# Patient Record
Sex: MALE | HISPANIC OR LATINO | URBAN - METROPOLITAN AREA
[De-identification: names, ages, dates, MRNs, and addresses within clinical notes are randomized per-mention and may not be internally consistent; named-entity substitution may affect disease eponyms.]

---

## 2020-01-01 ENCOUNTER — INPATIENT (INPATIENT)
Facility: HOSPITAL | Age: 60
LOS: 38 days | DRG: 207 | End: 2020-12-24
Attending: INTERNAL MEDICINE | Admitting: INTERNAL MEDICINE
Payer: MEDICAID

## 2020-01-01 VITALS
DIASTOLIC BLOOD PRESSURE: 95 MMHG | TEMPERATURE: 98 F | RESPIRATION RATE: 20 BRPM | OXYGEN SATURATION: 87 % | SYSTOLIC BLOOD PRESSURE: 145 MMHG | HEART RATE: 94 BPM

## 2020-01-01 VITALS — DIASTOLIC BLOOD PRESSURE: 25 MMHG | SYSTOLIC BLOOD PRESSURE: 35 MMHG

## 2020-01-01 DIAGNOSIS — I21.4 NON-ST ELEVATION (NSTEMI) MYOCARDIAL INFARCTION: ICD-10-CM

## 2020-01-01 DIAGNOSIS — R53.81 OTHER MALAISE: ICD-10-CM

## 2020-01-01 DIAGNOSIS — D63.8 ANEMIA IN OTHER CHRONIC DISEASES CLASSIFIED ELSEWHERE: ICD-10-CM

## 2020-01-01 DIAGNOSIS — J96.01 ACUTE RESPIRATORY FAILURE WITH HYPOXIA: ICD-10-CM

## 2020-01-01 DIAGNOSIS — U07.1 COVID-19: ICD-10-CM

## 2020-01-01 DIAGNOSIS — A41.9 SEPSIS, UNSPECIFIED ORGANISM: ICD-10-CM

## 2020-01-01 DIAGNOSIS — E87.5 HYPERKALEMIA: ICD-10-CM

## 2020-01-01 DIAGNOSIS — I50.9 HEART FAILURE, UNSPECIFIED: ICD-10-CM

## 2020-01-01 DIAGNOSIS — N17.9 ACUTE KIDNEY FAILURE, UNSPECIFIED: ICD-10-CM

## 2020-01-01 DIAGNOSIS — E87.1 HYPO-OSMOLALITY AND HYPONATREMIA: ICD-10-CM

## 2020-01-01 DIAGNOSIS — E87.8 OTHER DISORDERS OF ELECTROLYTE AND FLUID BALANCE, NOT ELSEWHERE CLASSIFIED: ICD-10-CM

## 2020-01-01 DIAGNOSIS — E43 UNSPECIFIED SEVERE PROTEIN-CALORIE MALNUTRITION: ICD-10-CM

## 2020-01-01 DIAGNOSIS — Z51.5 ENCOUNTER FOR PALLIATIVE CARE: ICD-10-CM

## 2020-01-01 LAB
-  AMIKACIN: SIGNIFICANT CHANGE UP
-  AMOXICILLIN/CLAVULANIC ACID: SIGNIFICANT CHANGE UP
-  AMPICILLIN/SULBACTAM: SIGNIFICANT CHANGE UP
-  AMPICILLIN: SIGNIFICANT CHANGE UP
-  AZTREONAM: SIGNIFICANT CHANGE UP
-  CEFAZOLIN: SIGNIFICANT CHANGE UP
-  CEFEPIME: SIGNIFICANT CHANGE UP
-  CEFOXITIN: SIGNIFICANT CHANGE UP
-  CEFTRIAXONE: SIGNIFICANT CHANGE UP
-  CIPROFLOXACIN: SIGNIFICANT CHANGE UP
-  ERTAPENEM: SIGNIFICANT CHANGE UP
-  GENTAMICIN: SIGNIFICANT CHANGE UP
-  IMIPENEM: SIGNIFICANT CHANGE UP
-  LEVOFLOXACIN: SIGNIFICANT CHANGE UP
-  MEROPENEM: SIGNIFICANT CHANGE UP
-  PIPERACILLIN/TAZOBACTAM: SIGNIFICANT CHANGE UP
-  TOBRAMYCIN: SIGNIFICANT CHANGE UP
-  TRIMETHOPRIM/SULFAMETHOXAZOLE: SIGNIFICANT CHANGE UP
24R-OH-CALCIDIOL SERPL-MCNC: 22.4 NG/ML — LOW (ref 30–80)
4/8 RATIO: 3.46 RATIO — SIGNIFICANT CHANGE UP (ref 0.9–3.6)
A1C WITH ESTIMATED AVERAGE GLUCOSE RESULT: 6 % — HIGH (ref 4–5.6)
ABS CD8: 90 /UL — LOW (ref 142–740)
ALBUMIN SERPL ELPH-MCNC: 1.6 G/DL — LOW (ref 3.5–5)
ALBUMIN SERPL ELPH-MCNC: 1.6 G/DL — LOW (ref 3.5–5)
ALBUMIN SERPL ELPH-MCNC: 1.8 G/DL — LOW (ref 3.5–5)
ALBUMIN SERPL ELPH-MCNC: 1.8 G/DL — LOW (ref 3.5–5)
ALBUMIN SERPL ELPH-MCNC: 2 G/DL — LOW (ref 3.5–5)
ALBUMIN SERPL ELPH-MCNC: 2 G/DL — LOW (ref 3.5–5)
ALBUMIN SERPL ELPH-MCNC: 2.1 G/DL — LOW (ref 3.5–5)
ALBUMIN SERPL ELPH-MCNC: 2.2 G/DL — LOW (ref 3.5–5)
ALBUMIN SERPL ELPH-MCNC: 2.3 G/DL — LOW (ref 3.5–5)
ALBUMIN SERPL ELPH-MCNC: 2.4 G/DL — LOW (ref 3.5–5)
ALBUMIN SERPL ELPH-MCNC: 2.5 G/DL — LOW (ref 3.5–5)
ALBUMIN SERPL ELPH-MCNC: 2.5 G/DL — LOW (ref 3.5–5)
ALBUMIN SERPL ELPH-MCNC: 2.9 G/DL — LOW (ref 3.5–5)
ALBUMIN SERPL ELPH-MCNC: 3 G/DL — LOW (ref 3.5–5)
ALBUMIN SERPL ELPH-MCNC: 3.3 G/DL — LOW (ref 3.5–5)
ALBUMIN SERPL ELPH-MCNC: 3.3 G/DL — LOW (ref 3.5–5)
ALBUMIN SERPL ELPH-MCNC: 3.5 G/DL — SIGNIFICANT CHANGE UP (ref 3.5–5)
ALP SERPL-CCNC: 104 U/L — SIGNIFICANT CHANGE UP (ref 40–120)
ALP SERPL-CCNC: 108 U/L — SIGNIFICANT CHANGE UP (ref 40–120)
ALP SERPL-CCNC: 108 U/L — SIGNIFICANT CHANGE UP (ref 40–120)
ALP SERPL-CCNC: 110 U/L — SIGNIFICANT CHANGE UP (ref 40–120)
ALP SERPL-CCNC: 110 U/L — SIGNIFICANT CHANGE UP (ref 40–120)
ALP SERPL-CCNC: 120 U/L — SIGNIFICANT CHANGE UP (ref 40–120)
ALP SERPL-CCNC: 122 U/L — HIGH (ref 40–120)
ALP SERPL-CCNC: 123 U/L — HIGH (ref 40–120)
ALP SERPL-CCNC: 124 U/L — HIGH (ref 40–120)
ALP SERPL-CCNC: 124 U/L — HIGH (ref 40–120)
ALP SERPL-CCNC: 125 U/L — HIGH (ref 40–120)
ALP SERPL-CCNC: 127 U/L — HIGH (ref 40–120)
ALP SERPL-CCNC: 130 U/L — HIGH (ref 40–120)
ALP SERPL-CCNC: 131 U/L — HIGH (ref 40–120)
ALP SERPL-CCNC: 133 U/L — HIGH (ref 40–120)
ALP SERPL-CCNC: 133 U/L — HIGH (ref 40–120)
ALP SERPL-CCNC: 139 U/L — HIGH (ref 40–120)
ALP SERPL-CCNC: 141 U/L — HIGH (ref 40–120)
ALP SERPL-CCNC: 143 U/L — HIGH (ref 40–120)
ALP SERPL-CCNC: 143 U/L — HIGH (ref 40–120)
ALP SERPL-CCNC: 148 U/L — HIGH (ref 40–120)
ALP SERPL-CCNC: 148 U/L — HIGH (ref 40–120)
ALP SERPL-CCNC: 151 U/L — HIGH (ref 40–120)
ALP SERPL-CCNC: 154 U/L — HIGH (ref 40–120)
ALP SERPL-CCNC: 157 U/L — HIGH (ref 40–120)
ALP SERPL-CCNC: 160 U/L — HIGH (ref 40–120)
ALP SERPL-CCNC: 165 U/L — HIGH (ref 40–120)
ALP SERPL-CCNC: 165 U/L — HIGH (ref 40–120)
ALP SERPL-CCNC: 167 U/L — HIGH (ref 40–120)
ALP SERPL-CCNC: 168 U/L — HIGH (ref 40–120)
ALP SERPL-CCNC: 172 U/L — HIGH (ref 40–120)
ALP SERPL-CCNC: 218 U/L — HIGH (ref 40–120)
ALP SERPL-CCNC: 236 U/L — HIGH (ref 40–120)
ALP SERPL-CCNC: 74 U/L — SIGNIFICANT CHANGE UP (ref 40–120)
ALP SERPL-CCNC: 80 U/L — SIGNIFICANT CHANGE UP (ref 40–120)
ALP SERPL-CCNC: 80 U/L — SIGNIFICANT CHANGE UP (ref 40–120)
ALP SERPL-CCNC: 84 U/L — SIGNIFICANT CHANGE UP (ref 40–120)
ALP SERPL-CCNC: 85 U/L — SIGNIFICANT CHANGE UP (ref 40–120)
ALP SERPL-CCNC: 89 U/L — SIGNIFICANT CHANGE UP (ref 40–120)
ALP SERPL-CCNC: 89 U/L — SIGNIFICANT CHANGE UP (ref 40–120)
ALT FLD-CCNC: 108 U/L DA — HIGH (ref 10–60)
ALT FLD-CCNC: 141 U/L DA — HIGH (ref 10–60)
ALT FLD-CCNC: 148 U/L DA — HIGH (ref 10–60)
ALT FLD-CCNC: 157 U/L DA — HIGH (ref 10–60)
ALT FLD-CCNC: 157 U/L DA — HIGH (ref 10–60)
ALT FLD-CCNC: 194 U/L DA — HIGH (ref 10–60)
ALT FLD-CCNC: 203 U/L DA — HIGH (ref 10–60)
ALT FLD-CCNC: 228 U/L DA — HIGH (ref 10–60)
ALT FLD-CCNC: 243 U/L DA — HIGH (ref 10–60)
ALT FLD-CCNC: 256 U/L DA — HIGH (ref 10–60)
ALT FLD-CCNC: 28 U/L DA — SIGNIFICANT CHANGE UP (ref 10–60)
ALT FLD-CCNC: 29 U/L DA — SIGNIFICANT CHANGE UP (ref 10–60)
ALT FLD-CCNC: 29 U/L DA — SIGNIFICANT CHANGE UP (ref 10–60)
ALT FLD-CCNC: 30 U/L DA — SIGNIFICANT CHANGE UP (ref 10–60)
ALT FLD-CCNC: 33 U/L DA — SIGNIFICANT CHANGE UP (ref 10–60)
ALT FLD-CCNC: 33 U/L DA — SIGNIFICANT CHANGE UP (ref 10–60)
ALT FLD-CCNC: 34 U/L DA — SIGNIFICANT CHANGE UP (ref 10–60)
ALT FLD-CCNC: 35 U/L DA — SIGNIFICANT CHANGE UP (ref 10–60)
ALT FLD-CCNC: 36 U/L DA — SIGNIFICANT CHANGE UP (ref 10–60)
ALT FLD-CCNC: 37 U/L DA — SIGNIFICANT CHANGE UP (ref 10–60)
ALT FLD-CCNC: 42 U/L DA — SIGNIFICANT CHANGE UP (ref 10–60)
ALT FLD-CCNC: 43 U/L DA — SIGNIFICANT CHANGE UP (ref 10–60)
ALT FLD-CCNC: 44 U/L DA — SIGNIFICANT CHANGE UP (ref 10–60)
ALT FLD-CCNC: 46 U/L DA — SIGNIFICANT CHANGE UP (ref 10–60)
ALT FLD-CCNC: 50 U/L DA — SIGNIFICANT CHANGE UP (ref 10–60)
ALT FLD-CCNC: 51 U/L DA — SIGNIFICANT CHANGE UP (ref 10–60)
ALT FLD-CCNC: 51 U/L DA — SIGNIFICANT CHANGE UP (ref 10–60)
ALT FLD-CCNC: 52 U/L DA — SIGNIFICANT CHANGE UP (ref 10–60)
ALT FLD-CCNC: 56 U/L DA — SIGNIFICANT CHANGE UP (ref 10–60)
ALT FLD-CCNC: 57 U/L DA — SIGNIFICANT CHANGE UP (ref 10–60)
ALT FLD-CCNC: 57 U/L DA — SIGNIFICANT CHANGE UP (ref 10–60)
ALT FLD-CCNC: 59 U/L DA — SIGNIFICANT CHANGE UP (ref 10–60)
ALT FLD-CCNC: 59 U/L DA — SIGNIFICANT CHANGE UP (ref 10–60)
ALT FLD-CCNC: 60 U/L DA — SIGNIFICANT CHANGE UP (ref 10–60)
ALT FLD-CCNC: 60 U/L DA — SIGNIFICANT CHANGE UP (ref 10–60)
ALT FLD-CCNC: 69 U/L DA — HIGH (ref 10–60)
ALT FLD-CCNC: 69 U/L DA — HIGH (ref 10–60)
ALT FLD-CCNC: 70 U/L DA — HIGH (ref 10–60)
ALT FLD-CCNC: 80 U/L DA — HIGH (ref 10–60)
ALT FLD-CCNC: 85 U/L DA — HIGH (ref 10–60)
ANION GAP SERPL CALC-SCNC: 10 MMOL/L — SIGNIFICANT CHANGE UP (ref 5–17)
ANION GAP SERPL CALC-SCNC: 11 MMOL/L — SIGNIFICANT CHANGE UP (ref 5–17)
ANION GAP SERPL CALC-SCNC: 12 MMOL/L — SIGNIFICANT CHANGE UP (ref 5–17)
ANION GAP SERPL CALC-SCNC: 13 MMOL/L — SIGNIFICANT CHANGE UP (ref 5–17)
ANION GAP SERPL CALC-SCNC: 14 MMOL/L — SIGNIFICANT CHANGE UP (ref 5–17)
ANION GAP SERPL CALC-SCNC: 15 MMOL/L — SIGNIFICANT CHANGE UP (ref 5–17)
ANION GAP SERPL CALC-SCNC: 16 MMOL/L — SIGNIFICANT CHANGE UP (ref 5–17)
ANION GAP SERPL CALC-SCNC: 16 MMOL/L — SIGNIFICANT CHANGE UP (ref 5–17)
ANION GAP SERPL CALC-SCNC: 17 MMOL/L — SIGNIFICANT CHANGE UP (ref 5–17)
ANION GAP SERPL CALC-SCNC: 8 MMOL/L — SIGNIFICANT CHANGE UP (ref 5–17)
ANION GAP SERPL CALC-SCNC: 9 MMOL/L — SIGNIFICANT CHANGE UP (ref 5–17)
ANISOCYTOSIS BLD QL: SLIGHT — SIGNIFICANT CHANGE UP
APTT BLD: 100.8 SEC — HIGH (ref 27.5–35.5)
APTT BLD: 105.3 SEC — HIGH (ref 27.5–35.5)
APTT BLD: 117.8 SEC — HIGH (ref 27.5–35.5)
APTT BLD: 126.6 SEC — CRITICAL HIGH (ref 27.5–35.5)
APTT BLD: 128.2 SEC — CRITICAL HIGH (ref 27.5–35.5)
APTT BLD: 142.4 SEC — CRITICAL HIGH (ref 27.5–35.5)
APTT BLD: 144.2 SEC — CRITICAL HIGH (ref 27.5–35.5)
APTT BLD: 154.4 SEC — CRITICAL HIGH (ref 27.5–35.5)
APTT BLD: 169.4 SEC — CRITICAL HIGH (ref 27.5–35.5)
APTT BLD: 195.4 SEC — CRITICAL HIGH (ref 27.5–35.5)
APTT BLD: 23.9 SEC — LOW (ref 27.5–35.5)
APTT BLD: 24.8 SEC — LOW (ref 27.5–35.5)
APTT BLD: 27 SEC — LOW (ref 27.5–35.5)
APTT BLD: 27.2 SEC — LOW (ref 27.5–35.5)
APTT BLD: 27.2 SEC — LOW (ref 27.5–35.5)
APTT BLD: 29.2 SEC — SIGNIFICANT CHANGE UP (ref 27.5–35.5)
APTT BLD: 29.8 SEC — SIGNIFICANT CHANGE UP (ref 27.5–35.5)
APTT BLD: 29.8 SEC — SIGNIFICANT CHANGE UP (ref 27.5–35.5)
APTT BLD: 31.4 SEC — SIGNIFICANT CHANGE UP (ref 27.5–35.5)
APTT BLD: 31.8 SEC — SIGNIFICANT CHANGE UP (ref 27.5–35.5)
APTT BLD: 34.1 SEC — SIGNIFICANT CHANGE UP (ref 27.5–35.5)
APTT BLD: 45.3 SEC — HIGH (ref 27.5–35.5)
APTT BLD: 47.2 SEC — HIGH (ref 27.5–35.5)
APTT BLD: 48.2 SEC — HIGH (ref 27.5–35.5)
APTT BLD: 49.1 SEC — HIGH (ref 27.5–35.5)
APTT BLD: 50.1 SEC — HIGH (ref 27.5–35.5)
APTT BLD: 50.8 SEC — HIGH (ref 27.5–35.5)
APTT BLD: 52.5 SEC — HIGH (ref 27.5–35.5)
APTT BLD: 57.7 SEC — HIGH (ref 27.5–35.5)
APTT BLD: 58.5 SEC — HIGH (ref 27.5–35.5)
APTT BLD: 58.8 SEC — HIGH (ref 27.5–35.5)
APTT BLD: 59.2 SEC — HIGH (ref 27.5–35.5)
APTT BLD: 61.1 SEC — HIGH (ref 27.5–35.5)
APTT BLD: 61.3 SEC — HIGH (ref 27.5–35.5)
APTT BLD: 61.8 SEC — HIGH (ref 27.5–35.5)
APTT BLD: 65.5 SEC — HIGH (ref 27.5–35.5)
APTT BLD: 65.5 SEC — HIGH (ref 27.5–35.5)
APTT BLD: 67 SEC — HIGH (ref 27.5–35.5)
APTT BLD: 70.9 SEC — HIGH (ref 27.5–35.5)
APTT BLD: 71.1 SEC — HIGH (ref 27.5–35.5)
APTT BLD: 73.6 SEC — HIGH (ref 27.5–35.5)
APTT BLD: 73.7 SEC — HIGH (ref 27.5–35.5)
APTT BLD: 76.7 SEC — HIGH (ref 27.5–35.5)
APTT BLD: 78.5 SEC — HIGH (ref 27.5–35.5)
APTT BLD: 78.6 SEC — HIGH (ref 27.5–35.5)
APTT BLD: 80.1 SEC — HIGH (ref 27.5–35.5)
APTT BLD: 81.2 SEC — HIGH (ref 27.5–35.5)
APTT BLD: 83.2 SEC — HIGH (ref 27.5–35.5)
APTT BLD: 83.7 SEC — HIGH (ref 27.5–35.5)
APTT BLD: 84.9 SEC — HIGH (ref 27.5–35.5)
APTT BLD: 85.4 SEC — HIGH (ref 27.5–35.5)
APTT BLD: 85.7 SEC — HIGH (ref 27.5–35.5)
APTT BLD: 88.7 SEC — HIGH (ref 27.5–35.5)
APTT BLD: 88.8 SEC — HIGH (ref 27.5–35.5)
APTT BLD: 89.2 SEC — HIGH (ref 27.5–35.5)
APTT BLD: 91.8 SEC — HIGH (ref 27.5–35.5)
APTT BLD: 92.1 SEC — HIGH (ref 27.5–35.5)
APTT BLD: 93.9 SEC — HIGH (ref 27.5–35.5)
APTT BLD: 97.5 SEC — HIGH (ref 27.5–35.5)
APTT BLD: 99.1 SEC — HIGH (ref 27.5–35.5)
AST SERPL-CCNC: 101 U/L — HIGH (ref 10–40)
AST SERPL-CCNC: 118 U/L — HIGH (ref 10–40)
AST SERPL-CCNC: 119 U/L — HIGH (ref 10–40)
AST SERPL-CCNC: 146 U/L — HIGH (ref 10–40)
AST SERPL-CCNC: 149 U/L — HIGH (ref 10–40)
AST SERPL-CCNC: 150 U/L — HIGH (ref 10–40)
AST SERPL-CCNC: 176 U/L — HIGH (ref 10–40)
AST SERPL-CCNC: 180 U/L — HIGH (ref 10–40)
AST SERPL-CCNC: 190 U/L — HIGH (ref 10–40)
AST SERPL-CCNC: 202 U/L — HIGH (ref 10–40)
AST SERPL-CCNC: 248 U/L — HIGH (ref 10–40)
AST SERPL-CCNC: 334 U/L — HIGH (ref 10–40)
AST SERPL-CCNC: 36 U/L — SIGNIFICANT CHANGE UP (ref 10–40)
AST SERPL-CCNC: 37 U/L — SIGNIFICANT CHANGE UP (ref 10–40)
AST SERPL-CCNC: 377 U/L — HIGH (ref 10–40)
AST SERPL-CCNC: 38 U/L — SIGNIFICANT CHANGE UP (ref 10–40)
AST SERPL-CCNC: 38 U/L — SIGNIFICANT CHANGE UP (ref 10–40)
AST SERPL-CCNC: 43 U/L — HIGH (ref 10–40)
AST SERPL-CCNC: 45 U/L — HIGH (ref 10–40)
AST SERPL-CCNC: 45 U/L — HIGH (ref 10–40)
AST SERPL-CCNC: 47 U/L — HIGH (ref 10–40)
AST SERPL-CCNC: 49 U/L — HIGH (ref 10–40)
AST SERPL-CCNC: 50 U/L — HIGH (ref 10–40)
AST SERPL-CCNC: 51 U/L — HIGH (ref 10–40)
AST SERPL-CCNC: 51 U/L — HIGH (ref 10–40)
AST SERPL-CCNC: 56 U/L — HIGH (ref 10–40)
AST SERPL-CCNC: 56 U/L — HIGH (ref 10–40)
AST SERPL-CCNC: 57 U/L — HIGH (ref 10–40)
AST SERPL-CCNC: 60 U/L — HIGH (ref 10–40)
AST SERPL-CCNC: 60 U/L — HIGH (ref 10–40)
AST SERPL-CCNC: 61 U/L — HIGH (ref 10–40)
AST SERPL-CCNC: 63 U/L — HIGH (ref 10–40)
AST SERPL-CCNC: 66 U/L — HIGH (ref 10–40)
AST SERPL-CCNC: 67 U/L — HIGH (ref 10–40)
AST SERPL-CCNC: 68 U/L — HIGH (ref 10–40)
AST SERPL-CCNC: 73 U/L — HIGH (ref 10–40)
AST SERPL-CCNC: 73 U/L — HIGH (ref 10–40)
AST SERPL-CCNC: 74 U/L — HIGH (ref 10–40)
AST SERPL-CCNC: 82 U/L — HIGH (ref 10–40)
AST SERPL-CCNC: 87 U/L — HIGH (ref 10–40)
AST SERPL-CCNC: 88 U/L — HIGH (ref 10–40)
AST SERPL-CCNC: 96 U/L — HIGH (ref 10–40)
BASE EXCESS BLDA CALC-SCNC: -0.3 MMOL/L — SIGNIFICANT CHANGE UP (ref -2–2)
BASE EXCESS BLDA CALC-SCNC: -0.4 MMOL/L — SIGNIFICANT CHANGE UP (ref -2–2)
BASE EXCESS BLDA CALC-SCNC: -0.5 MMOL/L — SIGNIFICANT CHANGE UP (ref -2–2)
BASE EXCESS BLDA CALC-SCNC: -0.6 MMOL/L — SIGNIFICANT CHANGE UP (ref -2–2)
BASE EXCESS BLDA CALC-SCNC: -0.7 MMOL/L — SIGNIFICANT CHANGE UP (ref -2–2)
BASE EXCESS BLDA CALC-SCNC: -1.2 MMOL/L — SIGNIFICANT CHANGE UP (ref -2–2)
BASE EXCESS BLDA CALC-SCNC: -1.4 MMOL/L — SIGNIFICANT CHANGE UP (ref -2–2)
BASE EXCESS BLDA CALC-SCNC: -1.4 MMOL/L — SIGNIFICANT CHANGE UP (ref -2–2)
BASE EXCESS BLDA CALC-SCNC: -1.8 MMOL/L — SIGNIFICANT CHANGE UP (ref -2–2)
BASE EXCESS BLDA CALC-SCNC: -1.9 MMOL/L — SIGNIFICANT CHANGE UP (ref -2–2)
BASE EXCESS BLDA CALC-SCNC: -10 MMOL/L — LOW (ref -2–2)
BASE EXCESS BLDA CALC-SCNC: -10.3 MMOL/L — LOW (ref -2–2)
BASE EXCESS BLDA CALC-SCNC: -10.9 MMOL/L — LOW (ref -2–2)
BASE EXCESS BLDA CALC-SCNC: -11.2 MMOL/L — LOW (ref -2–2)
BASE EXCESS BLDA CALC-SCNC: -11.5 MMOL/L — LOW (ref -2–2)
BASE EXCESS BLDA CALC-SCNC: -13.8 MMOL/L — LOW (ref -2–2)
BASE EXCESS BLDA CALC-SCNC: -15.2 MMOL/L — LOW (ref -2–2)
BASE EXCESS BLDA CALC-SCNC: -2 MMOL/L — SIGNIFICANT CHANGE UP (ref -2–2)
BASE EXCESS BLDA CALC-SCNC: -2.1 MMOL/L — LOW (ref -2–2)
BASE EXCESS BLDA CALC-SCNC: -2.1 MMOL/L — LOW (ref -2–2)
BASE EXCESS BLDA CALC-SCNC: -2.2 MMOL/L — LOW (ref -2–2)
BASE EXCESS BLDA CALC-SCNC: -2.4 MMOL/L — LOW (ref -2–2)
BASE EXCESS BLDA CALC-SCNC: -2.5 MMOL/L — LOW (ref -2–2)
BASE EXCESS BLDA CALC-SCNC: -2.6 MMOL/L — LOW (ref -2–2)
BASE EXCESS BLDA CALC-SCNC: -2.9 MMOL/L — LOW (ref -2–2)
BASE EXCESS BLDA CALC-SCNC: -3.3 MMOL/L — LOW (ref -2–2)
BASE EXCESS BLDA CALC-SCNC: -3.4 MMOL/L — LOW (ref -2–2)
BASE EXCESS BLDA CALC-SCNC: -3.6 MMOL/L — LOW (ref -2–2)
BASE EXCESS BLDA CALC-SCNC: -3.6 MMOL/L — LOW (ref -2–2)
BASE EXCESS BLDA CALC-SCNC: -3.8 MMOL/L — LOW (ref -2–2)
BASE EXCESS BLDA CALC-SCNC: -4 MMOL/L — LOW (ref -2–2)
BASE EXCESS BLDA CALC-SCNC: -4.2 MMOL/L — LOW (ref -2–2)
BASE EXCESS BLDA CALC-SCNC: -4.3 MMOL/L — LOW (ref -2–2)
BASE EXCESS BLDA CALC-SCNC: -4.6 MMOL/L — LOW (ref -2–2)
BASE EXCESS BLDA CALC-SCNC: -4.7 MMOL/L — LOW (ref -2–2)
BASE EXCESS BLDA CALC-SCNC: -4.9 MMOL/L — LOW (ref -2–2)
BASE EXCESS BLDA CALC-SCNC: -5.1 MMOL/L — LOW (ref -2–2)
BASE EXCESS BLDA CALC-SCNC: -5.1 MMOL/L — LOW (ref -2–2)
BASE EXCESS BLDA CALC-SCNC: -5.2 MMOL/L — LOW (ref -2–2)
BASE EXCESS BLDA CALC-SCNC: -5.3 MMOL/L — LOW (ref -2–2)
BASE EXCESS BLDA CALC-SCNC: -5.3 MMOL/L — LOW (ref -2–2)
BASE EXCESS BLDA CALC-SCNC: -5.8 MMOL/L — LOW (ref -2–2)
BASE EXCESS BLDA CALC-SCNC: -5.9 MMOL/L — LOW (ref -2–2)
BASE EXCESS BLDA CALC-SCNC: -5.9 MMOL/L — LOW (ref -2–2)
BASE EXCESS BLDA CALC-SCNC: -6 MMOL/L — LOW (ref -2–2)
BASE EXCESS BLDA CALC-SCNC: -6 MMOL/L — LOW (ref -2–2)
BASE EXCESS BLDA CALC-SCNC: -6.1 MMOL/L — LOW (ref -2–2)
BASE EXCESS BLDA CALC-SCNC: -6.2 MMOL/L — LOW (ref -2–2)
BASE EXCESS BLDA CALC-SCNC: -6.2 MMOL/L — LOW (ref -2–2)
BASE EXCESS BLDA CALC-SCNC: -6.3 MMOL/L — LOW (ref -2–2)
BASE EXCESS BLDA CALC-SCNC: -6.5 MMOL/L — LOW (ref -2–2)
BASE EXCESS BLDA CALC-SCNC: -6.6 MMOL/L — LOW (ref -2–2)
BASE EXCESS BLDA CALC-SCNC: -6.7 MMOL/L — LOW (ref -2–2)
BASE EXCESS BLDA CALC-SCNC: -6.9 MMOL/L — LOW (ref -2–2)
BASE EXCESS BLDA CALC-SCNC: -7 MMOL/L — LOW (ref -2–2)
BASE EXCESS BLDA CALC-SCNC: -7 MMOL/L — LOW (ref -2–2)
BASE EXCESS BLDA CALC-SCNC: -7.2 MMOL/L — LOW (ref -2–2)
BASE EXCESS BLDA CALC-SCNC: -7.2 MMOL/L — LOW (ref -2–2)
BASE EXCESS BLDA CALC-SCNC: -7.3 MMOL/L — LOW (ref -2–2)
BASE EXCESS BLDA CALC-SCNC: -7.3 MMOL/L — LOW (ref -2–2)
BASE EXCESS BLDA CALC-SCNC: -7.9 MMOL/L — LOW (ref -2–2)
BASE EXCESS BLDA CALC-SCNC: -7.9 MMOL/L — LOW (ref -2–2)
BASE EXCESS BLDA CALC-SCNC: -8 MMOL/L — LOW (ref -2–2)
BASE EXCESS BLDA CALC-SCNC: -8.1 MMOL/L — LOW (ref -2–2)
BASE EXCESS BLDA CALC-SCNC: -8.2 MMOL/L — LOW (ref -2–2)
BASE EXCESS BLDA CALC-SCNC: -8.4 MMOL/L — LOW (ref -2–2)
BASE EXCESS BLDA CALC-SCNC: -8.5 MMOL/L — LOW (ref -2–2)
BASE EXCESS BLDA CALC-SCNC: -8.9 MMOL/L — LOW (ref -2–2)
BASE EXCESS BLDA CALC-SCNC: -9 MMOL/L — LOW (ref -2–2)
BASE EXCESS BLDA CALC-SCNC: -9.2 MMOL/L — LOW (ref -2–2)
BASE EXCESS BLDA CALC-SCNC: -9.5 MMOL/L — LOW (ref -2–2)
BASE EXCESS BLDA CALC-SCNC: 0.4 MMOL/L — SIGNIFICANT CHANGE UP (ref -2–2)
BASE EXCESS BLDA CALC-SCNC: 1.5 MMOL/L — SIGNIFICANT CHANGE UP (ref -2–2)
BASE EXCESS BLDA CALC-SCNC: 1.6 MMOL/L — SIGNIFICANT CHANGE UP (ref -2–2)
BASE EXCESS BLDA CALC-SCNC: 1.6 MMOL/L — SIGNIFICANT CHANGE UP (ref -2–2)
BASE EXCESS BLDA CALC-SCNC: 2.5 MMOL/L — HIGH (ref -2–2)
BASE EXCESS BLDA CALC-SCNC: 2.8 MMOL/L — HIGH (ref -2–2)
BASE EXCESS BLDA CALC-SCNC: 3.6 MMOL/L — HIGH (ref -2–2)
BASE EXCESS BLDA CALC-SCNC: 4.2 MMOL/L — HIGH (ref -2–2)
BASE EXCESS BLDA CALC-SCNC: 5.3 MMOL/L — HIGH (ref -2–2)
BASE EXCESS BLDV CALC-SCNC: -2.2 MMOL/L — LOW (ref -2–2)
BASE EXCESS BLDV CALC-SCNC: -9.1 MMOL/L — LOW (ref -2–2)
BASE EXCESS BLDV CALC-SCNC: -9.2 MMOL/L — LOW (ref -2–2)
BASE EXCESS BLDV CALC-SCNC: 11.8 MMOL/L — HIGH (ref -2–2)
BASO STIPL BLD QL SMEAR: PRESENT — SIGNIFICANT CHANGE UP
BASOPHILS # BLD AUTO: 0 K/UL — SIGNIFICANT CHANGE UP (ref 0–0.2)
BASOPHILS # BLD AUTO: 0.01 K/UL — SIGNIFICANT CHANGE UP (ref 0–0.2)
BASOPHILS # BLD AUTO: 0.01 K/UL — SIGNIFICANT CHANGE UP (ref 0–0.2)
BASOPHILS # BLD AUTO: 0.02 K/UL — SIGNIFICANT CHANGE UP (ref 0–0.2)
BASOPHILS # BLD AUTO: 0.03 K/UL — SIGNIFICANT CHANGE UP (ref 0–0.2)
BASOPHILS # BLD AUTO: 0.05 K/UL — SIGNIFICANT CHANGE UP (ref 0–0.2)
BASOPHILS # BLD AUTO: 0.05 K/UL — SIGNIFICANT CHANGE UP (ref 0–0.2)
BASOPHILS # BLD AUTO: 0.06 K/UL — SIGNIFICANT CHANGE UP (ref 0–0.2)
BASOPHILS # BLD AUTO: 0.07 K/UL — SIGNIFICANT CHANGE UP (ref 0–0.2)
BASOPHILS NFR BLD AUTO: 0 % — SIGNIFICANT CHANGE UP (ref 0–2)
BASOPHILS NFR BLD AUTO: 0.1 % — SIGNIFICANT CHANGE UP (ref 0–2)
BASOPHILS NFR BLD AUTO: 0.1 % — SIGNIFICANT CHANGE UP (ref 0–2)
BASOPHILS NFR BLD AUTO: 0.2 % — SIGNIFICANT CHANGE UP (ref 0–2)
BASOPHILS NFR BLD AUTO: 0.2 % — SIGNIFICANT CHANGE UP (ref 0–2)
BASOPHILS NFR BLD AUTO: 0.3 % — SIGNIFICANT CHANGE UP (ref 0–2)
BASOPHILS NFR BLD AUTO: 0.3 % — SIGNIFICANT CHANGE UP (ref 0–2)
BASOPHILS NFR BLD AUTO: 0.4 % — SIGNIFICANT CHANGE UP (ref 0–2)
BASOPHILS NFR BLD AUTO: 0.5 % — SIGNIFICANT CHANGE UP (ref 0–2)
BILIRUB SERPL-MCNC: 0.5 MG/DL — SIGNIFICANT CHANGE UP (ref 0.2–1.2)
BILIRUB SERPL-MCNC: 0.6 MG/DL — SIGNIFICANT CHANGE UP (ref 0.2–1.2)
BILIRUB SERPL-MCNC: 0.6 MG/DL — SIGNIFICANT CHANGE UP (ref 0.2–1.2)
BILIRUB SERPL-MCNC: 0.7 MG/DL — SIGNIFICANT CHANGE UP (ref 0.2–1.2)
BILIRUB SERPL-MCNC: 0.8 MG/DL — SIGNIFICANT CHANGE UP (ref 0.2–1.2)
BILIRUB SERPL-MCNC: 0.9 MG/DL — SIGNIFICANT CHANGE UP (ref 0.2–1.2)
BILIRUB SERPL-MCNC: 1 MG/DL — SIGNIFICANT CHANGE UP (ref 0.2–1.2)
BILIRUB SERPL-MCNC: 1.1 MG/DL — SIGNIFICANT CHANGE UP (ref 0.2–1.2)
BILIRUB SERPL-MCNC: 1.3 MG/DL — HIGH (ref 0.2–1.2)
BLD GP AB SCN SERPL QL: SIGNIFICANT CHANGE UP
BLOOD GAS COMMENTS ARTERIAL: SIGNIFICANT CHANGE UP
BLOOD GAS COMMENTS, VENOUS: SIGNIFICANT CHANGE UP
BUN SERPL-MCNC: 15 MG/DL — SIGNIFICANT CHANGE UP (ref 7–18)
BUN SERPL-MCNC: 19 MG/DL — HIGH (ref 7–18)
BUN SERPL-MCNC: 24 MG/DL — HIGH (ref 7–18)
BUN SERPL-MCNC: 25 MG/DL — HIGH (ref 7–18)
BUN SERPL-MCNC: 25 MG/DL — HIGH (ref 7–18)
BUN SERPL-MCNC: 27 MG/DL — HIGH (ref 7–18)
BUN SERPL-MCNC: 32 MG/DL — HIGH (ref 7–18)
BUN SERPL-MCNC: 35 MG/DL — HIGH (ref 7–18)
BUN SERPL-MCNC: 37 MG/DL — HIGH (ref 7–18)
BUN SERPL-MCNC: 38 MG/DL — HIGH (ref 7–18)
BUN SERPL-MCNC: 39 MG/DL — HIGH (ref 7–18)
BUN SERPL-MCNC: 40 MG/DL — HIGH (ref 7–18)
BUN SERPL-MCNC: 41 MG/DL — HIGH (ref 7–18)
BUN SERPL-MCNC: 42 MG/DL — HIGH (ref 7–18)
BUN SERPL-MCNC: 43 MG/DL — HIGH (ref 7–18)
BUN SERPL-MCNC: 46 MG/DL — HIGH (ref 7–18)
BUN SERPL-MCNC: 49 MG/DL — HIGH (ref 7–18)
BUN SERPL-MCNC: 52 MG/DL — HIGH (ref 7–18)
BUN SERPL-MCNC: 52 MG/DL — HIGH (ref 7–18)
BUN SERPL-MCNC: 53 MG/DL — HIGH (ref 7–18)
BUN SERPL-MCNC: 54 MG/DL — HIGH (ref 7–18)
BUN SERPL-MCNC: 54 MG/DL — HIGH (ref 7–18)
BUN SERPL-MCNC: 56 MG/DL — HIGH (ref 7–18)
BUN SERPL-MCNC: 57 MG/DL — HIGH (ref 7–18)
BUN SERPL-MCNC: 58 MG/DL — HIGH (ref 7–18)
BUN SERPL-MCNC: 58 MG/DL — HIGH (ref 7–18)
BUN SERPL-MCNC: 61 MG/DL — HIGH (ref 7–18)
BUN SERPL-MCNC: 61 MG/DL — HIGH (ref 7–18)
BUN SERPL-MCNC: 62 MG/DL — HIGH (ref 7–18)
BUN SERPL-MCNC: 66 MG/DL — HIGH (ref 7–18)
BUN SERPL-MCNC: 66 MG/DL — HIGH (ref 7–18)
BUN SERPL-MCNC: 67 MG/DL — HIGH (ref 7–18)
BUN SERPL-MCNC: 69 MG/DL — HIGH (ref 7–18)
BUN SERPL-MCNC: 74 MG/DL — HIGH (ref 7–18)
BUN SERPL-MCNC: 78 MG/DL — HIGH (ref 7–18)
BUN SERPL-MCNC: 92 MG/DL — HIGH (ref 7–18)
CALCIUM SERPL-MCNC: 10.2 MG/DL — SIGNIFICANT CHANGE UP (ref 8.4–10.5)
CALCIUM SERPL-MCNC: 6.4 MG/DL — CRITICAL LOW (ref 8.4–10.5)
CALCIUM SERPL-MCNC: 7.4 MG/DL — LOW (ref 8.4–10.5)
CALCIUM SERPL-MCNC: 7.4 MG/DL — LOW (ref 8.4–10.5)
CALCIUM SERPL-MCNC: 7.5 MG/DL — LOW (ref 8.4–10.5)
CALCIUM SERPL-MCNC: 7.5 MG/DL — LOW (ref 8.4–10.5)
CALCIUM SERPL-MCNC: 7.6 MG/DL — LOW (ref 8.4–10.5)
CALCIUM SERPL-MCNC: 7.7 MG/DL — LOW (ref 8.4–10.5)
CALCIUM SERPL-MCNC: 7.8 MG/DL — LOW (ref 8.4–10.5)
CALCIUM SERPL-MCNC: 7.9 MG/DL — LOW (ref 8.4–10.5)
CALCIUM SERPL-MCNC: 8.1 MG/DL — LOW (ref 8.4–10.5)
CALCIUM SERPL-MCNC: 8.1 MG/DL — LOW (ref 8.4–10.5)
CALCIUM SERPL-MCNC: 8.2 MG/DL — LOW (ref 8.4–10.5)
CALCIUM SERPL-MCNC: 8.3 MG/DL — LOW (ref 8.4–10.5)
CALCIUM SERPL-MCNC: 8.3 MG/DL — LOW (ref 8.4–10.5)
CALCIUM SERPL-MCNC: 8.4 MG/DL — SIGNIFICANT CHANGE UP (ref 8.4–10.5)
CALCIUM SERPL-MCNC: 8.5 MG/DL — SIGNIFICANT CHANGE UP (ref 8.4–10.5)
CALCIUM SERPL-MCNC: 8.5 MG/DL — SIGNIFICANT CHANGE UP (ref 8.4–10.5)
CALCIUM SERPL-MCNC: 8.6 MG/DL — SIGNIFICANT CHANGE UP (ref 8.4–10.5)
CALCIUM SERPL-MCNC: 8.7 MG/DL — SIGNIFICANT CHANGE UP (ref 8.4–10.5)
CALCIUM SERPL-MCNC: 8.8 MG/DL — SIGNIFICANT CHANGE UP (ref 8.4–10.5)
CALCIUM SERPL-MCNC: 8.8 MG/DL — SIGNIFICANT CHANGE UP (ref 8.4–10.5)
CALCIUM SERPL-MCNC: 8.9 MG/DL — SIGNIFICANT CHANGE UP (ref 8.4–10.5)
CALCIUM SERPL-MCNC: 9.1 MG/DL — SIGNIFICANT CHANGE UP (ref 8.4–10.5)
CALCIUM SERPL-MCNC: 9.1 MG/DL — SIGNIFICANT CHANGE UP (ref 8.4–10.5)
CALCIUM SERPL-MCNC: 9.2 MG/DL — SIGNIFICANT CHANGE UP (ref 8.4–10.5)
CALCIUM SERPL-MCNC: 9.2 MG/DL — SIGNIFICANT CHANGE UP (ref 8.4–10.5)
CALCIUM SERPL-MCNC: 9.4 MG/DL — SIGNIFICANT CHANGE UP (ref 8.4–10.5)
CALCIUM SERPL-MCNC: 9.5 MG/DL — SIGNIFICANT CHANGE UP (ref 8.4–10.5)
CALCIUM SERPL-MCNC: 9.5 MG/DL — SIGNIFICANT CHANGE UP (ref 8.4–10.5)
CALCIUM SERPL-MCNC: 9.6 MG/DL — SIGNIFICANT CHANGE UP (ref 8.4–10.5)
CALCIUM SERPL-MCNC: 9.7 MG/DL — SIGNIFICANT CHANGE UP (ref 8.4–10.5)
CALCIUM SERPL-MCNC: 9.8 MG/DL — SIGNIFICANT CHANGE UP (ref 8.4–10.5)
CALCIUM SERPL-MCNC: 9.9 MG/DL — SIGNIFICANT CHANGE UP (ref 8.4–10.5)
CD3 BLASTS SPEC-ACNC: 420 /UL — LOW (ref 672–1870)
CD3 BLASTS SPEC-ACNC: 67 % — SIGNIFICANT CHANGE UP (ref 59–83)
CD4 %: 50 % — SIGNIFICANT CHANGE UP (ref 30–62)
CD8 %: 14 % — SIGNIFICANT CHANGE UP (ref 12–36)
CHLORIDE SERPL-SCNC: 100 MMOL/L — SIGNIFICANT CHANGE UP (ref 96–108)
CHLORIDE SERPL-SCNC: 100 MMOL/L — SIGNIFICANT CHANGE UP (ref 96–108)
CHLORIDE SERPL-SCNC: 101 MMOL/L — SIGNIFICANT CHANGE UP (ref 96–108)
CHLORIDE SERPL-SCNC: 102 MMOL/L — SIGNIFICANT CHANGE UP (ref 96–108)
CHLORIDE SERPL-SCNC: 102 MMOL/L — SIGNIFICANT CHANGE UP (ref 96–108)
CHLORIDE SERPL-SCNC: 103 MMOL/L — SIGNIFICANT CHANGE UP (ref 96–108)
CHLORIDE SERPL-SCNC: 105 MMOL/L — SIGNIFICANT CHANGE UP (ref 96–108)
CHLORIDE SERPL-SCNC: 89 MMOL/L — LOW (ref 96–108)
CHLORIDE SERPL-SCNC: 90 MMOL/L — LOW (ref 96–108)
CHLORIDE SERPL-SCNC: 91 MMOL/L — LOW (ref 96–108)
CHLORIDE SERPL-SCNC: 92 MMOL/L — LOW (ref 96–108)
CHLORIDE SERPL-SCNC: 93 MMOL/L — LOW (ref 96–108)
CHLORIDE SERPL-SCNC: 94 MMOL/L — LOW (ref 96–108)
CHLORIDE SERPL-SCNC: 94 MMOL/L — LOW (ref 96–108)
CHLORIDE SERPL-SCNC: 95 MMOL/L — LOW (ref 96–108)
CHLORIDE SERPL-SCNC: 96 MMOL/L — SIGNIFICANT CHANGE UP (ref 96–108)
CHLORIDE SERPL-SCNC: 97 MMOL/L — SIGNIFICANT CHANGE UP (ref 96–108)
CHLORIDE SERPL-SCNC: 98 MMOL/L — SIGNIFICANT CHANGE UP (ref 96–108)
CHLORIDE SERPL-SCNC: 99 MMOL/L — SIGNIFICANT CHANGE UP (ref 96–108)
CK MB BLD-MCNC: 1.1 % — SIGNIFICANT CHANGE UP (ref 0–3.5)
CK MB CFR SERPL CALC: 1.6 NG/ML — SIGNIFICANT CHANGE UP (ref 0–3.6)
CK SERPL-CCNC: 144 U/L — SIGNIFICANT CHANGE UP (ref 35–232)
CK SERPL-CCNC: 147 U/L — SIGNIFICANT CHANGE UP (ref 35–232)
CK SERPL-CCNC: 407 U/L — HIGH (ref 35–232)
CK SERPL-CCNC: 740 U/L — HIGH (ref 35–232)
CK SERPL-CCNC: 88 U/L — SIGNIFICANT CHANGE UP (ref 35–232)
CO2 SERPL-SCNC: 21 MMOL/L — LOW (ref 22–31)
CO2 SERPL-SCNC: 21 MMOL/L — LOW (ref 22–31)
CO2 SERPL-SCNC: 22 MMOL/L — SIGNIFICANT CHANGE UP (ref 22–31)
CO2 SERPL-SCNC: 22 MMOL/L — SIGNIFICANT CHANGE UP (ref 22–31)
CO2 SERPL-SCNC: 23 MMOL/L — SIGNIFICANT CHANGE UP (ref 22–31)
CO2 SERPL-SCNC: 24 MMOL/L — SIGNIFICANT CHANGE UP (ref 22–31)
CO2 SERPL-SCNC: 25 MMOL/L — SIGNIFICANT CHANGE UP (ref 22–31)
CO2 SERPL-SCNC: 26 MMOL/L — SIGNIFICANT CHANGE UP (ref 22–31)
CO2 SERPL-SCNC: 27 MMOL/L — SIGNIFICANT CHANGE UP (ref 22–31)
CO2 SERPL-SCNC: 28 MMOL/L — SIGNIFICANT CHANGE UP (ref 22–31)
CO2 SERPL-SCNC: 29 MMOL/L — SIGNIFICANT CHANGE UP (ref 22–31)
CO2 SERPL-SCNC: 30 MMOL/L — SIGNIFICANT CHANGE UP (ref 22–31)
CO2 SERPL-SCNC: 31 MMOL/L — SIGNIFICANT CHANGE UP (ref 22–31)
CO2 SERPL-SCNC: 32 MMOL/L — HIGH (ref 22–31)
CREAT SERPL-MCNC: 1.1 MG/DL — SIGNIFICANT CHANGE UP (ref 0.5–1.3)
CREAT SERPL-MCNC: 1.11 MG/DL — SIGNIFICANT CHANGE UP (ref 0.5–1.3)
CREAT SERPL-MCNC: 1.14 MG/DL — SIGNIFICANT CHANGE UP (ref 0.5–1.3)
CREAT SERPL-MCNC: 1.14 MG/DL — SIGNIFICANT CHANGE UP (ref 0.5–1.3)
CREAT SERPL-MCNC: 1.3 MG/DL — SIGNIFICANT CHANGE UP (ref 0.5–1.3)
CREAT SERPL-MCNC: 3.55 MG/DL — HIGH (ref 0.5–1.3)
CREAT SERPL-MCNC: 4.39 MG/DL — HIGH (ref 0.5–1.3)
CREAT SERPL-MCNC: 4.7 MG/DL — HIGH (ref 0.5–1.3)
CREAT SERPL-MCNC: 4.83 MG/DL — HIGH (ref 0.5–1.3)
CREAT SERPL-MCNC: 4.93 MG/DL — HIGH (ref 0.5–1.3)
CREAT SERPL-MCNC: 4.95 MG/DL — HIGH (ref 0.5–1.3)
CREAT SERPL-MCNC: 5.21 MG/DL — HIGH (ref 0.5–1.3)
CREAT SERPL-MCNC: 5.42 MG/DL — HIGH (ref 0.5–1.3)
CREAT SERPL-MCNC: 5.47 MG/DL — HIGH (ref 0.5–1.3)
CREAT SERPL-MCNC: 5.73 MG/DL — HIGH (ref 0.5–1.3)
CREAT SERPL-MCNC: 5.75 MG/DL — HIGH (ref 0.5–1.3)
CREAT SERPL-MCNC: 5.86 MG/DL — HIGH (ref 0.5–1.3)
CREAT SERPL-MCNC: 5.86 MG/DL — HIGH (ref 0.5–1.3)
CREAT SERPL-MCNC: 5.9 MG/DL — HIGH (ref 0.5–1.3)
CREAT SERPL-MCNC: 6.05 MG/DL — HIGH (ref 0.5–1.3)
CREAT SERPL-MCNC: 6.19 MG/DL — HIGH (ref 0.5–1.3)
CREAT SERPL-MCNC: 6.24 MG/DL — HIGH (ref 0.5–1.3)
CREAT SERPL-MCNC: 6.27 MG/DL — HIGH (ref 0.5–1.3)
CREAT SERPL-MCNC: 6.37 MG/DL — HIGH (ref 0.5–1.3)
CREAT SERPL-MCNC: 6.4 MG/DL — HIGH (ref 0.5–1.3)
CREAT SERPL-MCNC: 6.41 MG/DL — HIGH (ref 0.5–1.3)
CREAT SERPL-MCNC: 6.43 MG/DL — HIGH (ref 0.5–1.3)
CREAT SERPL-MCNC: 6.45 MG/DL — HIGH (ref 0.5–1.3)
CREAT SERPL-MCNC: 6.76 MG/DL — HIGH (ref 0.5–1.3)
CREAT SERPL-MCNC: 6.95 MG/DL — HIGH (ref 0.5–1.3)
CREAT SERPL-MCNC: 6.96 MG/DL — HIGH (ref 0.5–1.3)
CREAT SERPL-MCNC: 7.08 MG/DL — HIGH (ref 0.5–1.3)
CREAT SERPL-MCNC: 7.14 MG/DL — HIGH (ref 0.5–1.3)
CREAT SERPL-MCNC: 7.22 MG/DL — HIGH (ref 0.5–1.3)
CREAT SERPL-MCNC: 7.24 MG/DL — HIGH (ref 0.5–1.3)
CREAT SERPL-MCNC: 7.31 MG/DL — HIGH (ref 0.5–1.3)
CREAT SERPL-MCNC: 7.34 MG/DL — HIGH (ref 0.5–1.3)
CREAT SERPL-MCNC: 7.35 MG/DL — HIGH (ref 0.5–1.3)
CREAT SERPL-MCNC: 7.36 MG/DL — HIGH (ref 0.5–1.3)
CREAT SERPL-MCNC: 7.36 MG/DL — HIGH (ref 0.5–1.3)
CREAT SERPL-MCNC: 7.58 MG/DL — HIGH (ref 0.5–1.3)
CREAT SERPL-MCNC: 7.59 MG/DL — HIGH (ref 0.5–1.3)
CREAT SERPL-MCNC: 7.61 MG/DL — HIGH (ref 0.5–1.3)
CREAT SERPL-MCNC: 7.65 MG/DL — HIGH (ref 0.5–1.3)
CREAT SERPL-MCNC: 7.89 MG/DL — HIGH (ref 0.5–1.3)
CREAT SERPL-MCNC: 8.12 MG/DL — HIGH (ref 0.5–1.3)
CREAT SERPL-MCNC: 8.41 MG/DL — HIGH (ref 0.5–1.3)
CREAT SERPL-MCNC: 8.75 MG/DL — HIGH (ref 0.5–1.3)
CRP SERPL-MCNC: 0.1 MG/DL — SIGNIFICANT CHANGE UP (ref 0–0.4)
CRP SERPL-MCNC: 0.67 MG/DL — HIGH (ref 0–0.4)
CRP SERPL-MCNC: 10.33 MG/DL — HIGH (ref 0–0.4)
CRP SERPL-MCNC: 11.62 MG/DL — HIGH (ref 0–0.4)
CRP SERPL-MCNC: 12.62 MG/DL — HIGH (ref 0–0.4)
CRP SERPL-MCNC: 13.26 MG/DL — HIGH (ref 0–0.4)
CRP SERPL-MCNC: 15.9 MG/DL — HIGH (ref 0–0.4)
CRP SERPL-MCNC: 21.6 MG/DL — HIGH (ref 0–0.4)
CRP SERPL-MCNC: 29.62 MG/DL — HIGH (ref 0–0.4)
CRP SERPL-MCNC: 3.39 MG/DL — HIGH (ref 0–0.4)
CRP SERPL-MCNC: 3.44 MG/DL — HIGH (ref 0–0.4)
CRP SERPL-MCNC: 32.97 MG/DL — HIGH (ref 0–0.4)
CRP SERPL-MCNC: 4.25 MG/DL — HIGH (ref 0–0.4)
CRP SERPL-MCNC: 5.53 MG/DL — HIGH (ref 0–0.4)
CRP SERPL-MCNC: 6.65 MG/DL — HIGH (ref 0–0.4)
CRP SERPL-MCNC: 6.82 MG/DL — HIGH (ref 0–0.4)
CRP SERPL-MCNC: 7.17 MG/DL — HIGH (ref 0–0.4)
CRP SERPL-MCNC: 7.58 MG/DL — HIGH (ref 0–0.4)
CRP SERPL-MCNC: 7.84 MG/DL — HIGH (ref 0–0.4)
CRP SERPL-MCNC: 8.64 MG/DL — HIGH (ref 0–0.4)
CRP SERPL-MCNC: 8.98 MG/DL — HIGH (ref 0–0.4)
CRP SERPL-MCNC: 9.05 MG/DL — HIGH (ref 0–0.4)
CRP SERPL-MCNC: 9.88 MG/DL — HIGH (ref 0–0.4)
CRP SERPL-MCNC: <0.1 MG/DL — SIGNIFICANT CHANGE UP (ref 0–0.4)
CULTURE RESULTS: SIGNIFICANT CHANGE UP
D DIMER BLD IA.RAPID-MCNC: 1127 NG/ML DDU — HIGH
D DIMER BLD IA.RAPID-MCNC: 1144 NG/ML DDU — HIGH
D DIMER BLD IA.RAPID-MCNC: 1195 NG/ML DDU — HIGH
D DIMER BLD IA.RAPID-MCNC: 1204 NG/ML DDU — HIGH
D DIMER BLD IA.RAPID-MCNC: 1615 NG/ML DDU — HIGH
D DIMER BLD IA.RAPID-MCNC: 1663 NG/ML DDU — HIGH
D DIMER BLD IA.RAPID-MCNC: 1708 NG/ML DDU — HIGH
D DIMER BLD IA.RAPID-MCNC: 1740 NG/ML DDU — HIGH
D DIMER BLD IA.RAPID-MCNC: 2828 NG/ML DDU — HIGH
D DIMER BLD IA.RAPID-MCNC: 320 NG/ML DDU — HIGH
D DIMER BLD IA.RAPID-MCNC: 5529 NG/ML DDU — HIGH
D DIMER BLD IA.RAPID-MCNC: 737 NG/ML DDU — HIGH
D DIMER BLD IA.RAPID-MCNC: 833 NG/ML DDU — HIGH
D DIMER BLD IA.RAPID-MCNC: 929 NG/ML DDU — HIGH
DIGOXIN SERPL-MCNC: 0.9 NG/ML — SIGNIFICANT CHANGE UP (ref 0.8–2)
EOSINOPHIL # BLD AUTO: 0 K/UL — SIGNIFICANT CHANGE UP (ref 0–0.5)
EOSINOPHIL # BLD AUTO: 0.02 K/UL — SIGNIFICANT CHANGE UP (ref 0–0.5)
EOSINOPHIL # BLD AUTO: 0.1 K/UL — SIGNIFICANT CHANGE UP (ref 0–0.5)
EOSINOPHIL # BLD AUTO: 0.1 K/UL — SIGNIFICANT CHANGE UP (ref 0–0.5)
EOSINOPHIL # BLD AUTO: 0.13 K/UL — SIGNIFICANT CHANGE UP (ref 0–0.5)
EOSINOPHIL # BLD AUTO: 0.17 K/UL — SIGNIFICANT CHANGE UP (ref 0–0.5)
EOSINOPHIL # BLD AUTO: 0.17 K/UL — SIGNIFICANT CHANGE UP (ref 0–0.5)
EOSINOPHIL # BLD AUTO: 0.2 K/UL — SIGNIFICANT CHANGE UP (ref 0–0.5)
EOSINOPHIL # BLD AUTO: 0.22 K/UL — SIGNIFICANT CHANGE UP (ref 0–0.5)
EOSINOPHIL # BLD AUTO: 0.22 K/UL — SIGNIFICANT CHANGE UP (ref 0–0.5)
EOSINOPHIL # BLD AUTO: 0.23 K/UL — SIGNIFICANT CHANGE UP (ref 0–0.5)
EOSINOPHIL # BLD AUTO: 0.26 K/UL — SIGNIFICANT CHANGE UP (ref 0–0.5)
EOSINOPHIL # BLD AUTO: 0.29 K/UL — SIGNIFICANT CHANGE UP (ref 0–0.5)
EOSINOPHIL # BLD AUTO: 0.33 K/UL — SIGNIFICANT CHANGE UP (ref 0–0.5)
EOSINOPHIL # BLD AUTO: 0.51 K/UL — HIGH (ref 0–0.5)
EOSINOPHIL # BLD AUTO: 0.56 K/UL — HIGH (ref 0–0.5)
EOSINOPHIL # BLD AUTO: 0.57 K/UL — HIGH (ref 0–0.5)
EOSINOPHIL # BLD AUTO: 0.6 K/UL — HIGH (ref 0–0.5)
EOSINOPHIL # BLD AUTO: 0.79 K/UL — HIGH (ref 0–0.5)
EOSINOPHIL # BLD AUTO: 0.84 K/UL — HIGH (ref 0–0.5)
EOSINOPHIL NFR BLD AUTO: 0 % — SIGNIFICANT CHANGE UP (ref 0–6)
EOSINOPHIL NFR BLD AUTO: 0.3 % — SIGNIFICANT CHANGE UP (ref 0–6)
EOSINOPHIL NFR BLD AUTO: 1.1 % — SIGNIFICANT CHANGE UP (ref 0–6)
EOSINOPHIL NFR BLD AUTO: 1.3 % — SIGNIFICANT CHANGE UP (ref 0–6)
EOSINOPHIL NFR BLD AUTO: 1.4 % — SIGNIFICANT CHANGE UP (ref 0–6)
EOSINOPHIL NFR BLD AUTO: 1.9 % — SIGNIFICANT CHANGE UP (ref 0–6)
EOSINOPHIL NFR BLD AUTO: 2 % — SIGNIFICANT CHANGE UP (ref 0–6)
EOSINOPHIL NFR BLD AUTO: 2.2 % — SIGNIFICANT CHANGE UP (ref 0–6)
EOSINOPHIL NFR BLD AUTO: 2.3 % — SIGNIFICANT CHANGE UP (ref 0–6)
EOSINOPHIL NFR BLD AUTO: 2.3 % — SIGNIFICANT CHANGE UP (ref 0–6)
EOSINOPHIL NFR BLD AUTO: 2.8 % — SIGNIFICANT CHANGE UP (ref 0–6)
EOSINOPHIL NFR BLD AUTO: 3 % — SIGNIFICANT CHANGE UP (ref 0–6)
EOSINOPHIL NFR BLD AUTO: 4 % — SIGNIFICANT CHANGE UP (ref 0–6)
EOSINOPHIL NFR BLD AUTO: 4 % — SIGNIFICANT CHANGE UP (ref 0–6)
EOSINOPHIL NFR BLD AUTO: 5.4 % — SIGNIFICANT CHANGE UP (ref 0–6)
EOSINOPHIL NFR BLD AUTO: 5.4 % — SIGNIFICANT CHANGE UP (ref 0–6)
EOSINOPHIL NFR BLD AUTO: 5.8 % — SIGNIFICANT CHANGE UP (ref 0–6)
EOSINOPHIL NFR BLD AUTO: 7 % — HIGH (ref 0–6)
ERYTHROCYTE [SEDIMENTATION RATE] IN BLOOD: 100 MM/HR — HIGH (ref 0–20)
ERYTHROCYTE [SEDIMENTATION RATE] IN BLOOD: 2 MM/HR — SIGNIFICANT CHANGE UP (ref 0–20)
ERYTHROCYTE [SEDIMENTATION RATE] IN BLOOD: 2 MM/HR — SIGNIFICANT CHANGE UP (ref 0–20)
ERYTHROCYTE [SEDIMENTATION RATE] IN BLOOD: 66 MM/HR — HIGH (ref 0–20)
ERYTHROCYTE [SEDIMENTATION RATE] IN BLOOD: 70 MM/HR — HIGH (ref 0–20)
ERYTHROCYTE [SEDIMENTATION RATE] IN BLOOD: 75 MM/HR — HIGH (ref 0–20)
ERYTHROCYTE [SEDIMENTATION RATE] IN BLOOD: 92 MM/HR — HIGH (ref 0–20)
ESTIMATED AVERAGE GLUCOSE: 126 MG/DL — HIGH (ref 68–114)
FERRITIN SERPL-MCNC: 1057 NG/ML — HIGH (ref 30–400)
FERRITIN SERPL-MCNC: 1075 NG/ML — HIGH (ref 30–400)
FERRITIN SERPL-MCNC: 1116 NG/ML — HIGH (ref 30–400)
FERRITIN SERPL-MCNC: 1237 NG/ML — HIGH (ref 30–400)
FERRITIN SERPL-MCNC: 1434 NG/ML — HIGH (ref 30–400)
FERRITIN SERPL-MCNC: 1575 NG/ML — HIGH (ref 30–400)
FERRITIN SERPL-MCNC: 1690 NG/ML — HIGH (ref 30–400)
FERRITIN SERPL-MCNC: 1831 NG/ML — HIGH (ref 30–400)
FERRITIN SERPL-MCNC: 3196 NG/ML — HIGH (ref 30–400)
FERRITIN SERPL-MCNC: 414 NG/ML — HIGH (ref 30–400)
FERRITIN SERPL-MCNC: 441 NG/ML — HIGH (ref 30–400)
FERRITIN SERPL-MCNC: 574 NG/ML — HIGH (ref 30–400)
FERRITIN SERPL-MCNC: 625 NG/ML — HIGH (ref 30–400)
FERRITIN SERPL-MCNC: 811 NG/ML — HIGH (ref 30–400)
FERRITIN SERPL-MCNC: 842 NG/ML — HIGH (ref 30–400)
FERRITIN SERPL-MCNC: 935 NG/ML — HIGH (ref 30–400)
FERRITIN SERPL-MCNC: 946 NG/ML — HIGH (ref 30–400)
FERRITIN SERPL-MCNC: 946 NG/ML — HIGH (ref 30–400)
FERRITIN SERPL-MCNC: 992 NG/ML — HIGH (ref 30–400)
FIBRINOGEN PPP-MCNC: 679 MG/DL — HIGH (ref 290–520)
FIBRINOGEN PPP-MCNC: 689 MG/DL — HIGH (ref 290–520)
FSP PPP-MCNC: >=5 <20
GLUCOSE BLDC GLUCOMTR-MCNC: 100 MG/DL — HIGH (ref 70–99)
GLUCOSE BLDC GLUCOMTR-MCNC: 101 MG/DL — HIGH (ref 70–99)
GLUCOSE BLDC GLUCOMTR-MCNC: 101 MG/DL — HIGH (ref 70–99)
GLUCOSE BLDC GLUCOMTR-MCNC: 102 MG/DL — HIGH (ref 70–99)
GLUCOSE BLDC GLUCOMTR-MCNC: 104 MG/DL — HIGH (ref 70–99)
GLUCOSE BLDC GLUCOMTR-MCNC: 105 MG/DL — HIGH (ref 70–99)
GLUCOSE BLDC GLUCOMTR-MCNC: 106 MG/DL — HIGH (ref 70–99)
GLUCOSE BLDC GLUCOMTR-MCNC: 106 MG/DL — HIGH (ref 70–99)
GLUCOSE BLDC GLUCOMTR-MCNC: 108 MG/DL — HIGH (ref 70–99)
GLUCOSE BLDC GLUCOMTR-MCNC: 109 MG/DL — HIGH (ref 70–99)
GLUCOSE BLDC GLUCOMTR-MCNC: 109 MG/DL — HIGH (ref 70–99)
GLUCOSE BLDC GLUCOMTR-MCNC: 110 MG/DL — HIGH (ref 70–99)
GLUCOSE BLDC GLUCOMTR-MCNC: 110 MG/DL — HIGH (ref 70–99)
GLUCOSE BLDC GLUCOMTR-MCNC: 112 MG/DL — HIGH (ref 70–99)
GLUCOSE BLDC GLUCOMTR-MCNC: 112 MG/DL — HIGH (ref 70–99)
GLUCOSE BLDC GLUCOMTR-MCNC: 113 MG/DL — HIGH (ref 70–99)
GLUCOSE BLDC GLUCOMTR-MCNC: 113 MG/DL — HIGH (ref 70–99)
GLUCOSE BLDC GLUCOMTR-MCNC: 114 MG/DL — HIGH (ref 70–99)
GLUCOSE BLDC GLUCOMTR-MCNC: 114 MG/DL — HIGH (ref 70–99)
GLUCOSE BLDC GLUCOMTR-MCNC: 115 MG/DL — HIGH (ref 70–99)
GLUCOSE BLDC GLUCOMTR-MCNC: 116 MG/DL — HIGH (ref 70–99)
GLUCOSE BLDC GLUCOMTR-MCNC: 116 MG/DL — HIGH (ref 70–99)
GLUCOSE BLDC GLUCOMTR-MCNC: 117 MG/DL — HIGH (ref 70–99)
GLUCOSE BLDC GLUCOMTR-MCNC: 119 MG/DL — HIGH (ref 70–99)
GLUCOSE BLDC GLUCOMTR-MCNC: 120 MG/DL — HIGH (ref 70–99)
GLUCOSE BLDC GLUCOMTR-MCNC: 120 MG/DL — HIGH (ref 70–99)
GLUCOSE BLDC GLUCOMTR-MCNC: 122 MG/DL — HIGH (ref 70–99)
GLUCOSE BLDC GLUCOMTR-MCNC: 124 MG/DL — HIGH (ref 70–99)
GLUCOSE BLDC GLUCOMTR-MCNC: 124 MG/DL — HIGH (ref 70–99)
GLUCOSE BLDC GLUCOMTR-MCNC: 125 MG/DL — HIGH (ref 70–99)
GLUCOSE BLDC GLUCOMTR-MCNC: 127 MG/DL — HIGH (ref 70–99)
GLUCOSE BLDC GLUCOMTR-MCNC: 128 MG/DL — HIGH (ref 70–99)
GLUCOSE BLDC GLUCOMTR-MCNC: 130 MG/DL — HIGH (ref 70–99)
GLUCOSE BLDC GLUCOMTR-MCNC: 130 MG/DL — HIGH (ref 70–99)
GLUCOSE BLDC GLUCOMTR-MCNC: 131 MG/DL — HIGH (ref 70–99)
GLUCOSE BLDC GLUCOMTR-MCNC: 132 MG/DL — HIGH (ref 70–99)
GLUCOSE BLDC GLUCOMTR-MCNC: 133 MG/DL — HIGH (ref 70–99)
GLUCOSE BLDC GLUCOMTR-MCNC: 133 MG/DL — HIGH (ref 70–99)
GLUCOSE BLDC GLUCOMTR-MCNC: 134 MG/DL — HIGH (ref 70–99)
GLUCOSE BLDC GLUCOMTR-MCNC: 134 MG/DL — HIGH (ref 70–99)
GLUCOSE BLDC GLUCOMTR-MCNC: 135 MG/DL — HIGH (ref 70–99)
GLUCOSE BLDC GLUCOMTR-MCNC: 137 MG/DL — HIGH (ref 70–99)
GLUCOSE BLDC GLUCOMTR-MCNC: 138 MG/DL — HIGH (ref 70–99)
GLUCOSE BLDC GLUCOMTR-MCNC: 140 MG/DL — HIGH (ref 70–99)
GLUCOSE BLDC GLUCOMTR-MCNC: 141 MG/DL — HIGH (ref 70–99)
GLUCOSE BLDC GLUCOMTR-MCNC: 142 MG/DL — HIGH (ref 70–99)
GLUCOSE BLDC GLUCOMTR-MCNC: 143 MG/DL — HIGH (ref 70–99)
GLUCOSE BLDC GLUCOMTR-MCNC: 144 MG/DL — HIGH (ref 70–99)
GLUCOSE BLDC GLUCOMTR-MCNC: 145 MG/DL — HIGH (ref 70–99)
GLUCOSE BLDC GLUCOMTR-MCNC: 145 MG/DL — HIGH (ref 70–99)
GLUCOSE BLDC GLUCOMTR-MCNC: 146 MG/DL — HIGH (ref 70–99)
GLUCOSE BLDC GLUCOMTR-MCNC: 147 MG/DL — HIGH (ref 70–99)
GLUCOSE BLDC GLUCOMTR-MCNC: 148 MG/DL — HIGH (ref 70–99)
GLUCOSE BLDC GLUCOMTR-MCNC: 148 MG/DL — HIGH (ref 70–99)
GLUCOSE BLDC GLUCOMTR-MCNC: 149 MG/DL — HIGH (ref 70–99)
GLUCOSE BLDC GLUCOMTR-MCNC: 150 MG/DL — HIGH (ref 70–99)
GLUCOSE BLDC GLUCOMTR-MCNC: 151 MG/DL — HIGH (ref 70–99)
GLUCOSE BLDC GLUCOMTR-MCNC: 151 MG/DL — HIGH (ref 70–99)
GLUCOSE BLDC GLUCOMTR-MCNC: 152 MG/DL — HIGH (ref 70–99)
GLUCOSE BLDC GLUCOMTR-MCNC: 152 MG/DL — HIGH (ref 70–99)
GLUCOSE BLDC GLUCOMTR-MCNC: 153 MG/DL — HIGH (ref 70–99)
GLUCOSE BLDC GLUCOMTR-MCNC: 154 MG/DL — HIGH (ref 70–99)
GLUCOSE BLDC GLUCOMTR-MCNC: 155 MG/DL — HIGH (ref 70–99)
GLUCOSE BLDC GLUCOMTR-MCNC: 156 MG/DL — HIGH (ref 70–99)
GLUCOSE BLDC GLUCOMTR-MCNC: 156 MG/DL — HIGH (ref 70–99)
GLUCOSE BLDC GLUCOMTR-MCNC: 158 MG/DL — HIGH (ref 70–99)
GLUCOSE BLDC GLUCOMTR-MCNC: 159 MG/DL — HIGH (ref 70–99)
GLUCOSE BLDC GLUCOMTR-MCNC: 160 MG/DL — HIGH (ref 70–99)
GLUCOSE BLDC GLUCOMTR-MCNC: 160 MG/DL — HIGH (ref 70–99)
GLUCOSE BLDC GLUCOMTR-MCNC: 161 MG/DL — HIGH (ref 70–99)
GLUCOSE BLDC GLUCOMTR-MCNC: 162 MG/DL — HIGH (ref 70–99)
GLUCOSE BLDC GLUCOMTR-MCNC: 162 MG/DL — HIGH (ref 70–99)
GLUCOSE BLDC GLUCOMTR-MCNC: 164 MG/DL — HIGH (ref 70–99)
GLUCOSE BLDC GLUCOMTR-MCNC: 165 MG/DL — HIGH (ref 70–99)
GLUCOSE BLDC GLUCOMTR-MCNC: 171 MG/DL — HIGH (ref 70–99)
GLUCOSE BLDC GLUCOMTR-MCNC: 171 MG/DL — HIGH (ref 70–99)
GLUCOSE BLDC GLUCOMTR-MCNC: 172 MG/DL — HIGH (ref 70–99)
GLUCOSE BLDC GLUCOMTR-MCNC: 174 MG/DL — HIGH (ref 70–99)
GLUCOSE BLDC GLUCOMTR-MCNC: 176 MG/DL — HIGH (ref 70–99)
GLUCOSE BLDC GLUCOMTR-MCNC: 178 MG/DL — HIGH (ref 70–99)
GLUCOSE BLDC GLUCOMTR-MCNC: 179 MG/DL — HIGH (ref 70–99)
GLUCOSE BLDC GLUCOMTR-MCNC: 180 MG/DL — HIGH (ref 70–99)
GLUCOSE BLDC GLUCOMTR-MCNC: 182 MG/DL — HIGH (ref 70–99)
GLUCOSE BLDC GLUCOMTR-MCNC: 185 MG/DL — HIGH (ref 70–99)
GLUCOSE BLDC GLUCOMTR-MCNC: 187 MG/DL — HIGH (ref 70–99)
GLUCOSE BLDC GLUCOMTR-MCNC: 189 MG/DL — HIGH (ref 70–99)
GLUCOSE BLDC GLUCOMTR-MCNC: 191 MG/DL — HIGH (ref 70–99)
GLUCOSE BLDC GLUCOMTR-MCNC: 197 MG/DL — HIGH (ref 70–99)
GLUCOSE BLDC GLUCOMTR-MCNC: 203 MG/DL — HIGH (ref 70–99)
GLUCOSE BLDC GLUCOMTR-MCNC: 206 MG/DL — HIGH (ref 70–99)
GLUCOSE BLDC GLUCOMTR-MCNC: 219 MG/DL — HIGH (ref 70–99)
GLUCOSE BLDC GLUCOMTR-MCNC: 271 MG/DL — HIGH (ref 70–99)
GLUCOSE BLDC GLUCOMTR-MCNC: 68 MG/DL — LOW (ref 70–99)
GLUCOSE BLDC GLUCOMTR-MCNC: 73 MG/DL — SIGNIFICANT CHANGE UP (ref 70–99)
GLUCOSE BLDC GLUCOMTR-MCNC: 80 MG/DL — SIGNIFICANT CHANGE UP (ref 70–99)
GLUCOSE BLDC GLUCOMTR-MCNC: 84 MG/DL — SIGNIFICANT CHANGE UP (ref 70–99)
GLUCOSE BLDC GLUCOMTR-MCNC: 86 MG/DL — SIGNIFICANT CHANGE UP (ref 70–99)
GLUCOSE BLDC GLUCOMTR-MCNC: 87 MG/DL — SIGNIFICANT CHANGE UP (ref 70–99)
GLUCOSE BLDC GLUCOMTR-MCNC: 87 MG/DL — SIGNIFICANT CHANGE UP (ref 70–99)
GLUCOSE BLDC GLUCOMTR-MCNC: 89 MG/DL — SIGNIFICANT CHANGE UP (ref 70–99)
GLUCOSE BLDC GLUCOMTR-MCNC: 91 MG/DL — SIGNIFICANT CHANGE UP (ref 70–99)
GLUCOSE BLDC GLUCOMTR-MCNC: 92 MG/DL — SIGNIFICANT CHANGE UP (ref 70–99)
GLUCOSE BLDC GLUCOMTR-MCNC: 92 MG/DL — SIGNIFICANT CHANGE UP (ref 70–99)
GLUCOSE BLDC GLUCOMTR-MCNC: 93 MG/DL — SIGNIFICANT CHANGE UP (ref 70–99)
GLUCOSE BLDC GLUCOMTR-MCNC: 94 MG/DL — SIGNIFICANT CHANGE UP (ref 70–99)
GLUCOSE BLDC GLUCOMTR-MCNC: 95 MG/DL — SIGNIFICANT CHANGE UP (ref 70–99)
GLUCOSE BLDC GLUCOMTR-MCNC: 97 MG/DL — SIGNIFICANT CHANGE UP (ref 70–99)
GLUCOSE SERPL-MCNC: 102 MG/DL — HIGH (ref 70–99)
GLUCOSE SERPL-MCNC: 107 MG/DL — HIGH (ref 70–99)
GLUCOSE SERPL-MCNC: 107 MG/DL — HIGH (ref 70–99)
GLUCOSE SERPL-MCNC: 111 MG/DL — HIGH (ref 70–99)
GLUCOSE SERPL-MCNC: 111 MG/DL — HIGH (ref 70–99)
GLUCOSE SERPL-MCNC: 112 MG/DL — HIGH (ref 70–99)
GLUCOSE SERPL-MCNC: 112 MG/DL — HIGH (ref 70–99)
GLUCOSE SERPL-MCNC: 115 MG/DL — HIGH (ref 70–99)
GLUCOSE SERPL-MCNC: 117 MG/DL — HIGH (ref 70–99)
GLUCOSE SERPL-MCNC: 120 MG/DL — HIGH (ref 70–99)
GLUCOSE SERPL-MCNC: 123 MG/DL — HIGH (ref 70–99)
GLUCOSE SERPL-MCNC: 125 MG/DL — HIGH (ref 70–99)
GLUCOSE SERPL-MCNC: 126 MG/DL — HIGH (ref 70–99)
GLUCOSE SERPL-MCNC: 131 MG/DL — HIGH (ref 70–99)
GLUCOSE SERPL-MCNC: 134 MG/DL — HIGH (ref 70–99)
GLUCOSE SERPL-MCNC: 135 MG/DL — HIGH (ref 70–99)
GLUCOSE SERPL-MCNC: 138 MG/DL — HIGH (ref 70–99)
GLUCOSE SERPL-MCNC: 139 MG/DL — HIGH (ref 70–99)
GLUCOSE SERPL-MCNC: 143 MG/DL — HIGH (ref 70–99)
GLUCOSE SERPL-MCNC: 143 MG/DL — HIGH (ref 70–99)
GLUCOSE SERPL-MCNC: 144 MG/DL — HIGH (ref 70–99)
GLUCOSE SERPL-MCNC: 147 MG/DL — HIGH (ref 70–99)
GLUCOSE SERPL-MCNC: 148 MG/DL — HIGH (ref 70–99)
GLUCOSE SERPL-MCNC: 150 MG/DL — HIGH (ref 70–99)
GLUCOSE SERPL-MCNC: 161 MG/DL — HIGH (ref 70–99)
GLUCOSE SERPL-MCNC: 164 MG/DL — HIGH (ref 70–99)
GLUCOSE SERPL-MCNC: 167 MG/DL — HIGH (ref 70–99)
GLUCOSE SERPL-MCNC: 179 MG/DL — HIGH (ref 70–99)
GLUCOSE SERPL-MCNC: 186 MG/DL — HIGH (ref 70–99)
GLUCOSE SERPL-MCNC: 73 MG/DL — SIGNIFICANT CHANGE UP (ref 70–99)
GLUCOSE SERPL-MCNC: 76 MG/DL — SIGNIFICANT CHANGE UP (ref 70–99)
GLUCOSE SERPL-MCNC: 76 MG/DL — SIGNIFICANT CHANGE UP (ref 70–99)
GLUCOSE SERPL-MCNC: 77 MG/DL — SIGNIFICANT CHANGE UP (ref 70–99)
GLUCOSE SERPL-MCNC: 79 MG/DL — SIGNIFICANT CHANGE UP (ref 70–99)
GLUCOSE SERPL-MCNC: 84 MG/DL — SIGNIFICANT CHANGE UP (ref 70–99)
GLUCOSE SERPL-MCNC: 84 MG/DL — SIGNIFICANT CHANGE UP (ref 70–99)
GLUCOSE SERPL-MCNC: 85 MG/DL — SIGNIFICANT CHANGE UP (ref 70–99)
GLUCOSE SERPL-MCNC: 86 MG/DL — SIGNIFICANT CHANGE UP (ref 70–99)
GLUCOSE SERPL-MCNC: 91 MG/DL — SIGNIFICANT CHANGE UP (ref 70–99)
GLUCOSE SERPL-MCNC: 95 MG/DL — SIGNIFICANT CHANGE UP (ref 70–99)
GLUCOSE SERPL-MCNC: 96 MG/DL — SIGNIFICANT CHANGE UP (ref 70–99)
GLUCOSE SERPL-MCNC: 96 MG/DL — SIGNIFICANT CHANGE UP (ref 70–99)
GLUCOSE SERPL-MCNC: 97 MG/DL — SIGNIFICANT CHANGE UP (ref 70–99)
GLUCOSE SERPL-MCNC: 97 MG/DL — SIGNIFICANT CHANGE UP (ref 70–99)
GRAM STN FLD: SIGNIFICANT CHANGE UP
HAPTOGLOB SERPL-MCNC: 218 MG/DL — HIGH (ref 34–200)
HAV IGM SER-ACNC: SIGNIFICANT CHANGE UP
HBV CORE AB SER-ACNC: SIGNIFICANT CHANGE UP
HBV CORE IGM SER-ACNC: SIGNIFICANT CHANGE UP
HBV SURFACE AB SER-ACNC: <3 MIU/ML — LOW
HBV SURFACE AB SER-ACNC: SIGNIFICANT CHANGE UP
HBV SURFACE AG SER-ACNC: SIGNIFICANT CHANGE UP
HBV SURFACE AG SER-ACNC: SIGNIFICANT CHANGE UP
HCO3 BLDA-SCNC: 10 MMOL/L — LOW (ref 23–27)
HCO3 BLDA-SCNC: 19 MMOL/L — LOW (ref 23–27)
HCO3 BLDA-SCNC: 20 MMOL/L — LOW (ref 23–27)
HCO3 BLDA-SCNC: 21 MMOL/L — LOW (ref 23–27)
HCO3 BLDA-SCNC: 22 MMOL/L — LOW (ref 23–27)
HCO3 BLDA-SCNC: 23 MMOL/L — SIGNIFICANT CHANGE UP (ref 23–27)
HCO3 BLDA-SCNC: 24 MMOL/L — SIGNIFICANT CHANGE UP (ref 23–27)
HCO3 BLDA-SCNC: 25 MMOL/L — SIGNIFICANT CHANGE UP (ref 23–27)
HCO3 BLDA-SCNC: 26 MMOL/L — SIGNIFICANT CHANGE UP (ref 23–27)
HCO3 BLDA-SCNC: 27 MMOL/L — SIGNIFICANT CHANGE UP (ref 23–27)
HCO3 BLDA-SCNC: 28 MMOL/L — HIGH (ref 23–27)
HCO3 BLDA-SCNC: 29 MMOL/L — HIGH (ref 23–27)
HCO3 BLDA-SCNC: 30 MMOL/L — HIGH (ref 23–27)
HCO3 BLDA-SCNC: 30 MMOL/L — HIGH (ref 23–27)
HCO3 BLDA-SCNC: 31 MMOL/L — HIGH (ref 23–27)
HCO3 BLDA-SCNC: 32 MMOL/L — HIGH (ref 23–27)
HCO3 BLDV-SCNC: 17 MMOL/L — LOW (ref 21–29)
HCO3 BLDV-SCNC: 24 MMOL/L — SIGNIFICANT CHANGE UP (ref 21–29)
HCO3 BLDV-SCNC: 25 MMOL/L — SIGNIFICANT CHANGE UP (ref 21–29)
HCO3 BLDV-SCNC: 28 MMOL/L — SIGNIFICANT CHANGE UP (ref 21–29)
HCT VFR BLD CALC: 22.4 % — LOW (ref 39–50)
HCT VFR BLD CALC: 22.7 % — LOW (ref 39–50)
HCT VFR BLD CALC: 22.9 % — LOW (ref 39–50)
HCT VFR BLD CALC: 23 % — LOW (ref 39–50)
HCT VFR BLD CALC: 23.9 % — LOW (ref 39–50)
HCT VFR BLD CALC: 24 % — LOW (ref 39–50)
HCT VFR BLD CALC: 24 % — LOW (ref 39–50)
HCT VFR BLD CALC: 24.1 % — LOW (ref 39–50)
HCT VFR BLD CALC: 24.1 % — LOW (ref 39–50)
HCT VFR BLD CALC: 24.2 % — LOW (ref 39–50)
HCT VFR BLD CALC: 24.5 % — LOW (ref 39–50)
HCT VFR BLD CALC: 25.1 % — LOW (ref 39–50)
HCT VFR BLD CALC: 25.2 % — LOW (ref 39–50)
HCT VFR BLD CALC: 25.3 % — LOW (ref 39–50)
HCT VFR BLD CALC: 25.3 % — LOW (ref 39–50)
HCT VFR BLD CALC: 25.5 % — LOW (ref 39–50)
HCT VFR BLD CALC: 25.8 % — LOW (ref 39–50)
HCT VFR BLD CALC: 25.8 % — LOW (ref 39–50)
HCT VFR BLD CALC: 26.1 % — LOW (ref 39–50)
HCT VFR BLD CALC: 26.2 % — LOW (ref 39–50)
HCT VFR BLD CALC: 26.3 % — LOW (ref 39–50)
HCT VFR BLD CALC: 26.6 % — LOW (ref 39–50)
HCT VFR BLD CALC: 26.6 % — LOW (ref 39–50)
HCT VFR BLD CALC: 26.7 % — LOW (ref 39–50)
HCT VFR BLD CALC: 26.8 % — LOW (ref 39–50)
HCT VFR BLD CALC: 26.9 % — LOW (ref 39–50)
HCT VFR BLD CALC: 26.9 % — LOW (ref 39–50)
HCT VFR BLD CALC: 27 % — LOW (ref 39–50)
HCT VFR BLD CALC: 27 % — LOW (ref 39–50)
HCT VFR BLD CALC: 27.4 % — LOW (ref 39–50)
HCT VFR BLD CALC: 28 % — LOW (ref 39–50)
HCT VFR BLD CALC: 31.4 % — LOW (ref 39–50)
HCT VFR BLD CALC: 33.5 % — LOW (ref 39–50)
HCT VFR BLD CALC: 34 % — LOW (ref 39–50)
HCT VFR BLD CALC: 34.8 % — LOW (ref 39–50)
HCT VFR BLD CALC: 36.1 % — LOW (ref 39–50)
HCT VFR BLD CALC: 37.5 % — LOW (ref 39–50)
HCT VFR BLD CALC: 38.2 % — LOW (ref 39–50)
HCT VFR BLD CALC: 39.1 % — SIGNIFICANT CHANGE UP (ref 39–50)
HCT VFR BLD CALC: 39.7 % — SIGNIFICANT CHANGE UP (ref 39–50)
HCT VFR BLD CALC: 42.2 % — SIGNIFICANT CHANGE UP (ref 39–50)
HCT VFR BLD CALC: 43.9 % — SIGNIFICANT CHANGE UP (ref 39–50)
HCT VFR BLD CALC: 44 % — SIGNIFICANT CHANGE UP (ref 39–50)
HCT VFR BLD CALC: 44.3 % — SIGNIFICANT CHANGE UP (ref 39–50)
HCT VFR BLD CALC: 46.3 % — SIGNIFICANT CHANGE UP (ref 39–50)
HCT VFR BLD CALC: 46.5 % — SIGNIFICANT CHANGE UP (ref 39–50)
HCT VFR BLD CALC: 46.7 % — SIGNIFICANT CHANGE UP (ref 39–50)
HCT VFR BLD CALC: 47.1 % — SIGNIFICANT CHANGE UP (ref 39–50)
HCT VFR BLD CALC: 49 % — SIGNIFICANT CHANGE UP (ref 39–50)
HCT VFR BLD CALC: 49.2 % — SIGNIFICANT CHANGE UP (ref 39–50)
HCT VFR BLD CALC: 49.5 % — SIGNIFICANT CHANGE UP (ref 39–50)
HCT VFR BLD CALC: 49.7 % — SIGNIFICANT CHANGE UP (ref 39–50)
HCT VFR BLD CALC: 49.7 % — SIGNIFICANT CHANGE UP (ref 39–50)
HCV AB S/CO SERPL IA: 0.08 S/CO — SIGNIFICANT CHANGE UP (ref 0–0.99)
HCV AB S/CO SERPL IA: 0.11 S/CO — SIGNIFICANT CHANGE UP (ref 0–0.99)
HCV AB SERPL-IMP: SIGNIFICANT CHANGE UP
HCV AB SERPL-IMP: SIGNIFICANT CHANGE UP
HGB BLD-MCNC: 10.7 G/DL — LOW (ref 13–17)
HGB BLD-MCNC: 10.9 G/DL — LOW (ref 13–17)
HGB BLD-MCNC: 11.4 G/DL — LOW (ref 13–17)
HGB BLD-MCNC: 11.7 G/DL — LOW (ref 13–17)
HGB BLD-MCNC: 11.8 G/DL — LOW (ref 13–17)
HGB BLD-MCNC: 11.9 G/DL — LOW (ref 13–17)
HGB BLD-MCNC: 11.9 G/DL — LOW (ref 13–17)
HGB BLD-MCNC: 12.7 G/DL — LOW (ref 13–17)
HGB BLD-MCNC: 13.3 G/DL — SIGNIFICANT CHANGE UP (ref 13–17)
HGB BLD-MCNC: 14.1 G/DL — SIGNIFICANT CHANGE UP (ref 13–17)
HGB BLD-MCNC: 14.2 G/DL — SIGNIFICANT CHANGE UP (ref 13–17)
HGB BLD-MCNC: 14.2 G/DL — SIGNIFICANT CHANGE UP (ref 13–17)
HGB BLD-MCNC: 14.4 G/DL — SIGNIFICANT CHANGE UP (ref 13–17)
HGB BLD-MCNC: 15.3 G/DL — SIGNIFICANT CHANGE UP (ref 13–17)
HGB BLD-MCNC: 15.4 G/DL — SIGNIFICANT CHANGE UP (ref 13–17)
HGB BLD-MCNC: 15.6 G/DL — SIGNIFICANT CHANGE UP (ref 13–17)
HGB BLD-MCNC: 15.6 G/DL — SIGNIFICANT CHANGE UP (ref 13–17)
HGB BLD-MCNC: 15.9 G/DL — SIGNIFICANT CHANGE UP (ref 13–17)
HGB BLD-MCNC: 16.2 G/DL — SIGNIFICANT CHANGE UP (ref 13–17)
HGB BLD-MCNC: 16.6 G/DL — SIGNIFICANT CHANGE UP (ref 13–17)
HGB BLD-MCNC: 16.6 G/DL — SIGNIFICANT CHANGE UP (ref 13–17)
HGB BLD-MCNC: 6.6 G/DL — CRITICAL LOW (ref 13–17)
HGB BLD-MCNC: 6.9 G/DL — CRITICAL LOW (ref 13–17)
HGB BLD-MCNC: 7 G/DL — CRITICAL LOW (ref 13–17)
HGB BLD-MCNC: 7.1 G/DL — LOW (ref 13–17)
HGB BLD-MCNC: 7.1 G/DL — LOW (ref 13–17)
HGB BLD-MCNC: 7.3 G/DL — LOW (ref 13–17)
HGB BLD-MCNC: 7.4 G/DL — LOW (ref 13–17)
HGB BLD-MCNC: 7.4 G/DL — LOW (ref 13–17)
HGB BLD-MCNC: 7.5 G/DL — LOW (ref 13–17)
HGB BLD-MCNC: 7.6 G/DL — LOW (ref 13–17)
HGB BLD-MCNC: 7.8 G/DL — LOW (ref 13–17)
HGB BLD-MCNC: 7.9 G/DL — LOW (ref 13–17)
HGB BLD-MCNC: 7.9 G/DL — LOW (ref 13–17)
HGB BLD-MCNC: 8 G/DL — LOW (ref 13–17)
HGB BLD-MCNC: 8 G/DL — LOW (ref 13–17)
HGB BLD-MCNC: 8.1 G/DL — LOW (ref 13–17)
HGB BLD-MCNC: 8.2 G/DL — LOW (ref 13–17)
HGB BLD-MCNC: 8.3 G/DL — LOW (ref 13–17)
HGB BLD-MCNC: 8.5 G/DL — LOW (ref 13–17)
HGB BLD-MCNC: 9.7 G/DL — LOW (ref 13–17)
HOROWITZ INDEX BLDA+IHG-RTO: 100 — SIGNIFICANT CHANGE UP
HOROWITZ INDEX BLDA+IHG-RTO: 40 — SIGNIFICANT CHANGE UP
HOROWITZ INDEX BLDA+IHG-RTO: 60 — SIGNIFICANT CHANGE UP
HOROWITZ INDEX BLDA+IHG-RTO: 70 — SIGNIFICANT CHANGE UP
HOROWITZ INDEX BLDA+IHG-RTO: 80 — SIGNIFICANT CHANGE UP
HOROWITZ INDEX BLDA+IHG-RTO: 93 — SIGNIFICANT CHANGE UP
HOROWITZ INDEX BLDV+IHG-RTO: 100 — SIGNIFICANT CHANGE UP
HYPERCHROMIA BLD QL AUTO: SLIGHT — SIGNIFICANT CHANGE UP
HYPOCHROMIA BLD QL: SLIGHT — SIGNIFICANT CHANGE UP
HYPOSEGMENTATION: PRESENT — SIGNIFICANT CHANGE UP
HYPOSEGMENTATION: PRESENT — SIGNIFICANT CHANGE UP
IMM GRANULOCYTES NFR BLD AUTO: 1.1 % — SIGNIFICANT CHANGE UP (ref 0–1.5)
IMM GRANULOCYTES NFR BLD AUTO: 1.6 % — HIGH (ref 0–1.5)
IMM GRANULOCYTES NFR BLD AUTO: 1.8 % — HIGH (ref 0–1.5)
IMM GRANULOCYTES NFR BLD AUTO: 1.9 % — HIGH (ref 0–1.5)
IMM GRANULOCYTES NFR BLD AUTO: 12.8 % — HIGH (ref 0–1.5)
IMM GRANULOCYTES NFR BLD AUTO: 13.5 % — HIGH (ref 0–1.5)
IMM GRANULOCYTES NFR BLD AUTO: 15.1 % — HIGH (ref 0–1.5)
IMM GRANULOCYTES NFR BLD AUTO: 3.2 % — HIGH (ref 0–1.5)
IMM GRANULOCYTES NFR BLD AUTO: 3.4 % — HIGH (ref 0–1.5)
IMM GRANULOCYTES NFR BLD AUTO: 4.6 % — HIGH (ref 0–1.5)
IMM GRANULOCYTES NFR BLD AUTO: 5.4 % — HIGH (ref 0–1.5)
IMM GRANULOCYTES NFR BLD AUTO: 7.7 % — HIGH (ref 0–1.5)
INR BLD: 0.92 RATIO — SIGNIFICANT CHANGE UP (ref 0.88–1.16)
INR BLD: 0.97 RATIO — SIGNIFICANT CHANGE UP (ref 0.88–1.16)
INR BLD: 1.05 RATIO — SIGNIFICANT CHANGE UP (ref 0.88–1.16)
INR BLD: 1.05 RATIO — SIGNIFICANT CHANGE UP (ref 0.88–1.16)
INR BLD: 1.08 RATIO — SIGNIFICANT CHANGE UP (ref 0.88–1.16)
INR BLD: 1.1 RATIO — SIGNIFICANT CHANGE UP (ref 0.88–1.16)
INR BLD: 1.1 RATIO — SIGNIFICANT CHANGE UP (ref 0.88–1.16)
INR BLD: 1.12 RATIO — SIGNIFICANT CHANGE UP (ref 0.88–1.16)
INR BLD: 1.12 RATIO — SIGNIFICANT CHANGE UP (ref 0.88–1.16)
INR BLD: 1.14 RATIO — SIGNIFICANT CHANGE UP (ref 0.88–1.16)
INR BLD: 1.15 RATIO — SIGNIFICANT CHANGE UP (ref 0.88–1.16)
INR BLD: 1.16 RATIO — SIGNIFICANT CHANGE UP (ref 0.88–1.16)
INR BLD: 1.19 RATIO — HIGH (ref 0.88–1.16)
INR BLD: 1.2 RATIO — HIGH (ref 0.88–1.16)
LACTATE SERPL-SCNC: 1.9 MMOL/L — SIGNIFICANT CHANGE UP (ref 0.7–2)
LACTATE SERPL-SCNC: 2.9 MMOL/L — HIGH (ref 0.7–2)
LACTATE SERPL-SCNC: 4.3 MMOL/L — CRITICAL HIGH (ref 0.7–2)
LDH SERPL L TO P-CCNC: 303 U/L — HIGH (ref 120–225)
LDH SERPL L TO P-CCNC: 331 U/L — HIGH (ref 120–225)
LDH SERPL L TO P-CCNC: 395 U/L — HIGH (ref 120–225)
LDH SERPL L TO P-CCNC: 407 U/L — HIGH (ref 120–225)
LDH SERPL L TO P-CCNC: 487 U/L — HIGH (ref 120–225)
LDH SERPL L TO P-CCNC: 561 U/L — HIGH (ref 120–225)
LDH SERPL L TO P-CCNC: 562 U/L — HIGH (ref 120–225)
LDH SERPL L TO P-CCNC: 616 U/L — HIGH (ref 120–225)
LDH SERPL L TO P-CCNC: 662 U/L — HIGH (ref 120–225)
LDH SERPL L TO P-CCNC: 683 U/L — HIGH (ref 120–225)
LDH SERPL L TO P-CCNC: 720 U/L — HIGH (ref 120–225)
LDH SERPL L TO P-CCNC: 773 U/L — HIGH (ref 120–225)
LDH SERPL L TO P-CCNC: 830 U/L — HIGH (ref 120–225)
LDH SERPL L TO P-CCNC: 882 U/L — HIGH (ref 120–225)
LDH SERPL L TO P-CCNC: 946 U/L — HIGH (ref 120–225)
LYMPHOCYTES # BLD AUTO: 0.59 K/UL — LOW (ref 1–3.3)
LYMPHOCYTES # BLD AUTO: 0.61 K/UL — LOW (ref 1–3.3)
LYMPHOCYTES # BLD AUTO: 0.64 K/UL — LOW (ref 1–3.3)
LYMPHOCYTES # BLD AUTO: 0.78 K/UL — LOW (ref 1–3.3)
LYMPHOCYTES # BLD AUTO: 0.87 K/UL — LOW (ref 1–3.3)
LYMPHOCYTES # BLD AUTO: 0.91 K/UL — LOW (ref 1–3.3)
LYMPHOCYTES # BLD AUTO: 0.91 K/UL — LOW (ref 1–3.3)
LYMPHOCYTES # BLD AUTO: 0.96 K/UL — LOW (ref 1–3.3)
LYMPHOCYTES # BLD AUTO: 0.98 K/UL — LOW (ref 1–3.3)
LYMPHOCYTES # BLD AUTO: 1.19 K/UL — SIGNIFICANT CHANGE UP (ref 1–3.3)
LYMPHOCYTES # BLD AUTO: 1.25 K/UL — SIGNIFICANT CHANGE UP (ref 1–3.3)
LYMPHOCYTES # BLD AUTO: 1.31 K/UL — SIGNIFICANT CHANGE UP (ref 1–3.3)
LYMPHOCYTES # BLD AUTO: 1.33 K/UL — SIGNIFICANT CHANGE UP (ref 1–3.3)
LYMPHOCYTES # BLD AUTO: 1.34 K/UL — SIGNIFICANT CHANGE UP (ref 1–3.3)
LYMPHOCYTES # BLD AUTO: 1.41 K/UL — SIGNIFICANT CHANGE UP (ref 1–3.3)
LYMPHOCYTES # BLD AUTO: 1.53 K/UL — SIGNIFICANT CHANGE UP (ref 1–3.3)
LYMPHOCYTES # BLD AUTO: 1.74 K/UL — SIGNIFICANT CHANGE UP (ref 1–3.3)
LYMPHOCYTES # BLD AUTO: 1.81 K/UL — SIGNIFICANT CHANGE UP (ref 1–3.3)
LYMPHOCYTES # BLD AUTO: 10 % — LOW (ref 13–44)
LYMPHOCYTES # BLD AUTO: 10.3 % — LOW (ref 13–44)
LYMPHOCYTES # BLD AUTO: 10.6 % — LOW (ref 13–44)
LYMPHOCYTES # BLD AUTO: 11 % — LOW (ref 13–44)
LYMPHOCYTES # BLD AUTO: 11 % — LOW (ref 13–44)
LYMPHOCYTES # BLD AUTO: 11.2 % — LOW (ref 13–44)
LYMPHOCYTES # BLD AUTO: 11.6 % — LOW (ref 13–44)
LYMPHOCYTES # BLD AUTO: 12.4 % — LOW (ref 13–44)
LYMPHOCYTES # BLD AUTO: 12.4 % — LOW (ref 13–44)
LYMPHOCYTES # BLD AUTO: 14 % — SIGNIFICANT CHANGE UP (ref 13–44)
LYMPHOCYTES # BLD AUTO: 14 % — SIGNIFICANT CHANGE UP (ref 13–44)
LYMPHOCYTES # BLD AUTO: 14.3 % — SIGNIFICANT CHANGE UP (ref 13–44)
LYMPHOCYTES # BLD AUTO: 14.8 % — SIGNIFICANT CHANGE UP (ref 13–44)
LYMPHOCYTES # BLD AUTO: 16 % — SIGNIFICANT CHANGE UP (ref 13–44)
LYMPHOCYTES # BLD AUTO: 17.9 % — SIGNIFICANT CHANGE UP (ref 13–44)
LYMPHOCYTES # BLD AUTO: 19.1 % — SIGNIFICANT CHANGE UP (ref 13–44)
LYMPHOCYTES # BLD AUTO: 2.07 K/UL — SIGNIFICANT CHANGE UP (ref 1–3.3)
LYMPHOCYTES # BLD AUTO: 2.11 K/UL — SIGNIFICANT CHANGE UP (ref 1–3.3)
LYMPHOCYTES # BLD AUTO: 7 % — LOW (ref 13–44)
LYMPHOCYTES # BLD AUTO: 8 % — LOW (ref 13–44)
LYMPHOCYTES # BLD AUTO: 9 % — LOW (ref 13–44)
LYMPHOCYTES # BLD AUTO: 9 % — LOW (ref 13–44)
MACROCYTES BLD QL: SLIGHT — SIGNIFICANT CHANGE UP
MAGNESIUM SERPL-MCNC: 1.7 MG/DL — SIGNIFICANT CHANGE UP (ref 1.6–2.6)
MAGNESIUM SERPL-MCNC: 1.9 MG/DL — SIGNIFICANT CHANGE UP (ref 1.6–2.6)
MAGNESIUM SERPL-MCNC: 2 MG/DL — SIGNIFICANT CHANGE UP (ref 1.6–2.6)
MAGNESIUM SERPL-MCNC: 2.1 MG/DL — SIGNIFICANT CHANGE UP (ref 1.6–2.6)
MAGNESIUM SERPL-MCNC: 2.2 MG/DL — SIGNIFICANT CHANGE UP (ref 1.6–2.6)
MAGNESIUM SERPL-MCNC: 2.3 MG/DL — SIGNIFICANT CHANGE UP (ref 1.6–2.6)
MAGNESIUM SERPL-MCNC: 2.4 MG/DL — SIGNIFICANT CHANGE UP (ref 1.6–2.6)
MAGNESIUM SERPL-MCNC: 2.5 MG/DL — SIGNIFICANT CHANGE UP (ref 1.6–2.6)
MAGNESIUM SERPL-MCNC: 2.6 MG/DL — SIGNIFICANT CHANGE UP (ref 1.6–2.6)
MAGNESIUM SERPL-MCNC: 2.8 MG/DL — HIGH (ref 1.6–2.6)
MANUAL SMEAR VERIFICATION: SIGNIFICANT CHANGE UP
MCHC RBC-ENTMCNC: 28.5 GM/DL — LOW (ref 32–36)
MCHC RBC-ENTMCNC: 28.8 PG — SIGNIFICANT CHANGE UP (ref 27–34)
MCHC RBC-ENTMCNC: 28.8 PG — SIGNIFICANT CHANGE UP (ref 27–34)
MCHC RBC-ENTMCNC: 28.9 GM/DL — LOW (ref 32–36)
MCHC RBC-ENTMCNC: 28.9 GM/DL — LOW (ref 32–36)
MCHC RBC-ENTMCNC: 28.9 PG — SIGNIFICANT CHANGE UP (ref 27–34)
MCHC RBC-ENTMCNC: 29 GM/DL — LOW (ref 32–36)
MCHC RBC-ENTMCNC: 29 GM/DL — LOW (ref 32–36)
MCHC RBC-ENTMCNC: 29 PG — SIGNIFICANT CHANGE UP (ref 27–34)
MCHC RBC-ENTMCNC: 29.1 GM/DL — LOW (ref 32–36)
MCHC RBC-ENTMCNC: 29.1 PG — SIGNIFICANT CHANGE UP (ref 27–34)
MCHC RBC-ENTMCNC: 29.2 GM/DL — LOW (ref 32–36)
MCHC RBC-ENTMCNC: 29.2 PG — SIGNIFICANT CHANGE UP (ref 27–34)
MCHC RBC-ENTMCNC: 29.3 GM/DL — LOW (ref 32–36)
MCHC RBC-ENTMCNC: 29.3 PG — SIGNIFICANT CHANGE UP (ref 27–34)
MCHC RBC-ENTMCNC: 29.4 GM/DL — LOW (ref 32–36)
MCHC RBC-ENTMCNC: 29.5 GM/DL — LOW (ref 32–36)
MCHC RBC-ENTMCNC: 29.5 PG — SIGNIFICANT CHANGE UP (ref 27–34)
MCHC RBC-ENTMCNC: 29.6 GM/DL — LOW (ref 32–36)
MCHC RBC-ENTMCNC: 29.6 PG — SIGNIFICANT CHANGE UP (ref 27–34)
MCHC RBC-ENTMCNC: 29.7 PG — SIGNIFICANT CHANGE UP (ref 27–34)
MCHC RBC-ENTMCNC: 29.8 GM/DL — LOW (ref 32–36)
MCHC RBC-ENTMCNC: 29.8 GM/DL — LOW (ref 32–36)
MCHC RBC-ENTMCNC: 29.8 PG — SIGNIFICANT CHANGE UP (ref 27–34)
MCHC RBC-ENTMCNC: 29.9 PG — SIGNIFICANT CHANGE UP (ref 27–34)
MCHC RBC-ENTMCNC: 30 GM/DL — LOW (ref 32–36)
MCHC RBC-ENTMCNC: 30 PG — SIGNIFICANT CHANGE UP (ref 27–34)
MCHC RBC-ENTMCNC: 30.1 GM/DL — LOW (ref 32–36)
MCHC RBC-ENTMCNC: 30.1 PG — SIGNIFICANT CHANGE UP (ref 27–34)
MCHC RBC-ENTMCNC: 30.2 GM/DL — LOW (ref 32–36)
MCHC RBC-ENTMCNC: 30.2 PG — SIGNIFICANT CHANGE UP (ref 27–34)
MCHC RBC-ENTMCNC: 30.2 PG — SIGNIFICANT CHANGE UP (ref 27–34)
MCHC RBC-ENTMCNC: 30.3 GM/DL — LOW (ref 32–36)
MCHC RBC-ENTMCNC: 30.3 PG — SIGNIFICANT CHANGE UP (ref 27–34)
MCHC RBC-ENTMCNC: 30.4 GM/DL — LOW (ref 32–36)
MCHC RBC-ENTMCNC: 30.4 GM/DL — LOW (ref 32–36)
MCHC RBC-ENTMCNC: 30.4 PG — SIGNIFICANT CHANGE UP (ref 27–34)
MCHC RBC-ENTMCNC: 30.4 PG — SIGNIFICANT CHANGE UP (ref 27–34)
MCHC RBC-ENTMCNC: 30.5 GM/DL — LOW (ref 32–36)
MCHC RBC-ENTMCNC: 30.5 GM/DL — LOW (ref 32–36)
MCHC RBC-ENTMCNC: 30.5 PG — SIGNIFICANT CHANGE UP (ref 27–34)
MCHC RBC-ENTMCNC: 30.5 PG — SIGNIFICANT CHANGE UP (ref 27–34)
MCHC RBC-ENTMCNC: 30.6 GM/DL — LOW (ref 32–36)
MCHC RBC-ENTMCNC: 30.7 GM/DL — LOW (ref 32–36)
MCHC RBC-ENTMCNC: 30.7 GM/DL — LOW (ref 32–36)
MCHC RBC-ENTMCNC: 30.8 GM/DL — LOW (ref 32–36)
MCHC RBC-ENTMCNC: 30.9 GM/DL — LOW (ref 32–36)
MCHC RBC-ENTMCNC: 30.9 GM/DL — LOW (ref 32–36)
MCHC RBC-ENTMCNC: 31 GM/DL — LOW (ref 32–36)
MCHC RBC-ENTMCNC: 31.2 GM/DL — LOW (ref 32–36)
MCHC RBC-ENTMCNC: 31.2 GM/DL — LOW (ref 32–36)
MCHC RBC-ENTMCNC: 31.3 GM/DL — LOW (ref 32–36)
MCHC RBC-ENTMCNC: 31.5 GM/DL — LOW (ref 32–36)
MCHC RBC-ENTMCNC: 31.5 GM/DL — LOW (ref 32–36)
MCHC RBC-ENTMCNC: 31.7 GM/DL — LOW (ref 32–36)
MCHC RBC-ENTMCNC: 32 GM/DL — SIGNIFICANT CHANGE UP (ref 32–36)
MCHC RBC-ENTMCNC: 32.1 GM/DL — SIGNIFICANT CHANGE UP (ref 32–36)
MCHC RBC-ENTMCNC: 32.1 GM/DL — SIGNIFICANT CHANGE UP (ref 32–36)
MCHC RBC-ENTMCNC: 32.3 GM/DL — SIGNIFICANT CHANGE UP (ref 32–36)
MCHC RBC-ENTMCNC: 32.5 GM/DL — SIGNIFICANT CHANGE UP (ref 32–36)
MCHC RBC-ENTMCNC: 32.6 GM/DL — SIGNIFICANT CHANGE UP (ref 32–36)
MCHC RBC-ENTMCNC: 32.7 GM/DL — SIGNIFICANT CHANGE UP (ref 32–36)
MCHC RBC-ENTMCNC: 32.8 GM/DL — SIGNIFICANT CHANGE UP (ref 32–36)
MCHC RBC-ENTMCNC: 33.4 GM/DL — SIGNIFICANT CHANGE UP (ref 32–36)
MCHC RBC-ENTMCNC: 33.4 GM/DL — SIGNIFICANT CHANGE UP (ref 32–36)
MCHC RBC-ENTMCNC: 33.7 GM/DL — SIGNIFICANT CHANGE UP (ref 32–36)
MCHC RBC-ENTMCNC: 33.8 GM/DL — SIGNIFICANT CHANGE UP (ref 32–36)
MCHC RBC-ENTMCNC: 33.9 GM/DL — SIGNIFICANT CHANGE UP (ref 32–36)
MCV RBC AUTO: 100 FL — SIGNIFICANT CHANGE UP (ref 80–100)
MCV RBC AUTO: 100.4 FL — HIGH (ref 80–100)
MCV RBC AUTO: 100.4 FL — HIGH (ref 80–100)
MCV RBC AUTO: 102.2 FL — HIGH (ref 80–100)
MCV RBC AUTO: 88.9 FL — SIGNIFICANT CHANGE UP (ref 80–100)
MCV RBC AUTO: 89.2 FL — SIGNIFICANT CHANGE UP (ref 80–100)
MCV RBC AUTO: 89.3 FL — SIGNIFICANT CHANGE UP (ref 80–100)
MCV RBC AUTO: 90 FL — SIGNIFICANT CHANGE UP (ref 80–100)
MCV RBC AUTO: 90.3 FL — SIGNIFICANT CHANGE UP (ref 80–100)
MCV RBC AUTO: 91.5 FL — SIGNIFICANT CHANGE UP (ref 80–100)
MCV RBC AUTO: 92.3 FL — SIGNIFICANT CHANGE UP (ref 80–100)
MCV RBC AUTO: 92.6 FL — SIGNIFICANT CHANGE UP (ref 80–100)
MCV RBC AUTO: 93.1 FL — SIGNIFICANT CHANGE UP (ref 80–100)
MCV RBC AUTO: 93.6 FL — SIGNIFICANT CHANGE UP (ref 80–100)
MCV RBC AUTO: 93.9 FL — SIGNIFICANT CHANGE UP (ref 80–100)
MCV RBC AUTO: 94.5 FL — SIGNIFICANT CHANGE UP (ref 80–100)
MCV RBC AUTO: 94.6 FL — SIGNIFICANT CHANGE UP (ref 80–100)
MCV RBC AUTO: 94.8 FL — SIGNIFICANT CHANGE UP (ref 80–100)
MCV RBC AUTO: 94.8 FL — SIGNIFICANT CHANGE UP (ref 80–100)
MCV RBC AUTO: 95 FL — SIGNIFICANT CHANGE UP (ref 80–100)
MCV RBC AUTO: 95.4 FL — SIGNIFICANT CHANGE UP (ref 80–100)
MCV RBC AUTO: 95.8 FL — SIGNIFICANT CHANGE UP (ref 80–100)
MCV RBC AUTO: 96 FL — SIGNIFICANT CHANGE UP (ref 80–100)
MCV RBC AUTO: 96 FL — SIGNIFICANT CHANGE UP (ref 80–100)
MCV RBC AUTO: 96.1 FL — SIGNIFICANT CHANGE UP (ref 80–100)
MCV RBC AUTO: 96.2 FL — SIGNIFICANT CHANGE UP (ref 80–100)
MCV RBC AUTO: 96.6 FL — SIGNIFICANT CHANGE UP (ref 80–100)
MCV RBC AUTO: 96.6 FL — SIGNIFICANT CHANGE UP (ref 80–100)
MCV RBC AUTO: 96.8 FL — SIGNIFICANT CHANGE UP (ref 80–100)
MCV RBC AUTO: 96.8 FL — SIGNIFICANT CHANGE UP (ref 80–100)
MCV RBC AUTO: 96.9 FL — SIGNIFICANT CHANGE UP (ref 80–100)
MCV RBC AUTO: 97 FL — SIGNIFICANT CHANGE UP (ref 80–100)
MCV RBC AUTO: 97.4 FL — SIGNIFICANT CHANGE UP (ref 80–100)
MCV RBC AUTO: 97.4 FL — SIGNIFICANT CHANGE UP (ref 80–100)
MCV RBC AUTO: 97.5 FL — SIGNIFICANT CHANGE UP (ref 80–100)
MCV RBC AUTO: 97.7 FL — SIGNIFICANT CHANGE UP (ref 80–100)
MCV RBC AUTO: 97.8 FL — SIGNIFICANT CHANGE UP (ref 80–100)
MCV RBC AUTO: 97.8 FL — SIGNIFICANT CHANGE UP (ref 80–100)
MCV RBC AUTO: 98 FL — SIGNIFICANT CHANGE UP (ref 80–100)
MCV RBC AUTO: 98.1 FL — SIGNIFICANT CHANGE UP (ref 80–100)
MCV RBC AUTO: 98.1 FL — SIGNIFICANT CHANGE UP (ref 80–100)
MCV RBC AUTO: 98.4 FL — SIGNIFICANT CHANGE UP (ref 80–100)
MCV RBC AUTO: 98.9 FL — SIGNIFICANT CHANGE UP (ref 80–100)
MCV RBC AUTO: 99.2 FL — SIGNIFICANT CHANGE UP (ref 80–100)
MCV RBC AUTO: 99.3 FL — SIGNIFICANT CHANGE UP (ref 80–100)
MCV RBC AUTO: 99.6 FL — SIGNIFICANT CHANGE UP (ref 80–100)
METAMYELOCYTES # FLD: 1 % — HIGH (ref 0–0)
METAMYELOCYTES # FLD: 2 % — HIGH (ref 0–0)
METAMYELOCYTES # FLD: 4 % — HIGH (ref 0–0)
METAMYELOCYTES # FLD: 5 % — HIGH (ref 0–0)
METHOD TYPE: SIGNIFICANT CHANGE UP
MICROCYTES BLD QL: SLIGHT — SIGNIFICANT CHANGE UP
MONOCYTES # BLD AUTO: 0.32 K/UL — SIGNIFICANT CHANGE UP (ref 0–0.9)
MONOCYTES # BLD AUTO: 0.34 K/UL — SIGNIFICANT CHANGE UP (ref 0–0.9)
MONOCYTES # BLD AUTO: 0.38 K/UL — SIGNIFICANT CHANGE UP (ref 0–0.9)
MONOCYTES # BLD AUTO: 0.39 K/UL — SIGNIFICANT CHANGE UP (ref 0–0.9)
MONOCYTES # BLD AUTO: 0.43 K/UL — SIGNIFICANT CHANGE UP (ref 0–0.9)
MONOCYTES # BLD AUTO: 0.45 K/UL — SIGNIFICANT CHANGE UP (ref 0–0.9)
MONOCYTES # BLD AUTO: 0.47 K/UL — SIGNIFICANT CHANGE UP (ref 0–0.9)
MONOCYTES # BLD AUTO: 0.5 K/UL — SIGNIFICANT CHANGE UP (ref 0–0.9)
MONOCYTES # BLD AUTO: 0.52 K/UL — SIGNIFICANT CHANGE UP (ref 0–0.9)
MONOCYTES # BLD AUTO: 0.53 K/UL — SIGNIFICANT CHANGE UP (ref 0–0.9)
MONOCYTES # BLD AUTO: 0.53 K/UL — SIGNIFICANT CHANGE UP (ref 0–0.9)
MONOCYTES # BLD AUTO: 0.55 K/UL — SIGNIFICANT CHANGE UP (ref 0–0.9)
MONOCYTES # BLD AUTO: 0.56 K/UL — SIGNIFICANT CHANGE UP (ref 0–0.9)
MONOCYTES # BLD AUTO: 0.57 K/UL — SIGNIFICANT CHANGE UP (ref 0–0.9)
MONOCYTES # BLD AUTO: 0.58 K/UL — SIGNIFICANT CHANGE UP (ref 0–0.9)
MONOCYTES # BLD AUTO: 0.72 K/UL — SIGNIFICANT CHANGE UP (ref 0–0.9)
MONOCYTES # BLD AUTO: 0.75 K/UL — SIGNIFICANT CHANGE UP (ref 0–0.9)
MONOCYTES # BLD AUTO: 0.76 K/UL — SIGNIFICANT CHANGE UP (ref 0–0.9)
MONOCYTES # BLD AUTO: 0.76 K/UL — SIGNIFICANT CHANGE UP (ref 0–0.9)
MONOCYTES # BLD AUTO: 0.85 K/UL — SIGNIFICANT CHANGE UP (ref 0–0.9)
MONOCYTES NFR BLD AUTO: 2 % — SIGNIFICANT CHANGE UP (ref 2–14)
MONOCYTES NFR BLD AUTO: 3.1 % — SIGNIFICANT CHANGE UP (ref 2–14)
MONOCYTES NFR BLD AUTO: 4.5 % — SIGNIFICANT CHANGE UP (ref 2–14)
MONOCYTES NFR BLD AUTO: 4.7 % — SIGNIFICANT CHANGE UP (ref 2–14)
MONOCYTES NFR BLD AUTO: 5 % — SIGNIFICANT CHANGE UP (ref 2–14)
MONOCYTES NFR BLD AUTO: 5 % — SIGNIFICANT CHANGE UP (ref 2–14)
MONOCYTES NFR BLD AUTO: 5.4 % — SIGNIFICANT CHANGE UP (ref 2–14)
MONOCYTES NFR BLD AUTO: 5.4 % — SIGNIFICANT CHANGE UP (ref 2–14)
MONOCYTES NFR BLD AUTO: 5.5 % — SIGNIFICANT CHANGE UP (ref 2–14)
MONOCYTES NFR BLD AUTO: 5.7 % — SIGNIFICANT CHANGE UP (ref 2–14)
MONOCYTES NFR BLD AUTO: 5.8 % — SIGNIFICANT CHANGE UP (ref 2–14)
MONOCYTES NFR BLD AUTO: 6 % — SIGNIFICANT CHANGE UP (ref 2–14)
MONOCYTES NFR BLD AUTO: 6 % — SIGNIFICANT CHANGE UP (ref 2–14)
MONOCYTES NFR BLD AUTO: 6.4 % — SIGNIFICANT CHANGE UP (ref 2–14)
MONOCYTES NFR BLD AUTO: 6.6 % — SIGNIFICANT CHANGE UP (ref 2–14)
MONOCYTES NFR BLD AUTO: 7 % — SIGNIFICANT CHANGE UP (ref 2–14)
MONOCYTES NFR BLD AUTO: 8 % — SIGNIFICANT CHANGE UP (ref 2–14)
MONOCYTES NFR BLD AUTO: 8.1 % — SIGNIFICANT CHANGE UP (ref 2–14)
MRSA PCR RESULT.: SIGNIFICANT CHANGE UP
MRSA PCR RESULT.: SIGNIFICANT CHANGE UP
MYELOCYTES NFR BLD: 1 % — HIGH (ref 0–0)
MYELOCYTES NFR BLD: 2 % — HIGH (ref 0–0)
MYELOCYTES NFR BLD: 2 % — HIGH (ref 0–0)
MYELOCYTES NFR BLD: 6 % — HIGH (ref 0–0)
NEUTROPHILS # BLD AUTO: 10.39 K/UL — HIGH (ref 1.8–7.4)
NEUTROPHILS # BLD AUTO: 12.25 K/UL — HIGH (ref 1.8–7.4)
NEUTROPHILS # BLD AUTO: 14.68 K/UL — HIGH (ref 1.8–7.4)
NEUTROPHILS # BLD AUTO: 4.58 K/UL — SIGNIFICANT CHANGE UP (ref 1.8–7.4)
NEUTROPHILS # BLD AUTO: 4.62 K/UL — SIGNIFICANT CHANGE UP (ref 1.8–7.4)
NEUTROPHILS # BLD AUTO: 5.01 K/UL — SIGNIFICANT CHANGE UP (ref 1.8–7.4)
NEUTROPHILS # BLD AUTO: 5.14 K/UL — SIGNIFICANT CHANGE UP (ref 1.8–7.4)
NEUTROPHILS # BLD AUTO: 5.54 K/UL — SIGNIFICANT CHANGE UP (ref 1.8–7.4)
NEUTROPHILS # BLD AUTO: 5.66 K/UL — SIGNIFICANT CHANGE UP (ref 1.8–7.4)
NEUTROPHILS # BLD AUTO: 5.98 K/UL — SIGNIFICANT CHANGE UP (ref 1.8–7.4)
NEUTROPHILS # BLD AUTO: 5.98 K/UL — SIGNIFICANT CHANGE UP (ref 1.8–7.4)
NEUTROPHILS # BLD AUTO: 6.12 K/UL — SIGNIFICANT CHANGE UP (ref 1.8–7.4)
NEUTROPHILS # BLD AUTO: 6.24 K/UL — SIGNIFICANT CHANGE UP (ref 1.8–7.4)
NEUTROPHILS # BLD AUTO: 6.84 K/UL — SIGNIFICANT CHANGE UP (ref 1.8–7.4)
NEUTROPHILS # BLD AUTO: 6.87 K/UL — SIGNIFICANT CHANGE UP (ref 1.8–7.4)
NEUTROPHILS # BLD AUTO: 7.18 K/UL — SIGNIFICANT CHANGE UP (ref 1.8–7.4)
NEUTROPHILS # BLD AUTO: 8.64 K/UL — HIGH (ref 1.8–7.4)
NEUTROPHILS # BLD AUTO: 9.01 K/UL — HIGH (ref 1.8–7.4)
NEUTROPHILS # BLD AUTO: 9.69 K/UL — HIGH (ref 1.8–7.4)
NEUTROPHILS # BLD AUTO: 9.75 K/UL — HIGH (ref 1.8–7.4)
NEUTROPHILS NFR BLD AUTO: 62.1 % — SIGNIFICANT CHANGE UP (ref 43–77)
NEUTROPHILS NFR BLD AUTO: 64.5 % — SIGNIFICANT CHANGE UP (ref 43–77)
NEUTROPHILS NFR BLD AUTO: 66 % — SIGNIFICANT CHANGE UP (ref 43–77)
NEUTROPHILS NFR BLD AUTO: 67 % — SIGNIFICANT CHANGE UP (ref 43–77)
NEUTROPHILS NFR BLD AUTO: 67 % — SIGNIFICANT CHANGE UP (ref 43–77)
NEUTROPHILS NFR BLD AUTO: 68 % — SIGNIFICANT CHANGE UP (ref 43–77)
NEUTROPHILS NFR BLD AUTO: 69.7 % — SIGNIFICANT CHANGE UP (ref 43–77)
NEUTROPHILS NFR BLD AUTO: 69.8 % — SIGNIFICANT CHANGE UP (ref 43–77)
NEUTROPHILS NFR BLD AUTO: 70.5 % — SIGNIFICANT CHANGE UP (ref 43–77)
NEUTROPHILS NFR BLD AUTO: 73.1 % — SIGNIFICANT CHANGE UP (ref 43–77)
NEUTROPHILS NFR BLD AUTO: 74 % — SIGNIFICANT CHANGE UP (ref 43–77)
NEUTROPHILS NFR BLD AUTO: 74 % — SIGNIFICANT CHANGE UP (ref 43–77)
NEUTROPHILS NFR BLD AUTO: 75 % — SIGNIFICANT CHANGE UP (ref 43–77)
NEUTROPHILS NFR BLD AUTO: 76.9 % — SIGNIFICANT CHANGE UP (ref 43–77)
NEUTROPHILS NFR BLD AUTO: 77.3 % — HIGH (ref 43–77)
NEUTROPHILS NFR BLD AUTO: 78 % — HIGH (ref 43–77)
NEUTROPHILS NFR BLD AUTO: 78.6 % — HIGH (ref 43–77)
NEUTROPHILS NFR BLD AUTO: 79.1 % — HIGH (ref 43–77)
NEUTROPHILS NFR BLD AUTO: 80 % — HIGH (ref 43–77)
NEUTROPHILS NFR BLD AUTO: 83 % — HIGH (ref 43–77)
NEUTS BAND # BLD: 1 % — SIGNIFICANT CHANGE UP (ref 0–8)
NEUTS BAND # BLD: 10 % — HIGH (ref 0–8)
NEUTS BAND # BLD: 2 % — SIGNIFICANT CHANGE UP (ref 0–8)
NEUTS BAND # BLD: 3 % — SIGNIFICANT CHANGE UP (ref 0–8)
NEUTS BAND # BLD: 3 % — SIGNIFICANT CHANGE UP (ref 0–8)
NEUTS BAND # BLD: 5 % — SIGNIFICANT CHANGE UP (ref 0–8)
NRBC # BLD: 0 /100 WBCS — SIGNIFICANT CHANGE UP (ref 0–0)
NRBC # BLD: 0 /100 — SIGNIFICANT CHANGE UP (ref 0–0)
NRBC # BLD: 1 /100 WBCS — HIGH (ref 0–0)
NRBC # BLD: 10 /100 WBCS — HIGH (ref 0–0)
NRBC # BLD: 12 /100 WBCS — HIGH (ref 0–0)
NRBC # BLD: 12 /100 WBCS — HIGH (ref 0–0)
NRBC # BLD: 12 /100 — HIGH (ref 0–0)
NRBC # BLD: 14 /100 WBCS — HIGH (ref 0–0)
NRBC # BLD: 19 /100 WBCS — HIGH (ref 0–0)
NRBC # BLD: 2 /100 WBCS — HIGH (ref 0–0)
NRBC # BLD: 2 /100 WBCS — HIGH (ref 0–0)
NRBC # BLD: 2 /100 — HIGH (ref 0–0)
NRBC # BLD: 23 /100 WBCS — HIGH (ref 0–0)
NRBC # BLD: 3 /100 WBCS — HIGH (ref 0–0)
NRBC # BLD: 3 /100 — HIGH (ref 0–0)
NRBC # BLD: 4 /100 WBCS — HIGH (ref 0–0)
NRBC # BLD: 5 /100 WBCS — HIGH (ref 0–0)
NRBC # BLD: 9 /100 WBCS — HIGH (ref 0–0)
NT-PROBNP SERPL-SCNC: 1773 PG/ML — HIGH (ref 0–125)
ORGANISM # SPEC MICROSCOPIC CNT: SIGNIFICANT CHANGE UP
ORGANISM # SPEC MICROSCOPIC CNT: SIGNIFICANT CHANGE UP
PCO2 BLDA: 101 MMHG — CRITICAL HIGH (ref 32–46)
PCO2 BLDA: 101 MMHG — CRITICAL HIGH (ref 32–46)
PCO2 BLDA: 102 MMHG — CRITICAL HIGH (ref 32–46)
PCO2 BLDA: 104 MMHG — CRITICAL HIGH (ref 32–46)
PCO2 BLDA: 107 MMHG — CRITICAL HIGH (ref 32–46)
PCO2 BLDA: 108 MMHG — CRITICAL HIGH (ref 32–46)
PCO2 BLDA: 108 MMHG — CRITICAL HIGH (ref 32–46)
PCO2 BLDA: 110 MMHG — CRITICAL HIGH (ref 32–46)
PCO2 BLDA: 115 MMHG — CRITICAL HIGH (ref 32–46)
PCO2 BLDA: 117 MMHG — CRITICAL HIGH (ref 32–46)
PCO2 BLDA: 118 MMHG — CRITICAL HIGH (ref 32–46)
PCO2 BLDA: 119 MMHG — CRITICAL HIGH (ref 32–46)
PCO2 BLDA: 132 MMHG — CRITICAL HIGH (ref 32–46)
PCO2 BLDA: 19 MMHG — LOW (ref 32–46)
PCO2 BLDA: 42 MMHG — SIGNIFICANT CHANGE UP (ref 32–46)
PCO2 BLDA: 47 MMHG — HIGH (ref 32–46)
PCO2 BLDA: 48 MMHG — HIGH (ref 32–46)
PCO2 BLDA: 50 MMHG — HIGH (ref 32–46)
PCO2 BLDA: 51 MMHG — HIGH (ref 32–46)
PCO2 BLDA: 53 MMHG — HIGH (ref 32–46)
PCO2 BLDA: 54 MMHG — HIGH (ref 32–46)
PCO2 BLDA: 57 MMHG — HIGH (ref 32–46)
PCO2 BLDA: 58 MMHG — HIGH (ref 32–46)
PCO2 BLDA: 58 MMHG — HIGH (ref 32–46)
PCO2 BLDA: 59 MMHG — HIGH (ref 32–46)
PCO2 BLDA: 59 MMHG — HIGH (ref 32–46)
PCO2 BLDA: 61 MMHG — HIGH (ref 32–46)
PCO2 BLDA: 61 MMHG — HIGH (ref 32–46)
PCO2 BLDA: 64 MMHG — HIGH (ref 32–46)
PCO2 BLDA: 65 MMHG — HIGH (ref 32–46)
PCO2 BLDA: 67 MMHG — HIGH (ref 32–46)
PCO2 BLDA: 68 MMHG — HIGH (ref 32–46)
PCO2 BLDA: 69 MMHG — HIGH (ref 32–46)
PCO2 BLDA: 69 MMHG — HIGH (ref 32–46)
PCO2 BLDA: 70 MMHG — CRITICAL HIGH (ref 32–46)
PCO2 BLDA: 72 MMHG — CRITICAL HIGH (ref 32–46)
PCO2 BLDA: 73 MMHG — CRITICAL HIGH (ref 32–46)
PCO2 BLDA: 74 MMHG — CRITICAL HIGH (ref 32–46)
PCO2 BLDA: 74 MMHG — CRITICAL HIGH (ref 32–46)
PCO2 BLDA: 75 MMHG — CRITICAL HIGH (ref 32–46)
PCO2 BLDA: 76 MMHG — CRITICAL HIGH (ref 32–46)
PCO2 BLDA: 77 MMHG — CRITICAL HIGH (ref 32–46)
PCO2 BLDA: 79 MMHG — CRITICAL HIGH (ref 32–46)
PCO2 BLDA: 80 MMHG — CRITICAL HIGH (ref 32–46)
PCO2 BLDA: 80 MMHG — CRITICAL HIGH (ref 32–46)
PCO2 BLDA: 82 MMHG — CRITICAL HIGH (ref 32–46)
PCO2 BLDA: 83 MMHG — CRITICAL HIGH (ref 32–46)
PCO2 BLDA: 84 MMHG — CRITICAL HIGH (ref 32–46)
PCO2 BLDA: 86 MMHG — CRITICAL HIGH (ref 32–46)
PCO2 BLDA: 88 MMHG — CRITICAL HIGH (ref 32–46)
PCO2 BLDA: 88 MMHG — CRITICAL HIGH (ref 32–46)
PCO2 BLDA: 89 MMHG — CRITICAL HIGH (ref 32–46)
PCO2 BLDA: 89 MMHG — CRITICAL HIGH (ref 32–46)
PCO2 BLDA: 91 MMHG — CRITICAL HIGH (ref 32–46)
PCO2 BLDA: 93 MMHG — CRITICAL HIGH (ref 32–46)
PCO2 BLDA: 95 MMHG — CRITICAL HIGH (ref 32–46)
PCO2 BLDA: 96 MMHG — CRITICAL HIGH (ref 32–46)
PCO2 BLDV: 100 MMHG — HIGH (ref 35–50)
PCO2 BLDV: 117 MMHG — HIGH (ref 35–50)
PCO2 BLDV: 58 MMHG — HIGH (ref 35–50)
PCO2 BLDV: 88 MMHG — HIGH (ref 35–50)
PH BLDA: 6.87 — CRITICAL LOW (ref 7.35–7.45)
PH BLDA: 6.94 — CRITICAL LOW (ref 7.35–7.45)
PH BLDA: 6.94 — CRITICAL LOW (ref 7.35–7.45)
PH BLDA: 6.96 — CRITICAL LOW (ref 7.35–7.45)
PH BLDA: 6.97 — CRITICAL LOW (ref 7.35–7.45)
PH BLDA: 6.98 — CRITICAL LOW (ref 7.35–7.45)
PH BLDA: 6.99 — CRITICAL LOW (ref 7.35–7.45)
PH BLDA: 7.01 — CRITICAL LOW (ref 7.35–7.45)
PH BLDA: 7.03 — CRITICAL LOW (ref 7.35–7.45)
PH BLDA: 7.03 — CRITICAL LOW (ref 7.35–7.45)
PH BLDA: 7.05 — CRITICAL LOW (ref 7.35–7.45)
PH BLDA: 7.05 — CRITICAL LOW (ref 7.35–7.45)
PH BLDA: 7.06 — CRITICAL LOW (ref 7.35–7.45)
PH BLDA: 7.07 — CRITICAL LOW (ref 7.35–7.45)
PH BLDA: 7.08 — CRITICAL LOW (ref 7.35–7.45)
PH BLDA: 7.09 — CRITICAL LOW (ref 7.35–7.45)
PH BLDA: 7.1 — CRITICAL LOW (ref 7.35–7.45)
PH BLDA: 7.11 — CRITICAL LOW (ref 7.35–7.45)
PH BLDA: 7.11 — CRITICAL LOW (ref 7.35–7.45)
PH BLDA: 7.12 — CRITICAL LOW (ref 7.35–7.45)
PH BLDA: 7.13 — CRITICAL LOW (ref 7.35–7.45)
PH BLDA: 7.14 — CRITICAL LOW (ref 7.35–7.45)
PH BLDA: 7.15 — CRITICAL LOW (ref 7.35–7.45)
PH BLDA: 7.15 — CRITICAL LOW (ref 7.35–7.45)
PH BLDA: 7.16 — CRITICAL LOW (ref 7.35–7.45)
PH BLDA: 7.17 — CRITICAL LOW (ref 7.35–7.45)
PH BLDA: 7.18 — CRITICAL LOW (ref 7.35–7.45)
PH BLDA: 7.19 — CRITICAL LOW (ref 7.35–7.45)
PH BLDA: 7.2 — CRITICAL LOW (ref 7.35–7.45)
PH BLDA: 7.21 — LOW (ref 7.35–7.45)
PH BLDA: 7.22 — LOW (ref 7.35–7.45)
PH BLDA: 7.23 — LOW (ref 7.35–7.45)
PH BLDA: 7.24 — LOW (ref 7.35–7.45)
PH BLDA: 7.25 — LOW (ref 7.35–7.45)
PH BLDA: 7.26 — LOW (ref 7.35–7.45)
PH BLDA: 7.26 — LOW (ref 7.35–7.45)
PH BLDA: 7.35 — SIGNIFICANT CHANGE UP (ref 7.35–7.45)
PH BLDA: 7.41 — SIGNIFICANT CHANGE UP (ref 7.35–7.45)
PH BLDA: 7.42 — SIGNIFICANT CHANGE UP (ref 7.35–7.45)
PH BLDA: 7.42 — SIGNIFICANT CHANGE UP (ref 7.35–7.45)
PH BLDA: 7.44 — SIGNIFICANT CHANGE UP (ref 7.35–7.45)
PH BLDV: 6.94 — CRITICAL LOW (ref 7.35–7.45)
PH BLDV: 7.03 — CRITICAL LOW (ref 7.35–7.45)
PH BLDV: 7.11 — CRITICAL LOW (ref 7.35–7.45)
PH BLDV: 7.12 — CRITICAL LOW (ref 7.35–7.45)
PHOSPHATE SERPL-MCNC: 10.1 MG/DL — HIGH (ref 2.5–4.5)
PHOSPHATE SERPL-MCNC: 10.4 MG/DL — HIGH (ref 2.5–4.5)
PHOSPHATE SERPL-MCNC: 2.5 MG/DL — SIGNIFICANT CHANGE UP (ref 2.5–4.5)
PHOSPHATE SERPL-MCNC: 2.8 MG/DL — SIGNIFICANT CHANGE UP (ref 2.5–4.5)
PHOSPHATE SERPL-MCNC: 3 MG/DL — SIGNIFICANT CHANGE UP (ref 2.5–4.5)
PHOSPHATE SERPL-MCNC: 3.1 MG/DL — SIGNIFICANT CHANGE UP (ref 2.5–4.5)
PHOSPHATE SERPL-MCNC: 3.6 MG/DL — SIGNIFICANT CHANGE UP (ref 2.5–4.5)
PHOSPHATE SERPL-MCNC: 3.6 MG/DL — SIGNIFICANT CHANGE UP (ref 2.5–4.5)
PHOSPHATE SERPL-MCNC: 3.9 MG/DL — SIGNIFICANT CHANGE UP (ref 2.5–4.5)
PHOSPHATE SERPL-MCNC: 4 MG/DL — SIGNIFICANT CHANGE UP (ref 2.5–4.5)
PHOSPHATE SERPL-MCNC: 4.2 MG/DL — SIGNIFICANT CHANGE UP (ref 2.5–4.5)
PHOSPHATE SERPL-MCNC: 4.5 MG/DL — SIGNIFICANT CHANGE UP (ref 2.5–4.5)
PHOSPHATE SERPL-MCNC: 4.5 MG/DL — SIGNIFICANT CHANGE UP (ref 2.5–4.5)
PHOSPHATE SERPL-MCNC: 4.8 MG/DL — HIGH (ref 2.5–4.5)
PHOSPHATE SERPL-MCNC: 5.2 MG/DL — HIGH (ref 2.5–4.5)
PHOSPHATE SERPL-MCNC: 5.3 MG/DL — HIGH (ref 2.5–4.5)
PHOSPHATE SERPL-MCNC: 5.3 MG/DL — HIGH (ref 2.5–4.5)
PHOSPHATE SERPL-MCNC: 5.4 MG/DL — HIGH (ref 2.5–4.5)
PHOSPHATE SERPL-MCNC: 5.9 MG/DL — HIGH (ref 2.5–4.5)
PHOSPHATE SERPL-MCNC: 5.9 MG/DL — HIGH (ref 2.5–4.5)
PHOSPHATE SERPL-MCNC: 6 MG/DL — HIGH (ref 2.5–4.5)
PHOSPHATE SERPL-MCNC: 6.2 MG/DL — HIGH (ref 2.5–4.5)
PHOSPHATE SERPL-MCNC: 6.2 MG/DL — HIGH (ref 2.5–4.5)
PHOSPHATE SERPL-MCNC: 6.3 MG/DL — HIGH (ref 2.5–4.5)
PHOSPHATE SERPL-MCNC: 6.7 MG/DL — HIGH (ref 2.5–4.5)
PHOSPHATE SERPL-MCNC: 6.9 MG/DL — HIGH (ref 2.5–4.5)
PHOSPHATE SERPL-MCNC: 7.3 MG/DL — HIGH (ref 2.5–4.5)
PHOSPHATE SERPL-MCNC: 7.3 MG/DL — HIGH (ref 2.5–4.5)
PHOSPHATE SERPL-MCNC: 7.5 MG/DL — HIGH (ref 2.5–4.5)
PHOSPHATE SERPL-MCNC: 7.7 MG/DL — HIGH (ref 2.5–4.5)
PHOSPHATE SERPL-MCNC: 8.1 MG/DL — HIGH (ref 2.5–4.5)
PHOSPHATE SERPL-MCNC: 8.4 MG/DL — HIGH (ref 2.5–4.5)
PHOSPHATE SERPL-MCNC: 8.8 MG/DL — HIGH (ref 2.5–4.5)
PHOSPHATE SERPL-MCNC: 8.8 MG/DL — HIGH (ref 2.5–4.5)
PHOSPHATE SERPL-MCNC: 8.9 MG/DL — HIGH (ref 2.5–4.5)
PHOSPHATE SERPL-MCNC: 9.8 MG/DL — HIGH (ref 2.5–4.5)
PHOSPHATE SERPL-MCNC: 9.9 MG/DL — HIGH (ref 2.5–4.5)
PLAT MORPH BLD: NORMAL — SIGNIFICANT CHANGE UP
PLATELET # BLD AUTO: 104 K/UL — LOW (ref 150–400)
PLATELET # BLD AUTO: 105 K/UL — LOW (ref 150–400)
PLATELET # BLD AUTO: 106 K/UL — LOW (ref 150–400)
PLATELET # BLD AUTO: 107 K/UL — LOW (ref 150–400)
PLATELET # BLD AUTO: 117 K/UL — LOW (ref 150–400)
PLATELET # BLD AUTO: 121 K/UL — LOW (ref 150–400)
PLATELET # BLD AUTO: 125 K/UL — LOW (ref 150–400)
PLATELET # BLD AUTO: 126 K/UL — LOW (ref 150–400)
PLATELET # BLD AUTO: 126 K/UL — LOW (ref 150–400)
PLATELET # BLD AUTO: 128 K/UL — LOW (ref 150–400)
PLATELET # BLD AUTO: 135 K/UL — LOW (ref 150–400)
PLATELET # BLD AUTO: 138 K/UL — LOW (ref 150–400)
PLATELET # BLD AUTO: 143 K/UL — LOW (ref 150–400)
PLATELET # BLD AUTO: 144 K/UL — LOW (ref 150–400)
PLATELET # BLD AUTO: 144 K/UL — LOW (ref 150–400)
PLATELET # BLD AUTO: 155 K/UL — SIGNIFICANT CHANGE UP (ref 150–400)
PLATELET # BLD AUTO: 155 K/UL — SIGNIFICANT CHANGE UP (ref 150–400)
PLATELET # BLD AUTO: 162 K/UL — SIGNIFICANT CHANGE UP (ref 150–400)
PLATELET # BLD AUTO: 169 K/UL — SIGNIFICANT CHANGE UP (ref 150–400)
PLATELET # BLD AUTO: 172 K/UL — SIGNIFICANT CHANGE UP (ref 150–400)
PLATELET # BLD AUTO: 176 K/UL — SIGNIFICANT CHANGE UP (ref 150–400)
PLATELET # BLD AUTO: 176 K/UL — SIGNIFICANT CHANGE UP (ref 150–400)
PLATELET # BLD AUTO: 177 K/UL — SIGNIFICANT CHANGE UP (ref 150–400)
PLATELET # BLD AUTO: 179 K/UL — SIGNIFICANT CHANGE UP (ref 150–400)
PLATELET # BLD AUTO: 179 K/UL — SIGNIFICANT CHANGE UP (ref 150–400)
PLATELET # BLD AUTO: 181 K/UL — SIGNIFICANT CHANGE UP (ref 150–400)
PLATELET # BLD AUTO: 185 K/UL — SIGNIFICANT CHANGE UP (ref 150–400)
PLATELET # BLD AUTO: 191 K/UL — SIGNIFICANT CHANGE UP (ref 150–400)
PLATELET # BLD AUTO: 195 K/UL — SIGNIFICANT CHANGE UP (ref 150–400)
PLATELET # BLD AUTO: 199 K/UL — SIGNIFICANT CHANGE UP (ref 150–400)
PLATELET # BLD AUTO: 202 K/UL — SIGNIFICANT CHANGE UP (ref 150–400)
PLATELET # BLD AUTO: 203 K/UL — SIGNIFICANT CHANGE UP (ref 150–400)
PLATELET # BLD AUTO: 208 K/UL — SIGNIFICANT CHANGE UP (ref 150–400)
PLATELET # BLD AUTO: 215 K/UL — SIGNIFICANT CHANGE UP (ref 150–400)
PLATELET # BLD AUTO: 217 K/UL — SIGNIFICANT CHANGE UP (ref 150–400)
PLATELET # BLD AUTO: 218 K/UL — SIGNIFICANT CHANGE UP (ref 150–400)
PLATELET # BLD AUTO: 221 K/UL — SIGNIFICANT CHANGE UP (ref 150–400)
PLATELET # BLD AUTO: 225 K/UL — SIGNIFICANT CHANGE UP (ref 150–400)
PLATELET # BLD AUTO: 234 K/UL — SIGNIFICANT CHANGE UP (ref 150–400)
PLATELET # BLD AUTO: 238 K/UL — SIGNIFICANT CHANGE UP (ref 150–400)
PLATELET # BLD AUTO: 256 K/UL — SIGNIFICANT CHANGE UP (ref 150–400)
PLATELET # BLD AUTO: 265 K/UL — SIGNIFICANT CHANGE UP (ref 150–400)
PLATELET # BLD AUTO: 266 K/UL — SIGNIFICANT CHANGE UP (ref 150–400)
PLATELET # BLD AUTO: 268 K/UL — SIGNIFICANT CHANGE UP (ref 150–400)
PLATELET # BLD AUTO: 269 K/UL — SIGNIFICANT CHANGE UP (ref 150–400)
PLATELET # BLD AUTO: 278 K/UL — SIGNIFICANT CHANGE UP (ref 150–400)
PLATELET # BLD AUTO: 281 K/UL — SIGNIFICANT CHANGE UP (ref 150–400)
PLATELET # BLD AUTO: 300 K/UL — SIGNIFICANT CHANGE UP (ref 150–400)
PLATELET # BLD AUTO: 348 K/UL — SIGNIFICANT CHANGE UP (ref 150–400)
PLATELET # BLD AUTO: 383 K/UL — SIGNIFICANT CHANGE UP (ref 150–400)
PLATELET # BLD AUTO: 99 K/UL — LOW (ref 150–400)
PLATELET CLUMP BLD QL SMEAR: ABNORMAL
PLATELET COUNT - ESTIMATE: NORMAL — SIGNIFICANT CHANGE UP
PO2 BLDA: 103 MMHG — SIGNIFICANT CHANGE UP (ref 74–108)
PO2 BLDA: 103 MMHG — SIGNIFICANT CHANGE UP (ref 74–108)
PO2 BLDA: 105 MMHG — SIGNIFICANT CHANGE UP (ref 74–108)
PO2 BLDA: 109 MMHG — HIGH (ref 74–108)
PO2 BLDA: 109 MMHG — HIGH (ref 74–108)
PO2 BLDA: 114 MMHG — HIGH (ref 74–108)
PO2 BLDA: 121 MMHG — HIGH (ref 74–108)
PO2 BLDA: 123 MMHG — HIGH (ref 74–108)
PO2 BLDA: 125 MMHG — HIGH (ref 74–108)
PO2 BLDA: 126 MMHG — HIGH (ref 74–108)
PO2 BLDA: 127 MMHG — HIGH (ref 74–108)
PO2 BLDA: 130 MMHG — HIGH (ref 74–108)
PO2 BLDA: 134 MMHG — HIGH (ref 74–108)
PO2 BLDA: 134 MMHG — HIGH (ref 74–108)
PO2 BLDA: 136 MMHG — HIGH (ref 74–108)
PO2 BLDA: 138 MMHG — HIGH (ref 74–108)
PO2 BLDA: 158 MMHG — HIGH (ref 74–108)
PO2 BLDA: 163 MMHG — HIGH (ref 74–108)
PO2 BLDA: 164 MMHG — HIGH (ref 74–108)
PO2 BLDA: 221 MMHG — HIGH (ref 74–108)
PO2 BLDA: 46 MMHG — CRITICAL LOW (ref 74–108)
PO2 BLDA: 50 MMHG — CRITICAL LOW (ref 74–108)
PO2 BLDA: 50 MMHG — CRITICAL LOW (ref 74–108)
PO2 BLDA: 53 MMHG — LOW (ref 74–108)
PO2 BLDA: 56 MMHG — LOW (ref 74–108)
PO2 BLDA: 57 MMHG — LOW (ref 74–108)
PO2 BLDA: 58 MMHG — LOW (ref 74–108)
PO2 BLDA: 59 MMHG — LOW (ref 74–108)
PO2 BLDA: 60 MMHG — LOW (ref 74–108)
PO2 BLDA: 61 MMHG — LOW (ref 74–108)
PO2 BLDA: 61 MMHG — LOW (ref 74–108)
PO2 BLDA: 63 MMHG — LOW (ref 74–108)
PO2 BLDA: 63 MMHG — LOW (ref 74–108)
PO2 BLDA: 64 MMHG — LOW (ref 74–108)
PO2 BLDA: 66 MMHG — LOW (ref 74–108)
PO2 BLDA: 67 MMHG — LOW (ref 74–108)
PO2 BLDA: 67 MMHG — LOW (ref 74–108)
PO2 BLDA: 68 MMHG — LOW (ref 74–108)
PO2 BLDA: 68 MMHG — LOW (ref 74–108)
PO2 BLDA: 70 MMHG — LOW (ref 74–108)
PO2 BLDA: 71 MMHG — LOW (ref 74–108)
PO2 BLDA: 72 MMHG — LOW (ref 74–108)
PO2 BLDA: 73 MMHG — LOW (ref 74–108)
PO2 BLDA: 74 MMHG — SIGNIFICANT CHANGE UP (ref 74–108)
PO2 BLDA: 76 MMHG — SIGNIFICANT CHANGE UP (ref 74–108)
PO2 BLDA: 77 MMHG — SIGNIFICANT CHANGE UP (ref 74–108)
PO2 BLDA: 78 MMHG — SIGNIFICANT CHANGE UP (ref 74–108)
PO2 BLDA: 79 MMHG — SIGNIFICANT CHANGE UP (ref 74–108)
PO2 BLDA: 79 MMHG — SIGNIFICANT CHANGE UP (ref 74–108)
PO2 BLDA: 80 MMHG — SIGNIFICANT CHANGE UP (ref 74–108)
PO2 BLDA: 81 MMHG — SIGNIFICANT CHANGE UP (ref 74–108)
PO2 BLDA: 82 MMHG — SIGNIFICANT CHANGE UP (ref 74–108)
PO2 BLDA: 82 MMHG — SIGNIFICANT CHANGE UP (ref 74–108)
PO2 BLDA: 83 MMHG — SIGNIFICANT CHANGE UP (ref 74–108)
PO2 BLDA: 85 MMHG — SIGNIFICANT CHANGE UP (ref 74–108)
PO2 BLDA: 86 MMHG — SIGNIFICANT CHANGE UP (ref 74–108)
PO2 BLDA: 87 MMHG — SIGNIFICANT CHANGE UP (ref 74–108)
PO2 BLDA: 88 MMHG — SIGNIFICANT CHANGE UP (ref 74–108)
PO2 BLDA: 90 MMHG — SIGNIFICANT CHANGE UP (ref 74–108)
PO2 BLDA: 95 MMHG — SIGNIFICANT CHANGE UP (ref 74–108)
PO2 BLDA: 98 MMHG — SIGNIFICANT CHANGE UP (ref 74–108)
PO2 BLDA: 99 MMHG — SIGNIFICANT CHANGE UP (ref 74–108)
PO2 BLDV: 33 MMHG — SIGNIFICANT CHANGE UP (ref 25–45)
PO2 BLDV: 40 MMHG — SIGNIFICANT CHANGE UP (ref 25–45)
PO2 BLDV: 48 MMHG — HIGH (ref 25–45)
PO2 BLDV: 52 MMHG — HIGH (ref 25–45)
POIKILOCYTOSIS BLD QL AUTO: SLIGHT — SIGNIFICANT CHANGE UP
POLYCHROMASIA BLD QL SMEAR: SIGNIFICANT CHANGE UP
POLYCHROMASIA BLD QL SMEAR: SLIGHT — SIGNIFICANT CHANGE UP
POTASSIUM SERPL-MCNC: 3.4 MMOL/L — LOW (ref 3.5–5.3)
POTASSIUM SERPL-MCNC: 3.4 MMOL/L — LOW (ref 3.5–5.3)
POTASSIUM SERPL-MCNC: 3.5 MMOL/L — SIGNIFICANT CHANGE UP (ref 3.5–5.3)
POTASSIUM SERPL-MCNC: 3.6 MMOL/L — SIGNIFICANT CHANGE UP (ref 3.5–5.3)
POTASSIUM SERPL-MCNC: 3.7 MMOL/L — SIGNIFICANT CHANGE UP (ref 3.5–5.3)
POTASSIUM SERPL-MCNC: 3.8 MMOL/L — SIGNIFICANT CHANGE UP (ref 3.5–5.3)
POTASSIUM SERPL-MCNC: 3.9 MMOL/L — SIGNIFICANT CHANGE UP (ref 3.5–5.3)
POTASSIUM SERPL-MCNC: 4 MMOL/L — SIGNIFICANT CHANGE UP (ref 3.5–5.3)
POTASSIUM SERPL-MCNC: 4.1 MMOL/L — SIGNIFICANT CHANGE UP (ref 3.5–5.3)
POTASSIUM SERPL-MCNC: 4.2 MMOL/L — SIGNIFICANT CHANGE UP (ref 3.5–5.3)
POTASSIUM SERPL-MCNC: 4.5 MMOL/L — SIGNIFICANT CHANGE UP (ref 3.5–5.3)
POTASSIUM SERPL-MCNC: 4.6 MMOL/L — SIGNIFICANT CHANGE UP (ref 3.5–5.3)
POTASSIUM SERPL-MCNC: 4.7 MMOL/L — SIGNIFICANT CHANGE UP (ref 3.5–5.3)
POTASSIUM SERPL-MCNC: 4.8 MMOL/L — SIGNIFICANT CHANGE UP (ref 3.5–5.3)
POTASSIUM SERPL-MCNC: 4.9 MMOL/L — SIGNIFICANT CHANGE UP (ref 3.5–5.3)
POTASSIUM SERPL-MCNC: 5.1 MMOL/L — SIGNIFICANT CHANGE UP (ref 3.5–5.3)
POTASSIUM SERPL-MCNC: 5.2 MMOL/L — SIGNIFICANT CHANGE UP (ref 3.5–5.3)
POTASSIUM SERPL-MCNC: 5.2 MMOL/L — SIGNIFICANT CHANGE UP (ref 3.5–5.3)
POTASSIUM SERPL-MCNC: 5.4 MMOL/L — HIGH (ref 3.5–5.3)
POTASSIUM SERPL-MCNC: 5.5 MMOL/L — HIGH (ref 3.5–5.3)
POTASSIUM SERPL-MCNC: 5.6 MMOL/L — HIGH (ref 3.5–5.3)
POTASSIUM SERPL-MCNC: 5.7 MMOL/L — HIGH (ref 3.5–5.3)
POTASSIUM SERPL-SCNC: 3.4 MMOL/L — LOW (ref 3.5–5.3)
POTASSIUM SERPL-SCNC: 3.4 MMOL/L — LOW (ref 3.5–5.3)
POTASSIUM SERPL-SCNC: 3.5 MMOL/L — SIGNIFICANT CHANGE UP (ref 3.5–5.3)
POTASSIUM SERPL-SCNC: 3.6 MMOL/L — SIGNIFICANT CHANGE UP (ref 3.5–5.3)
POTASSIUM SERPL-SCNC: 3.7 MMOL/L — SIGNIFICANT CHANGE UP (ref 3.5–5.3)
POTASSIUM SERPL-SCNC: 3.8 MMOL/L — SIGNIFICANT CHANGE UP (ref 3.5–5.3)
POTASSIUM SERPL-SCNC: 3.9 MMOL/L — SIGNIFICANT CHANGE UP (ref 3.5–5.3)
POTASSIUM SERPL-SCNC: 4 MMOL/L — SIGNIFICANT CHANGE UP (ref 3.5–5.3)
POTASSIUM SERPL-SCNC: 4.1 MMOL/L — SIGNIFICANT CHANGE UP (ref 3.5–5.3)
POTASSIUM SERPL-SCNC: 4.2 MMOL/L — SIGNIFICANT CHANGE UP (ref 3.5–5.3)
POTASSIUM SERPL-SCNC: 4.5 MMOL/L — SIGNIFICANT CHANGE UP (ref 3.5–5.3)
POTASSIUM SERPL-SCNC: 4.6 MMOL/L — SIGNIFICANT CHANGE UP (ref 3.5–5.3)
POTASSIUM SERPL-SCNC: 4.7 MMOL/L — SIGNIFICANT CHANGE UP (ref 3.5–5.3)
POTASSIUM SERPL-SCNC: 4.8 MMOL/L — SIGNIFICANT CHANGE UP (ref 3.5–5.3)
POTASSIUM SERPL-SCNC: 4.9 MMOL/L — SIGNIFICANT CHANGE UP (ref 3.5–5.3)
POTASSIUM SERPL-SCNC: 5.1 MMOL/L — SIGNIFICANT CHANGE UP (ref 3.5–5.3)
POTASSIUM SERPL-SCNC: 5.2 MMOL/L — SIGNIFICANT CHANGE UP (ref 3.5–5.3)
POTASSIUM SERPL-SCNC: 5.2 MMOL/L — SIGNIFICANT CHANGE UP (ref 3.5–5.3)
POTASSIUM SERPL-SCNC: 5.4 MMOL/L — HIGH (ref 3.5–5.3)
POTASSIUM SERPL-SCNC: 5.5 MMOL/L — HIGH (ref 3.5–5.3)
POTASSIUM SERPL-SCNC: 5.6 MMOL/L — HIGH (ref 3.5–5.3)
POTASSIUM SERPL-SCNC: 5.7 MMOL/L — HIGH (ref 3.5–5.3)
PROCALCITONIN SERPL-MCNC: 0.09 NG/ML — SIGNIFICANT CHANGE UP (ref 0.02–0.1)
PROCALCITONIN SERPL-MCNC: 0.11 NG/ML — HIGH (ref 0.02–0.1)
PROCALCITONIN SERPL-MCNC: 19.6 NG/ML — HIGH (ref 0.02–0.1)
PROCALCITONIN SERPL-MCNC: 27.7 NG/ML — HIGH (ref 0.02–0.1)
PROCALCITONIN SERPL-MCNC: 3.13 NG/ML — HIGH (ref 0.02–0.1)
PROCALCITONIN SERPL-MCNC: 3.26 NG/ML — HIGH (ref 0.02–0.1)
PROCALCITONIN SERPL-MCNC: 3.53 NG/ML — HIGH (ref 0.02–0.1)
PROCALCITONIN SERPL-MCNC: 3.73 NG/ML — HIGH (ref 0.02–0.1)
PROCALCITONIN SERPL-MCNC: 3.81 NG/ML — HIGH (ref 0.02–0.1)
PROCALCITONIN SERPL-MCNC: 36.6 NG/ML — HIGH (ref 0.02–0.1)
PROCALCITONIN SERPL-MCNC: 4.69 NG/ML — HIGH (ref 0.02–0.1)
PROCALCITONIN SERPL-MCNC: 48.7 NG/ML — HIGH (ref 0.02–0.1)
PROCALCITONIN SERPL-MCNC: 5.8 NG/ML — HIGH (ref 0.02–0.1)
PROCALCITONIN SERPL-MCNC: 57.8 NG/ML — HIGH (ref 0.02–0.1)
PROCALCITONIN SERPL-MCNC: 8.07 NG/ML — HIGH (ref 0.02–0.1)
PROCALCITONIN SERPL-MCNC: 9.11 NG/ML — HIGH (ref 0.02–0.1)
PROCALCITONIN SERPL-MCNC: 9.29 NG/ML — HIGH (ref 0.02–0.1)
PROT SERPL-MCNC: 5.6 G/DL — LOW (ref 6–8.3)
PROT SERPL-MCNC: 5.6 G/DL — LOW (ref 6–8.3)
PROT SERPL-MCNC: 5.7 G/DL — LOW (ref 6–8.3)
PROT SERPL-MCNC: 5.7 G/DL — LOW (ref 6–8.3)
PROT SERPL-MCNC: 5.8 G/DL — LOW (ref 6–8.3)
PROT SERPL-MCNC: 5.9 G/DL — LOW (ref 6–8.3)
PROT SERPL-MCNC: 6 G/DL — SIGNIFICANT CHANGE UP (ref 6–8.3)
PROT SERPL-MCNC: 6.1 G/DL — SIGNIFICANT CHANGE UP (ref 6–8.3)
PROT SERPL-MCNC: 6.2 G/DL — SIGNIFICANT CHANGE UP (ref 6–8.3)
PROT SERPL-MCNC: 6.3 G/DL — SIGNIFICANT CHANGE UP (ref 6–8.3)
PROT SERPL-MCNC: 6.4 G/DL — SIGNIFICANT CHANGE UP (ref 6–8.3)
PROT SERPL-MCNC: 6.5 G/DL — SIGNIFICANT CHANGE UP (ref 6–8.3)
PROT SERPL-MCNC: 6.5 G/DL — SIGNIFICANT CHANGE UP (ref 6–8.3)
PROT SERPL-MCNC: 6.6 G/DL — SIGNIFICANT CHANGE UP (ref 6–8.3)
PROT SERPL-MCNC: 6.6 G/DL — SIGNIFICANT CHANGE UP (ref 6–8.3)
PROT SERPL-MCNC: 6.7 G/DL — SIGNIFICANT CHANGE UP (ref 6–8.3)
PROT SERPL-MCNC: 6.9 G/DL — SIGNIFICANT CHANGE UP (ref 6–8.3)
PROT SERPL-MCNC: 7 G/DL — SIGNIFICANT CHANGE UP (ref 6–8.3)
PROT SERPL-MCNC: 7.4 G/DL — SIGNIFICANT CHANGE UP (ref 6–8.3)
PROTHROM AB SERPL-ACNC: 11 SEC — SIGNIFICANT CHANGE UP (ref 10.6–13.6)
PROTHROM AB SERPL-ACNC: 11.6 SEC — SIGNIFICANT CHANGE UP (ref 10.6–13.6)
PROTHROM AB SERPL-ACNC: 12.5 SEC — SIGNIFICANT CHANGE UP (ref 10.6–13.6)
PROTHROM AB SERPL-ACNC: 12.5 SEC — SIGNIFICANT CHANGE UP (ref 10.6–13.6)
PROTHROM AB SERPL-ACNC: 12.8 SEC — SIGNIFICANT CHANGE UP (ref 10.6–13.6)
PROTHROM AB SERPL-ACNC: 13 SEC — SIGNIFICANT CHANGE UP (ref 10.6–13.6)
PROTHROM AB SERPL-ACNC: 13 SEC — SIGNIFICANT CHANGE UP (ref 10.6–13.6)
PROTHROM AB SERPL-ACNC: 13.2 SEC — SIGNIFICANT CHANGE UP (ref 10.6–13.6)
PROTHROM AB SERPL-ACNC: 13.3 SEC — SIGNIFICANT CHANGE UP (ref 10.6–13.6)
PROTHROM AB SERPL-ACNC: 13.5 SEC — SIGNIFICANT CHANGE UP (ref 10.6–13.6)
PROTHROM AB SERPL-ACNC: 13.6 SEC — SIGNIFICANT CHANGE UP (ref 10.6–13.6)
PROTHROM AB SERPL-ACNC: 13.7 SEC — HIGH (ref 10.6–13.6)
PROTHROM AB SERPL-ACNC: 14.1 SEC — HIGH (ref 10.6–13.6)
PROTHROM AB SERPL-ACNC: 14.2 SEC — HIGH (ref 10.6–13.6)
RAPID RVP RESULT: DETECTED
RBC # BLD: 2.24 M/UL — LOW (ref 4.2–5.8)
RBC # BLD: 2.35 M/UL — LOW (ref 4.2–5.8)
RBC # BLD: 2.36 M/UL — LOW (ref 4.2–5.8)
RBC # BLD: 2.36 M/UL — LOW (ref 4.2–5.8)
RBC # BLD: 2.39 M/UL — LOW (ref 4.2–5.8)
RBC # BLD: 2.41 M/UL — LOW (ref 4.2–5.8)
RBC # BLD: 2.42 M/UL — LOW (ref 4.2–5.8)
RBC # BLD: 2.44 M/UL — LOW (ref 4.2–5.8)
RBC # BLD: 2.47 M/UL — LOW (ref 4.2–5.8)
RBC # BLD: 2.5 M/UL — LOW (ref 4.2–5.8)
RBC # BLD: 2.5 M/UL — LOW (ref 4.2–5.8)
RBC # BLD: 2.55 M/UL — LOW (ref 4.2–5.8)
RBC # BLD: 2.57 M/UL — LOW (ref 4.2–5.8)
RBC # BLD: 2.57 M/UL — LOW (ref 4.2–5.8)
RBC # BLD: 2.59 M/UL — LOW (ref 4.2–5.8)
RBC # BLD: 2.6 M/UL — LOW (ref 4.2–5.8)
RBC # BLD: 2.6 M/UL — LOW (ref 4.2–5.8)
RBC # BLD: 2.62 M/UL — LOW (ref 4.2–5.8)
RBC # BLD: 2.62 M/UL — LOW (ref 4.2–5.8)
RBC # BLD: 2.63 M/UL — LOW (ref 4.2–5.8)
RBC # BLD: 2.67 M/UL — LOW (ref 4.2–5.8)
RBC # BLD: 2.68 M/UL — LOW (ref 4.2–5.8)
RBC # BLD: 2.68 M/UL — LOW (ref 4.2–5.8)
RBC # BLD: 2.72 M/UL — LOW (ref 4.2–5.8)
RBC # BLD: 2.72 M/UL — LOW (ref 4.2–5.8)
RBC # BLD: 2.74 M/UL — LOW (ref 4.2–5.8)
RBC # BLD: 2.75 M/UL — LOW (ref 4.2–5.8)
RBC # BLD: 2.76 M/UL — LOW (ref 4.2–5.8)
RBC # BLD: 2.77 M/UL — LOW (ref 4.2–5.8)
RBC # BLD: 2.79 M/UL — LOW (ref 4.2–5.8)
RBC # BLD: 2.79 M/UL — LOW (ref 4.2–5.8)
RBC # BLD: 2.8 M/UL — LOW (ref 4.2–5.8)
RBC # BLD: 2.81 M/UL — LOW (ref 4.2–5.8)
RBC # BLD: 3.24 M/UL — LOW (ref 4.2–5.8)
RBC # BLD: 3.55 M/UL — LOW (ref 4.2–5.8)
RBC # BLD: 3.58 M/UL — LOW (ref 4.2–5.8)
RBC # BLD: 3.76 M/UL — LOW (ref 4.2–5.8)
RBC # BLD: 3.9 M/UL — LOW (ref 4.2–5.8)
RBC # BLD: 3.91 M/UL — LOW (ref 4.2–5.8)
RBC # BLD: 3.98 M/UL — LOW (ref 4.2–5.8)
RBC # BLD: 4.04 M/UL — LOW (ref 4.2–5.8)
RBC # BLD: 4.23 M/UL — SIGNIFICANT CHANGE UP (ref 4.2–5.8)
RBC # BLD: 4.46 M/UL — SIGNIFICANT CHANGE UP (ref 4.2–5.8)
RBC # BLD: 4.63 M/UL — SIGNIFICANT CHANGE UP (ref 4.2–5.8)
RBC # BLD: 4.69 M/UL — SIGNIFICANT CHANGE UP (ref 4.2–5.8)
RBC # BLD: 4.81 M/UL — SIGNIFICANT CHANGE UP (ref 4.2–5.8)
RBC # BLD: 4.84 M/UL — SIGNIFICANT CHANGE UP (ref 4.2–5.8)
RBC # BLD: 5.13 M/UL — SIGNIFICANT CHANGE UP (ref 4.2–5.8)
RBC # BLD: 5.19 M/UL — SIGNIFICANT CHANGE UP (ref 4.2–5.8)
RBC # BLD: 5.19 M/UL — SIGNIFICANT CHANGE UP (ref 4.2–5.8)
RBC # BLD: 5.23 M/UL — SIGNIFICANT CHANGE UP (ref 4.2–5.8)
RBC # BLD: 5.28 M/UL — SIGNIFICANT CHANGE UP (ref 4.2–5.8)
RBC # BLD: 5.34 M/UL — SIGNIFICANT CHANGE UP (ref 4.2–5.8)
RBC # BLD: 5.51 M/UL — SIGNIFICANT CHANGE UP (ref 4.2–5.8)
RBC # BLD: 5.52 M/UL — SIGNIFICANT CHANGE UP (ref 4.2–5.8)
RBC # FLD: 12.1 % — SIGNIFICANT CHANGE UP (ref 10.3–14.5)
RBC # FLD: 12.2 % — SIGNIFICANT CHANGE UP (ref 10.3–14.5)
RBC # FLD: 12.3 % — SIGNIFICANT CHANGE UP (ref 10.3–14.5)
RBC # FLD: 12.4 % — SIGNIFICANT CHANGE UP (ref 10.3–14.5)
RBC # FLD: 12.4 % — SIGNIFICANT CHANGE UP (ref 10.3–14.5)
RBC # FLD: 12.5 % — SIGNIFICANT CHANGE UP (ref 10.3–14.5)
RBC # FLD: 12.6 % — SIGNIFICANT CHANGE UP (ref 10.3–14.5)
RBC # FLD: 12.7 % — SIGNIFICANT CHANGE UP (ref 10.3–14.5)
RBC # FLD: 12.7 % — SIGNIFICANT CHANGE UP (ref 10.3–14.5)
RBC # FLD: 12.8 % — SIGNIFICANT CHANGE UP (ref 10.3–14.5)
RBC # FLD: 12.8 % — SIGNIFICANT CHANGE UP (ref 10.3–14.5)
RBC # FLD: 13 % — SIGNIFICANT CHANGE UP (ref 10.3–14.5)
RBC # FLD: 13.1 % — SIGNIFICANT CHANGE UP (ref 10.3–14.5)
RBC # FLD: 13.2 % — SIGNIFICANT CHANGE UP (ref 10.3–14.5)
RBC # FLD: 13.8 % — SIGNIFICANT CHANGE UP (ref 10.3–14.5)
RBC # FLD: 14.1 % — SIGNIFICANT CHANGE UP (ref 10.3–14.5)
RBC # FLD: 14.2 % — SIGNIFICANT CHANGE UP (ref 10.3–14.5)
RBC # FLD: 14.3 % — SIGNIFICANT CHANGE UP (ref 10.3–14.5)
RBC # FLD: 14.3 % — SIGNIFICANT CHANGE UP (ref 10.3–14.5)
RBC # FLD: 14.4 % — SIGNIFICANT CHANGE UP (ref 10.3–14.5)
RBC # FLD: 14.6 % — HIGH (ref 10.3–14.5)
RBC # FLD: 15.5 % — HIGH (ref 10.3–14.5)
RBC # FLD: 16 % — HIGH (ref 10.3–14.5)
RBC # FLD: 16.3 % — HIGH (ref 10.3–14.5)
RBC # FLD: 16.3 % — HIGH (ref 10.3–14.5)
RBC # FLD: 16.5 % — HIGH (ref 10.3–14.5)
RBC # FLD: 16.5 % — HIGH (ref 10.3–14.5)
RBC # FLD: 16.6 % — HIGH (ref 10.3–14.5)
RBC # FLD: 16.7 % — HIGH (ref 10.3–14.5)
RBC # FLD: 16.8 % — HIGH (ref 10.3–14.5)
RBC # FLD: 16.8 % — HIGH (ref 10.3–14.5)
RBC # FLD: 16.9 % — HIGH (ref 10.3–14.5)
RBC # FLD: 16.9 % — HIGH (ref 10.3–14.5)
RBC # FLD: 17.1 % — HIGH (ref 10.3–14.5)
RBC # FLD: 17.1 % — HIGH (ref 10.3–14.5)
RBC # FLD: 17.2 % — HIGH (ref 10.3–14.5)
RBC # FLD: 17.3 % — HIGH (ref 10.3–14.5)
RBC # FLD: 17.4 % — HIGH (ref 10.3–14.5)
RBC # FLD: 17.6 % — HIGH (ref 10.3–14.5)
RBC # FLD: 17.7 % — HIGH (ref 10.3–14.5)
RBC # FLD: 18.3 % — HIGH (ref 10.3–14.5)
RBC BLD AUTO: ABNORMAL
RBC BLD AUTO: NORMAL — SIGNIFICANT CHANGE UP
S AUREUS DNA NOSE QL NAA+PROBE: SIGNIFICANT CHANGE UP
S AUREUS DNA NOSE QL NAA+PROBE: SIGNIFICANT CHANGE UP
SAO2 % BLDA: 80 % — LOW (ref 92–96)
SAO2 % BLDA: 81 % — LOW (ref 92–96)
SAO2 % BLDA: 82 % — LOW (ref 92–96)
SAO2 % BLDA: 83 % — LOW (ref 92–96)
SAO2 % BLDA: 84 % — LOW (ref 92–96)
SAO2 % BLDA: 85 % — LOW (ref 92–96)
SAO2 % BLDA: 86 % — LOW (ref 92–96)
SAO2 % BLDA: 86 % — LOW (ref 92–96)
SAO2 % BLDA: 87 % — LOW (ref 92–96)
SAO2 % BLDA: 88 % — LOW (ref 92–96)
SAO2 % BLDA: 88 % — LOW (ref 92–96)
SAO2 % BLDA: 90 % — LOW (ref 92–96)
SAO2 % BLDA: 91 % — LOW (ref 92–96)
SAO2 % BLDA: 92 % — SIGNIFICANT CHANGE UP (ref 92–96)
SAO2 % BLDA: 93 % — SIGNIFICANT CHANGE UP (ref 92–96)
SAO2 % BLDA: 94 % — SIGNIFICANT CHANGE UP (ref 92–96)
SAO2 % BLDA: 95 % — SIGNIFICANT CHANGE UP (ref 92–96)
SAO2 % BLDA: 96 % — SIGNIFICANT CHANGE UP (ref 92–96)
SAO2 % BLDA: 97 % — HIGH (ref 92–96)
SAO2 % BLDA: 98 % — HIGH (ref 92–96)
SAO2 % BLDA: 99 % — HIGH (ref 92–96)
SAO2 % BLDV: 50 % — LOW (ref 67–88)
SAO2 % BLDV: 57 % — LOW (ref 67–88)
SAO2 % BLDV: 71 % — SIGNIFICANT CHANGE UP (ref 67–88)
SAO2 % BLDV: 81 % — SIGNIFICANT CHANGE UP (ref 67–88)
SARS-COV-2 IGG SERPL IA-ACNC: 1.4 RATIO — HIGH
SARS-COV-2 IGG SERPL QL IA: NEGATIVE — SIGNIFICANT CHANGE UP
SARS-COV-2 IGG SERPL QL IA: POSITIVE
SARS-COV-2 IGM SERPL IA-ACNC: 0.28 RATIO — SIGNIFICANT CHANGE UP
SARS-COV-2 RNA SPEC QL NAA+PROBE: DETECTED
SCHISTOCYTES BLD QL AUTO: SLIGHT — SIGNIFICANT CHANGE UP
SMUDGE CELLS # BLD: PRESENT — SIGNIFICANT CHANGE UP
SODIUM SERPL-SCNC: 130 MMOL/L — LOW (ref 135–145)
SODIUM SERPL-SCNC: 131 MMOL/L — LOW (ref 135–145)
SODIUM SERPL-SCNC: 132 MMOL/L — LOW (ref 135–145)
SODIUM SERPL-SCNC: 133 MMOL/L — LOW (ref 135–145)
SODIUM SERPL-SCNC: 134 MMOL/L — LOW (ref 135–145)
SODIUM SERPL-SCNC: 135 MMOL/L — SIGNIFICANT CHANGE UP (ref 135–145)
SODIUM SERPL-SCNC: 136 MMOL/L — SIGNIFICANT CHANGE UP (ref 135–145)
SODIUM SERPL-SCNC: 137 MMOL/L — SIGNIFICANT CHANGE UP (ref 135–145)
SODIUM SERPL-SCNC: 138 MMOL/L — SIGNIFICANT CHANGE UP (ref 135–145)
SODIUM SERPL-SCNC: 139 MMOL/L — SIGNIFICANT CHANGE UP (ref 135–145)
SODIUM SERPL-SCNC: 140 MMOL/L — SIGNIFICANT CHANGE UP (ref 135–145)
SODIUM SERPL-SCNC: 142 MMOL/L — SIGNIFICANT CHANGE UP (ref 135–145)
SPECIMEN SOURCE: SIGNIFICANT CHANGE UP
STOMATOCYTES BLD QL SMEAR: SLIGHT — SIGNIFICANT CHANGE UP
T-CELL CD4 SUBSET PNL BLD: 312 /UL — LOW (ref 489–1457)
TARGETS BLD QL SMEAR: SLIGHT — SIGNIFICANT CHANGE UP
TRIGL SERPL-MCNC: 212 MG/DL — HIGH
TRIGL SERPL-MCNC: 315 MG/DL — HIGH
TRIGL SERPL-MCNC: 414 MG/DL — HIGH
TROPONIN I SERPL-MCNC: 0.28 NG/ML — HIGH (ref 0–0.04)
TROPONIN I SERPL-MCNC: 0.43 NG/ML — HIGH (ref 0–0.04)
TROPONIN I SERPL-MCNC: 0.58 NG/ML — HIGH (ref 0–0.04)
TROPONIN I SERPL-MCNC: 0.68 NG/ML — HIGH (ref 0–0.04)
TROPONIN I SERPL-MCNC: 0.68 NG/ML — HIGH (ref 0–0.04)
TROPONIN I SERPL-MCNC: 0.9 NG/ML — HIGH (ref 0–0.04)
TROPONIN I SERPL-MCNC: 8.28 NG/ML — HIGH (ref 0–0.04)
TROPONIN I SERPL-MCNC: 8.39 NG/ML — HIGH (ref 0–0.04)
TROPONIN I SERPL-MCNC: 9 NG/ML — HIGH (ref 0–0.04)
TSH SERPL-MCNC: 1.49 UU/ML — SIGNIFICANT CHANGE UP (ref 0.34–4.82)
VANCOMYCIN TROUGH SERPL-MCNC: 11.2 UG/ML — SIGNIFICANT CHANGE UP (ref 10–20)
VANCOMYCIN TROUGH SERPL-MCNC: 17.1 UG/ML — SIGNIFICANT CHANGE UP (ref 10–20)
VANCOMYCIN TROUGH SERPL-MCNC: 18.7 UG/ML — SIGNIFICANT CHANGE UP (ref 10–20)
VANCOMYCIN TROUGH SERPL-MCNC: 22 UG/ML — HIGH (ref 10–20)
VANCOMYCIN TROUGH SERPL-MCNC: 24.4 UG/ML — HIGH (ref 10–20)
VANCOMYCIN TROUGH SERPL-MCNC: 4.4 UG/ML — LOW (ref 10–20)
VANCOMYCIN TROUGH SERPL-MCNC: 6.7 UG/ML — LOW (ref 10–20)
VANCOMYCIN TROUGH SERPL-MCNC: 8.7 UG/ML — LOW (ref 10–20)
VARIANT LYMPHS # BLD: 1 % — SIGNIFICANT CHANGE UP (ref 0–6)
WBC # BLD: 10.6 K/UL — HIGH (ref 3.8–10.5)
WBC # BLD: 10.94 K/UL — HIGH (ref 3.8–10.5)
WBC # BLD: 11.07 K/UL — HIGH (ref 3.8–10.5)
WBC # BLD: 11.29 K/UL — HIGH (ref 3.8–10.5)
WBC # BLD: 11.83 K/UL — HIGH (ref 3.8–10.5)
WBC # BLD: 11.96 K/UL — HIGH (ref 3.8–10.5)
WBC # BLD: 12.21 K/UL — HIGH (ref 3.8–10.5)
WBC # BLD: 12.98 K/UL — HIGH (ref 3.8–10.5)
WBC # BLD: 13.26 K/UL — HIGH (ref 3.8–10.5)
WBC # BLD: 13.66 K/UL — HIGH (ref 3.8–10.5)
WBC # BLD: 13.97 K/UL — HIGH (ref 3.8–10.5)
WBC # BLD: 15.06 K/UL — HIGH (ref 3.8–10.5)
WBC # BLD: 15.31 K/UL — HIGH (ref 3.8–10.5)
WBC # BLD: 15.61 K/UL — HIGH (ref 3.8–10.5)
WBC # BLD: 15.64 K/UL — HIGH (ref 3.8–10.5)
WBC # BLD: 15.87 K/UL — HIGH (ref 3.8–10.5)
WBC # BLD: 16.19 K/UL — HIGH (ref 3.8–10.5)
WBC # BLD: 16.51 K/UL — HIGH (ref 3.8–10.5)
WBC # BLD: 17.07 K/UL — HIGH (ref 3.8–10.5)
WBC # BLD: 17.14 K/UL — HIGH (ref 3.8–10.5)
WBC # BLD: 17.47 K/UL — HIGH (ref 3.8–10.5)
WBC # BLD: 17.48 K/UL — HIGH (ref 3.8–10.5)
WBC # BLD: 17.77 K/UL — HIGH (ref 3.8–10.5)
WBC # BLD: 18.1 K/UL — HIGH (ref 3.8–10.5)
WBC # BLD: 18.23 K/UL — HIGH (ref 3.8–10.5)
WBC # BLD: 18.28 K/UL — HIGH (ref 3.8–10.5)
WBC # BLD: 18.58 K/UL — HIGH (ref 3.8–10.5)
WBC # BLD: 18.86 K/UL — HIGH (ref 3.8–10.5)
WBC # BLD: 18.94 K/UL — HIGH (ref 3.8–10.5)
WBC # BLD: 21.18 K/UL — HIGH (ref 3.8–10.5)
WBC # BLD: 5.77 K/UL — SIGNIFICANT CHANGE UP (ref 3.8–10.5)
WBC # BLD: 6.5 K/UL — SIGNIFICANT CHANGE UP (ref 3.8–10.5)
WBC # BLD: 6.56 K/UL — SIGNIFICANT CHANGE UP (ref 3.8–10.5)
WBC # BLD: 6.59 K/UL — SIGNIFICANT CHANGE UP (ref 3.8–10.5)
WBC # BLD: 6.65 K/UL — SIGNIFICANT CHANGE UP (ref 3.8–10.5)
WBC # BLD: 7.15 K/UL — SIGNIFICANT CHANGE UP (ref 3.8–10.5)
WBC # BLD: 7.36 K/UL — SIGNIFICANT CHANGE UP (ref 3.8–10.5)
WBC # BLD: 7.56 K/UL — SIGNIFICANT CHANGE UP (ref 3.8–10.5)
WBC # BLD: 7.64 K/UL — SIGNIFICANT CHANGE UP (ref 3.8–10.5)
WBC # BLD: 7.86 K/UL — SIGNIFICANT CHANGE UP (ref 3.8–10.5)
WBC # BLD: 7.93 K/UL — SIGNIFICANT CHANGE UP (ref 3.8–10.5)
WBC # BLD: 8.05 K/UL — SIGNIFICANT CHANGE UP (ref 3.8–10.5)
WBC # BLD: 8.11 K/UL — SIGNIFICANT CHANGE UP (ref 3.8–10.5)
WBC # BLD: 8.13 K/UL — SIGNIFICANT CHANGE UP (ref 3.8–10.5)
WBC # BLD: 8.23 K/UL — SIGNIFICANT CHANGE UP (ref 3.8–10.5)
WBC # BLD: 8.31 K/UL — SIGNIFICANT CHANGE UP (ref 3.8–10.5)
WBC # BLD: 8.54 K/UL — SIGNIFICANT CHANGE UP (ref 3.8–10.5)
WBC # BLD: 8.63 K/UL — SIGNIFICANT CHANGE UP (ref 3.8–10.5)
WBC # BLD: 8.64 K/UL — SIGNIFICANT CHANGE UP (ref 3.8–10.5)
WBC # BLD: 8.65 K/UL — SIGNIFICANT CHANGE UP (ref 3.8–10.5)
WBC # BLD: 8.97 K/UL — SIGNIFICANT CHANGE UP (ref 3.8–10.5)
WBC # BLD: 9.28 K/UL — SIGNIFICANT CHANGE UP (ref 3.8–10.5)
WBC # BLD: 9.39 K/UL — SIGNIFICANT CHANGE UP (ref 3.8–10.5)
WBC # BLD: 9.57 K/UL — SIGNIFICANT CHANGE UP (ref 3.8–10.5)
WBC # BLD: 9.61 K/UL — SIGNIFICANT CHANGE UP (ref 3.8–10.5)
WBC # FLD AUTO: 10.6 K/UL — HIGH (ref 3.8–10.5)
WBC # FLD AUTO: 10.94 K/UL — HIGH (ref 3.8–10.5)
WBC # FLD AUTO: 11.07 K/UL — HIGH (ref 3.8–10.5)
WBC # FLD AUTO: 11.29 K/UL — HIGH (ref 3.8–10.5)
WBC # FLD AUTO: 11.83 K/UL — HIGH (ref 3.8–10.5)
WBC # FLD AUTO: 11.96 K/UL — HIGH (ref 3.8–10.5)
WBC # FLD AUTO: 12.21 K/UL — HIGH (ref 3.8–10.5)
WBC # FLD AUTO: 12.98 K/UL — HIGH (ref 3.8–10.5)
WBC # FLD AUTO: 13.26 K/UL — HIGH (ref 3.8–10.5)
WBC # FLD AUTO: 13.66 K/UL — HIGH (ref 3.8–10.5)
WBC # FLD AUTO: 13.97 K/UL — HIGH (ref 3.8–10.5)
WBC # FLD AUTO: 15.06 K/UL — HIGH (ref 3.8–10.5)
WBC # FLD AUTO: 15.31 K/UL — HIGH (ref 3.8–10.5)
WBC # FLD AUTO: 15.61 K/UL — HIGH (ref 3.8–10.5)
WBC # FLD AUTO: 15.64 K/UL — HIGH (ref 3.8–10.5)
WBC # FLD AUTO: 15.87 K/UL — HIGH (ref 3.8–10.5)
WBC # FLD AUTO: 16.19 K/UL — HIGH (ref 3.8–10.5)
WBC # FLD AUTO: 16.51 K/UL — HIGH (ref 3.8–10.5)
WBC # FLD AUTO: 17.07 K/UL — HIGH (ref 3.8–10.5)
WBC # FLD AUTO: 17.14 K/UL — HIGH (ref 3.8–10.5)
WBC # FLD AUTO: 17.47 K/UL — HIGH (ref 3.8–10.5)
WBC # FLD AUTO: 17.48 K/UL — HIGH (ref 3.8–10.5)
WBC # FLD AUTO: 17.77 K/UL — HIGH (ref 3.8–10.5)
WBC # FLD AUTO: 18.1 K/UL — HIGH (ref 3.8–10.5)
WBC # FLD AUTO: 18.23 K/UL — HIGH (ref 3.8–10.5)
WBC # FLD AUTO: 18.28 K/UL — HIGH (ref 3.8–10.5)
WBC # FLD AUTO: 18.58 K/UL — HIGH (ref 3.8–10.5)
WBC # FLD AUTO: 18.86 K/UL — HIGH (ref 3.8–10.5)
WBC # FLD AUTO: 18.94 K/UL — HIGH (ref 3.8–10.5)
WBC # FLD AUTO: 21.18 K/UL — HIGH (ref 3.8–10.5)
WBC # FLD AUTO: 5.77 K/UL — SIGNIFICANT CHANGE UP (ref 3.8–10.5)
WBC # FLD AUTO: 6.5 K/UL — SIGNIFICANT CHANGE UP (ref 3.8–10.5)
WBC # FLD AUTO: 6.56 K/UL — SIGNIFICANT CHANGE UP (ref 3.8–10.5)
WBC # FLD AUTO: 6.59 K/UL — SIGNIFICANT CHANGE UP (ref 3.8–10.5)
WBC # FLD AUTO: 6.65 K/UL — SIGNIFICANT CHANGE UP (ref 3.8–10.5)
WBC # FLD AUTO: 7.15 K/UL — SIGNIFICANT CHANGE UP (ref 3.8–10.5)
WBC # FLD AUTO: 7.36 K/UL — SIGNIFICANT CHANGE UP (ref 3.8–10.5)
WBC # FLD AUTO: 7.56 K/UL — SIGNIFICANT CHANGE UP (ref 3.8–10.5)
WBC # FLD AUTO: 7.64 K/UL — SIGNIFICANT CHANGE UP (ref 3.8–10.5)
WBC # FLD AUTO: 7.86 K/UL — SIGNIFICANT CHANGE UP (ref 3.8–10.5)
WBC # FLD AUTO: 7.93 K/UL — SIGNIFICANT CHANGE UP (ref 3.8–10.5)
WBC # FLD AUTO: 8.05 K/UL — SIGNIFICANT CHANGE UP (ref 3.8–10.5)
WBC # FLD AUTO: 8.11 K/UL — SIGNIFICANT CHANGE UP (ref 3.8–10.5)
WBC # FLD AUTO: 8.13 K/UL — SIGNIFICANT CHANGE UP (ref 3.8–10.5)
WBC # FLD AUTO: 8.23 K/UL — SIGNIFICANT CHANGE UP (ref 3.8–10.5)
WBC # FLD AUTO: 8.31 K/UL — SIGNIFICANT CHANGE UP (ref 3.8–10.5)
WBC # FLD AUTO: 8.54 K/UL — SIGNIFICANT CHANGE UP (ref 3.8–10.5)
WBC # FLD AUTO: 8.63 K/UL — SIGNIFICANT CHANGE UP (ref 3.8–10.5)
WBC # FLD AUTO: 8.64 K/UL — SIGNIFICANT CHANGE UP (ref 3.8–10.5)
WBC # FLD AUTO: 8.65 K/UL — SIGNIFICANT CHANGE UP (ref 3.8–10.5)
WBC # FLD AUTO: 8.97 K/UL — SIGNIFICANT CHANGE UP (ref 3.8–10.5)
WBC # FLD AUTO: 9.28 K/UL — SIGNIFICANT CHANGE UP (ref 3.8–10.5)
WBC # FLD AUTO: 9.39 K/UL — SIGNIFICANT CHANGE UP (ref 3.8–10.5)
WBC # FLD AUTO: 9.57 K/UL — SIGNIFICANT CHANGE UP (ref 3.8–10.5)
WBC # FLD AUTO: 9.61 K/UL — SIGNIFICANT CHANGE UP (ref 3.8–10.5)

## 2020-01-01 PROCEDURE — 71045 X-RAY EXAM CHEST 1 VIEW: CPT | Mod: 26,77

## 2020-01-01 PROCEDURE — 99222 1ST HOSP IP/OBS MODERATE 55: CPT

## 2020-01-01 PROCEDURE — 71045 X-RAY EXAM CHEST 1 VIEW: CPT | Mod: 26

## 2020-01-01 PROCEDURE — 99233 SBSQ HOSP IP/OBS HIGH 50: CPT

## 2020-01-01 PROCEDURE — 99223 1ST HOSP IP/OBS HIGH 75: CPT

## 2020-01-01 PROCEDURE — 99254 IP/OBS CNSLTJ NEW/EST MOD 60: CPT

## 2020-01-01 PROCEDURE — 31500 INSERT EMERGENCY AIRWAY: CPT

## 2020-01-01 PROCEDURE — 99253 IP/OBS CNSLTJ NEW/EST LOW 45: CPT

## 2020-01-01 PROCEDURE — 99291 CRITICAL CARE FIRST HOUR: CPT

## 2020-01-01 RX ORDER — CEFEPIME 1 G/1
INJECTION, POWDER, FOR SOLUTION INTRAMUSCULAR; INTRAVENOUS
Refills: 0 | Status: DISCONTINUED | OUTPATIENT
Start: 2020-01-01 | End: 2020-01-01

## 2020-01-01 RX ORDER — PANTOPRAZOLE SODIUM 20 MG/1
40 TABLET, DELAYED RELEASE ORAL
Refills: 0 | Status: DISCONTINUED | OUTPATIENT
Start: 2020-01-01 | End: 2020-01-01

## 2020-01-01 RX ORDER — DEXAMETHASONE 0.5 MG/5ML
6 ELIXIR ORAL DAILY
Refills: 0 | Status: COMPLETED | OUTPATIENT
Start: 2020-01-01 | End: 2020-01-01

## 2020-01-01 RX ORDER — SODIUM CHLORIDE 9 MG/ML
1000 INJECTION, SOLUTION INTRAVENOUS
Refills: 0 | Status: DISCONTINUED | OUTPATIENT
Start: 2020-01-01 | End: 2020-01-01

## 2020-01-01 RX ORDER — ENOXAPARIN SODIUM 100 MG/ML
120 INJECTION SUBCUTANEOUS EVERY 12 HOURS
Refills: 0 | Status: DISCONTINUED | OUTPATIENT
Start: 2020-01-01 | End: 2020-01-01

## 2020-01-01 RX ORDER — HEPARIN SODIUM 5000 [USP'U]/ML
INJECTION INTRAVENOUS; SUBCUTANEOUS
Qty: 25000 | Refills: 0 | Status: DISCONTINUED | OUTPATIENT
Start: 2020-01-01 | End: 2020-01-01

## 2020-01-01 RX ORDER — SODIUM CHLORIDE 9 MG/ML
500 INJECTION INTRAMUSCULAR; INTRAVENOUS; SUBCUTANEOUS ONCE
Refills: 0 | Status: COMPLETED | OUTPATIENT
Start: 2020-01-01 | End: 2020-01-01

## 2020-01-01 RX ORDER — SODIUM BICARBONATE 1 MEQ/ML
0.09 SYRINGE (ML) INTRAVENOUS
Qty: 150 | Refills: 0 | Status: DISCONTINUED | OUTPATIENT
Start: 2020-01-01 | End: 2020-01-01

## 2020-01-01 RX ORDER — NOREPINEPHRINE BITARTRATE/D5W 8 MG/250ML
0.09 PLASTIC BAG, INJECTION (ML) INTRAVENOUS
Qty: 16 | Refills: 0 | Status: DISCONTINUED | OUTPATIENT
Start: 2020-01-01 | End: 2020-01-01

## 2020-01-01 RX ORDER — VECURONIUM BROMIDE 20 MG/1
50 INJECTION, POWDER, FOR SOLUTION INTRAVENOUS ONCE
Refills: 0 | Status: DISCONTINUED | OUTPATIENT
Start: 2020-01-01 | End: 2020-01-01

## 2020-01-01 RX ORDER — PROPOFOL 10 MG/ML
45 INJECTION, EMULSION INTRAVENOUS
Qty: 1000 | Refills: 0 | Status: DISCONTINUED | OUTPATIENT
Start: 2020-01-01 | End: 2020-01-01

## 2020-01-01 RX ORDER — HYDROMORPHONE HYDROCHLORIDE 2 MG/ML
3 INJECTION INTRAMUSCULAR; INTRAVENOUS; SUBCUTANEOUS
Qty: 100 | Refills: 0 | Status: DISCONTINUED | OUTPATIENT
Start: 2020-01-01 | End: 2020-01-01

## 2020-01-01 RX ORDER — SODIUM BICARBONATE 1 MEQ/ML
50 SYRINGE (ML) INTRAVENOUS ONCE
Refills: 0 | Status: COMPLETED | OUTPATIENT
Start: 2020-01-01 | End: 2020-01-01

## 2020-01-01 RX ORDER — PROPOFOL 10 MG/ML
20 INJECTION, EMULSION INTRAVENOUS
Qty: 1000 | Refills: 0 | Status: DISCONTINUED | OUTPATIENT
Start: 2020-01-01 | End: 2020-01-01

## 2020-01-01 RX ORDER — ACETAMINOPHEN 500 MG
1000 TABLET ORAL ONCE
Refills: 0 | Status: COMPLETED | OUTPATIENT
Start: 2020-01-01 | End: 2020-01-01

## 2020-01-01 RX ORDER — FUROSEMIDE 40 MG
120 TABLET ORAL ONCE
Refills: 0 | Status: COMPLETED | OUTPATIENT
Start: 2020-01-01 | End: 2020-01-01

## 2020-01-01 RX ORDER — SODIUM CHLORIDE 9 MG/ML
1000 INJECTION INTRAMUSCULAR; INTRAVENOUS; SUBCUTANEOUS ONCE
Refills: 0 | Status: COMPLETED | OUTPATIENT
Start: 2020-01-01 | End: 2020-01-01

## 2020-01-01 RX ORDER — SODIUM BICARBONATE 1 MEQ/ML
150 SYRINGE (ML) INTRAVENOUS ONCE
Refills: 0 | Status: COMPLETED | OUTPATIENT
Start: 2020-01-01 | End: 2020-01-01

## 2020-01-01 RX ORDER — PANTOPRAZOLE SODIUM 20 MG/1
40 TABLET, DELAYED RELEASE ORAL DAILY
Refills: 0 | Status: DISCONTINUED | OUTPATIENT
Start: 2020-01-01 | End: 2020-01-01

## 2020-01-01 RX ORDER — FENTANYL CITRATE 50 UG/ML
50 INJECTION INTRAVENOUS ONCE
Refills: 0 | Status: DISCONTINUED | OUTPATIENT
Start: 2020-01-01 | End: 2020-01-01

## 2020-01-01 RX ORDER — REMDESIVIR 5 MG/ML
100 INJECTION INTRAVENOUS EVERY 24 HOURS
Refills: 0 | Status: DISCONTINUED | OUTPATIENT
Start: 2020-01-01 | End: 2020-01-01

## 2020-01-01 RX ORDER — CHLORHEXIDINE GLUCONATE 213 G/1000ML
15 SOLUTION TOPICAL EVERY 12 HOURS
Refills: 0 | Status: DISCONTINUED | OUTPATIENT
Start: 2020-01-01 | End: 2020-01-01

## 2020-01-01 RX ORDER — BUDESONIDE AND FORMOTEROL FUMARATE DIHYDRATE 160; 4.5 UG/1; UG/1
2 AEROSOL RESPIRATORY (INHALATION)
Refills: 0 | Status: DISCONTINUED | OUTPATIENT
Start: 2020-01-01 | End: 2020-01-01

## 2020-01-01 RX ORDER — TRANEXAMIC ACID 100 MG/ML
5 INJECTION, SOLUTION INTRAVENOUS ONCE
Refills: 0 | Status: COMPLETED | OUTPATIENT
Start: 2020-01-01 | End: 2020-01-01

## 2020-01-01 RX ORDER — SENNA PLUS 8.6 MG/1
2 TABLET ORAL AT BEDTIME
Refills: 0 | Status: DISCONTINUED | OUTPATIENT
Start: 2020-01-01 | End: 2020-01-01

## 2020-01-01 RX ORDER — ASPIRIN/CALCIUM CARB/MAGNESIUM 324 MG
81 TABLET ORAL DAILY
Refills: 0 | Status: DISCONTINUED | OUTPATIENT
Start: 2020-01-01 | End: 2020-01-01

## 2020-01-01 RX ORDER — ETOMIDATE 2 MG/ML
25 INJECTION INTRAVENOUS ONCE
Refills: 0 | Status: DISCONTINUED | OUTPATIENT
Start: 2020-01-01 | End: 2020-01-01

## 2020-01-01 RX ORDER — VANCOMYCIN HCL 1 G
1000 VIAL (EA) INTRAVENOUS EVERY 24 HOURS
Refills: 0 | Status: DISCONTINUED | OUTPATIENT
Start: 2020-01-01 | End: 2020-01-01

## 2020-01-01 RX ORDER — VANCOMYCIN HCL 1 G
VIAL (EA) INTRAVENOUS
Refills: 0 | Status: DISCONTINUED | OUTPATIENT
Start: 2020-01-01 | End: 2020-01-01

## 2020-01-01 RX ORDER — CHLORHEXIDINE GLUCONATE 213 G/1000ML
1 SOLUTION TOPICAL
Refills: 0 | Status: DISCONTINUED | OUTPATIENT
Start: 2020-01-01 | End: 2020-01-01

## 2020-01-01 RX ORDER — ETOMIDATE 2 MG/ML
20 INJECTION INTRAVENOUS ONCE
Refills: 0 | Status: COMPLETED | OUTPATIENT
Start: 2020-01-01 | End: 2020-01-01

## 2020-01-01 RX ORDER — FENTANYL CITRATE 50 UG/ML
4 INJECTION INTRAVENOUS
Qty: 2500 | Refills: 0 | Status: DISCONTINUED | OUTPATIENT
Start: 2020-01-01 | End: 2020-01-01

## 2020-01-01 RX ORDER — HEPARIN SODIUM 5000 [USP'U]/ML
5000 INJECTION INTRAVENOUS; SUBCUTANEOUS EVERY 8 HOURS
Refills: 0 | Status: DISCONTINUED | OUTPATIENT
Start: 2020-01-01 | End: 2020-01-01

## 2020-01-01 RX ORDER — SENNA PLUS 8.6 MG/1
8.8 TABLET ORAL AT BEDTIME
Refills: 0 | Status: DISCONTINUED | OUTPATIENT
Start: 2020-01-01 | End: 2020-01-01

## 2020-01-01 RX ORDER — HEPARIN SODIUM 5000 [USP'U]/ML
900 INJECTION INTRAVENOUS; SUBCUTANEOUS
Qty: 25000 | Refills: 0 | Status: DISCONTINUED | OUTPATIENT
Start: 2020-01-01 | End: 2020-01-01

## 2020-01-01 RX ORDER — FENTANYL CITRATE 50 UG/ML
3 INJECTION INTRAVENOUS
Qty: 2500 | Refills: 0 | Status: DISCONTINUED | OUTPATIENT
Start: 2020-01-01 | End: 2020-01-01

## 2020-01-01 RX ORDER — DILTIAZEM HCL 120 MG
5 CAPSULE, EXT RELEASE 24 HR ORAL
Qty: 125 | Refills: 0 | Status: DISCONTINUED | OUTPATIENT
Start: 2020-01-01 | End: 2020-01-01

## 2020-01-01 RX ORDER — NOREPINEPHRINE BITARTRATE/D5W 8 MG/250ML
0.05 PLASTIC BAG, INJECTION (ML) INTRAVENOUS
Qty: 16 | Refills: 0 | Status: DISCONTINUED | OUTPATIENT
Start: 2020-01-01 | End: 2020-01-01

## 2020-01-01 RX ORDER — INSULIN LISPRO 100/ML
VIAL (ML) SUBCUTANEOUS EVERY 6 HOURS
Refills: 0 | Status: DISCONTINUED | OUTPATIENT
Start: 2020-01-01 | End: 2020-01-01

## 2020-01-01 RX ORDER — SODIUM BICARBONATE 1 MEQ/ML
100 SYRINGE (ML) INTRAVENOUS ONCE
Refills: 0 | Status: COMPLETED | OUTPATIENT
Start: 2020-01-01 | End: 2020-01-01

## 2020-01-01 RX ORDER — CLOPIDOGREL BISULFATE 75 MG/1
300 TABLET, FILM COATED ORAL ONCE
Refills: 0 | Status: DISCONTINUED | OUTPATIENT
Start: 2020-01-01 | End: 2020-01-01

## 2020-01-01 RX ORDER — CEFEPIME 1 G/1
1000 INJECTION, POWDER, FOR SOLUTION INTRAMUSCULAR; INTRAVENOUS EVERY 24 HOURS
Refills: 0 | Status: DISCONTINUED | OUTPATIENT
Start: 2020-01-01 | End: 2020-01-01

## 2020-01-01 RX ORDER — HEPARIN SODIUM 5000 [USP'U]/ML
10000 INJECTION INTRAVENOUS; SUBCUTANEOUS EVERY 6 HOURS
Refills: 0 | Status: DISCONTINUED | OUTPATIENT
Start: 2020-01-01 | End: 2020-01-01

## 2020-01-01 RX ORDER — VANCOMYCIN HCL 1 G
1000 VIAL (EA) INTRAVENOUS ONCE
Refills: 0 | Status: DISCONTINUED | OUTPATIENT
Start: 2020-01-01 | End: 2020-01-01

## 2020-01-01 RX ORDER — PROPOFOL 10 MG/ML
50 INJECTION, EMULSION INTRAVENOUS
Qty: 1000 | Refills: 0 | Status: DISCONTINUED | OUTPATIENT
Start: 2020-01-01 | End: 2020-01-01

## 2020-01-01 RX ORDER — SODIUM BICARBONATE 1 MEQ/ML
50 SYRINGE (ML) INTRAVENOUS
Refills: 0 | Status: COMPLETED | OUTPATIENT
Start: 2020-01-01 | End: 2020-01-01

## 2020-01-01 RX ORDER — VANCOMYCIN HCL 1 G
1000 VIAL (EA) INTRAVENOUS ONCE
Refills: 0 | Status: COMPLETED | OUTPATIENT
Start: 2020-01-01 | End: 2020-01-01

## 2020-01-01 RX ORDER — ACETAMINOPHEN 500 MG
650 TABLET ORAL EVERY 6 HOURS
Refills: 0 | Status: DISCONTINUED | OUTPATIENT
Start: 2020-01-01 | End: 2020-01-01

## 2020-01-01 RX ORDER — NOREPINEPHRINE BITARTRATE/D5W 8 MG/250ML
0.5 PLASTIC BAG, INJECTION (ML) INTRAVENOUS
Qty: 32 | Refills: 0 | Status: DISCONTINUED | OUTPATIENT
Start: 2020-01-01 | End: 2020-01-01

## 2020-01-01 RX ORDER — DIGOXIN 250 MCG
0.5 TABLET ORAL ONCE
Refills: 0 | Status: COMPLETED | OUTPATIENT
Start: 2020-01-01 | End: 2020-01-01

## 2020-01-01 RX ORDER — CHOLECALCIFEROL (VITAMIN D3) 125 MCG
1000 CAPSULE ORAL DAILY
Refills: 0 | Status: DISCONTINUED | OUTPATIENT
Start: 2020-01-01 | End: 2020-01-01

## 2020-01-01 RX ORDER — PHENYLEPHRINE HYDROCHLORIDE 10 MG/ML
6.02 INJECTION INTRAVENOUS
Qty: 160 | Refills: 0 | Status: DISCONTINUED | OUTPATIENT
Start: 2020-01-01 | End: 2020-01-01

## 2020-01-01 RX ORDER — VANCOMYCIN HCL 1 G
500 VIAL (EA) INTRAVENOUS EVERY 24 HOURS
Refills: 0 | Status: DISCONTINUED | OUTPATIENT
Start: 2020-01-01 | End: 2020-01-01

## 2020-01-01 RX ORDER — SODIUM BICARBONATE 1 MEQ/ML
1300 SYRINGE (ML) INTRAVENOUS THREE TIMES A DAY
Refills: 0 | Status: DISCONTINUED | OUTPATIENT
Start: 2020-01-01 | End: 2020-01-01

## 2020-01-01 RX ORDER — GLUCAGON INJECTION, SOLUTION 0.5 MG/.1ML
1 INJECTION, SOLUTION SUBCUTANEOUS ONCE
Refills: 0 | Status: DISCONTINUED | OUTPATIENT
Start: 2020-01-01 | End: 2020-01-01

## 2020-01-01 RX ORDER — CALCIUM GLUCONATE 100 MG/ML
2 VIAL (ML) INTRAVENOUS ONCE
Refills: 0 | Status: COMPLETED | OUTPATIENT
Start: 2020-01-01 | End: 2020-01-01

## 2020-01-01 RX ORDER — CISATRACURIUM BESYLATE 2 MG/ML
20 INJECTION INTRAVENOUS ONCE
Refills: 0 | Status: COMPLETED | OUTPATIENT
Start: 2020-01-01 | End: 2020-01-01

## 2020-01-01 RX ORDER — VASOPRESSIN 20 [USP'U]/ML
0.04 INJECTION INTRAVENOUS
Qty: 50 | Refills: 0 | Status: DISCONTINUED | OUTPATIENT
Start: 2020-01-01 | End: 2020-01-01

## 2020-01-01 RX ORDER — REMDESIVIR 5 MG/ML
INJECTION INTRAVENOUS
Refills: 0 | Status: DISCONTINUED | OUTPATIENT
Start: 2020-01-01 | End: 2020-01-01

## 2020-01-01 RX ORDER — SODIUM CHLORIDE 9 MG/ML
10 INJECTION INTRAMUSCULAR; INTRAVENOUS; SUBCUTANEOUS
Refills: 0 | Status: DISCONTINUED | OUTPATIENT
Start: 2020-01-01 | End: 2020-01-01

## 2020-01-01 RX ORDER — FENTANYL CITRATE 50 UG/ML
100 INJECTION INTRAVENOUS ONCE
Refills: 0 | Status: DISCONTINUED | OUTPATIENT
Start: 2020-01-01 | End: 2020-01-01

## 2020-01-01 RX ORDER — DILTIAZEM HCL 120 MG
10 CAPSULE, EXT RELEASE 24 HR ORAL
Qty: 125 | Refills: 0 | Status: DISCONTINUED | OUTPATIENT
Start: 2020-01-01 | End: 2020-01-01

## 2020-01-01 RX ORDER — VANCOMYCIN HCL 1 G
500 VIAL (EA) INTRAVENOUS ONCE
Refills: 0 | Status: COMPLETED | OUTPATIENT
Start: 2020-01-01 | End: 2020-01-01

## 2020-01-01 RX ORDER — AZITHROMYCIN 500 MG/1
TABLET, FILM COATED ORAL
Refills: 0 | Status: ACTIVE | OUTPATIENT
Start: 2020-01-01

## 2020-01-01 RX ORDER — REMDESIVIR 5 MG/ML
200 INJECTION INTRAVENOUS EVERY 24 HOURS
Refills: 0 | Status: COMPLETED | OUTPATIENT
Start: 2020-01-01 | End: 2020-01-01

## 2020-01-01 RX ORDER — DEXTROSE 50 % IN WATER 50 %
25 SYRINGE (ML) INTRAVENOUS ONCE
Refills: 0 | Status: DISCONTINUED | OUTPATIENT
Start: 2020-01-01 | End: 2020-01-01

## 2020-01-01 RX ORDER — ENOXAPARIN SODIUM 100 MG/ML
80 INJECTION SUBCUTANEOUS ONCE
Refills: 0 | Status: COMPLETED | OUTPATIENT
Start: 2020-01-01 | End: 2020-01-01

## 2020-01-01 RX ORDER — PHENYLEPHRINE HYDROCHLORIDE 10 MG/ML
3.2 INJECTION INTRAVENOUS
Qty: 160 | Refills: 0 | Status: DISCONTINUED | OUTPATIENT
Start: 2020-01-01 | End: 2020-01-01

## 2020-01-01 RX ORDER — NOREPINEPHRINE BITARTRATE/D5W 8 MG/250ML
0.05 PLASTIC BAG, INJECTION (ML) INTRAVENOUS
Qty: 32 | Refills: 0 | Status: DISCONTINUED | OUTPATIENT
Start: 2020-01-01 | End: 2020-01-01

## 2020-01-01 RX ORDER — ALBUTEROL 90 UG/1
2 AEROSOL, METERED ORAL EVERY 6 HOURS
Refills: 0 | Status: DISCONTINUED | OUTPATIENT
Start: 2020-01-01 | End: 2020-01-01

## 2020-01-01 RX ORDER — MUPIROCIN 20 MG/G
1 OINTMENT TOPICAL
Refills: 0 | Status: COMPLETED | OUTPATIENT
Start: 2020-01-01 | End: 2020-01-01

## 2020-01-01 RX ORDER — DEXTROSE 50 % IN WATER 50 %
12.5 SYRINGE (ML) INTRAVENOUS ONCE
Refills: 0 | Status: DISCONTINUED | OUTPATIENT
Start: 2020-01-01 | End: 2020-01-01

## 2020-01-01 RX ORDER — SODIUM BICARBONATE 1 MEQ/ML
650 SYRINGE (ML) INTRAVENOUS
Refills: 0 | Status: DISCONTINUED | OUTPATIENT
Start: 2020-01-01 | End: 2020-01-01

## 2020-01-01 RX ORDER — ACETYLCYSTEINE 200 MG/ML
15 VIAL (ML) MISCELLANEOUS ONCE
Refills: 0 | Status: COMPLETED | OUTPATIENT
Start: 2020-01-01 | End: 2020-01-01

## 2020-01-01 RX ORDER — SODIUM CHLORIDE 9 MG/ML
1000 INJECTION, SOLUTION INTRAVENOUS ONCE
Refills: 0 | Status: COMPLETED | OUTPATIENT
Start: 2020-01-01 | End: 2020-01-01

## 2020-01-01 RX ORDER — SENNA PLUS 8.6 MG/1
10 TABLET ORAL AT BEDTIME
Refills: 0 | Status: DISCONTINUED | OUTPATIENT
Start: 2020-01-01 | End: 2020-01-01

## 2020-01-01 RX ORDER — MIDODRINE HYDROCHLORIDE 2.5 MG/1
5 TABLET ORAL EVERY 8 HOURS
Refills: 0 | Status: DISCONTINUED | OUTPATIENT
Start: 2020-01-01 | End: 2020-01-01

## 2020-01-01 RX ORDER — NOREPINEPHRINE BITARTRATE/D5W 8 MG/250ML
0.32 PLASTIC BAG, INJECTION (ML) INTRAVENOUS
Qty: 32 | Refills: 0 | Status: DISCONTINUED | OUTPATIENT
Start: 2020-01-01 | End: 2020-01-01

## 2020-01-01 RX ORDER — PANTOPRAZOLE SODIUM 20 MG/1
40 TABLET, DELAYED RELEASE ORAL ONCE
Refills: 0 | Status: COMPLETED | OUTPATIENT
Start: 2020-01-01 | End: 2020-01-01

## 2020-01-01 RX ORDER — NOREPINEPHRINE BITARTRATE/D5W 8 MG/250ML
0.15 PLASTIC BAG, INJECTION (ML) INTRAVENOUS
Qty: 32 | Refills: 0 | Status: DISCONTINUED | OUTPATIENT
Start: 2020-01-01 | End: 2020-01-01

## 2020-01-01 RX ORDER — ENOXAPARIN SODIUM 100 MG/ML
40 INJECTION SUBCUTANEOUS EVERY 12 HOURS
Refills: 0 | Status: DISCONTINUED | OUTPATIENT
Start: 2020-01-01 | End: 2020-01-01

## 2020-01-01 RX ORDER — DIGOXIN 250 MCG
0.12 TABLET ORAL EVERY OTHER DAY
Refills: 0 | Status: DISCONTINUED | OUTPATIENT
Start: 2020-01-01 | End: 2020-01-01

## 2020-01-01 RX ORDER — CISATRACURIUM BESYLATE 2 MG/ML
3 INJECTION INTRAVENOUS
Qty: 200 | Refills: 0 | Status: DISCONTINUED | OUTPATIENT
Start: 2020-01-01 | End: 2020-01-01

## 2020-01-01 RX ORDER — CLOPIDOGREL BISULFATE 75 MG/1
75 TABLET, FILM COATED ORAL DAILY
Refills: 0 | Status: DISCONTINUED | OUTPATIENT
Start: 2020-01-01 | End: 2020-01-01

## 2020-01-01 RX ORDER — ASPIRIN/CALCIUM CARB/MAGNESIUM 324 MG
325 TABLET ORAL ONCE
Refills: 0 | Status: DISCONTINUED | OUTPATIENT
Start: 2020-01-01 | End: 2020-01-01

## 2020-01-01 RX ORDER — DILTIAZEM HCL 120 MG
10 CAPSULE, EXT RELEASE 24 HR ORAL ONCE
Refills: 0 | Status: COMPLETED | OUTPATIENT
Start: 2020-01-01 | End: 2020-01-01

## 2020-01-01 RX ORDER — MAGNESIUM SULFATE 500 MG/ML
1 VIAL (ML) INJECTION ONCE
Refills: 0 | Status: COMPLETED | OUTPATIENT
Start: 2020-01-01 | End: 2020-01-01

## 2020-01-01 RX ORDER — VANCOMYCIN HCL 1 G
750 VIAL (EA) INTRAVENOUS ONCE
Refills: 0 | Status: COMPLETED | OUTPATIENT
Start: 2020-01-01 | End: 2020-01-01

## 2020-01-01 RX ORDER — AZITHROMYCIN 500 MG/1
500 TABLET, FILM COATED ORAL ONCE
Refills: 0 | Status: COMPLETED | OUTPATIENT
Start: 2020-01-01 | End: 2020-01-01

## 2020-01-01 RX ORDER — MIDAZOLAM HYDROCHLORIDE 1 MG/ML
0.15 INJECTION, SOLUTION INTRAMUSCULAR; INTRAVENOUS
Qty: 100 | Refills: 0 | Status: DISCONTINUED | OUTPATIENT
Start: 2020-01-01 | End: 2020-01-01

## 2020-01-01 RX ORDER — CEFEPIME 1 G/1
2000 INJECTION, POWDER, FOR SOLUTION INTRAMUSCULAR; INTRAVENOUS EVERY 24 HOURS
Refills: 0 | Status: DISCONTINUED | OUTPATIENT
Start: 2020-01-01 | End: 2020-01-01

## 2020-01-01 RX ORDER — PROPOFOL 10 MG/ML
5 INJECTION, EMULSION INTRAVENOUS
Qty: 1000 | Refills: 0 | Status: DISCONTINUED | OUTPATIENT
Start: 2020-01-01 | End: 2020-01-01

## 2020-01-01 RX ORDER — CEFEPIME 1 G/1
1000 INJECTION, POWDER, FOR SOLUTION INTRAMUSCULAR; INTRAVENOUS ONCE
Refills: 0 | Status: COMPLETED | OUTPATIENT
Start: 2020-01-01 | End: 2020-01-01

## 2020-01-01 RX ORDER — DEXAMETHASONE 0.5 MG/5ML
6 ELIXIR ORAL DAILY
Refills: 0 | Status: DISCONTINUED | OUTPATIENT
Start: 2020-01-01 | End: 2020-01-01

## 2020-01-01 RX ORDER — ACETAMINOPHEN 500 MG
650 TABLET ORAL ONCE
Refills: 0 | Status: COMPLETED | OUTPATIENT
Start: 2020-01-01 | End: 2020-01-01

## 2020-01-01 RX ORDER — CEFEPIME 1 G/1
1000 INJECTION, POWDER, FOR SOLUTION INTRAMUSCULAR; INTRAVENOUS EVERY 12 HOURS
Refills: 0 | Status: DISCONTINUED | OUTPATIENT
Start: 2020-01-01 | End: 2020-01-01

## 2020-01-01 RX ORDER — ALBUMIN HUMAN 25 %
50 VIAL (ML) INTRAVENOUS EVERY 6 HOURS
Refills: 0 | Status: COMPLETED | OUTPATIENT
Start: 2020-01-01 | End: 2020-01-01

## 2020-01-01 RX ORDER — PHENYLEPHRINE HYDROCHLORIDE 10 MG/ML
4 INJECTION INTRAVENOUS
Qty: 160 | Refills: 0 | Status: DISCONTINUED | OUTPATIENT
Start: 2020-01-01 | End: 2020-01-01

## 2020-01-01 RX ORDER — AZITHROMYCIN 500 MG/1
500 TABLET, FILM COATED ORAL EVERY 24 HOURS
Refills: 0 | Status: ACTIVE | OUTPATIENT
Start: 2020-01-01

## 2020-01-01 RX ORDER — METOPROLOL TARTRATE 50 MG
5 TABLET ORAL ONCE
Refills: 0 | Status: COMPLETED | OUTPATIENT
Start: 2020-01-01 | End: 2020-01-01

## 2020-01-01 RX ORDER — PROPOFOL 10 MG/ML
15 INJECTION, EMULSION INTRAVENOUS
Qty: 1000 | Refills: 0 | Status: DISCONTINUED | OUTPATIENT
Start: 2020-01-01 | End: 2020-01-01

## 2020-01-01 RX ORDER — ENOXAPARIN SODIUM 100 MG/ML
40 INJECTION SUBCUTANEOUS DAILY
Refills: 0 | Status: DISCONTINUED | OUTPATIENT
Start: 2020-01-01 | End: 2020-01-01

## 2020-01-01 RX ORDER — SODIUM BICARBONATE 1 MEQ/ML
1300 SYRINGE (ML) INTRAVENOUS
Refills: 0 | Status: DISCONTINUED | OUTPATIENT
Start: 2020-01-01 | End: 2020-01-01

## 2020-01-01 RX ORDER — ROCURONIUM BROMIDE 10 MG/ML
50 VIAL (ML) INTRAVENOUS ONCE
Refills: 0 | Status: COMPLETED | OUTPATIENT
Start: 2020-01-01 | End: 2020-01-01

## 2020-01-01 RX ORDER — PHENYLEPHRINE HYDROCHLORIDE 10 MG/ML
0.1 INJECTION INTRAVENOUS
Qty: 160 | Refills: 0 | Status: DISCONTINUED | OUTPATIENT
Start: 2020-01-01 | End: 2020-01-01

## 2020-01-01 RX ORDER — HEPARIN SODIUM 5000 [USP'U]/ML
5000 INJECTION INTRAVENOUS; SUBCUTANEOUS EVERY 6 HOURS
Refills: 0 | Status: DISCONTINUED | OUTPATIENT
Start: 2020-01-01 | End: 2020-01-01

## 2020-01-01 RX ORDER — CALCIUM ACETATE 667 MG
667 TABLET ORAL
Refills: 0 | Status: DISCONTINUED | OUTPATIENT
Start: 2020-01-01 | End: 2020-01-01

## 2020-01-01 RX ORDER — PHENYLEPHRINE HYDROCHLORIDE 10 MG/ML
3 INJECTION INTRAVENOUS
Qty: 160 | Refills: 0 | Status: DISCONTINUED | OUTPATIENT
Start: 2020-01-01 | End: 2020-01-01

## 2020-01-01 RX ORDER — CALCIUM ACETATE 667 MG
1334 TABLET ORAL
Refills: 0 | Status: DISCONTINUED | OUTPATIENT
Start: 2020-01-01 | End: 2020-01-01

## 2020-01-01 RX ORDER — POLYETHYLENE GLYCOL 3350 17 G/17G
17 POWDER, FOR SOLUTION ORAL DAILY
Refills: 0 | Status: DISCONTINUED | OUTPATIENT
Start: 2020-01-01 | End: 2020-01-01

## 2020-01-01 RX ORDER — ALBUMIN HUMAN 25 %
50 VIAL (ML) INTRAVENOUS EVERY 8 HOURS
Refills: 0 | Status: COMPLETED | OUTPATIENT
Start: 2020-01-01 | End: 2020-01-01

## 2020-01-01 RX ORDER — PHENYLEPHRINE HYDROCHLORIDE 10 MG/ML
6 INJECTION INTRAVENOUS
Qty: 160 | Refills: 0 | Status: DISCONTINUED | OUTPATIENT
Start: 2020-01-01 | End: 2020-01-01

## 2020-01-01 RX ORDER — CHLORHEXIDINE GLUCONATE 213 G/1000ML
15 SOLUTION TOPICAL
Refills: 0 | Status: DISCONTINUED | OUTPATIENT
Start: 2020-01-01 | End: 2020-01-01

## 2020-01-01 RX ORDER — DEXTROSE 50 % IN WATER 50 %
15 SYRINGE (ML) INTRAVENOUS ONCE
Refills: 0 | Status: DISCONTINUED | OUTPATIENT
Start: 2020-01-01 | End: 2020-01-01

## 2020-01-01 RX ORDER — ALBUMIN HUMAN 25 %
50 VIAL (ML) INTRAVENOUS ONCE
Refills: 0 | Status: COMPLETED | OUTPATIENT
Start: 2020-01-01 | End: 2020-01-01

## 2020-01-01 RX ADMIN — SENNA PLUS 2 TABLET(S): 8.6 TABLET ORAL at 21:04

## 2020-01-01 RX ADMIN — Medication 50 MILLILITER(S): at 05:05

## 2020-01-01 RX ADMIN — FENTANYL CITRATE 37.5 MICROGRAM(S)/KG/HR: 50 INJECTION INTRAVENOUS at 09:07

## 2020-01-01 RX ADMIN — PANTOPRAZOLE SODIUM 40 MILLIGRAM(S): 20 TABLET, DELAYED RELEASE ORAL at 13:34

## 2020-01-01 RX ADMIN — Medication 400 MILLIGRAM(S): at 22:34

## 2020-01-01 RX ADMIN — Medication 1334 MILLIGRAM(S): at 08:21

## 2020-01-01 RX ADMIN — HEPARIN SODIUM 1900 UNIT(S)/HR: 5000 INJECTION INTRAVENOUS; SUBCUTANEOUS at 04:44

## 2020-01-01 RX ADMIN — HEPARIN SODIUM 2200 UNIT(S)/HR: 5000 INJECTION INTRAVENOUS; SUBCUTANEOUS at 14:07

## 2020-01-01 RX ADMIN — Medication 1300 MILLIGRAM(S): at 13:44

## 2020-01-01 RX ADMIN — PROPOFOL 15 MICROGRAM(S)/KG/MIN: 10 INJECTION, EMULSION INTRAVENOUS at 08:34

## 2020-01-01 RX ADMIN — PHENYLEPHRINE HYDROCHLORIDE 75 MICROGRAM(S)/KG/MIN: 10 INJECTION INTRAVENOUS at 06:04

## 2020-01-01 RX ADMIN — HYDROMORPHONE HYDROCHLORIDE 3 MG/HR: 2 INJECTION INTRAMUSCULAR; INTRAVENOUS; SUBCUTANEOUS at 02:45

## 2020-01-01 RX ADMIN — MIDAZOLAM HYDROCHLORIDE 18.8 MG/KG/HR: 1 INJECTION, SOLUTION INTRAMUSCULAR; INTRAVENOUS at 08:00

## 2020-01-01 RX ADMIN — Medication 6 MILLIGRAM(S): at 11:03

## 2020-01-01 RX ADMIN — MIDAZOLAM HYDROCHLORIDE 2.5 MG/KG/HR: 1 INJECTION, SOLUTION INTRAMUSCULAR; INTRAVENOUS at 01:22

## 2020-01-01 RX ADMIN — Medication 5.86 MICROGRAM(S)/KG/MIN: at 16:00

## 2020-01-01 RX ADMIN — Medication 1000 MILLIGRAM(S): at 01:31

## 2020-01-01 RX ADMIN — HEPARIN SODIUM 1000 UNIT(S)/HR: 5000 INJECTION INTRAVENOUS; SUBCUTANEOUS at 17:40

## 2020-01-01 RX ADMIN — CHLORHEXIDINE GLUCONATE 15 MILLILITER(S): 213 SOLUTION TOPICAL at 04:51

## 2020-01-01 RX ADMIN — Medication 2: at 12:08

## 2020-01-01 RX ADMIN — CEFEPIME 100 MILLIGRAM(S): 1 INJECTION, POWDER, FOR SOLUTION INTRAMUSCULAR; INTRAVENOUS at 17:11

## 2020-01-01 RX ADMIN — CHLORHEXIDINE GLUCONATE 15 MILLILITER(S): 213 SOLUTION TOPICAL at 17:21

## 2020-01-01 RX ADMIN — CHLORHEXIDINE GLUCONATE 1 APPLICATION(S): 213 SOLUTION TOPICAL at 05:11

## 2020-01-01 RX ADMIN — Medication 50 MILLILITER(S): at 23:45

## 2020-01-01 RX ADMIN — Medication 1300 MILLIGRAM(S): at 21:54

## 2020-01-01 RX ADMIN — Medication 1 DROP(S): at 17:45

## 2020-01-01 RX ADMIN — Medication 58.6 MICROGRAM(S)/KG/MIN: at 04:47

## 2020-01-01 RX ADMIN — HEPARIN SODIUM 2100 UNIT(S)/HR: 5000 INJECTION INTRAVENOUS; SUBCUTANEOUS at 01:12

## 2020-01-01 RX ADMIN — CHLORHEXIDINE GLUCONATE 15 MILLILITER(S): 213 SOLUTION TOPICAL at 06:12

## 2020-01-01 RX ADMIN — HEPARIN SODIUM 1400 UNIT(S)/HR: 5000 INJECTION INTRAVENOUS; SUBCUTANEOUS at 23:18

## 2020-01-01 RX ADMIN — Medication 0.5 MILLIGRAM(S): at 10:22

## 2020-01-01 RX ADMIN — CHLORHEXIDINE GLUCONATE 15 MILLILITER(S): 213 SOLUTION TOPICAL at 17:13

## 2020-01-01 RX ADMIN — VASOPRESSIN 2.4 UNIT(S)/MIN: 20 INJECTION INTRAVENOUS at 18:41

## 2020-01-01 RX ADMIN — CEFEPIME 100 MILLIGRAM(S): 1 INJECTION, POWDER, FOR SOLUTION INTRAMUSCULAR; INTRAVENOUS at 13:21

## 2020-01-01 RX ADMIN — PANTOPRAZOLE SODIUM 40 MILLIGRAM(S): 20 TABLET, DELAYED RELEASE ORAL at 11:00

## 2020-01-01 RX ADMIN — Medication 1: at 05:50

## 2020-01-01 RX ADMIN — REMDESIVIR 500 MILLIGRAM(S): 5 INJECTION INTRAVENOUS at 05:13

## 2020-01-01 RX ADMIN — CHLORHEXIDINE GLUCONATE 15 MILLILITER(S): 213 SOLUTION TOPICAL at 05:27

## 2020-01-01 RX ADMIN — VASOPRESSIN 2.4 UNIT(S)/MIN: 20 INJECTION INTRAVENOUS at 21:57

## 2020-01-01 RX ADMIN — Medication 1: at 05:29

## 2020-01-01 RX ADMIN — FENTANYL CITRATE 100 MICROGRAM(S): 50 INJECTION INTRAVENOUS at 13:20

## 2020-01-01 RX ADMIN — Medication 1300 MILLIGRAM(S): at 05:47

## 2020-01-01 RX ADMIN — PROPOFOL 33.8 MICROGRAM(S)/KG/MIN: 10 INJECTION, EMULSION INTRAVENOUS at 12:48

## 2020-01-01 RX ADMIN — PHENYLEPHRINE HYDROCHLORIDE 2.34 MICROGRAM(S)/KG/MIN: 10 INJECTION INTRAVENOUS at 21:02

## 2020-01-01 RX ADMIN — PROPOFOL 37.5 MICROGRAM(S)/KG/MIN: 10 INJECTION, EMULSION INTRAVENOUS at 11:30

## 2020-01-01 RX ADMIN — PANTOPRAZOLE SODIUM 40 MILLIGRAM(S): 20 TABLET, DELAYED RELEASE ORAL at 11:03

## 2020-01-01 RX ADMIN — Medication 1: at 00:00

## 2020-01-01 RX ADMIN — CHLORHEXIDINE GLUCONATE 15 MILLILITER(S): 213 SOLUTION TOPICAL at 05:07

## 2020-01-01 RX ADMIN — HEPARIN SODIUM 1400 UNIT(S)/HR: 5000 INJECTION INTRAVENOUS; SUBCUTANEOUS at 06:58

## 2020-01-01 RX ADMIN — Medication 1300 MILLIGRAM(S): at 17:13

## 2020-01-01 RX ADMIN — Medication 650 MILLIGRAM(S): at 12:00

## 2020-01-01 RX ADMIN — MIDAZOLAM HYDROCHLORIDE 2.5 MG/KG/HR: 1 INJECTION, SOLUTION INTRAMUSCULAR; INTRAVENOUS at 13:10

## 2020-01-01 RX ADMIN — Medication 50 MILLILITER(S): at 13:21

## 2020-01-01 RX ADMIN — PROPOFOL 37.5 MICROGRAM(S)/KG/MIN: 10 INJECTION, EMULSION INTRAVENOUS at 05:15

## 2020-01-01 RX ADMIN — SENNA PLUS 10 MILLILITER(S): 8.6 TABLET ORAL at 21:10

## 2020-01-01 RX ADMIN — Medication 1: at 17:21

## 2020-01-01 RX ADMIN — Medication 650 MILLIGRAM(S): at 05:12

## 2020-01-01 RX ADMIN — CEFEPIME 100 MILLIGRAM(S): 1 INJECTION, POWDER, FOR SOLUTION INTRAMUSCULAR; INTRAVENOUS at 13:45

## 2020-01-01 RX ADMIN — PANTOPRAZOLE SODIUM 40 MILLIGRAM(S): 20 TABLET, DELAYED RELEASE ORAL at 11:57

## 2020-01-01 RX ADMIN — Medication 58.6 MICROGRAM(S)/KG/MIN: at 05:18

## 2020-01-01 RX ADMIN — CHLORHEXIDINE GLUCONATE 15 MILLILITER(S): 213 SOLUTION TOPICAL at 17:51

## 2020-01-01 RX ADMIN — HEPARIN SODIUM 1600 UNIT(S)/HR: 5000 INJECTION INTRAVENOUS; SUBCUTANEOUS at 06:18

## 2020-01-01 RX ADMIN — SENNA PLUS 2 TABLET(S): 8.6 TABLET ORAL at 21:03

## 2020-01-01 RX ADMIN — FENTANYL CITRATE 12.5 MICROGRAM(S)/KG/HR: 50 INJECTION INTRAVENOUS at 21:10

## 2020-01-01 RX ADMIN — HEPARIN SODIUM 1500 UNIT(S)/HR: 5000 INJECTION INTRAVENOUS; SUBCUTANEOUS at 06:48

## 2020-01-01 RX ADMIN — CEFEPIME 100 MILLIGRAM(S): 1 INJECTION, POWDER, FOR SOLUTION INTRAMUSCULAR; INTRAVENOUS at 16:09

## 2020-01-01 RX ADMIN — CHLORHEXIDINE GLUCONATE 1 APPLICATION(S): 213 SOLUTION TOPICAL at 05:21

## 2020-01-01 RX ADMIN — HEPARIN SODIUM 0 UNIT(S)/HR: 5000 INJECTION INTRAVENOUS; SUBCUTANEOUS at 05:45

## 2020-01-01 RX ADMIN — CEFEPIME 100 MILLIGRAM(S): 1 INJECTION, POWDER, FOR SOLUTION INTRAMUSCULAR; INTRAVENOUS at 13:38

## 2020-01-01 RX ADMIN — Medication 1334 MILLIGRAM(S): at 11:42

## 2020-01-01 RX ADMIN — PROPOFOL 37.5 MICROGRAM(S)/KG/MIN: 10 INJECTION, EMULSION INTRAVENOUS at 15:29

## 2020-01-01 RX ADMIN — CEFEPIME 100 MILLIGRAM(S): 1 INJECTION, POWDER, FOR SOLUTION INTRAMUSCULAR; INTRAVENOUS at 17:51

## 2020-01-01 RX ADMIN — TRANEXAMIC ACID 5 MILLILITER(S): 100 INJECTION, SOLUTION INTRAVENOUS at 16:30

## 2020-01-01 RX ADMIN — PANTOPRAZOLE SODIUM 40 MILLIGRAM(S): 20 TABLET, DELAYED RELEASE ORAL at 11:58

## 2020-01-01 RX ADMIN — Medication 10 MILLIGRAM(S): at 12:26

## 2020-01-01 RX ADMIN — FENTANYL CITRATE 25 MICROGRAM(S)/KG/HR: 50 INJECTION INTRAVENOUS at 13:41

## 2020-01-01 RX ADMIN — POLYETHYLENE GLYCOL 3350 17 GRAM(S): 17 POWDER, FOR SOLUTION ORAL at 11:03

## 2020-01-01 RX ADMIN — HEPARIN SODIUM 1700 UNIT(S)/HR: 5000 INJECTION INTRAVENOUS; SUBCUTANEOUS at 23:28

## 2020-01-01 RX ADMIN — CHLORHEXIDINE GLUCONATE 1 APPLICATION(S): 213 SOLUTION TOPICAL at 05:49

## 2020-01-01 RX ADMIN — Medication 50 MILLILITER(S): at 01:02

## 2020-01-01 RX ADMIN — Medication 1: at 11:48

## 2020-01-01 RX ADMIN — VASOPRESSIN 2.4 UNIT(S)/MIN: 20 INJECTION INTRAVENOUS at 22:16

## 2020-01-01 RX ADMIN — CHLORHEXIDINE GLUCONATE 15 MILLILITER(S): 213 SOLUTION TOPICAL at 17:05

## 2020-01-01 RX ADMIN — Medication 1300 MILLIGRAM(S): at 13:02

## 2020-01-01 RX ADMIN — PROPOFOL 15 MICROGRAM(S)/KG/MIN: 10 INJECTION, EMULSION INTRAVENOUS at 20:47

## 2020-01-01 RX ADMIN — HEPARIN SODIUM 1900 UNIT(S)/HR: 5000 INJECTION INTRAVENOUS; SUBCUTANEOUS at 18:05

## 2020-01-01 RX ADMIN — SODIUM CHLORIDE 1000 MILLILITER(S): 9 INJECTION INTRAMUSCULAR; INTRAVENOUS; SUBCUTANEOUS at 02:13

## 2020-01-01 RX ADMIN — CEFEPIME 100 MILLIGRAM(S): 1 INJECTION, POWDER, FOR SOLUTION INTRAMUSCULAR; INTRAVENOUS at 06:22

## 2020-01-01 RX ADMIN — PROPOFOL 37.5 MICROGRAM(S)/KG/MIN: 10 INJECTION, EMULSION INTRAVENOUS at 01:49

## 2020-01-01 RX ADMIN — SENNA PLUS 2 TABLET(S): 8.6 TABLET ORAL at 22:35

## 2020-01-01 RX ADMIN — REMDESIVIR 500 MILLIGRAM(S): 5 INJECTION INTRAVENOUS at 04:25

## 2020-01-01 RX ADMIN — Medication 400 MILLIGRAM(S): at 12:51

## 2020-01-01 RX ADMIN — PANTOPRAZOLE SODIUM 40 MILLIGRAM(S): 20 TABLET, DELAYED RELEASE ORAL at 13:22

## 2020-01-01 RX ADMIN — HYDROMORPHONE HYDROCHLORIDE 2 MG/HR: 2 INJECTION INTRAMUSCULAR; INTRAVENOUS; SUBCUTANEOUS at 10:00

## 2020-01-01 RX ADMIN — PANTOPRAZOLE SODIUM 40 MILLIGRAM(S): 20 TABLET, DELAYED RELEASE ORAL at 11:50

## 2020-01-01 RX ADMIN — CHLORHEXIDINE GLUCONATE 1 APPLICATION(S): 213 SOLUTION TOPICAL at 04:51

## 2020-01-01 RX ADMIN — Medication 1334 MILLIGRAM(S): at 17:31

## 2020-01-01 RX ADMIN — Medication 1300 MILLIGRAM(S): at 07:23

## 2020-01-01 RX ADMIN — Medication 1300 MILLIGRAM(S): at 05:03

## 2020-01-01 RX ADMIN — POLYETHYLENE GLYCOL 3350 17 GRAM(S): 17 POWDER, FOR SOLUTION ORAL at 11:50

## 2020-01-01 RX ADMIN — FENTANYL CITRATE 37.5 MICROGRAM(S)/KG/HR: 50 INJECTION INTRAVENOUS at 07:42

## 2020-01-01 RX ADMIN — FENTANYL CITRATE 50 MICROGRAM(S)/KG/HR: 50 INJECTION INTRAVENOUS at 15:44

## 2020-01-01 RX ADMIN — MIDODRINE HYDROCHLORIDE 5 MILLIGRAM(S): 2.5 TABLET ORAL at 13:26

## 2020-01-01 RX ADMIN — Medication 10 MILLIGRAM(S): at 17:00

## 2020-01-01 RX ADMIN — FENTANYL CITRATE 37.5 MICROGRAM(S)/KG/HR: 50 INJECTION INTRAVENOUS at 21:53

## 2020-01-01 RX ADMIN — Medication 1300 MILLIGRAM(S): at 22:06

## 2020-01-01 RX ADMIN — HEPARIN SODIUM 1400 UNIT(S)/HR: 5000 INJECTION INTRAVENOUS; SUBCUTANEOUS at 20:56

## 2020-01-01 RX ADMIN — Medication 5.86 MICROGRAM(S)/KG/MIN: at 05:09

## 2020-01-01 RX ADMIN — HEPARIN SODIUM 1400 UNIT(S)/HR: 5000 INJECTION INTRAVENOUS; SUBCUTANEOUS at 08:19

## 2020-01-01 RX ADMIN — HEPARIN SODIUM 1900 UNIT(S)/HR: 5000 INJECTION INTRAVENOUS; SUBCUTANEOUS at 07:10

## 2020-01-01 RX ADMIN — Medication 1300 MILLIGRAM(S): at 13:26

## 2020-01-01 RX ADMIN — Medication 1300 MILLIGRAM(S): at 17:37

## 2020-01-01 RX ADMIN — CHLORHEXIDINE GLUCONATE 15 MILLILITER(S): 213 SOLUTION TOPICAL at 17:48

## 2020-01-01 RX ADMIN — MIDAZOLAM HYDROCHLORIDE 18.8 MG/KG/HR: 1 INJECTION, SOLUTION INTRAMUSCULAR; INTRAVENOUS at 23:01

## 2020-01-01 RX ADMIN — Medication 1: at 05:27

## 2020-01-01 RX ADMIN — CHLORHEXIDINE GLUCONATE 1 APPLICATION(S): 213 SOLUTION TOPICAL at 06:35

## 2020-01-01 RX ADMIN — Medication 50 MILLIEQUIVALENT(S): at 06:22

## 2020-01-01 RX ADMIN — PROPOFOL 37.5 MICROGRAM(S)/KG/MIN: 10 INJECTION, EMULSION INTRAVENOUS at 13:07

## 2020-01-01 RX ADMIN — PANTOPRAZOLE SODIUM 40 MILLIGRAM(S): 20 TABLET, DELAYED RELEASE ORAL at 13:11

## 2020-01-01 RX ADMIN — MIDAZOLAM HYDROCHLORIDE 18.8 MG/KG/HR: 1 INJECTION, SOLUTION INTRAMUSCULAR; INTRAVENOUS at 14:41

## 2020-01-01 RX ADMIN — SODIUM CHLORIDE 2000 MILLILITER(S): 9 INJECTION, SOLUTION INTRAVENOUS at 11:03

## 2020-01-01 RX ADMIN — Medication 6 MILLIGRAM(S): at 05:48

## 2020-01-01 RX ADMIN — PROPOFOL 15 MICROGRAM(S)/KG/MIN: 10 INJECTION, EMULSION INTRAVENOUS at 23:08

## 2020-01-01 RX ADMIN — PROPOFOL 15 MICROGRAM(S)/KG/MIN: 10 INJECTION, EMULSION INTRAVENOUS at 13:11

## 2020-01-01 RX ADMIN — Medication 6 MILLIGRAM(S): at 05:35

## 2020-01-01 RX ADMIN — Medication 1334 MILLIGRAM(S): at 09:16

## 2020-01-01 RX ADMIN — FENTANYL CITRATE 37.5 MICROGRAM(S)/KG/HR: 50 INJECTION INTRAVENOUS at 13:10

## 2020-01-01 RX ADMIN — HEPARIN SODIUM 1700 UNIT(S)/HR: 5000 INJECTION INTRAVENOUS; SUBCUTANEOUS at 16:32

## 2020-01-01 RX ADMIN — CEFEPIME 100 MILLIGRAM(S): 1 INJECTION, POWDER, FOR SOLUTION INTRAMUSCULAR; INTRAVENOUS at 05:49

## 2020-01-01 RX ADMIN — Medication 1334 MILLIGRAM(S): at 08:30

## 2020-01-01 RX ADMIN — Medication 58.6 MICROGRAM(S)/KG/MIN: at 11:39

## 2020-01-01 RX ADMIN — FENTANYL CITRATE 50 MICROGRAM(S)/KG/HR: 50 INJECTION INTRAVENOUS at 14:03

## 2020-01-01 RX ADMIN — AZITHROMYCIN 255 MILLIGRAM(S): 500 TABLET, FILM COATED ORAL at 12:45

## 2020-01-01 RX ADMIN — HEPARIN SODIUM 5000 UNIT(S): 5000 INJECTION INTRAVENOUS; SUBCUTANEOUS at 13:04

## 2020-01-01 RX ADMIN — HEPARIN SODIUM 2200 UNIT(S)/HR: 5000 INJECTION INTRAVENOUS; SUBCUTANEOUS at 03:32

## 2020-01-01 RX ADMIN — HEPARIN SODIUM 5000 UNIT(S): 5000 INJECTION INTRAVENOUS; SUBCUTANEOUS at 05:33

## 2020-01-01 RX ADMIN — HEPARIN SODIUM 1900 UNIT(S)/HR: 5000 INJECTION INTRAVENOUS; SUBCUTANEOUS at 04:40

## 2020-01-01 RX ADMIN — Medication 1: at 00:28

## 2020-01-01 RX ADMIN — POLYETHYLENE GLYCOL 3350 17 GRAM(S): 17 POWDER, FOR SOLUTION ORAL at 16:38

## 2020-01-01 RX ADMIN — SODIUM CHLORIDE 100 MILLILITER(S): 9 INJECTION, SOLUTION INTRAVENOUS at 05:12

## 2020-01-01 RX ADMIN — ENOXAPARIN SODIUM 40 MILLIGRAM(S): 100 INJECTION SUBCUTANEOUS at 11:23

## 2020-01-01 RX ADMIN — MUPIROCIN 1 APPLICATION(S): 20 OINTMENT TOPICAL at 06:40

## 2020-01-01 RX ADMIN — Medication 5 MG/HR: at 13:30

## 2020-01-01 RX ADMIN — REMDESIVIR 500 MILLIGRAM(S): 5 INJECTION INTRAVENOUS at 06:10

## 2020-01-01 RX ADMIN — Medication 6 MILLIGRAM(S): at 05:47

## 2020-01-01 RX ADMIN — MIDODRINE HYDROCHLORIDE 5 MILLIGRAM(S): 2.5 TABLET ORAL at 22:08

## 2020-01-01 RX ADMIN — Medication 1300 MILLIGRAM(S): at 05:29

## 2020-01-01 RX ADMIN — HEPARIN SODIUM 1700 UNIT(S)/HR: 5000 INJECTION INTRAVENOUS; SUBCUTANEOUS at 07:24

## 2020-01-01 RX ADMIN — POLYETHYLENE GLYCOL 3350 17 GRAM(S): 17 POWDER, FOR SOLUTION ORAL at 12:54

## 2020-01-01 RX ADMIN — Medication 250 MILLIGRAM(S): at 17:40

## 2020-01-01 RX ADMIN — CHLORHEXIDINE GLUCONATE 15 MILLILITER(S): 213 SOLUTION TOPICAL at 17:34

## 2020-01-01 RX ADMIN — PROPOFOL 15 MICROGRAM(S)/KG/MIN: 10 INJECTION, EMULSION INTRAVENOUS at 05:09

## 2020-01-01 RX ADMIN — Medication 650 MILLIGRAM(S): at 14:25

## 2020-01-01 RX ADMIN — FENTANYL CITRATE 12.5 MICROGRAM(S)/KG/HR: 50 INJECTION INTRAVENOUS at 13:52

## 2020-01-01 RX ADMIN — FENTANYL CITRATE 50 MICROGRAM(S)/KG/HR: 50 INJECTION INTRAVENOUS at 06:04

## 2020-01-01 RX ADMIN — PROPOFOL 33.8 MICROGRAM(S)/KG/MIN: 10 INJECTION, EMULSION INTRAVENOUS at 09:01

## 2020-01-01 RX ADMIN — PANTOPRAZOLE SODIUM 40 MILLIGRAM(S): 20 TABLET, DELAYED RELEASE ORAL at 12:04

## 2020-01-01 RX ADMIN — Medication 1000 MILLIGRAM(S): at 20:55

## 2020-01-01 RX ADMIN — HEPARIN SODIUM 900 UNIT(S)/HR: 5000 INJECTION INTRAVENOUS; SUBCUTANEOUS at 00:16

## 2020-01-01 RX ADMIN — HEPARIN SODIUM 900 UNIT(S)/HR: 5000 INJECTION INTRAVENOUS; SUBCUTANEOUS at 13:56

## 2020-01-01 RX ADMIN — HEPARIN SODIUM 1900 UNIT(S)/HR: 5000 INJECTION INTRAVENOUS; SUBCUTANEOUS at 05:14

## 2020-01-01 RX ADMIN — HEPARIN SODIUM 0 UNIT(S)/HR: 5000 INJECTION INTRAVENOUS; SUBCUTANEOUS at 08:51

## 2020-01-01 RX ADMIN — Medication 10 MILLIGRAM(S): at 12:06

## 2020-01-01 RX ADMIN — HEPARIN SODIUM 2200 UNIT(S)/HR: 5000 INJECTION INTRAVENOUS; SUBCUTANEOUS at 20:27

## 2020-01-01 RX ADMIN — PROPOFOL 11.3 MICROGRAM(S)/KG/MIN: 10 INJECTION, EMULSION INTRAVENOUS at 05:43

## 2020-01-01 RX ADMIN — FENTANYL CITRATE 37.5 MICROGRAM(S)/KG/HR: 50 INJECTION INTRAVENOUS at 13:32

## 2020-01-01 RX ADMIN — CHLORHEXIDINE GLUCONATE 1 APPLICATION(S): 213 SOLUTION TOPICAL at 05:56

## 2020-01-01 RX ADMIN — CHLORHEXIDINE GLUCONATE 15 MILLILITER(S): 213 SOLUTION TOPICAL at 17:15

## 2020-01-01 RX ADMIN — CHLORHEXIDINE GLUCONATE 15 MILLILITER(S): 213 SOLUTION TOPICAL at 05:51

## 2020-01-01 RX ADMIN — Medication 1000 MILLIGRAM(S): at 12:52

## 2020-01-01 RX ADMIN — CEFEPIME 100 MILLIGRAM(S): 1 INJECTION, POWDER, FOR SOLUTION INTRAMUSCULAR; INTRAVENOUS at 17:56

## 2020-01-01 RX ADMIN — AZITHROMYCIN 255 MILLIGRAM(S): 500 TABLET, FILM COATED ORAL at 11:00

## 2020-01-01 RX ADMIN — PROPOFOL 15 MICROGRAM(S)/KG/MIN: 10 INJECTION, EMULSION INTRAVENOUS at 16:30

## 2020-01-01 RX ADMIN — Medication 1334 MILLIGRAM(S): at 17:51

## 2020-01-01 RX ADMIN — HEPARIN SODIUM 1900 UNIT(S)/HR: 5000 INJECTION INTRAVENOUS; SUBCUTANEOUS at 17:34

## 2020-01-01 RX ADMIN — PHENYLEPHRINE HYDROCHLORIDE 2.34 MICROGRAM(S)/KG/MIN: 10 INJECTION INTRAVENOUS at 23:46

## 2020-01-01 RX ADMIN — Medication 50 MILLILITER(S): at 17:25

## 2020-01-01 RX ADMIN — HEPARIN SODIUM 1700 UNIT(S)/HR: 5000 INJECTION INTRAVENOUS; SUBCUTANEOUS at 23:26

## 2020-01-01 RX ADMIN — Medication 5.86 MICROGRAM(S)/KG/MIN: at 04:02

## 2020-01-01 RX ADMIN — Medication 1: at 17:46

## 2020-01-01 RX ADMIN — CHLORHEXIDINE GLUCONATE 1 APPLICATION(S): 213 SOLUTION TOPICAL at 05:28

## 2020-01-01 RX ADMIN — HEPARIN SODIUM 5000 UNIT(S): 5000 INJECTION INTRAVENOUS; SUBCUTANEOUS at 14:07

## 2020-01-01 RX ADMIN — CHLORHEXIDINE GLUCONATE 1 APPLICATION(S): 213 SOLUTION TOPICAL at 05:08

## 2020-01-01 RX ADMIN — HYDROMORPHONE HYDROCHLORIDE 1.5 MG/HR: 2 INJECTION INTRAMUSCULAR; INTRAVENOUS; SUBCUTANEOUS at 11:58

## 2020-01-01 RX ADMIN — CHLORHEXIDINE GLUCONATE 1 APPLICATION(S): 213 SOLUTION TOPICAL at 05:12

## 2020-01-01 RX ADMIN — Medication 58.6 MICROGRAM(S)/KG/MIN: at 17:25

## 2020-01-01 RX ADMIN — HYDROMORPHONE HYDROCHLORIDE 1.5 MG/HR: 2 INJECTION INTRAMUSCULAR; INTRAVENOUS; SUBCUTANEOUS at 12:49

## 2020-01-01 RX ADMIN — Medication 6 MILLIGRAM(S): at 05:13

## 2020-01-01 RX ADMIN — HEPARIN SODIUM 1700 UNIT(S)/HR: 5000 INJECTION INTRAVENOUS; SUBCUTANEOUS at 20:13

## 2020-01-01 RX ADMIN — Medication 400 MILLIGRAM(S): at 20:55

## 2020-01-01 RX ADMIN — CHLORHEXIDINE GLUCONATE 15 MILLILITER(S): 213 SOLUTION TOPICAL at 05:33

## 2020-01-01 RX ADMIN — Medication 667 MILLIGRAM(S): at 11:58

## 2020-01-01 RX ADMIN — CHLORHEXIDINE GLUCONATE 15 MILLILITER(S): 213 SOLUTION TOPICAL at 05:05

## 2020-01-01 RX ADMIN — Medication 1334 MILLIGRAM(S): at 18:32

## 2020-01-01 RX ADMIN — CHLORHEXIDINE GLUCONATE 15 MILLILITER(S): 213 SOLUTION TOPICAL at 18:25

## 2020-01-01 RX ADMIN — PANTOPRAZOLE SODIUM 40 MILLIGRAM(S): 20 TABLET, DELAYED RELEASE ORAL at 11:39

## 2020-01-01 RX ADMIN — Medication 1300 MILLIGRAM(S): at 05:48

## 2020-01-01 RX ADMIN — CHLORHEXIDINE GLUCONATE 15 MILLILITER(S): 213 SOLUTION TOPICAL at 17:37

## 2020-01-01 RX ADMIN — HYDROMORPHONE HYDROCHLORIDE 3 MG/HR: 2 INJECTION INTRAMUSCULAR; INTRAVENOUS; SUBCUTANEOUS at 13:22

## 2020-01-01 RX ADMIN — CISATRACURIUM BESYLATE 22.5 MICROGRAM(S)/KG/MIN: 2 INJECTION INTRAVENOUS at 21:18

## 2020-01-01 RX ADMIN — Medication 1: at 23:17

## 2020-01-01 RX ADMIN — Medication 400 MILLIGRAM(S): at 05:20

## 2020-01-01 RX ADMIN — PANTOPRAZOLE SODIUM 40 MILLIGRAM(S): 20 TABLET, DELAYED RELEASE ORAL at 11:14

## 2020-01-01 RX ADMIN — POLYETHYLENE GLYCOL 3350 17 GRAM(S): 17 POWDER, FOR SOLUTION ORAL at 11:42

## 2020-01-01 RX ADMIN — CEFEPIME 100 MILLIGRAM(S): 1 INJECTION, POWDER, FOR SOLUTION INTRAMUSCULAR; INTRAVENOUS at 05:47

## 2020-01-01 RX ADMIN — FENTANYL CITRATE 50 MICROGRAM(S)/KG/HR: 50 INJECTION INTRAVENOUS at 08:30

## 2020-01-01 RX ADMIN — MIDODRINE HYDROCHLORIDE 5 MILLIGRAM(S): 2.5 TABLET ORAL at 05:08

## 2020-01-01 RX ADMIN — Medication 5.86 MICROGRAM(S)/KG/MIN: at 06:24

## 2020-01-01 RX ADMIN — CHLORHEXIDINE GLUCONATE 15 MILLILITER(S): 213 SOLUTION TOPICAL at 12:34

## 2020-01-01 RX ADMIN — MIDAZOLAM HYDROCHLORIDE 2.5 MG/KG/HR: 1 INJECTION, SOLUTION INTRAMUSCULAR; INTRAVENOUS at 23:34

## 2020-01-01 RX ADMIN — FENTANYL CITRATE 37.5 MICROGRAM(S)/KG/HR: 50 INJECTION INTRAVENOUS at 17:59

## 2020-01-01 RX ADMIN — Medication 650 MILLIGRAM(S): at 08:08

## 2020-01-01 RX ADMIN — HYDROMORPHONE HYDROCHLORIDE 3 MG/HR: 2 INJECTION INTRAMUSCULAR; INTRAVENOUS; SUBCUTANEOUS at 08:38

## 2020-01-01 RX ADMIN — Medication 3: at 17:50

## 2020-01-01 RX ADMIN — PANTOPRAZOLE SODIUM 40 MILLIGRAM(S): 20 TABLET, DELAYED RELEASE ORAL at 11:41

## 2020-01-01 RX ADMIN — Medication 5.86 MICROGRAM(S)/KG/MIN: at 21:18

## 2020-01-01 RX ADMIN — PROPOFOL 33.8 MICROGRAM(S)/KG/MIN: 10 INJECTION, EMULSION INTRAVENOUS at 15:20

## 2020-01-01 RX ADMIN — Medication 1334 MILLIGRAM(S): at 09:39

## 2020-01-01 RX ADMIN — Medication 1: at 13:08

## 2020-01-01 RX ADMIN — MIDODRINE HYDROCHLORIDE 5 MILLIGRAM(S): 2.5 TABLET ORAL at 13:44

## 2020-01-01 RX ADMIN — HYDROMORPHONE HYDROCHLORIDE 3 MG/HR: 2 INJECTION INTRAMUSCULAR; INTRAVENOUS; SUBCUTANEOUS at 19:26

## 2020-01-01 RX ADMIN — Medication 5.86 MICROGRAM(S)/KG/MIN: at 13:13

## 2020-01-01 RX ADMIN — PROPOFOL 33.8 MICROGRAM(S)/KG/MIN: 10 INJECTION, EMULSION INTRAVENOUS at 19:30

## 2020-01-01 RX ADMIN — HEPARIN SODIUM 0 UNIT(S)/HR: 5000 INJECTION INTRAVENOUS; SUBCUTANEOUS at 13:04

## 2020-01-01 RX ADMIN — Medication 1334 MILLIGRAM(S): at 17:14

## 2020-01-01 RX ADMIN — FENTANYL CITRATE 37.5 MICROGRAM(S)/KG/HR: 50 INJECTION INTRAVENOUS at 05:01

## 2020-01-01 RX ADMIN — PANTOPRAZOLE SODIUM 40 MILLIGRAM(S): 20 TABLET, DELAYED RELEASE ORAL at 12:11

## 2020-01-01 RX ADMIN — HEPARIN SODIUM 2100 UNIT(S)/HR: 5000 INJECTION INTRAVENOUS; SUBCUTANEOUS at 18:24

## 2020-01-01 RX ADMIN — HEPARIN SODIUM 1400 UNIT(S)/HR: 5000 INJECTION INTRAVENOUS; SUBCUTANEOUS at 03:01

## 2020-01-01 RX ADMIN — Medication 1000 UNIT(S): at 11:00

## 2020-01-01 RX ADMIN — CHLORHEXIDINE GLUCONATE 15 MILLILITER(S): 213 SOLUTION TOPICAL at 17:12

## 2020-01-01 RX ADMIN — FENTANYL CITRATE 37.5 MICROGRAM(S)/KG/HR: 50 INJECTION INTRAVENOUS at 09:51

## 2020-01-01 RX ADMIN — SENNA PLUS 10 MILLILITER(S): 8.6 TABLET ORAL at 21:54

## 2020-01-01 RX ADMIN — CEFEPIME 100 MILLIGRAM(S): 1 INJECTION, POWDER, FOR SOLUTION INTRAMUSCULAR; INTRAVENOUS at 05:33

## 2020-01-01 RX ADMIN — PROPOFOL 11.3 MICROGRAM(S)/KG/MIN: 10 INJECTION, EMULSION INTRAVENOUS at 11:31

## 2020-01-01 RX ADMIN — MUPIROCIN 1 APPLICATION(S): 20 OINTMENT TOPICAL at 17:21

## 2020-01-01 RX ADMIN — HEPARIN SODIUM 600 UNIT(S)/HR: 5000 INJECTION INTRAVENOUS; SUBCUTANEOUS at 09:51

## 2020-01-01 RX ADMIN — PROPOFOL 33.8 MICROGRAM(S)/KG/MIN: 10 INJECTION, EMULSION INTRAVENOUS at 02:46

## 2020-01-01 RX ADMIN — Medication 1334 MILLIGRAM(S): at 08:16

## 2020-01-01 RX ADMIN — CHLORHEXIDINE GLUCONATE 15 MILLILITER(S): 213 SOLUTION TOPICAL at 18:09

## 2020-01-01 RX ADMIN — MIDAZOLAM HYDROCHLORIDE 18.8 MG/KG/HR: 1 INJECTION, SOLUTION INTRAMUSCULAR; INTRAVENOUS at 14:29

## 2020-01-01 RX ADMIN — HYDROMORPHONE HYDROCHLORIDE 3 MG/HR: 2 INJECTION INTRAMUSCULAR; INTRAVENOUS; SUBCUTANEOUS at 08:59

## 2020-01-01 RX ADMIN — AZITHROMYCIN 255 MILLIGRAM(S): 500 TABLET, FILM COATED ORAL at 11:57

## 2020-01-01 RX ADMIN — Medication 1: at 05:26

## 2020-01-01 RX ADMIN — Medication 5 MILLIGRAM(S): at 03:33

## 2020-01-01 RX ADMIN — Medication 1000 UNIT(S): at 11:41

## 2020-01-01 RX ADMIN — PROPOFOL 37.5 MICROGRAM(S)/KG/MIN: 10 INJECTION, EMULSION INTRAVENOUS at 23:41

## 2020-01-01 RX ADMIN — CHLORHEXIDINE GLUCONATE 15 MILLILITER(S): 213 SOLUTION TOPICAL at 05:49

## 2020-01-01 RX ADMIN — FENTANYL CITRATE 37.5 MICROGRAM(S)/KG/HR: 50 INJECTION INTRAVENOUS at 09:02

## 2020-01-01 RX ADMIN — Medication 50 MILLILITER(S): at 13:38

## 2020-01-01 RX ADMIN — MIDAZOLAM HYDROCHLORIDE 18.8 MG/KG/HR: 1 INJECTION, SOLUTION INTRAMUSCULAR; INTRAVENOUS at 05:01

## 2020-01-01 RX ADMIN — Medication 250 MILLIGRAM(S): at 16:31

## 2020-01-01 RX ADMIN — PANTOPRAZOLE SODIUM 40 MILLIGRAM(S): 20 TABLET, DELAYED RELEASE ORAL at 11:19

## 2020-01-01 RX ADMIN — ENOXAPARIN SODIUM 40 MILLIGRAM(S): 100 INJECTION SUBCUTANEOUS at 08:48

## 2020-01-01 RX ADMIN — FENTANYL CITRATE 25 MICROGRAM(S)/KG/HR: 50 INJECTION INTRAVENOUS at 20:58

## 2020-01-01 RX ADMIN — CHLORHEXIDINE GLUCONATE 15 MILLILITER(S): 213 SOLUTION TOPICAL at 05:03

## 2020-01-01 RX ADMIN — CHLORHEXIDINE GLUCONATE 1 APPLICATION(S): 213 SOLUTION TOPICAL at 05:15

## 2020-01-01 RX ADMIN — CHLORHEXIDINE GLUCONATE 15 MILLILITER(S): 213 SOLUTION TOPICAL at 17:31

## 2020-01-01 RX ADMIN — Medication 1334 MILLIGRAM(S): at 08:07

## 2020-01-01 RX ADMIN — PROPOFOL 15 MICROGRAM(S)/KG/MIN: 10 INJECTION, EMULSION INTRAVENOUS at 20:04

## 2020-01-01 RX ADMIN — CHLORHEXIDINE GLUCONATE 1 APPLICATION(S): 213 SOLUTION TOPICAL at 05:13

## 2020-01-01 RX ADMIN — FENTANYL CITRATE 50 MICROGRAM(S): 50 INJECTION INTRAVENOUS at 10:00

## 2020-01-01 RX ADMIN — Medication 1334 MILLIGRAM(S): at 17:04

## 2020-01-01 RX ADMIN — PANTOPRAZOLE SODIUM 40 MILLIGRAM(S): 20 TABLET, DELAYED RELEASE ORAL at 13:03

## 2020-01-01 RX ADMIN — Medication 0.12 MILLIGRAM(S): at 13:11

## 2020-01-01 RX ADMIN — Medication 100 MILLIEQUIVALENT(S): at 18:20

## 2020-01-01 RX ADMIN — Medication 50 MILLILITER(S): at 02:44

## 2020-01-01 RX ADMIN — PANTOPRAZOLE SODIUM 40 MILLIGRAM(S): 20 TABLET, DELAYED RELEASE ORAL at 13:18

## 2020-01-01 RX ADMIN — Medication 58.6 MICROGRAM(S)/KG/MIN: at 21:25

## 2020-01-01 RX ADMIN — HEPARIN SODIUM 2200 UNIT(S)/HR: 5000 INJECTION INTRAVENOUS; SUBCUTANEOUS at 10:58

## 2020-01-01 RX ADMIN — FENTANYL CITRATE 37.5 MICROGRAM(S)/KG/HR: 50 INJECTION INTRAVENOUS at 17:39

## 2020-01-01 RX ADMIN — CHLORHEXIDINE GLUCONATE 15 MILLILITER(S): 213 SOLUTION TOPICAL at 17:52

## 2020-01-01 RX ADMIN — SENNA PLUS 8.8 MILLILITER(S): 8.6 TABLET ORAL at 22:21

## 2020-01-01 RX ADMIN — Medication 1300 MILLIGRAM(S): at 05:52

## 2020-01-01 RX ADMIN — Medication 58.6 MICROGRAM(S)/KG/MIN: at 17:04

## 2020-01-01 RX ADMIN — HYDROMORPHONE HYDROCHLORIDE 2 MG/HR: 2 INJECTION INTRAMUSCULAR; INTRAVENOUS; SUBCUTANEOUS at 10:57

## 2020-01-01 RX ADMIN — ETOMIDATE 20 MILLIGRAM(S): 2 INJECTION INTRAVENOUS at 13:35

## 2020-01-01 RX ADMIN — FENTANYL CITRATE 100 MICROGRAM(S): 50 INJECTION INTRAVENOUS at 15:50

## 2020-01-01 RX ADMIN — CHLORHEXIDINE GLUCONATE 15 MILLILITER(S): 213 SOLUTION TOPICAL at 18:32

## 2020-01-01 RX ADMIN — Medication 6 MILLIGRAM(S): at 05:50

## 2020-01-01 RX ADMIN — FENTANYL CITRATE 37.5 MICROGRAM(S)/KG/HR: 50 INJECTION INTRAVENOUS at 08:41

## 2020-01-01 RX ADMIN — FENTANYL CITRATE 37.5 MICROGRAM(S)/KG/HR: 50 INJECTION INTRAVENOUS at 01:05

## 2020-01-01 RX ADMIN — CHLORHEXIDINE GLUCONATE 1 APPLICATION(S): 213 SOLUTION TOPICAL at 05:20

## 2020-01-01 RX ADMIN — CISATRACURIUM BESYLATE 20 MILLIGRAM(S): 2 INJECTION INTRAVENOUS at 10:49

## 2020-01-01 RX ADMIN — Medication 667 MILLIGRAM(S): at 07:42

## 2020-01-01 RX ADMIN — Medication 650 MILLIGRAM(S): at 10:00

## 2020-01-01 RX ADMIN — PROPOFOL 11.3 MICROGRAM(S)/KG/MIN: 10 INJECTION, EMULSION INTRAVENOUS at 18:00

## 2020-01-01 RX ADMIN — Medication 6 MILLIGRAM(S): at 05:23

## 2020-01-01 RX ADMIN — MIDODRINE HYDROCHLORIDE 5 MILLIGRAM(S): 2.5 TABLET ORAL at 21:54

## 2020-01-01 RX ADMIN — CEFEPIME 100 MILLIGRAM(S): 1 INJECTION, POWDER, FOR SOLUTION INTRAMUSCULAR; INTRAVENOUS at 05:00

## 2020-01-01 RX ADMIN — CISATRACURIUM BESYLATE 22.5 MICROGRAM(S)/KG/MIN: 2 INJECTION INTRAVENOUS at 01:01

## 2020-01-01 RX ADMIN — PROPOFOL 37.5 MICROGRAM(S)/KG/MIN: 10 INJECTION, EMULSION INTRAVENOUS at 15:21

## 2020-01-01 RX ADMIN — Medication 58.6 MICROGRAM(S)/KG/MIN: at 10:00

## 2020-01-01 RX ADMIN — HYDROMORPHONE HYDROCHLORIDE 2 MG/HR: 2 INJECTION INTRAMUSCULAR; INTRAVENOUS; SUBCUTANEOUS at 05:53

## 2020-01-01 RX ADMIN — Medication 1: at 17:00

## 2020-01-01 RX ADMIN — HYDROMORPHONE HYDROCHLORIDE 3 MG/HR: 2 INJECTION INTRAMUSCULAR; INTRAVENOUS; SUBCUTANEOUS at 10:00

## 2020-01-01 RX ADMIN — Medication 100 MILLIEQUIVALENT(S): at 04:53

## 2020-01-01 RX ADMIN — Medication 1334 MILLIGRAM(S): at 11:58

## 2020-01-01 RX ADMIN — VASOPRESSIN 2.4 UNIT(S)/MIN: 20 INJECTION INTRAVENOUS at 05:00

## 2020-01-01 RX ADMIN — MIDAZOLAM HYDROCHLORIDE 2.5 MG/KG/HR: 1 INJECTION, SOLUTION INTRAMUSCULAR; INTRAVENOUS at 13:32

## 2020-01-01 RX ADMIN — CISATRACURIUM BESYLATE 20 MILLIGRAM(S): 2 INJECTION INTRAVENOUS at 13:15

## 2020-01-01 RX ADMIN — Medication 325 GRAM(S): at 11:57

## 2020-01-01 RX ADMIN — PROPOFOL 37.5 MICROGRAM(S)/KG/MIN: 10 INJECTION, EMULSION INTRAVENOUS at 14:06

## 2020-01-01 RX ADMIN — Medication 1000 MILLIGRAM(S): at 09:14

## 2020-01-01 RX ADMIN — CHLORHEXIDINE GLUCONATE 15 MILLILITER(S): 213 SOLUTION TOPICAL at 17:25

## 2020-01-01 RX ADMIN — Medication 0.12 MILLIGRAM(S): at 11:00

## 2020-01-01 RX ADMIN — FENTANYL CITRATE 50 MICROGRAM(S)/KG/HR: 50 INJECTION INTRAVENOUS at 23:21

## 2020-01-01 RX ADMIN — FENTANYL CITRATE 50 MICROGRAM(S)/KG/HR: 50 INJECTION INTRAVENOUS at 04:35

## 2020-01-01 RX ADMIN — PANTOPRAZOLE SODIUM 40 MILLIGRAM(S): 20 TABLET, DELAYED RELEASE ORAL at 12:09

## 2020-01-01 RX ADMIN — VASOPRESSIN 2.4 UNIT(S)/MIN: 20 INJECTION INTRAVENOUS at 09:26

## 2020-01-01 RX ADMIN — Medication 10 MILLIGRAM(S): at 11:29

## 2020-01-01 RX ADMIN — Medication 50 MILLILITER(S): at 05:03

## 2020-01-01 RX ADMIN — Medication 500 MILLIGRAM(S): at 17:40

## 2020-01-01 RX ADMIN — PANTOPRAZOLE SODIUM 40 MILLIGRAM(S): 20 TABLET, DELAYED RELEASE ORAL at 12:05

## 2020-01-01 RX ADMIN — SENNA PLUS 8.8 MILLILITER(S): 8.6 TABLET ORAL at 21:06

## 2020-01-01 RX ADMIN — MIDODRINE HYDROCHLORIDE 5 MILLIGRAM(S): 2.5 TABLET ORAL at 05:48

## 2020-01-01 RX ADMIN — PROPOFOL 33.8 MICROGRAM(S)/KG/MIN: 10 INJECTION, EMULSION INTRAVENOUS at 22:36

## 2020-01-01 RX ADMIN — Medication 650 MILLIGRAM(S): at 10:56

## 2020-01-01 RX ADMIN — POLYETHYLENE GLYCOL 3350 17 GRAM(S): 17 POWDER, FOR SOLUTION ORAL at 13:12

## 2020-01-01 RX ADMIN — MIDAZOLAM HYDROCHLORIDE 2.5 MG/KG/HR: 1 INJECTION, SOLUTION INTRAMUSCULAR; INTRAVENOUS at 07:14

## 2020-01-01 RX ADMIN — CISATRACURIUM BESYLATE 22.5 MICROGRAM(S)/KG/MIN: 2 INJECTION INTRAVENOUS at 14:47

## 2020-01-01 RX ADMIN — PROPOFOL 15 MICROGRAM(S)/KG/MIN: 10 INJECTION, EMULSION INTRAVENOUS at 00:19

## 2020-01-01 RX ADMIN — CHLORHEXIDINE GLUCONATE 15 MILLILITER(S): 213 SOLUTION TOPICAL at 05:09

## 2020-01-01 RX ADMIN — CLOPIDOGREL BISULFATE 75 MILLIGRAM(S): 75 TABLET, FILM COATED ORAL at 11:41

## 2020-01-01 RX ADMIN — PHENYLEPHRINE HYDROCHLORIDE 70.3 MICROGRAM(S)/KG/MIN: 10 INJECTION INTRAVENOUS at 23:12

## 2020-01-01 RX ADMIN — POLYETHYLENE GLYCOL 3350 17 GRAM(S): 17 POWDER, FOR SOLUTION ORAL at 12:35

## 2020-01-01 RX ADMIN — Medication 1334 MILLIGRAM(S): at 13:31

## 2020-01-01 RX ADMIN — CHLORHEXIDINE GLUCONATE 1 APPLICATION(S): 213 SOLUTION TOPICAL at 05:52

## 2020-01-01 RX ADMIN — CISATRACURIUM BESYLATE 22.5 MICROGRAM(S)/KG/MIN: 2 INJECTION INTRAVENOUS at 09:36

## 2020-01-01 RX ADMIN — CISATRACURIUM BESYLATE 22.5 MICROGRAM(S)/KG/MIN: 2 INJECTION INTRAVENOUS at 09:01

## 2020-01-01 RX ADMIN — CHLORHEXIDINE GLUCONATE 1 APPLICATION(S): 213 SOLUTION TOPICAL at 05:04

## 2020-01-01 RX ADMIN — PROPOFOL 15 MICROGRAM(S)/KG/MIN: 10 INJECTION, EMULSION INTRAVENOUS at 13:22

## 2020-01-01 RX ADMIN — CHLORHEXIDINE GLUCONATE 15 MILLILITER(S): 213 SOLUTION TOPICAL at 18:41

## 2020-01-01 RX ADMIN — Medication 1300 MILLIGRAM(S): at 05:08

## 2020-01-01 RX ADMIN — PROPOFOL 33.8 MICROGRAM(S)/KG/MIN: 10 INJECTION, EMULSION INTRAVENOUS at 02:27

## 2020-01-01 RX ADMIN — Medication 1334 MILLIGRAM(S): at 13:11

## 2020-01-01 RX ADMIN — CHLORHEXIDINE GLUCONATE 1 APPLICATION(S): 213 SOLUTION TOPICAL at 05:16

## 2020-01-01 RX ADMIN — HEPARIN SODIUM 1100 UNIT(S)/HR: 5000 INJECTION INTRAVENOUS; SUBCUTANEOUS at 14:27

## 2020-01-01 RX ADMIN — MUPIROCIN 1 APPLICATION(S): 20 OINTMENT TOPICAL at 17:11

## 2020-01-01 RX ADMIN — Medication 1: at 17:17

## 2020-01-01 RX ADMIN — Medication 1300 MILLIGRAM(S): at 18:20

## 2020-01-01 RX ADMIN — CHLORHEXIDINE GLUCONATE 15 MILLILITER(S): 213 SOLUTION TOPICAL at 18:15

## 2020-01-01 RX ADMIN — Medication 1334 MILLIGRAM(S): at 09:09

## 2020-01-01 RX ADMIN — FENTANYL CITRATE 37.5 MICROGRAM(S)/KG/HR: 50 INJECTION INTRAVENOUS at 15:28

## 2020-01-01 RX ADMIN — Medication 650 MILLIGRAM(S): at 10:57

## 2020-01-01 RX ADMIN — Medication 1300 MILLIGRAM(S): at 17:48

## 2020-01-01 RX ADMIN — Medication 100 GRAM(S): at 11:30

## 2020-01-01 RX ADMIN — Medication 650 MILLIGRAM(S): at 16:38

## 2020-01-01 RX ADMIN — CISATRACURIUM BESYLATE 22.5 MICROGRAM(S)/KG/MIN: 2 INJECTION INTRAVENOUS at 14:01

## 2020-01-01 RX ADMIN — Medication 1300 MILLIGRAM(S): at 05:17

## 2020-01-01 RX ADMIN — CEFEPIME 100 MILLIGRAM(S): 1 INJECTION, POWDER, FOR SOLUTION INTRAMUSCULAR; INTRAVENOUS at 17:41

## 2020-01-01 RX ADMIN — PROPOFOL 15 MICROGRAM(S)/KG/MIN: 10 INJECTION, EMULSION INTRAVENOUS at 15:40

## 2020-01-01 RX ADMIN — SENNA PLUS 2 TABLET(S): 8.6 TABLET ORAL at 21:41

## 2020-01-01 RX ADMIN — CISATRACURIUM BESYLATE 22.5 MICROGRAM(S)/KG/MIN: 2 INJECTION INTRAVENOUS at 18:15

## 2020-01-01 RX ADMIN — PHENYLEPHRINE HYDROCHLORIDE 75 MICROGRAM(S)/KG/MIN: 10 INJECTION INTRAVENOUS at 14:01

## 2020-01-01 RX ADMIN — Medication 1300 MILLIGRAM(S): at 06:35

## 2020-01-01 RX ADMIN — PROPOFOL 15 MICROGRAM(S)/KG/MIN: 10 INJECTION, EMULSION INTRAVENOUS at 21:18

## 2020-01-01 RX ADMIN — MIDAZOLAM HYDROCHLORIDE 18.8 MG/KG/HR: 1 INJECTION, SOLUTION INTRAMUSCULAR; INTRAVENOUS at 23:48

## 2020-01-01 RX ADMIN — Medication 58.6 MICROGRAM(S)/KG/MIN: at 20:18

## 2020-01-01 RX ADMIN — HEPARIN SODIUM 1100 UNIT(S)/HR: 5000 INJECTION INTRAVENOUS; SUBCUTANEOUS at 20:52

## 2020-01-01 RX ADMIN — Medication 1: at 05:34

## 2020-01-01 RX ADMIN — PHENYLEPHRINE HYDROCHLORIDE 70.3 MICROGRAM(S)/KG/MIN: 10 INJECTION INTRAVENOUS at 06:37

## 2020-01-01 RX ADMIN — Medication 50 MILLILITER(S): at 06:42

## 2020-01-01 RX ADMIN — Medication 1334 MILLIGRAM(S): at 17:05

## 2020-01-01 RX ADMIN — PANTOPRAZOLE SODIUM 40 MILLIGRAM(S): 20 TABLET, DELAYED RELEASE ORAL at 14:03

## 2020-01-01 RX ADMIN — MIDAZOLAM HYDROCHLORIDE 2.5 MG/KG/HR: 1 INJECTION, SOLUTION INTRAMUSCULAR; INTRAVENOUS at 18:56

## 2020-01-01 RX ADMIN — Medication 1: at 05:23

## 2020-01-01 RX ADMIN — MUPIROCIN 1 APPLICATION(S): 20 OINTMENT TOPICAL at 05:35

## 2020-01-01 RX ADMIN — SODIUM CHLORIDE 500 MILLILITER(S): 9 INJECTION INTRAMUSCULAR; INTRAVENOUS; SUBCUTANEOUS at 11:41

## 2020-01-01 RX ADMIN — PHENYLEPHRINE HYDROCHLORIDE 70.3 MICROGRAM(S)/KG/MIN: 10 INJECTION INTRAVENOUS at 15:04

## 2020-01-01 RX ADMIN — Medication 6 MILLIGRAM(S): at 05:11

## 2020-01-01 RX ADMIN — HEPARIN SODIUM 2200 UNIT(S)/HR: 5000 INJECTION INTRAVENOUS; SUBCUTANEOUS at 16:22

## 2020-01-01 RX ADMIN — PROPOFOL 37.5 MICROGRAM(S)/KG/MIN: 10 INJECTION, EMULSION INTRAVENOUS at 22:26

## 2020-01-01 RX ADMIN — REMDESIVIR 500 MILLIGRAM(S): 5 INJECTION INTRAVENOUS at 05:50

## 2020-01-01 RX ADMIN — Medication 10 MILLIGRAM(S): at 02:40

## 2020-01-01 RX ADMIN — VASOPRESSIN 2.4 UNIT(S)/MIN: 20 INJECTION INTRAVENOUS at 13:54

## 2020-01-01 RX ADMIN — FENTANYL CITRATE 37.5 MICROGRAM(S)/KG/HR: 50 INJECTION INTRAVENOUS at 17:07

## 2020-01-01 RX ADMIN — Medication 1: at 13:51

## 2020-01-01 RX ADMIN — CHLORHEXIDINE GLUCONATE 1 APPLICATION(S): 213 SOLUTION TOPICAL at 05:07

## 2020-01-01 RX ADMIN — PANTOPRAZOLE SODIUM 40 MILLIGRAM(S): 20 TABLET, DELAYED RELEASE ORAL at 11:59

## 2020-01-01 RX ADMIN — Medication 1: at 17:03

## 2020-01-01 RX ADMIN — CISATRACURIUM BESYLATE 22.5 MICROGRAM(S)/KG/MIN: 2 INJECTION INTRAVENOUS at 17:39

## 2020-01-01 RX ADMIN — SODIUM CHLORIDE 1000 MILLILITER(S): 9 INJECTION INTRAMUSCULAR; INTRAVENOUS; SUBCUTANEOUS at 11:14

## 2020-01-01 RX ADMIN — Medication 0.12 MILLIGRAM(S): at 12:04

## 2020-01-01 RX ADMIN — FENTANYL CITRATE 25 MICROGRAM(S)/KG/HR: 50 INJECTION INTRAVENOUS at 02:20

## 2020-01-01 RX ADMIN — Medication 1000 MILLIGRAM(S): at 09:00

## 2020-01-01 RX ADMIN — PROPOFOL 15 MICROGRAM(S)/KG/MIN: 10 INJECTION, EMULSION INTRAVENOUS at 02:39

## 2020-01-01 RX ADMIN — CHLORHEXIDINE GLUCONATE 15 MILLILITER(S): 213 SOLUTION TOPICAL at 05:46

## 2020-01-01 RX ADMIN — HEPARIN SODIUM 1400 UNIT(S)/HR: 5000 INJECTION INTRAVENOUS; SUBCUTANEOUS at 08:06

## 2020-01-01 RX ADMIN — Medication 5.86 MICROGRAM(S)/KG/MIN: at 22:13

## 2020-01-01 RX ADMIN — PROPOFOL 33.8 MICROGRAM(S)/KG/MIN: 10 INJECTION, EMULSION INTRAVENOUS at 14:19

## 2020-01-01 RX ADMIN — Medication 1300 MILLIGRAM(S): at 05:22

## 2020-01-01 RX ADMIN — CEFEPIME 100 MILLIGRAM(S): 1 INJECTION, POWDER, FOR SOLUTION INTRAMUSCULAR; INTRAVENOUS at 13:27

## 2020-01-01 RX ADMIN — PHENYLEPHRINE HYDROCHLORIDE 2.34 MICROGRAM(S)/KG/MIN: 10 INJECTION INTRAVENOUS at 14:42

## 2020-01-01 RX ADMIN — Medication 650 MILLIGRAM(S): at 21:45

## 2020-01-01 RX ADMIN — ENOXAPARIN SODIUM 40 MILLIGRAM(S): 100 INJECTION SUBCUTANEOUS at 11:00

## 2020-01-01 RX ADMIN — PROPOFOL 11.3 MICROGRAM(S)/KG/MIN: 10 INJECTION, EMULSION INTRAVENOUS at 00:26

## 2020-01-01 RX ADMIN — PROPOFOL 15 MICROGRAM(S)/KG/MIN: 10 INJECTION, EMULSION INTRAVENOUS at 23:23

## 2020-01-01 RX ADMIN — PROPOFOL 15 MICROGRAM(S)/KG/MIN: 10 INJECTION, EMULSION INTRAVENOUS at 08:50

## 2020-01-01 RX ADMIN — HEPARIN SODIUM 1200 UNIT(S)/HR: 5000 INJECTION INTRAVENOUS; SUBCUTANEOUS at 20:28

## 2020-01-01 RX ADMIN — MUPIROCIN 1 APPLICATION(S): 20 OINTMENT TOPICAL at 17:12

## 2020-01-01 RX ADMIN — CHLORHEXIDINE GLUCONATE 15 MILLILITER(S): 213 SOLUTION TOPICAL at 05:08

## 2020-01-01 RX ADMIN — POLYETHYLENE GLYCOL 3350 17 GRAM(S): 17 POWDER, FOR SOLUTION ORAL at 11:58

## 2020-01-01 RX ADMIN — PROPOFOL 37.5 MICROGRAM(S)/KG/MIN: 10 INJECTION, EMULSION INTRAVENOUS at 21:01

## 2020-01-01 RX ADMIN — Medication 1: at 11:02

## 2020-01-01 RX ADMIN — POLYETHYLENE GLYCOL 3350 17 GRAM(S): 17 POWDER, FOR SOLUTION ORAL at 13:20

## 2020-01-01 RX ADMIN — FENTANYL CITRATE 37.5 MICROGRAM(S)/KG/HR: 50 INJECTION INTRAVENOUS at 18:28

## 2020-01-01 RX ADMIN — CHLORHEXIDINE GLUCONATE 1 APPLICATION(S): 213 SOLUTION TOPICAL at 05:03

## 2020-01-01 RX ADMIN — PROPOFOL 33.8 MICROGRAM(S)/KG/MIN: 10 INJECTION, EMULSION INTRAVENOUS at 19:09

## 2020-01-01 RX ADMIN — Medication 6 MILLIGRAM(S): at 05:08

## 2020-01-01 RX ADMIN — Medication 5.86 MICROGRAM(S)/KG/MIN: at 20:48

## 2020-01-01 RX ADMIN — Medication 50 MILLIEQUIVALENT(S): at 06:34

## 2020-01-01 RX ADMIN — CHLORHEXIDINE GLUCONATE 1 APPLICATION(S): 213 SOLUTION TOPICAL at 05:33

## 2020-01-01 RX ADMIN — Medication 1334 MILLIGRAM(S): at 09:20

## 2020-01-01 RX ADMIN — CEFEPIME 100 MILLIGRAM(S): 1 INJECTION, POWDER, FOR SOLUTION INTRAMUSCULAR; INTRAVENOUS at 05:10

## 2020-01-01 RX ADMIN — Medication 1300 MILLIGRAM(S): at 14:03

## 2020-01-01 RX ADMIN — Medication 400 MILLIGRAM(S): at 00:54

## 2020-01-01 RX ADMIN — CISATRACURIUM BESYLATE 22.5 MICROGRAM(S)/KG/MIN: 2 INJECTION INTRAVENOUS at 16:30

## 2020-01-01 RX ADMIN — VASOPRESSIN 2.4 UNIT(S)/MIN: 20 INJECTION INTRAVENOUS at 20:53

## 2020-01-01 RX ADMIN — Medication 58.6 MICROGRAM(S)/KG/MIN: at 01:58

## 2020-01-01 RX ADMIN — HYDROMORPHONE HYDROCHLORIDE 3 MG/HR: 2 INJECTION INTRAMUSCULAR; INTRAVENOUS; SUBCUTANEOUS at 21:10

## 2020-01-01 RX ADMIN — Medication 10 MILLIGRAM(S): at 12:35

## 2020-01-01 RX ADMIN — Medication 58.6 MICROGRAM(S)/KG/MIN: at 17:51

## 2020-01-01 RX ADMIN — Medication 1: at 23:21

## 2020-01-01 RX ADMIN — PANTOPRAZOLE SODIUM 40 MILLIGRAM(S): 20 TABLET, DELAYED RELEASE ORAL at 11:48

## 2020-01-01 RX ADMIN — FENTANYL CITRATE 50 MICROGRAM(S): 50 INJECTION INTRAVENOUS at 09:45

## 2020-01-01 RX ADMIN — Medication 1000 MILLIGRAM(S): at 23:04

## 2020-01-01 RX ADMIN — Medication 6 MILLIGRAM(S): at 05:12

## 2020-01-01 RX ADMIN — PROPOFOL 33.8 MICROGRAM(S)/KG/MIN: 10 INJECTION, EMULSION INTRAVENOUS at 23:21

## 2020-01-01 RX ADMIN — MIDAZOLAM HYDROCHLORIDE 2.5 MG/KG/HR: 1 INJECTION, SOLUTION INTRAMUSCULAR; INTRAVENOUS at 14:46

## 2020-01-01 RX ADMIN — Medication 1334 MILLIGRAM(S): at 12:04

## 2020-01-01 RX ADMIN — HEPARIN SODIUM 1400 UNIT(S)/HR: 5000 INJECTION INTRAVENOUS; SUBCUTANEOUS at 09:21

## 2020-01-01 RX ADMIN — MIDAZOLAM HYDROCHLORIDE 2.5 MG/KG/HR: 1 INJECTION, SOLUTION INTRAMUSCULAR; INTRAVENOUS at 17:11

## 2020-01-01 RX ADMIN — CHLORHEXIDINE GLUCONATE 15 MILLILITER(S): 213 SOLUTION TOPICAL at 06:34

## 2020-01-01 RX ADMIN — HEPARIN SODIUM 0 UNIT(S)/HR: 5000 INJECTION INTRAVENOUS; SUBCUTANEOUS at 22:13

## 2020-01-01 RX ADMIN — Medication 1334 MILLIGRAM(S): at 11:49

## 2020-01-01 RX ADMIN — Medication 0.12 MILLIGRAM(S): at 11:03

## 2020-01-01 RX ADMIN — CHLORHEXIDINE GLUCONATE 15 MILLILITER(S): 213 SOLUTION TOPICAL at 05:36

## 2020-01-01 RX ADMIN — PANTOPRAZOLE SODIUM 40 MILLIGRAM(S): 20 TABLET, DELAYED RELEASE ORAL at 11:24

## 2020-01-01 RX ADMIN — Medication 1 DROP(S): at 06:34

## 2020-01-01 RX ADMIN — FENTANYL CITRATE 37.5 MICROGRAM(S)/KG/HR: 50 INJECTION INTRAVENOUS at 19:28

## 2020-01-01 RX ADMIN — PROPOFOL 33.8 MICROGRAM(S)/KG/MIN: 10 INJECTION, EMULSION INTRAVENOUS at 05:12

## 2020-01-01 RX ADMIN — CEFEPIME 100 MILLIGRAM(S): 1 INJECTION, POWDER, FOR SOLUTION INTRAMUSCULAR; INTRAVENOUS at 12:32

## 2020-01-01 RX ADMIN — HEPARIN SODIUM 2200 UNIT(S)/HR: 5000 INJECTION INTRAVENOUS; SUBCUTANEOUS at 02:44

## 2020-01-01 RX ADMIN — CHLORHEXIDINE GLUCONATE 15 MILLILITER(S): 213 SOLUTION TOPICAL at 05:15

## 2020-01-01 RX ADMIN — ALBUTEROL 2 PUFF(S): 90 AEROSOL, METERED ORAL at 09:22

## 2020-01-01 RX ADMIN — FENTANYL CITRATE 12.5 MICROGRAM(S)/KG/HR: 50 INJECTION INTRAVENOUS at 11:46

## 2020-01-01 RX ADMIN — HEPARIN SODIUM 1800 UNIT(S)/HR: 5000 INJECTION INTRAVENOUS; SUBCUTANEOUS at 14:53

## 2020-01-01 RX ADMIN — Medication 250 MILLIGRAM(S): at 17:49

## 2020-01-01 RX ADMIN — PANTOPRAZOLE SODIUM 40 MILLIGRAM(S): 20 TABLET, DELAYED RELEASE ORAL at 11:13

## 2020-01-01 RX ADMIN — Medication 1300 MILLIGRAM(S): at 21:09

## 2020-01-01 RX ADMIN — Medication 1 DROP(S): at 17:21

## 2020-01-01 RX ADMIN — CHLORHEXIDINE GLUCONATE 1 APPLICATION(S): 213 SOLUTION TOPICAL at 05:37

## 2020-01-01 RX ADMIN — CHLORHEXIDINE GLUCONATE 15 MILLILITER(S): 213 SOLUTION TOPICAL at 05:20

## 2020-01-01 RX ADMIN — CHLORHEXIDINE GLUCONATE 15 MILLILITER(S): 213 SOLUTION TOPICAL at 17:49

## 2020-01-01 RX ADMIN — Medication 1300 MILLIGRAM(S): at 13:30

## 2020-01-01 RX ADMIN — CHLORHEXIDINE GLUCONATE 15 MILLILITER(S): 213 SOLUTION TOPICAL at 17:47

## 2020-01-01 RX ADMIN — CHLORHEXIDINE GLUCONATE 1 APPLICATION(S): 213 SOLUTION TOPICAL at 05:29

## 2020-01-01 RX ADMIN — Medication 1300 MILLIGRAM(S): at 21:10

## 2020-01-01 RX ADMIN — PANTOPRAZOLE SODIUM 40 MILLIGRAM(S): 20 TABLET, DELAYED RELEASE ORAL at 12:25

## 2020-01-01 RX ADMIN — MIDAZOLAM HYDROCHLORIDE 18.8 MG/KG/HR: 1 INJECTION, SOLUTION INTRAMUSCULAR; INTRAVENOUS at 18:25

## 2020-01-01 RX ADMIN — CHLORHEXIDINE GLUCONATE 1 APPLICATION(S): 213 SOLUTION TOPICAL at 06:02

## 2020-01-01 RX ADMIN — MIDAZOLAM HYDROCHLORIDE 2.5 MG/KG/HR: 1 INJECTION, SOLUTION INTRAMUSCULAR; INTRAVENOUS at 17:34

## 2020-01-01 RX ADMIN — Medication 100 MILLIEQUIVALENT(S): at 14:23

## 2020-01-01 RX ADMIN — MUPIROCIN 1 APPLICATION(S): 20 OINTMENT TOPICAL at 05:11

## 2020-01-01 RX ADMIN — PROPOFOL 37.5 MICROGRAM(S)/KG/MIN: 10 INJECTION, EMULSION INTRAVENOUS at 14:52

## 2020-01-01 RX ADMIN — MIDAZOLAM HYDROCHLORIDE 18.8 MG/KG/HR: 1 INJECTION, SOLUTION INTRAMUSCULAR; INTRAVENOUS at 03:05

## 2020-01-01 RX ADMIN — Medication 10 MILLIGRAM(S): at 11:40

## 2020-01-01 RX ADMIN — HEPARIN SODIUM 5000 UNIT(S): 5000 INJECTION INTRAVENOUS; SUBCUTANEOUS at 21:55

## 2020-01-01 RX ADMIN — CHLORHEXIDINE GLUCONATE 1 APPLICATION(S): 213 SOLUTION TOPICAL at 05:32

## 2020-01-01 RX ADMIN — CEFEPIME 100 MILLIGRAM(S): 1 INJECTION, POWDER, FOR SOLUTION INTRAMUSCULAR; INTRAVENOUS at 05:17

## 2020-01-01 RX ADMIN — CHLORHEXIDINE GLUCONATE 1 APPLICATION(S): 213 SOLUTION TOPICAL at 05:00

## 2020-01-01 RX ADMIN — Medication 1 DROP(S): at 05:09

## 2020-01-01 RX ADMIN — Medication 1334 MILLIGRAM(S): at 09:00

## 2020-01-01 RX ADMIN — Medication 250 MILLIGRAM(S): at 17:05

## 2020-01-01 RX ADMIN — Medication 650 MILLIGRAM(S): at 10:23

## 2020-01-01 RX ADMIN — CISATRACURIUM BESYLATE 22.5 MICROGRAM(S)/KG/MIN: 2 INJECTION INTRAVENOUS at 04:37

## 2020-01-01 RX ADMIN — FENTANYL CITRATE 50 MICROGRAM(S)/KG/HR: 50 INJECTION INTRAVENOUS at 14:43

## 2020-01-01 RX ADMIN — HEPARIN SODIUM 5000 UNIT(S): 5000 INJECTION INTRAVENOUS; SUBCUTANEOUS at 21:05

## 2020-01-01 RX ADMIN — CHLORHEXIDINE GLUCONATE 15 MILLILITER(S): 213 SOLUTION TOPICAL at 05:10

## 2020-01-01 RX ADMIN — AZITHROMYCIN 255 MILLIGRAM(S): 500 TABLET, FILM COATED ORAL at 11:43

## 2020-01-01 RX ADMIN — HEPARIN SODIUM 1900 UNIT(S)/HR: 5000 INJECTION INTRAVENOUS; SUBCUTANEOUS at 04:43

## 2020-01-01 RX ADMIN — PROPOFOL 15 MICROGRAM(S)/KG/MIN: 10 INJECTION, EMULSION INTRAVENOUS at 13:42

## 2020-01-01 RX ADMIN — Medication 1: at 05:36

## 2020-01-01 RX ADMIN — POLYETHYLENE GLYCOL 3350 17 GRAM(S): 17 POWDER, FOR SOLUTION ORAL at 11:45

## 2020-01-01 RX ADMIN — Medication 9.96 MICROGRAM(S)/KG/MIN: at 22:07

## 2020-01-01 RX ADMIN — SODIUM CHLORIDE 1000 MILLILITER(S): 9 INJECTION INTRAMUSCULAR; INTRAVENOUS; SUBCUTANEOUS at 03:05

## 2020-01-01 RX ADMIN — VASOPRESSIN 2.4 UNIT(S)/MIN: 20 INJECTION INTRAVENOUS at 17:14

## 2020-01-01 RX ADMIN — HEPARIN SODIUM 1400 UNIT(S)/HR: 5000 INJECTION INTRAVENOUS; SUBCUTANEOUS at 11:26

## 2020-01-01 RX ADMIN — CHLORHEXIDINE GLUCONATE 15 MILLILITER(S): 213 SOLUTION TOPICAL at 05:14

## 2020-01-01 RX ADMIN — Medication 1: at 18:00

## 2020-01-01 RX ADMIN — FENTANYL CITRATE 37.5 MICROGRAM(S)/KG/HR: 50 INJECTION INTRAVENOUS at 08:00

## 2020-01-01 RX ADMIN — PHENYLEPHRINE HYDROCHLORIDE 75 MICROGRAM(S)/KG/MIN: 10 INJECTION INTRAVENOUS at 05:14

## 2020-01-01 RX ADMIN — CHLORHEXIDINE GLUCONATE 1 APPLICATION(S): 213 SOLUTION TOPICAL at 05:09

## 2020-01-01 RX ADMIN — FENTANYL CITRATE 37.5 MICROGRAM(S)/KG/HR: 50 INJECTION INTRAVENOUS at 14:29

## 2020-01-01 RX ADMIN — MUPIROCIN 1 APPLICATION(S): 20 OINTMENT TOPICAL at 21:08

## 2020-01-01 RX ADMIN — Medication 250 MILLIGRAM(S): at 17:52

## 2020-01-01 RX ADMIN — CHLORHEXIDINE GLUCONATE 15 MILLILITER(S): 213 SOLUTION TOPICAL at 17:28

## 2020-01-01 RX ADMIN — Medication 5.86 MICROGRAM(S)/KG/MIN: at 20:04

## 2020-01-01 RX ADMIN — SENNA PLUS 8.8 MILLILITER(S): 8.6 TABLET ORAL at 22:06

## 2020-01-01 RX ADMIN — HYDROMORPHONE HYDROCHLORIDE 3 MG/HR: 2 INJECTION INTRAMUSCULAR; INTRAVENOUS; SUBCUTANEOUS at 21:07

## 2020-01-01 RX ADMIN — PROPOFOL 11.3 MICROGRAM(S)/KG/MIN: 10 INJECTION, EMULSION INTRAVENOUS at 00:56

## 2020-01-01 RX ADMIN — PROPOFOL 15 MICROGRAM(S)/KG/MIN: 10 INJECTION, EMULSION INTRAVENOUS at 06:10

## 2020-01-01 RX ADMIN — HYDROMORPHONE HYDROCHLORIDE 1.5 MG/HR: 2 INJECTION INTRAMUSCULAR; INTRAVENOUS; SUBCUTANEOUS at 00:46

## 2020-01-01 RX ADMIN — SENNA PLUS 10 MILLILITER(S): 8.6 TABLET ORAL at 21:09

## 2020-01-01 RX ADMIN — PROPOFOL 37.5 MICROGRAM(S)/KG/MIN: 10 INJECTION, EMULSION INTRAVENOUS at 11:41

## 2020-01-01 RX ADMIN — CEFEPIME 100 MILLIGRAM(S): 1 INJECTION, POWDER, FOR SOLUTION INTRAMUSCULAR; INTRAVENOUS at 05:20

## 2020-01-01 RX ADMIN — Medication 1: at 05:53

## 2020-01-01 RX ADMIN — CISATRACURIUM BESYLATE 22.5 MICROGRAM(S)/KG/MIN: 2 INJECTION INTRAVENOUS at 02:39

## 2020-01-01 RX ADMIN — MUPIROCIN 1 APPLICATION(S): 20 OINTMENT TOPICAL at 05:47

## 2020-01-01 RX ADMIN — CHLORHEXIDINE GLUCONATE 15 MILLILITER(S): 213 SOLUTION TOPICAL at 17:18

## 2020-01-01 RX ADMIN — FENTANYL CITRATE 25 MICROGRAM(S)/KG/HR: 50 INJECTION INTRAVENOUS at 09:13

## 2020-01-01 RX ADMIN — MIDAZOLAM HYDROCHLORIDE 18.8 MG/KG/HR: 1 INJECTION, SOLUTION INTRAMUSCULAR; INTRAVENOUS at 11:58

## 2020-01-01 RX ADMIN — MUPIROCIN 1 APPLICATION(S): 20 OINTMENT TOPICAL at 17:42

## 2020-01-01 RX ADMIN — CHLORHEXIDINE GLUCONATE 1 APPLICATION(S): 213 SOLUTION TOPICAL at 06:12

## 2020-01-01 RX ADMIN — HEPARIN SODIUM 1400 UNIT(S)/HR: 5000 INJECTION INTRAVENOUS; SUBCUTANEOUS at 06:09

## 2020-01-01 RX ADMIN — CHLORHEXIDINE GLUCONATE 15 MILLILITER(S): 213 SOLUTION TOPICAL at 05:47

## 2020-01-01 RX ADMIN — CHLORHEXIDINE GLUCONATE 15 MILLILITER(S): 213 SOLUTION TOPICAL at 05:21

## 2020-01-01 RX ADMIN — CHLORHEXIDINE GLUCONATE 1 APPLICATION(S): 213 SOLUTION TOPICAL at 05:10

## 2020-01-01 RX ADMIN — SENNA PLUS 10 MILLILITER(S): 8.6 TABLET ORAL at 23:42

## 2020-01-01 RX ADMIN — HEPARIN SODIUM 2100 UNIT(S)/HR: 5000 INJECTION INTRAVENOUS; SUBCUTANEOUS at 07:18

## 2020-01-01 RX ADMIN — FENTANYL CITRATE 50 MICROGRAM(S)/KG/HR: 50 INJECTION INTRAVENOUS at 11:15

## 2020-01-01 RX ADMIN — Medication 1: at 11:17

## 2020-01-01 RX ADMIN — CEFEPIME 100 MILLIGRAM(S): 1 INJECTION, POWDER, FOR SOLUTION INTRAMUSCULAR; INTRAVENOUS at 17:13

## 2020-01-01 RX ADMIN — Medication 1300 MILLIGRAM(S): at 05:12

## 2020-01-01 RX ADMIN — CHLORHEXIDINE GLUCONATE 15 MILLILITER(S): 213 SOLUTION TOPICAL at 05:56

## 2020-01-01 RX ADMIN — CISATRACURIUM BESYLATE 22.5 MICROGRAM(S)/KG/MIN: 2 INJECTION INTRAVENOUS at 02:30

## 2020-01-01 RX ADMIN — CHLORHEXIDINE GLUCONATE 1 APPLICATION(S): 213 SOLUTION TOPICAL at 11:04

## 2020-01-01 RX ADMIN — PROPOFOL 33.8 MICROGRAM(S)/KG/MIN: 10 INJECTION, EMULSION INTRAVENOUS at 17:41

## 2020-01-01 RX ADMIN — CHLORHEXIDINE GLUCONATE 15 MILLILITER(S): 213 SOLUTION TOPICAL at 17:56

## 2020-01-01 RX ADMIN — PROPOFOL 11.3 MICROGRAM(S)/KG/MIN: 10 INJECTION, EMULSION INTRAVENOUS at 06:46

## 2020-01-01 RX ADMIN — Medication 9.96 MICROGRAM(S)/KG/MIN: at 07:45

## 2020-01-01 RX ADMIN — Medication 50 MILLIGRAM(S): at 13:00

## 2020-01-01 RX ADMIN — PROPOFOL 37.5 MICROGRAM(S)/KG/MIN: 10 INJECTION, EMULSION INTRAVENOUS at 17:38

## 2020-01-01 RX ADMIN — FENTANYL CITRATE 37.5 MICROGRAM(S)/KG/HR: 50 INJECTION INTRAVENOUS at 18:56

## 2020-01-01 RX ADMIN — FENTANYL CITRATE 37.5 MICROGRAM(S)/KG/HR: 50 INJECTION INTRAVENOUS at 18:26

## 2020-01-01 RX ADMIN — Medication 50 MILLILITER(S): at 10:30

## 2020-01-01 RX ADMIN — POLYETHYLENE GLYCOL 3350 17 GRAM(S): 17 POWDER, FOR SOLUTION ORAL at 11:04

## 2020-01-01 RX ADMIN — Medication 6 MILLIGRAM(S): at 06:03

## 2020-01-01 RX ADMIN — HYDROMORPHONE HYDROCHLORIDE 3 MG/HR: 2 INJECTION INTRAMUSCULAR; INTRAVENOUS; SUBCUTANEOUS at 08:08

## 2020-01-01 RX ADMIN — PROPOFOL 15 MICROGRAM(S)/KG/MIN: 10 INJECTION, EMULSION INTRAVENOUS at 02:55

## 2020-01-01 RX ADMIN — MUPIROCIN 1 APPLICATION(S): 20 OINTMENT TOPICAL at 05:16

## 2020-01-01 RX ADMIN — PANTOPRAZOLE SODIUM 40 MILLIGRAM(S): 20 TABLET, DELAYED RELEASE ORAL at 11:29

## 2020-01-01 RX ADMIN — Medication 1000 MILLIGRAM(S): at 05:50

## 2020-01-01 RX ADMIN — CHLORHEXIDINE GLUCONATE 15 MILLILITER(S): 213 SOLUTION TOPICAL at 05:16

## 2020-01-01 RX ADMIN — CEFEPIME 100 MILLIGRAM(S): 1 INJECTION, POWDER, FOR SOLUTION INTRAMUSCULAR; INTRAVENOUS at 14:05

## 2020-01-01 RX ADMIN — Medication 1: at 11:16

## 2020-01-01 RX ADMIN — PROPOFOL 37.5 MICROGRAM(S)/KG/MIN: 10 INJECTION, EMULSION INTRAVENOUS at 20:06

## 2020-01-01 RX ADMIN — Medication 650 MILLIGRAM(S): at 01:00

## 2020-01-01 RX ADMIN — Medication 1334 MILLIGRAM(S): at 11:57

## 2020-01-01 RX ADMIN — Medication 1334 MILLIGRAM(S): at 17:12

## 2020-01-01 RX ADMIN — Medication 5.86 MICROGRAM(S)/KG/MIN: at 18:36

## 2020-01-01 RX ADMIN — FENTANYL CITRATE 37.5 MICROGRAM(S)/KG/HR: 50 INJECTION INTRAVENOUS at 02:37

## 2020-01-01 RX ADMIN — ENOXAPARIN SODIUM 80 MILLIGRAM(S): 100 INJECTION SUBCUTANEOUS at 11:41

## 2020-01-01 RX ADMIN — Medication 400 MILLIGRAM(S): at 08:13

## 2020-01-01 RX ADMIN — HEPARIN SODIUM 5000 UNIT(S): 5000 INJECTION INTRAVENOUS; SUBCUTANEOUS at 04:53

## 2020-01-01 RX ADMIN — HYDROMORPHONE HYDROCHLORIDE 3 MG/HR: 2 INJECTION INTRAMUSCULAR; INTRAVENOUS; SUBCUTANEOUS at 03:39

## 2020-01-01 RX ADMIN — SENNA PLUS 8.8 MILLILITER(S): 8.6 TABLET ORAL at 21:12

## 2020-01-01 RX ADMIN — PROPOFOL 11.3 MICROGRAM(S)/KG/MIN: 10 INJECTION, EMULSION INTRAVENOUS at 08:00

## 2020-01-01 RX ADMIN — Medication 650 MILLIGRAM(S): at 08:00

## 2020-01-01 RX ADMIN — SODIUM CHLORIDE 100 MILLILITER(S): 9 INJECTION, SOLUTION INTRAVENOUS at 15:21

## 2020-01-01 RX ADMIN — HEPARIN SODIUM 1400 UNIT(S)/HR: 5000 INJECTION INTRAVENOUS; SUBCUTANEOUS at 13:03

## 2020-01-01 RX ADMIN — PROPOFOL 37.5 MICROGRAM(S)/KG/MIN: 10 INJECTION, EMULSION INTRAVENOUS at 05:00

## 2020-01-01 RX ADMIN — HEPARIN SODIUM 2200 UNIT(S)/HR: 5000 INJECTION INTRAVENOUS; SUBCUTANEOUS at 00:22

## 2020-01-01 RX ADMIN — CHLORHEXIDINE GLUCONATE 15 MILLILITER(S): 213 SOLUTION TOPICAL at 06:04

## 2020-01-01 RX ADMIN — CHLORHEXIDINE GLUCONATE 15 MILLILITER(S): 213 SOLUTION TOPICAL at 17:43

## 2020-01-01 RX ADMIN — Medication 50 MILLIGRAM(S): at 13:54

## 2020-01-01 RX ADMIN — Medication 1: at 17:45

## 2020-01-01 RX ADMIN — Medication 650 MILLIGRAM(S): at 17:19

## 2020-01-01 RX ADMIN — HEPARIN SODIUM 1600 UNIT(S)/HR: 5000 INJECTION INTRAVENOUS; SUBCUTANEOUS at 00:08

## 2020-01-01 RX ADMIN — HEPARIN SODIUM 1100 UNIT(S)/HR: 5000 INJECTION INTRAVENOUS; SUBCUTANEOUS at 03:21

## 2020-01-01 RX ADMIN — PROPOFOL 37.5 MICROGRAM(S)/KG/MIN: 10 INJECTION, EMULSION INTRAVENOUS at 06:04

## 2020-01-01 RX ADMIN — Medication 650 MILLIGRAM(S): at 20:20

## 2020-01-01 RX ADMIN — Medication 58.6 MICROGRAM(S)/KG/MIN: at 10:19

## 2020-01-01 RX ADMIN — HEPARIN SODIUM 5000 UNIT(S): 5000 INJECTION INTRAVENOUS; SUBCUTANEOUS at 13:18

## 2020-01-01 RX ADMIN — Medication 650 MILLIGRAM(S): at 14:24

## 2020-01-01 RX ADMIN — HEPARIN SODIUM 1900 UNIT(S)/HR: 5000 INJECTION INTRAVENOUS; SUBCUTANEOUS at 10:52

## 2020-01-01 RX ADMIN — Medication 58.6 MICROGRAM(S)/KG/MIN: at 21:07

## 2020-01-01 RX ADMIN — Medication 650 MILLIGRAM(S): at 00:19

## 2020-01-01 RX ADMIN — FENTANYL CITRATE 25 MICROGRAM(S)/KG/HR: 50 INJECTION INTRAVENOUS at 09:37

## 2020-01-01 RX ADMIN — CHLORHEXIDINE GLUCONATE 15 MILLILITER(S): 213 SOLUTION TOPICAL at 05:29

## 2020-01-01 RX ADMIN — Medication 1300 MILLIGRAM(S): at 13:21

## 2020-01-01 RX ADMIN — Medication 1: at 13:11

## 2020-01-01 RX ADMIN — Medication 1000 UNIT(S): at 12:12

## 2020-01-01 RX ADMIN — POLYETHYLENE GLYCOL 3350 17 GRAM(S): 17 POWDER, FOR SOLUTION ORAL at 12:04

## 2020-01-01 RX ADMIN — ENOXAPARIN SODIUM 120 MILLIGRAM(S): 100 INJECTION SUBCUTANEOUS at 23:06

## 2020-01-01 RX ADMIN — FENTANYL CITRATE 12.5 MICROGRAM(S)/KG/HR: 50 INJECTION INTRAVENOUS at 10:25

## 2020-01-01 RX ADMIN — MIDODRINE HYDROCHLORIDE 5 MILLIGRAM(S): 2.5 TABLET ORAL at 05:16

## 2020-01-01 RX ADMIN — HEPARIN SODIUM 2200 UNIT(S)/HR: 5000 INJECTION INTRAVENOUS; SUBCUTANEOUS at 06:42

## 2020-01-01 RX ADMIN — CEFEPIME 100 MILLIGRAM(S): 1 INJECTION, POWDER, FOR SOLUTION INTRAMUSCULAR; INTRAVENOUS at 20:01

## 2020-01-01 RX ADMIN — CHLORHEXIDINE GLUCONATE 15 MILLILITER(S): 213 SOLUTION TOPICAL at 18:05

## 2020-01-01 RX ADMIN — FENTANYL CITRATE 37.5 MICROGRAM(S)/KG/HR: 50 INJECTION INTRAVENOUS at 00:38

## 2020-01-01 RX ADMIN — CHLORHEXIDINE GLUCONATE 1 APPLICATION(S): 213 SOLUTION TOPICAL at 05:47

## 2020-01-01 RX ADMIN — CHLORHEXIDINE GLUCONATE 1 APPLICATION(S): 213 SOLUTION TOPICAL at 05:46

## 2020-01-01 RX ADMIN — CHLORHEXIDINE GLUCONATE 15 MILLILITER(S): 213 SOLUTION TOPICAL at 17:03

## 2020-01-01 RX ADMIN — Medication 81 MILLIGRAM(S): at 11:40

## 2020-01-01 RX ADMIN — PANTOPRAZOLE SODIUM 40 MILLIGRAM(S): 20 TABLET, DELAYED RELEASE ORAL at 12:08

## 2020-01-01 RX ADMIN — SENNA PLUS 2 TABLET(S): 8.6 TABLET ORAL at 21:31

## 2020-01-01 RX ADMIN — CHLORHEXIDINE GLUCONATE 1 APPLICATION(S): 213 SOLUTION TOPICAL at 05:05

## 2020-01-01 RX ADMIN — VASOPRESSIN 2.4 UNIT(S)/MIN: 20 INJECTION INTRAVENOUS at 21:30

## 2020-01-01 RX ADMIN — CISATRACURIUM BESYLATE 22.5 MICROGRAM(S)/KG/MIN: 2 INJECTION INTRAVENOUS at 05:09

## 2020-01-01 RX ADMIN — POLYETHYLENE GLYCOL 3350 17 GRAM(S): 17 POWDER, FOR SOLUTION ORAL at 13:02

## 2020-01-01 RX ADMIN — PROPOFOL 37.5 MICROGRAM(S)/KG/MIN: 10 INJECTION, EMULSION INTRAVENOUS at 02:13

## 2020-01-01 RX ADMIN — Medication 1334 MILLIGRAM(S): at 17:56

## 2020-01-01 RX ADMIN — FENTANYL CITRATE 37.5 MICROGRAM(S)/KG/HR: 50 INJECTION INTRAVENOUS at 23:34

## 2020-01-01 RX ADMIN — HEPARIN SODIUM 5000 UNIT(S): 5000 INJECTION INTRAVENOUS; SUBCUTANEOUS at 14:01

## 2020-01-01 RX ADMIN — HEPARIN SODIUM 5000 UNIT(S): 5000 INJECTION INTRAVENOUS; SUBCUTANEOUS at 22:34

## 2020-01-01 RX ADMIN — Medication 10 MILLIGRAM(S): at 13:35

## 2020-01-01 RX ADMIN — PANTOPRAZOLE SODIUM 40 MILLIGRAM(S): 20 TABLET, DELAYED RELEASE ORAL at 06:35

## 2020-01-01 RX ADMIN — FENTANYL CITRATE 12.5 MICROGRAM(S)/KG/HR: 50 INJECTION INTRAVENOUS at 22:06

## 2020-01-01 RX ADMIN — PROPOFOL 33.8 MICROGRAM(S)/KG/MIN: 10 INJECTION, EMULSION INTRAVENOUS at 05:19

## 2020-01-01 RX ADMIN — SENNA PLUS 10 MILLILITER(S): 8.6 TABLET ORAL at 22:06

## 2020-01-01 RX ADMIN — SENNA PLUS 8.8 MILLILITER(S): 8.6 TABLET ORAL at 22:35

## 2020-01-01 RX ADMIN — VASOPRESSIN 2.4 UNIT(S)/MIN: 20 INJECTION INTRAVENOUS at 23:12

## 2020-01-01 RX ADMIN — FENTANYL CITRATE 37.5 MICROGRAM(S)/KG/HR: 50 INJECTION INTRAVENOUS at 02:30

## 2020-01-01 RX ADMIN — SODIUM CHLORIDE 100 MILLILITER(S): 9 INJECTION, SOLUTION INTRAVENOUS at 11:41

## 2020-01-01 RX ADMIN — FENTANYL CITRATE 25 MICROGRAM(S)/KG/HR: 50 INJECTION INTRAVENOUS at 23:07

## 2020-01-01 RX ADMIN — CEFEPIME 100 MILLIGRAM(S): 1 INJECTION, POWDER, FOR SOLUTION INTRAMUSCULAR; INTRAVENOUS at 13:30

## 2020-01-01 RX ADMIN — Medication 58.6 MICROGRAM(S)/KG/MIN: at 19:28

## 2020-01-01 RX ADMIN — MIDAZOLAM HYDROCHLORIDE 18.8 MG/KG/HR: 1 INJECTION, SOLUTION INTRAMUSCULAR; INTRAVENOUS at 09:07

## 2020-01-01 RX ADMIN — Medication 250 MILLIGRAM(S): at 17:56

## 2020-01-01 RX ADMIN — PROPOFOL 33.8 MICROGRAM(S)/KG/MIN: 10 INJECTION, EMULSION INTRAVENOUS at 08:40

## 2020-01-01 RX ADMIN — PROPOFOL 37.5 MICROGRAM(S)/KG/MIN: 10 INJECTION, EMULSION INTRAVENOUS at 16:18

## 2020-01-01 RX ADMIN — Medication 200 GRAM(S): at 08:49

## 2020-01-01 RX ADMIN — HEPARIN SODIUM 1700 UNIT(S)/HR: 5000 INJECTION INTRAVENOUS; SUBCUTANEOUS at 07:37

## 2020-01-01 RX ADMIN — CHLORHEXIDINE GLUCONATE 1 APPLICATION(S): 213 SOLUTION TOPICAL at 05:22

## 2020-01-01 RX ADMIN — PANTOPRAZOLE SODIUM 40 MILLIGRAM(S): 20 TABLET, DELAYED RELEASE ORAL at 06:07

## 2020-01-01 RX ADMIN — PROPOFOL 15 MICROGRAM(S)/KG/MIN: 10 INJECTION, EMULSION INTRAVENOUS at 17:59

## 2020-01-01 RX ADMIN — PROPOFOL 11.3 MICROGRAM(S)/KG/MIN: 10 INJECTION, EMULSION INTRAVENOUS at 18:02

## 2020-01-01 RX ADMIN — Medication 1334 MILLIGRAM(S): at 12:32

## 2020-01-01 RX ADMIN — FENTANYL CITRATE 37.5 MICROGRAM(S)/KG/HR: 50 INJECTION INTRAVENOUS at 19:04

## 2020-01-01 RX ADMIN — CHLORHEXIDINE GLUCONATE 15 MILLILITER(S): 213 SOLUTION TOPICAL at 11:14

## 2020-01-01 RX ADMIN — Medication 400 MILLIGRAM(S): at 09:02

## 2020-01-01 RX ADMIN — FENTANYL CITRATE 50 MICROGRAM(S)/KG/HR: 50 INJECTION INTRAVENOUS at 18:19

## 2020-01-01 RX ADMIN — Medication 50 MILLILITER(S): at 18:09

## 2020-01-01 RX ADMIN — Medication 58.6 MICROGRAM(S)/KG/MIN: at 13:26

## 2020-01-01 RX ADMIN — PROPOFOL 11.3 MICROGRAM(S)/KG/MIN: 10 INJECTION, EMULSION INTRAVENOUS at 11:30

## 2020-01-01 RX ADMIN — AZITHROMYCIN 255 MILLIGRAM(S): 500 TABLET, FILM COATED ORAL at 13:11

## 2020-01-01 RX ADMIN — CEFEPIME 100 MILLIGRAM(S): 1 INJECTION, POWDER, FOR SOLUTION INTRAMUSCULAR; INTRAVENOUS at 17:05

## 2020-01-01 RX ADMIN — AZITHROMYCIN 255 MILLIGRAM(S): 500 TABLET, FILM COATED ORAL at 10:55

## 2020-01-01 RX ADMIN — FENTANYL CITRATE 37.5 MICROGRAM(S)/KG/HR: 50 INJECTION INTRAVENOUS at 02:31

## 2020-01-01 RX ADMIN — Medication 37.5 MICROGRAM(S)/KG/MIN: at 09:47

## 2020-01-01 RX ADMIN — Medication 124 MILLIGRAM(S): at 14:58

## 2020-01-01 RX ADMIN — HYDROMORPHONE HYDROCHLORIDE 3 MG/HR: 2 INJECTION INTRAMUSCULAR; INTRAVENOUS; SUBCUTANEOUS at 13:25

## 2020-01-01 RX ADMIN — PROPOFOL 37.5 MICROGRAM(S)/KG/MIN: 10 INJECTION, EMULSION INTRAVENOUS at 08:05

## 2020-01-01 RX ADMIN — PROPOFOL 33.8 MICROGRAM(S)/KG/MIN: 10 INJECTION, EMULSION INTRAVENOUS at 14:58

## 2020-01-01 RX ADMIN — FENTANYL CITRATE 37.5 MICROGRAM(S)/KG/HR: 50 INJECTION INTRAVENOUS at 07:16

## 2020-01-01 RX ADMIN — MIDAZOLAM HYDROCHLORIDE 2.5 MG/KG/HR: 1 INJECTION, SOLUTION INTRAMUSCULAR; INTRAVENOUS at 09:30

## 2020-01-01 RX ADMIN — HEPARIN SODIUM 0 UNIT(S)/HR: 5000 INJECTION INTRAVENOUS; SUBCUTANEOUS at 13:27

## 2020-01-01 RX ADMIN — Medication 1300 MILLIGRAM(S): at 05:15

## 2020-01-01 RX ADMIN — PHENYLEPHRINE HYDROCHLORIDE 2.34 MICROGRAM(S)/KG/MIN: 10 INJECTION INTRAVENOUS at 00:27

## 2020-01-01 RX ADMIN — PHENYLEPHRINE HYDROCHLORIDE 2.34 MICROGRAM(S)/KG/MIN: 10 INJECTION INTRAVENOUS at 15:32

## 2020-01-01 RX ADMIN — CHLORHEXIDINE GLUCONATE 15 MILLILITER(S): 213 SOLUTION TOPICAL at 17:46

## 2020-01-01 RX ADMIN — CHLORHEXIDINE GLUCONATE 15 MILLILITER(S): 213 SOLUTION TOPICAL at 05:04

## 2020-01-01 RX ADMIN — PANTOPRAZOLE SODIUM 40 MILLIGRAM(S): 20 TABLET, DELAYED RELEASE ORAL at 11:18

## 2020-01-01 RX ADMIN — Medication 1300 MILLIGRAM(S): at 01:45

## 2020-01-01 RX ADMIN — HEPARIN SODIUM 1700 UNIT(S)/HR: 5000 INJECTION INTRAVENOUS; SUBCUTANEOUS at 07:51

## 2020-01-01 RX ADMIN — PROPOFOL 37.5 MICROGRAM(S)/KG/MIN: 10 INJECTION, EMULSION INTRAVENOUS at 10:55

## 2020-01-01 RX ADMIN — ALBUTEROL 2 PUFF(S): 90 AEROSOL, METERED ORAL at 16:05

## 2020-01-01 RX ADMIN — POLYETHYLENE GLYCOL 3350 17 GRAM(S): 17 POWDER, FOR SOLUTION ORAL at 12:07

## 2020-01-01 RX ADMIN — Medication 6 MILLIGRAM(S): at 04:54

## 2020-01-01 RX ADMIN — HEPARIN SODIUM 1700 UNIT(S)/HR: 5000 INJECTION INTRAVENOUS; SUBCUTANEOUS at 16:34

## 2020-01-01 RX ADMIN — Medication 1: at 18:28

## 2020-01-01 RX ADMIN — CEFEPIME 100 MILLIGRAM(S): 1 INJECTION, POWDER, FOR SOLUTION INTRAMUSCULAR; INTRAVENOUS at 17:00

## 2020-01-01 RX ADMIN — Medication 650 MILLIGRAM(S): at 12:10

## 2020-01-01 RX ADMIN — ENOXAPARIN SODIUM 40 MILLIGRAM(S): 100 INJECTION SUBCUTANEOUS at 11:03

## 2020-01-01 RX ADMIN — Medication 1000 UNIT(S): at 21:29

## 2020-01-01 RX ADMIN — SENNA PLUS 10 MILLILITER(S): 8.6 TABLET ORAL at 22:01

## 2020-01-01 RX ADMIN — ENOXAPARIN SODIUM 120 MILLIGRAM(S): 100 INJECTION SUBCUTANEOUS at 10:57

## 2020-01-01 RX ADMIN — Medication 6 MILLIGRAM(S): at 05:05

## 2020-01-01 RX ADMIN — MIDAZOLAM HYDROCHLORIDE 2.5 MG/KG/HR: 1 INJECTION, SOLUTION INTRAMUSCULAR; INTRAVENOUS at 01:10

## 2020-01-01 RX ADMIN — Medication 58.6 MICROGRAM(S)/KG/MIN: at 11:17

## 2020-01-01 RX ADMIN — PHENYLEPHRINE HYDROCHLORIDE 93.8 MICROGRAM(S)/KG/MIN: 10 INJECTION INTRAVENOUS at 14:47

## 2020-01-01 RX ADMIN — PANTOPRAZOLE SODIUM 40 MILLIGRAM(S): 20 TABLET, DELAYED RELEASE ORAL at 12:36

## 2020-01-01 RX ADMIN — PROPOFOL 37.5 MICROGRAM(S)/KG/MIN: 10 INJECTION, EMULSION INTRAVENOUS at 09:16

## 2020-01-01 RX ADMIN — Medication 1334 MILLIGRAM(S): at 13:19

## 2020-01-01 RX ADMIN — Medication 6 MILLIGRAM(S): at 11:45

## 2020-01-01 RX ADMIN — FENTANYL CITRATE 37.5 MICROGRAM(S)/KG/HR: 50 INJECTION INTRAVENOUS at 01:48

## 2020-01-01 RX ADMIN — Medication 1334 MILLIGRAM(S): at 17:15

## 2020-01-01 RX ADMIN — Medication 1334 MILLIGRAM(S): at 12:35

## 2020-01-01 RX ADMIN — HEPARIN SODIUM 1900 UNIT(S)/HR: 5000 INJECTION INTRAVENOUS; SUBCUTANEOUS at 22:45

## 2020-01-01 RX ADMIN — PROPOFOL 37.5 MICROGRAM(S)/KG/MIN: 10 INJECTION, EMULSION INTRAVENOUS at 18:19

## 2020-01-01 RX ADMIN — PANTOPRAZOLE SODIUM 40 MILLIGRAM(S): 20 TABLET, DELAYED RELEASE ORAL at 17:00

## 2020-01-01 RX ADMIN — PROPOFOL 33.8 MICROGRAM(S)/KG/MIN: 10 INJECTION, EMULSION INTRAVENOUS at 11:50

## 2020-01-01 RX ADMIN — Medication 1: at 23:24

## 2020-01-01 RX ADMIN — PROPOFOL 11.3 MICROGRAM(S)/KG/MIN: 10 INJECTION, EMULSION INTRAVENOUS at 12:13

## 2020-01-01 RX ADMIN — Medication 1: at 17:30

## 2020-01-01 RX ADMIN — PHENYLEPHRINE HYDROCHLORIDE 2.34 MICROGRAM(S)/KG/MIN: 10 INJECTION INTRAVENOUS at 07:22

## 2020-01-01 RX ADMIN — PROPOFOL 37.5 MICROGRAM(S)/KG/MIN: 10 INJECTION, EMULSION INTRAVENOUS at 22:16

## 2020-01-01 RX ADMIN — PROPOFOL 11.3 MICROGRAM(S)/KG/MIN: 10 INJECTION, EMULSION INTRAVENOUS at 00:54

## 2020-01-01 RX ADMIN — Medication 100 MILLIEQUIVALENT(S): at 04:57

## 2020-01-01 RX ADMIN — CISATRACURIUM BESYLATE 22.5 MICROGRAM(S)/KG/MIN: 2 INJECTION INTRAVENOUS at 14:06

## 2020-01-01 RX ADMIN — Medication 75 MEQ/KG/HR: at 05:38

## 2020-01-01 RX ADMIN — Medication 1: at 23:27

## 2020-01-01 RX ADMIN — FENTANYL CITRATE 50 MICROGRAM(S)/KG/HR: 50 INJECTION INTRAVENOUS at 02:13

## 2020-01-01 RX ADMIN — Medication 1300 MILLIGRAM(S): at 22:01

## 2020-01-01 RX ADMIN — MIDAZOLAM HYDROCHLORIDE 2.5 MG/KG/HR: 1 INJECTION, SOLUTION INTRAMUSCULAR; INTRAVENOUS at 19:44

## 2020-11-15 NOTE — ED PROVIDER NOTE - CLINICAL SUMMARY MEDICAL DECISION MAKING FREE TEXT BOX
50 y/o M patient, w/ known COVID-19, presents to the ED with respiratory distress and hypoxia. No need for intubation. Patient is currently comfortable w/ O2 Sat of 90% on non-rebreather. Spoke w/ ICU doctor, Dr. Tafoya. Dr. Tafoya will attempt to prone and likely start high flow Oxygen in ED or ICU.

## 2020-11-15 NOTE — ED PROVIDER NOTE - OBJECTIVE STATEMENT
48 y/o M patient, w/ no PMHx, presents to the ED w/ body aches that began x5 days ago, dry cough that began x3 days ago, and shortness of breath that began yesterday(11/14/2020). Patient denies chest pain, back pain, fever, or any other acute complaints. Patient denies recent travels or sick contacts. As per EMS, on site the patient's O2 Sat was 50%, but came up to 90% on non-rebreather. Patient states he tested positive for COVID-19 x5 days ago. NKDA.

## 2020-11-15 NOTE — H&P ADULT - HISTORY OF PRESENT ILLNESS
Patient is a 49y old  Male who presents with a chief complaint of     Initial HPI on admission:  HPI:      BRIEF HOSPITAL COURSE: ***    PAST MEDICAL & SURGICAL HISTORY:    Allergies    No Known Allergies    Intolerances      FAMILY HISTORY:          Medications:  chlorhexidine 2% Cloths 1 Application(s) Topical <User Schedule>  dexAMETHasone  Injectable 6 milliGRAM(s) IV Push daily      vent settings      Vital Signs Last 24 Hrs  T(C): 37.2 (15 Nov 2020 07:56), Max: 37.2 (15 Nov 2020 07:56)  T(F): 98.9 (15 Nov 2020 07:56), Max: 98.9 (15 Nov 2020 07:56)  HR: 94 (15 Nov 2020 07:27) (94 - 94)  BP: 145/95 (15 Nov 2020 07:27) (145/95 - 145/95)  BP(mean): --  RR: 20 (15 Nov 2020 07:27) (20 - 20)  SpO2: 87% (15 Nov 2020 07:27) (87% - 87%)              LABS:                        16.6   6.50  )-----------( 106      ( 15 Nov 2020 08:37 )             49.0                 CAPILLARY BLOOD GLUCOSE      POCT Blood Glucose.: 136 mg/dL (15 Nov 2020 07:46)    PT/INR - ( 15 Nov 2020 08:38 )   PT: 11.0 sec;   INR: 0.92 ratio         PTT - ( 15 Nov 2020 08:38 )  PTT:27.2 sec    CULTURES:        Physical Examination:  PHYSICAL EXAM:  GENERAL: NAD, speaks in full sentences, no signs of respiratory distress  HEAD:  Atraumatic, Normocephalic  EYES: EOMI, PERRLA, conjunctiva and sclera clear  NECK: Supple, No JVD  CHEST/LUNG: Clear to auscultation bilaterally; No wheeze; No crackles; No accessory muscles used  HEART: Regular rate and rhythm; No murmurs;   ABDOMEN: Soft, Nontender, Nondistended; Bowel sounds present; No guarding  EXTREMITIES:  2+ Peripheral Pulses, No cyanosis or edema  PSYCH: AAOx3  NEUROLOGY: non-focal  SKIN: No rashes or lesions      RADIOLOGY REVIEWED ***           Patient is a 49y old  Male who presents with a chief complaint of     Initial HPI on admission:  HPI:  Pt is a 49 yr old male from home who presented with shortness of breath.     BRIEF HOSPITAL COURSE: Pt was hypoxic in ED, was placed on NRB with saturation in 80's. Pt admitted to ICU for acute hypoxic resp failure requiring high flow nasal canula    PAST MEDICAL & SURGICAL HISTORY:    Allergies    No Known Allergies    Intolerances      FAMILY HISTORY:          Medications:  chlorhexidine 2% Cloths 1 Application(s) Topical <User Schedule>  dexAMETHasone  Injectable 6 milliGRAM(s) IV Push norbert    Vital Signs Last 24 Hrs  T(C): 37.2 (15 Nov 2020 07:56), Max: 37.2 (15 Nov 2020 07:56)  T(F): 98.9 (15 Nov 2020 07:56), Max: 98.9 (15 Nov 2020 07:56)  HR: 94 (15 Nov 2020 07:27) (94 - 94)  BP: 145/95 (15 Nov 2020 07:27) (145/95 - 145/95)  BP(mean): --  RR: 20 (15 Nov 2020 07:27) (20 - 20)  SpO2: 87% (15 Nov 2020 07:27) (87% - 87%)      LABS:                        16.6   6.50  )-----------( 106      ( 15 Nov 2020 08:37 )             49.0                 CAPILLARY BLOOD GLUCOSE      POCT Blood Glucose.: 136 mg/dL (15 Nov 2020 07:46)    PT/INR - ( 15 Nov 2020 08:38 )   PT: 11.0 sec;   INR: 0.92 ratio         PTT - ( 15 Nov 2020 08:38 )  PTT:27.2 sec    CULTURES:        Physical Examination:  PHYSICAL EXAM:  GENERAL: Obese male sitting on bed, + resp distress noted  HEAD:  Atraumatic, Normocephalic  EYES: EOMI, PERRLA  NECK: Supple, No JVD  CHEST/LUNG:   HEART: Regular rate and rhythm; No murmurs;   ABDOMEN: soft, full, no tenderness noted  EXTREMITIES:  2+ Peripheral Pulses, No cyanosis or edema  PSYCH: AAOx3  NEUROLOGY: non-focal  SKIN: No rashes or lesions      RADIOLOGY REVIEWED ***    CXR with b/l pneumonia    EKG with sinus tachycardia, LVH, no acute ischemic changes noted           Patient is a 49y old  Male who presents with a chief complaint of     Initial HPI on admission:  HPI:  Pt is a 49 yr old male with no PMH from home lives with wife who presented with shortness of breath. Pt states that we has been sick for a week with shortness of breath. He was diagnosed with covid 3 days ago and has been taking antibiotics and aspirin. Today his breathing was worse and he presented to ED. Pt denies any headache, abd pain pain, chest pain, nausea, vomiting or diarrhea. Pt was noted to be hypoxic in ED and was started on non-rebreather. Pt denies smoking and occasional alcohol intake. Per EMS, Pt was hypoxic to 50's and saturation improved on non-rebreather.    BRIEF HOSPITAL COURSE: Pt was hypoxic in ED, was placed on NRB with saturation in 80's. Pt admitted to ICU for acute hypoxic resp failure requiring high flow nasal canula    PAST MEDICAL & SURGICAL HISTORY:    Allergies    No Known Allergies  Intolerances      FAMILY HISTORY:    Medications:  chlorhexidine 2% Cloths 1 Application(s) Topical <User Schedule>  dexAMETHasone  Injectable 6 milliGRAM(s) IV Push norbert    Vital Signs Last 24 Hrs  T(C): 37.2 (15 Nov 2020 07:56), Max: 37.2 (15 Nov 2020 07:56)  T(F): 98.9 (15 Nov 2020 07:56), Max: 98.9 (15 Nov 2020 07:56)  HR: 94 (15 Nov 2020 07:27) (94 - 94)  BP: 145/95 (15 Nov 2020 07:27) (145/95 - 145/95)  BP(mean): --  RR: 20 (15 Nov 2020 07:27) (20 - 20)  SpO2: 87% (15 Nov 2020 07:27) (87% - 87%)      LABS:                        16.6   6.50  )-----------( 106      ( 15 Nov 2020 08:37 )             49.0                 CAPILLARY BLOOD GLUCOSE      POCT Blood Glucose.: 136 mg/dL (15 Nov 2020 07:46)    PT/INR - ( 15 Nov 2020 08:38 )   PT: 11.0 sec;   INR: 0.92 ratio         PTT - ( 15 Nov 2020 08:38 )  PTT:27.2 sec    CULTURES:        Physical Examination:  PHYSICAL EXAM:  GENERAL: Obese male sitting on bed, + resp distress noted  HEAD:  Atraumatic, Normocephalic  EYES: EOMI, PERRLA  NECK: Supple, No JVD  CHEST/LUNG: B/l decreased breath sounds  HEART: Regular rate and rhythm; No murmurs;   ABDOMEN: soft, full, no tenderness noted  EXTREMITIES:  2+ Peripheral Pulses, No cyanosis or edema  PSYCH: AAOx3  NEUROLOGY: non-focal  SKIN: No rashes or lesions      RADIOLOGY REVIEWED ***    CXR with b/l pneumonia    EKG with sinus tachycardia, LVH, no acute ischemic changes noted           Patient is a 60y old  Male who presents with a chief complaint of     Initial HPI on admission:  HPI:  Pt is a 60 yr old male with no PMH from home lives with wife who presented with shortness of breath. Pt states that we has been sick for a week with shortness of breath. He was diagnosed with covid 3 days ago and has been taking antibiotics and aspirin. Today his breathing was worse and he presented to ED. Pt denies any headache, abd pain pain, chest pain, nausea, vomiting or diarrhea. Pt was noted to be hypoxic in ED and was started on non-rebreather. Pt denies smoking and occasional alcohol intake. Per EMS, Pt was hypoxic to 50's and saturation improved on non-rebreather.    BRIEF HOSPITAL COURSE: Pt was hypoxic in ED, was placed on NRB with saturation in 80's. Pt admitted to ICU for acute hypoxic resp failure requiring high flow nasal canula    PAST MEDICAL & SURGICAL HISTORY:    Allergies    No Known Allergies  Intolerances      FAMILY HISTORY:    Medications:  chlorhexidine 2% Cloths 1 Application(s) Topical <User Schedule>  dexAMETHasone  Injectable 6 milliGRAM(s) IV Push norbert    Vital Signs Last 24 Hrs  T(C): 37.2 (15 Nov 2020 07:56), Max: 37.2 (15 Nov 2020 07:56)  T(F): 98.9 (15 Nov 2020 07:56), Max: 98.9 (15 Nov 2020 07:56)  HR: 94 (15 Nov 2020 07:27) (94 - 94)  BP: 145/95 (15 Nov 2020 07:27) (145/95 - 145/95)  BP(mean): --  RR: 20 (15 Nov 2020 07:27) (20 - 20)  SpO2: 87% (15 Nov 2020 07:27) (87% - 87%)      LABS:                        16.6   6.50  )-----------( 106      ( 15 Nov 2020 08:37 )             49.0                 CAPILLARY BLOOD GLUCOSE      POCT Blood Glucose.: 136 mg/dL (15 Nov 2020 07:46)    PT/INR - ( 15 Nov 2020 08:38 )   PT: 11.0 sec;   INR: 0.92 ratio         PTT - ( 15 Nov 2020 08:38 )  PTT:27.2 sec    CULTURES:        Physical Examination:  PHYSICAL EXAM:  GENERAL: Obese male sitting on bed, + resp distress noted  HEAD:  Atraumatic, Normocephalic  EYES: EOMI, PERRLA  NECK: Supple, No JVD  CHEST/LUNG: B/l decreased breath sounds  HEART: Regular rate and rhythm; No murmurs;   ABDOMEN: soft, full, no tenderness noted  EXTREMITIES:  2+ Peripheral Pulses, No cyanosis or edema  PSYCH: AAOx3  NEUROLOGY: non-focal  SKIN: No rashes or lesions      RADIOLOGY REVIEWED ***    CXR with b/l pneumonia    EKG with sinus tachycardia, LVH, no acute ischemic changes noted

## 2020-11-15 NOTE — ED PROVIDER NOTE - PROGRESS NOTE DETAILS
ICU resident in the ER, accepted pt under Dr Tafoya, will place on high flow O2 upstairs. Will attenmpt to prone in the ED

## 2020-11-15 NOTE — H&P ADULT - ASSESSMENT
ASSESSMENT AND PLAN:      Neuro  -    Cardiovascular  -    Pulmonary  -    Infections  -    Nephro  -    Gastrointestinal  -    Heme  -Thrombocytopenia  MOnitor platelet count closely    Endocrine  -    Skin/Catheters  -No acute issues    Prophylaxis   -C/w lovenox for dvt ppx         ASSESSMENT AND PLAN:  This is a 49 ye old obese male who is p/w sob s/s covid pneumonia. Pt is being admitted to ICU for acute hypoxic respiratory failure secondary to covid pneumonia    Neuro  -AOAX3  No acute issues    Cardiovascular  - Elevated Trop  Pt noted with elevated trop, EKG with sinus tachycardia with no acute ischemic changes  Will start on full dose lovenox  Monitor platelet count and trend trop    Pulmonary  -Acute hypoxic respiratory failure due to covid Pneumonia  Pt hypoxic on NRB, will start on high flow  consider prone positioning  f/u markers  start dexamethasone 6mg qday      Infections  -    Nephro  -    Gastrointestinal  -    Heme  -Thrombocytopenia  MOnitor platelet count closely    Endocrine  -    Skin/Catheters  -No acute issues    Prophylaxis   -C/w lovenox for dvt ppx         ASSESSMENT AND PLAN:  This is a 49 ye old obese male who is p/w sob s/s covid pneumonia. Pt is being admitted to ICU for acute hypoxic respiratory failure secondary to covid pneumonia    Neuro  -AOAX3  No acute issues    Cardiovascular  - Elevated Trop  Pt noted with elevated trop, EKG with sinus tachycardia with no acute ischemic changes  Monitor platelet count and trend trop      Pulmonary  -Acute hypoxic respiratory failure due to covid Pneumonia  Pt hypoxic on NRB, will start on high flow  consider prone positioning  f/u markers  start dexamethasone 6mg qday  Will start on remdesivir if antibody negative  F/u ABG      Infections  -Covid -19 infection  Will start on steriods  Will hold abx for now, follow up blood cultures and urine cultures  F/u RVP    Nephro  -No acute issues  Monitor renal function and electrolytes      Gastrointestinal  -No acute issues  -Will keep NPO for now  -C/w pantoprazole for GI ppx    Heme  -Thrombocytopenia  MOnitor platelet count closely    Endocrine  -BSL elevated  -F/u TSH and Hba1c  -FS q 6 as pt npo    Skin/Catheters  -No acute issues    Prophylaxis   -C/w lovenox for dvt ppx         ASSESSMENT AND PLAN:  This is a 60 yr old obese male who is p/w sob s/s covid pneumonia. Pt is being admitted to ICU for acute hypoxic respiratory failure secondary to covid pneumonia    Neuro  -AOAX3  No acute issues    Cardiovascular  - Elevated Trop  Pt noted with elevated trop, EKG with sinus tachycardia with no acute ischemic changes  Monitor platelet count and trend trop      Pulmonary  -Acute hypoxic respiratory failure due to covid Pneumonia  Pt hypoxic on NRB, will start on high flow  consider prone positioning  f/u markers  start dexamethasone 6mg qday  Will start on remdesivir if antibody negative  F/u ABG      Infections  -Covid -19 infection  Will start on steriods  Will hold abx for now, follow up blood cultures and urine cultures  F/u RVP    Nephro  -No acute issues  Monitor renal function and electrolytes      Gastrointestinal  -No acute issues  -Will keep NPO for now  -C/w pantoprazole for GI ppx    Heme  -Thrombocytopenia  MOnitor platelet count closely    Endocrine  -BSL elevated  -F/u TSH and Hba1c  -FS q 6 as pt npo    Skin/Catheters  -No acute issues    Prophylaxis   -C/w lovenox for dvt ppx

## 2020-11-15 NOTE — H&P ADULT - ATTENDING COMMENTS
IMP: This is a 49 yr  old obese male with out any significant medical history  who presented with sob due to acute hypoxic resp failure due to covid pneumonia. Pt is requiring BiPaP   support. Elevated lactated due to hypoxia and work of breathing       - Acute Hypoxic resp Failure   - COVID-19 PNA  - Morbid Obesity   - Elevated lactate      Plan  -admit to icu  -continue BiPaP and alternate with HFNC  -keep o2 sat >90%  -prone therapy as needed for lung recruitment   -started on iv decadron  -NPO for now   -f/u Covid-19 antibx , if neg will start remdesivir   -DVT/ GI prophy  -isolation : contact and lulu IMP: This is a 60 yr  old obese male with out any significant medical history  who presented with sob due to acute hypoxic resp failure due to covid pneumonia. Pt is requiring BiPaP   support. Elevated lactated due to hypoxia and work of breathing       - Acute Hypoxic resp Failure   - COVID-19 PNA  - Morbid Obesity   - Elevated lactate      Plan  -admit to icu  -continue BiPaP and alternate with HFNC  -keep o2 sat >90%  -prone therapy as needed for lung recruitment   -started on iv decadron  -NPO for now   -f/u Covid-19 antibx , if neg will start remdesivir   -DVT/ GI prophy  -isolation : contact and lulu

## 2020-11-15 NOTE — ED ADULT NURSE NOTE - OBJECTIVE STATEMENT
pt presents for co sob x5 days, pt endorses recent diagnosis of covid-19 5 days ago. Pt endorses he went to get tested for covid initially d/t body aches & weakness. Denies fever, chills, cp, palpitations, HA, dizziness, abdominal pain, n/v/d, leg swelling. SpO2 88% on 15L non rebreather.

## 2020-11-16 NOTE — PROGRESS NOTE ADULT - SUBJECTIVE AND OBJECTIVE BOX
HPI: 49M without PMH from home with wife with x1 week SOB, diagnosed with COVID outpatient x3 days prior to presentation, presented 2/2 worsening of respiratory symptoms. Hypoxic to 50s per EMS, COVID infection confirmed with repeat PCR, patient placed on NRB and admitted to ICU for further management of AHRF 2/2 COVID PNA.      INTERVAL HPI/OVERNIGHT EVENTS: NAEON. VS notable for RR 20-30s with SpO2 80-90 on 55LPM 100% HFNC. Patient reports feeling somewhat better this am. Continuing with steroids, awaiting antibody results to decide if remsedivir is warranted. D-dimer elevated to 320, per guidelines does not warrant full-dose AC as this is not 2x upper limit nl. Procal and ferritin elevated, lactate wnl.     ICU Vital Signs Last 24 Hrs  T(C): 36.8 (16 Nov 2020 08:00), Max: 37.1 (16 Nov 2020 02:07)  T(F): 98.2 (16 Nov 2020 08:00), Max: 98.8 (16 Nov 2020 02:07)  HR: 90 (16 Nov 2020 12:34) (76 - 101)  BP: 124/58 (16 Nov 2020 12:00) (121/58 - 178/110)  BP(mean): 75 (16 Nov 2020 12:00) (74 - 123)  ABP: --  ABP(mean): --  RR: 30 (16 Nov 2020 12:34) (18 - 43)  SpO2: 95% (16 Nov 2020 12:34) (83% - 95%)    I&O's Summary    15 Nov 2020 07:01  -  16 Nov 2020 07:00  --------------------------------------------------------  IN: 1000 mL / OUT: 2400 mL / NET: -1400 mL    16 Nov 2020 07:01  -  16 Nov 2020 14:29  --------------------------------------------------------  IN: 250 mL / OUT: 325 mL / NET: -75 mL    LABS:                        15.6   6.56  )-----------( 121      ( 16 Nov 2020 05:48 )             46.7     11-16    138  |  103  |  19<H>  ----------------------------<  112<H>  3.8   |  27  |  1.11    Ca    8.5      16 Nov 2020 05:48  Phos  2.5     11-16  Mg     1.9     11-16    TPro  6.4  /  Alb  3.3<L>  /  TBili  0.6  /  DBili  x   /  AST  66<H>  /  ALT  50  /  AlkPhos  80  11-16    PT/INR - ( 15 Nov 2020 08:38 )   PT: 11.0 sec;   INR: 0.92 ratio         PTT - ( 15 Nov 2020 08:38 )  PTT:27.2 sec    CAPILLARY BLOOD GLUCOSE      POCT Blood Glucose.: 140 mg/dL (16 Nov 2020 11:43)  POCT Blood Glucose.: 120 mg/dL (15 Nov 2020 23:11)    ABG - ( 15 Nov 2020 11:54 )  pH, Arterial: 7.41  pH, Blood: x     /  pCO2: 42    /  pO2: 70    / HCO3: 26    / Base Excess: 1.6   /  SaO2: 94          RADIOLOGY & ADDITIONAL TESTS:    Consultant(s) Notes Reviewed:  [x ] YES  [ ] NO    MEDICATIONS  (STANDING):  chlorhexidine 2% Cloths 1 Application(s) Topical <User Schedule>  dexAMETHasone  Injectable 6 milliGRAM(s) IV Push daily  enoxaparin Injectable 40 milliGRAM(s) SubCutaneous daily  pantoprazole  Injectable 40 milliGRAM(s) IV Push daily    MEDICATIONS  (PRN):    ROS: limited 2/2 patient condition, reports that he is feeling better, denies pain/discomfort    PHYSICAL EXAM:  GENERAL: well built, well nourished  HEAD:  Atraumatic, Normocephalic  EYES: EOMI, PERRLA, conjunctiva and sclera clear  ENT: Moist mucous membranes  NECK: Supple  NERVOUS SYSTEM:  Alert & Oriented X3  CHEST/LUNG: B/L good air entry, HFNC in place  HEART: S1S2 normal, RRR  ABDOMEN: Soft, Nontender, Nondistended; Bowel sounds present  EXTREMITIES:  2+ Peripheral Pulse  PSYCH: appropriate mood and affect  SKIN: No rashes or lesions    Care Discussed with Consultants/Other Providers [ x] YES  [ ] NO

## 2020-11-16 NOTE — PROGRESS NOTE ADULT - ASSESSMENT
ASSESSMENT AND PLAN: 49M without PMH from home with wife admitted to ICU for of AHRF 2/2 COVID PNA.    Neuro: #no issues, A&Ox3    CVS: #troponemia  -downtrended 0.681-->0.587  -no acute ischemic changes on EKG    Pulmonary: #AHRF 2/2 COVID PNA  -SpO2 80-90s with RR 20-30s on 55LPM 100% HFNC  -encourage prone positioning   -D-dimer elevated to 320, ferritin 625, procal 0.11  -lactate wnl  -continue dexamethasone 6mg Qd  -awaiting antibodies, if negative will start remdesivir   -ABG 7.41/42/70/26, will follow daily     ID:   -history and management as above  -RVP +COVID, no other infection noted  -BCx NGTD  F/u RVP    Nephro: #no issues  -monitor BMP    GI: #no issues  -advanced to regular diet   -Protonix for PPx    Heme: #thrombocytopenia  -106-->121 today  -monitor    Endocrine: #BG elevated  -A1c 6, average glucose 126  -TSH wnl  -vit D moderately low to 22, will give 1000U/day   -FS QACHS    Skin/Catheters: #no issues    Prophylaxis:  -Lovenox for PPx, not requiring full-dose given that D-dimer elevation is not x2 above nl

## 2020-11-16 NOTE — PROGRESS NOTE ADULT - ATTENDING COMMENTS
49 yr  old obese male with out any significant medical history  who presented with sob due to acute hypoxic resp failure due to covid pneumonia.    Assessment:  1. Acute Hypoxic resp Failure   2. COVID-19 PNA  3. Morbid Obesity   4. Elevated lactate      Plan  -HFNC support as tolerated   -keep o2 sat >90%  -self prone therapy as needed for lung recruitment   -Cont iv decadron  -NPO for now   -Start remdisivir  -DVT/ GI prophy  -isolation : contact and airborne

## 2020-11-17 NOTE — PROGRESS NOTE ADULT - ASSESSMENT
ASSESSMENT AND PLAN: 49M without PMH from home with wife admitted to ICU for of AHRF 2/2 COVID PNA.    Neuro: #no issues, A&Ox3    CVS: #troponemia  -downtrended 0.681-->0.587  -no acute ischemic changes on EKG    Pulmonary: #AHRF 2/2 COVID PNA  -SpO2 80-90s with RR 20-30s on 50LPM 100% HFNC  -encourage prone positioning and incentive spirometry use  -D-dimer elevated to 320, ferritin 625, procal 0.11, will repeat in am  -lactate wnl  -continue dexamethasone 6mg Qd, changed to PO (10 day course through 11/25)  -IgA positive, IgG negative, started remdesivir, will obtain approval from Dr. Amato today   -ABG 7.44/42/87/28, will follow daily     ID:   -history and management as above  -RVP +COVID, no other infection noted  -BCx NGTD    Nephro: #no issues  -monitor BMP  -net -280cc, 1300cc u/o    GI: #no issues  -advanced to regular diet   -Protonix for PPx, changed to PO  -reports loose stools    Heme: #thrombocytopenia  -106-->125 today  -monitor    Endocrine: #BG elevated  -A1c 6, average glucose 126  -TSH wnl  -vit D moderately low to 22, giving 1000U/day   -FS QACHS    Skin/Catheters: #no issues    Prophylaxis:  -Lovenox for PPx, not requiring full-dose given that D-dimer elevation is not x2 above nl

## 2020-11-17 NOTE — PROGRESS NOTE ADULT - SUBJECTIVE AND OBJECTIVE BOX
Patient is a 50y old  Male who presents with a chief complaint of Shortness of breath D/t Covid (16 Nov 2020 14:29)      INTERVAL HPI/OVERNIGHT EVENTS:  ICU Vital Signs Last 24 Hrs  T(C): 37.2 (17 Nov 2020 07:00), Max: 37.3 (16 Nov 2020 20:00)  T(F): 98.9 (17 Nov 2020 07:00), Max: 99.1 (16 Nov 2020 20:00)  HR: 81 (17 Nov 2020 10:00) (76 - 93)  BP: 129/117 (17 Nov 2020 10:00) (112/69 - 165/94)  BP(mean): 120 (17 Nov 2020 10:00) (75 - 120)  ABP: --  ABP(mean): --  RR: 23 (17 Nov 2020 10:00) (15 - 39)  SpO2: 96% (17 Nov 2020 10:00) (85% - 99%)    I&O's Summary    16 Nov 2020 07:01  -  17 Nov 2020 07:00  --------------------------------------------------------  IN: 1020 mL / OUT: 1300 mL / NET: -280 mL    17 Nov 2020 07:01  -  17 Nov 2020 10:49  --------------------------------------------------------  IN: 240 mL / OUT: 350 mL / NET: -110 mL          LABS:                        15.9   8.13  )-----------( 125      ( 17 Nov 2020 04:41 )             47.1     11-17    138  |  102  |  24<H>  ----------------------------<  112<H>  3.6   |  27  |  1.10    Ca    8.6      17 Nov 2020 04:41  Phos  2.8     11-17  Mg     2.0     11-17    TPro  6.3  /  Alb  3.3<L>  /  TBili  0.7  /  DBili  x   /  AST  73<H>  /  ALT  56  /  AlkPhos  104  11-17        CAPILLARY BLOOD GLUCOSE      POCT Blood Glucose.: 105 mg/dL (17 Nov 2020 05:06)  POCT Blood Glucose.: 106 mg/dL (16 Nov 2020 23:32)  POCT Blood Glucose.: 138 mg/dL (16 Nov 2020 16:15)  POCT Blood Glucose.: 140 mg/dL (16 Nov 2020 11:43)    ABG - ( 17 Nov 2020 03:35 )  pH, Arterial: 7.44  pH, Blood: x     /  pCO2: 42    /  pO2: 87    / HCO3: 28    / Base Excess: 3.6   /  SaO2: 97                  RADIOLOGY & ADDITIONAL TESTS:    Consultant(s) Notes Reviewed:  [x ] YES  [ ] NO    MEDICATIONS  (STANDING):  chlorhexidine 2% Cloths 1 Application(s) Topical <User Schedule>  cholecalciferol 1000 Unit(s) Oral daily  dexAMETHasone  Injectable 6 milliGRAM(s) IV Push daily  enoxaparin Injectable 40 milliGRAM(s) SubCutaneous daily  pantoprazole  Injectable 40 milliGRAM(s) IV Push daily  remdesivir  IVPB   IV Intermittent     MEDICATIONS  (PRN):      PHYSICAL EXAM:  GENERAL: well built, well nourished  HEAD:  Atraumatic, Normocephalic  EYES: EOMI, PERRLA, conjunctiva and sclera clear  ENT: No tonsillar erythema, exudates, or enlargement; Moist mucous membranes, Good dentition, No lesions  NECK: Supple, No JVD, Normal thyroid, no enlarged nodes  NERVOUS SYSTEM:  Alert & Oriented X3, Good concentration; Motor Strength 5/5 B/L upper and lower extremities; DTRs 2+ intact and symmetric, sensory intact  CHEST/LUNG: B/L good air entry; No rales, rhonchi, or wheezing  HEART: S1S2 normal, no S3, Regular rate and rhythm; No murmurs, rubs, or gallops  ABDOMEN: Soft, Nontender, Nondistended; Bowel sounds present  EXTREMITIES:  2+ Peripheral Pulses, No clubbing, cyanosis, or edema  LYMPH: No lymphadenopathy noted  SKIN: No rashes or lesions    Care Discussed with Consultants/Other Providers [ x] YES  [ ] NO HPI: 49M without PMH from home with wife with x1 week SOB, diagnosed with COVID outpatient x3 days prior to presentation, presented 2/2 worsening of respiratory symptoms. Hypoxic to 50s per EMS, COVID infection confirmed with repeat PCR, patient placed on NRB and admitted to ICU for further management of AHRF 2/2 COVID PNA.      INTERVAL HPI/OVERNIGHT EVENTS: NAEON. Decreased slightly to 50 % HFNC, RR 20-30s overnight, SpO2 high 80s-mid 90s. ABG this am 7.44/42/87/28. SBPs moderately elevated to 140-160s. Patient reports that he is feeling gradually better each day and reports good compliance with incentive spirometry. IgA positive, IgG negative, started remsedivir. Will obtain approval today.     ICU Vital Signs Last 24 Hrs  T(C): 37.2 (17 Nov 2020 07:00), Max: 37.3 (16 Nov 2020 20:00)  T(F): 98.9 (17 Nov 2020 07:00), Max: 99.1 (16 Nov 2020 20:00)  HR: 81 (17 Nov 2020 10:00) (76 - 93)  BP: 129/117 (17 Nov 2020 10:00) (112/69 - 165/94)  BP(mean): 120 (17 Nov 2020 10:00) (75 - 120)  ABP: --  ABP(mean): --  RR: 23 (17 Nov 2020 10:00) (15 - 39)  SpO2: 96% (17 Nov 2020 10:00) (85% - 99%)    I&O's Summary    16 Nov 2020 07:01  -  17 Nov 2020 07:00  --------------------------------------------------------  IN: 1020 mL / OUT: 1300 mL / NET: -280 mL    17 Nov 2020 07:01  -  17 Nov 2020 10:49  --------------------------------------------------------  IN: 240 mL / OUT: 350 mL / NET: -110 mL    LABS:                        15.9   8.13  )-----------( 125      ( 17 Nov 2020 04:41 )             47.1     11-17    138  |  102  |  24<H>  ----------------------------<  112<H>  3.6   |  27  |  1.10    Ca    8.6      17 Nov 2020 04:41  Phos  2.8     11-17  Mg     2.0     11-17    TPro  6.3  /  Alb  3.3<L>  /  TBili  0.7  /  DBili  x   /  AST  73<H>  /  ALT  56  /  AlkPhos  104  11-17        CAPILLARY BLOOD GLUCOSE      POCT Blood Glucose.: 105 mg/dL (17 Nov 2020 05:06)  POCT Blood Glucose.: 106 mg/dL (16 Nov 2020 23:32)  POCT Blood Glucose.: 138 mg/dL (16 Nov 2020 16:15)  POCT Blood Glucose.: 140 mg/dL (16 Nov 2020 11:43)    ABG - ( 17 Nov 2020 03:35 )  pH, Arterial: 7.44  pH, Blood: x     /  pCO2: 42    /  pO2: 87    / HCO3: 28    / Base Excess: 3.6   /  SaO2: 97        RADIOLOGY & ADDITIONAL TESTS:    Consultant(s) Notes Reviewed:  [x ] YES  [ ] NO    MEDICATIONS  (STANDING):  chlorhexidine 2% Cloths 1 Application(s) Topical <User Schedule>  cholecalciferol 1000 Unit(s) Oral daily  dexAMETHasone  Injectable 6 milliGRAM(s) IV Push daily  enoxaparin Injectable 40 milliGRAM(s) SubCutaneous daily  pantoprazole  Injectable 40 milliGRAM(s) IV Push daily  remdesivir  IVPB   IV Intermittent     MEDICATIONS  (PRN):    ROS:  GENERAL: not fever or fatigue, feeling improved  HEENT: no HA  Resp: improved WOB, using incentive spirometer, slight cough  CV: no CP  GI: no abdominal pain, +loose stools, no melena or hematochezia  : no dysuria or hematuria  Ext: no body aches    PHYSICAL EXAM:  GENERAL: well built, well nourished, OOB to chair this am  HEAD:  Atraumatic, Normocephalic  EYES: EOMI, PERRLA, conjunctiva and sclera clear  ENT: Moist mucous membranes  NECK: Supple  NERVOUS SYSTEM:  Alert & Oriented X3  CHEST/LUNG: B/L good air entry, HFNC in place  HEART: S1S2 normal, RRR  ABDOMEN: Soft, Nontender, Nondistended; Bowel sounds present  EXTREMITIES:  2+ Peripheral Pulse  PSYCH: appropriate mood and affect  SKIN: No rashes or lesions    Care Discussed with Consultants/Other Providers [ x] YES  [ ] NO

## 2020-11-17 NOTE — PROGRESS NOTE ADULT - ATTENDING COMMENTS
49 yr  old obese male with out any significant medical history  who presented with sob due to acute hypoxic resp failure due to covid pneumonia.    Assessment:  1. Acute Hypoxic respiratory Failure   2. COVID-19 PNA  3. Morbid Obesity   4. Elevated lactate    Plan  -HFNC support as tolerated   -keep o2 sat >90%  -self prone therapy as needed for lung recruitment   -Cont iv decadron and remdesivir  - Oral diet   -DVT/ GI prophy  -isolation : contact and airborne

## 2020-11-18 NOTE — DIETITIAN INITIAL EVALUATION ADULT. - OTHER INFO
Unable to see pt due to Covid. Seen pt through glass door due to Covid/ Isolation. Asleep, Obese. RN reports pt c/p loss of taste, eating poorly, does not have much energy.  Discussed with PGY 1, she to add Jaquan ZIMMERMAN

## 2020-11-18 NOTE — DIETITIAN INITIAL EVALUATION ADULT. - PERTINENT LABORATORY DATA
11-18 Na140 mmol/L Glu 102 mg/dL<H> K+ 3.6 mmol/L Cr  1.14 mg/dL BUN 25 mg/dL<H>   11-17 Phos 2.8 mg/dL   11-18 Alb 3.0 g/dL<L>        11-15-20 @ 18:35 HgbA1C 6.0      Vitamin D, 25-Hydroxy: 22.4 ng/mL (11-15-20 @ 18:32)

## 2020-11-18 NOTE — DIETITIAN INITIAL EVALUATION ADULT. - PERTINENT MEDS FT
MEDICATIONS  (STANDING):  chlorhexidine 2% Cloths 1 Application(s) Topical <User Schedule>  cholecalciferol 1000 Unit(s) Oral daily  dexAMETHasone     Tablet 6 milliGRAM(s) Oral daily  enoxaparin Injectable 120 milliGRAM(s) SubCutaneous every 12 hours  pantoprazole    Tablet 40 milliGRAM(s) Oral before breakfast  remdesivir  IVPB 100 milliGRAM(s) IV Intermittent every 24 hours    MEDICATIONS  (PRN):

## 2020-11-18 NOTE — PROGRESS NOTE ADULT - ATTENDING COMMENTS
49 yr  old obese male with out any significant medical history  who presented with sob due to acute hypoxic resp failure due to covid pneumonia.    Assessment:  1. Acute Hypoxic respiratory Failure   2. COVID-19 PNA  3. Morbid Obesity   4. Elevated lactate    Plan  -HFNC support as tolerated   -keep o2 sat >90%  -self prone therapy as needed for lung recruitment   -Cont iv decadron and remdesivir  -Monitor renal and hepatic function  - Oral diet   -DVT/ GI prophy  -isolation : contact and airborne

## 2020-11-18 NOTE — PROGRESS NOTE ADULT - SUBJECTIVE AND OBJECTIVE BOX
HPI: 49M without PMH from home with wife with x1 week SOB, diagnosed with COVID outpatient x3 days prior to presentation, presented 2/2 worsening of respiratory symptoms. Hypoxic to 50s per EMS, COVID infection confirmed with repeat PCR, patient placed on NRB and admitted to ICU for further management of AHRF 2/2 COVID PNA.      INTERVAL HPI/OVERNIGHT EVENTS: NAEON. RR 30-40s overnight with SpO2 80s-90s, SBPs 140s-150s. HFNC increased to 60LPM from 50LPM yesterday, FiO2 100%. Patient reports feeling gradually better each day, with improving breathing and cough. Encouraged to prone intermittently throughout the day and endorses using incentive spirometry. ABG this am 7.42/48/60/30. Trop slightly increased to 0.9, EKG unchanged from previous. D-dimer markedly increased to 5529, started on full-dose Lovenox. CXR today unchanged from previous.     ICU Vital Signs Last 24 Hrs  T(C): 37.2 (18 Nov 2020 12:00), Max: 37.7 (17 Nov 2020 20:00)  T(F): 98.9 (18 Nov 2020 12:00), Max: 99.9 (17 Nov 2020 20:00)  HR: 90 (18 Nov 2020 13:00) (72 - 104)  BP: 159/81 (18 Nov 2020 13:00) (131/80 - 180/99)  BP(mean): 100 (18 Nov 2020 13:00) (92 - 123)  ABP: --  ABP(mean): --  RR: 28 (18 Nov 2020 13:00) (18 - 47)  SpO2: 89% (18 Nov 2020 13:00) (84% - 97%)    I&O's Summary    17 Nov 2020 07:01  -  18 Nov 2020 07:00  --------------------------------------------------------  IN: 810 mL / OUT: 1075 mL / NET: -265 mL    18 Nov 2020 07:01  -  18 Nov 2020 13:58  --------------------------------------------------------  IN: 0 mL / OUT: 355 mL / NET: -355 mL      LABS:                        16.6   7.64  )-----------( 99       ( 18 Nov 2020 06:39 )             49.7     11-18    140  |  101  |  25<H>  ----------------------------<  102<H>  3.6   |  28  |  1.14    Ca    8.4      18 Nov 2020 06:39  Phos  2.8     11-17  Mg     2.0     11-17    TPro  6.2  /  Alb  3.0<L>  /  TBili  0.8  /  DBili  x   /  AST  82<H>  /  ALT  52  /  AlkPhos  120  11-18    PT/INR - ( 18 Nov 2020 06:39 )   PT: 11.6 sec;   INR: 0.97 ratio         PTT - ( 18 Nov 2020 06:39 )  PTT:29.8 sec    CAPILLARY BLOOD GLUCOSE      POCT Blood Glucose.: 149 mg/dL (18 Nov 2020 11:26)  POCT Blood Glucose.: 84 mg/dL (18 Nov 2020 06:23)  POCT Blood Glucose.: 100 mg/dL (17 Nov 2020 23:59)  POCT Blood Glucose.: 113 mg/dL (17 Nov 2020 16:37)    ABG - ( 18 Nov 2020 03:43 )  pH, Arterial: 7.42  pH, Blood: x     /  pCO2: 48    /  pO2: 60    / HCO3: 30    / Base Excess: 5.3   /  SaO2: 90            RADIOLOGY & ADDITIONAL TESTS:    Consultant(s) Notes Reviewed:  [x ] YES  [ ] NO    MEDICATIONS  (STANDING):  chlorhexidine 2% Cloths 1 Application(s) Topical <User Schedule>  cholecalciferol 1000 Unit(s) Oral daily  dexAMETHasone     Tablet 6 milliGRAM(s) Oral daily  enoxaparin Injectable 120 milliGRAM(s) SubCutaneous every 12 hours  pantoprazole    Tablet 40 milliGRAM(s) Oral before breakfast  remdesivir  IVPB 100 milliGRAM(s) IV Intermittent every 24 hours    MEDICATIONS  (PRN):    ROS: limited 2/2 patient condition, reports that he is feeling better, denies pain/discomfort    PHYSICAL EXAM:  GENERAL: obese  HEAD:  Atraumatic, Normocephalic  EYES: EOMI, PERRLA, conjunctiva and sclera clear  ENT: Moist mucous membranes  NECK: Supple  NERVOUS SYSTEM:  Alert & Oriented X3  CHEST/LUNG: B/L good air entry, HFNC in place  HEART: S1S2 normal, RRR  ABDOMEN: Soft, Nontender, obese; Bowel sounds present  EXTREMITIES:  2+ Peripheral Pulse  PSYCH: appropriate mood and affect  SKIN: No rashes or lesions    Care Discussed with Consultants/Other Providers [ x] YES  [ ] NO

## 2020-11-18 NOTE — PROGRESS NOTE ADULT - ASSESSMENT
ASSESSMENT AND PLAN: 49M without PMH from home with wife admitted to ICU for of AHRF 2/2 COVID PNA.    Neuro: #no issues, A&Ox3    CVS: #troponemia  -downtrended 0.681-->0.587, today increased to 0.9, will repeat  -no acute ischemic changes on EKG today    Pulmonary: #AHRF 2/2 COVID PNA  -SpO2 80-90s with RR 30-40s on 60LPM 100% HFNC  -encouraged prone positioning   -D-dimer elevated to 320->5529, ferritin 625->441, procal 0.11, CRP 0.1->0.67,   -started full-dose Lovenox  -lactate wnl  -continue dexamethasone 6mg Qd PO (10 day course through 11/25)  -IgA positive, IgG negative, indicating new infection, remdesivir start, Dr. Amato approved (11/17)  -ABG 7.42/48/60/30, will follow daily     ID:   -history and management as above  -RVP +COVID, no other infection noted  -BCx NGTD    Nephro: #no issues  -monitor BMP    GI: #no issues  -tolerating regular diet   -Protonix PO for PPx    Heme: #thrombocytopenia  -106-->121-->125 today  -monitor    Endocrine: #BG elevated  -A1c 6, average glucose 126  -TSH wnl  -vit D moderately low to 22, will give 1000U/day   -FS QACHS    Skin/Catheters: #no issues    Prophylaxis:  -Lovenox for PPx, today increased to full dose given increased d-dimer as above

## 2020-11-19 NOTE — PROGRESS NOTE ADULT - ASSESSMENT
ASSESSMENT AND PLAN: 49M without PMH from home with wife admitted to ICU for of AHRF 2/2 COVID PNA.    Neuro: #sedation  -sedated with propofol, fentanyl  -paralyzed with Nimbex     CVS: #troponemia  -downtrended 0.681-->0.587, today increased to 0.9, will repeat today  -no acute ischemic changes on EKG today    Pulmonary: #AHRF 2/2 COVID PNA  -SpO2 80s overnight, placed on BiPAP, continued to have elevated WOB with low SpO2, intubated  -vent 26/420/100/15, ABG 7.09/86.2/79.7/24.8, will adjust, monitor closely  -will prone as needed   -D-dimer elevated to 320->5529, ferritin 625->441, procal 0.11, CRP 0.1->0.67->3.39,   -continue full-dose Lovenox  -lactate wnl  -continue dexamethasone 6mg Qd (10 day course through 11/25), changed to IV for intubation  -IgA positive, IgG negative, indicating new infection, remdesivir start, Dr. Amato approved (11/17)    ID:   -history and management as above  -WBC newly elevated to 18 this am  -RVP +COVID, no other infection noted  -BCx NGTD    Nephro: #no issues  -monitor BMP    GI: #no issues  -NPO   -Protonix IV for PPx    Heme: #thrombocytopenia  -106-->121-->125-->99->107 today  -monitor    Endocrine: #BG elevated  -A1c 6, average glucose 126  -TSH wnl  -vit D moderately low to 22, will give 1000U/day   -FS QACHS    Skin/Catheters:   #LIJ placed 11/19  #left radial artery 11/19  #FC    Prophylaxis:  -continue full-dose Lovenox increased d-dimer as above

## 2020-11-19 NOTE — CHART NOTE - NSCHARTNOTEFT_GEN_A_CORE
Assessment:   Patient is a 50y old  Male who presents with a chief complaint of Shortness of breath D/t Covid (2020 14:09). Pt intubated this AM. NPO.      Factors impacting intake: [ ] none [ ] nausea  [ ] vomiting [ ] diarrhea [ ] constipation  [ ]chewing problems [ ] swallowing issues  [x ] other: see above    Diet Prescription: Diet, NPO (20 @ 09:20)      Daily     Daily Weight in k.3 (2020 08:00)    % Weight Change: 1+ generalized edema with O>I    Pertinent Medications: MEDICATIONS  (STANDING):  chlorhexidine 0.12% Liquid 15 milliLiter(s) Oral Mucosa every 12 hours  chlorhexidine 2% Cloths 1 Application(s) Topical <User Schedule>  cholecalciferol 1000 Unit(s) Oral daily  cisatracurium Infusion 3 MICROgram(s)/kG/Min (22.5 mL/Hr) IV Continuous <Continuous>  dexAMETHasone     Tablet 6 milliGRAM(s) Oral daily  enoxaparin Injectable 120 milliGRAM(s) SubCutaneous every 12 hours  fentaNYL   Infusion 2 MICROgram(s)/kG/Hr (25 mL/Hr) IV Continuous <Continuous>  pantoprazole  Injectable 40 milliGRAM(s) IV Push daily  propofol Infusion 20 MICROgram(s)/kG/Min (15 mL/Hr) IV Continuous <Continuous>  remdesivir  IVPB 100 milliGRAM(s) IV Intermittent every 24 hours  sodium chloride 0.9% Bolus 1000 milliLiter(s) IV Bolus once    MEDICATIONS  (PRN):    Pertinent Labs:  Na139 mmol/L Glu 96 mg/dL K+ 3.5 mmol/L Cr  1.14 mg/dL BUN 25 mg/dL<H>  Phos 3.9 mg/dL  Alb 2.9 g/dL<L>     CAPILLARY BLOOD GLUCOSE      POCT Blood Glucose.: 182 mg/dL (2020 11:25)  POCT Blood Glucose.: 116 mg/dL (2020 07:40)  POCT Blood Glucose.: 125 mg/dL (2020 05:09)  POCT Blood Glucose.: 105 mg/dL (2020 21:29)      Estimated Needs:   [x ] no change since previous assessment  [ ] recalculated:       Previous Nutrition Diagnosis:   [ ] Altered GI function  [x ]Inadequate Oral Intake [ ] Swallowing Difficulty   [ ] Altered nutrition related labs [ ] Increased Nutrient Needs [ ] Overweight/Obesity   [ ] Unintended Weight Loss [ ] Food & Nutrition Related Knowledge Deficit [ ] Malnutrition   [ ] Other:     Nutrition Diagnosis is [x ] ongoing  [ ] resolved [ ] not applicable     New Nutrition Diagnosis: [ ] not applicable       Interventions:   Recommend  [ ] Change Diet To:  [ ] Nutrition Supplement  [x ] Nutrition Support: Glucerna 1.5 50x24 (initial goal/ without Propofol): 1200 ml, 1800 kcals, 99 gm protein +1 pkt Prosource BID (+ 30 gm protein, 120 kcals). MD to monitor. RD available.   [ ] Other:     Monitoring and Evaluation:    [ x ] Tolerance to diet prescription [ x ] weights [ x ] labs[ x ] follow up per protocol  [ ] other:

## 2020-11-19 NOTE — PROGRESS NOTE ADULT - SUBJECTIVE AND OBJECTIVE BOX
HPI: 49M without PMH from home with wife with x1 week SOB, diagnosed with COVID outpatient x3 days prior to presentation, presented 2/2 worsening of respiratory symptoms. Hypoxic to 50s per EMS, COVID infection confirmed with repeat PCR, patient placed on NRB and admitted to ICU for further management of AHRF 2/2 COVID PNA.      INTERVAL HPI/OVERNIGHT EVENTS: Noted to be tachypneic with SpO2 80s overnight, started on BiPAP 12/100/6. Continued to have increased WOB this am with worsening b/l infiltrates suggestive of ARDS on CXR, intubated, now on vent 26/420/100/14, ABG 7.11/108/105/31, will adjust vent settings. Sedated and paralyzed with propofol, fentanyl, and Nimbex. Family informed with assistance of [place]  Edie [id number].     ICU Vital Signs Last 24 Hrs  T(C): 36.9 (19 Nov 2020 12:00), Max: 37.6 (19 Nov 2020 04:00)  T(F): 98.4 (19 Nov 2020 12:00), Max: 99.6 (19 Nov 2020 04:00)  HR: 83 (19 Nov 2020 15:15) (80 - 128)  BP: 69/42 (19 Nov 2020 12:04) (69/42 - 220/134)  BP(mean): 48 (19 Nov 2020 12:04) (48 - 154)  ABP: 92/82 (19 Nov 2020 15:15) (54/35 - 114/56)  ABP(mean): 87 (19 Nov 2020 15:15) (42 - 102)  RR: 22 (19 Nov 2020 15:15) (0 - 44)  SpO2: 91% (19 Nov 2020 15:15) (58% - 94%)    I&O's Summary    18 Nov 2020 07:01  -  19 Nov 2020 07:00  --------------------------------------------------------  IN: 120 mL / OUT: 1205 mL / NET: -1085 mL    19 Nov 2020 07:01  -  19 Nov 2020 15:32  --------------------------------------------------------  IN: 1351.5 mL / OUT: 170 mL / NET: 1181.5 mL      Mode: AC/ CMV (Assist Control/ Continuous Mandatory Ventilation)  RR (machine): 30  TV (machine): 400  FiO2: 80  PEEP: 15  ITime: 1  MAP: 25  PIP: 39      LABS:                        16.2   18.94 )-----------( 155      ( 19 Nov 2020 11:23 )             49.7     11-19    139  |  101  |  25<H>  ----------------------------<  96  3.5   |  26  |  1.14    Ca    8.6      19 Nov 2020 06:11  Phos  3.9     11-19  Mg     2.4     11-19    TPro  6.3  /  Alb  2.9<L>  /  TBili  0.7  /  DBili  x   /  AST  101<H>  /  ALT  46  /  AlkPhos  131<H>  11-19    PT/INR - ( 19 Nov 2020 11:23 )   PT: 14.1 sec;   INR: 1.19 ratio         PTT - ( 19 Nov 2020 11:23 )  PTT:31.4 sec    CAPILLARY BLOOD GLUCOSE      POCT Blood Glucose.: 182 mg/dL (19 Nov 2020 11:25)  POCT Blood Glucose.: 116 mg/dL (19 Nov 2020 07:40)  POCT Blood Glucose.: 125 mg/dL (19 Nov 2020 05:09)  POCT Blood Glucose.: 105 mg/dL (18 Nov 2020 21:29)  POCT Blood Glucose.: 134 mg/dL (18 Nov 2020 15:58)    ABG - ( 19 Nov 2020 11:34 )  pH, Arterial: 7.10  pH, Blood: x     /  pCO2: 108   /  pO2: 105   / HCO3: 31    / Base Excess: -3.6  /  SaO2: 95                  RADIOLOGY & ADDITIONAL TESTS:    Consultant(s) Notes Reviewed:  [x ] YES  [ ] NO    MEDICATIONS  (STANDING):  artificial  tears Solution 1 Drop(s) Both EYES two times a day  chlorhexidine 0.12% Liquid 15 milliLiter(s) Oral Mucosa every 12 hours  chlorhexidine 2% Cloths 1 Application(s) Topical <User Schedule>  cholecalciferol 1000 Unit(s) Oral daily  cisatracurium Infusion 3 MICROgram(s)/kG/Min (22.5 mL/Hr) IV Continuous <Continuous>  dexAMETHasone     Tablet 6 milliGRAM(s) Oral daily  enoxaparin Injectable 120 milliGRAM(s) SubCutaneous every 12 hours  fentaNYL   Infusion 2 MICROgram(s)/kG/Hr (25 mL/Hr) IV Continuous <Continuous>  norepinephrine Infusion 0.05 MICROgram(s)/kG/Min (5.86 mL/Hr) IV Continuous <Continuous>  pantoprazole  Injectable 40 milliGRAM(s) IV Push daily  propofol Infusion 20 MICROgram(s)/kG/Min (15 mL/Hr) IV Continuous <Continuous>  remdesivir  IVPB 100 milliGRAM(s) IV Intermittent every 24 hours    MEDICATIONS  (PRN):      PHYSICAL EXAM:  GENERAL: well built, well nourished  HEAD:  Atraumatic, Normocephalic  EYES: EOMI, PERRLA, conjunctiva and sclera clear  ENT: No tonsillar erythema, exudates, or enlargement; Moist mucous membranes, Good dentition, No lesions  NECK: Supple, No JVD, Normal thyroid, no enlarged nodes  NERVOUS SYSTEM:  Alert & Oriented X3, Good concentration; Motor Strength 5/5 B/L upper and lower extremities; DTRs 2+ intact and symmetric, sensory intact  CHEST/LUNG: B/L good air entry; No rales, rhonchi, or wheezing  HEART: S1S2 normal, no S3, Regular rate and rhythm; No murmurs, rubs, or gallops  ABDOMEN: Soft, Nontender, Nondistended; Bowel sounds present  EXTREMITIES:  2+ Peripheral Pulses, No clubbing, cyanosis, or edema  LYMPH: No lymphadenopathy noted  SKIN: No rashes or lesions    Care Discussed with Consultants/Other Providers [ x] YES  [ ] NO HPI: 49M without PMH from home with wife with x1 week SOB, diagnosed with COVID outpatient x3 days prior to presentation, presented 2/2 worsening of respiratory symptoms. Hypoxic to 50s per EMS, COVID infection confirmed with repeat PCR, patient placed on NRB and admitted to ICU for further management of AHRF 2/2 COVID PNA.      INTERVAL HPI/OVERNIGHT EVENTS: Noted to be tachypneic with SpO2 80s overnight, started on BiPAP 12/100/6. Continued to have increased WOB this am with worsening b/l infiltrates suggestive of ARDS on CXR, intubated, now on vent 26/420/100/14, ABG 7.11/108/105/31, will adjust vent settings. Sedated and paralyzed with propofol, fentanyl, and Nimbex. Family informed with assistance of Citizen of [place]  Edie [phone].     ICU Vital Signs Last 24 Hrs  T(C): 36.9 (19 Nov 2020 12:00), Max: 37.6 (19 Nov 2020 04:00)  T(F): 98.4 (19 Nov 2020 12:00), Max: 99.6 (19 Nov 2020 04:00)  HR: 83 (19 Nov 2020 15:15) (80 - 128)  BP: 69/42 (19 Nov 2020 12:04) (69/42 - 220/134)  BP(mean): 48 (19 Nov 2020 12:04) (48 - 154)  ABP: 92/82 (19 Nov 2020 15:15) (54/35 - 114/56)  ABP(mean): 87 (19 Nov 2020 15:15) (42 - 102)  RR: 22 (19 Nov 2020 15:15) (0 - 44)  SpO2: 91% (19 Nov 2020 15:15) (58% - 94%)    I&O's Summary    18 Nov 2020 07:01  -  19 Nov 2020 07:00  --------------------------------------------------------  IN: 120 mL / OUT: 1205 mL / NET: -1085 mL    19 Nov 2020 07:01  -  19 Nov 2020 15:32  --------------------------------------------------------  IN: 1351.5 mL / OUT: 170 mL / NET: 1181.5 mL      Mode: AC/ CMV (Assist Control/ Continuous Mandatory Ventilation)  RR (machine): 30  TV (machine): 400  FiO2: 80  PEEP: 15  ITime: 1  MAP: 25  PIP: 39      LABS:                        16.2   18.94 )-----------( 155      ( 19 Nov 2020 11:23 )             49.7     11-19    139  |  101  |  25<H>  ----------------------------<  96  3.5   |  26  |  1.14    Ca    8.6      19 Nov 2020 06:11  Phos  3.9     11-19  Mg     2.4     11-19    TPro  6.3  /  Alb  2.9<L>  /  TBili  0.7  /  DBili  x   /  AST  101<H>  /  ALT  46  /  AlkPhos  131<H>  11-19    PT/INR - ( 19 Nov 2020 11:23 )   PT: 14.1 sec;   INR: 1.19 ratio         PTT - ( 19 Nov 2020 11:23 )  PTT:31.4 sec    CAPILLARY BLOOD GLUCOSE      POCT Blood Glucose.: 182 mg/dL (19 Nov 2020 11:25)  POCT Blood Glucose.: 116 mg/dL (19 Nov 2020 07:40)  POCT Blood Glucose.: 125 mg/dL (19 Nov 2020 05:09)  POCT Blood Glucose.: 105 mg/dL (18 Nov 2020 21:29)  POCT Blood Glucose.: 134 mg/dL (18 Nov 2020 15:58)    ABG - ( 19 Nov 2020 11:34 )  pH, Arterial: 7.10  pH, Blood: x     /  pCO2: 108   /  pO2: 105   / HCO3: 31    / Base Excess: -3.6  /  SaO2: 95        RADIOLOGY & ADDITIONAL TESTS:    Consultant(s) Notes Reviewed:  [x ] YES  [ ] NO    MEDICATIONS  (STANDING):  artificial  tears Solution 1 Drop(s) Both EYES two times a day  chlorhexidine 0.12% Liquid 15 milliLiter(s) Oral Mucosa every 12 hours  chlorhexidine 2% Cloths 1 Application(s) Topical <User Schedule>  cholecalciferol 1000 Unit(s) Oral daily  cisatracurium Infusion 3 MICROgram(s)/kG/Min (22.5 mL/Hr) IV Continuous <Continuous>  dexAMETHasone     Tablet 6 milliGRAM(s) Oral daily  enoxaparin Injectable 120 milliGRAM(s) SubCutaneous every 12 hours  fentaNYL   Infusion 2 MICROgram(s)/kG/Hr (25 mL/Hr) IV Continuous <Continuous>  norepinephrine Infusion 0.05 MICROgram(s)/kG/Min (5.86 mL/Hr) IV Continuous <Continuous>  pantoprazole  Injectable 40 milliGRAM(s) IV Push daily  propofol Infusion 20 MICROgram(s)/kG/Min (15 mL/Hr) IV Continuous <Continuous>  remdesivir  IVPB 100 milliGRAM(s) IV Intermittent every 24 hours    MEDICATIONS  (PRN):      PHYSICAL EXAM:  GENERAL: well built, well nourished  HEAD:  Atraumatic, Normocephalic  EYES: EOMI, PERRLA, conjunctiva and sclera clear  ENT: No tonsillar erythema, exudates, or enlargement; Moist mucous membranes, Good dentition, No lesions  NECK: Supple, No JVD, Normal thyroid, no enlarged nodes  NERVOUS SYSTEM:  Alert & Oriented X3, Good concentration; Motor Strength 5/5 B/L upper and lower extremities; DTRs 2+ intact and symmetric, sensory intact  CHEST/LUNG: B/L good air entry; No rales, rhonchi, or wheezing  HEART: S1S2 normal, no S3, Regular rate and rhythm; No murmurs, rubs, or gallops  ABDOMEN: Soft, Nontender, Nondistended; Bowel sounds present  EXTREMITIES:  2+ Peripheral Pulses, No clubbing, cyanosis, or edema  LYMPH: No lymphadenopathy noted  SKIN: No rashes or lesions    Care Discussed with Consultants/Other Providers [ x] YES  [ ] NO HPI: 49M without PMH from home with wife with x1 week SOB, diagnosed with COVID outpatient x3 days prior to presentation, presented 2/2 worsening of respiratory symptoms. Hypoxic to 50s per EMS, COVID infection confirmed with repeat PCR, patient placed on NRB and admitted to ICU for further management of AHRF 2/2 COVID PNA.      INTERVAL HPI/OVERNIGHT EVENTS: Noted to be tachypneic with SpO2 80s overnight, started on BiPAP 12/100/6. Continued to have increased WOB this am with worsening b/l infiltrates suggestive of ARDS on CXR, intubated, now on vent 26/420/100/14, ABG 7.11/108/105/31, will adjust vent settings. Sedated and paralyzed with propofol, fentanyl, and Nimbex. Continuing with day 3 of remdesivir and day 5 of dexamethasone. Family informed with assistance of [place]  Edie [phone].     ICU Vital Signs Last 24 Hrs  T(C): 36.9 (19 Nov 2020 12:00), Max: 37.6 (19 Nov 2020 04:00)  T(F): 98.4 (19 Nov 2020 12:00), Max: 99.6 (19 Nov 2020 04:00)  HR: 83 (19 Nov 2020 15:15) (80 - 128)  BP: 69/42 (19 Nov 2020 12:04) (69/42 - 220/134)  BP(mean): 48 (19 Nov 2020 12:04) (48 - 154)  ABP: 92/82 (19 Nov 2020 15:15) (54/35 - 114/56)  ABP(mean): 87 (19 Nov 2020 15:15) (42 - 102)  RR: 22 (19 Nov 2020 15:15) (0 - 44)  SpO2: 91% (19 Nov 2020 15:15) (58% - 94%)    I&O's Summary    18 Nov 2020 07:01  -  19 Nov 2020 07:00  --------------------------------------------------------  IN: 120 mL / OUT: 1205 mL / NET: -1085 mL    19 Nov 2020 07:01  -  19 Nov 2020 15:32  --------------------------------------------------------  IN: 1351.5 mL / OUT: 170 mL / NET: 1181.5 mL      Mode: AC/ CMV (Assist Control/ Continuous Mandatory Ventilation)  RR (machine): 30  TV (machine): 400  FiO2: 80  PEEP: 15  ITime: 1  MAP: 25  PIP: 39      LABS:                        16.2   18.94 )-----------( 155      ( 19 Nov 2020 11:23 )             49.7     11-19    139  |  101  |  25<H>  ----------------------------<  96  3.5   |  26  |  1.14    Ca    8.6      19 Nov 2020 06:11  Phos  3.9     11-19  Mg     2.4     11-19    TPro  6.3  /  Alb  2.9<L>  /  TBili  0.7  /  DBili  x   /  AST  101<H>  /  ALT  46  /  AlkPhos  131<H>  11-19    PT/INR - ( 19 Nov 2020 11:23 )   PT: 14.1 sec;   INR: 1.19 ratio         PTT - ( 19 Nov 2020 11:23 )  PTT:31.4 sec    CAPILLARY BLOOD GLUCOSE      POCT Blood Glucose.: 182 mg/dL (19 Nov 2020 11:25)  POCT Blood Glucose.: 116 mg/dL (19 Nov 2020 07:40)  POCT Blood Glucose.: 125 mg/dL (19 Nov 2020 05:09)  POCT Blood Glucose.: 105 mg/dL (18 Nov 2020 21:29)  POCT Blood Glucose.: 134 mg/dL (18 Nov 2020 15:58)    ABG - ( 19 Nov 2020 11:34 )  pH, Arterial: 7.10  pH, Blood: x     /  pCO2: 108   /  pO2: 105   / HCO3: 31    / Base Excess: -3.6  /  SaO2: 95        RADIOLOGY & ADDITIONAL TESTS:    Consultant(s) Notes Reviewed:  [x ] YES  [ ] NO    MEDICATIONS  (STANDING):  artificial  tears Solution 1 Drop(s) Both EYES two times a day  chlorhexidine 0.12% Liquid 15 milliLiter(s) Oral Mucosa every 12 hours  chlorhexidine 2% Cloths 1 Application(s) Topical <User Schedule>  cholecalciferol 1000 Unit(s) Oral daily  cisatracurium Infusion 3 MICROgram(s)/kG/Min (22.5 mL/Hr) IV Continuous <Continuous>  dexAMETHasone     Tablet 6 milliGRAM(s) Oral daily  enoxaparin Injectable 120 milliGRAM(s) SubCutaneous every 12 hours  fentaNYL   Infusion 2 MICROgram(s)/kG/Hr (25 mL/Hr) IV Continuous <Continuous>  norepinephrine Infusion 0.05 MICROgram(s)/kG/Min (5.86 mL/Hr) IV Continuous <Continuous>  pantoprazole  Injectable 40 milliGRAM(s) IV Push daily  propofol Infusion 20 MICROgram(s)/kG/Min (15 mL/Hr) IV Continuous <Continuous>  remdesivir  IVPB 100 milliGRAM(s) IV Intermittent every 24 hours    MEDICATIONS  (PRN):    ROS: limited 2/2 patient condition, reports that he is feeling better, denies pain/discomfort    PHYSICAL EXAM   GENERAL: obese, +ETT  HEAD:  Atraumatic, Normocephalic  EYES: EOMI, PERRLA, conjunctiva and sclera clear  ENT: Moist mucous membranes  NECK: Supple  NERVOUS SYSTEM:  Alert & Oriented X3  CHEST/LUNG: B/L good air entry, HFNC in place  HEART: S1S2 normal, RRR  ABDOMEN: Soft, Nontender, obese; Bowel sounds present  EXTREMITIES:  2+ Peripheral Pulse  PSYCH: appropriate mood and affect  SKIN: No rashes or lesions  Care Discussed with Consultants/Other Providers [ x] YES  [ ] NO HPI: 49M without PMH from home with wife with x1 week SOB, diagnosed with COVID outpatient x3 days prior to presentation, presented 2/2 worsening of respiratory symptoms. Hypoxic to 50s per EMS, COVID infection confirmed with repeat PCR, patient placed on NRB and admitted to ICU for further management of AHRF 2/2 COVID PNA.      INTERVAL HPI/OVERNIGHT EVENTS: Noted to be tachypneic with SpO2 80s overnight, started on BiPAP 12/100/6. Continued to have increased WOB this am with worsening b/l infiltrates suggestive of ARDS on CXR, intubated, now on vent 26/420/100/14, ABG 7.11/108/105/31, will adjust vent settings. Sedated and paralyzed with propofol, fentanyl, and Nimbex. Continuing with day 3 of remdesivir and day 5 of dexamethasone. Family informed with assistance of [place]  Edie [phone].     ICU Vital Signs Last 24 Hrs  T(C): 36.9 (19 Nov 2020 12:00), Max: 37.6 (19 Nov 2020 04:00)  T(F): 98.4 (19 Nov 2020 12:00), Max: 99.6 (19 Nov 2020 04:00)  HR: 83 (19 Nov 2020 15:15) (80 - 128)  BP: 69/42 (19 Nov 2020 12:04) (69/42 - 220/134)  BP(mean): 48 (19 Nov 2020 12:04) (48 - 154)  ABP: 92/82 (19 Nov 2020 15:15) (54/35 - 114/56)  ABP(mean): 87 (19 Nov 2020 15:15) (42 - 102)  RR: 22 (19 Nov 2020 15:15) (0 - 44)  SpO2: 91% (19 Nov 2020 15:15) (58% - 94%)    I&O's Summary    18 Nov 2020 07:01  -  19 Nov 2020 07:00  --------------------------------------------------------  IN: 120 mL / OUT: 1205 mL / NET: -1085 mL    19 Nov 2020 07:01  -  19 Nov 2020 15:32  --------------------------------------------------------  IN: 1351.5 mL / OUT: 170 mL / NET: 1181.5 mL      Mode: AC/ CMV (Assist Control/ Continuous Mandatory Ventilation)  RR (machine): 30  TV (machine): 400  FiO2: 80  PEEP: 15  ITime: 1  MAP: 25  PIP: 39      LABS:                        16.2   18.94 )-----------( 155      ( 19 Nov 2020 11:23 )             49.7     11-19    139  |  101  |  25<H>  ----------------------------<  96  3.5   |  26  |  1.14    Ca    8.6      19 Nov 2020 06:11  Phos  3.9     11-19  Mg     2.4     11-19    TPro  6.3  /  Alb  2.9<L>  /  TBili  0.7  /  DBili  x   /  AST  101<H>  /  ALT  46  /  AlkPhos  131<H>  11-19    PT/INR - ( 19 Nov 2020 11:23 )   PT: 14.1 sec;   INR: 1.19 ratio         PTT - ( 19 Nov 2020 11:23 )  PTT:31.4 sec    CAPILLARY BLOOD GLUCOSE      POCT Blood Glucose.: 182 mg/dL (19 Nov 2020 11:25)  POCT Blood Glucose.: 116 mg/dL (19 Nov 2020 07:40)  POCT Blood Glucose.: 125 mg/dL (19 Nov 2020 05:09)  POCT Blood Glucose.: 105 mg/dL (18 Nov 2020 21:29)  POCT Blood Glucose.: 134 mg/dL (18 Nov 2020 15:58)    ABG - ( 19 Nov 2020 11:34 )  pH, Arterial: 7.10  pH, Blood: x     /  pCO2: 108   /  pO2: 105   / HCO3: 31    / Base Excess: -3.6  /  SaO2: 95        RADIOLOGY & ADDITIONAL TESTS:    Consultant(s) Notes Reviewed:  [x ] YES  [ ] NO    MEDICATIONS  (STANDING):  artificial  tears Solution 1 Drop(s) Both EYES two times a day  chlorhexidine 0.12% Liquid 15 milliLiter(s) Oral Mucosa every 12 hours  chlorhexidine 2% Cloths 1 Application(s) Topical <User Schedule>  cholecalciferol 1000 Unit(s) Oral daily  cisatracurium Infusion 3 MICROgram(s)/kG/Min (22.5 mL/Hr) IV Continuous <Continuous>  dexAMETHasone     Tablet 6 milliGRAM(s) Oral daily  enoxaparin Injectable 120 milliGRAM(s) SubCutaneous every 12 hours  fentaNYL   Infusion 2 MICROgram(s)/kG/Hr (25 mL/Hr) IV Continuous <Continuous>  norepinephrine Infusion 0.05 MICROgram(s)/kG/Min (5.86 mL/Hr) IV Continuous <Continuous>  pantoprazole  Injectable 40 milliGRAM(s) IV Push daily  propofol Infusion 20 MICROgram(s)/kG/Min (15 mL/Hr) IV Continuous <Continuous>  remdesivir  IVPB 100 milliGRAM(s) IV Intermittent every 24 hours    MEDICATIONS  (PRN):    ROS (prior to intubation): limited 2/2 patient condition, reports that he is feeling better, denies pain/discomfort    PHYSICAL EXAM   GENERAL: obese, +ETT, sedated  HEAD:  Atraumatic, Normocephalic  EYES: PERRLA, conjunctiva and sclera clear  ENT: Moist mucous membranes  NECK: Supple  NERVOUS SYSTEM:  sedated  CHEST/LUNG: increased WOB prior to intubation  HEART: S1S2 normal, RRR  ABDOMEN: Soft, Nontender, obese; Bowel sounds present  EXTREMITIES:  2+ Peripheral Pulse  PSYCH: appropriate mood and affect prior in intubation  SKIN: No rashes or lesions  Care Discussed with Consultants/Other Providers [ x] YES  [ ] NO

## 2020-11-20 NOTE — CONSULT NOTE ADULT - SUBJECTIVE AND OBJECTIVE BOX
Chandan Awad MD (Nephrology)  [address], [facility],  [address],  [facility]  TEl: [phone]  Cell: [phone]    Patient is a 50y old  Male who presents with a chief complaint of Shortness of breath D/t Covid (20 Nov 2020 12:04)      HPI:  Patient is a 49y old  Male who presents with a chief complaint of     Initial HPI on admission:  HPI:  Pt is a 49 yr old male with no PMH from home lives with wife who presented with shortness of breath. Pt states that we has been sick for a week with shortness of breath. He was diagnosed with covid 3 days ago and has been taking antibiotics and aspirin. Today his breathing was worse and he presented to ED. Pt denies any headache, abd pain pain, chest pain, nausea, vomiting or diarrhea. Pt was noted to be hypoxic in ED and was started on non-rebreather. Pt denies smoking and occasional alcohol intake. Per EMS, Pt was hypoxic to 50's and saturation improved on non-rebreather.    BRIEF HOSPITAL COURSE: Pt was hypoxic in ED, was placed on NRB with saturation in 80's. Pt admitted to ICU for acute hypoxic resp failure requiring high flow nasal canula    PAST MEDICAL & SURGICAL HISTORY:    Allergies    No Known Allergies  Intolerances      FAMILY HISTORY:  Non contributory      PAST MEDICAL & SURGICAL HISTORY:  No pertinent past medical history    No significant past surgical history      Allergies:  No Known Allergies      Home Medications:   artificial  tears Solution 1 Drop(s) Both EYES two times a day  aspirin 325 milliGRAM(s) Oral once  aspirin  chewable 81 milliGRAM(s) Oral daily  chlorhexidine 0.12% Liquid 15 milliLiter(s) Oral Mucosa every 12 hours  chlorhexidine 2% Cloths 1 Application(s) Topical <User Schedule>  cisatracurium Infusion 3 MICROgram(s)/kG/Min IV Continuous <Continuous>  clopidogrel Tablet 300 milliGRAM(s) Oral once  clopidogrel Tablet 75 milliGRAM(s) Oral daily  dexAMETHasone  Injectable 6 milliGRAM(s) IV Push daily  dextrose 5%. 1000 milliLiter(s) IV Continuous <Continuous>  fentaNYL   Infusion 2 MICROgram(s)/kG/Hr IV Continuous <Continuous>  furosemide   IVPB 120 milliGRAM(s) IV Intermittent once  glucagon  Injectable 1 milliGRAM(s) IntraMuscular once  insulin lispro (ADMELOG) corrective regimen sliding scale   SubCutaneous every 6 hours  norepinephrine Infusion 0.05 MICROgram(s)/kG/Min IV Continuous <Continuous>  pantoprazole  Injectable 40 milliGRAM(s) IV Push daily  propofol Infusion 20 MICROgram(s)/kG/Min IV Continuous <Continuous>      Hospital Medications:   MEDICATIONS  (STANDING):  artificial  tears Solution 1 Drop(s) Both EYES two times a day  aspirin 325 milliGRAM(s) Oral once  aspirin  chewable 81 milliGRAM(s) Oral daily  chlorhexidine 0.12% Liquid 15 milliLiter(s) Oral Mucosa every 12 hours  chlorhexidine 2% Cloths 1 Application(s) Topical <User Schedule>  cisatracurium Infusion 3 MICROgram(s)/kG/Min (22.5 mL/Hr) IV Continuous <Continuous>  clopidogrel Tablet 300 milliGRAM(s) Oral once  clopidogrel Tablet 75 milliGRAM(s) Oral daily  dexAMETHasone  Injectable 6 milliGRAM(s) IV Push daily  dextrose 5%. 1000 milliLiter(s) (100 mL/Hr) IV Continuous <Continuous>  fentaNYL   Infusion 2 MICROgram(s)/kG/Hr (25 mL/Hr) IV Continuous <Continuous>  furosemide   IVPB 120 milliGRAM(s) IV Intermittent once  glucagon  Injectable 1 milliGRAM(s) IntraMuscular once  insulin lispro (ADMELOG) corrective regimen sliding scale   SubCutaneous every 6 hours  norepinephrine Infusion 0.05 MICROgram(s)/kG/Min (5.86 mL/Hr) IV Continuous <Continuous>  pantoprazole  Injectable 40 milliGRAM(s) IV Push daily  propofol Infusion 20 MICROgram(s)/kG/Min (15 mL/Hr) IV Continuous <Continuous>      SOCIAL HISTORY:  Denies ETOh, Smoking,     Family History:  FAMILY HISTORY:      ROS:  Limited. Sedated and intubated on ventilatory support    VITALS:  T(F): 99.2 (11-20-20 @ 04:00), Max: 99.8 (11-19-20 @ 23:00)  HR: 97 (11-20-20 @ 14:00)  BP: --  RR: 32 (11-20-20 @ 14:00)  SpO2: 88% (11-20-20 @ 14:00)  Wt(kg): --    11-19 @ 07:01  -  11-20 @ 07:00  --------------------------------------------------------  IN: 3541.5 mL / OUT: 440 mL / NET: 3101.5 mL        CAPILLARY BLOOD GLUCOSE      POCT Blood Glucose.: 176 mg/dL (20 Nov 2020 12:34)  POCT Blood Glucose.: 135 mg/dL (20 Nov 2020 06:20)  POCT Blood Glucose.: 161 mg/dL (19 Nov 2020 22:53)  POCT Blood Glucose.: 203 mg/dL (19 Nov 2020 17:32)  POCT Blood Glucose.: 219 mg/dL (19 Nov 2020 17:31)        PHYSICAL EXAM:  GENERAL: Sedated and intubated on ventilatory support  HEENT: LEONOR, EOMI, neck supple, no JVP, no carotid bruits, no palpable thyromegaly or lymphadenopathy, no carotid bruits  CHEST/LUNG: Bilateral decreased breath sounds, bibasilar rales and rhonchi  HEART: Regular rate and rhythm, ERNESTO II/VI at LPSB, no gallops, no rub   ABDOMEN: Soft, nontender, non distended, bowel sounds present, no palpable organomegaly, no guarding, no rigidity, no rebound tenderness.  : No flank or supra pubic tenderness.  EXTREMITIES: Peripheral pulses are palpable, trace pedal edema, no calf tenderness  Musculoskeletal: No joint or spinal tenderness  Neurology: Sedated and intubated on ventilatory support  SKIN: No rash or skin lesion    LABS:  11-20    139  |  101  |  46<H>  ----------------------------<  167<H>  5.7<H>   |  27  |  5.21<H>    Ca    7.4<L>      20 Nov 2020 13:41  Phos  8.4     11-20  Mg     2.4     11-20    TPro  5.6<L>  /  Alb  2.5<L>  /  TBili  0.5  /  DBili      /  AST  67<H>  /  ALT  42  /  AlkPhos  110  11-20    Creatinine Trend: 5.21 <--, 3.55 <--, 1.14 <--, 1.14 <--, 1.10 <--, 1.11 <--, 1.30 <--                        15.4   11.07 )-----------( 135      ( 20 Nov 2020 07:05 )             49.2     Urine Studies:        RADIOLOGY & ADDITIONAL STUDIES:  r< from: Xray Chest 1 View- PORTABLE-Routine (Xray Chest 1 View- PORTABLE-Routine in AM.) (11.20.20 @ 12:11) >    EXAM:  XR CHEST PORTABLE ROUTINE 1V                            PROCEDURE DATE:  11/20/2020          INTERPRETATION:  CLINICAL INDICATION: 50 years  Male with ett.    COMPARISON: 11/19/2020    The tip of the ET tube is in the mid trachea. The tip ofthe feeding tube is below the diaphragm. The left jugular catheter tip overlies the superior vena cava.    AP view of the chest again demonstrates severe bilateral airspace infiltrates.. There is no pleural effusion. There is no pneumothorax.    The heart, mediastinum and cachorro cannot be assessed due to projection.    The pulmonary vasculature is normal.    Mild thoracic degenerative changes are present.    IMPRESSION:    Severe bilateral airspace infiltrates unchanged.    Tubes and catheters in satisfactory position.                    MILI PERALES MD; Attending Radiologist  This document has been electronically signed. Nov 20 2020  1:12PM    < end of copied text >    EKG findings reviewed.    Imaging Personally Reviewed:  [x] YES  [ ] NO    Consultant(s) and primary physician Notes Reviewed:  [x] YES  [ ] NO    Care Discussed with Primary team/ Consultants/Other Providers [x] YES  [ ] NO

## 2020-11-20 NOTE — CONSULT NOTE ADULT - ASSESSMENT
51 yo M with no significant PMH who presented with dyspnea in setting of COVID-19 infection, now intubated due to respiratory failure.    1. Elevated cardiac enzymes: May represent type II MI (demand ischemia) in setting of underlying infection; may also represent non-ischemic elevations in enzymes in setting of AGUSTO (creatinine 3.55)  -EKG is unchanged since admission. Since echocardiogram will not acutely change current management, would hold off until patient is downgraded from ICU  -Patient is on heparin gtt in setting of COVID-19 with elevated D-dimers    ***Note that this is a preliminary note and any recommendations should NOT be carried out until this note is finalized. ***     51 yo M with no significant PMH who presented with dyspnea in setting of COVID-19 infection, now intubated due to respiratory failure.    1. Elevated cardiac enzymes: May represent type II MI (demand ischemia) in setting of underlying infection; may also represent non-ischemic elevations in enzymes in setting of AGUSTO (creatinine 3.55)  -EKG is unchanged since admission. Since echocardiogram will not acutely change current management (i.e. cannot tolerate ACEi or beta blocker), would hold off until patient is downgraded from ICU  -Patient is on heparin gtt in setting of COVID-19 with elevated D-dimers; can be switched to Lovenox if preferred from ICU team  -No convincing evidence of primary MI (i.e. ACS); no need for aspirin or clopidogrel at this time. No need to trend troponins further.

## 2020-11-20 NOTE — PROGRESS NOTE ADULT - SUBJECTIVE AND OBJECTIVE BOX
HPI: 49M without PMH from home with wife with x1 week SOB, diagnosed with COVID outpatient x3 days prior to presentation, presented 2/2 worsening of respiratory symptoms. Hypoxic to 50s per EMS, COVID infection confirmed with repeat PCR, patient placed on NRB and admitted to ICU for further management of AHRF 2/2 COVID PNA.     INTERVAL HPI/OVERNIGHT EVENTS: SBPs soft to 90-100s overnight, Tmax 99.2. AMB am 7.06/101/136/28, vent 30>35/400/80/15, repeat ABG 7.13/80/127/26, will continue to adjust. Patient proned 7pm-11am. Continuing on gtt fentanyl 2mcg, propofol 50->30, levo 0.3mcg, and nimbex 1.7->1. Yesterday noted to have troponin 9->8.4. No changes noted on EKG. Lovenox dc, started heparin gtt, asa, Plavix, cardiology Dr. Tsang consulted. Ca 6.4 this am, gave 2g CaG, will repeat BMP 1pm. U/o noted to be low 10cc/hr, possibly 2/2 low BPs, abdominal pressure from pronation, or covid-related injury, gave x1 bolus 500cc LR and nephro Dr. Awad consulted. Remdesivir to be held tomorrow given CrCl <45. Started on TF Nepro 10cc/hr.    ICU Vital Signs Last 24 Hrs  T(C): 37.3 (20 Nov 2020 04:00), Max: 37.7 (19 Nov 2020 23:00)  T(F): 99.2 (20 Nov 2020 04:00), Max: 99.8 (19 Nov 2020 23:00)  HR: 103 (20 Nov 2020 10:00) (80 - 103)  BP: 69/42 (19 Nov 2020 12:04) (69/42 - 69/42)  BP(mean): 48 (19 Nov 2020 12:04) (48 - 48)  ABP: 112/64 (20 Nov 2020 10:00) (54/35 - 262/245)  ABP(mean): 78 (20 Nov 2020 10:00) (42 - 271)  RR: 20 (20 Nov 2020 10:00) (0 - 35)  SpO2: 93% (20 Nov 2020 10:00) (83% - 99%)    I&O's Summary    19 Nov 2020 07:01  -  20 Nov 2020 07:00  --------------------------------------------------------  IN: 3541.5 mL / OUT: 440 mL / NET: 3101.5 mL      Mode: AC/ CMV (Assist Control/ Continuous Mandatory Ventilation)  RR (machine): 20  TV (machine): 400  FiO2: 60  PEEP: 10  ITime: 1  MAP: 15  PIP: 24      LABS:                        15.4   11.07 )-----------( 135      ( 20 Nov 2020 07:05 )             49.2     11-20    142  |  105  |  38<H>  ----------------------------<  131<H>  4.6   |  23  |  3.55<H>    Ca    6.4<LL>      20 Nov 2020 07:05  Phos  8.4     11-20  Mg     2.4     11-20    TPro  5.6<L>  /  Alb  2.5<L>  /  TBili  0.5  /  DBili  x   /  AST  67<H>  /  ALT  42  /  AlkPhos  110  11-20    PT/INR - ( 19 Nov 2020 11:23 )   PT: 14.1 sec;   INR: 1.19 ratio         PTT - ( 20 Nov 2020 07:15 )  PTT:154.4 sec    CAPILLARY BLOOD GLUCOSE      POCT Blood Glucose.: 135 mg/dL (20 Nov 2020 06:20)  POCT Blood Glucose.: 161 mg/dL (19 Nov 2020 22:53)  POCT Blood Glucose.: 203 mg/dL (19 Nov 2020 17:32)  POCT Blood Glucose.: 219 mg/dL (19 Nov 2020 17:31)  POCT Blood Glucose.: 182 mg/dL (19 Nov 2020 11:25)    ABG - ( 20 Nov 2020 11:01 )  pH, Arterial: 7.01  pH, Blood: x     /  pCO2: 115   /  pO2: 78    / HCO3: 28    / Base Excess: -8.4  /  SaO2: 94        RADIOLOGY & ADDITIONAL TESTS:    Consultant(s) Notes Reviewed:  [x ] YES  [ ] NO    MEDICATIONS  (STANDING):  artificial  tears Solution 1 Drop(s) Both EYES two times a day  aspirin 325 milliGRAM(s) Oral once  aspirin  chewable 81 milliGRAM(s) Oral daily  chlorhexidine 0.12% Liquid 15 milliLiter(s) Oral Mucosa every 12 hours  chlorhexidine 2% Cloths 1 Application(s) Topical <User Schedule>  cisatracurium Infusion 3 MICROgram(s)/kG/Min (22.5 mL/Hr) IV Continuous <Continuous>  clopidogrel Tablet 300 milliGRAM(s) Oral once  clopidogrel Tablet 75 milliGRAM(s) Oral daily  dexAMETHasone  Injectable 6 milliGRAM(s) IV Push daily  fentaNYL   Infusion 2 MICROgram(s)/kG/Hr (25 mL/Hr) IV Continuous <Continuous>  heparin  Infusion.  Unit(s)/Hr (10 mL/Hr) IV Continuous <Continuous>  norepinephrine Infusion 0.05 MICROgram(s)/kG/Min (5.86 mL/Hr) IV Continuous <Continuous>  pantoprazole  Injectable 40 milliGRAM(s) IV Push daily  propofol Infusion 20 MICROgram(s)/kG/Min (15 mL/Hr) IV Continuous <Continuous>  sodium chloride 0.9% Bolus 500 milliLiter(s) IV Bolus once    MEDICATIONS  (PRN):    PHYSICAL EXAM   GENERAL: obese, +ETT, sedated, proned  HEAD:  Atraumatic, Normocephalic  EYES: not compressed against bed (proned)  ENT: Moist mucous membranes  NECK: Supple  NERVOUS SYSTEM:  sedated  CHEST/LUNG: lungs CTA  HEART: S1S2 normal, RRR  ABDOMEN: Soft, Nontender, obese  EXTREMITIES:  2+ Peripheral Pulse  PSYCH: sedated  SKIN: No rashes or lesions    Care Discussed with Consultants/Other Providers [ x] YES  [ ] NO

## 2020-11-20 NOTE — PROCEDURE NOTE - NSICDXPROCEDURE_GEN_ALL_CORE_FT
PROCEDURES:  Insertion, catheter, hemodialysis, non-tunneled, with US guidance 20-Nov-2020 16:44:47  Dara Perales

## 2020-11-20 NOTE — CONSULT NOTE ADULT - PROBLEM SELECTOR RECOMMENDATION 9
Oligoanuric AGUSTO likely ATN from septic shock/ARDS with worsening renal function and hyperkalemia  - Strict I/o  - Avoid Nephrotoxic agents  - Monitor BMP every 6 hrly  - Obtain HD catheter   - Agree with IV Lasix with albumin   - Will start RRT/HD if family agrees

## 2020-11-20 NOTE — PROGRESS NOTE ADULT - ATTENDING COMMENTS
49 yr  old obese male with out any significant medical history  who presented with sob due to acute hypoxic respiratory failure due to covid pneumonia.    Assessment:  1. Acute Hypoxic respiratory Failure   2. COVID-19 PNA  3. Morbid Obesity   4. Elevated lactate  5. Hypotension - sedation related  6. Type 2 MI - likely due to hypoxia  7. Acute kidney injury     Plan  -Intubated 11/19 for worsening hypoxia and worsening ARDS  -keep o2 sat >90%  -Proning 16 hours a day  - Sedation and paralytics for vent synchrony  - IV heparin given increasing D-Dimer  - Nephrology consult for worsening hyperkalemia and acidosis with renal failure  - Plan for HD today  -Cont iv decadron and remdesivir  -Monitor renal and hepatic function  -Vasopressor support to maintain MAP > 65  -DVT/ GI prophy  -Trickle tube feeds  -isolation : contact and airborne  - Prognosis is guarded 49 yr  old obese male with out any significant medical history  who presented with sob due to acute hypoxic respiratory failure due to covid pneumonia.    Assessment:  1. Acute Hypoxic respiratory Failure   2. COVID-19 PNA  3. Morbid Obesity   4. Elevated lactate  5. Hypotension - sedation related  6. Type 2 MI - likely due to hypoxia  7. Acute kidney injury     Plan  -Intubated 11/19 for worsening hypoxia and worsening ARDS  -keep o2 sat >90%  -Proning 16 hours a day  - Sedation and paralytics for vent synchrony  - IV heparin given increasing D-Dimer  - Nephrology consult for worsening hyperkalemia and acidosis with renal failure  - Plan for HD today  -Cont iv decadron and remdesivir  -Monitor renal and hepatic function  -Vasopressor support to maintain MAP > 65  -d/c remdesivir given low CrCl  -Cont. dexamethasone  -DVT/ GI prophy  -Trickle tube feeds  -isolation : contact and airborne  - Prognosis is guarded

## 2020-11-20 NOTE — CONSULT NOTE ADULT - SUBJECTIVE AND OBJECTIVE BOX
CHIEF COMPLAINT: Shortness of breath    HPI: 51 yo M who presented with dyspnea in setting of COVID-19 infection. Patient developed further respiratory failure requiring intubation and was transferred to the ICU. Cardiology service called as patient's cardiac enzymes were elevated (peak 9.0).  Patient     PAST MEDICAL & SURGICAL HISTORY:  No pertinent past medical history    No significant past surgical history    Allergies    No Known Allergies    MEDICATIONS  (STANDING):  artificial  tears Solution 1 Drop(s) Both EYES two times a day  aspirin 325 milliGRAM(s) Oral once  aspirin  chewable 81 milliGRAM(s) Oral daily  chlorhexidine 0.12% Liquid 15 milliLiter(s) Oral Mucosa every 12 hours  chlorhexidine 2% Cloths 1 Application(s) Topical <User Schedule>  cisatracurium Infusion 3 MICROgram(s)/kG/Min (22.5 mL/Hr) IV Continuous <Continuous>  clopidogrel Tablet 300 milliGRAM(s) Oral once  clopidogrel Tablet 75 milliGRAM(s) Oral daily  dexAMETHasone  Injectable 6 milliGRAM(s) IV Push daily  dextrose 5%. 1000 milliLiter(s) (100 mL/Hr) IV Continuous <Continuous>  fentaNYL   Infusion 2 MICROgram(s)/kG/Hr (25 mL/Hr) IV Continuous <Continuous>  glucagon  Injectable 1 milliGRAM(s) IntraMuscular once  heparin  Infusion.  Unit(s)/Hr (10 mL/Hr) IV Continuous <Continuous>  insulin lispro (ADMELOG) corrective regimen sliding scale   SubCutaneous every 6 hours  norepinephrine Infusion 0.05 MICROgram(s)/kG/Min (5.86 mL/Hr) IV Continuous <Continuous>  pantoprazole  Injectable 40 milliGRAM(s) IV Push daily  propofol Infusion 20 MICROgram(s)/kG/Min (15 mL/Hr) IV Continuous <Continuous>    MEDICATIONS  (PRN):      FAMILY HISTORY:    No family history of premature coronary artery disease or sudden cardiac death    SOCIAL HISTORY:  Smoking-  Alcohol-  Illicit Drug use-    REVIEW OF SYSTEMS:  Constitutional: [ ] fever, [ ]weight loss,  [ ]fatigue  Eyes: [ ] visual changes  Respiratory: [ ]shortness of breath;  [ ] cough, [ ]wheezing, [ ]chills, [ ]hemoptysis  Cardiovascular: [ ] chest pain, [ ]palpitations, [ ]dizziness,  [ ]leg swelling [ ]syncope  Gastrointestinal: [ ] abdominal pain, [ ]nausea, [ ]vomiting,  [ ]diarrhea   Genitourinary: [ ] dysuria, [ ] hematuria  Neurologic: [ ] headaches [ ] tremors  [ ] weakness [ ] lightheadedness  Skin: [ ] itching, [ ]burning, [ ] rashes  Endocrine: [ ] heat or cold intolerance  Musculoskeletal: [ ] joint pain or swelling; [ ] muscle, back, or extremity pain  Psychiatric: [ ] depression, [ ]anxiety, [ ]mood swings, or [ ]difficulty sleeping  Hematologic: [ ] easy bruising, [ ] bleeding gums       [ x] All others negative	  [ ] Unable to obtain    Vital Signs Last 24 Hrs  T(C): 37.3 (20 Nov 2020 04:00), Max: 37.7 (19 Nov 2020 23:00)  T(F): 99.2 (20 Nov 2020 04:00), Max: 99.8 (19 Nov 2020 23:00)  HR: 90 (20 Nov 2020 11:59) (80 - 103)  BP: --  BP(mean): --  RR: 20 (20 Nov 2020 10:00) (0 - 35)  SpO2: 90% (20 Nov 2020 11:59) (83% - 99%)  I&O's Summary    19 Nov 2020 07:01  -  20 Nov 2020 07:00  --------------------------------------------------------  IN: 3541.5 mL / OUT: 440 mL / NET: 3101.5 mL    PHYSICAL EXAM:  General: No acute distress  HEENT: EOMI, PERRL  Neck: Supple, No JVD  Lungs: Clear to auscultation bilaterally; No rales or wheezing  Heart: Regular rate and rhythm; No murmurs, rubs, or gallops  Abdomen: Nontender, bowel sounds present  Extremities: No clubbing, cyanosis, or edema  Nervous system:  Alert & Oriented X3, no focal deficits  Psychiatric: Normal affect  Skin: No rashes or lesions    LABS:  11-20    142  |  105  |  38<H>  ----------------------------<  131<H>  4.6   |  23  |  3.55<H>    Ca    6.4<LL>      20 Nov 2020 07:05  Phos  8.4     11-20  Mg     2.4     11-20    TPro  5.6<L>  /  Alb  2.5<L>  /  TBili  0.5  /  DBili  x   /  AST  67<H>  /  ALT  42  /  AlkPhos  110  11-20    Creatinine Trend: 3.55<--, 1.14<--, 1.14<--, 1.10<--, 1.11<--, 1.30<--                        15.4   11.07 )-----------( 135      ( 20 Nov 2020 07:05 )             49.2     PT/INR - ( 19 Nov 2020 11:23 )   PT: 14.1 sec;   INR: 1.19 ratio         PTT - ( 20 Nov 2020 07:15 )  PTT:154.4 sec    Lipid Panel:   Cardiac Enzymes: CARDIAC MARKERS ( 19 Nov 2020 17:32 )  8.280 ng/mL / x     / x     / x     / x      CARDIAC MARKERS ( 19 Nov 2020 11:23 )  9.000 ng/mL / x     / x     / x     / x        RADIOLOGY:   < from: Xray Chest 1 View-PORTABLE IMMEDIATE (Xray Chest 1 View-PORTABLE IMMEDIATE .) (11.19.20 @ 11:59) >  Interval minimal improvement in severe bilateral airspace infiltrates.    < end of copied text >    ECG [my interpretation]: 11/19/2020 @ 16:43: Sinus rhythm, normal axis, voltage criteria for LVH with repolarization abnormality; unchanged from 11/15    TELEMETRY:     CHIEF COMPLAINT: Shortness of breath    HPI: 51 yo M who presented with dyspnea in setting of COVID-19 infection. Patient developed further respiratory failure requiring intubation and was transferred to the ICU. Cardiology service called as patient's cardiac enzymes were elevated (peak 9.0).  Patient is sedated and on paralytics; unable to provide any additional history which was obtained from chart review as well as discussion with care providers.     PAST MEDICAL & SURGICAL HISTORY:  No pertinent past medical history    No significant past surgical history    Allergies    No Known Allergies    MEDICATIONS  (STANDING):  artificial  tears Solution 1 Drop(s) Both EYES two times a day  aspirin 325 milliGRAM(s) Oral once  aspirin  chewable 81 milliGRAM(s) Oral daily  chlorhexidine 0.12% Liquid 15 milliLiter(s) Oral Mucosa every 12 hours  chlorhexidine 2% Cloths 1 Application(s) Topical <User Schedule>  cisatracurium Infusion 3 MICROgram(s)/kG/Min (22.5 mL/Hr) IV Continuous <Continuous>  clopidogrel Tablet 300 milliGRAM(s) Oral once  clopidogrel Tablet 75 milliGRAM(s) Oral daily  dexAMETHasone  Injectable 6 milliGRAM(s) IV Push daily  dextrose 5%. 1000 milliLiter(s) (100 mL/Hr) IV Continuous <Continuous>  fentaNYL   Infusion 2 MICROgram(s)/kG/Hr (25 mL/Hr) IV Continuous <Continuous>  glucagon  Injectable 1 milliGRAM(s) IntraMuscular once  heparin  Infusion.  Unit(s)/Hr (10 mL/Hr) IV Continuous <Continuous>  insulin lispro (ADMELOG) corrective regimen sliding scale   SubCutaneous every 6 hours  norepinephrine Infusion 0.05 MICROgram(s)/kG/Min (5.86 mL/Hr) IV Continuous <Continuous>  pantoprazole  Injectable 40 milliGRAM(s) IV Push daily  propofol Infusion 20 MICROgram(s)/kG/Min (15 mL/Hr) IV Continuous <Continuous>    MEDICATIONS  (PRN):      FAMILY HISTORY:    No family history of premature coronary artery disease or sudden cardiac death    SOCIAL HISTORY:  Smoking-Unknown  Alcohol-Unknown  Illicit Drug use-Unknown    REVIEW OF SYSTEMS:  Constitutional: [ ] fever, [ ]weight loss,  [ ]fatigue  Eyes: [ ] visual changes  Respiratory: [ ]shortness of breath;  [ ] cough, [ ]wheezing, [ ]chills, [ ]hemoptysis  Cardiovascular: [ ] chest pain, [ ]palpitations, [ ]dizziness,  [ ]leg swelling [ ]syncope  Gastrointestinal: [ ] abdominal pain, [ ]nausea, [ ]vomiting,  [ ]diarrhea   Genitourinary: [ ] dysuria, [ ] hematuria  Neurologic: [ ] headaches [ ] tremors  [ ] weakness [ ] lightheadedness  Skin: [ ] itching, [ ]burning, [ ] rashes  Endocrine: [ ] heat or cold intolerance  Musculoskeletal: [ ] joint pain or swelling; [ ] muscle, back, or extremity pain  Psychiatric: [ ] depression, [ ]anxiety, [ ]mood swings, or [ ]difficulty sleeping  Hematologic: [ ] easy bruising, [ ] bleeding gums       [ ] All others negative	  [x ] Unable to obtain    Vital Signs Last 24 Hrs  T(C): 37.3 (20 Nov 2020 04:00), Max: 37.7 (19 Nov 2020 23:00)  T(F): 99.2 (20 Nov 2020 04:00), Max: 99.8 (19 Nov 2020 23:00)  HR: 90 (20 Nov 2020 11:59) (80 - 103)  BP: --  BP(mean): --  RR: 20 (20 Nov 2020 10:00) (0 - 35)  SpO2: 90% (20 Nov 2020 11:59) (83% - 99%)  I&O's Summary    19 Nov 2020 07:01  -  20 Nov 2020 07:00  --------------------------------------------------------  IN: 3541.5 mL / OUT: 440 mL / NET: 3101.5 mL    PHYSICAL EXAM:  see below    LABS:  11-20    142  |  105  |  38<H>  ----------------------------<  131<H>  4.6   |  23  |  3.55<H>    Ca    6.4<LL>      20 Nov 2020 07:05  Phos  8.4     11-20  Mg     2.4     11-20    TPro  5.6<L>  /  Alb  2.5<L>  /  TBili  0.5  /  DBili  x   /  AST  67<H>  /  ALT  42  /  AlkPhos  110  11-20    Creatinine Trend: 3.55<--, 1.14<--, 1.14<--, 1.10<--, 1.11<--, 1.30<--                        15.4   11.07 )-----------( 135      ( 20 Nov 2020 07:05 )             49.2     PT/INR - ( 19 Nov 2020 11:23 )   PT: 14.1 sec;   INR: 1.19 ratio         PTT - ( 20 Nov 2020 07:15 )  PTT:154.4 sec    Lipid Panel:   Cardiac Enzymes: CARDIAC MARKERS ( 19 Nov 2020 17:32 )  8.280 ng/mL / x     / x     / x     / x      CARDIAC MARKERS ( 19 Nov 2020 11:23 )  9.000 ng/mL / x     / x     / x     / x        RADIOLOGY:   < from: Xray Chest 1 View-PORTABLE IMMEDIATE (Xray Chest 1 View-PORTABLE IMMEDIATE .) (11.19.20 @ 11:59) >  Interval minimal improvement in severe bilateral airspace infiltrates.    < end of copied text >    ECG [my interpretation]: 11/19/2020 @ 16:43: Sinus rhythm, normal axis, voltage criteria for LVH with repolarization abnormality; unchanged from 11/15    TELEMETRY: Sinus tachycardia occasional PVCs

## 2020-11-20 NOTE — CONSULT NOTE ADULT - ASSESSMENT
Pt is a 49 yr old male with no PMH from home lives with wife who presented with shortness of breath. Pt states that we has been sick for a week with shortness of breath. He was diagnosed with covid 3 days ago and has been taking antibiotics and aspirin.

## 2020-11-20 NOTE — PROGRESS NOTE ADULT - ASSESSMENT
ASSESSMENT AND PLAN: 49M without PMH from home with wife admitted to ICU for of AHRF 2/2 COVID PNA.    Neuro: #sedation  -sedated with propofol, fentanyl  -titrated propofol 50->30 overnight given soft SBPs 90-100s  -paralyzed with Nimbex     CVS: #hypotension  -soft SBPs overnight 90-100s  -downtitrated propofol as above  -started Levophed 0.3    -#troponemia  -9->8.2 yesterday, no changes on EKG  -will treat as NSTEMI, started heparin gtt, asa, and Plavix  -cardio Dr. Tsang consulted    Pulmonary: #AHRF 2/2 COVID PNA  -ETT placed 11/19, sedated and paralyzed as above  -vent changed 30>35/400/80/15 this am 2/2 ABG 7.06/101/136/28, repeat improved to 7.13/80/127/26  -will continue to adjust and monitor as needed, repeat ABG 2pm  -proned 16h 7pm-11am today  -D-dimer elevated to 320->5529, ferritin 625->441, procal 0.11, CRP 0.1->0.67->3.39, , will repeat in am  -Lovenox held 2/2 heparin gtt as above  -continue dexamethasone IV 6mg Qd (10 day course through 11/25)  -IgA positive, IgG negative, indicating new infection, Dr. Amato approved remdesivir (11/17)  -will hold am remdesivir dose (11/20) given worsening renal function with CrCl <45    ID:   -history and management as above  -WBC 18->11.07 11/19-20  -RVP +COVID, no other infection noted  -BCx NGTD    Nephro: #AGUSTO  -decreasing u/o overnight to 10cc/hr, CrCl 44, Cr 3.55, possibly 2/2 covid injury, low BPs, compression from proning  -will place pressure FC to monitor for abdominal compartment syndrome  -trial x1 500cc LR bolus  -Ca 6.4 this am, gave 2g CaG, will recheck BMP 1pm  -fu CPK  -hold remdesivir given Cr <45 as above    GI: #TF  -NGT in place  -start TF with Nepro at 10cc/hr  -monitor LFTs for remdesivir admin, AST mildly elevated to 67, otherwise wnl  -Protonix IV for PPx    Heme: #thrombocytopenia  -106-->121-->125-->99->107-->135 today  -monitor    Endocrine: #BG elevated  -A1c 6, average glucose 126, 131 this am  -TSH wnl  -vit D moderately low to 22, will give 1000U/day   -FS q6  -started low-dose HSS given TF    Skin/Catheters:   #LIJ placed 11/19  #left radial artery 11/19  #FC (pressure)    Prophylaxis:  -hold full-dose Lovenox, started heparin gtt for NSTEMI ASSESSMENT AND PLAN: 49M without PMH from home with wife admitted to ICU for of AHRF 2/2 COVID PNA.    Neuro: #sedation  -sedated with propofol, fentanyl  -titrated propofol 50->30 overnight given soft SBPs 90-100s  -paralyzed with Nimbex     CVS: #hypotension  -soft SBPs overnight 90-100s  -downtitrated propofol as above  -started Levophed 0.3    -#troponemia  -9->8.2 yesterday, no changes on EKG  -will treat as NSTEMI, started heparin gtt, asa, and Plavix  -cardio Dr. Tsang consulted    Pulmonary: #AHRF 2/2 COVID PNA  -ETT placed 11/19, sedated and paralyzed as above  -vent changed 30>35/400/80/15 this am 2/2 ABG 7.06/101/136/28, repeat improved to 7.13/80/127/26  -will continue to adjust and monitor as needed, repeat ABG 2pm  -proned 16h 7pm-11am today  -D-dimer elevated to 320->5529, ferritin 625->441, procal 0.11, CRP 0.1->0.67->3.39, , will repeat in am  -Lovenox held 2/2 heparin gtt as above  -continue dexamethasone IV 6mg Qd (10 day course through 11/25)  -IgA positive, IgG negative, indicating new infection, Dr. Amato approved remdesivir (11/17)  -will hold am remdesivir dose (11/21) given worsening renal function with CrCl <45    ID:   -history and management as above  -WBC 18->11.07 11/19-20  -RVP +COVID, no other infection noted  -BCx NGTD    Nephro: #AGUSTO  -decreasing u/o overnight to 10cc/hr, CrCl 44, Cr 3.55, possibly 2/2 covid injury, low BPs, compression from proning  -will place pressure FC to monitor for abdominal compartment syndrome  -trial x1 500cc LR bolus  -Ca 6.4 this am, gave 2g CaG, will recheck BMP 1pm  -fu CPK  -hold remdesivir given Cr <45 as above    GI: #TF  -NGT in place  -start TF with Nepro at 10cc/hr  -monitor LFTs for remdesivir admin, AST mildly elevated to 67, otherwise wnl  -Protonix IV for PPx    Heme: #thrombocytopenia  -106-->121-->125-->99->107-->135 today  -monitor    Endocrine: #BG elevated  -A1c 6, average glucose 126, 131 this am  -TSH wnl  -vit D moderately low to 22, will give 1000U/day   -FS q6  -started low-dose HSS given TF    Skin/Catheters:   #LIJ placed 11/19  #left radial artery 11/19  #FC (pressure)    Prophylaxis:  -hold full-dose Lovenox, started heparin gtt for NSTEMI

## 2020-11-21 NOTE — PROGRESS NOTE ADULT - PROBLEM SELECTOR PLAN 1
Oligoanuric AGUSTO likely ATN from septic shock/ARDS requiring RRT/HD  - S/p HD treatment yesterday with 0.5 L fluid removal  - Strict I/o  - Avoid Nephrotoxic agents  - Monitor BMP  - Repeat HD treatment as per ordered with volume removal as tolerated.

## 2020-11-21 NOTE — PROGRESS NOTE ADULT - ATTENDING COMMENTS
49 yr  old obese male with out any significant medical history  who presented with sob due to acute hypoxic respiratory failure due to covid pneumonia.    Assessment:  1. Acute Hypoxic respiratory Failure   2. COVID-19 PNA  3. Morbid Obesity   4. Elevated lactate  5. Hypotension - sedation related  6. Type 2 MI - likely due to hypoxia  7. Acute kidney injury     Plan  -Intubated 11/19 for worsening hypoxia and worsening ARDS  -keep o2 sat >90%  -Proning 16 hours a day  - Sedation and paralytics for vent synchrony  - Mechanical ventilation on PC for now, adjusted to ABG  - unable to do low tidal volume ventilation due to severe acidosis   - IV heparin given increasing D-Dimer  - Nephrology for repeat HD today  -Cont iv decadron   - Holding remdesivir due to renal failure  -Monitor renal and hepatic function  -Vasopressor support to maintain MAP > 65  -DVT/ GI prophy  -Trickle tube feeds  - Add bowel regimen  -isolation : contact and airborne  - Prognosis is guarded 49 yr  old obese male with out any significant medical history  who presented with sob due to acute hypoxic respiratory failure due to covid pneumonia.    Assessment:  1. Acute Hypoxic respiratory Failure   2. COVID-19 PNA  3. Morbid Obesity   4. Elevated lactate  5. Hypotension - sedation related  6. Type 2 MI - likely due to hypoxia  7. Acute kidney injury     Plan  - Intubated 11/19 for worsening hypoxia and worsening ARDS  - keep o2 sat >90%  - Proning 16 hours a day  - Sedation and paralytics for vent synchrony  - Mechanical ventilation on PC for now, adjusted to ABG  - unable to do low tidal volume ventilation due to severe acidosis   - D/C bicarb gtt  - IV heparin given increasing D-Dimer  - Nephrology for repeat HD today  - Cont iv decadron   - Holding remdesivir due to renal failure  - Monitor renal and hepatic function  - Vasopressor support to maintain MAP > 65  - DVT/ GI prophy  - Trickle tube feeds  - Add bowel regimen  - isolation : contact and airborne  - Prognosis is guarded

## 2020-11-21 NOTE — PROGRESS NOTE ADULT - SUBJECTIVE AND OBJECTIVE BOX
Chandan Awad MD (Nephrology dialysis Note)  [address] [facility],  [address],  [facility] [id number]  TEl: [phone]  Cell: [phone]      Patient was seen and evaluated on dialysis.   HR: 81 (11-21-20 @ 18:00)  BP: 122/59 (11-21-20 @ 18:00)  Wt(kg): --  11-21    136  |  99  |  42<H>  ----------------------------<  144<H>  4.7   |  25  |  6.40<H>    Ca    7.5<L>      21 Nov 2020 06:24  Phos  8.8     11-21  Mg     2.6     11-21    TPro  5.9<L>  /  Alb  2.4<L>  /  TBili  0.8  /  DBili  x   /  AST  74<H>  /  ALT  37  /  AlkPhos  89  11-21      Continue dialysis:   Dialyzer: REvaclear 300              QB: 300           QD: 500         K bath: 2K  Goal UF 2 to 2.5 kg over 3 Hours    Patient is tolerating the procedure well.   Continue full hemodialysis treatment as prescribed.

## 2020-11-21 NOTE — PROGRESS NOTE ADULT - PROBLEM SELECTOR PLAN 5
Patient with ARDS/acute hypoxic respiratory failure on ventilatory support  Continue ventilatory support  Monitor respiratory status  Monitor vital signs/respiratory status  Ventilatory management per ICU team.  Fluid removal as tolerated

## 2020-11-21 NOTE — PROGRESS NOTE ADULT - PROBLEM SELECTOR PLAN 3
Patient with Calcium 7.5 with albumin 2.4, Phos 8.8, Mag 2.2  Received IV calcium  Consider add phoslo 667 mg po tid

## 2020-11-21 NOTE — PROGRESS NOTE ADULT - ASSESSMENT
ASSESSMENT AND PLAN: 49M without PMH from home with wife admitted to ICU for of AHRF 2/2 COVID PNA.    Neuro: #sedation  -sedated with propofol, fentanyl  -on propofol 40   -paralyzed with Nimbex     CVS: #hypotension  -soft SBPs overnight 90-100s  -On Levophed 0.4    -#troponemia  -9->8.2 , no changes on EKG  -As per Dr. Tsang, it' not NSTEMI, likely from leaking from general inflammation.  -cardio Dr. Tsang consulted    Pulmonary: #AHRF 2/2 COVID PNA  -ETT placed 11/19, sedated and paralyzed as above  -vent changed 36/400/100/15   -will continue to adjust and monitor as needed  -proned at 0am  -D-dimer elevated to 320->5529, ferritin 625->441, procal 0.11, CRP 0.1->0.67->3.39, , will repeat in am  -started heparin gtt, on hold due to hematruia overnight  -continue dexamethasone IV 6mg Qd (10 day course through 11/25)  -IgA positive, IgG negative, indicating new infection, Dr. Amato approved remdesivir (11/17)  -on hold am remdesivir dose (11/21) given worsening renal function with CrCl <45    ID:   -history and management as above  -WBC 18->11.07 11/19-20  -RVP +COVID, no other infection noted  -BCx NGTD    Nephro: #AGUSTO  - CrCl 44, Cr 3.55, possibly 2/2 covid injury, low BPs, compression from proning  -placed pressure FC11/20  to monitor for abdominal compartment syndrome  -trial x1 500cc LR bolus  -Ca 6.4 this am, gave 2g CaG, will recheck BMP 1pm  -fu CPK  -hold remdesivir given Crcl <45 as above    GI: #TF  -NGT in place  -TF with Nepro at 10cc/hr on hold during prone  -monitor LFTs for remdesivir admin, AST mildly elevated to 67, otherwise wnl  -Protonix IV for PPx    Heme: #thrombocytopenia  -106-->121-->125-->99->107-->135>138 today  -monitor    Endocrine: #BG elevated  -A1c 6, average glucose 126, 131 this am  -TSH wnl  -vit D moderately low to 22, c/w 1000U/day   -FS q6  -on  low-dose HSS given TF    Skin/Catheters:   #LIJ placed 11/19  #left radial artery 11/19  #FC (pressure)    Prophylaxis:  -hold full-dose Lovenox, on heparin gtt for elevated D-dimer, on hold due to hematuria ASSESSMENT AND PLAN: 49M without PMH from home with wife admitted to ICU for of AHRF 2/2 COVID PNA.    Neuro: #sedation  -sedated with propofol, fentanyl  -on propofol 40   -paralyzed with Nimbex     CVS: #hypotension  - MAP 70-80'S   -On Levophed 0.4    -#troponemia  -9->8.2 , no changes on EKG  -As per Dr. Tsang, it' not NSTEMI, likely from leaking from general inflammation.  -cardio Dr. Tsang consulted    Pulmonary: #AHRF 2/2 COVID PNA  -ETT placed 11/19, sedated and paralyzed as above  - On PC  28/PI 20/60/10   -proned at 0am, To be supined at 4pm   -D-dimer elevated to 320->5529 1198  ferritin 625->441, procal 0.11, CRP 0.1->0.67->3.39, , will repeat in am  -started heparin gtt as hematuria resolved  -continue dexamethasone IV 6mg Qd (10 day course through 11/25)  -IgA positive, IgG negative, indicating new infection, Dr. Amato approved remdesivir (11/17) but ON HOLD  given worsening renal function with CrCl <45    ID:   -history and management as above  -WBC 18->11.07 11/19-20  -RVP +COVID, no other infection noted  -BCx NGTD    Nephro: #AGUSTO  - CrCl 44, Cr 3.55, possibly 2/2 covid injury, low BPs, compression from proning  -placed pressure FC11/20  to monitor for abdominal compartment syndrome  Pt   -Ca 6.4 this am, gave 2g CaG, will recheck BMP 1pm  -    GI: #TF  -NGT in place  -TF with Nepro at 10cc/hr on hold during prone  -monitor LFTs for remdesivir admin, AST mildly elevated to 67, otherwise wnl  -Protonix IV for PPx    Heme: #thrombocytopenia  -106-->121-->125-->99->107-->135>138 today  -monitor    Endocrine: #BG elevated  -A1c 6, average glucose 126, 131 this am  -TSH wnl  -vit D moderately low to 22, c/w 1000U/day   -FS q6  -on  low-dose HSS given TF    Skin/Catheters:   #LIJ placed 11/19  #left radial artery 11/19  #FC (pressure)    Prophylaxis:  -hold full-dose Lovenox, on heparin gtt for elevated D-dimer, on hold due to hematuria

## 2020-11-21 NOTE — PROGRESS NOTE ADULT - SUBJECTIVE AND OBJECTIVE BOX
INTERVAL HPI/OVERNIGHT EVENTS:     Pt is intubated and sedated. Pt had HD overnight and proned around 0am. heparin drip was on hold.      Antimicrobial:    Cardiovascular:  norepinephrine Infusion 0.05 MICROgram(s)/kG/Min IV Continuous <Continuous>    Pulmonary:    Hematalogic:    Other:  artificial  tears Solution 1 Drop(s) Both EYES two times a day  chlorhexidine 0.12% Liquid 15 milliLiter(s) Oral Mucosa every 12 hours  chlorhexidine 2% Cloths 1 Application(s) Topical <User Schedule>  cisatracurium Infusion 3 MICROgram(s)/kG/Min IV Continuous <Continuous>  dexAMETHasone  Injectable 6 milliGRAM(s) IV Push daily  dextrose 5%. 1000 milliLiter(s) IV Continuous <Continuous>  fentaNYL   Infusion 2 MICROgram(s)/kG/Hr IV Continuous <Continuous>  glucagon  Injectable 1 milliGRAM(s) IntraMuscular once  insulin lispro (ADMELOG) corrective regimen sliding scale   SubCutaneous every 6 hours  mupirocin 2% Ointment 1 Application(s) Both Nostrils two times a day  pantoprazole  Injectable 40 milliGRAM(s) IV Push daily  propofol Infusion 20 MICROgram(s)/kG/Min IV Continuous <Continuous>    artificial  tears Solution 1 Drop(s) Both EYES two times a day  chlorhexidine 0.12% Liquid 15 milliLiter(s) Oral Mucosa every 12 hours  chlorhexidine 2% Cloths 1 Application(s) Topical <User Schedule>  cisatracurium Infusion 3 MICROgram(s)/kG/Min IV Continuous <Continuous>  dexAMETHasone  Injectable 6 milliGRAM(s) IV Push daily  dextrose 5%. 1000 milliLiter(s) IV Continuous <Continuous>  fentaNYL   Infusion 2 MICROgram(s)/kG/Hr IV Continuous <Continuous>  glucagon  Injectable 1 milliGRAM(s) IntraMuscular once  insulin lispro (ADMELOG) corrective regimen sliding scale   SubCutaneous every 6 hours  mupirocin 2% Ointment 1 Application(s) Both Nostrils two times a day  norepinephrine Infusion 0.05 MICROgram(s)/kG/Min IV Continuous <Continuous>  pantoprazole  Injectable 40 milliGRAM(s) IV Push daily  propofol Infusion 20 MICROgram(s)/kG/Min IV Continuous <Continuous>    Drug Dosing Weight  Height (cm): 154.4 (15 Nov 2020 09:25)  Weight (kg): 125 (15 Nov 2020 09:25)  BMI (kg/m2): 52.4 (15 Nov 2020 09:25)  BSA (m2): 2.16 (15 Nov 2020 09:25)    PRESSORS: [ ] YES [ ]No  WHICH:    CENTRAL LINE: [ ] YES [ ] NO  LOCATION:   DATE INSERTED:  REMOVE: [ ] YES [ ] NO  EXPLAIN:    MICHELLE: [ ] YES [ ] NO    DATE INSERTED:  REMOVE:  [ ] YES [ ] NO  EXPLAIN:    A-LINE:  [ ] YES [ ] NO  LOCATION:   DATE INSERTED:  REMOVE:  [ ] YES [ ] NO  EXPLAIN:    PMH -reviewed admission note, no change since admission  PAST MEDICAL & SURGICAL HISTORY:  No pertinent past medical history    No significant past surgical history        ICU Vital Signs Last 24 Hrs  T(C): 38.1 (20 Nov 2020 23:20), Max: 38.7 (20 Nov 2020 21:15)  T(F): 100.6 (20 Nov 2020 23:20), Max: 101.7 (20 Nov 2020 21:15)  HR: 94 (21 Nov 2020 00:27) (81 - 116)  BP: --  BP(mean): --  ABP: 116/69 (21 Nov 2020 00:00) (57/39 - 144/140)  ABP(mean): 85 (21 Nov 2020 00:00) (46 - 172)  RR: 36 (21 Nov 2020 00:00) (0 - 36)  SpO2: 96% (21 Nov 2020 00:27) (77% - 99%)      ABG - ( 20 Nov 2020 17:02 )  pH, Arterial: 7.07  pH, Blood: x     /  pCO2: 86    /  pO2: 64    / HCO3: 24    / Base Excess: -8.9  /  SaO2: 90                    11-19 @ 07:01  -  11-20 @ 07:00  --------------------------------------------------------  IN: 3756.6 mL / OUT: 440 mL / NET: 3316.6 mL        Mode: AC/ CMV (Assist Control/ Continuous Mandatory Ventilation)  RR (machine): 36  TV (machine): 400  FiO2: 100  PEEP: 14  ITime: 1  MAP: 22  PIP: 40      PHYSICAL EXAM:    GENERAL: obese, +ETT, sedated, proned  HEAD:  Atraumatic, Normocephalic  EYES: not compressed against bed (proned)  ENT: Moist mucous membranes  NECK: Supple  NERVOUS SYSTEM:  sedated  CHEST/LUNG: lungs CTA  HEART: S1S2 normal, RRR  ABDOMEN: Soft, Nontender, obese  EXTREMITIES:  2+ Peripheral Pulse  PSYCH: sedated  SKIN: No rashes or lesions    LABS:  CBC Full  -  ( 20 Nov 2020 17:20 )  WBC Count : 9.28 K/uL  RBC Count : 4.84 M/uL  Hemoglobin : 14.4 g/dL  Hematocrit : 46.5 %  Platelet Count - Automated : 138 K/uL  Mean Cell Volume : 96.1 fl  Mean Cell Hemoglobin : 29.8 pg  Mean Cell Hemoglobin Concentration : 31.0 gm/dL  Auto Neutrophil # : x  Auto Lymphocyte # : x  Auto Monocyte # : x  Auto Eosinophil # : x  Auto Basophil # : x  Auto Neutrophil % : x  Auto Lymphocyte % : x  Auto Monocyte % : x  Auto Eosinophil % : x  Auto Basophil % : x    11-20    139  |  101  |  46<H>  ----------------------------<  167<H>  5.7<H>   |  27  |  5.21<H>    Ca    7.4<L>      20 Nov 2020 13:41  Phos  8.4     11-20  Mg     2.4     11-20    TPro  5.6<L>  /  Alb  2.5<L>  /  TBili  0.5  /  DBili  x   /  AST  67<H>  /  ALT  42  /  AlkPhos  110  11-20    PT/INR - ( 20 Nov 2020 17:20 )   PT: 14.2 sec;   INR: 1.20 ratio         PTT - ( 20 Nov 2020 17:20 )  PTT:49.1 sec        RADIOLOGY & ADDITIONAL STUDIES REVIEWED:  ***    [ ]GOALS OF CARE DISCUSSION WITH PATIENT/FAMILY/PROXY:    CRITICAL CARE TIME SPENT: 35 minutes INTERVAL HPI/OVERNIGHT EVENTS:     Pt is intubated and sedated. Pt had first HD session overnight. Pt ABG from AM showed Resp acidosis and the vent settings were chnaged from VC to PC. Pi decreased to 20, RR dec to 28. Plan to restart heparin as Hematuria cleared. Pt making very poor urine.        Antimicrobial:    Cardiovascular:  norepinephrine Infusion 0.05 MICROgram(s)/kG/Min IV Continuous <Continuous>    Pulmonary:    Hematalogic:    Other:  artificial  tears Solution 1 Drop(s) Both EYES two times a day  chlorhexidine 0.12% Liquid 15 milliLiter(s) Oral Mucosa every 12 hours  chlorhexidine 2% Cloths 1 Application(s) Topical <User Schedule>  cisatracurium Infusion 3 MICROgram(s)/kG/Min IV Continuous <Continuous>  dexAMETHasone  Injectable 6 milliGRAM(s) IV Push daily  dextrose 5%. 1000 milliLiter(s) IV Continuous <Continuous>  fentaNYL   Infusion 2 MICROgram(s)/kG/Hr IV Continuous <Continuous>  glucagon  Injectable 1 milliGRAM(s) IntraMuscular once  insulin lispro (ADMELOG) corrective regimen sliding scale   SubCutaneous every 6 hours  mupirocin 2% Ointment 1 Application(s) Both Nostrils two times a day  pantoprazole  Injectable 40 milliGRAM(s) IV Push daily  propofol Infusion 20 MICROgram(s)/kG/Min IV Continuous <Continuous>    artificial  tears Solution 1 Drop(s) Both EYES two times a day  chlorhexidine 0.12% Liquid 15 milliLiter(s) Oral Mucosa every 12 hours  chlorhexidine 2% Cloths 1 Application(s) Topical <User Schedule>  cisatracurium Infusion 3 MICROgram(s)/kG/Min IV Continuous <Continuous>  dexAMETHasone  Injectable 6 milliGRAM(s) IV Push daily  dextrose 5%. 1000 milliLiter(s) IV Continuous <Continuous>  fentaNYL   Infusion 2 MICROgram(s)/kG/Hr IV Continuous <Continuous>  glucagon  Injectable 1 milliGRAM(s) IntraMuscular once  insulin lispro (ADMELOG) corrective regimen sliding scale   SubCutaneous every 6 hours  mupirocin 2% Ointment 1 Application(s) Both Nostrils two times a day  norepinephrine Infusion 0.05 MICROgram(s)/kG/Min IV Continuous <Continuous>  pantoprazole  Injectable 40 milliGRAM(s) IV Push daily  propofol Infusion 20 MICROgram(s)/kG/Min IV Continuous <Continuous>    Drug Dosing Weight  Height (cm): 154.4 (15 Nov 2020 09:25)  Weight (kg): 125 (15 Nov 2020 09:25)  BMI (kg/m2): 52.4 (15 Nov 2020 09:25)  BSA (m2): 2.16 (15 Nov 2020 09:25)    PRESSORS: [ ] YES [ ]No  WHICH:    CENTRAL LINE: [ ] YES [ ] NO  LOCATION:   DATE INSERTED:  REMOVE: [ ] YES [ ] NO  EXPLAIN:    MICHELLE: [ ] YES [ ] NO    DATE INSERTED:  REMOVE:  [ ] YES [ ] NO  EXPLAIN:    A-LINE:  [ ] YES [ ] NO  LOCATION:   DATE INSERTED:  REMOVE:  [ ] YES [ ] NO  EXPLAIN:    PMH -reviewed admission note, no change since admission  PAST MEDICAL & SURGICAL HISTORY:  No pertinent past medical history    No significant past surgical history        ICU Vital Signs Last 24 Hrs  T(C): 38.1 (20 Nov 2020 23:20), Max: 38.7 (20 Nov 2020 21:15)  T(F): 100.6 (20 Nov 2020 23:20), Max: 101.7 (20 Nov 2020 21:15)  HR: 94 (21 Nov 2020 00:27) (81 - 116)  BP: --  BP(mean): --  ABP: 116/69 (21 Nov 2020 00:00) (57/39 - 144/140)  ABP(mean): 85 (21 Nov 2020 00:00) (46 - 172)  RR: 36 (21 Nov 2020 00:00) (0 - 36)  SpO2: 96% (21 Nov 2020 00:27) (77% - 99%)      ABG - ( 20 Nov 2020 17:02 )  pH, Arterial: 7.07  pH, Blood: x     /  pCO2: 86    /  pO2: 64    / HCO3: 24    / Base Excess: -8.9  /  SaO2: 90                    11-19 @ 07:01  -  11-20 @ 07:00  --------------------------------------------------------  IN: 3756.6 mL / OUT: 440 mL / NET: 3316.6 mL        Mode: AC/ CMV (Assist Control/ Continuous Mandatory Ventilation)  RR (machine): 36  TV (machine): 400  FiO2: 100  PEEP: 14  ITime: 1  MAP: 22  PIP: 40      PHYSICAL EXAM:    GENERAL: obese, +ETT, sedated, proned  HEAD:  Atraumatic, Normocephalic  EYES: not compressed against bed (proned)  ENT: Moist mucous membranes  NECK: Supple  NERVOUS SYSTEM:  sedated  CHEST/LUNG: lungs CTA  HEART: S1S2 normal, RRR  ABDOMEN: Soft, Nontender, obese  EXTREMITIES:  2+ Peripheral Pulse  PSYCH: sedated  SKIN: No rashes or lesions    LABS:  CBC Full  -  ( 20 Nov 2020 17:20 )  WBC Count : 9.28 K/uL  RBC Count : 4.84 M/uL  Hemoglobin : 14.4 g/dL  Hematocrit : 46.5 %  Platelet Count - Automated : 138 K/uL  Mean Cell Volume : 96.1 fl  Mean Cell Hemoglobin : 29.8 pg  Mean Cell Hemoglobin Concentration : 31.0 gm/dL  Auto Neutrophil # : x  Auto Lymphocyte # : x  Auto Monocyte # : x  Auto Eosinophil # : x  Auto Basophil # : x  Auto Neutrophil % : x  Auto Lymphocyte % : x  Auto Monocyte % : x  Auto Eosinophil % : x  Auto Basophil % : x    11-20    139  |  101  |  46<H>  ----------------------------<  167<H>  5.7<H>   |  27  |  5.21<H>    Ca    7.4<L>      20 Nov 2020 13:41  Phos  8.4     11-20  Mg     2.4     11-20    TPro  5.6<L>  /  Alb  2.5<L>  /  TBili  0.5  /  DBili  x   /  AST  67<H>  /  ALT  42  /  AlkPhos  110  11-20    PT/INR - ( 20 Nov 2020 17:20 )   PT: 14.2 sec;   INR: 1.20 ratio         PTT - ( 20 Nov 2020 17:20 )  PTT:49.1 sec        RADIOLOGY & ADDITIONAL STUDIES REVIEWED:  ***    [ ]GOALS OF CARE DISCUSSION WITH PATIENT/FAMILY/PROXY:    CRITICAL CARE TIME SPENT: 35 minutes INTERVAL HPI/OVERNIGHT EVENTS:     Pt is intubated and sedated. Pt had first HD session overnight. Pt ABG from AM showed Resp acidosis and the vent settings were chnaged from VC to PC. Pi decreased to 20, RR dec to 28. Plan to restart heparin as Hematuria cleared. Pt making very poor urine.        Antimicrobial:    Cardiovascular:  norepinephrine Infusion 0.05 MICROgram(s)/kG/Min IV Continuous <Continuous>    Pulmonary:    Hematalogic:    Other:  artificial  tears Solution 1 Drop(s) Both EYES two times a day  chlorhexidine 0.12% Liquid 15 milliLiter(s) Oral Mucosa every 12 hours  chlorhexidine 2% Cloths 1 Application(s) Topical <User Schedule>  cisatracurium Infusion 3 MICROgram(s)/kG/Min IV Continuous <Continuous>  dexAMETHasone  Injectable 6 milliGRAM(s) IV Push daily  dextrose 5%. 1000 milliLiter(s) IV Continuous <Continuous>  fentaNYL   Infusion 2 MICROgram(s)/kG/Hr IV Continuous <Continuous>  glucagon  Injectable 1 milliGRAM(s) IntraMuscular once  insulin lispro (ADMELOG) corrective regimen sliding scale   SubCutaneous every 6 hours  mupirocin 2% Ointment 1 Application(s) Both Nostrils two times a day  pantoprazole  Injectable 40 milliGRAM(s) IV Push daily  propofol Infusion 20 MICROgram(s)/kG/Min IV Continuous <Continuous>    artificial  tears Solution 1 Drop(s) Both EYES two times a day  chlorhexidine 0.12% Liquid 15 milliLiter(s) Oral Mucosa every 12 hours  chlorhexidine 2% Cloths 1 Application(s) Topical <User Schedule>  cisatracurium Infusion 3 MICROgram(s)/kG/Min IV Continuous <Continuous>  dexAMETHasone  Injectable 6 milliGRAM(s) IV Push daily  dextrose 5%. 1000 milliLiter(s) IV Continuous <Continuous>  fentaNYL   Infusion 2 MICROgram(s)/kG/Hr IV Continuous <Continuous>  glucagon  Injectable 1 milliGRAM(s) IntraMuscular once  insulin lispro (ADMELOG) corrective regimen sliding scale   SubCutaneous every 6 hours  mupirocin 2% Ointment 1 Application(s) Both Nostrils two times a day  norepinephrine Infusion 0.05 MICROgram(s)/kG/Min IV Continuous <Continuous>  pantoprazole  Injectable 40 milliGRAM(s) IV Push daily  propofol Infusion 20 MICROgram(s)/kG/Min IV Continuous <Continuous>    Drug Dosing Weight  Height (cm): 154.4 (15 Nov 2020 09:25)  Weight (kg): 125 (15 Nov 2020 09:25)  BMI (kg/m2): 52.4 (15 Nov 2020 09:25)  BSA (m2): 2.16 (15 Nov 2020 09:25)    PRESSORS: [ ] YES [ ]No  WHICH:    CENTRAL LINE: [ ] YES [ ] NO  LOCATION:   DATE INSERTED:  REMOVE: [ ] YES [ ] NO  EXPLAIN:    MICHELLE: [ ] YES [ ] NO    DATE INSERTED:  REMOVE:  [ ] YES [ ] NO  EXPLAIN:    A-LINE:  [ ] YES [ ] NO  LOCATION:   DATE INSERTED:  REMOVE:  [ ] YES [ ] NO  EXPLAIN:    PMH -reviewed admission note, no change since admission  PAST MEDICAL & SURGICAL HISTORY:  No pertinent past medical history    No significant past surgical history        ICU Vital Signs Last 24 Hrs  T(C): 38.1 (20 Nov 2020 23:20), Max: 38.7 (20 Nov 2020 21:15)  T(F): 100.6 (20 Nov 2020 23:20), Max: 101.7 (20 Nov 2020 21:15)  HR: 94 (21 Nov 2020 00:27) (81 - 116)  BP: --  BP(mean): --  ABP: 116/69 (21 Nov 2020 00:00) (57/39 - 144/140)  ABP(mean): 85 (21 Nov 2020 00:00) (46 - 172)  RR: 36 (21 Nov 2020 00:00) (0 - 36)  SpO2: 96% (21 Nov 2020 00:27) (77% - 99%)      ABG - ( 20 Nov 2020 17:02 )  pH, Arterial: 7.07  pH, Blood: x     /  pCO2: 86    /  pO2: 64    / HCO3: 24    / Base Excess: -8.9  /  SaO2: 90                    11-19 @ 07:01  -  11-20 @ 07:00  --------------------------------------------------------  IN: 3756.6 mL / OUT: 440 mL / NET: 3316.6 mL        Mode: AC/ CMV (Assist Control/ Continuous Mandatory Ventilation)  RR (machine): 36  TV (machine): 400  FiO2: 100  PEEP: 14  ITime: 1  MAP: 22  PIP: 40      PHYSICAL EXAM:    GENERAL: obese, +ETT, sedated, proned  HEAD:  Atraumatic, Normocephalic  EYES: not compressed against bed (proned)  ENT: Moist mucous membranes  NECK: Supple  NERVOUS SYSTEM:  sedated  CHEST/LUNG: lungs CTA  HEART: S1S2 normal, RRR  ABDOMEN: Soft, Nontender, obese  EXTREMITIES:  2+ Peripheral Pulse  PSYCH: sedated  SKIN: No rashes or lesions    LABS:  CBC Full  -  ( 20 Nov 2020 17:20 )  WBC Count : 9.28 K/uL  RBC Count : 4.84 M/uL  Hemoglobin : 14.4 g/dL  Hematocrit : 46.5 %  Platelet Count - Automated : 138 K/uL  Mean Cell Volume : 96.1 fl  Mean Cell Hemoglobin : 29.8 pg  Mean Cell Hemoglobin Concentration : 31.0 gm/dL  Auto Neutrophil # : x  Auto Lymphocyte # : x  Auto Monocyte # : x  Auto Eosinophil # : x  Auto Basophil # : x  Auto Neutrophil % : x  Auto Lymphocyte % : x  Auto Monocyte % : x  Auto Eosinophil % : x  Auto Basophil % : x    11-20    139  |  101  |  46<H>  ----------------------------<  167<H>  5.7<H>   |  27  |  5.21<H>    Ca    7.4<L>      20 Nov 2020 13:41  Phos  8.4     11-20  Mg     2.4     11-20    TPro  5.6<L>  /  Alb  2.5<L>  /  TBili  0.5  /  DBili  x   /  AST  67<H>  /  ALT  42  /  AlkPhos  110  11-20    PT/INR - ( 20 Nov 2020 17:20 )   PT: 14.2 sec;   INR: 1.20 ratio         PTT - ( 20 Nov 2020 17:20 )  PTT:49.1 sec        RADIOLOGY & ADDITIONAL STUDIES REVIEWED:  ***    CXR  < from: Xray Chest 1 View- PORTABLE-Routine (Xray Chest 1 View- PORTABLE-Routine in AM.) (11.21.20 @ 10:38) >    EXAM:  XR CHEST PORTABLE ROUTINE 1V                            PROCEDURE DATE:  11/21/2020          INTERPRETATION:  XR CHEST. One view.    INDICATION: Intubated    COMPARISON: 11/20/2020    FINDINGS/  IMPRESSION:    Endotracheal tube tip. 7.5 cm above the paula. Enteric tube courses below the diaphragm. The right IJ central line within SVC. Left IJ central line within the brachiocephalic vein.    Diffuse lung opacities are unchanged.            SAYDA RENNER MD; Attending Radiologist  This document has been electronically signed. Nov 21 2020 12:36PM    < end of copied text >  [ ]GOALS OF CARE DISCUSSION WITH PATIENT/FAMILY/PROXY:    CRITICAL CARE TIME SPENT: 35 minutes

## 2020-11-21 NOTE — PROGRESS NOTE ADULT - SUBJECTIVE AND OBJECTIVE BOX
Chandan Awad MD (Nephrology progress note)  [address], [facility],  [id number],  [id number]  TEl: [phone]  Cell: [phone]    Patient is a 50y Male seen and evaluated at bedside. Vital signs, laboratory data, imaging studies, consult notes reviewed done within past 24 hours. Overnight events noted. Patient remains critically ill on ventilatory support and IV pressors overnight. Remain oliguric overnight and tolerated HD treatment yesterday. K level 4.7 today morning    Allergies    No Known Allergies    Intolerances        ROS:  Sedated and intubated on ventilatory support    VITALS:    T(C): 37.9 (11-21-20 @ 04:00), Max: 38.7 (11-20-20 @ 21:15)  HR: 72 (11-21-20 @ 08:00) (72 - 116)  BP: --  RR: 32 (11-21-20 @ 08:00) (14 - 36)  SpO2: 99% (11-21-20 @ 08:00) (77% - 99%)  CAPILLARY BLOOD GLUCOSE      POCT Blood Glucose.: 145 mg/dL (21 Nov 2020 06:01)  POCT Blood Glucose.: 134 mg/dL (20 Nov 2020 23:36)  POCT Blood Glucose.: 156 mg/dL (20 Nov 2020 17:09)  POCT Blood Glucose.: 176 mg/dL (20 Nov 2020 12:34)      11-20-20 @ 07:01  -  11-21-20 @ 07:00  --------------------------------------------------------  IN: 3736.5 mL / OUT: 960 mL / NET: 2776.5 mL    11-21-20 @ 07:01  -  11-21-20 @ 08:23  --------------------------------------------------------  IN: 70 mL / OUT: 0 mL / NET: 70 mL      MEDICATIONS  (STANDING):  artificial  tears Solution 1 Drop(s) Both EYES two times a day  chlorhexidine 2% Cloths 1 Application(s) Topical <User Schedule>  cisatracurium Infusion 3 MICROgram(s)/kG/Min (22.5 mL/Hr) IV Continuous <Continuous>  dexAMETHasone  Injectable 6 milliGRAM(s) IV Push daily  dextrose 5%. 1000 milliLiter(s) (100 mL/Hr) IV Continuous <Continuous>  fentaNYL   Infusion 2 MICROgram(s)/kG/Hr (25 mL/Hr) IV Continuous <Continuous>  glucagon  Injectable 1 milliGRAM(s) IntraMuscular once  insulin lispro (ADMELOG) corrective regimen sliding scale   SubCutaneous every 6 hours  mupirocin 2% Ointment 1 Application(s) Both Nostrils two times a day  norepinephrine Infusion 0.05 MICROgram(s)/kG/Min (5.86 mL/Hr) IV Continuous <Continuous>  pantoprazole  Injectable 40 milliGRAM(s) IV Push daily  propofol Infusion 20 MICROgram(s)/kG/Min (15 mL/Hr) IV Continuous <Continuous>    MEDICATIONS  (PRN):      PHYSICAL EXAM:  GENERAL: Sedated and intubated on ventilatory support  HEENT: LEONOR, EOMI, neck supple, no JVP, no carotid bruit, oral mucosa moist and pink.  CHEST/LUNG: Bilateral decreased breath sounds, Bibasilar rales and crepitations, no wheezing  HEART: Regular rate and rhythm, ERNESTO II/VI at LPSB, no gallops, no rub   ABDOMEN: Soft, nontender, non distended, bowel sounds present, no palpable organomegaly  : No flank or supra pubic tenderness.  EXTREMITIES: Peripheral pulses are palpable, no pedal edema  Neurology: Sedated and intubated on ventilatory support  SKIN: No rash or skin lesion  Musculoskeletal: No joint deformity or spinal tenderness.      Vascular ACCESS:     LABS:                        14.1   9.61  )-----------( 143      ( 21 Nov 2020 06:24 )             43.9     11-21    136  |  99  |  42<H>  ----------------------------<  144<H>  4.7   |  25  |  6.40<H>    Ca    7.5<L>      21 Nov 2020 06:24  Phos  8.8     11-21  Mg     2.6     11-21    TPro  5.9<L>  /  Alb  2.4<L>  /  TBili  0.8  /  DBili  x   /  AST  74<H>  /  ALT  37  /  AlkPhos  89  11-21    Hepatitis C Virus S/CO Ratio: 0.08 S/CO [0.00 - 0.99] (11-21 @ 01:01)  Hepatitis C Virus Interpretation: Nonreact (11-21 @ 01:01)    PT/INR - ( 20 Nov 2020 17:20 )   PT: 14.2 sec;   INR: 1.20 ratio         PTT - ( 20 Nov 2020 17:20 )  PTT:49.1 sec          RADIOLOGY & ADDITIONAL STUDIES:  rad< from: Xray Chest 1 View- PORTABLE-Urgent (Xray Chest 1 View- PORTABLE-Urgent .) (11.20.20 @ 16:51) >    EXAM:  XR CHEST PORTABLE URGENT 1V                            PROCEDURE DATE:  11/20/2020          INTERPRETATION:  Portable chest x-ray    INDICATION: Status post Shiley catheter placement.    Portable chest x-ray is compared to a previous examination dated 11/20/2020 at 11:39 AM.    IMPRESSION: Stable ET tube, and left IJ central venous catheter. Distal portion of the NG tube is excluded from the radiograph. New right IJ central venous catheter terminates at the SVC.    The left costophrenic angle is excluded from the radiograph. No gross evidence for pleural effusion or pneumothorax.    Airspace opacifications in both lungs, grossly stable left and progress on the right.    Stable cardiac silhouette.              LOKI BURTON MD; Attending Radiologist  This document has been electronically signed. Nov 20 2020  5:08PM    < end of copied text >    Imaging Personally Reviewed:  [x] YES  [ ] NO    Consultant(s) Notes Reviewed:  [x] YES  [ ] NO    Care Discussed with Primary team/ Other Providers [x] YES  [ ] NO

## 2020-11-21 NOTE — PROGRESS NOTE ADULT - PROBLEM SELECTOR PLAN 7
Patient with NSTEMi/Demand ischemia from severe septic shock  Continue antiplatelet agents and AC per primary/Cardiology team  Supportive care  Advanced directives per family.

## 2020-11-22 NOTE — PROGRESS NOTE ADULT - ASSESSMENT
ASSESSMENT AND PLAN: 49M without PMH from home with wife admitted to ICU for of AHRF 2/2 COVID PNA.    Neuro: #sedation  -sedated with propofol, fentanyl  -paralyzed with Nimbex     CVS: #hypotension  -SBPs to 70s 11/21  -added Levophed, resolved to SBPs 140s today  -[place] monitoring    -#troponemia  -9->8.2 , no changes on EKG  -cardio Dr. Tsang following, not NSTEMI, likely from leaking from general inflammation, asa and Plavix dc    Pulmonary: #AHRF 2/2 COVID PNA  -ETT placed 11/19, sedated and paralyzed as above  - On PC 30/PI 26/100/10   -supinated 4:30pm, proned 12:30am, acutely desat 60-70s with positional changes, resolves after 5-10 minutes  -D-dimer elevated to 320->5529 1198  ferritin 625->441, procal 0.11, CRP 0.1->0.67->3.39, , will repeat in am  -started heparin gtt as hematuria resolved  -continue dexamethasone IV 6mg Qd (10 day course through 11/25)  -IgA positive, IgG negative, indicating new infection, Dr. Amato approved remdesivir (11/17) but ON HOLD  given worsening renal function with CrCl <45    ID:   -history and management as above  -WBC 18->11.07 11/19-20  -RVP +COVID, no other infection noted  -BCx NGTD    Nephro: #AGUSTO  - CrCl 44, Cr 3.55, possibly 2/2 covid injury, low BPs, compression from proning  -placed pressure FC11/20  to monitor for abdominal compartment syndrome  Pt   -Ca 6.4 this am, gave 2g CaG, will recheck BMP 1pm  -    GI: #TF  -NGT in place  -TF with Nepro at 10cc/hr on hold during prone  -monitor LFTs for remdesivir admin, AST mildly elevated to 67, otherwise wnl  -Protonix IV for PPx    Heme: #thrombocytopenia  -106-->121-->125-->99->107-->135>138 today  -monitor    Endocrine: #BG elevated  -A1c 6, average glucose 126, 131 this am  -TSH wnl  -vit D moderately low to 22, c/w 1000U/day   -FS q6  -on  low-dose HSS given TF    Skin/Catheters:   #LIJ placed 11/19  #left radial artery 11/19  #FC (pressure)    Prophylaxis:  -hold full-dose Lovenox, on heparin gtt for elevated D-dimer, on hold due to hematuria ASSESSMENT AND PLAN: 49M without PMH from home with wife admitted to ICU for of AHRF 2/2 COVID PNA.    Neuro: #sedation  -sedated with propofol, fentanyl  -paralyzed with Nimbex     CVS: #hypotension  -SBPs to 70s 11/21  -added Levophed, resolved to SBPs 140s today  -[place] monitoring    -#troponemia  -9->8.2, no changes on EKG  -cardio Dr. Tsang following, not NSTEMI, likely from leaking from general inflammation, asa and Plavix dc    Pulmonary: #AHRF 2/2 COVID PNA  -ETT placed 11/19, sedated and paralyzed as above  -PC 30/PI 26/100/10   -supinated 4:30pm, proned 12:30am, acutely desat 60-70s with positional changes, resolves after 5-10 minutes  -D-dimer improving 320->5529->1195  ferritin 625->441->414, procal 0.11->3.13, CRP 0.1->0.67->3.39->8.64, ->683, will repeat 11/24  -heparin gtt restarted 11/21 as hematuria resolved, held early am 11/22 2/2 epistaxis  -continue dexamethasone IV 6mg Qd (10 day course through 11/25)  -IgA positive, IgG negative, indicating new infection, Dr. Amato approved remdesivir (11/17) but held from 11/21am given worsening renal function with CrCl <45    ID:   -history and management as above  -WBC 18->11.07->11.9 11/19-21  -RVP +COVID, procal increased to 3.13 as noted above, started cefepime 11/21  -BCx NGTD    Nephro: #AGUSTO  -CrCl 44, Cr 1.1 on admssion, acutely worsened 11/21, this am 6.4, possibly 2/2 covid injury and/or low BP  -placed pressure FC 11/20  to monitor for abdominal compartment syndrome  -RIJ HD placed 11/21 and HD started 11/21 and 11/22    GI: #TF  -NGT clogged, removed, will replace today once supine  -TF with Nepro at 10cc/hr, held while proned  -monitor LFTs for remdesivir admin, AST mildly elevated to 74, otherwise wnl  -Protonix IV for PPx    Heme: #thrombocytopenia  -resolved, 177 today  -monitor    Endocrine: #BG elevated  -A1c 6, average glucose 126, 131 this am  -TSH wnl  -vit D moderately low to 22, c/w 1000U/day   -FS q6  -on  low-dose HSS given TF    Skin/Catheters:   #LIJ placed 11/19  #left radial artery 11/19  #FC (pressure)    Prophylaxis:  -hold full-dose Lovenox, on heparin gtt for elevated D-dimer, on hold due to epistaxis

## 2020-11-22 NOTE — PROGRESS NOTE ADULT - SUBJECTIVE AND OBJECTIVE BOX
HPI: 49M without PMH from home with wife with x1 week SOB, diagnosed with COVID outpatient x3 days prior to presentation, presented 2/2 worsening of respiratory symptoms. Hypoxic to 50s per EMS, COVID infection confirmed with repeat PCR, patient placed on NRB and admitted to ICU for further management of AHRF 2/2 COVID PNA.     INTERVAL HPI/OVERNIGHT EVENTS: Proned 12am, acutely desat to 60-70s, improved to 90 after approx 15 minutes. Tachy to 160-170s overnight, improved s/p 500cc bolus NS. ABG improved to 7.21/53/123/20. On pressure control  U/O continues to be very low, 30cc/day. HD yesterday and today. Continue to monitor Cr. Continuing to hold remdesivir given renal failure. Procal noted to be increased to 3.13, indicating possible bacterial PNA, started cefepime.     ICU Vital Signs Last 24 Hrs  T(C): 37.2 (22 Nov 2020 00:00), Max: 37.9 (21 Nov 2020 04:00)  T(F): 99 (22 Nov 2020 00:00), Max: 100.2 (21 Nov 2020 04:00)  HR: 109 (22 Nov 2020 01:30) (68 - 114)  BP: 92/53 (22 Nov 2020 00:45) (71/37 - 181/84)  BP(mean): 62 (22 Nov 2020 00:45) (45 - 109)  ABP: 109/63 (22 Nov 2020 01:30) (49/33 - 179/88)  ABP(mean): 76 (22 Nov 2020 01:30) (40 - 337)  RR: 30 (22 Nov 2020 01:30) (0 - 36)  SpO2: 91% (22 Nov 2020 01:30) (61% - 99%)    I&O's Summary    20 Nov 2020 07:01  -  21 Nov 2020 07:00  --------------------------------------------------------  IN: 3736.5 mL / OUT: 960 mL / NET: 2776.5 mL    21 Nov 2020 07:01  -  22 Nov 2020 02:02  --------------------------------------------------------  IN: 2117 mL / OUT: 2530 mL / NET: -413 mL      Mode: AC/ CMV (Assist Control/ Continuous Mandatory Ventilation)  RR (machine): 30  FiO2: 100  PEEP: 10  ITime: 0.82  MAP: 20  PC: 24  PIP: 34      LABS:                        14.2   11.83 )-----------( 177      ( 21 Nov 2020 19:50 )             44.0     11-21    136  |  99  |  42<H>  ----------------------------<  144<H>  4.7   |  25  |  6.40<H>    Ca    7.5<L>      21 Nov 2020 06:24  Phos  8.8     11-21  Mg     2.6     11-21    TPro  5.9<L>  /  Alb  2.4<L>  /  TBili  0.8  /  DBili  x   /  AST  74<H>  /  ALT  37  /  AlkPhos  89  11-21    PT/INR - ( 20 Nov 2020 17:20 )   PT: 14.2 sec;   INR: 1.20 ratio         PTT - ( 21 Nov 2020 19:50 )  PTT:45.3 sec    CAPILLARY BLOOD GLUCOSE      POCT Blood Glucose.: 146 mg/dL (21 Nov 2020 22:33)  POCT Blood Glucose.: 180 mg/dL (21 Nov 2020 17:06)  POCT Blood Glucose.: 171 mg/dL (21 Nov 2020 12:04)  POCT Blood Glucose.: 145 mg/dL (21 Nov 2020 06:01)    ABG - ( 21 Nov 2020 16:12 )  pH, Arterial: 7.21  pH, Blood: x     /  pCO2: 53    /  pO2: 123   / HCO3: 20    / Base Excess: -8.0  /  SaO2: 98                  RADIOLOGY & ADDITIONAL TESTS:    Consultant(s) Notes Reviewed:  [x ] YES  [ ] NO    MEDICATIONS  (STANDING):  budesonide 160 MICROgram(s)/formoterol 4.5 MICROgram(s) Inhaler 2 Puff(s) Inhalation two times a day  cefepime   IVPB      cefepime   IVPB 1000 milliGRAM(s) IV Intermittent every 12 hours  chlorhexidine 2% Cloths 1 Application(s) Topical <User Schedule>  cisatracurium Infusion 3 MICROgram(s)/kG/Min (22.5 mL/Hr) IV Continuous <Continuous>  dexAMETHasone  Injectable 6 milliGRAM(s) IV Push daily  fentaNYL   Infusion 2 MICROgram(s)/kG/Hr (25 mL/Hr) IV Continuous <Continuous>  glucagon  Injectable 1 milliGRAM(s) IntraMuscular once  insulin lispro (ADMELOG) corrective regimen sliding scale   SubCutaneous every 6 hours  lubricant eye ont 1 Application(s) 1 Application(s) Both EYES two times a day  mupirocin 2% Ointment 1 Application(s) Both Nostrils two times a day  norepinephrine Infusion 0.05 MICROgram(s)/kG/Min (5.86 mL/Hr) IV Continuous <Continuous>  pantoprazole  Injectable 40 milliGRAM(s) IV Push daily  polyethylene glycol 3350 17 Gram(s) Oral daily  propofol Infusion 20 MICROgram(s)/kG/Min (15 mL/Hr) IV Continuous <Continuous>  senna 2 Tablet(s) Oral at bedtime  sodium chloride 0.9% Bolus 500 milliLiter(s) IV Bolus once    MEDICATIONS  (PRN):      PHYSICAL EXAM:  GENERAL: well built, well nourished  HEAD:  Atraumatic, Normocephalic  EYES: EOMI, PERRLA, conjunctiva and sclera clear  ENT: No tonsillar erythema, exudates, or enlargement; Moist mucous membranes, Good dentition, No lesions  NECK: Supple, No JVD, Normal thyroid, no enlarged nodes  NERVOUS SYSTEM:  Alert & Oriented X3, Good concentration; Motor Strength 5/5 B/L upper and lower extremities; DTRs 2+ intact and symmetric, sensory intact  CHEST/LUNG: B/L good air entry; No rales, rhonchi, or wheezing  HEART: S1S2 normal, no S3, Regular rate and rhythm; No murmurs, rubs, or gallops  ABDOMEN: Soft, Nontender, Nondistended; Bowel sounds present  EXTREMITIES:  2+ Peripheral Pulses, No clubbing, cyanosis, or edema  LYMPH: No lymphadenopathy noted  SKIN: No rashes or lesions    Care Discussed with Consultants/Other Providers [ x] YES  [ ] NO HPI: 49M without PMH from home with wife with x1 week SOB, diagnosed with COVID outpatient x3 days prior to presentation, presented 2/2 worsening of respiratory symptoms. Hypoxic to 50s per EMS, COVID infection confirmed with repeat PCR, patient placed on NRB and admitted to ICU for further management of AHRF 2/2 COVID PNA.     INTERVAL HPI/OVERNIGHT EVENTS: Proned 12am, acutely desat to 60-70s, improved to 90 after approx 10 minutes. Tachy to 160-170s overnight, improved s/p 500cc bolus NS. ABG improved to 7.21/53/123/20. On pressure control 30, PIP 26/100/10. U/O continues to be very low, 30cc/day. HD yesterday and today. Continue to monitor Cr. Continuing to hold remdesivir given renal failure. Procal noted to be increased to 3.13, indicating possible bacterial PNA, started cefepime.     ICU Vital Signs Last 24 Hrs  T(C): 37.2 (22 Nov 2020 00:00), Max: 37.9 (21 Nov 2020 04:00)  T(F): 99 (22 Nov 2020 00:00), Max: 100.2 (21 Nov 2020 04:00)  HR: 109 (22 Nov 2020 01:30) (68 - 114)  BP: 92/53 (22 Nov 2020 00:45) (71/37 - 181/84)  BP(mean): 62 (22 Nov 2020 00:45) (45 - 109)  ABP: 109/63 (22 Nov 2020 01:30) (49/33 - 179/88)  ABP(mean): 76 (22 Nov 2020 01:30) (40 - 337)  RR: 30 (22 Nov 2020 01:30) (0 - 36)  SpO2: 91% (22 Nov 2020 01:30) (61% - 99%)    I&O's Summary    20 Nov 2020 07:01  -  21 Nov 2020 07:00  --------------------------------------------------------  IN: 3736.5 mL / OUT: 960 mL / NET: 2776.5 mL    21 Nov 2020 07:01  -  22 Nov 2020 02:02  --------------------------------------------------------  IN: 2117 mL / OUT: 2530 mL / NET: -413 mL      Mode: AC/ CMV (Assist Control/ Continuous Mandatory Ventilation)  RR (machine): 30  FiO2: 100  PEEP: 10  ITime: 0.82  MAP: 20  PC: 24  PIP: 34      LABS:                        14.2   11.83 )-----------( 177      ( 21 Nov 2020 19:50 )             44.0     11-21    136  |  99  |  42<H>  ----------------------------<  144<H>  4.7   |  25  |  6.40<H>    Ca    7.5<L>      21 Nov 2020 06:24  Phos  8.8     11-21  Mg     2.6     11-21    TPro  5.9<L>  /  Alb  2.4<L>  /  TBili  0.8  /  DBili  x   /  AST  74<H>  /  ALT  37  /  AlkPhos  89  11-21    PT/INR - ( 20 Nov 2020 17:20 )   PT: 14.2 sec;   INR: 1.20 ratio         PTT - ( 21 Nov 2020 19:50 )  PTT:45.3 sec    CAPILLARY BLOOD GLUCOSE      POCT Blood Glucose.: 146 mg/dL (21 Nov 2020 22:33)  POCT Blood Glucose.: 180 mg/dL (21 Nov 2020 17:06)  POCT Blood Glucose.: 171 mg/dL (21 Nov 2020 12:04)  POCT Blood Glucose.: 145 mg/dL (21 Nov 2020 06:01)    ABG - ( 21 Nov 2020 16:12 )  pH, Arterial: 7.21  pH, Blood: x     /  pCO2: 53    /  pO2: 123   / HCO3: 20    / Base Excess: -8.0  /  SaO2: 98                  RADIOLOGY & ADDITIONAL TESTS:    Consultant(s) Notes Reviewed:  [x ] YES  [ ] NO    MEDICATIONS  (STANDING):  budesonide 160 MICROgram(s)/formoterol 4.5 MICROgram(s) Inhaler 2 Puff(s) Inhalation two times a day  cefepime   IVPB      cefepime   IVPB 1000 milliGRAM(s) IV Intermittent every 12 hours  chlorhexidine 2% Cloths 1 Application(s) Topical <User Schedule>  cisatracurium Infusion 3 MICROgram(s)/kG/Min (22.5 mL/Hr) IV Continuous <Continuous>  dexAMETHasone  Injectable 6 milliGRAM(s) IV Push daily  fentaNYL   Infusion 2 MICROgram(s)/kG/Hr (25 mL/Hr) IV Continuous <Continuous>  glucagon  Injectable 1 milliGRAM(s) IntraMuscular once  insulin lispro (ADMELOG) corrective regimen sliding scale   SubCutaneous every 6 hours  lubricant eye ont 1 Application(s) 1 Application(s) Both EYES two times a day  mupirocin 2% Ointment 1 Application(s) Both Nostrils two times a day  norepinephrine Infusion 0.05 MICROgram(s)/kG/Min (5.86 mL/Hr) IV Continuous <Continuous>  pantoprazole  Injectable 40 milliGRAM(s) IV Push daily  polyethylene glycol 3350 17 Gram(s) Oral daily  propofol Infusion 20 MICROgram(s)/kG/Min (15 mL/Hr) IV Continuous <Continuous>  senna 2 Tablet(s) Oral at bedtime  sodium chloride 0.9% Bolus 500 milliLiter(s) IV Bolus once    MEDICATIONS  (PRN):      GENERAL: obese, +ETT, sedated, proned  HEAD:  Atraumatic, Normocephalic  EYES: not compressed against bed (proned)  ENT: Moist mucous membranes  NECK: Supple  NERVOUS SYSTEM:  sedated  CHEST/LUNG: lungs CTA  HEART: S1S2 normal, RRR  ABDOMEN: Soft, Nontender, obese  EXTREMITIES:  2+ Peripheral Pulse  PSYCH: sedated  SKIN: No rashes or lesions    Care Discussed with Consultants/Other Providers [ x] YES  [ ] NO HPI: 49M without PMH from home with wife with x1 week SOB, diagnosed with COVID outpatient x3 days prior to presentation, presented 2/2 worsening of respiratory symptoms. Hypoxic to 50s per EMS, COVID infection confirmed with repeat PCR, patient placed on NRB and admitted to ICU for further management of AHRF 2/2 COVID PNA.     INTERVAL HPI/OVERNIGHT EVENTS: Proned 12am, acutely desat to 60-70s, improved to 90 after approx 10 minutes. Tachy to 160-170s overnight, improved s/p 500cc bolus NS. ABG improved to 7.21/53/123/20. On pressure control 30, PIP 26/100/10. U/O continues to be very low, 30cc/day. HD yesterday and today. Continue to monitor Cr. Continuing to hold remdesivir given renal failure. Procal noted to be increased to 3.13, indicating possible bacterial PNA, started cefepime.   - patient continues to have HR of 130's, possibly secondary to inadequate pain control. Fentanyl increased.     ICU Vital Signs Last 24 Hrs  T(C): 37.2 (22 Nov 2020 00:00), Max: 37.9 (21 Nov 2020 04:00)  T(F): 99 (22 Nov 2020 00:00), Max: 100.2 (21 Nov 2020 04:00)  HR: 109 (22 Nov 2020 01:30) (68 - 114)  BP: 92/53 (22 Nov 2020 00:45) (71/37 - 181/84)  BP(mean): 62 (22 Nov 2020 00:45) (45 - 109)  ABP: 109/63 (22 Nov 2020 01:30) (49/33 - 179/88)  ABP(mean): 76 (22 Nov 2020 01:30) (40 - 337)  RR: 30 (22 Nov 2020 01:30) (0 - 36)  SpO2: 91% (22 Nov 2020 01:30) (61% - 99%)    I&O's Summary    20 Nov 2020 07:01  -  21 Nov 2020 07:00  --------------------------------------------------------  IN: 3736.5 mL / OUT: 960 mL / NET: 2776.5 mL    21 Nov 2020 07:01  -  22 Nov 2020 02:02  --------------------------------------------------------  IN: 2117 mL / OUT: 2530 mL / NET: -413 mL      Mode: AC/ CMV (Assist Control/ Continuous Mandatory Ventilation)  RR (machine): 30  FiO2: 100  PEEP: 10  ITime: 0.82  MAP: 20  PC: 24  PIP: 34      LABS:                        14.2   11.83 )-----------( 177      ( 21 Nov 2020 19:50 )             44.0     11-21    136  |  99  |  42<H>  ----------------------------<  144<H>  4.7   |  25  |  6.40<H>    Ca    7.5<L>      21 Nov 2020 06:24  Phos  8.8     11-21  Mg     2.6     11-21    TPro  5.9<L>  /  Alb  2.4<L>  /  TBili  0.8  /  DBili  x   /  AST  74<H>  /  ALT  37  /  AlkPhos  89  11-21    PT/INR - ( 20 Nov 2020 17:20 )   PT: 14.2 sec;   INR: 1.20 ratio         PTT - ( 21 Nov 2020 19:50 )  PTT:45.3 sec    CAPILLARY BLOOD GLUCOSE      POCT Blood Glucose.: 146 mg/dL (21 Nov 2020 22:33)  POCT Blood Glucose.: 180 mg/dL (21 Nov 2020 17:06)  POCT Blood Glucose.: 171 mg/dL (21 Nov 2020 12:04)  POCT Blood Glucose.: 145 mg/dL (21 Nov 2020 06:01)    ABG - ( 21 Nov 2020 16:12 )  pH, Arterial: 7.21  pH, Blood: x     /  pCO2: 53    /  pO2: 123   / HCO3: 20    / Base Excess: -8.0  /  SaO2: 98                  RADIOLOGY & ADDITIONAL TESTS:    Consultant(s) Notes Reviewed:  [x ] YES  [ ] NO    MEDICATIONS  (STANDING):  budesonide 160 MICROgram(s)/formoterol 4.5 MICROgram(s) Inhaler 2 Puff(s) Inhalation two times a day  cefepime   IVPB      cefepime   IVPB 1000 milliGRAM(s) IV Intermittent every 12 hours  chlorhexidine 2% Cloths 1 Application(s) Topical <User Schedule>  cisatracurium Infusion 3 MICROgram(s)/kG/Min (22.5 mL/Hr) IV Continuous <Continuous>  dexAMETHasone  Injectable 6 milliGRAM(s) IV Push daily  fentaNYL   Infusion 2 MICROgram(s)/kG/Hr (25 mL/Hr) IV Continuous <Continuous>  glucagon  Injectable 1 milliGRAM(s) IntraMuscular once  insulin lispro (ADMELOG) corrective regimen sliding scale   SubCutaneous every 6 hours  lubricant eye ont 1 Application(s) 1 Application(s) Both EYES two times a day  mupirocin 2% Ointment 1 Application(s) Both Nostrils two times a day  norepinephrine Infusion 0.05 MICROgram(s)/kG/Min (5.86 mL/Hr) IV Continuous <Continuous>  pantoprazole  Injectable 40 milliGRAM(s) IV Push daily  polyethylene glycol 3350 17 Gram(s) Oral daily  propofol Infusion 20 MICROgram(s)/kG/Min (15 mL/Hr) IV Continuous <Continuous>  senna 2 Tablet(s) Oral at bedtime  sodium chloride 0.9% Bolus 500 milliLiter(s) IV Bolus once    MEDICATIONS  (PRN):      GENERAL: obese, +ETT, sedated, proned  HEAD:  Atraumatic, Normocephalic  EYES: not compressed against bed (proned)  ENT: Moist mucous membranes  NECK: Supple  NERVOUS SYSTEM:  sedated  CHEST/LUNG: lungs CTA  HEART: S1S2 normal, RRR  ABDOMEN: Soft, Nontender, obese  EXTREMITIES:  2+ Peripheral Pulse  PSYCH: sedated  SKIN: No rashes or lesions    Care Discussed with Consultants/Other Providers [ x] YES  [ ] NO HPI: 49M without PMH from home with wife with x1 week SOB, diagnosed with COVID outpatient x3 days prior to presentation, presented 2/2 worsening of respiratory symptoms. Hypoxic to 50s per EMS, COVID infection confirmed with repeat PCR, patient placed on NRB and admitted to ICU for further management of AHRF 2/2 COVID PNA.     INTERVAL HPI/OVERNIGHT EVENTS: Proned 12am, acutely desat to 60-70s, improved to 90 after approx 10 minutes. Tachy to 160-170s overnight, improved s/p 500cc bolus NS. ABG improved to 7.21/53/123/20. On pressure control 30, PIP 26/100/10. U/O continues to be very low, 30cc/day. HD yesterday and today. Continue to monitor Cr. Continuing to hold remdesivir given renal failure. Procal noted to be increased to 3.13, indicating possible bacterial PNA, started cefepime.   - patient continues to have HR of 130's, possibly secondary to inadequate pain control. Fentanyl increased.     ICU Vital Signs Last 24 Hrs  T(C): 37.2 (22 Nov 2020 00:00), Max: 37.9 (21 Nov 2020 04:00)  T(F): 99 (22 Nov 2020 00:00), Max: 100.2 (21 Nov 2020 04:00)  HR: 109 (22 Nov 2020 01:30) (68 - 114)  BP: 92/53 (22 Nov 2020 00:45) (71/37 - 181/84)  BP(mean): 62 (22 Nov 2020 00:45) (45 - 109)  ABP: 109/63 (22 Nov 2020 01:30) (49/33 - 179/88)  ABP(mean): 76 (22 Nov 2020 01:30) (40 - 337)  RR: 30 (22 Nov 2020 01:30) (0 - 36)  SpO2: 91% (22 Nov 2020 01:30) (61% - 99%)    I&O's Summary    20 Nov 2020 07:01  -  21 Nov 2020 07:00  --------------------------------------------------------  IN: 3736.5 mL / OUT: 960 mL / NET: 2776.5 mL    21 Nov 2020 07:01  -  22 Nov 2020 02:02  --------------------------------------------------------  IN: 2117 mL / OUT: 2530 mL / NET: -413 mL      Mode: AC/ CMV (Assist Control/ Continuous Mandatory Ventilation)  RR (machine): 30  FiO2: 100  PEEP: 10  ITime: 0.82  MAP: 20  PC: 24  PIP: 34      LABS:                        14.2   11.83 )-----------( 177      ( 21 Nov 2020 19:50 )             44.0     11-21    136  |  99  |  42<H>  ----------------------------<  144<H>  4.7   |  25  |  6.40<H>    Ca    7.5<L>      21 Nov 2020 06:24  Phos  8.8     11-21  Mg     2.6     11-21    TPro  5.9<L>  /  Alb  2.4<L>  /  TBili  0.8  /  DBili  x   /  AST  74<H>  /  ALT  37  /  AlkPhos  89  11-21    PT/INR - ( 20 Nov 2020 17:20 )   PT: 14.2 sec;   INR: 1.20 ratio         PTT - ( 21 Nov 2020 19:50 )  PTT:45.3 sec    CAPILLARY BLOOD GLUCOSE      POCT Blood Glucose.: 146 mg/dL (21 Nov 2020 22:33)  POCT Blood Glucose.: 180 mg/dL (21 Nov 2020 17:06)  POCT Blood Glucose.: 171 mg/dL (21 Nov 2020 12:04)  POCT Blood Glucose.: 145 mg/dL (21 Nov 2020 06:01)    ABG - ( 21 Nov 2020 16:12 )  pH, Arterial: 7.21  pH, Blood: x     /  pCO2: 53    /  pO2: 123   / HCO3: 20    / Base Excess: -8.0  /  SaO2: 98                  RADIOLOGY & ADDITIONAL TESTS:    CXR:  < from: Xray Chest 1 View- PORTABLE-Routine (Xray Chest 1 View- PORTABLE-Routine in AM.) (11.21.20 @ 10:38) >    EXAM:  XR CHEST PORTABLE ROUTINE 1V                            PROCEDURE DATE:  11/21/2020          INTERPRETATION:  XR CHEST. One view.    INDICATION: Intubated    COMPARISON: 11/20/2020    FINDINGS/  IMPRESSION:    Endotracheal tube tip. 7.5 cm above the paula. Enteric tube courses below the diaphragm. The right IJ central line within SVC. Left IJ central line within the brachiocephalic vein.    Diffuse lung opacities are unchanged.            SAYDA RENNER MD; Attending Radiologist  This document has been electronically signed. Nov 21 2020 12:36PM    < end of copied text >  Consultant(s) Notes Reviewed:  [x ] YES  [ ] NO    MEDICATIONS  (STANDING):  budesonide 160 MICROgram(s)/formoterol 4.5 MICROgram(s) Inhaler 2 Puff(s) Inhalation two times a day  cefepime   IVPB      cefepime   IVPB 1000 milliGRAM(s) IV Intermittent every 12 hours  chlorhexidine 2% Cloths 1 Application(s) Topical <User Schedule>  cisatracurium Infusion 3 MICROgram(s)/kG/Min (22.5 mL/Hr) IV Continuous <Continuous>  dexAMETHasone  Injectable 6 milliGRAM(s) IV Push daily  fentaNYL   Infusion 2 MICROgram(s)/kG/Hr (25 mL/Hr) IV Continuous <Continuous>  glucagon  Injectable 1 milliGRAM(s) IntraMuscular once  insulin lispro (ADMELOG) corrective regimen sliding scale   SubCutaneous every 6 hours  lubricant eye ont 1 Application(s) 1 Application(s) Both EYES two times a day  mupirocin 2% Ointment 1 Application(s) Both Nostrils two times a day  norepinephrine Infusion 0.05 MICROgram(s)/kG/Min (5.86 mL/Hr) IV Continuous <Continuous>  pantoprazole  Injectable 40 milliGRAM(s) IV Push daily  polyethylene glycol 3350 17 Gram(s) Oral daily  propofol Infusion 20 MICROgram(s)/kG/Min (15 mL/Hr) IV Continuous <Continuous>  senna 2 Tablet(s) Oral at bedtime  sodium chloride 0.9% Bolus 500 milliLiter(s) IV Bolus once    MEDICATIONS  (PRN):      GENERAL: obese, +ETT, sedated, proned  HEAD:  Atraumatic, Normocephalic  EYES: not compressed against bed (proned)  ENT: Moist mucous membranes  NECK: Supple  NERVOUS SYSTEM:  sedated  CHEST/LUNG: lungs CTA  HEART: S1S2 normal, RRR  ABDOMEN: Soft, Nontender, obese  EXTREMITIES:  2+ Peripheral Pulse  PSYCH: sedated  SKIN: No rashes or lesions    Care Discussed with Consultants/Other Providers [ x] YES  [ ] NO

## 2020-11-22 NOTE — PROGRESS NOTE ADULT - ATTENDING COMMENTS
49 yr  old obese male with out any significant medical history  who presented with sob due to acute hypoxic respiratory failure due to covid pneumonia.    Assessment:  1. Acute Hypoxic respiratory Failure   2. COVID-19 PNA  3. Morbid Obesity   4. Elevated lactate  5. Hypotension - sedation related  6. Type 2 MI - likely due to hypoxia  7. Acute kidney injury     Plan  - intubated 11/19 for worsening hypoxia and worsening ARDS  - vent management with lung protective strategy   - keep o2 sat >90%  - Proning 16 hours a day  - Sedation and paralytics for vent synchrony  - Mechanical ventilation on PC for now, adjusted to ABG  - unable to do low tidal volume ventilation due to severe acidosis   - d/c bicarb gtt  - IV heparin given increasing D-Dimer on hold due to epistaxis   - Nephrology for repeat HD today.. d/c with Dr Awad at bedside   - Cont iv decadron   - Holding remdesivir due to renal failure  - Monitor renal and hepatic function  - Vasopressor support to maintain MAP > 65  - DVT/ GI prophy  - Trickle tube feeds  - Add bowel regimen  - isolation : contact and airborne  - Prognosis is guarded .   - Pain control

## 2020-11-22 NOTE — PROGRESS NOTE ADULT - PROBLEM SELECTOR PLAN 1
Oligoanuric AGUSTO likely ATN from septic shock/ARDS requiring RRT/HD  - S/p HD treatment on 11/21 with 2L fluid removal  - Avoid Nephrotoxic agents  - Monitor BMP and urine output  - HD treatment as per ordered for clearances and UF  - Avoid Nephrotoxic agents  - Maintain MAP>70 mm hG  - Adjust antibiotics as per renal dose clearances

## 2020-11-22 NOTE — PROGRESS NOTE ADULT - SUBJECTIVE AND OBJECTIVE BOX
Chandan Awad MD (Nephrology progress note)  [address], [facility], [place], operated by [facility],  [address],  [facility]  TEl: [phone]  Cell: [phone]    Patient is a 50y Male seen and evaluated at bedside. Vital signs, laboratory data, imaging studies, consult notes reviewed done within past 24 hours. Overnight events noted. Patient remain critically ill on ventilatory support with FIO2 70% lying in bed on IV pressors. Interval elevated K level to 5.6 overight with tachycardia, CVP 26.     Allergies    No Known Allergies    Intolerances        ROS:  Sedated and intubated on ventilatory support    VITALS:    T(C): 37.2 (11-22-20 @ 08:00), Max: 37.5 (11-21-20 @ 22:30)  HR: 136 (11-22-20 @ 12:45) (68 - 165)  BP: 99/54 (11-22-20 @ 10:00) (71/37 - 126/58)  RR: 18 (11-22-20 @ 12:45) (0 - 30)  SpO2: 93% (11-22-20 @ 12:45) (61% - 98%)  CAPILLARY BLOOD GLUCOSE      POCT Blood Glucose.: 171 mg/dL (22 Nov 2020 10:28)  POCT Blood Glucose.: 178 mg/dL (22 Nov 2020 05:18)  POCT Blood Glucose.: 146 mg/dL (21 Nov 2020 22:33)  POCT Blood Glucose.: 180 mg/dL (21 Nov 2020 17:06)      11-21-20 @ 07:01  -  11-22-20 @ 07:00  --------------------------------------------------------  IN: 3796 mL / OUT: 2540 mL / NET: 1256 mL    11-22-20 @ 07:01  -  11-22-20 @ 13:04  --------------------------------------------------------  IN: 652.7 mL / OUT: 0 mL / NET: 652.7 mL      MEDICATIONS  (STANDING):  cefepime   IVPB      cefepime   IVPB 1000 milliGRAM(s) IV Intermittent every 12 hours  chlorhexidine 2% Cloths 1 Application(s) Topical <User Schedule>  cisatracurium Infusion 3 MICROgram(s)/kG/Min (22.5 mL/Hr) IV Continuous <Continuous>  dexAMETHasone  Injectable 6 milliGRAM(s) IV Push daily  fentaNYL   Infusion 3 MICROgram(s)/kG/Hr (37.5 mL/Hr) IV Continuous <Continuous>  glucagon  Injectable 1 milliGRAM(s) IntraMuscular once  insulin lispro (ADMELOG) corrective regimen sliding scale   SubCutaneous every 6 hours  lubricant eye ont 1 Application(s) 1 Application(s) Both EYES two times a day  mupirocin 2% Ointment 1 Application(s) Both Nostrils two times a day  norepinephrine Infusion 0.05 MICROgram(s)/kG/Min (5.86 mL/Hr) IV Continuous <Continuous>  pantoprazole  Injectable 40 milliGRAM(s) IV Push daily  polyethylene glycol 3350 17 Gram(s) Oral daily  propofol Infusion 20 MICROgram(s)/kG/Min (15 mL/Hr) IV Continuous <Continuous>  senna 2 Tablet(s) Oral at bedtime    MEDICATIONS  (PRN):  ALBUTerol    90 MICROgram(s) HFA Inhaler 2 Puff(s) Inhalation every 6 hours PRN Shortness of Breath      PHYSICAL EXAM:  GENERAL: Sedated and intubated on ventilatory support  HEENT: LEONOR, EOMI, neck supple, no JVP, no carotid bruit, oral mucosa moist and pink.  CHEST/LUNG: Bilateral decreased breath sounds, bibasilar rales and crepitations, no wheezing  HEART: Regular rate and rhythm, ERNESTO II/VI at LPSB, no gallops, no rub   ABDOMEN: Soft, nontender, non distended, bowel sounds present, no palpable organomegaly  : No flank or supra pubic tenderness.  EXTREMITIES: Peripheral pulses are palpable, no pedal edema  Neurology: Sedated and intubated on ventilatory support  SKIN: No rash or skin lesion  Musculoskeletal: No joint deformity or spinal tenderness.    LABS:                        13.3   11.29 )-----------( 181      ( 22 Nov 2020 05:44 )             42.2     11-22    134<L>  |  96  |  43<H>  ----------------------------<  186<H>  5.6<H>   |  28  |  7.34<H>    Ca    8.1<L>      22 Nov 2020 09:38  Phos  10.1     11-22  Mg     2.5     11-22    TPro  6.3  /  Alb  2.2<L>  /  TBili  0.9  /  DBili  x   /  AST  146<H>  /  ALT  69<H>  /  AlkPhos  85  11-22      PT/INR - ( 22 Nov 2020 03:06 )   PT: 13.2 sec;   INR: 1.12 ratio         PTT - ( 21 Nov 2020 19:50 )  PTT:45.3 sec    RADIOLOGY & ADDITIONAL STUDIES:  r< from: Xray Chest 1 View- PORTABLE-Routine (Xray Chest 1 View- PORTABLE-Routine in AM.) (11.21.20 @ 10:38) >    EXAM:  XR CHEST PORTABLE ROUTINE 1V                            PROCEDURE DATE:  11/21/2020          INTERPRETATION:  XR CHEST. One view.    INDICATION: Intubated    COMPARISON: 11/20/2020    FINDINGS/  IMPRESSION:    Endotracheal tube tip. 7.5 cm above the paula. Enteric tube courses below the diaphragm. The right IJ central line within SVC. Left IJ central line within the brachiocephalic vein.    Diffuse lung opacities are unchanged.            SAYDA RENNER MD; Attending Radiologist  This document has been electronically signed. Nov 21 2020 12:36PM    < end of copied text >    Imaging Personally Reviewed:  [x] YES  [ ] NO    Consultant(s) Notes Reviewed:  [x] YES  [ ] NO    Care Discussed with Primary team/ Other Providers [x] YES  [ ] NO

## 2020-11-22 NOTE — PROGRESS NOTE ADULT - PROBLEM SELECTOR PLAN 2
Patient with hyperkalemia due to ATN/renal failure  K level 5.6  EKG  Continue RRt/HD as per ordered

## 2020-11-22 NOTE — PROGRESS NOTE ADULT - PROBLEM SELECTOR PLAN 5
Patient with ARDS/acute hypoxic/hypercapnic respiratory failure on ventilatory support  Continue ventilatory support  Monitor respiratory status  Monitor vital signs/respiratory status  Ventilatory management per ICU team.  Fluid removal as tolerated

## 2020-11-23 NOTE — PROGRESS NOTE ADULT - ATTENDING COMMENTS
49 yr  old obese male with out any significant medical history  who presented with sob due to acute hypoxic respiratory failure due to covid pneumonia.    Assessment:  1. Acute Hypoxic respiratory Failure / ARDS  2. COVID-19 PNA  3. Morbid Obesity   4. Elevated lactate  5. Hypotension - sedation related  6. Type 2 MI - likely due to hypoxia  7. Acute kidney injury     Plan  - intubated 11/19 for worsening hypoxia and worsening ARDS  - vent management with lung protective strategy   - keep o2 sat >90%  - Proning 16 hours a day  - Sedation and paralytics for vent synchrony  - Mechanical ventilation on PC for now, adjusted to ABG  - unable to do low tidal volume ventilation due to severe acidosis   - d/c bicarb gtt  - IV heparin given increasing D-Dimer on hold due to epistaxis   - Nephrology for repeat HD today.. d/c with Dr Awad at bedside   - Cont iv decadron   - Holding remdesivir due to renal failure  - Monitor renal and hepatic function  - Vasopressor support to maintain MAP > 65  - DVT/ GI prophy  - Trickle tube feeds  - Add bowel regimen  - isolation : contact and airborne  - Prognosis is guarded .   - Pain control .

## 2020-11-23 NOTE — PROGRESS NOTE ADULT - PROBLEM SELECTOR PLAN 3
Patient with Calcium 7.5 with albumin 2.1, Phos 5.9,, Mag 2.2  Add Phoslo 667 mg po tid  Monitor BMP, calcium, mag, phos level

## 2020-11-23 NOTE — PROGRESS NOTE ADULT - PROBLEM SELECTOR PLAN 1
Oligoanuric AGUSTO likely ATN from septic shock/ARDS requiring RRT/HD  - S/p HD treatment on 11/22 with net 1.4 L fluid removal  - Avoid Nephrotoxic agents  - Monitor BMP and urine output  - Defer HD today  - Next anticipated IHD on 11/24  - Avoid Nephrotoxic agents  - Maintain MAP>65-70 mm hG  - Adjust antibiotics as per renal dose clearances

## 2020-11-23 NOTE — PROGRESS NOTE ADULT - ASSESSMENT
ASSESSMENT AND PLAN: 49M without PMH from home with wife admitted to ICU for of AHRF 2/2 COVID PNA.    Neuro: #sedation  -sedated with propofol, fentanyl  -paralyzed with Nimbex     CVS: #hypotension  -SBPs 90-100s  -titrated down on Levophed 2/2 tachycardia, added vasopressin  -EKG this am with irregular tachy, possible a-flutter, t-wave inversions noted, inconclusive  -will repeat EKG once tachy improves s/p decreasing Levophed  -[place] monitoring    -#troponemia  -9->8.2->8.3 11/21  -cardio Dr. Tsang following, not NSTEMI, likely from leaking from general inflammation, do not need asa and Plavix    Pulmonary: #AHRF 2/2 COVID PNA  -ETT placed 11/19, sedated and paralyzed as above  -RR 30/PC 28/100/10, I:E 0.9, ABG improving 7.26/61/82/26  -supinated 3pm, will not prone 2/2 BRB per os**  -D-dimer improving 320->5529->1195->737, ferritin 625->441->414, procal 0.11->3.13, CRP 0.1->0.67->3.39->8.64, ->683, will repeat 11/24  -heparin gtt restarted 11/21 as hematuria resolved, held early am 11/22 2/2 epistaxis  -continue dexamethasone IV 6mg Qd (10 day course through 11/25)  -IgA positive, IgG negative, indicating new infection, Dr. Amato approved remdesivir (11/17) but held from 11/21am given worsening renal function with CrCl <45    ID:   -history and management as above  -WBC 18->11.07->11.9 11/19-21  -RVP +COVID, procal increased to 3.13 as noted above, started cefepime 11/21  -BCx NGTD    Nephro: #AGUSTO  -CrCl 44, Cr 1.1 on admssion, acutely worsened 11/21, this am 6.4, possibly 2/2 covid injury and/or low BP  -placed pressure FC 11/20  to monitor for abdominal compartment syndrome  -RIJ HD placed 11/21 and HD started 11/21 and 11/22    GI: #TF  -NGT clogged, removed, will replace today once supine  -TF with Nepro at 10cc/hr, held while proned  -monitor LFTs for remdesivir admin, AST mildly elevated to 74, otherwise wnl  -Protonix IV for PPx    Heme: #thrombocytopenia  -resolved, 177 today  -monitor    Endocrine: #BG elevated  -A1c 6, average glucose 126, 131 this am  -TSH wnl  -vit D moderately low to 22, c/w 1000U/day   -FS q6  -on  low-dose HSS given TF    Skin/Catheters:   #LIJ placed 11/19  #left radial artery 11/19  #FC (pressure)    Prophylaxis:  -hold full-dose Lovenox, on heparin gtt for elevated D-dimer, on hold due to epistaxis ASSESSMENT AND PLAN: 49M without PMH from home with wife admitted to ICU for of AHRF 2/2 COVID PNA.    Neuro: #sedation  -sedated with propofol, fentanyl  -paralyzed with Nimbex     CVS: #hypotension  -SBPs 90-100s  -titrated down on Levophed 2/2 tachycardia, added vasopressin  -EKG this am with irregular tachy, possible a-flutter, t-wave inversions noted, inconclusive  -will repeat EKG once tachy improves s/p decreasing Levophed  -[place] monitoring    -#troponemia  -9->8.2->8.3 11/21  -cardio Dr. Tsang following, not NSTEMI, likely from leaking from general inflammation, do not need asa and Plavix    Pulmonary: #AHRF 2/2 COVID PNA  -ETT placed 11/19, sedated and paralyzed as above  -RR 30/PC 28/100/10, I:E 0.9, ABG improving 7.26/61/82/26  -supinated 3pm, will not prone 2/2 BRB per os  -D-dimer improving 320->5529->1195->737, ferritin 625->441->414, procal 0.11->3.13, CRP 0.1->0.67->3.39->8.64->9.05, ->683->561, will repeat 11/24  -heparin gtt restarted 11/21 as hematuria resolved, held early am 11/22 2/2 epistaxis and BR per os  -continue dexamethasone IV 6mg Qd (10 day course through 11/24), added albuterol  -slight clearing of infiltrates noted on CXR  -IgA positive, IgG negative, indicating new infection, Dr. Amato approved remdesivir (11/17) but held from 11/21am given worsening renal function with CrCl <45    ID:   -history and management as above  -WBC 18->11.07, stable since 11/19  -RVP +COVID, procal increased to 3.13 as noted above, started cefepime 11/21  -BCx NGTD  -sent sputum cx, nasal cx    Nephro: #AGUSTO  -CrCl 44, Cr 1.1 on admission, acutely worsened 11/21, this am 5.47, possibly 2/2 covid injury and/or low BP  -placed pressure FC 11/20  to monitor for abdominal compartment syndrome  -RIJ HD placed 11/21 and HD started 11/21, 11/22, and 11/23  -fu urine lytes if able to obtain (scant urine at present)  -continue phoslo, s/p HCO3 gtt    GI: #TF  -NGT clogged, removed, will hold off replacing given BRB per os  -TF with Nepro at 10cc/hr, held while no NGT  -monitor LFTs for remdesivir admin, AST mildly elevated to 96, otherwise wnl  -Protonix IV for PPx    Heme: #thrombocytopenia  -resolved, 172 today  -monitor  -epistaxis episode 11/22, s/p x1 unit platelets and transexamic acid    Endocrine: #BG elevated  -A1c 6, average glucose 126, 131 this am  -TSH wnl  -vit D moderately low to 22, c/w 1000U/day   -FS q6  -hold HSS given TF    Skin/Catheters:   #LIJ placed 11/19  #RIJ HD catheter 11/21  #left radial artery 11/19  #FC (pressure)  #eye drops    Prophylaxis:  -hold full-dose Lovenox, started heparin gtt for elevated D-dimer, now held due to epistaxis and BRB per os, restarted heparin ppx and will restart gtt if tolerates

## 2020-11-23 NOTE — PROGRESS NOTE ADULT - SUBJECTIVE AND OBJECTIVE BOX
HPI: 49M without PMH from home with wife with x1 week SOB, diagnosed with COVID outpatient x3 days prior to presentation, presented 2/2 worsening of respiratory symptoms. Hypoxic to 50s per EMS, COVID infection confirmed with repeat PCR, patient placed on NRB and admitted to ICU for further management of AHRF 2/2 COVID PNA.     INTERVAL HPI/OVERNIGHT EVENTS: Tmax 100.6 overnight, tachy 130-150s. EKG with T-wave inversions, will repeat this pm. SBPs variable, 90-100s this am. Titrating down on levophed to 0.02, added vasopressin. Vent RR30, PC28, [id number], 1:E 0.9, PEEP 10. ABG improved this am to 7.26/61/82/26. Supined 3pm last night, BRB noted from mouth, Hb stable. Heparin PPx restarted, will monitor for signs of bleeding and restart gtt if stable. Will hold off NGT placement 2/2 bleeding.   ICU Vital Signs Last 24 Hrs  T(C): 38.2 (23 Nov 2020 07:00), Max: 38.2 (23 Nov 2020 07:00)  T(F): 100.8 (23 Nov 2020 07:00), Max: 100.8 (23 Nov 2020 07:00)  HR: 120 (23 Nov 2020 12:15) (105 - 158)  BP: 79/42 (23 Nov 2020 12:00) (67/41 - 120/59)  BP(mean): 52 (23 Nov 2020 12:00) (46 - 71)  ABP: 138/86 (23 Nov 2020 12:15) (77/47 - 163/120)  ABP(mean): 101 (23 Nov 2020 12:15) (59 - 136)  RR: 24 (23 Nov 2020 12:15) (10 - 33)  SpO2: 93% (23 Nov 2020 12:15) (74% - 98%)    I&O's Summary    22 Nov 2020 07:01  -  23 Nov 2020 07:00  --------------------------------------------------------  IN: 3330.9 mL / OUT: 2000 mL / NET: 1330.9 mL    23 Nov 2020 07:01  -  23 Nov 2020 13:11  --------------------------------------------------------  IN: 509.3 mL / OUT: 0 mL / NET: 509.3 mL      Mode: AC/ CMV (Assist Control/ Continuous Mandatory Ventilation)  RR (machine): 30  FiO2: 100  PEEP: 10  ITime: 0.9  MAP: 22  PC: 28  PIP: 38      LABS:                        11.4   8.54  )-----------( 172      ( 23 Nov 2020 09:59 )             34.8     11-23    136  |  100  |  35<H>  ----------------------------<  120<H>  3.8   |  26  |  5.47<H>    Ca    7.5<L>      23 Nov 2020 04:57  Phos  5.9     11-23  Mg     2.1     11-23    TPro  5.8<L>  /  Alb  2.1<L>  /  TBili  1.1  /  DBili  x   /  AST  96<H>  /  ALT  59  /  AlkPhos  80  11-23    PT/INR - ( 23 Nov 2020 02:31 )   PT: 13.0 sec;   INR: 1.10 ratio         PTT - ( 23 Nov 2020 02:31 )  PTT:24.8 sec    CAPILLARY BLOOD GLUCOSE      POCT Blood Glucose.: 138 mg/dL (23 Nov 2020 09:43)  POCT Blood Glucose.: 108 mg/dL (23 Nov 2020 04:53)  POCT Blood Glucose.: 135 mg/dL (22 Nov 2020 23:32)  POCT Blood Glucose.: 161 mg/dL (22 Nov 2020 16:33)    ABG - ( 23 Nov 2020 03:26 )  pH, Arterial: 7.26  pH, Blood: x     /  pCO2: 61    /  pO2: 82    / HCO3: 26    / Base Excess: -1.4  /  SaO2: 95                  RADIOLOGY & ADDITIONAL TESTS:    Consultant(s) Notes Reviewed:  [x ] YES  [ ] NO    MEDICATIONS  (STANDING):  calcium acetate 667 milliGRAM(s) Oral three times a day with meals  cefepime   IVPB      cefepime   IVPB 1000 milliGRAM(s) IV Intermittent every 12 hours  chlorhexidine 2% Cloths 1 Application(s) Topical <User Schedule>  cisatracurium Infusion 3 MICROgram(s)/kG/Min (22.5 mL/Hr) IV Continuous <Continuous>  dexAMETHasone  Injectable 6 milliGRAM(s) IV Push daily  fentaNYL   Infusion 3 MICROgram(s)/kG/Hr (37.5 mL/Hr) IV Continuous <Continuous>  heparin   Injectable 5000 Unit(s) SubCutaneous every 8 hours  lubricant eye ont 1 Application(s) 1 Application(s) Both EYES two times a day  mupirocin 2% Ointment 1 Application(s) Both Nostrils two times a day  norepinephrine Infusion 0.05 MICROgram(s)/kG/Min (5.86 mL/Hr) IV Continuous <Continuous>  pantoprazole  Injectable 40 milliGRAM(s) IV Push daily  polyethylene glycol 3350 17 Gram(s) Oral daily  propofol Infusion 45 MICROgram(s)/kG/Min (33.8 mL/Hr) IV Continuous <Continuous>  senna 2 Tablet(s) Oral at bedtime  vasopressin Infusion 0.04 Unit(s)/Min (2.4 mL/Hr) IV Continuous <Continuous>    MEDICATIONS  (PRN):  ALBUTerol    90 MICROgram(s) HFA Inhaler 2 Puff(s) Inhalation every 6 hours PRN Shortness of Breath    GENERAL: obese, +ETT, sedated, supine  HEAD:  Atraumatic, Normocephalic  EYES: not compressed, supined   ENT: Moist mucous membranes  NECK: Supple  NERVOUS SYSTEM:  sedated  CHEST/LUNG: lungs CTA  HEART: S1S2 normal, tachy  ABDOMEN: Soft, obese  EXTREMITIES:  2+ Peripheral Pulse  PSYCH: sedated  SKIN: No rashes or lesions    Care Discussed with Consultants/Other Providers [ x] YES  [ ] NO

## 2020-11-23 NOTE — PROGRESS NOTE ADULT - SUBJECTIVE AND OBJECTIVE BOX
Chandan Awad MD (Nephrology progress note)  [address], [facility],  [address],  [facility]  TEl: [phone]  Cell: [phone]    Patient is a 50y Male seen and evaluated at bedside. Vital signs, laboratory data, imaging studies, consult notes reviewed done within past 24 hours. Overnight events noted. Patient remains critically ill on ventilatory support and IV pressors requiring RRT/HD. Last 24 hours I/o with net positive 1.3 L fluid balance with oligoanuria.     Allergies    No Known Allergies    Intolerances        ROS:  Limited. Sedated and intubated on ventilatory support    VITALS:    T(C): 38.2 (11-23-20 @ 07:00), Max: 38.2 (11-23-20 @ 07:00)  HR: 154 (11-23-20 @ 09:15) (125 - 158)  BP: 120/59 (11-23-20 @ 08:04) (67/41 - 120/59)  RR: 30 (11-23-20 @ 09:15) (10 - 33)  SpO2: 96% (11-23-20 @ 09:15) (74% - 98%)  CAPILLARY BLOOD GLUCOSE      POCT Blood Glucose.: 108 mg/dL (23 Nov 2020 04:53)  POCT Blood Glucose.: 135 mg/dL (22 Nov 2020 23:32)  POCT Blood Glucose.: 161 mg/dL (22 Nov 2020 16:33)  POCT Blood Glucose.: 171 mg/dL (22 Nov 2020 10:28)      11-22-20 @ 07:01  -  11-23-20 @ 07:00  --------------------------------------------------------  IN: 3330.9 mL / OUT: 2000 mL / NET: 1330.9 mL      MEDICATIONS  (STANDING):  calcium acetate 667 milliGRAM(s) Oral three times a day with meals  cefepime   IVPB      cefepime   IVPB 1000 milliGRAM(s) IV Intermittent every 12 hours  chlorhexidine 2% Cloths 1 Application(s) Topical <User Schedule>  cisatracurium Infusion 3 MICROgram(s)/kG/Min (22.5 mL/Hr) IV Continuous <Continuous>  dexAMETHasone  Injectable 6 milliGRAM(s) IV Push daily  fentaNYL   Infusion 3 MICROgram(s)/kG/Hr (37.5 mL/Hr) IV Continuous <Continuous>  glucagon  Injectable 1 milliGRAM(s) IntraMuscular once  insulin lispro (ADMELOG) corrective regimen sliding scale   SubCutaneous every 6 hours  lubricant eye ont 1 Application(s) 1 Application(s) Both EYES two times a day  mupirocin 2% Ointment 1 Application(s) Both Nostrils two times a day  norepinephrine Infusion 0.05 MICROgram(s)/kG/Min (5.86 mL/Hr) IV Continuous <Continuous>  pantoprazole  Injectable 40 milliGRAM(s) IV Push daily  polyethylene glycol 3350 17 Gram(s) Oral daily  propofol Infusion 45 MICROgram(s)/kG/Min (33.8 mL/Hr) IV Continuous <Continuous>  senna 2 Tablet(s) Oral at bedtime  vasopressin Infusion 0.04 Unit(s)/Min (2.4 mL/Hr) IV Continuous <Continuous>    MEDICATIONS  (PRN):  ALBUTerol    90 MICROgram(s) HFA Inhaler 2 Puff(s) Inhalation every 6 hours PRN Shortness of Breath      PHYSICAL EXAM:  GENERAL: Sedated and intubated on ventilatory support  HEENT: LEONOR, EOMI, neck supple, no JVP, no carotid bruit, oral mucosa moist and pink, ETT in place,  CHEST/LUNG: Bilateral decreased breath sounds, Bibasilar rales and rhonchi  HEART: Regular rate and rhythm with tachycardia, ERNESTO II/VI at LPSB, no gallops, no rub   ABDOMEN: Soft, nontender, non distended, bowel sounds present, no palpable organomegaly  : No flank or supra pubic tenderness.  EXTREMITIES: Peripheral pulses are palpable, no pedal edema  Neurology: Sedated and intubated on ventilatory support  SKIN: No rash or skin lesion  Musculoskeletal: No joint deformity or spinal tenderness.      Vascular ACCESS:     LABS:                        11.8   8.23  )-----------( 169      ( 23 Nov 2020 04:57 )             36.1     11-23    136  |  100  |  35<H>  ----------------------------<  120<H>  3.8   |  26  |  5.47<H>    Ca    7.5<L>      23 Nov 2020 04:57  Phos  5.9     11-23  Mg     2.1     11-23    TPro  5.8<L>  /  Alb  2.1<L>  /  TBili  1.1  /  DBili  x   /  AST  96<H>  /  ALT  59  /  AlkPhos  80  11-23      PT/INR - ( 23 Nov 2020 02:31 )   PT: 13.0 sec;   INR: 1.10 ratio         PTT - ( 23 Nov 2020 02:31 )  PTT:24.8 sec          RADIOLOGY & ADDITIONAL STUDIES:  ra< from: Xray Chest 1 View- PORTABLE-Routine (Xray Chest 1 View- PORTABLE-Routine in AM.) (11.22.20 @ 10:03) >    EXAM:  XR CHEST PORTABLE ROUTINE 1V                            PROCEDURE DATE:  11/22/2020          INTERPRETATION:  XR CHEST    Single AP view    HISTORY:  COVID 19 pneumonia    Comparison: Chest x-ray one day prior    The tip of the ET tube is above the paula.    The NG tube is in the stomach.    Right IJ line tip in the SVC.    Left IJ line tip in the SVC.    The cardiac silhouette is obscured. Diffuse bilateral airspace disease. No pleural abnormality.    IMPRESSION: No change from one dayprior aside from removal of NG tube            AMISHARIANNA DHILLON MD; Attending Radiologist  This document has been electronically signed. Nov 22 2020  2:05PM    < end of copied text >    Imaging Personally Reviewed:  [x] YES  [ ] NO    Consultant(s) Notes Reviewed:  [x] YES  [ ] NO    Care Discussed with Primary team/ Other Providers [x] YES  [ ] NO

## 2020-11-23 NOTE — PROGRESS NOTE ADULT - PROBLEM SELECTOR PLAN 2
Patient with hyperkalemia due to ATN/renal failure now with improvement  K level 3.9  Continue RRt/HD   Monitor urine output

## 2020-11-23 NOTE — PROGRESS NOTE ADULT - PROBLEM SELECTOR PLAN 5
Patient with ARDS/acute hypoxic/hypercapnic respiratory failure on ventilatory support  Continue ventilatory support  Monitor respiratory status  Monitor vital signs/respiratory status  Ventilatory management per ICU team.  Fluid removal as tolerated during HD treatment

## 2020-11-24 NOTE — PROGRESS NOTE ADULT - PROBLEM SELECTOR PLAN 1
Oligoanuric AGUSTO likely ATN from septic shock/ARDS requiring RRT/HD  - S/p HD treatment on 11/22 with net 1.4 L fluid removal  - Avoid Nephrotoxic agents  - Monitor BMP and urine output  - Will schedule for HD treatment  - Maintain MAP>65-70 mm hG  - Adjust antibiotics as per renal dose clearances

## 2020-11-24 NOTE — PROGRESS NOTE ADULT - ASSESSMENT
ASSESSMENT AND PLAN: 49M without PMH from home with wife admitted to ICU for of AHRF 2/2 COVID PNA.    Neuro: #sedation  -sedated with propofol, fentanyl  -paralyzed with Nimbex     CVS: #hypotension  -SBPs 90-100s  -titrated down on Levophed 2/2 tachycardia, added vasopressin  -EKG this am with irregular tachy, possible a-flutter, t-wave inversions noted, inconclusive  -will repeat EKG once tachy improves s/p decreasing Levophed  -[facility] monitoring    -#troponemia  -9->8.2->8.3 11/21  -cardio Dr. Tsang following, not NSTEMI, likely from leaking from general inflammation, do not need asa and Plavix    Pulmonary: #AHRF 2/2 COVID PNA  -ETT placed 11/19, sedated and paralyzed as above  -RR 30/PC 28/100/10, I:E 0.9, ABG improving 7.26/61/82/26  -supinated 3pm, will not prone 2/2 BRB per os  -D-dimer improving 320->5529->1195->737, ferritin 625->441->414, procal 0.11->3.13, CRP 0.1->0.67->3.39->8.64->9.05, ->683->561, will repeat 11/24  -heparin gtt restarted 11/21 as hematuria resolved, held early am 11/22 2/2 epistaxis and BR per os  -continue dexamethasone IV 6mg Qd (10 day course through 11/24), added albuterol  -slight clearing of infiltrates noted on CXR  -IgA positive, IgG negative, indicating new infection, Dr. Amato approved remdesivir (11/17) but held from 11/21am given worsening renal function with CrCl <45    ID:   -history and management as above  -WBC 18->11.07, stable since 11/19  -RVP +COVID, procal increased to 3.13 as noted above, started cefepime 11/21  -BCx NGTD  -sent sputum cx, nasal cx    Nephro: #AGUSTO  -CrCl 44, Cr 1.1 on admission, acutely worsened 11/21, this am 5.47, possibly 2/2 covid injury and/or low BP  -placed pressure FC 11/20  to monitor for abdominal compartment syndrome  -RIJ HD placed 11/21 and HD started 11/21, 11/22, and 11/23  -fu urine lytes if able to obtain (scant urine at present)  -continue phoslo, s/p HCO3 gtt    GI: #TF  -NGT clogged, removed, will hold off replacing given BRB per os  -TF with Nepro at 10cc/hr, held while no NGT  -monitor LFTs for remdesivir admin, AST mildly elevated to 96, otherwise wnl  -Protonix IV for PPx    Heme: #thrombocytopenia  -resolved, 172 today  -monitor  -epistaxis episode 11/22, s/p x1 unit platelets and transexamic acid    Endocrine: #BG elevated  -A1c 6, average glucose 126, 131 this am  -TSH wnl  -vit D moderately low to 22, c/w 1000U/day   -FS q6  -hold HSS given TF    Skin/Catheters:   #LIJ placed 11/19  #RIJ HD catheter 11/21  #left radial artery 11/19  #FC (pressure)  #eye drops    Prophylaxis:  -hold full-dose Lovenox, started heparin gtt for elevated D-dimer, now held due to epistaxis and BRB per os, restarted heparin ppx and will restart gtt if tolerates ASSESSMENT AND PLAN: 49M without PMH from home with wife admitted to ICU for of AHRF 2/2 COVID PNA.    Neuro: #sedation  -sedated with propofol, fentanyl  -paralyzed with Nimbex     CVS: #hypotension  -SBPs 90-100s  -off Levophed 2/2 tachycardia, added vasopressin and phenylephrine  -will repeat EKG after HD today  -bedside Echo without global dysfunction, flowtracker with CI 3.5-3.7 (goal >2.8)  -will obtain VBG from [facility] today to assess for sepsis vs cardiogenic shock  -[place] monitoring    -#troponemia  -9->8.2->8.3 11/21  -cardio Dr. Tsang following, not NSTEMI, likely from leaking from general inflammation, do not need asa and Plavix    Pulmonary: #AHRF 2/2 COVID PNA  -ETT placed 11/19, sedated and paralyzed as above  -RR 30/PC 28/100/10, I:E 0.9, ABG stable 7.21/58/80/23  -supinated since 3pm 11/22  -D-dimer stable 320->5529->1195->737->833, ferritin 625->441->414->574, procal 0.11->3.13, CRP 0.1->0.67->3.39->8.64->9.05->13.26, ->683->561->562, will repeat 11/27  -heparin gtt restarted 11/21 as hematuria resolved, held early am 11/22 2/2 epistaxis and BR per os, restarted today as bleeding resolved  -last day dexamethasone IV 6mg Qd (10 day course through 11/24), dc albuterol  -continued slight clearing of infiltrates noted on CXR  -IgA positive, IgG negative, indicating new infection, Dr. Amato approved remdesivir (11/17) but held from 11/21am given worsening renal function with CrCl <45    ID:   -history and management as above  -WBC 18->~11, stable since 11/19  -RVP +COVID, procal increased to 3.13 as noted above, started cefepime 11/21  -BCx NGTD  -sent sputum cx    Nephro: #AGUSTO  -CrCl 44, Cr 1.1 on admission, acutely worsened 11/21, possibly 2/2 covid injury and/or low BP, this am 8.41  -placed pressure FC 11/20  to monitor for abdominal compartment syndrome  -RIJ HD placed 11/21 and HD started 11/21, 11/22, and 11/24  -fu urine lytes if able to obtain (scant urine at present)  -continue phoslo, s/p HCO3 gtt    GI: #TF  -NGT clogged, removed, will hold off replacing given BRB per os  -TF with Nepro at 10cc/hr, held while no NGT  -monitor LFTs for remdesivir admin, AST mildly elevated to 96, otherwise wnl  -Protonix IV for PPx    Heme: #thrombocytopenia  -resolved, 172 today  -monitor  -epistaxis episode 11/22, s/p x1 unit platelets and transexamic acid    Endocrine: #BG elevated  -A1c 6, average glucose 126, 131 this am  -TSH wnl  -vit D moderately low to 22, c/w 1000U/day   -FS q6  -hold HSS given TF    Skin/Catheters:   #LIJ placed 11/19  #RIJ HD catheter 11/21  #left radial artery 11/19  #FC (pressure)  #eye drops    Prophylaxis:  -hold full-dose Lovenox, started heparin gtt for elevated D-dimer, now held due to epistaxis and BRB per os, restarted heparin ppx and will restart gtt if tolerates ASSESSMENT AND PLAN: 49M without PMH from home with wife admitted to ICU for of AHRF 2/2 COVID PNA.    Neuro: #sedation  -sedated with propofol, fentanyl  -paralyzed with Nimbex     CVS: #hypotension  -SBPs 90-100s  -off Levophed 2/2 tachycardia, added vasopressin and phenylephrine  -will repeat EKG after HD today  -bedside Echo without global dysfunction, flowtracker with CI 3.5-3.7 (goal >2.8)  -will obtain VBG from C[facility] today to assess for sepsis vs cardiogenic shock  -[place] monitoring    -#troponemia  -9->8.2->8.3 11/21  -cardio Dr. Tsang following, not NSTEMI, likely from leaking from general inflammation, do not need asa and Plavix    Pulmonary: #AHRF 2/2 COVID PNA  -ETT placed 11/19, sedated and paralyzed as above  -RR 30/PC 28/100/10, I:E 0.9, ABG stable 7.21/58/80/23  -supinated since 3pm 11/22  -D-dimer stable 320->5529->1195->737->833, ferritin 625->441->414->574, procal 0.11->3.13, CRP 0.1->0.67->3.39->8.64->9.05->13.26, ->683->561->562, will repeat 11/27  -heparin gtt restarted 11/21 as hematuria resolved, held early am 11/22 2/2 epistaxis and BR per os, restarted today as bleeding resolved  -last day dexamethasone IV 6mg Qd (10 day course through 11/24), dc albuterol  -continued slight clearing of infiltrates noted on CXR  -IgA positive, IgG negative, indicating new infection, Dr. Amato approved remdesivir (11/17) but held from 11/21am given worsening renal function with CrCl <45    ID:   -history and management as above  -WBC 18->~11, stable since 11/19  -RVP +COVID, procal increased to 3.13 as noted above, started cefepime 11/21, added vanc 11/24  -BCx NGTD  -fu sputum cx    Nephro: #AGUSTO  -CrCl 44, Cr 1.1 on admission, acutely worsened 11/21, possibly 2/2 covid injury and/or low BP, this am 8.41  -RIJ HD placed 11/21 and HD started 11/21, 11/22, and 11/24  -fu urine lytes if able to obtain (scant urine at present)  -continue phoslo, s/p HCO3 gtt    GI: #TF  -NGT clogged, removed, placed OGT today, will confirm on CXR  -TF with Nepro at 10cc/hr, restart once OGT in place  -monitor LFTs for remdesivir admin, AST mildly elevated to 87, otherwise wnl  -Protonix IV for PPx    Heme: #thrombocytopenia  -resolved, 199 today  -monitor  -epistaxis episode 11/22, s/p x1 unit platelets and transexamic acid, resolved today    Endocrine: #BG elevated  -A1c 6, average glucose 126  -TSH wnl  -vit D moderately low to 22, c/w 1000U/day   -FS q6, will change to QACHS with restarting TF  -hold HSS given TF, will restart with TF this pm    Skin/Catheters:   #LIJ placed 11/19  #RIJ HD catheter 11/21  #left radial artery 11/19  #FC (pressure)  #eye drops    Prophylaxis:  -hold full-dose Lovenox, started heparin gtt for elevated D-dimer, restart now that epistaxis and BRB per os have resolved

## 2020-11-24 NOTE — PROGRESS NOTE ADULT - SUBJECTIVE AND OBJECTIVE BOX
Chandan Awad MD (Nephrology dialysis Note)  [address] [facility],  [address],  [facility]  TEl: [id number]  Cell: [phone]      Patient was seen and evaluated on dialysis.   HR: 142 (11-24-20 @ 18:30)  BP: 140/76 (11-24-20 @ 18:00)  Wt(kg): --  11-24    135  |  98  |  67<H>  ----------------------------<  111<H>  4.8   |  23  |  8.41<H>    Ca    7.9<L>      24 Nov 2020 06:53  Phos  8.8     11-24  Mg     2.6     11-24    TPro  5.9<L>  /  Alb  2.0<L>  /  TBili  0.8  /  DBili  x   /  AST  87<H>  /  ALT  57  /  AlkPhos  74  11-24      Continue dialysis:   Dialyzer:  REvalear 400            QB: 400            QD:  500       K bath: 2K  Goal UF 2 kg  over 3 Hours    Patient is tolerating the procedure well.   Continue full hemodialysis treatment as prescribed.

## 2020-11-24 NOTE — PROGRESS NOTE ADULT - ATTENDING COMMENTS
49 yr  old obese male with out any significant medical history  who presented with sob due to acute hypoxic respiratory failure due to covid pneumonia.    Assessment:  1. Acute Hypoxic respiratory Failure / ARDS  2. COVID-19 PNA  3. Morbid Obesity   4. Elevated lactate  5. Hypotension - sedation related  6. Type 2 MI - likely due to hypoxia  7. Acute kidney injury     Plan  - intubated 11/19 for worsening hypoxia and worsening ARDS  - vent management with lung protective strategy   - keep o2 sat >90%  - Proning 16 hours a day  - Sedation and paralytics for vent synchrony  - Mechanical ventilation on PC for now, adjusted to ABG  - IV heparin given increasing D-Dimer on hold due to epistaxis   - Nephrology for  HD today.. d/c with Dr Awad at bedside   - Holding remdesivir due to renal failure  - Monitor renal and hepatic function  - Vasopressor support to maintain MAP > 65  - DVT/ GI prophy  - Trickle tube feeds  - Add bowel regimen  - isolation : contact and airborne  - Prognosis is guarded .   - Pain control .

## 2020-11-24 NOTE — PROGRESS NOTE ADULT - SUBJECTIVE AND OBJECTIVE BOX
Chandan Awad MD (Nephrology progress note)  [address], [facility],  [address],  [facility]  TEl: [phone]  Cell: [phone]    Patient is a 50y Male seen and evaluated at bedside. Vital signs, laboratory data, imaging studies, consult notes reviewed done within past 24 hours. Overnight events noted. Patient lying in  bed remains critically ill on ventilatory support and IV pressors. Patient with interval worsening renal function with oligoanuria. Chest x-ray with slight improvement.       Allergies    No Known Allergies    Intolerances        ROS:  Limited.     VITALS:    T(C): 38.5 (11-24-20 @ 12:15), Max: 38.5 (11-24-20 @ 08:00)  HR: 137 (11-24-20 @ 13:30) (109 - 149)  BP: 117/43 (11-24-20 @ 12:00) (88/65 - 139/85)  RR: 30 (11-24-20 @ 13:30) (27 - 30)  SpO2: 86% (11-24-20 @ 13:30) (86% - 99%)  CAPILLARY BLOOD GLUCOSE      POCT Blood Glucose.: 142 mg/dL (24 Nov 2020 05:28)  POCT Blood Glucose.: 132 mg/dL (23 Nov 2020 23:11)      11-23-20 @ 07:01  -  11-24-20 @ 07:00  --------------------------------------------------------  IN: 2487.1 mL / OUT: 15 mL / NET: 2472.1 mL    11-24-20 @ 07:01  -  11-24-20 @ 14:01  --------------------------------------------------------  IN: 524.5 mL / OUT: 25 mL / NET: 499.5 mL      MEDICATIONS  (STANDING):  calcium acetate 667 milliGRAM(s) Oral three times a day with meals  cefepime   IVPB      cefepime   IVPB 1000 milliGRAM(s) IV Intermittent every 12 hours  chlorhexidine 2% Cloths 1 Application(s) Topical <User Schedule>  cisatracurium Infusion 3 MICROgram(s)/kG/Min (22.5 mL/Hr) IV Continuous <Continuous>  fentaNYL   Infusion 3 MICROgram(s)/kG/Hr (37.5 mL/Hr) IV Continuous <Continuous>  heparin   Injectable 5000 Unit(s) SubCutaneous every 8 hours  lubricant eye ont 1 Application(s) 1 Application(s) Both EYES two times a day  mupirocin 2% Ointment 1 Application(s) Both Nostrils two times a day  norepinephrine Infusion 0.05 MICROgram(s)/kG/Min (5.86 mL/Hr) IV Continuous <Continuous>  pantoprazole  Injectable 40 milliGRAM(s) IV Push daily  phenylephrine    Infusion 0.1 MICROgram(s)/kG/Min (2.34 mL/Hr) IV Continuous <Continuous>  polyethylene glycol 3350 17 Gram(s) Oral daily  propofol Infusion 45 MICROgram(s)/kG/Min (33.8 mL/Hr) IV Continuous <Continuous>  senna 2 Tablet(s) Oral at bedtime  vasopressin Infusion 0.04 Unit(s)/Min (2.4 mL/Hr) IV Continuous <Continuous>    MEDICATIONS  (PRN):  ALBUTerol    90 MICROgram(s) HFA Inhaler 2 Puff(s) Inhalation every 6 hours PRN Shortness of Breath      PHYSICAL EXAM:  GENERAL: Sedated and intubated on ventilatory support  HEENT: LEONOR, EOMI, neck supple, no JVP, no carotid bruit, oral mucosa moist and pink, ETT in place.  CHEST/LUNG: Bilateral decreased breath sounds, bilateral rales and rhonchi  HEART: Regular rate and rhythm, ERNESTO II/VI at LPSB, no gallops, no rub   ABDOMEN: Soft, nontender, distended, bowel sounds present, no palpable organomegaly  : No flank or supra pubic tenderness.  EXTREMITIES: Peripheral pulses are palpable, no pedal edema  Neurology: Sedated and intubated on ventilatory support  SKIN: No rash or skin lesion  Musculoskeletal: No joint deformity or spinal tenderness.      Vascular ACCESS: Right IJ access    LABS:                        10.9   8.65  )-----------( 199      ( 24 Nov 2020 06:53 )             33.5     11-24    135  |  98  |  67<H>  ----------------------------<  111<H>  4.8   |  23  |  8.41<H>    Ca    7.9<L>      24 Nov 2020 06:53  Phos  8.8     11-24  Mg     2.6     11-24    TPro  5.9<L>  /  Alb  2.0<L>  /  TBili  0.8  /  DBili  x   /  AST  87<H>  /  ALT  57  /  AlkPhos  74  11-24      PT/INR - ( 23 Nov 2020 02:31 )   PT: 13.0 sec;   INR: 1.10 ratio         PTT - ( 23 Nov 2020 02:31 )  PTT:24.8 sec          RADIOLOGY & ADDITIONAL STUDIES:  < from: Xray Chest 1 View- PORTABLE-Routine (Xray Chest 1 View- PORTABLE-Routine in AM.) (11.23.20 @ 10:22) >    EXAM:  XR CHEST PORTABLE ROUTINE 1V                            PROCEDURE DATE:  11/23/2020          INTERPRETATION:  Chest one view    HISTORY: Intubation    COMPARISON STUDY: 11/22/2020    Frontal expiratory view of the chest shows the heart to besimilar in size. Endotracheal tube, right dialysis catheter and left jugular central line remain present.    The lungs show slight clearing of bilateral patchy infiltrates and there is no evidence of pneumothorax nor pleural effusion.    IMPRESSION:  Slight clearing of the lungs.    Thank you for the courtesy of this referral.            ALEXIA FERNANDES MD; Attending Interventional Radiologist  This document has been electronically signed. Nov 23 2020 12:10PM    < end of copied text >    Imaging Personally Reviewed:  [x] YES  [ ] NO    Consultant(s) Notes Reviewed:  [x] YES  [ ] NO    Care Discussed with Primary team/ Other Providers [x] YES  [ ] NO

## 2020-11-24 NOTE — PROGRESS NOTE ADULT - SUBJECTIVE AND OBJECTIVE BOX
HPI: 49M without PMH from home with wife with x1 week SOB, diagnosed with COVID outpatient x3 days prior to presentation, presented 2/2 worsening of respiratory symptoms. Hypoxic to 50s per EMS, COVID infection confirmed with repeat PCR, patient placed on NRB and admitted to ICU for further management of AHRF 2/2 COVID PNA.     INTERVAL HPI/OVERNIGHT EVENTS: NAEON.   ICU Vital Signs Last 24 Hrs  T(C): 38 (23 Nov 2020 23:15), Max: 38 (23 Nov 2020 23:15)  T(F): 100.4 (23 Nov 2020 23:15), Max: 100.4 (23 Nov 2020 23:15)  HR: 118 (24 Nov 2020 06:15) (105 - 155)  BP: 102/64 (24 Nov 2020 06:00) (79/42 - 138/94)  BP(mean): 73 (24 Nov 2020 06:00) (49 - 105)  ABP: 104/62 (24 Nov 2020 06:15) (73/44 - 146/100)  ABP(mean): 77 (24 Nov 2020 06:15) (55 - 115)  RR: 30 (24 Nov 2020 06:15) (24 - 30)  SpO2: 93% (24 Nov 2020 06:15) (93% - 99%)    I&O's Summary    23 Nov 2020 07:01  -  24 Nov 2020 07:00  --------------------------------------------------------  IN: 2388.2 mL / OUT: 15 mL / NET: 2373.2 mL      Mode: AC/ CMV (Assist Control/ Continuous Mandatory Ventilation)  RR (machine): 30  FiO2: 100  PEEP: 10  ITime: 0.9  MAP: 22  PC: 28  PIP: 38      LABS:                        10.9   8.65  )-----------( 199      ( 24 Nov 2020 06:53 )             33.5     11-24    135  |  98  |  67<H>  ----------------------------<  111<H>  x    |  23  |  x     Ca    7.9<L>      24 Nov 2020 06:53  Phos  5.9     11-23  Mg     2.6     11-24    TPro  5.9<L>  /  Alb  2.0<L>  /  TBili  0.8  /  DBili  x   /  AST  x   /  ALT  x   /  AlkPhos  74  11-24    PT/INR - ( 23 Nov 2020 02:31 )   PT: 13.0 sec;   INR: 1.10 ratio         PTT - ( 23 Nov 2020 02:31 )  PTT:24.8 sec    CAPILLARY BLOOD GLUCOSE      POCT Blood Glucose.: 142 mg/dL (24 Nov 2020 05:28)  POCT Blood Glucose.: 132 mg/dL (23 Nov 2020 23:11)  POCT Blood Glucose.: 138 mg/dL (23 Nov 2020 09:43)    ABG - ( 24 Nov 2020 04:37 )  pH, Arterial: 7.21  pH, Blood: x     /  pCO2: 58    /  pO2: 80    / HCO3: 23    / Base Excess: -5.1  /  SaO2: 94                  RADIOLOGY & ADDITIONAL TESTS:    Consultant(s) Notes Reviewed:  [x ] YES  [ ] NO    MEDICATIONS  (STANDING):  calcium acetate 667 milliGRAM(s) Oral three times a day with meals  cefepime   IVPB      cefepime   IVPB 1000 milliGRAM(s) IV Intermittent every 12 hours  chlorhexidine 2% Cloths 1 Application(s) Topical <User Schedule>  cisatracurium Infusion 3 MICROgram(s)/kG/Min (22.5 mL/Hr) IV Continuous <Continuous>  fentaNYL   Infusion 3 MICROgram(s)/kG/Hr (37.5 mL/Hr) IV Continuous <Continuous>  heparin   Injectable 5000 Unit(s) SubCutaneous every 8 hours  lubricant eye ont 1 Application(s) 1 Application(s) Both EYES two times a day  mupirocin 2% Ointment 1 Application(s) Both Nostrils two times a day  norepinephrine Infusion 0.05 MICROgram(s)/kG/Min (5.86 mL/Hr) IV Continuous <Continuous>  pantoprazole  Injectable 40 milliGRAM(s) IV Push daily  phenylephrine    Infusion 0.1 MICROgram(s)/kG/Min (2.34 mL/Hr) IV Continuous <Continuous>  polyethylene glycol 3350 17 Gram(s) Oral daily  propofol Infusion 45 MICROgram(s)/kG/Min (33.8 mL/Hr) IV Continuous <Continuous>  senna 2 Tablet(s) Oral at bedtime  vasopressin Infusion 0.04 Unit(s)/Min (2.4 mL/Hr) IV Continuous <Continuous>    MEDICATIONS  (PRN):  ALBUTerol    90 MICROgram(s) HFA Inhaler 2 Puff(s) Inhalation every 6 hours PRN Shortness of Breath      PHYSICAL EXAM:  GENERAL: intubated, sedated  HEENT: Atraumatic, Normocephalic  EYES: not compressed, supined   ENT: Moist mucous membranes  NECK: Supple  NERVOUS SYSTEM:  sedated  CHEST/LUNG: lungs CTA  HEART: S1S2 normal, tachy  ABDOMEN: Soft, obese  EXTREMITIES:  2+ Peripheral Pulse  PSYCH: sedated  SKIN: No rashes or lesions    Care Discussed with Consultants/Other Providers [ x] YES  [ ] NO HPI: 49M without PMH from home with wife with x1 week SOB, diagnosed with COVID outpatient x3 days prior to presentation, presented 2/2 worsening of respiratory symptoms. Hypoxic to 50s per EMS, COVID infection confirmed with repeat PCR, patient placed on NRB and admitted to ICU for further management of AHRF 2/2 COVID PNA.     INTERVAL HPI/OVERNIGHT EVENTS: NAEON.   ICU Vital Signs Last 24 Hrs  T(C): 38 (23 Nov 2020 23:15), Max: 38 (23 Nov 2020 23:15)  T(F): 100.4 (23 Nov 2020 23:15), Max: 100.4 (23 Nov 2020 23:15)  HR: 118 (24 Nov 2020 06:15) (105 - 155)  BP: 102/64 (24 Nov 2020 06:00) (79/42 - 138/94)  BP(mean): 73 (24 Nov 2020 06:00) (49 - 105)  ABP: 104/62 (24 Nov 2020 06:15) (73/44 - 146/100)  ABP(mean): 77 (24 Nov 2020 06:15) (55 - 115)  RR: 30 (24 Nov 2020 06:15) (24 - 30)  SpO2: 93% (24 Nov 2020 06:15) (93% - 99%)    I&O's Summary    23 Nov 2020 07:01  -  24 Nov 2020 07:00  --------------------------------------------------------  IN: 2388.2 mL / OUT: 15 mL / NET: 2373.2 mL      Mode: AC/ CMV (Assist Control/ Continuous Mandatory Ventilation)  RR (machine): 30  FiO2: 100  PEEP: 10  ITime: 0.9  MAP: 22  PC: 28  PIP: 38      LABS:                        10.9   8.65  )-----------( 199      ( 24 Nov 2020 06:53 )             33.5     11-24    135  |  98  |  67<H>  ----------------------------<  111<H>  x    |  23  |  x     Ca    7.9<L>      24 Nov 2020 06:53  Phos  5.9     11-23  Mg     2.6     11-24    TPro  5.9<L>  /  Alb  2.0<L>  /  TBili  0.8  /  DBili  x   /  AST  x   /  ALT  x   /  AlkPhos  74  11-24    PT/INR - ( 23 Nov 2020 02:31 )   PT: 13.0 sec;   INR: 1.10 ratio         PTT - ( 23 Nov 2020 02:31 )  PTT:24.8 sec    CAPILLARY BLOOD GLUCOSE      POCT Blood Glucose.: 142 mg/dL (24 Nov 2020 05:28)  POCT Blood Glucose.: 132 mg/dL (23 Nov 2020 23:11)  POCT Blood Glucose.: 138 mg/dL (23 Nov 2020 09:43)    ABG - ( 24 Nov 2020 04:37 )  pH, Arterial: 7.21  pH, Blood: x     /  pCO2: 58    /  pO2: 80    / HCO3: 23    / Base Excess: -5.1  /  SaO2: 94          CXR:  < from: Xray Chest 1 View- PORTABLE-Routine (Xray Chest 1 View- PORTABLE-Routine in AM.) (11.23.20 @ 10:22) >    EXAM:  XR CHEST PORTABLE ROUTINE 1V                            PROCEDURE DATE:  11/23/2020          INTERPRETATION:  Chest one view    HISTORY: Intubation    COMPARISON STUDY: 11/22/2020    Frontal expiratory view of the chest shows the heart to besimilar in size. Endotracheal tube, right dialysis catheter and left jugular central line remain present.    The lungs show slight clearing of bilateral patchy infiltrates and there is no evidence of pneumothorax nor pleural effusion.    IMPRESSION:  Slight clearing of the lungs.    Thank you for the courtesy of this referral.            ALEXIA FERNANDES MD; Attending Interventional Radiologist  This document has been electronically signed. Nov 23 2020 12:10PM    < end of copied text >        RADIOLOGY & ADDITIONAL TESTS:    Consultant(s) Notes Reviewed:  [x ] YES  [ ] NO    MEDICATIONS  (STANDING):  calcium acetate 667 milliGRAM(s) Oral three times a day with meals  cefepime   IVPB      cefepime   IVPB 1000 milliGRAM(s) IV Intermittent every 12 hours  chlorhexidine 2% Cloths 1 Application(s) Topical <User Schedule>  cisatracurium Infusion 3 MICROgram(s)/kG/Min (22.5 mL/Hr) IV Continuous <Continuous>  fentaNYL   Infusion 3 MICROgram(s)/kG/Hr (37.5 mL/Hr) IV Continuous <Continuous>  heparin   Injectable 5000 Unit(s) SubCutaneous every 8 hours  lubricant eye ont 1 Application(s) 1 Application(s) Both EYES two times a day  mupirocin 2% Ointment 1 Application(s) Both Nostrils two times a day  norepinephrine Infusion 0.05 MICROgram(s)/kG/Min (5.86 mL/Hr) IV Continuous <Continuous>  pantoprazole  Injectable 40 milliGRAM(s) IV Push daily  phenylephrine    Infusion 0.1 MICROgram(s)/kG/Min (2.34 mL/Hr) IV Continuous <Continuous>  polyethylene glycol 3350 17 Gram(s) Oral daily  propofol Infusion 45 MICROgram(s)/kG/Min (33.8 mL/Hr) IV Continuous <Continuous>  senna 2 Tablet(s) Oral at bedtime  vasopressin Infusion 0.04 Unit(s)/Min (2.4 mL/Hr) IV Continuous <Continuous>    MEDICATIONS  (PRN):  ALBUTerol    90 MICROgram(s) HFA Inhaler 2 Puff(s) Inhalation every 6 hours PRN Shortness of Breath      PHYSICAL EXAM:  GENERAL: intubated, sedated  HEENT: Atraumatic, Normocephalic  EYES: not compressed, supined   ENT: Moist mucous membranes  NECK: Supple  NERVOUS SYSTEM:  sedated  CHEST/LUNG: lungs CTA  HEART: S1S2 normal, tachy  ABDOMEN: Soft, obese  EXTREMITIES:  2+ Peripheral Pulse  PSYCH: sedated  SKIN: No rashes or lesions    Care Discussed with Consultants/Other Providers [ x] YES  [ ] NO HPI: 49M without PMH from home with wife with x1 week SOB, diagnosed with COVID outpatient x3 days prior to presentation, presented 2/2 worsening of respiratory symptoms. Hypoxic to 50s per EMS, COVID infection confirmed with repeat PCR, patient placed on NRB and admitted to ICU for further management of AHRF 2/2 COVID PNA.     INTERVAL HPI/OVERNIGHT EVENTS: NAEON. SBPs 70-120s, HR 110s. SpO2 90s. Vent RR30, PC28, PEEP10, [id number], I:E 0.9, ABG this am 7.21/58/80/23. CXR with slight clearing in right base. HD today. OG placed this pm.     ICU Vital Signs Last 24 Hrs  T(C): 38 (23 Nov 2020 23:15), Max: 38 (23 Nov 2020 23:15)  T(F): 100.4 (23 Nov 2020 23:15), Max: 100.4 (23 Nov 2020 23:15)  HR: 118 (24 Nov 2020 06:15) (105 - 155)  BP: 102/64 (24 Nov 2020 06:00) (79/42 - 138/94)  BP(mean): 73 (24 Nov 2020 06:00) (49 - 105)  ABP: 104/62 (24 Nov 2020 06:15) (73/44 - 146/100)  ABP(mean): 77 (24 Nov 2020 06:15) (55 - 115)  RR: 30 (24 Nov 2020 06:15) (24 - 30)  SpO2: 93% (24 Nov 2020 06:15) (93% - 99%)    I&O's Summary    23 Nov 2020 07:01  -  24 Nov 2020 07:00  --------------------------------------------------------  IN: 2388.2 mL / OUT: 15 mL / NET: 2373.2 mL      Mode: AC/ CMV (Assist Control/ Continuous Mandatory Ventilation)  RR (machine): 30  FiO2: 100  PEEP: 10  ITime: 0.9  MAP: 22  PC: 28  PIP: 38      LABS:                        10.9   8.65  )-----------( 199      ( 24 Nov 2020 06:53 )             33.5     11-24    135  |  98  |  67<H>  ----------------------------<  111<H>  x    |  23  |  x     Ca    7.9<L>      24 Nov 2020 06:53  Phos  5.9     11-23  Mg     2.6     11-24    TPro  5.9<L>  /  Alb  2.0<L>  /  TBili  0.8  /  DBili  x   /  AST  x   /  ALT  x   /  AlkPhos  74  11-24    PT/INR - ( 23 Nov 2020 02:31 )   PT: 13.0 sec;   INR: 1.10 ratio         PTT - ( 23 Nov 2020 02:31 )  PTT:24.8 sec    CAPILLARY BLOOD GLUCOSE      POCT Blood Glucose.: 142 mg/dL (24 Nov 2020 05:28)  POCT Blood Glucose.: 132 mg/dL (23 Nov 2020 23:11)  POCT Blood Glucose.: 138 mg/dL (23 Nov 2020 09:43)    ABG - ( 24 Nov 2020 04:37 )  pH, Arterial: 7.21  pH, Blood: x     /  pCO2: 58    /  pO2: 80    / HCO3: 23    / Base Excess: -5.1  /  SaO2: 94          CXR:  < from: Xray Chest 1 View- PORTABLE-Routine (Xray Chest 1 View- PORTABLE-Routine in AM.) (11.23.20 @ 10:22) >    EXAM:  XR CHEST PORTABLE ROUTINE 1V                            PROCEDURE DATE:  11/23/2020          INTERPRETATION:  Chest one view    HISTORY: Intubation    COMPARISON STUDY: 11/22/2020    Frontal expiratory view of the chest shows the heart to besimilar in size. Endotracheal tube, right dialysis catheter and left jugular central line remain present.    The lungs show slight clearing of bilateral patchy infiltrates and there is no evidence of pneumothorax nor pleural effusion.    IMPRESSION:  Slight clearing of the lungs.    Thank you for the courtesy of this referral.            ALEXIA FERNANDES MD; Attending Interventional Radiologist  This document has been electronically signed. Nov 23 2020 12:10PM    < end of copied text >        RADIOLOGY & ADDITIONAL TESTS:    Consultant(s) Notes Reviewed:  [x ] YES  [ ] NO    MEDICATIONS  (STANDING):  calcium acetate 667 milliGRAM(s) Oral three times a day with meals  cefepime   IVPB      cefepime   IVPB 1000 milliGRAM(s) IV Intermittent every 12 hours  chlorhexidine 2% Cloths 1 Application(s) Topical <User Schedule>  cisatracurium Infusion 3 MICROgram(s)/kG/Min (22.5 mL/Hr) IV Continuous <Continuous>  fentaNYL   Infusion 3 MICROgram(s)/kG/Hr (37.5 mL/Hr) IV Continuous <Continuous>  heparin   Injectable 5000 Unit(s) SubCutaneous every 8 hours  lubricant eye ont 1 Application(s) 1 Application(s) Both EYES two times a day  mupirocin 2% Ointment 1 Application(s) Both Nostrils two times a day  norepinephrine Infusion 0.05 MICROgram(s)/kG/Min (5.86 mL/Hr) IV Continuous <Continuous>  pantoprazole  Injectable 40 milliGRAM(s) IV Push daily  phenylephrine    Infusion 0.1 MICROgram(s)/kG/Min (2.34 mL/Hr) IV Continuous <Continuous>  polyethylene glycol 3350 17 Gram(s) Oral daily  propofol Infusion 45 MICROgram(s)/kG/Min (33.8 mL/Hr) IV Continuous <Continuous>  senna 2 Tablet(s) Oral at bedtime  vasopressin Infusion 0.04 Unit(s)/Min (2.4 mL/Hr) IV Continuous <Continuous>    MEDICATIONS  (PRN):  ALBUTerol    90 MICROgram(s) HFA Inhaler 2 Puff(s) Inhalation every 6 hours PRN Shortness of Breath    PHYSICAL EXAM:  GENERAL: obese, +ETT, sedated, supine, OGT  HEAD:  Atraumatic, Normocephalic  EYES: not compressed, supined   ENT: Moist mucous membranes  NECK: Supple  NERVOUS SYSTEM:  sedated  CHEST/LUNG: lungs CTA  HEART: S1S2 normal, tachy  ABDOMEN: Soft, obese  EXTREMITIES:  2+ Peripheral Pulse  PSYCH: sedated  SKIN: No rashes or lesions    Care Discussed with Consultants/Other Providers [ x] YES  [ ] NO

## 2020-11-24 NOTE — PROGRESS NOTE ADULT - PROBLEM SELECTOR PLAN 2
Patient with hyperkalemia due to ATN/renal failure now with improvement  K level 4.8  Continue RRt/HD   Monitor urine output

## 2020-11-24 NOTE — PROGRESS NOTE ADULT - PROBLEM SELECTOR PLAN 3
Patient with Calcium 7.9 with albumin 2.0, Phos 8.8, Mag 2.6  Continue Phoslo 667 mg po tid  Monitor BMP, calcium, mag, phos level

## 2020-11-25 NOTE — PROGRESS NOTE ADULT - PROBLEM SELECTOR PLAN 1
Oligoanuric AGUSTO likely ATN from septic shock/ARDS requiring RRT/HD  - S/p HD treatment on 11/24 with net 2.5 L fluid removal  - Avoid Nephrotoxic agents  - Monitor BMP and urine output  - Will reschedule for HD treatment  - Maintain MAP>65-70 mm hG  - Adjust antibiotics as per renal dose clearances

## 2020-11-25 NOTE — PROGRESS NOTE ADULT - SUBJECTIVE AND OBJECTIVE BOX
Patient is a 50y old  Male who presents with a chief complaint of Shortness of breath D/t Covid (25 Nov 2020 15:03)      INTERVAL HPI/OVERNIGHT EVENTS:  ICU Vital Signs Last 24 Hrs  T(C): 37.2 (25 Nov 2020 11:00), Max: 39.4 (25 Nov 2020 00:30)  T(F): 98.9 (25 Nov 2020 11:00), Max: 102.9 (25 Nov 2020 00:30)  HR: 143 (25 Nov 2020 14:30) (137 - 148)  BP: 104/46 (25 Nov 2020 14:00) (104/45 - 150/79)  BP(mean): 60 (25 Nov 2020 14:00) (32 - 98)  ABP: 103/68 (25 Nov 2020 14:30) (63/50 - 150/101)  ABP(mean): 81 (25 Nov 2020 14:30) (56 - 116)  RR: 32 (25 Nov 2020 14:30) (22 - 32)  SpO2: 85% (25 Nov 2020 14:30) (79% - 96%)    I&O's Summary    24 Nov 2020 07:01  -  25 Nov 2020 07:00  --------------------------------------------------------  IN: 3548.9 mL / OUT: 2650 mL / NET: 898.9 mL    25 Nov 2020 07:01  -  25 Nov 2020 15:29  --------------------------------------------------------  IN: 1549.2 mL / OUT: 10 mL / NET: 1539.2 mL      Mode: AC/ CMV (Assist Control/ Continuous Mandatory Ventilation)  RR (machine): 32  FiO2: 100  PEEP: 12  ITime: 0.7  MAP: 22  PC: 28  PIP: 41      LABS:                        11.9   13.26 )-----------( 256      ( 25 Nov 2020 06:29 )             38.2     11-25    131<L>  |  95<L>  |  62<H>  ----------------------------<  96  4.9   |  22  |  7.89<H>    Ca    8.2<L>      25 Nov 2020 06:29  Phos  9.8     11-25  Mg     2.6     11-25    TPro  6.9  /  Alb  2.4<L>  /  TBili  1.0  /  DBili  x   /  AST  334<H>  /  ALT  194<H>  /  AlkPhos  108  11-25        CAPILLARY BLOOD GLUCOSE      POCT Blood Glucose.: 112 mg/dL (25 Nov 2020 11:21)  POCT Blood Glucose.: 114 mg/dL (25 Nov 2020 06:38)  POCT Blood Glucose.: 109 mg/dL (25 Nov 2020 00:45)  POCT Blood Glucose.: 144 mg/dL (24 Nov 2020 18:48)    ABG - ( 25 Nov 2020 06:11 )  pH, Arterial: 7.10  pH, Blood: x     /  pCO2: 84    /  pO2: 57    / HCO3: 25    / Base Excess: -6.1  /  SaO2: 84                  RADIOLOGY & ADDITIONAL TESTS:    Consultant(s) Notes Reviewed:  [x ] YES  [ ] NO    MEDICATIONS  (STANDING):  azithromycin  IVPB      calcium acetate 1334 milliGRAM(s) Oral three times a day with meals  cefepime   IVPB      cefepime   IVPB 1000 milliGRAM(s) IV Intermittent every 12 hours  chlorhexidine 2% Cloths 1 Application(s) Topical <User Schedule>  cisatracurium Infusion 3 MICROgram(s)/kG/Min (22.5 mL/Hr) IV Continuous <Continuous>  diltiazem Infusion 10 mG/Hr (10 mL/Hr) IV Continuous <Continuous>  fentaNYL   Infusion 4 MICROgram(s)/kG/Hr (50 mL/Hr) IV Continuous <Continuous>  heparin   Injectable 5000 Unit(s) SubCutaneous every 8 hours  lubricant eye ont 1 Application(s) 1 Application(s) Both EYES two times a day  pantoprazole  Injectable 40 milliGRAM(s) IV Push daily  phenylephrine    Infusion 0.1 MICROgram(s)/kG/Min (2.34 mL/Hr) IV Continuous <Continuous>  polyethylene glycol 3350 17 Gram(s) Oral daily  propofol Infusion 50 MICROgram(s)/kG/Min (37.5 mL/Hr) IV Continuous <Continuous>  senna 2 Tablet(s) Oral at bedtime  vancomycin  IVPB      vancomycin  IVPB 1000 milliGRAM(s) IV Intermittent every 24 hours  vasopressin Infusion 0.04 Unit(s)/Min (2.4 mL/Hr) IV Continuous <Continuous>    MEDICATIONS  (PRN):  ALBUTerol    90 MICROgram(s) HFA Inhaler 2 Puff(s) Inhalation every 6 hours PRN Shortness of Breath      PHYSICAL EXAM:  GENERAL: well built, well nourished  HEAD:  Atraumatic, Normocephalic  EYES: EOMI, PERRLA, conjunctiva and sclera clear  ENT: No tonsillar erythema, exudates, or enlargement; Moist mucous membranes, Good dentition, No lesions  NECK: Supple, No JVD, Normal thyroid, no enlarged nodes  NERVOUS SYSTEM:  Alert & Oriented X3, Good concentration; Motor Strength 5/5 B/L upper and lower extremities; DTRs 2+ intact and symmetric, sensory intact  CHEST/LUNG: B/L good air entry; No rales, rhonchi, or wheezing  HEART: S1S2 normal, no S3, Regular rate and rhythm; No murmurs, rubs, or gallops  ABDOMEN: Soft, Nontender, Nondistended; Bowel sounds present  EXTREMITIES:  2+ Peripheral Pulses, No clubbing, cyanosis, or edema  LYMPH: No lymphadenopathy noted  SKIN: No rashes or lesions    Care Discussed with Consultants/Other Providers [ x] YES  [ ] NO HPI: 49M without PMH from home with wife with x1 week SOB, diagnosed with COVID outpatient x3 days prior to presentation, presented 2/2 worsening of respiratory symptoms. Hypoxic to 50s per EMS, COVID infection confirmed with repeat PCR, patient placed on NRB and admitted to ICU for further management of AHRF 2/2 COVID PNA.    INTERVAL HPI/OVERNIGHT EVENTS:    ICU Vital Signs Last 24 Hrs  T(C): 37.2 (25 Nov 2020 11:00), Max: 39.4 (25 Nov 2020 00:30)  T(F): 98.9 (25 Nov 2020 11:00), Max: 102.9 (25 Nov 2020 00:30)  HR: 143 (25 Nov 2020 14:30) (137 - 148)  BP: 104/46 (25 Nov 2020 14:00) (104/45 - 150/79)  BP(mean): 60 (25 Nov 2020 14:00) (32 - 98)  ABP: 103/68 (25 Nov 2020 14:30) (63/50 - 150/101)  ABP(mean): 81 (25 Nov 2020 14:30) (56 - 116)  RR: 32 (25 Nov 2020 14:30) (22 - 32)  SpO2: 85% (25 Nov 2020 14:30) (79% - 96%)    I&O's Summary    24 Nov 2020 07:01  -  25 Nov 2020 07:00  --------------------------------------------------------  IN: 3548.9 mL / OUT: 2650 mL / NET: 898.9 mL    25 Nov 2020 07:01  -  25 Nov 2020 15:29  --------------------------------------------------------  IN: 1549.2 mL / OUT: 10 mL / NET: 1539.2 mL      Mode: AC/ CMV (Assist Control/ Continuous Mandatory Ventilation)  RR (machine): 32  FiO2: 100  PEEP: 12  ITime: 0.7  MAP: 22  PC: 28  PIP: 41      LABS:                        11.9   13.26 )-----------( 256      ( 25 Nov 2020 06:29 )             38.2     11-25    131<L>  |  95<L>  |  62<H>  ----------------------------<  96  4.9   |  22  |  7.89<H>    Ca    8.2<L>      25 Nov 2020 06:29  Phos  9.8     11-25  Mg     2.6     11-25    TPro  6.9  /  Alb  2.4<L>  /  TBili  1.0  /  DBili  x   /  AST  334<H>  /  ALT  194<H>  /  AlkPhos  108  11-25        CAPILLARY BLOOD GLUCOSE      POCT Blood Glucose.: 112 mg/dL (25 Nov 2020 11:21)  POCT Blood Glucose.: 114 mg/dL (25 Nov 2020 06:38)  POCT Blood Glucose.: 109 mg/dL (25 Nov 2020 00:45)  POCT Blood Glucose.: 144 mg/dL (24 Nov 2020 18:48)    ABG - ( 25 Nov 2020 06:11 )  pH, Arterial: 7.10  pH, Blood: x     /  pCO2: 84    /  pO2: 57    / HCO3: 25    / Base Excess: -6.1  /  SaO2: 84                  RADIOLOGY & ADDITIONAL TESTS:    Consultant(s) Notes Reviewed:  [x ] YES  [ ] NO    MEDICATIONS  (STANDING):  azithromycin  IVPB      calcium acetate 1334 milliGRAM(s) Oral three times a day with meals  cefepime   IVPB      cefepime   IVPB 1000 milliGRAM(s) IV Intermittent every 12 hours  chlorhexidine 2% Cloths 1 Application(s) Topical <User Schedule>  cisatracurium Infusion 3 MICROgram(s)/kG/Min (22.5 mL/Hr) IV Continuous <Continuous>  diltiazem Infusion 10 mG/Hr (10 mL/Hr) IV Continuous <Continuous>  fentaNYL   Infusion 4 MICROgram(s)/kG/Hr (50 mL/Hr) IV Continuous <Continuous>  heparin   Injectable 5000 Unit(s) SubCutaneous every 8 hours  lubricant eye ont 1 Application(s) 1 Application(s) Both EYES two times a day  pantoprazole  Injectable 40 milliGRAM(s) IV Push daily  phenylephrine    Infusion 0.1 MICROgram(s)/kG/Min (2.34 mL/Hr) IV Continuous <Continuous>  polyethylene glycol 3350 17 Gram(s) Oral daily  propofol Infusion 50 MICROgram(s)/kG/Min (37.5 mL/Hr) IV Continuous <Continuous>  senna 2 Tablet(s) Oral at bedtime  vancomycin  IVPB      vancomycin  IVPB 1000 milliGRAM(s) IV Intermittent every 24 hours  vasopressin Infusion 0.04 Unit(s)/Min (2.4 mL/Hr) IV Continuous <Continuous>    MEDICATIONS  (PRN):  ALBUTerol    90 MICROgram(s) HFA Inhaler 2 Puff(s) Inhalation every 6 hours PRN Shortness of Breath      PHYSICAL EXAM:  GENERAL: well built, well nourished  HEAD:  Atraumatic, Normocephalic  EYES: EOMI, PERRLA, conjunctiva and sclera clear  ENT: No tonsillar erythema, exudates, or enlargement; Moist mucous membranes, Good dentition, No lesions  NECK: Supple, No JVD, Normal thyroid, no enlarged nodes  NERVOUS SYSTEM:  Alert & Oriented X3, Good concentration; Motor Strength 5/5 B/L upper and lower extremities; DTRs 2+ intact and symmetric, sensory intact  CHEST/LUNG: B/L good air entry; No rales, rhonchi, or wheezing  HEART: S1S2 normal, no S3, Regular rate and rhythm; No murmurs, rubs, or gallops  ABDOMEN: Soft, Nontender, Nondistended; Bowel sounds present  EXTREMITIES:  2+ Peripheral Pulses, No clubbing, cyanosis, or edema  LYMPH: No lymphadenopathy noted  SKIN: No rashes or lesions    Care Discussed with Consultants/Other Providers [ x] YES  [ ] NO HPI: 49M without PMH from home with wife with x1 week SOB, diagnosed with COVID outpatient x3 days prior to presentation, presented 2/2 worsening of respiratory symptoms. Hypoxic to 50s per EMS, COVID infection confirmed with repeat PCR, patient placed on NRB and admitted to ICU for further management of AHRF 2/2 COVID PNA.    INTERVAL HPI/OVERNIGHT EVENTS: Fever to 102.4 overnight, SpO2 worsened to 88%, ABG overnight worsened to 7.17/64/79/23, vent settings changed RR30->32, TI0.8->0.7, PEEP 10->12, PC28, FiO2 100. ABG this am worsened to 7.1/84/57/25.     ICU Vital Signs Last 24 Hrs  T(C): 37.2 (25 Nov 2020 11:00), Max: 39.4 (25 Nov 2020 00:30)  T(F): 98.9 (25 Nov 2020 11:00), Max: 102.9 (25 Nov 2020 00:30)  HR: 143 (25 Nov 2020 14:30) (137 - 148)  BP: 104/46 (25 Nov 2020 14:00) (104/45 - 150/79)  BP(mean): 60 (25 Nov 2020 14:00) (32 - 98)  ABP: 103/68 (25 Nov 2020 14:30) (63/50 - 150/101)  ABP(mean): 81 (25 Nov 2020 14:30) (56 - 116)  RR: 32 (25 Nov 2020 14:30) (22 - 32)  SpO2: 85% (25 Nov 2020 14:30) (79% - 96%)    I&O's Summary    24 Nov 2020 07:01  -  25 Nov 2020 07:00  --------------------------------------------------------  IN: 3548.9 mL / OUT: 2650 mL / NET: 898.9 mL    25 Nov 2020 07:01  -  25 Nov 2020 15:29  --------------------------------------------------------  IN: 1549.2 mL / OUT: 10 mL / NET: 1539.2 mL      Mode: AC/ CMV (Assist Control/ Continuous Mandatory Ventilation)  RR (machine): 32  FiO2: 100  PEEP: 12  ITime: 0.7  MAP: 22  PC: 28  PIP: 41      LABS:                        11.9   13.26 )-----------( 256      ( 25 Nov 2020 06:29 )             38.2     11-25    131<L>  |  95<L>  |  62<H>  ----------------------------<  96  4.9   |  22  |  7.89<H>    Ca    8.2<L>      25 Nov 2020 06:29  Phos  9.8     11-25  Mg     2.6     11-25    TPro  6.9  /  Alb  2.4<L>  /  TBili  1.0  /  DBili  x   /  AST  334<H>  /  ALT  194<H>  /  AlkPhos  108  11-25        CAPILLARY BLOOD GLUCOSE      POCT Blood Glucose.: 112 mg/dL (25 Nov 2020 11:21)  POCT Blood Glucose.: 114 mg/dL (25 Nov 2020 06:38)  POCT Blood Glucose.: 109 mg/dL (25 Nov 2020 00:45)  POCT Blood Glucose.: 144 mg/dL (24 Nov 2020 18:48)    ABG - ( 25 Nov 2020 06:11 )  pH, Arterial: 7.10  pH, Blood: x     /  pCO2: 84    /  pO2: 57    / HCO3: 25    / Base Excess: -6.1  /  SaO2: 84                  RADIOLOGY & ADDITIONAL TESTS:    Consultant(s) Notes Reviewed:  [x ] YES  [ ] NO    MEDICATIONS  (STANDING):  azithromycin  IVPB      calcium acetate 1334 milliGRAM(s) Oral three times a day with meals  cefepime   IVPB      cefepime   IVPB 1000 milliGRAM(s) IV Intermittent every 12 hours  chlorhexidine 2% Cloths 1 Application(s) Topical <User Schedule>  cisatracurium Infusion 3 MICROgram(s)/kG/Min (22.5 mL/Hr) IV Continuous <Continuous>  diltiazem Infusion 10 mG/Hr (10 mL/Hr) IV Continuous <Continuous>  fentaNYL   Infusion 4 MICROgram(s)/kG/Hr (50 mL/Hr) IV Continuous <Continuous>  heparin   Injectable 5000 Unit(s) SubCutaneous every 8 hours  lubricant eye ont 1 Application(s) 1 Application(s) Both EYES two times a day  pantoprazole  Injectable 40 milliGRAM(s) IV Push daily  phenylephrine    Infusion 0.1 MICROgram(s)/kG/Min (2.34 mL/Hr) IV Continuous <Continuous>  polyethylene glycol 3350 17 Gram(s) Oral daily  propofol Infusion 50 MICROgram(s)/kG/Min (37.5 mL/Hr) IV Continuous <Continuous>  senna 2 Tablet(s) Oral at bedtime  vancomycin  IVPB      vancomycin  IVPB 1000 milliGRAM(s) IV Intermittent every 24 hours  vasopressin Infusion 0.04 Unit(s)/Min (2.4 mL/Hr) IV Continuous <Continuous>    MEDICATIONS  (PRN):  ALBUTerol    90 MICROgram(s) HFA Inhaler 2 Puff(s) Inhalation every 6 hours PRN Shortness of Breath      PHYSICAL EXAM:  GENERAL: well built, well nourished  HEAD:  Atraumatic, Normocephalic  EYES: EOMI, PERRLA, conjunctiva and sclera clear  ENT: No tonsillar erythema, exudates, or enlargement; Moist mucous membranes, Good dentition, No lesions  NECK: Supple, No JVD, Normal thyroid, no enlarged nodes  NERVOUS SYSTEM:  Alert & Oriented X3, Good concentration; Motor Strength 5/5 B/L upper and lower extremities; DTRs 2+ intact and symmetric, sensory intact  CHEST/LUNG: B/L good air entry; No rales, rhonchi, or wheezing  HEART: S1S2 normal, no S3, Regular rate and rhythm; No murmurs, rubs, or gallops  ABDOMEN: Soft, Nontender, Nondistended; Bowel sounds present  EXTREMITIES:  2+ Peripheral Pulses, No clubbing, cyanosis, or edema  LYMPH: No lymphadenopathy noted  SKIN: No rashes or lesions    Care Discussed with Consultants/Other Providers [ x] YES  [ ] NO HPI: 49M without PMH from home with wife with x1 week SOB, diagnosed with COVID outpatient x3 days prior to presentation, presented 2/2 worsening of respiratory symptoms. Hypoxic to 50s per EMS, COVID infection confirmed with repeat PCR, patient placed on NRB and admitted to ICU for further management of AHRF 2/2 COVID PNA.    INTERVAL HPI/OVERNIGHT EVENTS: Tachy to 140sFever to 102.4 overnight, SpO2 worsened to 88%, ABG overnight worsened to 7.17/64/79/23, vent settings changed RR30->32, TI0.8->0.7, PEEP 10->12, PC28, FiO2 100. ABG this am worsened to 7.1/84/57/25.     ICU Vital Signs Last 24 Hrs  T(C): 37.2 (25 Nov 2020 11:00), Max: 39.4 (25 Nov 2020 00:30)  T(F): 98.9 (25 Nov 2020 11:00), Max: 102.9 (25 Nov 2020 00:30)  HR: 143 (25 Nov 2020 14:30) (137 - 148)  BP: 104/46 (25 Nov 2020 14:00) (104/45 - 150/79)  BP(mean): 60 (25 Nov 2020 14:00) (32 - 98)  ABP: 103/68 (25 Nov 2020 14:30) (63/50 - 150/101)  ABP(mean): 81 (25 Nov 2020 14:30) (56 - 116)  RR: 32 (25 Nov 2020 14:30) (22 - 32)  SpO2: 85% (25 Nov 2020 14:30) (79% - 96%)    I&O's Summary    24 Nov 2020 07:01  -  25 Nov 2020 07:00  --------------------------------------------------------  IN: 3548.9 mL / OUT: 2650 mL / NET: 898.9 mL    25 Nov 2020 07:01  -  25 Nov 2020 15:29  --------------------------------------------------------  IN: 1549.2 mL / OUT: 10 mL / NET: 1539.2 mL      Mode: AC/ CMV (Assist Control/ Continuous Mandatory Ventilation)  RR (machine): 32  FiO2: 100  PEEP: 12  ITime: 0.7  MAP: 22  PC: 28  PIP: 41      LABS:                        11.9   13.26 )-----------( 256      ( 25 Nov 2020 06:29 )             38.2     11-25    131<L>  |  95<L>  |  62<H>  ----------------------------<  96  4.9   |  22  |  7.89<H>    Ca    8.2<L>      25 Nov 2020 06:29  Phos  9.8     11-25  Mg     2.6     11-25    TPro  6.9  /  Alb  2.4<L>  /  TBili  1.0  /  DBili  x   /  AST  334<H>  /  ALT  194<H>  /  AlkPhos  108  11-25        CAPILLARY BLOOD GLUCOSE      POCT Blood Glucose.: 112 mg/dL (25 Nov 2020 11:21)  POCT Blood Glucose.: 114 mg/dL (25 Nov 2020 06:38)  POCT Blood Glucose.: 109 mg/dL (25 Nov 2020 00:45)  POCT Blood Glucose.: 144 mg/dL (24 Nov 2020 18:48)    ABG - ( 25 Nov 2020 06:11 )  pH, Arterial: 7.10  pH, Blood: x     /  pCO2: 84    /  pO2: 57    / HCO3: 25    / Base Excess: -6.1  /  SaO2: 84                  RADIOLOGY & ADDITIONAL TESTS:    Consultant(s) Notes Reviewed:  [x ] YES  [ ] NO    MEDICATIONS  (STANDING):  azithromycin  IVPB      calcium acetate 1334 milliGRAM(s) Oral three times a day with meals  cefepime   IVPB      cefepime   IVPB 1000 milliGRAM(s) IV Intermittent every 12 hours  chlorhexidine 2% Cloths 1 Application(s) Topical <User Schedule>  cisatracurium Infusion 3 MICROgram(s)/kG/Min (22.5 mL/Hr) IV Continuous <Continuous>  diltiazem Infusion 10 mG/Hr (10 mL/Hr) IV Continuous <Continuous>  fentaNYL   Infusion 4 MICROgram(s)/kG/Hr (50 mL/Hr) IV Continuous <Continuous>  heparin   Injectable 5000 Unit(s) SubCutaneous every 8 hours  lubricant eye ont 1 Application(s) 1 Application(s) Both EYES two times a day  pantoprazole  Injectable 40 milliGRAM(s) IV Push daily  phenylephrine    Infusion 0.1 MICROgram(s)/kG/Min (2.34 mL/Hr) IV Continuous <Continuous>  polyethylene glycol 3350 17 Gram(s) Oral daily  propofol Infusion 50 MICROgram(s)/kG/Min (37.5 mL/Hr) IV Continuous <Continuous>  senna 2 Tablet(s) Oral at bedtime  vancomycin  IVPB      vancomycin  IVPB 1000 milliGRAM(s) IV Intermittent every 24 hours  vasopressin Infusion 0.04 Unit(s)/Min (2.4 mL/Hr) IV Continuous <Continuous>    MEDICATIONS  (PRN):  ALBUTerol    90 MICROgram(s) HFA Inhaler 2 Puff(s) Inhalation every 6 hours PRN Shortness of Breath      PHYSICAL EXAM:  GENERAL: well built, well nourished  HEAD:  Atraumatic, Normocephalic  EYES: EOMI, PERRLA, conjunctiva and sclera clear  ENT: No tonsillar erythema, exudates, or enlargement; Moist mucous membranes, Good dentition, No lesions  NECK: Supple, No JVD, Normal thyroid, no enlarged nodes  NERVOUS SYSTEM:  Alert & Oriented X3, Good concentration; Motor Strength 5/5 B/L upper and lower extremities; DTRs 2+ intact and symmetric, sensory intact  CHEST/LUNG: B/L good air entry; No rales, rhonchi, or wheezing  HEART: S1S2 normal, no S3, Regular rate and rhythm; No murmurs, rubs, or gallops  ABDOMEN: Soft, Nontender, Nondistended; Bowel sounds present  EXTREMITIES:  2+ Peripheral Pulses, No clubbing, cyanosis, or edema  LYMPH: No lymphadenopathy noted  SKIN: No rashes or lesions    Care Discussed with Consultants/Other Providers [ x] YES  [ ] NO HPI: 49M without PMH from home with wife with x1 week SOB, diagnosed with COVID outpatient x3 days prior to presentation, presented 2/2 worsening of respiratory symptoms. Hypoxic to 50s per EMS, COVID infection confirmed with repeat PCR, patient placed on NRB and admitted to ICU for further management of AHRF 2/2 COVID PNA.    INTERVAL HPI/OVERNIGHT EVENTS: Tachy to 140s, EKG sinus tach->AF, will titrate up propofol and fentanyl, consider Cardizem gtt if no improvement. Fever to 102.4 overnight, SpO2 worsened to 88%, ABG overnight worsened to 7.17/64/79/23, vent settings changed RR30->32, TI0.8->0.7, PEEP 10->12, PC28, FiO2 100. ABG this am worsened to 7.1/84/57/25. s/p x1 dose 25% albumin 50mg overnight. Worsening on CXR, azithromycin added. LIJ dc, femoral line placed.     ICU Vital Signs Last 24 Hrs  T(C): 37.2 (25 Nov 2020 11:00), Max: 39.4 (25 Nov 2020 00:30)  T(F): 98.9 (25 Nov 2020 11:00), Max: 102.9 (25 Nov 2020 00:30)  HR: 143 (25 Nov 2020 14:30) (137 - 148)  BP: 104/46 (25 Nov 2020 14:00) (104/45 - 150/79)  BP(mean): 60 (25 Nov 2020 14:00) (32 - 98)  ABP: 103/68 (25 Nov 2020 14:30) (63/50 - 150/101)  ABP(mean): 81 (25 Nov 2020 14:30) (56 - 116)  RR: 32 (25 Nov 2020 14:30) (22 - 32)  SpO2: 85% (25 Nov 2020 14:30) (79% - 96%)    I&O's Summary    24 Nov 2020 07:01  -  25 Nov 2020 07:00  --------------------------------------------------------  IN: 3548.9 mL / OUT: 2650 mL / NET: 898.9 mL    25 Nov 2020 07:01  -  25 Nov 2020 15:29  --------------------------------------------------------  IN: 1549.2 mL / OUT: 10 mL / NET: 1539.2 mL      Mode: AC/ CMV (Assist Control/ Continuous Mandatory Ventilation)  RR (machine): 32  FiO2: 100  PEEP: 12  ITime: 0.7  MAP: 22  PC: 28  PIP: 41      LABS:                        11.9   13.26 )-----------( 256      ( 25 Nov 2020 06:29 )             38.2     11-25    131<L>  |  95<L>  |  62<H>  ----------------------------<  96  4.9   |  22  |  7.89<H>    Ca    8.2<L>      25 Nov 2020 06:29  Phos  9.8     11-25  Mg     2.6     11-25    TPro  6.9  /  Alb  2.4<L>  /  TBili  1.0  /  DBili  x   /  AST  334<H>  /  ALT  194<H>  /  AlkPhos  108  11-25        CAPILLARY BLOOD GLUCOSE      POCT Blood Glucose.: 112 mg/dL (25 Nov 2020 11:21)  POCT Blood Glucose.: 114 mg/dL (25 Nov 2020 06:38)  POCT Blood Glucose.: 109 mg/dL (25 Nov 2020 00:45)  POCT Blood Glucose.: 144 mg/dL (24 Nov 2020 18:48)    ABG - ( 25 Nov 2020 06:11 )  pH, Arterial: 7.10  pH, Blood: x     /  pCO2: 84    /  pO2: 57    / HCO3: 25    / Base Excess: -6.1  /  SaO2: 84                  RADIOLOGY & ADDITIONAL TESTS:    Consultant(s) Notes Reviewed:  [x ] YES  [ ] NO    MEDICATIONS  (STANDING):  azithromycin  IVPB      calcium acetate 1334 milliGRAM(s) Oral three times a day with meals  cefepime   IVPB      cefepime   IVPB 1000 milliGRAM(s) IV Intermittent every 12 hours  chlorhexidine 2% Cloths 1 Application(s) Topical <User Schedule>  cisatracurium Infusion 3 MICROgram(s)/kG/Min (22.5 mL/Hr) IV Continuous <Continuous>  diltiazem Infusion 10 mG/Hr (10 mL/Hr) IV Continuous <Continuous>  fentaNYL   Infusion 4 MICROgram(s)/kG/Hr (50 mL/Hr) IV Continuous <Continuous>  heparin   Injectable 5000 Unit(s) SubCutaneous every 8 hours  lubricant eye ont 1 Application(s) 1 Application(s) Both EYES two times a day  pantoprazole  Injectable 40 milliGRAM(s) IV Push daily  phenylephrine    Infusion 0.1 MICROgram(s)/kG/Min (2.34 mL/Hr) IV Continuous <Continuous>  polyethylene glycol 3350 17 Gram(s) Oral daily  propofol Infusion 50 MICROgram(s)/kG/Min (37.5 mL/Hr) IV Continuous <Continuous>  senna 2 Tablet(s) Oral at bedtime  vancomycin  IVPB      vancomycin  IVPB 1000 milliGRAM(s) IV Intermittent every 24 hours  vasopressin Infusion 0.04 Unit(s)/Min (2.4 mL/Hr) IV Continuous <Continuous>    MEDICATIONS  (PRN):  ALBUTerol    90 MICROgram(s) HFA Inhaler 2 Puff(s) Inhalation every 6 hours PRN Shortness of Breath    PHYSICAL EXAM:  GENERAL: obese, +ETT, sedated, supine, OGT  HEAD:  Atraumatic, Normocephalic  EYES: not compressed, supined   ENT: Moist mucous membranes  NECK: Supple  NERVOUS SYSTEM:  sedated  CHEST/LUNG: lungs CTA  HEART: S1S2 normal, tachy  ABDOMEN: Soft, obese  EXTREMITIES:  2+ Peripheral Pulse  PSYCH: sedated  SKIN: No rashes or lesions    Care Discussed with Consultants/Other Providers [ x] YES  [ ] NO HPI: 49M without PMH from home with wife with x1 week SOB, diagnosed with COVID outpatient x3 days prior to presentation, presented 2/2 worsening of respiratory symptoms. Hypoxic to 50s per EMS, COVID infection confirmed with repeat PCR, patient placed on NRB and admitted to ICU for further management of AHRF 2/2 COVID PNA.    INTERVAL HPI/OVERNIGHT EVENTS: Tachy to 140s, EKG sinus tach->AF, will titrate up propofol and fentanyl, consider Cardizem gtt if no improvement. Fever to 102.4 overnight, SpO2 worsened to 88%, ABG overnight worsened to 7.17/64/79/23, vent settings changed RR30->32, TI0.8->0.7, PEEP 10->12, PC28, FiO2 100. ABG this am worsened to 7.1/84/57/25. s/p x1 dose 25% albumin 50mg overnight. Worsening on CXR, azithromycin added. LIJ dc, femoral line placed. HD again today.      ICU Vital Signs Last 24 Hrs  T(C): 37.2 (25 Nov 2020 11:00), Max: 39.4 (25 Nov 2020 00:30)  T(F): 98.9 (25 Nov 2020 11:00), Max: 102.9 (25 Nov 2020 00:30)  HR: 143 (25 Nov 2020 14:30) (137 - 148)  BP: 104/46 (25 Nov 2020 14:00) (104/45 - 150/79)  BP(mean): 60 (25 Nov 2020 14:00) (32 - 98)  ABP: 103/68 (25 Nov 2020 14:30) (63/50 - 150/101)  ABP(mean): 81 (25 Nov 2020 14:30) (56 - 116)  RR: 32 (25 Nov 2020 14:30) (22 - 32)  SpO2: 85% (25 Nov 2020 14:30) (79% - 96%)    I&O's Summary    24 Nov 2020 07:01  -  25 Nov 2020 07:00  --------------------------------------------------------  IN: 3548.9 mL / OUT: 2650 mL / NET: 898.9 mL    25 Nov 2020 07:01  -  25 Nov 2020 15:29  --------------------------------------------------------  IN: 1549.2 mL / OUT: 10 mL / NET: 1539.2 mL      Mode: AC/ CMV (Assist Control/ Continuous Mandatory Ventilation)  RR (machine): 32  FiO2: 100  PEEP: 12  ITime: 0.7  MAP: 22  PC: 28  PIP: 41      LABS:                        11.9   13.26 )-----------( 256      ( 25 Nov 2020 06:29 )             38.2     11-25    131<L>  |  95<L>  |  62<H>  ----------------------------<  96  4.9   |  22  |  7.89<H>    Ca    8.2<L>      25 Nov 2020 06:29  Phos  9.8     11-25  Mg     2.6     11-25    TPro  6.9  /  Alb  2.4<L>  /  TBili  1.0  /  DBili  x   /  AST  334<H>  /  ALT  194<H>  /  AlkPhos  108  11-25        CAPILLARY BLOOD GLUCOSE      POCT Blood Glucose.: 112 mg/dL (25 Nov 2020 11:21)  POCT Blood Glucose.: 114 mg/dL (25 Nov 2020 06:38)  POCT Blood Glucose.: 109 mg/dL (25 Nov 2020 00:45)  POCT Blood Glucose.: 144 mg/dL (24 Nov 2020 18:48)    ABG - ( 25 Nov 2020 06:11 )  pH, Arterial: 7.10  pH, Blood: x     /  pCO2: 84    /  pO2: 57    / HCO3: 25    / Base Excess: -6.1  /  SaO2: 84                  RADIOLOGY & ADDITIONAL TESTS:    Consultant(s) Notes Reviewed:  [x ] YES  [ ] NO    MEDICATIONS  (STANDING):  azithromycin  IVPB      calcium acetate 1334 milliGRAM(s) Oral three times a day with meals  cefepime   IVPB      cefepime   IVPB 1000 milliGRAM(s) IV Intermittent every 12 hours  chlorhexidine 2% Cloths 1 Application(s) Topical <User Schedule>  cisatracurium Infusion 3 MICROgram(s)/kG/Min (22.5 mL/Hr) IV Continuous <Continuous>  diltiazem Infusion 10 mG/Hr (10 mL/Hr) IV Continuous <Continuous>  fentaNYL   Infusion 4 MICROgram(s)/kG/Hr (50 mL/Hr) IV Continuous <Continuous>  heparin   Injectable 5000 Unit(s) SubCutaneous every 8 hours  lubricant eye ont 1 Application(s) 1 Application(s) Both EYES two times a day  pantoprazole  Injectable 40 milliGRAM(s) IV Push daily  phenylephrine    Infusion 0.1 MICROgram(s)/kG/Min (2.34 mL/Hr) IV Continuous <Continuous>  polyethylene glycol 3350 17 Gram(s) Oral daily  propofol Infusion 50 MICROgram(s)/kG/Min (37.5 mL/Hr) IV Continuous <Continuous>  senna 2 Tablet(s) Oral at bedtime  vancomycin  IVPB      vancomycin  IVPB 1000 milliGRAM(s) IV Intermittent every 24 hours  vasopressin Infusion 0.04 Unit(s)/Min (2.4 mL/Hr) IV Continuous <Continuous>    MEDICATIONS  (PRN):  ALBUTerol    90 MICROgram(s) HFA Inhaler 2 Puff(s) Inhalation every 6 hours PRN Shortness of Breath    PHYSICAL EXAM:  GENERAL: obese, +ETT, sedated, supine, OGT  HEAD:  Atraumatic, Normocephalic  EYES: not compressed, supined   ENT: Moist mucous membranes  NECK: Supple  NERVOUS SYSTEM:  sedated  CHEST/LUNG: lungs CTA  HEART: S1S2 normal, tachy  ABDOMEN: Soft, obese  EXTREMITIES:  2+ Peripheral Pulse  PSYCH: sedated  SKIN: No rashes or lesions    Care Discussed with Consultants/Other Providers [ x] YES  [ ] NO HPI: 49M without PMH from home with wife with x1 week SOB, diagnosed with COVID outpatient x3 days prior to presentation, presented 2/2 worsening of respiratory symptoms. Hypoxic to 50s per EMS, COVID infection confirmed with repeat PCR, patient placed on NRB and admitted to ICU for further management of AHRF 2/2 COVID PNA.    INTERVAL HPI/OVERNIGHT EVENTS: Tachy to 140s, EKG sinus tach->AF, will titrate up propofol and fentanyl, consider Cardizem gtt if no improvement. Fever to 102.4 overnight, SpO2 worsened to 88%, ABG overnight worsened to 7.17/64/79/23, vent settings changed RR30->32, TI0.8->0.7, PEEP 10->12, PC28, FiO2 100. ABG this am worsened to 7.1/84/57/25. s/p x1 dose 25% albumin 50mg overnight. Worsening on CXR, azithromycin added. LIJ dc, femoral line placed. HD again today.      ICU Vital Signs Last 24 Hrs  T(C): 37.2 (25 Nov 2020 11:00), Max: 39.4 (25 Nov 2020 00:30)  T(F): 98.9 (25 Nov 2020 11:00), Max: 102.9 (25 Nov 2020 00:30)  HR: 143 (25 Nov 2020 14:30) (137 - 148)  BP: 104/46 (25 Nov 2020 14:00) (104/45 - 150/79)  BP(mean): 60 (25 Nov 2020 14:00) (32 - 98)  ABP: 103/68 (25 Nov 2020 14:30) (63/50 - 150/101)  ABP(mean): 81 (25 Nov 2020 14:30) (56 - 116)  RR: 32 (25 Nov 2020 14:30) (22 - 32)  SpO2: 85% (25 Nov 2020 14:30) (79% - 96%)    I&O's Summary    24 Nov 2020 07:01  -  25 Nov 2020 07:00  --------------------------------------------------------  IN: 3548.9 mL / OUT: 2650 mL / NET: 898.9 mL    25 Nov 2020 07:01  -  25 Nov 2020 15:29  --------------------------------------------------------  IN: 1549.2 mL / OUT: 10 mL / NET: 1539.2 mL      Mode: AC/ CMV (Assist Control/ Continuous Mandatory Ventilation)  RR (machine): 32  FiO2: 100  PEEP: 12  ITime: 0.7  MAP: 22  PC: 28  PIP: 41      LABS:                        11.9   13.26 )-----------( 256      ( 25 Nov 2020 06:29 )             38.2     11-25    131<L>  |  95<L>  |  62<H>  ----------------------------<  96  4.9   |  22  |  7.89<H>    Ca    8.2<L>      25 Nov 2020 06:29  Phos  9.8     11-25  Mg     2.6     11-25    TPro  6.9  /  Alb  2.4<L>  /  TBili  1.0  /  DBili  x   /  AST  334<H>  /  ALT  194<H>  /  AlkPhos  108  11-25        CAPILLARY BLOOD GLUCOSE      POCT Blood Glucose.: 112 mg/dL (25 Nov 2020 11:21)  POCT Blood Glucose.: 114 mg/dL (25 Nov 2020 06:38)  POCT Blood Glucose.: 109 mg/dL (25 Nov 2020 00:45)  POCT Blood Glucose.: 144 mg/dL (24 Nov 2020 18:48)    ABG - ( 25 Nov 2020 06:11 )  pH, Arterial: 7.10  pH, Blood: x     /  pCO2: 84    /  pO2: 57    / HCO3: 25    / Base Excess: -6.1  /  SaO2: 84                  RADIOLOGY & ADDITIONAL TESTS:  CXR:  < from: Xray Chest 1 View- PORTABLE-Routine (Xray Chest 1 View- PORTABLE-Routine in AM.) (11.25.20 @ 07:57) >    EXAM:  XR CHEST PORTABLE ROUTINE 1V                            PROCEDURE DATE:  11/25/2020          INTERPRETATION:  Portable chest.    Indication: COVID-19.    Portable chest x-ray is compared to a previous examination dated 11/24/2020.    Impression: Stable ET tube, bilateral IJ central venous catheters and NG tube.    Possible new mild left pleural effusion.    No evidence for pneumothorax.    Bilateral pulmonary infiltrates, grossly unchanged on the left and increased on the right.    The trachea is midline.    Stable cardiac silhouette.            LOKI BURTON MD; Attending Radiologist  This document has been electronically signed. Nov 25 2020 10:44AM    < end of copied text >  Consultant(s) Notes Reviewed:  [x ] YES  [ ] NO    MEDICATIONS  (STANDING):  azithromycin  IVPB      calcium acetate 1334 milliGRAM(s) Oral three times a day with meals  cefepime   IVPB      cefepime   IVPB 1000 milliGRAM(s) IV Intermittent every 12 hours  chlorhexidine 2% Cloths 1 Application(s) Topical <User Schedule>  cisatracurium Infusion 3 MICROgram(s)/kG/Min (22.5 mL/Hr) IV Continuous <Continuous>  diltiazem Infusion 10 mG/Hr (10 mL/Hr) IV Continuous <Continuous>  fentaNYL   Infusion 4 MICROgram(s)/kG/Hr (50 mL/Hr) IV Continuous <Continuous>  heparin   Injectable 5000 Unit(s) SubCutaneous every 8 hours  lubricant eye ont 1 Application(s) 1 Application(s) Both EYES two times a day  pantoprazole  Injectable 40 milliGRAM(s) IV Push daily  phenylephrine    Infusion 0.1 MICROgram(s)/kG/Min (2.34 mL/Hr) IV Continuous <Continuous>  polyethylene glycol 3350 17 Gram(s) Oral daily  propofol Infusion 50 MICROgram(s)/kG/Min (37.5 mL/Hr) IV Continuous <Continuous>  senna 2 Tablet(s) Oral at bedtime  vancomycin  IVPB      vancomycin  IVPB 1000 milliGRAM(s) IV Intermittent every 24 hours  vasopressin Infusion 0.04 Unit(s)/Min (2.4 mL/Hr) IV Continuous <Continuous>    MEDICATIONS  (PRN):  ALBUTerol    90 MICROgram(s) HFA Inhaler 2 Puff(s) Inhalation every 6 hours PRN Shortness of Breath    PHYSICAL EXAM:  GENERAL: obese, +ETT, sedated, supine, OGT  HEAD:  Atraumatic, Normocephalic  EYES: not compressed, supined   ENT: Moist mucous membranes  NECK: Supple  NERVOUS SYSTEM:  sedated  CHEST/LUNG: lungs CTA  HEART: S1S2 normal, tachy  ABDOMEN: Soft, obese  EXTREMITIES:  2+ Peripheral Pulse  PSYCH: sedated  SKIN: No rashes or lesions    Care Discussed with Consultants/Other Providers [ x] YES  [ ] NO

## 2020-11-25 NOTE — PROGRESS NOTE ADULT - PROBLEM SELECTOR PLAN 2
Patient with hyperkalemia due to ATN/renal failure now with improvement  K level 4.9  Continue RRt/HD   Monitor urine output

## 2020-11-25 NOTE — CHART NOTE - NSCHARTNOTEFT_GEN_A_CORE
Patient is doing very poorly    He is on 100% but remains hypoxic     He has a combine respiratory and metabolic acidosis    On HD now and that hopefully improve the metabolic component     A third pressor has been added for BP support    Will repeat labs post HD    Patients prognosis is grim.  If he were to CODE given his multiorgan failure it would be futile to CODE the patient

## 2020-11-25 NOTE — PROGRESS NOTE ADULT - PROBLEM SELECTOR PLAN 3
Patient with Calcium 8.2 with albumin 2.0, Phos 9.8, Mag 2.6  Increase Phoslo 1331 mg po tid  Monitor BMP, calcium, mag, phos level

## 2020-11-25 NOTE — PROGRESS NOTE ADULT - SUBJECTIVE AND OBJECTIVE BOX
Chandan Awad MD (Nephrology progress note)  [address], [facility],  [address],  [id number]  TEl: [phone]  Cell: [phone]    Patient is a 50y Male seen and evaluated at bedside. Vital signs, laboratory data, imaging studies, consult notes reviewed done within past 24 hours. Overnight events noted. Patient remains critically ill on ventilatory support with FIO2 100% and IV pressors. Interval HD treatment yesterday with 2.5L fluid removal. Patient still remains oligoanuric at present with worsening infiltrate on chest X-ray    Allergies    No Known Allergies    Intolerances        ROS:  Limited. Sedated and intubated on ventilatory support  VITALS:    T(C): 37.2 (11-25-20 @ 11:00), Max: 39.4 (11-25-20 @ 00:30)  HR: 143 (11-25-20 @ 14:30) (130 - 148)  BP: 104/46 (11-25-20 @ 14:00) (104/45 - 150/79)  RR: 32 (11-25-20 @ 14:30) (22 - 32)  SpO2: 85% (11-25-20 @ 14:30) (79% - 96%)  CAPILLARY BLOOD GLUCOSE      POCT Blood Glucose.: 112 mg/dL (25 Nov 2020 11:21)  POCT Blood Glucose.: 114 mg/dL (25 Nov 2020 06:38)  POCT Blood Glucose.: 109 mg/dL (25 Nov 2020 00:45)  POCT Blood Glucose.: 144 mg/dL (24 Nov 2020 18:48)      11-24-20 @ 07:01  -  11-25-20 @ 07:00  --------------------------------------------------------  IN: 3548.9 mL / OUT: 2650 mL / NET: 898.9 mL    11-25-20 @ 07:01  -  11-25-20 @ 15:03  --------------------------------------------------------  IN: 1549.2 mL / OUT: 10 mL / NET: 1539.2 mL      MEDICATIONS  (STANDING):  azithromycin  IVPB      calcium acetate 667 milliGRAM(s) Oral three times a day with meals  cefepime   IVPB      cefepime   IVPB 1000 milliGRAM(s) IV Intermittent every 12 hours  chlorhexidine 2% Cloths 1 Application(s) Topical <User Schedule>  cisatracurium Infusion 3 MICROgram(s)/kG/Min (22.5 mL/Hr) IV Continuous <Continuous>  diltiazem Infusion 10 mG/Hr (10 mL/Hr) IV Continuous <Continuous>  fentaNYL   Infusion 4 MICROgram(s)/kG/Hr (50 mL/Hr) IV Continuous <Continuous>  heparin   Injectable 5000 Unit(s) SubCutaneous every 8 hours  lubricant eye ont 1 Application(s) 1 Application(s) Both EYES two times a day  pantoprazole  Injectable 40 milliGRAM(s) IV Push daily  phenylephrine    Infusion 0.1 MICROgram(s)/kG/Min (2.34 mL/Hr) IV Continuous <Continuous>  polyethylene glycol 3350 17 Gram(s) Oral daily  propofol Infusion 50 MICROgram(s)/kG/Min (37.5 mL/Hr) IV Continuous <Continuous>  senna 2 Tablet(s) Oral at bedtime  vancomycin  IVPB      vancomycin  IVPB 1000 milliGRAM(s) IV Intermittent every 24 hours  vasopressin Infusion 0.04 Unit(s)/Min (2.4 mL/Hr) IV Continuous <Continuous>    MEDICATIONS  (PRN):  ALBUTerol    90 MICROgram(s) HFA Inhaler 2 Puff(s) Inhalation every 6 hours PRN Shortness of Breath      PHYSICAL EXAM:  GENERAL: Sedated and intubated on ventilatory support  HEENT: LEONOR, EOMI, neck supple, no JVP, no carotid bruit, oral mucosa moist and pink.  CHEST/LUNG: Bilateral decreased breath sounds, Bilateral rales and rhonchi  HEART: Regular rate and rhythm, ERNESTO II/VI at LPSB, no gallops, no rub   ABDOMEN: Soft, nontender, non distended, bowel sounds present, no palpable organomegaly  : No flank or supra pubic tenderness.  EXTREMITIES: Bilateral pitting pedal edema  Neurology: Sedated and intubated on ventilatory support  SKIN: No rash or skin lesion  Musculoskeletal: No joint deformity or spinal tenderness.      Vascular ACCESS:     LABS:                        11.9   13.26 )-----------( 256      ( 25 Nov 2020 06:29 )             38.2     11-25    131<L>  |  95<L>  |  62<H>  ----------------------------<  96  4.9   |  22  |  7.89<H>    Ca    8.2<L>      25 Nov 2020 06:29  Phos  9.8     11-25  Mg     2.6     11-25    TPro  6.9  /  Alb  2.4<L>  /  TBili  1.0  /  DBili  x   /  AST  334<H>  /  ALT  194<H>  /  AlkPhos  108  11-25                RADIOLOGY & ADDITIONAL STUDIES:  ra< from: Xray Chest 1 View- PORTABLE-Routine (Xray Chest 1 View- PORTABLE-Routine in AM.) (11.25.20 @ 07:57) >    EXAM:  XR CHEST PORTABLE ROUTINE 1V                            PROCEDURE DATE:  11/25/2020          INTERPRETATION:  Portable chest.    Indication: COVID-19.    Portable chest x-ray is compared to a previous examination dated 11/24/2020.    Impression: Stable ET tube, bilateral IJ central venous catheters and NG tube.    Possible new mild left pleural effusion.    No evidence for pneumothorax.    Bilateral pulmonary infiltrates, grossly unchanged on the left and increased on the right.    The trachea is midline.    Stable cardiac silhouette.            LOKI BURTON MD; Attending Radiologist  This document has been electronically signed. Nov 25 2020 10:44AM    < end of copied text >    Imaging Personally Reviewed:  [x] YES  [ ] NO    Consultant(s) Notes Reviewed:  [x] YES  [ ] NO    Care Discussed with Primary team/ Other Providers [x] YES  [ ] NO

## 2020-11-25 NOTE — PROGRESS NOTE ADULT - PROBLEM SELECTOR PLAN 7
Patient with Demand ischemia and atrial fibrillation from severe septic shock  Continue antiplatelet agents and AC per primary/Cardiology team  Consider Echocardiogram to assess LVEF  Supportive care  Advanced directives per family.

## 2020-11-25 NOTE — PROGRESS NOTE ADULT - PROBLEM SELECTOR PLAN 5
Patient with ARDS/acute hypoxic/hypercapnic respiratory failure on ventilatory support  Continue ventilatory support  Monitor vital signs/respiratory status  Ventilatory management per ICU team.  Fluid removal as tolerated during HD treatment

## 2020-11-25 NOTE — PROGRESS NOTE ADULT - ASSESSMENT
ASSESSMENT AND PLAN: 49M without PMH from home with wife admitted to ICU for of AHRF 2/2 COVID PNA.    Neuro: #sedation  -sedated with propofol, fentanyl  -paralyzed with Nimbex     CVS: #hypotension  -SBPs 80-100s  -off Levophed 2/2 tachycardia, added vasopressin and phenylephrine  -will increase propofol and fentanyl to lower HR  -will consider Cardizem if HR does not improve  -EKG with sinus tach->AF  -bedside Echo without global dysfunction, flowtracker with CI 2.9 today, worse since yesterday (goal >2.8)  -[place] monitoring    -#troponemia  -9->8.2->8.3 11/21  -cardio Dr. Tsang following, not NSTEMI, likely from leaking from general inflammation, do not need asa and Plavix    Pulmonary: #AHRF 2/2 COVID PNA  -ETT placed 11/19, sedated and paralyzed as above  -RR 32/PC 28/100/12, I:E 0.7, ABG worsening 7.1/84/57/25  -supinated since 3pm 11/22  -D-dimer stable 320->5529->1195->737->833, ferritin 625->441->414->574, procal 0.11->3.13, CRP 0.1->0.67->3.39->8.64->9.05->13.26, ->683->561->562, will repeat 11/27  -heparin gtt restarted 11/21 as hematuria resolved, held early am 11/22 2/2 epistaxis and BR per os, intermittent bleeding through yesterday, held further for line placement today, will restart this pm  -s/p dexamethasone IV 6mg Qd (10 day course through 11/24)  -worsening infiltrates noted on CXR today  -IgA positive, IgG negative, indicating new infection, Dr. Amato approved remdesivir (11/17) but held from 11/21am given worsening renal function with CrCl <45    ID:   -history and management as above  -WBC 18->~11, stable since 11/19  -RVP +COVID, procal increased to 3.13 as noted above, started cefepime 11/21, added vanc 11/24  -BCx NGTD  -fu sputum cx    Nephro: #AGUSTO  -CrCl 44, Cr 1.1 on admission, acutely worsened 11/21, possibly 2/2 covid injury and/or low BP, this am 8.41  -RIJ HD placed 11/21 and HD started 11/21, 11/22, and 11/24  -fu urine lytes if able to obtain (scant urine at present)  -continue phoslo, s/p HCO3 gtt    GI: #TF  -NGT clogged, removed, placed OGT today, will confirm on CXR  -TF with Nepro at 10cc/hr, restart once OGT in place  -monitor LFTs for remdesivir admin, AST mildly elevated to 87, otherwise wnl  -Protonix IV for PPx    Heme: #thrombocytopenia  -resolved, 199 today  -monitor  -epistaxis episode 11/22, s/p x1 unit platelets and transexamic acid, resolved today    Endocrine: #BG elevated  -A1c 6, average glucose 126  -TSH wnl  -vit D moderately low to 22, c/w 1000U/day   -FS q6, will change to QACHS with restarting TF  -hold HSS given TF, will restart with TF this pm    Skin/Catheters:   #LIJ placed 11/19  #RIJ HD catheter 11/21  #left radial artery 11/19  #FC (pressure)  #eye drops    Prophylaxis:  -hold full-dose Lovenox, started heparin gtt for elevated D-dimer, restart now that epistaxis and BRB per os have resolved ASSESSMENT AND PLAN: 49M without PMH from home with wife admitted to ICU for of AHRF 2/2 COVID PNA.    Neuro: #sedation  -sedated with propofol, fentanyl  -paralyzed with Nimbex     CVS: #hypotension  -SBPs 80-100s  -off Levophed 2/2 tachycardia, added vasopressin and phenylephrine  -will increase propofol and fentanyl to lower HR  -will consider Cardizem if HR does not improve  -EKG with sinus tach->AF  -bedside Echo without global dysfunction, flowtracker with CI 2.9 today, worse since yesterday (goal >2.8)  -[place] monitoring    -#troponemia  -9->8.2->8.3 11/21  -cardio Dr. Tsang following, not NSTEMI, likely from leaking from general inflammation, do not need asa and Plavix    Pulmonary: #AHRF 2/2 COVID PNA  -ETT placed 11/19, sedated and paralyzed as above  -RR 32/PC 28/100/12, I:E 0.7, ABG worsening 7.1/84/57/25  -supinated since 3pm 11/22  -D-dimer stable 320->5529->1195->737->833, ferritin 625->441->414->574, procal 0.11->3.13, CRP 0.1->0.67->3.39->8.64->9.05->13.26, ->683->561->562, will repeat 11/27  -heparin gtt restarted 11/21 as hematuria resolved, held early am 11/22 2/2 epistaxis and BR per os, intermittent bleeding through yesterday, held further for line placement today, will restart this pm  -s/p dexamethasone IV 6mg Qd (10 day course through 11/24)  -worsening infiltrates noted on CXR today  -IgA positive, IgG negative, indicating new infection, Dr. Amato approved remdesivir (11/17) but held from 11/21am given worsening renal function with CrCl <45    ID:   -history and management as above  -new fever to 102.4 overnight  -changed LIJ to femoral line  -WBC 18->11->13.2  -RVP +COVID, procal increased to 3.13 as noted above, started cefepime 11/21, added vanc 11/24, added azithromycin given worsening CXR noted above 11/25  -BCx NGTD  -fu sputum cx    Nephro: #AGUSTO  -CrCl 44, Cr 1.1 on admission, acutely worsened 11/21, possibly 2/2 covid injury and/or low BP, this am 7.89  -RIJ HD placed 11/21 and HD started 11/21, 11/22, and 11/24  -HD again today  -fu urine lytes if able to obtain (scant urine at present)  -continue phoslo, s/p HCO3 gtt    GI: #TF  -OGT confirmed, restart TF  -TF with Nepro at 10cc/hr, restart once OGT in place  -monitor LFTs for remdesivir admin, AST mildly elevated to 87, otherwise wnl  -Protonix IV for PPx    Heme: #thrombocytopenia  -resolved, 256 today  -monitor  -epistaxis episode 11/22, s/p x1 unit platelets and transexamic acid, resolved today    Endocrine: #BG elevated  -A1c 6, average glucose 126  -TSH wnl  -vit D moderately low to 22, c/w 1000U/day   -FS q6, will change to QACHS with restarting TF  -hold HSS given TF, will restart with TF this pm    Skin/Catheters:   #LIJ placed 11/19-25  #femoral line 11/25  #RIJ HD catheter 11/21  #left radial artery 11/19  #FC (pressure)  #eye drops    Prophylaxis:  -hold full-dose Lovenox, restarted heparin gtt for elevated D-dimer

## 2020-11-26 NOTE — PROGRESS NOTE ADULT - PROBLEM SELECTOR PLAN 1
Oligoanuric AGUSTO likely ATN from septic shock/ARDS requiring RRT/HD  - S/p HD treatment on 11/25 with net 2.1 L fluid removal  - Avoid Nephrotoxic agents  - Monitor BMP and urine output  - Will reschedule for HD treatment for UF and clearances  - Maintain MAP>65-70 mm hG  - Adjust antibiotics as per renal dose clearances

## 2020-11-26 NOTE — PROGRESS NOTE ADULT - PROBLEM SELECTOR PLAN 2
Patient with hyperkalemia due to ATN/renal failure now with improvement  K level 5.4  Continue RRt/HD   Monitor urine output  Renal feeding

## 2020-11-26 NOTE — PROGRESS NOTE ADULT - PROBLEM SELECTOR PLAN 5
Patient with ARDS/acute hypoxic/hypercapnic respiratory failure on ventilatory support with COVID-19 pneumonia/HCAP  Continue ventilatory support  Monitor vital signs/respiratory status  Ventilatory management per ICU team.  Fluid removal as tolerated during HD treatment

## 2020-11-26 NOTE — PROGRESS NOTE ADULT - SUBJECTIVE AND OBJECTIVE BOX
HPI: 49M without PMH from home with wife with x1 week SOB, diagnosed with COVID outpatient x3 days prior to presentation, presented 2/2 worsening of respiratory symptoms. Hypoxic to 50s per EMS, COVID infection confirmed with repeat PCR, patient placed on NRB and admitted to ICU for further management of AHRF 2/2 COVID PNA.     INTERVAL HPI/OVERNIGHT EVENTS: Continuing on nimbex, fentanyl, propofol, levophed, phenylephrine, and vasopressin. ABG this am 7.08/86/74/22, changed vent to RR36, PC 28, FiO2 100, and PEEP 14. Repeat ABG 7.16/54/50/19. SpO2 80s. Tmax overnight 102.7, SBPs 60-80s with HR continuing in 130s. HR did not respond to increases in propofol and fentanyl. Trialed cardizem gtt yesterday, but had to hold 2/2 low BPs during HD. Will start dig loading today. HD 11/25, will repeat today. LFTs continue to increase, likely 2/2 worsening COVID, but will add mucomyst as acetaminophen toxicity is a possibility.     ICU Vital Signs Last 24 Hrs  T(C): 36.8 (26 Nov 2020 07:00), Max: 39.3 (25 Nov 2020 16:30)  T(F): 98.3 (26 Nov 2020 07:00), Max: 102.7 (25 Nov 2020 16:30)  HR: 132 (26 Nov 2020 11:45) (107 - 145)  BP: 107/68 (26 Nov 2020 10:00) (79/60 - 121/74)  BP(mean): 77 (26 Nov 2020 10:00) (44 - 84)  ABP: 102/67 (26 Nov 2020 11:45) (63/50 - 130/85)  ABP(mean): 81 (26 Nov 2020 11:45) (56 - 101)  RR: 33 (26 Nov 2020 11:45) (20 - 36)  SpO2: 81% (26 Nov 2020 11:45) (78% - 97%)    I&O's Summary    25 Nov 2020 07:01  -  26 Nov 2020 07:00  --------------------------------------------------------  IN: 5568.4 mL / OUT: 2820 mL / NET: 2748.4 mL      Mode: AC/ CMV (Assist Control/ Continuous Mandatory Ventilation)  RR (machine): 32  FiO2: 80  PEEP: 7  ITime: 0.9  MAP: 21  PC: 30  PIP: 38      LABS:                        11.7   15.64 )-----------( 234      ( 26 Nov 2020 04:54 )             37.5     11-26    134<L>  |  96  |  54<H>  ----------------------------<  150<H>  5.4<H>   |  21<L>  |  7.65<H>    Ca    7.8<L>      26 Nov 2020 04:54  Phos  9.9     11-26  Mg     2.5     11-26    TPro  6.5  /  Alb  2.2<L>  /  TBili  1.3<H>  /  DBili  x   /  AST  377<H>  /  ALT  256<H>  /  AlkPhos  110  11-26    PTT - ( 26 Nov 2020 04:54 )  PTT:195.4 sec    CAPILLARY BLOOD GLUCOSE      POCT Blood Glucose.: 144 mg/dL (26 Nov 2020 11:28)  POCT Blood Glucose.: 158 mg/dL (26 Nov 2020 05:06)  POCT Blood Glucose.: 122 mg/dL (25 Nov 2020 23:54)  POCT Blood Glucose.: 133 mg/dL (25 Nov 2020 17:33)    ABG - ( 26 Nov 2020 08:28 )  pH, Arterial: 7.16  pH, Blood: x     /  pCO2: 54    /  pO2: 50    / HCO3: 19    / Base Excess: -10.0 /  SaO2: 83        RADIOLOGY & ADDITIONAL TESTS:    Consultant(s) Notes Reviewed:  [x ] YES  [ ] NO    MEDICATIONS  (STANDING):  azithromycin  IVPB      azithromycin  IVPB 500 milliGRAM(s) IV Intermittent every 24 hours  calcium acetate 1334 milliGRAM(s) Oral three times a day with meals  cefepime   IVPB      cefepime   IVPB 1000 milliGRAM(s) IV Intermittent every 12 hours  chlorhexidine 0.12% Liquid 15 milliLiter(s) Oral Mucosa two times a day  chlorhexidine 2% Cloths 1 Application(s) Topical <User Schedule>  cisatracurium Infusion 3 MICROgram(s)/kG/Min (22.5 mL/Hr) IV Continuous <Continuous>  diltiazem Infusion 10 mG/Hr (10 mL/Hr) IV Continuous <Continuous>  fentaNYL   Infusion 4 MICROgram(s)/kG/Hr (50 mL/Hr) IV Continuous <Continuous>  glucagon  Injectable 1 milliGRAM(s) IntraMuscular once  heparin  Infusion.  Unit(s)/Hr (22 mL/Hr) IV Continuous <Continuous>  insulin lispro (ADMELOG) corrective regimen sliding scale   SubCutaneous every 6 hours  lubricant eye ont 1 Application(s) 1 Application(s) Both EYES two times a day  norepinephrine Infusion 0.15 MICROgram(s)/kG/Min (17.6 mL/Hr) IV Continuous <Continuous>  pantoprazole  Injectable 40 milliGRAM(s) IV Push daily  phenylephrine    Infusion 3 MICROgram(s)/kG/Min (70.3 mL/Hr) IV Continuous <Continuous>  polyethylene glycol 3350 17 Gram(s) Oral daily  propofol Infusion 50 MICROgram(s)/kG/Min (37.5 mL/Hr) IV Continuous <Continuous>  senna 2 Tablet(s) Oral at bedtime  vancomycin  IVPB      vancomycin  IVPB 1000 milliGRAM(s) IV Intermittent every 24 hours  vasopressin Infusion 0.04 Unit(s)/Min (2.4 mL/Hr) IV Continuous <Continuous>    MEDICATIONS  (PRN):  ALBUTerol    90 MICROgram(s) HFA Inhaler 2 Puff(s) Inhalation every 6 hours PRN Shortness of Breath    PHYSICAL EXAM:  GENERAL: obese, +ETT, sedated, supine, OGT  HEAD:  Atraumatic, Normocephalic  EYES: not compressed, supined   ENT: Moist mucous membranes  NECK: Supple  NERVOUS SYSTEM:  sedated  CHEST/LUNG: lungs CTA  HEART: S1S2 normal, tachy  ABDOMEN: Soft, obese  EXTREMITIES:  2+ Peripheral Pulse, generalized edema  PSYCH: sedated  SKIN: No rashes or lesions    Care Discussed with Consultants/Other Providers [ x] YES  [ ] NO

## 2020-11-26 NOTE — PROGRESS NOTE ADULT - ASSESSMENT
ASSESSMENT AND PLAN: 49M without PMH from home with wife admitted to ICU for of AHRF 2/2 COVID PNA.    Neuro: #sedation  -sedated with propofol, fentanyl, increased yesterday  -paralyzed with Nimbex     CVS: #hypotension and tachycardia  -SBPs 60-80s  -restarted levophed, continue vasopressin and phenylephrine  -HR did not respond to increases in sedatives, trialed cardizem gtt yesterday, held 2/2 decreasing SBPs before HD  -will trial dig loading today  -EKG with sinus tach->AF  -bedside Echo without global dysfunction  -Tierney monitoring    -#troponemia  -9->8.2->8.3 11/21  -cardio Dr. Tsang following, not NSTEMI, likely from leaking from general inflammation, do not need asa and Plavix    Pulmonary: #AHRF 2/2 COVID PNA  -ETT placed 11/19, sedated and paralyzed as above  -RR 32>36/PC 28/100/12>14, I:E 0.9, ABG this am 7.08/86/74/22, improved to 7.16/54/50/19 after RR and PEEP increased  -supinated since 3pm 11/22  -D-dimer stable 320->5529->1195->737->833, ferritin 625->441->414->574, procal 0.11->3.13, CRP 0.1->0.67->3.39->8.64->9.05->13.26, ->683->561->562, will repeat 11/27  -heparin gtt restarted as hematuria and epistaxis/oozing per os resolved  -s/p dexamethasone IV 6mg Qd (10 day course through 11/24)  -worsening infiltrates noted on CXR yesterday, unchanged today  -IgA positive, IgG negative, indicating new infection, Dr. Amato approved remdesivir (11/17) but held from 11/21am given worsening renal function with CrCl <45    ID:   -history and management as above  -continues to spike fevers, Tmax 102.7 overnight  -changed LIJ to femoral line, removed LIJ yesterday  -WBC 18->11->13.2->15.6  -RVP +COVID, procal increased to 3.13 as noted above, started cefepime 11/21, added vanc 11/24, added azithromycin given worsening CXR noted above 11/25  -BCx NGF  -fu sputum cx    Nephro: #AGUSTO  -CrCl 44, Cr 1.1 on admission, acutely worsened 11/21, possibly 2/2 covid injury and/or low BP, this am 7.65  -RIJ HD placed 11/21 and HD started 11/21, 11/22, 11/24, and 11/25  -HD again today  -fu urine lytes if able to obtain (scant urine at present)  -continue phoslo, s/p HCO3 gtt    GI: #TF  -OGT in place  -TF with Nepro at 10cc/hr  -monitor LFTs ASt/ALT acutely elevated to 377/256, likely 2/2 COVID, but may also consider acetaminophen toxicity  -will trend BID  -start mucomyst   -Protonix IV for PPx    Heme: #thrombocytopenia  -resolved, 234 today  -monitor    Endocrine: #BG elevated  -A1c 6, average glucose 126  -TSH wnl  -vit D moderately low to 22, c/w 1000U/day   -FS q6 for TF  -HSS    Skin/Catheters:   #LIJ placed 11/19-25  #right femoral line 11/25  #RIJ HD catheter 11/21  #left radial artery 11/19  #FC (pressure)  #eye drops    Prophylaxis:  -hold full-dose Lovenox, restarted heparin gtt for elevated D-dimer

## 2020-11-26 NOTE — PROGRESS NOTE ADULT - PROBLEM SELECTOR PLAN 3
Patient with Calcium 7.8 with albumin 2.0, Phos 9.9, Mag 2.5  Continue  Phoslo 1331 mg po tid  Monitor BMP, calcium, mag, phos level

## 2020-11-26 NOTE — PROGRESS NOTE ADULT - SUBJECTIVE AND OBJECTIVE BOX
Chandan Awad MD (Nephrology progress note)  [address], [facility] ([place],  [id number],  [id number]  TEl: [phone]  Cell: [phone]    Patient is a 50y Male seen and evaluated at bedside. Vital signs, laboratory data, imaging studies, consult notes reviewed done within past 24 hours. Overnight events noted. Patient remains critically ill on ventilatory support and IV pressors. Interval HD treatment yesterday with 2.1 L fluid removal. Tolerated procedure well.     Allergies    No Known Allergies    Intolerances        ROS:  Limited. Sedated and intubated on ventilatory support    VITALS:    T(C): 36.8 (11-26-20 @ 07:00), Max: 39.3 (11-25-20 @ 16:30)  HR: 132 (11-26-20 @ 11:45) (107 - 145)  BP: 107/68 (11-26-20 @ 10:00) (79/60 - 121/74)  RR: 33 (11-26-20 @ 11:45) (20 - 36)  SpO2: 81% (11-26-20 @ 11:45) (78% - 97%)  CAPILLARY BLOOD GLUCOSE      POCT Blood Glucose.: 144 mg/dL (26 Nov 2020 11:28)  POCT Blood Glucose.: 158 mg/dL (26 Nov 2020 05:06)  POCT Blood Glucose.: 122 mg/dL (25 Nov 2020 23:54)  POCT Blood Glucose.: 133 mg/dL (25 Nov 2020 17:33)      11-25-20 @ 07:01  -  11-26-20 @ 07:00  --------------------------------------------------------  IN: 5568.4 mL / OUT: 2820 mL / NET: 2748.4 mL      MEDICATIONS  (STANDING):  azithromycin  IVPB      azithromycin  IVPB 500 milliGRAM(s) IV Intermittent every 24 hours  calcium acetate 1334 milliGRAM(s) Oral three times a day with meals  cefepime   IVPB      cefepime   IVPB 1000 milliGRAM(s) IV Intermittent every 12 hours  chlorhexidine 0.12% Liquid 15 milliLiter(s) Oral Mucosa two times a day  chlorhexidine 2% Cloths 1 Application(s) Topical <User Schedule>  cisatracurium Infusion 3 MICROgram(s)/kG/Min (22.5 mL/Hr) IV Continuous <Continuous>  diltiazem Infusion 10 mG/Hr (10 mL/Hr) IV Continuous <Continuous>  fentaNYL   Infusion 4 MICROgram(s)/kG/Hr (50 mL/Hr) IV Continuous <Continuous>  glucagon  Injectable 1 milliGRAM(s) IntraMuscular once  heparin  Infusion.  Unit(s)/Hr (22 mL/Hr) IV Continuous <Continuous>  insulin lispro (ADMELOG) corrective regimen sliding scale   SubCutaneous every 6 hours  lubricant eye ont 1 Application(s) 1 Application(s) Both EYES two times a day  norepinephrine Infusion 0.15 MICROgram(s)/kG/Min (17.6 mL/Hr) IV Continuous <Continuous>  pantoprazole  Injectable 40 milliGRAM(s) IV Push daily  phenylephrine    Infusion 3 MICROgram(s)/kG/Min (70.3 mL/Hr) IV Continuous <Continuous>  polyethylene glycol 3350 17 Gram(s) Oral daily  propofol Infusion 50 MICROgram(s)/kG/Min (37.5 mL/Hr) IV Continuous <Continuous>  senna 2 Tablet(s) Oral at bedtime  vancomycin  IVPB      vancomycin  IVPB 1000 milliGRAM(s) IV Intermittent every 24 hours  vasopressin Infusion 0.04 Unit(s)/Min (2.4 mL/Hr) IV Continuous <Continuous>    MEDICATIONS  (PRN):  ALBUTerol    90 MICROgram(s) HFA Inhaler 2 Puff(s) Inhalation every 6 hours PRN Shortness of Breath      PHYSICAL EXAM:  GENERAL: Sedated and intubated on ventilatory support  HEENT: LEONOR, EOMI, neck supple, no JVP, no carotid bruit, oral mucosa moist and pink.  CHEST/LUNG: Bilateral decreased breath sounds, bibasilar rales and rhonchi, no wheezing  HEART: Regular rate and rhythm, ERNESTO II/VI at LPSB, no gallops, no rub   ABDOMEN: Soft, nontender, non distended, bowel sounds present, no palpable organomegaly  : No flank or supra pubic tenderness.  EXTREMITIES: Bilateral peripheral pulses present with bilateral leg edema upto thigh  Neurology: Sedated and intubated on ventilatory support  SKIN: No rash or skin lesion  Musculoskeletal: No joint deformity       Vascular ACCESS:     LABS:                        11.7   15.64 )-----------( 234      ( 26 Nov 2020 04:54 )             37.5     11-26    134<L>  |  96  |  54<H>  ----------------------------<  150<H>  5.4<H>   |  21<L>  |  7.65<H>    Ca    7.8<L>      26 Nov 2020 04:54  Phos  9.9     11-26  Mg     2.5     11-26    TPro  6.5  /  Alb  2.2<L>  /  TBili  1.3<H>  /  DBili  x   /  AST  377<H>  /  ALT  256<H>  /  AlkPhos  110  11-26      PTT - ( 26 Nov 2020 04:54 )  PTT:195.4 sec          RADIOLOGY & ADDITIONAL STUDIES:  r< from: Xray Chest 1 View- PORTABLE-Routine (Xray Chest 1 View- PORTABLE-Routine in AM.) (11.26.20 @ 09:27) >    EXAM:  XR CHEST PORTABLE ROUTINE 1V                            PROCEDURE DATE:  11/26/2020          INTERPRETATION:  HISTORY:  ADMDIAG1: U07.1 COVID-19/; covid; ett;  TECHNIQUE: Portable frontal view of the chest, 1 view.  COMPARISON: 11/25/2020.  FINDINGS:  Right central line SVC. Endotracheal tube above the paula. The nasogastric tube courses below the left hemidiaphragm, tip off edge of film.  HEART:difficult to access in this projection  LUNGS: Dense bilateral consolidations.  BONES: Osseous structures are within normal limits.    IMPRESSION:  When taking into account difference in technique, there is likely no significant change.              ELVIS RAMIREZ MD; Attending Interventional Radiologist  This document has been electronically signed. Nov 26 2020 10:40AM    < end of copied text >    Imaging Personally Reviewed:  [x] YES  [ ] NO    Consultant(s) Notes Reviewed:  [x] YES  [ ] NO    Care Discussed with Primary team/ Other Providers [x] YES  [ ] NO

## 2020-11-26 NOTE — CHART NOTE - NSCHARTNOTEFT_GEN_A_CORE
Assessment:   Patient is a 50y old  Male who presents with a chief complaint of Shortness of breath D/t Covid . Unable to see pt up close due to Covid+ isolation. Nepro hanging (unable to see if on). Flow records indicate pt was receiving 10 ml/hr earlier. Low level Propofol noted, at times and on paralytic. Pt noted with MOF and poor prognosis, receiving HD.       Factors impacting intake: [ ] none [ ] nausea  [ ] vomiting [ ] diarrhea [ ] constipation  [ ]chewing problems [ ] swallowing issues  [x ] other: Critical illness ongoing with MOF with HD, TF (low or none)    Diet Prescription: Diet, NPO with Tube Feed:   Tube Feeding Modality: Nasogastric  Nepro with Carb Steady  Total Volume for 24 Hours (mL): 240  Continuous  Starting Tube Feed Rate {mL per Hour}: 10  Until Goal Tube Feed Rate (mL per Hour): 10  Tube Feed Duration (in Hours): 24  Tube Feed Start Time: 12:00 (20 @ 11:40)    Intake: trickle fees (or none, at times)    Daily     Daily Weight in k (2020 07:00)  Weight in k.1 (2020 00:25)  Weight in k.4 (2020 21:50)  Weight in k.8 (2020 18:20)  Weight in k.6 (2020 15:20)  Weight in k (2020 07:00)  Weight in k.6 (2020 03:05)  Weight in k.9 (2020 23:31)  Weight in k.8 (2020 08:00)  Weight in k.2 (2020 20:31)  Weight in k.1 (2020 17:31)  Weight in k.7 (2020 07:45)  Weight in k.2 (2020 23:20)  Weight in k.3 (2020 21:15)  Weight in k.8 (2020 07:00)  Weight in k.3 (2020 08:00)    % Weight Change: 3+ generalized edema noted, I>O     Pertinent Medications: MEDICATIONS  (STANDING):  azithromycin  IVPB      azithromycin  IVPB 500 milliGRAM(s) IV Intermittent every 24 hours  calcium acetate 1334 milliGRAM(s) Oral three times a day with meals  cefepime   IVPB      cefepime   IVPB 1000 milliGRAM(s) IV Intermittent every 12 hours  chlorhexidine 0.12% Liquid 15 milliLiter(s) Oral Mucosa two times a day  chlorhexidine 2% Cloths 1 Application(s) Topical <User Schedule>  cisatracurium Infusion 3 MICROgram(s)/kG/Min (22.5 mL/Hr) IV Continuous <Continuous>  diltiazem Infusion 10 mG/Hr (10 mL/Hr) IV Continuous <Continuous>  fentaNYL   Infusion 4 MICROgram(s)/kG/Hr (50 mL/Hr) IV Continuous <Continuous>  glucagon  Injectable 1 milliGRAM(s) IntraMuscular once  heparin  Infusion.  Unit(s)/Hr (22 mL/Hr) IV Continuous <Continuous>  insulin lispro (ADMELOG) corrective regimen sliding scale   SubCutaneous every 6 hours  lubricant eye ont 1 Application(s) 1 Application(s) Both EYES two times a day  norepinephrine Infusion 0.15 MICROgram(s)/kG/Min (17.6 mL/Hr) IV Continuous <Continuous>  pantoprazole  Injectable 40 milliGRAM(s) IV Push daily  phenylephrine    Infusion 3 MICROgram(s)/kG/Min (70.3 mL/Hr) IV Continuous <Continuous>  polyethylene glycol 3350 17 Gram(s) Oral daily  propofol Infusion 50 MICROgram(s)/kG/Min (37.5 mL/Hr) IV Continuous <Continuous>  senna 2 Tablet(s) Oral at bedtime  vancomycin  IVPB      vancomycin  IVPB 1000 milliGRAM(s) IV Intermittent every 24 hours  vasopressin Infusion 0.04 Unit(s)/Min (2.4 mL/Hr) IV Continuous <Continuous>    MEDICATIONS  (PRN):  ALBUTerol    90 MICROgram(s) HFA Inhaler 2 Puff(s) Inhalation every 6 hours PRN Shortness of Breath    Pertinent Labs:  Na134 mmol/L<L> Glu 150 mg/dL<H> K+ 5.4 mmol/L<H> Cr  7.65 mg/dL<H> BUN 54 mg/dL<H>  Phos 9.9 mg/dL<H>  Alb 2.2 g/dL<L>     CAPILLARY BLOOD GLUCOSE      POCT Blood Glucose.: 144 mg/dL (2020 11:28)  POCT Blood Glucose.: 158 mg/dL (2020 05:06)  POCT Blood Glucose.: 122 mg/dL (2020 23:54)  POCT Blood Glucose.: 133 mg/dL (2020 17:33)      Estimated  needs remain the same but could be reduced due to pt's current critical medical condition, tx and prognosis  [x ] no change since previous assessment (see above)  [ ] recalculated: N/A at this time      Previous Nutrition Diagnosis:   [ ] Altered GI function  [x ]Inadequate Oral Intake [ ] Swallowing Difficulty   [ ] Altered nutrition related labs [ ] Increased Nutrient Needs [ ] Overweight/Obesity   [ ] Unintended Weight Loss [ ] Food & Nutrition Related Knowledge Deficit [ ] Malnutrition   [ ] Other:     Nutrition Diagnosis is [x ] ongoing  [ ] resolved [ ] not applicable     New Nutrition Diagnosis: [x ]PCM (severe) related to acute/ critical illness (covid 19 w/ MOF as evidenced by <50% needs met> 5 days, 3+ generalized edema.       Interventions:   Recommend  [ ] Change Diet To:  [ ] Nutrition Supplement  [x ] Nutrition Support: TF as medically feasible and consistent with goals of care  (Current TF order provides 240 ml Nepro:432 kcals,  19 gm protein). Maintaintrickle feeds as possible with consideration for possible increase to 20 ml/hr). MD to monitor. RD available.   [ ] Other:     Monitoring and Evaluation: [ x ] follow up per protocol  [ ] other: Assessment:   Patient is a 50y old  Male who presents with a chief complaint of Shortness of breath D/t Covid . Unable to see pt up close due to Covid+ isolation. Flow records indicate pt was receiving 10 ml/hr earlier of Nepro) Low level Propofol noted, at times and on paralytic. Pt noted with MOF and poor prognosis, receiving HD.       Factors impacting intake: [ ] none [ ] nausea  [ ] vomiting [ ] diarrhea [ ] constipation  [ ]chewing problems [ ] swallowing issues  [x ] other: Critical illness ongoing with MOF with HD, TF (low or none)    Diet Prescription: Diet, NPO with Tube Feed:   Tube Feeding Modality: Nasogastric  Nepro with Carb Steady  Total Volume for 24 Hours (mL): 240  Continuous  Starting Tube Feed Rate {mL per Hour}: 10  Until Goal Tube Feed Rate (mL per Hour): 10  Tube Feed Duration (in Hours): 24  Tube Feed Start Time: 12:00 (20 @ 11:40)    Intake: trickle fees (or none, at times)    Daily     Daily Weight in k (2020 07:00)  Weight in k.1 (2020 00:25)  Weight in k.4 (2020 21:50)  Weight in k.8 (2020 18:20)  Weight in k.6 (2020 15:20)  Weight in k (2020 07:00)  Weight in k.6 (2020 03:05)  Weight in k.9 (2020 23:31)  Weight in k.8 (2020 08:00)  Weight in k.2 (2020 20:31)  Weight in k.1 (2020 17:31)  Weight in k.7 (2020 07:45)  Weight in k.2 (2020 23:20)  Weight in k.3 (2020 21:15)  Weight in k.8 (2020 07:00)  Weight in k.3 (2020 08:00)    % Weight Change: 3+ generalized edema noted, I>O     Pertinent Medications: MEDICATIONS  (STANDING):  azithromycin  IVPB      azithromycin  IVPB 500 milliGRAM(s) IV Intermittent every 24 hours  calcium acetate 1334 milliGRAM(s) Oral three times a day with meals  cefepime   IVPB      cefepime   IVPB 1000 milliGRAM(s) IV Intermittent every 12 hours  chlorhexidine 0.12% Liquid 15 milliLiter(s) Oral Mucosa two times a day  chlorhexidine 2% Cloths 1 Application(s) Topical <User Schedule>  cisatracurium Infusion 3 MICROgram(s)/kG/Min (22.5 mL/Hr) IV Continuous <Continuous>  diltiazem Infusion 10 mG/Hr (10 mL/Hr) IV Continuous <Continuous>  fentaNYL   Infusion 4 MICROgram(s)/kG/Hr (50 mL/Hr) IV Continuous <Continuous>  glucagon  Injectable 1 milliGRAM(s) IntraMuscular once  heparin  Infusion.  Unit(s)/Hr (22 mL/Hr) IV Continuous <Continuous>  insulin lispro (ADMELOG) corrective regimen sliding scale   SubCutaneous every 6 hours  lubricant eye ont 1 Application(s) 1 Application(s) Both EYES two times a day  norepinephrine Infusion 0.15 MICROgram(s)/kG/Min (17.6 mL/Hr) IV Continuous <Continuous>  pantoprazole  Injectable 40 milliGRAM(s) IV Push daily  phenylephrine    Infusion 3 MICROgram(s)/kG/Min (70.3 mL/Hr) IV Continuous <Continuous>  polyethylene glycol 3350 17 Gram(s) Oral daily  propofol Infusion 50 MICROgram(s)/kG/Min (37.5 mL/Hr) IV Continuous <Continuous>  senna 2 Tablet(s) Oral at bedtime  vancomycin  IVPB      vancomycin  IVPB 1000 milliGRAM(s) IV Intermittent every 24 hours  vasopressin Infusion 0.04 Unit(s)/Min (2.4 mL/Hr) IV Continuous <Continuous>    MEDICATIONS  (PRN):  ALBUTerol    90 MICROgram(s) HFA Inhaler 2 Puff(s) Inhalation every 6 hours PRN Shortness of Breath    Pertinent Labs:  Na134 mmol/L<L> Glu 150 mg/dL<H> K+ 5.4 mmol/L<H> Cr  7.65 mg/dL<H> BUN 54 mg/dL<H>  Phos 9.9 mg/dL<H>  Alb 2.2 g/dL<L>     CAPILLARY BLOOD GLUCOSE      POCT Blood Glucose.: 144 mg/dL (2020 11:28)  POCT Blood Glucose.: 158 mg/dL (2020 05:06)  POCT Blood Glucose.: 122 mg/dL (2020 23:54)  POCT Blood Glucose.: 133 mg/dL (2020 17:33)      Estimated  needs remain the same but could be reduced due to pt's current critical medical condition, tx and prognosis  [x ] no change since previous assessment (see above)  [ ] recalculated: N/A at this time      Previous Nutrition Diagnosis:   [ ] Altered GI function  [x ]Inadequate Oral Intake [ ] Swallowing Difficulty   [ ] Altered nutrition related labs [ ] Increased Nutrient Needs [ ] Overweight/Obesity   [ ] Unintended Weight Loss [ ] Food & Nutrition Related Knowledge Deficit [ ] Malnutrition   [ ] Other:     Nutrition Diagnosis is [x ] ongoing  [ ] resolved [ ] not applicable     New Nutrition Diagnosis: [x ]PCM (severe) related to acute/ critical illness (covid 19 w/ MOF as evidenced by <50% needs met> 5 days, 3+ generalized edema.       Interventions:   Recommend  [ ] Change Diet To:  [ ] Nutrition Supplement  [x ] Nutrition Support: TF as medically feasible and consistent with goals of care  (Current TF order provides 240 ml Nepro:432 kcals,  19 gm protein). Maintaintrickle feeds as possible with consideration for possible increase to 20 ml/hr). MD to monitor. RD available.   [ ] Other:     Monitoring and Evaluation: [ x ] follow up per protocol  [ ] other:

## 2020-11-26 NOTE — PROGRESS NOTE ADULT - ATTENDING COMMENTS
49 yr  old obese male with out any significant medical history  who presented with sob due to acute hypoxic respiratory failure due to covid pneumonia.    Assessment:  1. Acute Hypoxic respiratory Failure / ARDS  2. COVID-19 PNA  3. Morbid Obesity   4. Elevated lactate  5. Hypotension - sedation related  6. Type 2 MI - likely due to hypoxia  7. Acute kidney injury     Plan  - intubated 11/19 for worsening hypoxia and worsening ARDS  - vent management with lung protective strategy   -peak pressures are high  -will start NO  -monitor blood gas  -mucomyst for ALI  -start dig for afib  - keep o2 sat >90%  - Proning 16 hours a day  - Sedation and paralytics for vent synchrony  - Mechanical ventilation on PC for now, adjusted to ABG  - spike fever, TLC change , continue antibx  - Nephrology for HD today , cxr is worst   - Holding remdesivir due to renal failure  - Monitor renal and hepatic function  - Vasopressor support to maintain MAP > 65  - DVT/ GI prophy  - Trickle tube feeds  - Add bowel regimen  - isolation : contact and airborne  - Prognosis is guarded .   - Pain control .

## 2020-11-26 NOTE — CHART NOTE - TREATMENT: THE FOLLOWING DIET HAS BEEN RECOMMENDED
Diet, NPO with Tube Feed:   Tube Feeding Modality: Nasogastric  Nepro with Carb Steady  Total Volume for 24 Hours (mL): 240  Continuous  Starting Tube Feed Rate {mL per Hour}: 10  Until Goal Tube Feed Rate (mL per Hour): 10  Tube Feed Duration (in Hours): 24  Tube Feed Start Time: 12:00 (11-20-20 @ 11:40) [Active]    Please see document section.

## 2020-11-27 NOTE — PROGRESS NOTE ADULT - ATTENDING COMMENTS
49 yr  old obese male with out any significant medical history  who presented with sob due to acute hypoxic respiratory failure due to covid pneumonia.    Assessment:  1. Acute Hypoxic respiratory Failure / ARDS  2. COVID-19 PNA  3. Morbid Obesity   4. Elevated lactate  5. Hypotension - sedation related  6. Type 2 MI - likely due to hypoxia  7. Acute kidney injury     Plan  - intubated 11/19 for worsening hypoxia and worsening ARDS  - vent management with lung protective strategy   -peak pressures are high  -started on  NO  -monitor blood gas  -start dig for afib  - keep o2 sat >90%  - Proning 16 hours a day  - Sedation and paralytics for vent synchrony  - Mechanical ventilation on PC for now, adjusted to ABG  - spike fever, TLC change , continue antibx  - Nephrology for HD today , cxr is worst   - Holding remdesivir due to renal failure  - Monitor renal and hepatic function  - Vasopressor support to maintain MAP > 65  - DVT/ GI prophy  - Trickle tube feeds  - Add bowel regimen  - isolation : contact and airborne  - Prognosis is guarded .   - Pain control .

## 2020-11-27 NOTE — PROGRESS NOTE ADULT - PROBLEM SELECTOR PLAN 2
Patient with hyperkalemia due to ATN/renal failure now with improvement  K level 4.7  Continue RRt/HD   Renal feeding

## 2020-11-27 NOTE — CONSULT NOTE ADULT - ASSESSMENT
50y Male with no significant medical Hx p/w SOB and admitted to ICU for hypoxic respiratory failure due to COVID-19 PNA on 11/15/2020, intubated, on pressor support, remains febrile, tachycardic, elevated troponin, AGUSTO requiring HD (since 11/21). Hepatology is consulted for acute worsening of transaminases with normal cholestatic parameters.        50y Male with no significant medical Hx p/w SOB and admitted to ICU for hypoxic respiratory failure due to COVID-19 PNA on 11/15/2020, intubated, on pressor support, remains febrile, tachycardic, elevated troponin, AGUSTO requiring HD (since 11/21). Hepatology is consulted for acute worsening of transaminases with normal cholestatic parameters, and had normal INR (but no recent one), mental status cannot be assessed (sedated, intubated).     Liver injury is not uncommon in COVID-19 and usually multifactorial, combination of ischemic/hypoxic injury, systemic inflammatory response, potential drug induced component, and the occurence of thrombotic events, including microthrombi is also more frequent in COVID patients, and cannot rule out direct viral effect either.   - check INR  - check CPK  - consider US abdomen with Doppler if feasible  - c/w mgmt. of COVID-19 per ICU team  - can consider NAC      Will continue to follow  D/w primary team  Thank you for the consult

## 2020-11-27 NOTE — PROGRESS NOTE ADULT - PROBLEM SELECTOR PLAN 5
Patient with ARDS/acute hypoxic/hypercapnic respiratory failure on ventilatory support with COVID-19 pneumonia/HCAP  Continue ventilatory support  Monitor vital signs/respiratory status  Ventilatory management per ICU team.  Surveillance COVID-19  Fluid removal as tolerated during HD treatment

## 2020-11-27 NOTE — PROGRESS NOTE ADULT - PROBLEM SELECTOR PLAN 1
Oligoanuric AGUSTO likely ATN from septic shock/ARDS requiring RRT/HD  - S/p HD treatment on 11/26 with net 3.5 L fluid removal  - Avoid Nephrotoxic agents  - Monitor BMP and electrolytes  - Maintain MAP>65-70 mm hG  - Adjust antibiotics as per renal dose clearances  - Volume status and electrolytes noted.  - Defer HD today. Next anticipated IHD on 11/28.

## 2020-11-27 NOTE — PROGRESS NOTE ADULT - SUBJECTIVE AND OBJECTIVE BOX
Patient is a 50y old  Male who presents with a chief complaint of Shortness of breath D/t Covid (27 Nov 2020 12:36)      INTERVAL HPI/OVERNIGHT EVENTS:  ICU Vital Signs Last 24 Hrs  T(C): 37.4 (27 Nov 2020 12:00), Max: 37.6 (26 Nov 2020 20:00)  T(F): 99.3 (27 Nov 2020 12:00), Max: 99.6 (26 Nov 2020 20:00)  HR: 141 (27 Nov 2020 12:25) (71 - 150)  BP: 95/70 (27 Nov 2020 12:00) (95/70 - 123/93)  BP(mean): 75 (27 Nov 2020 12:00) (73 - 99)  ABP: 93/62 (27 Nov 2020 12:15) (74/41 - 213/126)  ABP(mean): 75 (27 Nov 2020 12:15) (53 - 151)  RR: 34 (27 Nov 2020 12:15) (16 - 34)  SpO2: 91% (27 Nov 2020 12:25) (75% - 98%)    I&O's Summary    26 Nov 2020 07:01  -  27 Nov 2020 07:00  --------------------------------------------------------  IN: 2295.8 mL / OUT: 3511 mL / NET: -1215.2 mL    27 Nov 2020 07:01  -  27 Nov 2020 14:11  --------------------------------------------------------  IN: 480.2 mL / OUT: 0 mL / NET: 480.2 mL      Mode: AC/ CMV (Assist Control/ Continuous Mandatory Ventilation)  RR (machine): 34  FiO2: 100  PEEP: 15  ITime: 0.8  MAP: 29  PC: 32  PIP: 48      LABS:                        10.7   17.77 )-----------( 238      ( 27 Nov 2020 06:11 )             34.0     11-27    134<L>  |  95<L>  |  42<H>  ----------------------------<  143<H>  4.7   |  25  |  6.41<H>    Ca    8.3<L>      27 Nov 2020 06:11  Phos  8.9     11-27  Mg     2.5     11-27    TPro  6.0  /  Alb  2.1<L>  /  TBili  0.9  /  DBili  x   /  AST  248<H>  /  ALT  243<H>  /  AlkPhos  108  11-27    PTT - ( 27 Nov 2020 02:34 )  PTT:85.4 sec    CAPILLARY BLOOD GLUCOSE      POCT Blood Glucose.: 206 mg/dL (27 Nov 2020 12:00)  POCT Blood Glucose.: 155 mg/dL (27 Nov 2020 05:23)  POCT Blood Glucose.: 180 mg/dL (26 Nov 2020 23:15)  POCT Blood Glucose.: 189 mg/dL (26 Nov 2020 18:17)    ABG - ( 27 Nov 2020 04:07 )  pH, Arterial: 7.11  pH, Blood: x     /  pCO2: 72    /  pO2: 70    / HCO3: 22    / Base Excess: -9.5  /  SaO2: 90                  RADIOLOGY & ADDITIONAL TESTS:    Consultant(s) Notes Reviewed:  [x ] YES  [ ] NO    MEDICATIONS  (STANDING):  azithromycin  IVPB 500 milliGRAM(s) IV Intermittent every 24 hours  azithromycin  IVPB      calcium acetate 1334 milliGRAM(s) Oral three times a day with meals  cefepime   IVPB      cefepime   IVPB 1000 milliGRAM(s) IV Intermittent every 12 hours  chlorhexidine 0.12% Liquid 15 milliLiter(s) Oral Mucosa two times a day  chlorhexidine 2% Cloths 1 Application(s) Topical <User Schedule>  cisatracurium Infusion 3 MICROgram(s)/kG/Min (22.5 mL/Hr) IV Continuous <Continuous>  fentaNYL   Infusion 4 MICROgram(s)/kG/Hr (50 mL/Hr) IV Continuous <Continuous>  glucagon  Injectable 1 milliGRAM(s) IntraMuscular once  heparin  Infusion.  Unit(s)/Hr (22 mL/Hr) IV Continuous <Continuous>  insulin lispro (ADMELOG) corrective regimen sliding scale   SubCutaneous every 6 hours  lubricant eye ont 1 Application(s) 1 Application(s) Both EYES two times a day  pantoprazole  Injectable 40 milliGRAM(s) IV Push daily  phenylephrine    Infusion 3.2 MICROgram(s)/kG/Min (75 mL/Hr) IV Continuous <Continuous>  polyethylene glycol 3350 17 Gram(s) Oral daily  propofol Infusion 50 MICROgram(s)/kG/Min (37.5 mL/Hr) IV Continuous <Continuous>  senna 2 Tablet(s) Oral at bedtime  vancomycin  IVPB      vancomycin  IVPB 1000 milliGRAM(s) IV Intermittent every 24 hours  vasopressin Infusion 0.04 Unit(s)/Min (2.4 mL/Hr) IV Continuous <Continuous>    MEDICATIONS  (PRN):  ALBUTerol    90 MICROgram(s) HFA Inhaler 2 Puff(s) Inhalation every 6 hours PRN Shortness of Breath      PHYSICAL EXAM:  GENERAL: well built, well nourished  HEAD:  Atraumatic, Normocephalic  EYES: EOMI, PERRLA, conjunctiva and sclera clear  ENT: No tonsillar erythema, exudates, or enlargement; Moist mucous membranes, Good dentition, No lesions  NECK: Supple, No JVD, Normal thyroid, no enlarged nodes  NERVOUS SYSTEM:  Alert & Oriented X3, Good concentration; Motor Strength 5/5 B/L upper and lower extremities; DTRs 2+ intact and symmetric, sensory intact  CHEST/LUNG: B/L good air entry; No rales, rhonchi, or wheezing  HEART: S1S2 normal, no S3, Regular rate and rhythm; No murmurs, rubs, or gallops  ABDOMEN: Soft, Nontender, Nondistended; Bowel sounds present  EXTREMITIES:  2+ Peripheral Pulses, No clubbing, cyanosis, or edema  LYMPH: No lymphadenopathy noted  SKIN: No rashes or lesions    Care Discussed with Consultants/Other Providers [ x] YES  [ ] NO HPI: 49M without PMH from home with wife with x1 week SOB, diagnosed with COVID outpatient x3 days prior to presentation, presented 2/2 worsening of respiratory symptoms. Hypoxic to 50s per EMS, COVID infection confirmed with repeat PCR, patient placed on NRB and admitted to ICU for further management of AHRF 2/2 COVID PNA.     INTERVAL HPI/OVERNIGHT EVENTS: Tmax 99.6 overnight,   ICU Vital Signs Last 24 Hrs  T(C): 37.4 (27 Nov 2020 12:00), Max: 37.6 (26 Nov 2020 20:00)  T(F): 99.3 (27 Nov 2020 12:00), Max: 99.6 (26 Nov 2020 20:00)  HR: 141 (27 Nov 2020 12:25) (71 - 150)  BP: 95/70 (27 Nov 2020 12:00) (95/70 - 123/93)  BP(mean): 75 (27 Nov 2020 12:00) (73 - 99)  ABP: 93/62 (27 Nov 2020 12:15) (74/41 - 213/126)  ABP(mean): 75 (27 Nov 2020 12:15) (53 - 151)  RR: 34 (27 Nov 2020 12:15) (16 - 34)  SpO2: 91% (27 Nov 2020 12:25) (75% - 98%)    I&O's Summary    26 Nov 2020 07:01  -  27 Nov 2020 07:00  --------------------------------------------------------  IN: 2295.8 mL / OUT: 3511 mL / NET: -1215.2 mL    27 Nov 2020 07:01  -  27 Nov 2020 14:11  --------------------------------------------------------  IN: 480.2 mL / OUT: 0 mL / NET: 480.2 mL      Mode: AC/ CMV (Assist Control/ Continuous Mandatory Ventilation)  RR (machine): 34  FiO2: 100  PEEP: 15  ITime: 0.8  MAP: 29  PC: 32  PIP: 48      LABS:                        10.7   17.77 )-----------( 238      ( 27 Nov 2020 06:11 )             34.0     11-27    134<L>  |  95<L>  |  42<H>  ----------------------------<  143<H>  4.7   |  25  |  6.41<H>    Ca    8.3<L>      27 Nov 2020 06:11  Phos  8.9     11-27  Mg     2.5     11-27    TPro  6.0  /  Alb  2.1<L>  /  TBili  0.9  /  DBili  x   /  AST  248<H>  /  ALT  243<H>  /  AlkPhos  108  11-27    PTT - ( 27 Nov 2020 02:34 )  PTT:85.4 sec    CAPILLARY BLOOD GLUCOSE      POCT Blood Glucose.: 206 mg/dL (27 Nov 2020 12:00)  POCT Blood Glucose.: 155 mg/dL (27 Nov 2020 05:23)  POCT Blood Glucose.: 180 mg/dL (26 Nov 2020 23:15)  POCT Blood Glucose.: 189 mg/dL (26 Nov 2020 18:17)    ABG - ( 27 Nov 2020 04:07 )  pH, Arterial: 7.11  pH, Blood: x     /  pCO2: 72    /  pO2: 70    / HCO3: 22    / Base Excess: -9.5  /  SaO2: 90                  RADIOLOGY & ADDITIONAL TESTS:    Consultant(s) Notes Reviewed:  [x ] YES  [ ] NO    MEDICATIONS  (STANDING):  azithromycin  IVPB 500 milliGRAM(s) IV Intermittent every 24 hours  azithromycin  IVPB      calcium acetate 1334 milliGRAM(s) Oral three times a day with meals  cefepime   IVPB      cefepime   IVPB 1000 milliGRAM(s) IV Intermittent every 12 hours  chlorhexidine 0.12% Liquid 15 milliLiter(s) Oral Mucosa two times a day  chlorhexidine 2% Cloths 1 Application(s) Topical <User Schedule>  cisatracurium Infusion 3 MICROgram(s)/kG/Min (22.5 mL/Hr) IV Continuous <Continuous>  fentaNYL   Infusion 4 MICROgram(s)/kG/Hr (50 mL/Hr) IV Continuous <Continuous>  glucagon  Injectable 1 milliGRAM(s) IntraMuscular once  heparin  Infusion.  Unit(s)/Hr (22 mL/Hr) IV Continuous <Continuous>  insulin lispro (ADMELOG) corrective regimen sliding scale   SubCutaneous every 6 hours  lubricant eye ont 1 Application(s) 1 Application(s) Both EYES two times a day  pantoprazole  Injectable 40 milliGRAM(s) IV Push daily  phenylephrine    Infusion 3.2 MICROgram(s)/kG/Min (75 mL/Hr) IV Continuous <Continuous>  polyethylene glycol 3350 17 Gram(s) Oral daily  propofol Infusion 50 MICROgram(s)/kG/Min (37.5 mL/Hr) IV Continuous <Continuous>  senna 2 Tablet(s) Oral at bedtime  vancomycin  IVPB      vancomycin  IVPB 1000 milliGRAM(s) IV Intermittent every 24 hours  vasopressin Infusion 0.04 Unit(s)/Min (2.4 mL/Hr) IV Continuous <Continuous>    MEDICATIONS  (PRN):  ALBUTerol    90 MICROgram(s) HFA Inhaler 2 Puff(s) Inhalation every 6 hours PRN Shortness of Breath      PHYSICAL EXAM:  GENERAL: well built, well nourished  HEAD:  Atraumatic, Normocephalic  EYES: EOMI, PERRLA, conjunctiva and sclera clear  ENT: No tonsillar erythema, exudates, or enlargement; Moist mucous membranes, Good dentition, No lesions  NECK: Supple, No JVD, Normal thyroid, no enlarged nodes  NERVOUS SYSTEM:  Alert & Oriented X3, Good concentration; Motor Strength 5/5 B/L upper and lower extremities; DTRs 2+ intact and symmetric, sensory intact  CHEST/LUNG: B/L good air entry; No rales, rhonchi, or wheezing  HEART: S1S2 normal, no S3, Regular rate and rhythm; No murmurs, rubs, or gallops  ABDOMEN: Soft, Nontender, Nondistended; Bowel sounds present  EXTREMITIES:  2+ Peripheral Pulses, No clubbing, cyanosis, or edema  LYMPH: No lymphadenopathy noted  SKIN: No rashes or lesions    Care Discussed with Consultants/Other Providers [ x] YES  [ ] NO HPI: 49M without PMH from home with wife with x1 week SOB, diagnosed with COVID outpatient x3 days prior to presentation, presented 2/2 worsening of respiratory symptoms. Hypoxic to 50s per EMS, COVID infection confirmed with repeat PCR, patient placed on NRB and admitted to ICU for further management of AHRF 2/2 COVID PNA.     INTERVAL HPI/OVERNIGHT EVENTS: Tmax 99.6 overnight, HR sustaining 140s, worsening SpO2 70-90s. Continuing with phenylephrine, vasopressin, fentanyl, propofol, nimbex. Continuing digoxin loading. Underwent HD yesterday, will hold off today, Cr 6.41 this am. AST//243, . Hepatology Dr. Sabillon consulted. Wife Saima updated today regarding patient's worsening status and multi-organ failure. Patient to remain full code at this time.     ICU Vital Signs Last 24 Hrs  T(C): 37.4 (27 Nov 2020 12:00), Max: 37.6 (26 Nov 2020 20:00)  T(F): 99.3 (27 Nov 2020 12:00), Max: 99.6 (26 Nov 2020 20:00)  HR: 141 (27 Nov 2020 12:25) (71 - 150)  BP: 95/70 (27 Nov 2020 12:00) (95/70 - 123/93)  BP(mean): 75 (27 Nov 2020 12:00) (73 - 99)  ABP: 93/62 (27 Nov 2020 12:15) (74/41 - 213/126)  ABP(mean): 75 (27 Nov 2020 12:15) (53 - 151)  RR: 34 (27 Nov 2020 12:15) (16 - 34)  SpO2: 91% (27 Nov 2020 12:25) (75% - 98%)    I&O's Summary    26 Nov 2020 07:01  -  27 Nov 2020 07:00  --------------------------------------------------------  IN: 2295.8 mL / OUT: 3511 mL / NET: -1215.2 mL    27 Nov 2020 07:01  -  27 Nov 2020 14:11  --------------------------------------------------------  IN: 480.2 mL / OUT: 0 mL / NET: 480.2 mL      Mode: AC/ CMV (Assist Control/ Continuous Mandatory Ventilation)  RR (machine): 34  FiO2: 100  PEEP: 15  ITime: 0.8  MAP: 29  PC: 32  PIP: 48      LABS:                        10.7   17.77 )-----------( 238      ( 27 Nov 2020 06:11 )             34.0     11-27    134<L>  |  95<L>  |  42<H>  ----------------------------<  143<H>  4.7   |  25  |  6.41<H>    Ca    8.3<L>      27 Nov 2020 06:11  Phos  8.9     11-27  Mg     2.5     11-27    TPro  6.0  /  Alb  2.1<L>  /  TBili  0.9  /  DBili  x   /  AST  248<H>  /  ALT  243<H>  /  AlkPhos  108  11-27    PTT - ( 27 Nov 2020 02:34 )  PTT:85.4 sec    CAPILLARY BLOOD GLUCOSE      POCT Blood Glucose.: 206 mg/dL (27 Nov 2020 12:00)  POCT Blood Glucose.: 155 mg/dL (27 Nov 2020 05:23)  POCT Blood Glucose.: 180 mg/dL (26 Nov 2020 23:15)  POCT Blood Glucose.: 189 mg/dL (26 Nov 2020 18:17)    ABG - ( 27 Nov 2020 04:07 )  pH, Arterial: 7.11  pH, Blood: x     /  pCO2: 72    /  pO2: 70    / HCO3: 22    / Base Excess: -9.5  /  SaO2: 90                  RADIOLOGY & ADDITIONAL TESTS:    Consultant(s) Notes Reviewed:  [x ] YES  [ ] NO    MEDICATIONS  (STANDING):  azithromycin  IVPB 500 milliGRAM(s) IV Intermittent every 24 hours  azithromycin  IVPB      calcium acetate 1334 milliGRAM(s) Oral three times a day with meals  cefepime   IVPB      cefepime   IVPB 1000 milliGRAM(s) IV Intermittent every 12 hours  chlorhexidine 0.12% Liquid 15 milliLiter(s) Oral Mucosa two times a day  chlorhexidine 2% Cloths 1 Application(s) Topical <User Schedule>  cisatracurium Infusion 3 MICROgram(s)/kG/Min (22.5 mL/Hr) IV Continuous <Continuous>  fentaNYL   Infusion 4 MICROgram(s)/kG/Hr (50 mL/Hr) IV Continuous <Continuous>  glucagon  Injectable 1 milliGRAM(s) IntraMuscular once  heparin  Infusion.  Unit(s)/Hr (22 mL/Hr) IV Continuous <Continuous>  insulin lispro (ADMELOG) corrective regimen sliding scale   SubCutaneous every 6 hours  lubricant eye ont 1 Application(s) 1 Application(s) Both EYES two times a day  pantoprazole  Injectable 40 milliGRAM(s) IV Push daily  phenylephrine    Infusion 3.2 MICROgram(s)/kG/Min (75 mL/Hr) IV Continuous <Continuous>  polyethylene glycol 3350 17 Gram(s) Oral daily  propofol Infusion 50 MICROgram(s)/kG/Min (37.5 mL/Hr) IV Continuous <Continuous>  senna 2 Tablet(s) Oral at bedtime  vancomycin  IVPB      vancomycin  IVPB 1000 milliGRAM(s) IV Intermittent every 24 hours  vasopressin Infusion 0.04 Unit(s)/Min (2.4 mL/Hr) IV Continuous <Continuous>    MEDICATIONS  (PRN):  ALBUTerol    90 MICROgram(s) HFA Inhaler 2 Puff(s) Inhalation every 6 hours PRN Shortness of Breath    PHYSICAL EXAM:  GENERAL: obese, +ETT, sedated, supine, OGT  HEAD:  Atraumatic, Normocephalic  EYES: not compressed, supined   ENT: Moist mucous membranes  NECK: Supple  NERVOUS SYSTEM:  sedated  CHEST/LUNG: lungs CTA  HEART: S1S2 normal, tachy  ABDOMEN: Soft, obese  EXTREMITIES:  2+ Peripheral Pulse, generalized edema  PSYCH: sedated  SKIN: No rashes or lesionslesions    Care Discussed with Consultants/Other Providers [ x] YES  [ ] NO HPI: 49M without PMH from home with wife with x1 week SOB, diagnosed with COVID outpatient x3 days prior to presentation, presented 2/2 worsening of respiratory symptoms. Hypoxic to 50s per EMS, COVID infection confirmed with repeat PCR, patient placed on NRB and admitted to ICU for further management of AHRF 2/2 COVID PNA.     INTERVAL HPI/OVERNIGHT EVENTS: Tmax 99.6 overnight, HR sustaining 140s, worsening SpO2 70-90s. Continuing with phenylephrine, vasopressin, fentanyl, propofol, nimbex. Vent 34/PC32/FiO2 100/PEEP 15, ABG 7.11/72/70/22. Added NO. Continuing digoxin loading. Underwent HD yesterday, will hold off today, Cr 6.41 this am. AST//243, . Hepatology Dr. Sabillon consulted. Wife Saima updated today regarding patient's worsening status and multi-organ failure. Patient to remain full code at this time.     ICU Vital Signs Last 24 Hrs  T(C): 37.4 (27 Nov 2020 12:00), Max: 37.6 (26 Nov 2020 20:00)  T(F): 99.3 (27 Nov 2020 12:00), Max: 99.6 (26 Nov 2020 20:00)  HR: 141 (27 Nov 2020 12:25) (71 - 150)  BP: 95/70 (27 Nov 2020 12:00) (95/70 - 123/93)  BP(mean): 75 (27 Nov 2020 12:00) (73 - 99)  ABP: 93/62 (27 Nov 2020 12:15) (74/41 - 213/126)  ABP(mean): 75 (27 Nov 2020 12:15) (53 - 151)  RR: 34 (27 Nov 2020 12:15) (16 - 34)  SpO2: 91% (27 Nov 2020 12:25) (75% - 98%)    I&O's Summary    26 Nov 2020 07:01  -  27 Nov 2020 07:00  --------------------------------------------------------  IN: 2295.8 mL / OUT: 3511 mL / NET: -1215.2 mL    27 Nov 2020 07:01  -  27 Nov 2020 14:11  --------------------------------------------------------  IN: 480.2 mL / OUT: 0 mL / NET: 480.2 mL      Mode: AC/ CMV (Assist Control/ Continuous Mandatory Ventilation)  RR (machine): 34  FiO2: 100  PEEP: 15  ITime: 0.8  MAP: 29  PC: 32  PIP: 48      LABS:                        10.7   17.77 )-----------( 238      ( 27 Nov 2020 06:11 )             34.0     11-27    134<L>  |  95<L>  |  42<H>  ----------------------------<  143<H>  4.7   |  25  |  6.41<H>    Ca    8.3<L>      27 Nov 2020 06:11  Phos  8.9     11-27  Mg     2.5     11-27    TPro  6.0  /  Alb  2.1<L>  /  TBili  0.9  /  DBili  x   /  AST  248<H>  /  ALT  243<H>  /  AlkPhos  108  11-27    PTT - ( 27 Nov 2020 02:34 )  PTT:85.4 sec    CAPILLARY BLOOD GLUCOSE      POCT Blood Glucose.: 206 mg/dL (27 Nov 2020 12:00)  POCT Blood Glucose.: 155 mg/dL (27 Nov 2020 05:23)  POCT Blood Glucose.: 180 mg/dL (26 Nov 2020 23:15)  POCT Blood Glucose.: 189 mg/dL (26 Nov 2020 18:17)    ABG - ( 27 Nov 2020 04:07 )  pH, Arterial: 7.11  pH, Blood: x     /  pCO2: 72    /  pO2: 70    / HCO3: 22    / Base Excess: -9.5  /  SaO2: 90                  RADIOLOGY & ADDITIONAL TESTS:    Consultant(s) Notes Reviewed:  [x ] YES  [ ] NO    MEDICATIONS  (STANDING):  azithromycin  IVPB 500 milliGRAM(s) IV Intermittent every 24 hours  azithromycin  IVPB      calcium acetate 1334 milliGRAM(s) Oral three times a day with meals  cefepime   IVPB      cefepime   IVPB 1000 milliGRAM(s) IV Intermittent every 12 hours  chlorhexidine 0.12% Liquid 15 milliLiter(s) Oral Mucosa two times a day  chlorhexidine 2% Cloths 1 Application(s) Topical <User Schedule>  cisatracurium Infusion 3 MICROgram(s)/kG/Min (22.5 mL/Hr) IV Continuous <Continuous>  fentaNYL   Infusion 4 MICROgram(s)/kG/Hr (50 mL/Hr) IV Continuous <Continuous>  glucagon  Injectable 1 milliGRAM(s) IntraMuscular once  heparin  Infusion.  Unit(s)/Hr (22 mL/Hr) IV Continuous <Continuous>  insulin lispro (ADMELOG) corrective regimen sliding scale   SubCutaneous every 6 hours  lubricant eye ont 1 Application(s) 1 Application(s) Both EYES two times a day  pantoprazole  Injectable 40 milliGRAM(s) IV Push daily  phenylephrine    Infusion 3.2 MICROgram(s)/kG/Min (75 mL/Hr) IV Continuous <Continuous>  polyethylene glycol 3350 17 Gram(s) Oral daily  propofol Infusion 50 MICROgram(s)/kG/Min (37.5 mL/Hr) IV Continuous <Continuous>  senna 2 Tablet(s) Oral at bedtime  vancomycin  IVPB      vancomycin  IVPB 1000 milliGRAM(s) IV Intermittent every 24 hours  vasopressin Infusion 0.04 Unit(s)/Min (2.4 mL/Hr) IV Continuous <Continuous>    MEDICATIONS  (PRN):  ALBUTerol    90 MICROgram(s) HFA Inhaler 2 Puff(s) Inhalation every 6 hours PRN Shortness of Breath    PHYSICAL EXAM:  GENERAL: obese, +ETT, sedated, supine, OGT  HEAD:  Atraumatic, Normocephalic  EYES: not compressed, supined   ENT: Moist mucous membranes  NECK: Supple  NERVOUS SYSTEM:  sedated  CHEST/LUNG: lungs CTA  HEART: S1S2 normal, tachy  ABDOMEN: Soft, obese  EXTREMITIES:  2+ Peripheral Pulse, generalized edema  PSYCH: sedated  SKIN: No rashes or lesionslesions    Care Discussed with Consultants/Other Providers [ x] YES  [ ] NO

## 2020-11-27 NOTE — PROGRESS NOTE ADULT - PROBLEM SELECTOR PLAN 6
Patient with COVID-19 with ARDS/NSTEMi/Respiratory failure  Continue ventilatory support and IV pressors  Plan per primary/ID team.

## 2020-11-27 NOTE — CONSULT NOTE ADULT - SUBJECTIVE AND OBJECTIVE BOX
Chief Complaint:  Patient is a 50y old  Male who presents with a chief complaint of Shortness of breath D/t Covid (2020 11:27)      HPI:JAMILA OSORIO is a 50y Male    PMHX/PSHX:  No pertinent past medical history    No significant past surgical history      Allergies:  No Known Allergies      Home Medications: reviewed  Hospital Medications:  ALBUTerol    90 MICROgram(s) HFA Inhaler 2 Puff(s) Inhalation every 6 hours PRN  azithromycin  IVPB 500 milliGRAM(s) IV Intermittent every 24 hours  azithromycin  IVPB      calcium acetate 1334 milliGRAM(s) Oral three times a day with meals  cefepime   IVPB      cefepime   IVPB 1000 milliGRAM(s) IV Intermittent every 12 hours  chlorhexidine 0.12% Liquid 15 milliLiter(s) Oral Mucosa two times a day  chlorhexidine 2% Cloths 1 Application(s) Topical <User Schedule>  cisatracurium Infusion 3 MICROgram(s)/kG/Min IV Continuous <Continuous>  fentaNYL   Infusion 4 MICROgram(s)/kG/Hr IV Continuous <Continuous>  glucagon  Injectable 1 milliGRAM(s) IntraMuscular once  heparin  Infusion.  Unit(s)/Hr IV Continuous <Continuous>  insulin lispro (ADMELOG) corrective regimen sliding scale   SubCutaneous every 6 hours  lubricant eye ont 1 Application(s) 1 Application(s) Both EYES two times a day  pantoprazole  Injectable 40 milliGRAM(s) IV Push daily  phenylephrine    Infusion 3.2 MICROgram(s)/kG/Min IV Continuous <Continuous>  polyethylene glycol 3350 17 Gram(s) Oral daily  propofol Infusion 50 MICROgram(s)/kG/Min IV Continuous <Continuous>  senna 2 Tablet(s) Oral at bedtime  vancomycin  IVPB      vancomycin  IVPB 1000 milliGRAM(s) IV Intermittent every 24 hours  vasopressin Infusion 0.04 Unit(s)/Min IV Continuous <Continuous>      Social History:   Tob: Denies  EtOH: Denies  Illicit Drugs: Denies    Family history:    Denies family history of colon cancer/polyps, stomach cancer/polyps, pancreatic cancer/masses, liver cancer/disease, ovarian cancer and endometrial cancer.    ROS:   General:  No  fevers, chills, night sweats, fatigue  Eyes:  Good vision, no reported pain  ENT:  No sore throat, pain, runny nose  CV:  No pain, palpitations  Pulm:  No dyspnea, cough  GI:  See HPI, otherwise negative  :  No  incontinence, nocturia  Muscle:  No pain, weakness  Neuro:  No memory problems  Psych:  No insomnia, mood problems, depression  Endocrine:  No polyuria, polydipsia, cold/heat intolerance  Heme:  No petechiae, ecchymosis, easy bruisability  Skin:  No rash    PHYSICAL EXAM:   Vital Signs:  Vital Signs Last 24 Hrs  T(C): 37.4 (2020 12:00), Max: 37.6 (2020 20:00)  T(F): 99.3 (2020 12:00), Max: 99.6 (2020 20:00)  HR: 141 (2020 12:25) (71 - 150)  BP: 95/70 (2020 12:00) (95/70 - 123/93)  BP(mean): 75 (2020 12:00) (73 - 99)  RR: 34 (2020 12:15) (16 - 34)  SpO2: 91% (2020 12:25) (75% - 98%)  Daily     Daily Weight in k.6 (2020 07:02)    GENERAL: no acute distress  NEURO: alert, no asterixis  HEENT: anicteric sclera, no conjunctival pallor appreciated  CHEST: no respiratory distress, no accessory muscle use  CARDIAC: regular rate, rhythm  ABDOMEN: soft, non-tender, non-distended, no rebound or guarding  EXTREMITIES: warm, well perfused, no edema  SKIN: no lesions noted    LABS: reviewed                        10.7   17.77 )-----------( 238      ( 2020 06:11 )             34.0         134<L>  |  95<L>  |  42<H>  ----------------------------<  143<H>  4.7   |  25  |  6.41<H>    Ca    8.3<L>      2020 06:11  Phos  8.9       Mg     2.5         TPro  6.0  /  Alb  2.1<L>  /  TBili  0.9  /  DBili  x   /  AST  248<H>  /  ALT  243<H>  /  AlkPhos  108      LIVER FUNCTIONS - ( 2020 06:11 )  Alb: 2.1 g/dL / Pro: 6.0 g/dL / ALK PHOS: 108 U/L / ALT: 243 U/L DA / AST: 248 U/L / GGT: x               Diagnostic Studies: see sunrise for full report         Chief Complaint:  Patient is a 50y old  Male who presents with a chief complaint of Shortness of breath D/t Covid (2020 11:27)      HPI:  JAMILA OSORIO is a 50y Male with no significant medical Hx p/w SOB and admitted to ICU for hypoxic respiratory failure due to COVID-19 PNA on 11/15/2020, intubated, on pressor support, remains febrile, tachycardic, elevated troponin, AGUSTO requiring HD (since ).   Hepatology is consulted for worsening liver function tests.   On admission had mildly elevated AST (73), ALT (59), ALP was WNL, bilirubin was WNL. Bilirubin remains normal (except a 1.3 level from yesterday, transient), ALP remains normal, but AST and ALT are up-trending (AST peak 377, now 248, ALT peak 256, now 243) . No recent INR, was 1.1 on . Patient has a negative acute hepatitis viral panel from 20. No abdominal imaging.   Labs are also significant for leukocytosis (WBC 17.77), anemia (Hb 10.7), PLT WNL, procalcitonin 57.8, cRP 5.53, ferritin 3196, BUN/Cr 42/6.41.     PMHX/PSHX:  No pertinent past medical history    No significant past surgical history      Allergies:  No Known Allergies      Home Medications: reviewed  Hospital Medications:  ALBUTerol    90 MICROgram(s) HFA Inhaler 2 Puff(s) Inhalation every 6 hours PRN  azithromycin  IVPB 500 milliGRAM(s) IV Intermittent every 24 hours  azithromycin  IVPB      calcium acetate 1334 milliGRAM(s) Oral three times a day with meals  cefepime   IVPB      cefepime   IVPB 1000 milliGRAM(s) IV Intermittent every 12 hours  chlorhexidine 0.12% Liquid 15 milliLiter(s) Oral Mucosa two times a day  chlorhexidine 2% Cloths 1 Application(s) Topical <User Schedule>  cisatracurium Infusion 3 MICROgram(s)/kG/Min IV Continuous <Continuous>  fentaNYL   Infusion 4 MICROgram(s)/kG/Hr IV Continuous <Continuous>  glucagon  Injectable 1 milliGRAM(s) IntraMuscular once  heparin  Infusion.  Unit(s)/Hr IV Continuous <Continuous>  insulin lispro (ADMELOG) corrective regimen sliding scale   SubCutaneous every 6 hours  lubricant eye ont 1 Application(s) 1 Application(s) Both EYES two times a day  pantoprazole  Injectable 40 milliGRAM(s) IV Push daily  phenylephrine    Infusion 3.2 MICROgram(s)/kG/Min IV Continuous <Continuous>  polyethylene glycol 3350 17 Gram(s) Oral daily  propofol Infusion 50 MICROgram(s)/kG/Min IV Continuous <Continuous>  senna 2 Tablet(s) Oral at bedtime  vancomycin  IVPB      vancomycin  IVPB 1000 milliGRAM(s) IV Intermittent every 24 hours  vasopressin Infusion 0.04 Unit(s)/Min IV Continuous <Continuous>      Social History:   Tob: Denies  EtOH: Denies  Illicit Drugs: Denies    Family history:    Denies family history of colon cancer/polyps, stomach cancer/polyps, pancreatic cancer/masses, liver cancer/disease, ovarian cancer and endometrial cancer.    ROS:   General:  No  fevers, chills, night sweats, fatigue  Eyes:  Good vision, no reported pain  ENT:  No sore throat, pain, runny nose  CV:  No pain, palpitations  Pulm:  No dyspnea, cough  GI:  See HPI, otherwise negative  :  No  incontinence, nocturia  Muscle:  No pain, weakness  Neuro:  No memory problems  Psych:  No insomnia, mood problems, depression  Endocrine:  No polyuria, polydipsia, cold/heat intolerance  Heme:  No petechiae, ecchymosis, easy bruisability  Skin:  No rash    PHYSICAL EXAM:   Vital Signs:  Vital Signs Last 24 Hrs  T(C): 37.4 (2020 12:00), Max: 37.6 (2020 20:00)  T(F): 99.3 (2020 12:00), Max: 99.6 (2020 20:00)  HR: 141 (2020 12:25) (71 - 150)  BP: 95/70 (2020 12:00) (95/70 - 123/93)  BP(mean): 75 (2020 12:00) (73 - 99)  RR: 34 (2020 12:15) (16 - 34)  SpO2: 91% (2020 12:25) (75% - 98%)  Daily     Daily Weight in k.6 (2020 07:02)    GENERAL: no acute distress  NEURO: alert, no asterixis  HEENT: anicteric sclera, no conjunctival pallor appreciated  CHEST: no respiratory distress, no accessory muscle use  CARDIAC: regular rate, rhythm  ABDOMEN: soft, non-tender, non-distended, no rebound or guarding  EXTREMITIES: warm, well perfused, no edema  SKIN: no lesions noted    LABS: reviewed                        10.7   17.77 )-----------( 238      ( 2020 06:11 )             34.0         134<L>  |  95<L>  |  42<H>  ----------------------------<  143<H>  4.7   |  25  |  6.41<H>    Ca    8.3<L>      2020 06:11  Phos  8.9       Mg     2.5         TPro  6.0  /  Alb  2.1<L>  /  TBili  0.9  /  DBili  x   /  AST  248<H>  /  ALT  243<H>  /  AlkPhos  108      LIVER FUNCTIONS - ( 2020 06:11 )  Alb: 2.1 g/dL / Pro: 6.0 g/dL / ALK PHOS: 108 U/L / ALT: 243 U/L DA / AST: 248 U/L / GGT: x               Diagnostic Studies: see sunrise for full report         Chief Complaint:  Patient is a 50y old  Male who presents with a chief complaint of Shortness of breath D/t Covid (2020 11:27)      HPI:  Hx obtained from chart.  JAMILA OSORIO is a 50y Male with no significant medical Hx p/w SOB and admitted to ICU for hypoxic respiratory failure due to COVID-19 PNA on 11/15/2020, intubated, on pressor support, remains febrile, tachycardic, elevated troponin, AGUSTO requiring HD (since ).   Hepatology is consulted for worsening liver function tests.   On admission had mildly elevated AST (73), ALT (59), ALP was WNL, bilirubin was WNL. Bilirubin remains normal (except a 1.3 level from yesterday, transient), ALP remains normal, but AST and ALT are up-trending (AST peak 377, now 248, ALT peak 256, now 243) . No recent INR, was 1.1 on . Patient has a negative acute hepatitis viral panel from 20. No abdominal imaging.   Labs are also significant for leukocytosis (WBC 17.77), anemia (Hb 10.7), PLT WNL, procalcitonin 57.8, cRP 5.53, ferritin 3196, BUN/Cr 42/6.41.     PMHX/PSHX:  No pertinent past medical history    No significant past surgical history      Allergies:  No Known Allergies      Home Medications: reviewed  Hospital Medications:  ALBUTerol    90 MICROgram(s) HFA Inhaler 2 Puff(s) Inhalation every 6 hours PRN  azithromycin  IVPB 500 milliGRAM(s) IV Intermittent every 24 hours  azithromycin  IVPB      calcium acetate 1334 milliGRAM(s) Oral three times a day with meals  cefepime   IVPB      cefepime   IVPB 1000 milliGRAM(s) IV Intermittent every 12 hours  chlorhexidine 0.12% Liquid 15 milliLiter(s) Oral Mucosa two times a day  chlorhexidine 2% Cloths 1 Application(s) Topical <User Schedule>  cisatracurium Infusion 3 MICROgram(s)/kG/Min IV Continuous <Continuous>  fentaNYL   Infusion 4 MICROgram(s)/kG/Hr IV Continuous <Continuous>  glucagon  Injectable 1 milliGRAM(s) IntraMuscular once  heparin  Infusion.  Unit(s)/Hr IV Continuous <Continuous>  insulin lispro (ADMELOG) corrective regimen sliding scale   SubCutaneous every 6 hours  lubricant eye ont 1 Application(s) 1 Application(s) Both EYES two times a day  pantoprazole  Injectable 40 milliGRAM(s) IV Push daily  phenylephrine    Infusion 3.2 MICROgram(s)/kG/Min IV Continuous <Continuous>  polyethylene glycol 3350 17 Gram(s) Oral daily  propofol Infusion 50 MICROgram(s)/kG/Min IV Continuous <Continuous>  senna 2 Tablet(s) Oral at bedtime  vancomycin  IVPB      vancomycin  IVPB 1000 milliGRAM(s) IV Intermittent every 24 hours  vasopressin Infusion 0.04 Unit(s)/Min IV Continuous <Continuous>      Social History:   Tob: Denies  EtOH: Denies  Illicit Drugs: Denies    Family history:    Denies family history of colon cancer/polyps, stomach cancer/polyps, pancreatic cancer/masses, liver cancer/disease, ovarian cancer and endometrial cancer.    ROS:   Unable to obtain due to patient condition    PHYSICAL EXAM:   Vital Signs:  Vital Signs Last 24 Hrs  T(C): 37.4 (2020 12:00), Max: 37.6 (2020 20:00)  T(F): 99.3 (2020 12:00), Max: 99.6 (2020 20:00)  HR: 141 (2020 12:25) (71 - 150)  BP: 95/70 (2020 12:00) (95/70 - 123/93)  BP(mean): 75 (2020 12:00) (73 - 99)  RR: 34 (2020 12:15) (16 - 34)  SpO2: 91% (2020 12:25) (75% - 98%)  Daily     Daily Weight in k.6 (2020 07:02)    GENERAL: critical  NEURO: sedated  HEENT: anicteric sclera  CHEST: intubated, on vent  CARDIAC: regular rate, rhythm  ABDOMEN: soft, non-tender, non-distended, no rebound or guarding, BS+, obese  EXTREMITIES: b/l LE edema      LABS: reviewed                        10.7   17.77 )-----------( 238      ( 2020 06:11 )             34.0     11-    134<L>  |  95<L>  |  42<H>  ----------------------------<  143<H>  4.7   |  25  |  6.41<H>    Ca    8.3<L>      2020 06:11  Phos  8.9       Mg     2.5         TPro  6.0  /  Alb  2.1<L>  /  TBili  0.9  /  DBili  x   /  AST  248<H>  /  ALT  243<H>  /  AlkPhos  108      LIVER FUNCTIONS - ( 2020 06:11 )  Alb: 2.1 g/dL / Pro: 6.0 g/dL / ALK PHOS: 108 U/L / ALT: 243 U/L DA / AST: 248 U/L / GGT: x               Diagnostic Studies: see sunrise for full report

## 2020-11-27 NOTE — PROGRESS NOTE ADULT - SUBJECTIVE AND OBJECTIVE BOX
Chandan Awad MD (Nephrology progress note)  [address], [facility],  [id number],  [id number]  TEl: [phone]  Cell: [phone]    Patient is a 50y Male seen and evaluated at bedside. Vital signs, laboratory data, imaging studies, consult notes reviewed done within past 24 hours. Overnight events noted. Patient remains critically ill on ventilatory support and IV pressors. Interval HD treatment on 11/26 with 3.5 L fluid removal still remains hypoxic    Allergies    No Known Allergies    Intolerances        ROS:  Limited. Sedated and intubated on ventilatory support    VITALS:    T(C): 36.2 (11-27-20 @ 07:02), Max: 37.6 (11-26-20 @ 20:00)  HR: 143 (11-27-20 @ 11:15) (71 - 150)  BP: 108/63 (11-27-20 @ 10:00) (99/71 - 123/93)  RR: 34 (11-27-20 @ 11:15) (16 - 34)  SpO2: 90% (11-27-20 @ 11:15) (75% - 98%)  CAPILLARY BLOOD GLUCOSE      POCT Blood Glucose.: 155 mg/dL (27 Nov 2020 05:23)  POCT Blood Glucose.: 180 mg/dL (26 Nov 2020 23:15)  POCT Blood Glucose.: 189 mg/dL (26 Nov 2020 18:17)  POCT Blood Glucose.: 144 mg/dL (26 Nov 2020 11:28)      11-26-20 @ 07:01  -  11-27-20 @ 07:00  --------------------------------------------------------  IN: 2295.8 mL / OUT: 3511 mL / NET: -1215.2 mL    11-27-20 @ 07:01  -  11-27-20 @ 11:27  --------------------------------------------------------  IN: 480.2 mL / OUT: 0 mL / NET: 480.2 mL      MEDICATIONS  (STANDING):  azithromycin  IVPB 500 milliGRAM(s) IV Intermittent every 24 hours  azithromycin  IVPB      calcium acetate 1334 milliGRAM(s) Oral three times a day with meals  cefepime   IVPB      cefepime   IVPB 1000 milliGRAM(s) IV Intermittent every 12 hours  chlorhexidine 0.12% Liquid 15 milliLiter(s) Oral Mucosa two times a day  chlorhexidine 2% Cloths 1 Application(s) Topical <User Schedule>  cisatracurium Infusion 3 MICROgram(s)/kG/Min (22.5 mL/Hr) IV Continuous <Continuous>  fentaNYL   Infusion 4 MICROgram(s)/kG/Hr (50 mL/Hr) IV Continuous <Continuous>  glucagon  Injectable 1 milliGRAM(s) IntraMuscular once  heparin  Infusion.  Unit(s)/Hr (22 mL/Hr) IV Continuous <Continuous>  insulin lispro (ADMELOG) corrective regimen sliding scale   SubCutaneous every 6 hours  lubricant eye ont 1 Application(s) 1 Application(s) Both EYES two times a day  pantoprazole  Injectable 40 milliGRAM(s) IV Push daily  phenylephrine    Infusion 3.2 MICROgram(s)/kG/Min (75 mL/Hr) IV Continuous <Continuous>  polyethylene glycol 3350 17 Gram(s) Oral daily  propofol Infusion 50 MICROgram(s)/kG/Min (37.5 mL/Hr) IV Continuous <Continuous>  senna 2 Tablet(s) Oral at bedtime  vancomycin  IVPB      vancomycin  IVPB 1000 milliGRAM(s) IV Intermittent every 24 hours  vasopressin Infusion 0.04 Unit(s)/Min (2.4 mL/Hr) IV Continuous <Continuous>    MEDICATIONS  (PRN):  ALBUTerol    90 MICROgram(s) HFA Inhaler 2 Puff(s) Inhalation every 6 hours PRN Shortness of Breath      PHYSICAL EXAM:  GENERAL: Sedated and intubated on ventilatory support  HEENT: LEONOR, EOMI, neck supple, no JVP, no carotid bruit, oral mucosa moist and pink.  CHEST/LUNG: Bilateral decreased breath sounds, bibasilar rales and rhonchi  HEART: Regular rate and rhythm, ERNESTO II/VI at LPSB, no gallops, no rub   ABDOMEN: Soft, nontender, non distended, bowel sounds present, no palpable organomegaly  : No flank or supra pubic tenderness.  EXTREMITIES: Peripheral pulses are palpable, no pedal edema  Neurology: Sedated and intubated on ventilatory support  SKIN: No rash or skin lesion  Musculoskeletal: No joint deformity    Vascular ACCESS: Right IJ HD catheter    LABS:                        10.7   17.77 )-----------( 238      ( 27 Nov 2020 06:11 )             34.0     11-27    134<L>  |  95<L>  |  42<H>  ----------------------------<  143<H>  4.7   |  25  |  6.41<H>    Ca    8.3<L>      27 Nov 2020 06:11  Phos  8.9     11-27  Mg     2.5     11-27    TPro  6.0  /  Alb  2.1<L>  /  TBili  0.9  /  DBili  x   /  AST  248<H>  /  ALT  243<H>  /  AlkPhos  108  11-27      PTT - ( 27 Nov 2020 02:34 )  PTT:85.4 sec          RADIOLOGY & ADDITIONAL STUDIES:  rad< from: Xray Chest 1 View- PORTABLE-Routine (Xray Chest 1 View- PORTABLE-Routine in AM.) (11.26.20 @ 09:27) >    EXAM:  XR CHEST PORTABLE ROUTINE 1V                            PROCEDURE DATE:  11/26/2020          INTERPRETATION:  HISTORY:  ADMDIAG1: U07.1 COVID-19/; covid; ett;  TECHNIQUE: Portable frontal view of the chest, 1 view.  COMPARISON: 11/25/2020.  FINDINGS:  Right central line SVC. Endotracheal tube above the paula. The nasogastric tube courses below the left hemidiaphragm, tip off edge of film.  HEART:difficult to access in this projection  LUNGS: Dense bilateral consolidations.  BONES: Osseous structures are within normal limits.    IMPRESSION:  When taking into account difference in technique, there is likely no significant change.              ELVIS RAMIREZ MD; Attending Interventional Radiologist  This document has been electronically signed. Nov 26 2020 10:40AM    < end of copied text >    Imaging Personally Reviewed:  [x] YES  [ ] NO    Consultant(s) Notes Reviewed:  [x] YES  [ ] NO    Care Discussed with Primary team/ Other Providers [x] YES  [ ] NO

## 2020-11-27 NOTE — PROGRESS NOTE ADULT - ASSESSMENT
ASSESSMENT AND PLAN: 49M without PMH from home with wife admitted to ICU for of AHRF 2/2 COVID PNA.    Neuro: #sedation  -sedated with propofol, fentanyl, increased yesterday  -paralyzed with Nimbex     CVS: #hypotension and tachycardia  -SBPs 60-80s  -restarted levophed, continue vasopressin and phenylephrine  -HR did not respond to increases in sedatives, trialed cardizem gtt yesterday, held 2/2 decreasing SBPs before HD  -will trial dig loading today  -EKG with sinus tach->AF  -bedside Echo without global dysfunction  -[place] monitoring    -#troponemia  -9->8.2->8.3 11/21  -cardio Dr. Tsang following, not NSTEMI, likely from leaking from general inflammation, do not need asa and Plavix    Pulmonary: #AHRF 2/2 COVID PNA  -ETT placed 11/19, sedated and paralyzed as above  -RR 32>36/PC 28/100/12>14, I:E 0.9, ABG this am 7.08/86/74/22, improved to 7.16/54/50/19 after RR and PEEP increased  -supinated since 3pm 11/22  -D-dimer stable 320->5529->1195->737->833, ferritin 625->441->414->574, procal 0.11->3.13, CRP 0.1->0.67->3.39->8.64->9.05->13.26, ->683->561->562, will repeat 11/27  -heparin gtt restarted as hematuria and epistaxis/oozing per os resolved  -s/p dexamethasone IV 6mg Qd (10 day course through 11/24)  -worsening infiltrates noted on CXR yesterday, unchanged today  -IgA positive, IgG negative, indicating new infection, Dr. Amato approved remdesivir (11/17) but held from 11/21am given worsening renal function with CrCl <45    ID:   -history and management as above  -continues to spike fevers, Tmax 102.7 overnight  -changed LIJ to femoral line, removed LIJ yesterday  -WBC 18->11->13.2->15.6  -RVP +COVID, procal increased to 3.13 as noted above, started cefepime 11/21, added vanc 11/24, added azithromycin given worsening CXR noted above 11/25  -BCx NGF  -fu sputum cx    Nephro: #AGUSTO  -CrCl 44, Cr 1.1 on admission, acutely worsened 11/21, possibly 2/2 covid injury and/or low BP, this am 7.65  -RIJ HD placed 11/21 and HD started 11/21, 11/22, 11/24, and 11/25  -HD again today  -fu urine lytes if able to obtain (scant urine at present)  -continue phoslo, s/p HCO3 gtt    GI: #TF  -OGT in place  -TF with Nepro at 10cc/hr  -monitor LFTs ASt/ALT acutely elevated to 377/256, likely 2/2 COVID, but may also consider acetaminophen toxicity  -will trend BID  -start mucomyst   -Protonix IV for PPx    Heme: #thrombocytopenia  -resolved, 234 today  -monitor    Endocrine: #BG elevated  -A1c 6, average glucose 126  -TSH wnl  -vit D moderately low to 22, c/w 1000U/day   -FS q6 for TF  -HSS    Skin/Catheters:   #LIJ placed 11/19-25  #right femoral line 11/25  #RIJ HD catheter 11/21  #left radial artery 11/19  #FC (pressure)  #eye drops    Prophylaxis:  -hold full-dose Lovenox, restarted heparin gtt for elevated D-dimer ASSESSMENT AND PLAN: 49M without PMH from home with wife admitted to ICU for of AHRF 2/2 COVID PNA.    Neuro: #sedation  -sedated with propofol, fentanyl  -paralyzed with Nimbex     CVS: #hypotension and tachycardia  -SBPs improved 90-100s  -dc levophed, continue vasopressin and phenylephrine  -s/p dig loading, start 0.125mg dig every other day (renal dosing)  -EKG with sinus tach->AF  -bedside Echo without global dysfunction  -[place] monitoring    -#troponemia  -9->8.2->8.3 11/21  -cardio Dr. Tsang following, not NSTEMI, likely from leaking from general inflammation, do not need asa and Plavix    Pulmonary: #AHRF 2/2 COVID PNA  -ETT placed 11/19, sedated and paralyzed as above  -RR 36>34/PC 28/100/10>15, I:E 0.7, ABG continuing to remain acidotic 7.11/72/70/22, will repeat this pm  -supinated since 3pm 11/22  -ESR, procal, ferritin increasing, ddimer, LDH, CRP decreasing, will trend daily  -s/p dexamethasone IV 6mg Qd (10 day course through 11/24)  -worsening infiltrates noted on CXR yesterday, unchanged today  -IgA positive, IgG negative, indicating new infection, Dr. Amato approved remdesivir (11/17) but held from 11/21am given worsening renal function with CrCl <45    ID:   -history and management as above  -continues to spike fevers, Tmax 102.7 overnight  -changed LIJ to femoral line, removed LIJ yesterday  -WBC 18->11->13.2->15.6  -RVP +COVID, procal increased to 3.13 as noted above, started cefepime 11/21, added vanc 11/24, added azithromycin given worsening CXR noted above 11/25  -BCx NGF  -fu sputum cx    Nephro: #AGUSTO  -CrCl 44, Cr 1.1 on admission, acutely worsened 11/21, possibly 2/2 covid injury and/or low BP, this am 7.65  -RIJ HD placed 11/21 and HD started 11/21, 11/22, 11/24, and 11/25  -HD again today  -fu urine lytes if able to obtain (scant urine at present)  -continue phoslo, s/p HCO3 gtt    GI: #TF  -OGT in place  -TF with Nepro at 10cc/hr  -monitor LFTs ASt/ALT acutely elevated to 377/256, likely 2/2 COVID, but may also consider acetaminophen toxicity  -will trend BID  -start mucomyst   -Protonix IV for PPx    Heme: #thrombocytopenia  -resolved, 234 today  -monitor    Endocrine: #BG elevated  -A1c 6, average glucose 126  -TSH wnl  -vit D moderately low to 22, c/w 1000U/day   -FS q6 for TF  -HSS    Skin/Catheters:   #LIJ placed 11/19-25  #right femoral line 11/25  #RIJ HD catheter 11/21  #left radial artery 11/19  #FC (pressure)  #eye drops    Prophylaxis:  -hold full-dose Lovenox, restarted heparin gtt for elevated D-dimer ASSESSMENT AND PLAN: 49M without PMH from home with wife admitted to ICU for of AHRF 2/2 COVID PNA.    Neuro: #sedation  -sedated with propofol, fentanyl  -paralyzed with Nimbex     CVS: #hypotension and tachycardia  -SBPs improved 90-100s  -dc levophed, continue vasopressin and phenylephrine  -s/p dig loading, start 0.125mg dig every other day (renal dosing)  -EKG with sinus tach->AF  -bedside Echo without global dysfunction  -[place] monitoring    -#troponemia  -9->8.2->8.3 11/21  -cardio Dr. Tsang following, not NSTEMI, likely from leaking from general inflammation, do not need asa and Plavix    Pulmonary: #AHRF 2/2 COVID PNA  -ETT placed 11/19, sedated and paralyzed as above  -RR 36>34/PC 32/100/10>15, I:E 0.7, ABG continuing to remain acidotic 7.11/72/70/22, will repeat this pm  -added NO, will fu metHb level  -supinated since 3pm 11/22  -ESR, procal, ferritin increasing, ddimer, LDH, CRP decreasing, will trend daily  -s/p dexamethasone IV 6mg Qd (10 day course through 11/24)  -worsening infiltrates noted on CXR 11/25, no recent changes  -IgA positive, IgG negative, indicating new infection, Dr. Amato approved remdesivir (11/17) but held from 11/21am given worsening renal function with CrCl <45    ID:   -history and management as above  -changed LIJ to femoral line, removed LIJ 11/25  -WBC 18->11->13.2->15.6->17.8  -RVP +COVID, procal increased to 3.13 as noted above, started cefepime 11/21, added vanc 11/24, added azithromycin given worsening CXR noted above 11/25  -BCx NGF  -fu sputum cx    Nephro: #AGUSTO  -CrCl 44, Cr 1.1 on admission, acutely worsened 11/21, possibly 2/2 covid injury and/or low BP, this am 6.41  -RIJ HD placed 11/21 and HD started 11/21, 11/22, 11/24, 11/25, and 11/26, will hold off today  -fu urine lytes if able to obtain (scant urine at present)  -continue phoslo, s/p HCO3 gtt    GI: #TF  -OGT in place  -TF with Nepro at 10cc/hr  -monitor LFTs AST/ALT acute elevation this week, today slightly improved to 248/243, likely 2/2 COVID, hep Dr. Sabillon consulted  -will trend BID  -hold off mucomyst for now as unlikely to be related to acetaminophen toxicity  -Protonix IV for PPx    Heme: #thrombocytopenia  -resolved, 238 today  -monitor    Endocrine: #BG elevated  -A1c 6, average glucose 126  -TSH wnl  -vit D moderately low to 22, c/w 1000U/day   -FS q6 for TF  -HSS    Skin/Catheters:   #LIJ placed 11/19-25  #right femoral line 11/25  #RIJ HD catheter 11/21  #left radial artery 11/19  #FC (pressure)  #eye drops    Prophylaxis:  -heparin gtt for elevated D-dimer

## 2020-11-27 NOTE — PROGRESS NOTE ADULT - PROBLEM SELECTOR PLAN 3
Patient with Calcium 8.3 with albumin 2.0, Phos 9.9, Mag 2.5  Continue  Phoslo 1331 mg po tid  Monitor BMP, calcium, mag, phos level

## 2020-11-28 NOTE — PROGRESS NOTE ADULT - PROBLEM SELECTOR PLAN 1
Oligoanuric AGUSTO likely ATN from septic shock/ARDS requiring RRT/HD  - S/p HD treatment on 11/26 with net 3.5 L fluid removal  - Avoid Nephrotoxic agents  - Monitor BMP and electrolytes  - Maintain MAP>65-70 mm hG  - Adjust antibiotics as per renal dose clearances  - Volume status and electrolytes noted.  - HD treatment today as per ordered.

## 2020-11-28 NOTE — PROGRESS NOTE ADULT - SUBJECTIVE AND OBJECTIVE BOX
Chandan Awad MD (Nephrology dialysis Note)  [address], [facility],  [address],  [facility]  TEl: [phone]  Cell: [phone]      Patient was seen and evaluated on dialysis.   HR: 89 (11-28-20 @ 12:30)  BP: 114/55 (11-28-20 @ 12:30)  Wt(kg): --  11-28    132<L>  |  96  |  57<H>  ----------------------------<  150<H>  5.2   |  24  |  8.12<H>    Ca    8.1<L>      28 Nov 2020 07:37  Phos  10.4     11-28  Mg     2.8     11-28    TPro  5.8<L>  /  Alb  2.2<L>  /  TBili  0.9  /  DBili  x   /  AST  180<H>  /  ALT  203<H>  /  AlkPhos  130<H>  11-28      Continue dialysis:   Dialyzer: Revaclear 400             QB: 400            QD: 500         K bath: 2K  Goal UF 3.5 Kg over 3.5 Hours    Patient is tolerating the procedure well.   Continue full hemodialysis treatment as prescribed.

## 2020-11-28 NOTE — PROGRESS NOTE ADULT - ATTENDING COMMENTS
49 yr  old obese male with out any significant medical history  who presented with sob due to acute hypoxic respiratory failure due to covid pneumonia.    Assessment:  1. Acute Hypoxic respiratory Failure / ARDS  2. COVID-19 PNA  3. Morbid Obesity   4. Elevated lactate  5. Hypotension - sedation related  6. Type 2 MI - likely due to hypoxia  7. Acute kidney injury     Plan  - intubated 11/19 for worsening hypoxia and worsening ARDS  - vent management with lung protective strategy   -peak pressures are high  -started on  NO  -monitor blood gas  -start dig for afib  - keep o2 sat >90%  - Proning 16 hours a day  - Sedation and paralytics for vent synchrony  - Mechanical ventilation on PC for now, adjusted to ABG  - spike fever, TLC change , continue antibx  - Nephrology for HD   - Holding remdesivir due to renal failure  - Monitor renal and hepatic function  - Vasopressor support to maintain MAP > 65  - DVT/ GI prophy  - Trickle tube feeds  - Add bowel regimen  - isolation : contact and airborne  - Prognosis is guarded .   - Pain control .

## 2020-11-28 NOTE — PROGRESS NOTE ADULT - PROBLEM SELECTOR PLAN 3
Patient with Calcium 8.3 with albumin 2.0, Phos 10.4, Mag 2.5  Continue  Phoslo 1331 mg po tid  Monitor BMP, calcium, mag, phos level

## 2020-11-28 NOTE — CHART NOTE - NSCHARTNOTEFT_GEN_A_CORE
Spoke to the Wife Naima [phone] regarding the deteriorating condition of Mr Gillette that he is being maximally resuscitated at this time with Medications and Vent settings but he is not responding and infact is going down. Should his heart stops/ he goes into cardiac arrest despite ongoing max resuscitation, CPR will not change the outcome and will not be offered.   Wife understood  and is agreeable with the above.   Discussed with Attending Dr Crandall.

## 2020-11-28 NOTE — PROGRESS NOTE ADULT - SUBJECTIVE AND OBJECTIVE BOX
Chandan Awad MD (Nephrology progress note)  [address], [facility],  [address],  [facility]  TEl: [phone]  Cell: [phone]    Patient is a 50y Male seen and evaluated at bedside. Vital signs, laboratory data, imaging studies, consult notes reviewed done within past 24 hours. Overnight events noted. Patient remains critically ill on ventilatory support and IV pressors.     Allergies    No Known Allergies    Intolerances        ROS:  Limited. Sedated and intubated on ventilatory support  VITALS:    T(C): 37.1 (11-28-20 @ 10:23), Max: 38.3 (11-27-20 @ 18:30)  HR: 89 (11-28-20 @ 12:30) (74 - 146)  BP: 114/55 (11-28-20 @ 12:30) (93/44 - 134/79)  RR: 27 (11-28-20 @ 12:30) (16 - 38)  SpO2: 98% (11-28-20 @ 12:30) (81% - 98%)  CAPILLARY BLOOD GLUCOSE      POCT Blood Glucose.: 138 mg/dL (28 Nov 2020 10:53)  POCT Blood Glucose.: 138 mg/dL (27 Nov 2020 23:46)  POCT Blood Glucose.: 271 mg/dL (27 Nov 2020 16:54)      11-27-20 @ 07:01  -  11-28-20 @ 07:00  --------------------------------------------------------  IN: 4107.6 mL / OUT: 25 mL / NET: 4082.6 mL    11-28-20 @ 07:01  -  11-28-20 @ 12:52  --------------------------------------------------------  IN: 1305.1 mL / OUT: 5 mL / NET: 1300.1 mL      MEDICATIONS  (STANDING):  azithromycin  IVPB      azithromycin  IVPB 500 milliGRAM(s) IV Intermittent every 24 hours  calcium acetate 1334 milliGRAM(s) Oral three times a day with meals  cefepime   IVPB      cefepime   IVPB 1000 milliGRAM(s) IV Intermittent every 12 hours  chlorhexidine 0.12% Liquid 15 milliLiter(s) Oral Mucosa two times a day  chlorhexidine 2% Cloths 1 Application(s) Topical <User Schedule>  cisatracurium Infusion 3 MICROgram(s)/kG/Min (22.5 mL/Hr) IV Continuous <Continuous>  dextrose 5% 1000 milliLiter(s) (100 mL/Hr) IV Continuous <Continuous>  digoxin     Tablet 0.125 milliGRAM(s) Oral every other day  fentaNYL   Infusion 1 MICROgram(s)/kG/Hr (12.5 mL/Hr) IV Continuous <Continuous>  glucagon  Injectable 1 milliGRAM(s) IntraMuscular once  heparin  Infusion.  Unit(s)/Hr (22 mL/Hr) IV Continuous <Continuous>  insulin lispro (ADMELOG) corrective regimen sliding scale   SubCutaneous every 6 hours  lubricant eye ont 1 Application(s) 1 Application(s) Both EYES two times a day  pantoprazole  Injectable 40 milliGRAM(s) IV Push daily  phenylephrine    Infusion 4 MICROgram(s)/kG/Min (93.8 mL/Hr) IV Continuous <Continuous>  polyethylene glycol 3350 17 Gram(s) Oral daily  propofol Infusion 50 MICROgram(s)/kG/Min (37.5 mL/Hr) IV Continuous <Continuous>  senna 2 Tablet(s) Oral at bedtime  vancomycin  IVPB      vancomycin  IVPB 1000 milliGRAM(s) IV Intermittent every 24 hours  vasopressin Infusion 0.04 Unit(s)/Min (2.4 mL/Hr) IV Continuous <Continuous>    MEDICATIONS  (PRN):  ALBUTerol    90 MICROgram(s) HFA Inhaler 2 Puff(s) Inhalation every 6 hours PRN Shortness of Breath      PHYSICAL EXAM:  GENERAL: Sedated and intubated on ventilatory support  HEENT: LEONOR, EOMI, neck supple, no JVP, no carotid bruit, oral mucosa moist and pink, ETT in place.  CHEST/LUNG: Bilateral clear breath sounds, no rales, no crepitations, no rhonchi, no wheezing  HEART: Regular rate and rhythm, ERNESTO II/VI at LPSB, no gallops, no rub   ABDOMEN: Soft, nontender, non distended, bowel sounds present, no palpable organomegaly  : No flank or supra pubic tenderness.  EXTREMITIES: Bilateral leg edema  Neurology: Sedated and intubated on ventilatory support  SKIN: No rash or skin lesion  Musculoskeletal: No joint deformity or spinal tenderness.      Vascular ACCESS:     LABS:                        9.7    21.18 )-----------( 278      ( 28 Nov 2020 07:37 )             31.4     11-28    132<L>  |  96  |  57<H>  ----------------------------<  150<H>  5.2   |  24  |  8.12<H>    Ca    8.1<L>      28 Nov 2020 07:37  Phos  10.4     11-28  Mg     2.8     11-28    TPro  5.8<L>  /  Alb  2.2<L>  /  TBili  0.9  /  DBili  x   /  AST  180<H>  /  ALT  203<H>  /  AlkPhos  130<H>  11-28      PTT - ( 28 Nov 2020 07:37 )  PTT:48.2 sec          RADIOLOGY & ADDITIONAL STUDIES:  < from: Xray Chest 1 View-PORTABLE IMMEDIATE (Xray Chest 1 View-PORTABLE IMMEDIATE .) (11.28.20 @ 04:42) >    EXAM:  XR CHEST PORTABLE IMMED 1V                            PROCEDURE DATE:  11/28/2020          INTERPRETATION:  AP chest with comparison to 11/27/2020.    Clinical indications: Hypoxia.    IMPRESSION: There is unchanged extensive bilateral airspace disease. The tubes and lines are unchanged in appearance. The heart size cannot be well evaluated.            SOPHIA APARICIO MD; Attending Radiologist  This document has been electronically signed. Nov 28 2020  9:51AM    < end of copied text >    Imaging Personally Reviewed:  [x] YES  [ ] NO    Consultant(s) Notes Reviewed:  [x] YES  [ ] NO    Care Discussed with Primary team/ Other Providers [x] YES  [ ] NO

## 2020-11-28 NOTE — PROGRESS NOTE ADULT - SUBJECTIVE AND OBJECTIVE BOX
INTERVAL HPI/OVERNIGHT EVENTS:     PRESSORS: [ ] YES [ ] NO  WHICH:    ANTIBIOTICS:                  DATE STARTED:  ANTIBIOTICS:                  DATE STARTED:  ANTIBIOTICS:                  DATE STARTED:    Antimicrobial:  azithromycin  IVPB 500 milliGRAM(s) IV Intermittent every 24 hours  azithromycin  IVPB      cefepime   IVPB      cefepime   IVPB 1000 milliGRAM(s) IV Intermittent every 12 hours  vancomycin  IVPB      vancomycin  IVPB 1000 milliGRAM(s) IV Intermittent every 24 hours    Cardiovascular:  digoxin     Tablet 0.125 milliGRAM(s) Oral every other day  phenylephrine    Infusion 3.2 MICROgram(s)/kG/Min IV Continuous <Continuous>    Pulmonary:  ALBUTerol    90 MICROgram(s) HFA Inhaler 2 Puff(s) Inhalation every 6 hours PRN    Hematalogic:  heparin  Infusion.  Unit(s)/Hr IV Continuous <Continuous>    Other:  calcium acetate 1334 milliGRAM(s) Oral three times a day with meals  chlorhexidine 0.12% Liquid 15 milliLiter(s) Oral Mucosa two times a day  chlorhexidine 2% Cloths 1 Application(s) Topical <User Schedule>  cisatracurium Infusion 3 MICROgram(s)/kG/Min IV Continuous <Continuous>  fentaNYL   Infusion 4 MICROgram(s)/kG/Hr IV Continuous <Continuous>  glucagon  Injectable 1 milliGRAM(s) IntraMuscular once  insulin lispro (ADMELOG) corrective regimen sliding scale   SubCutaneous every 6 hours  lubricant eye ont 1 Application(s) 1 Application(s) Both EYES two times a day  pantoprazole  Injectable 40 milliGRAM(s) IV Push daily  polyethylene glycol 3350 17 Gram(s) Oral daily  propofol Infusion 50 MICROgram(s)/kG/Min IV Continuous <Continuous>  senna 2 Tablet(s) Oral at bedtime  vasopressin Infusion 0.04 Unit(s)/Min IV Continuous <Continuous>    ALBUTerol    90 MICROgram(s) HFA Inhaler 2 Puff(s) Inhalation every 6 hours PRN  azithromycin  IVPB 500 milliGRAM(s) IV Intermittent every 24 hours  azithromycin  IVPB      calcium acetate 1334 milliGRAM(s) Oral three times a day with meals  cefepime   IVPB      cefepime   IVPB 1000 milliGRAM(s) IV Intermittent every 12 hours  chlorhexidine 0.12% Liquid 15 milliLiter(s) Oral Mucosa two times a day  chlorhexidine 2% Cloths 1 Application(s) Topical <User Schedule>  cisatracurium Infusion 3 MICROgram(s)/kG/Min IV Continuous <Continuous>  digoxin     Tablet 0.125 milliGRAM(s) Oral every other day  fentaNYL   Infusion 4 MICROgram(s)/kG/Hr IV Continuous <Continuous>  glucagon  Injectable 1 milliGRAM(s) IntraMuscular once  heparin  Infusion.  Unit(s)/Hr IV Continuous <Continuous>  insulin lispro (ADMELOG) corrective regimen sliding scale   SubCutaneous every 6 hours  lubricant eye ont 1 Application(s) 1 Application(s) Both EYES two times a day  pantoprazole  Injectable 40 milliGRAM(s) IV Push daily  phenylephrine    Infusion 3.2 MICROgram(s)/kG/Min IV Continuous <Continuous>  polyethylene glycol 3350 17 Gram(s) Oral daily  propofol Infusion 50 MICROgram(s)/kG/Min IV Continuous <Continuous>  senna 2 Tablet(s) Oral at bedtime  vancomycin  IVPB      vancomycin  IVPB 1000 milliGRAM(s) IV Intermittent every 24 hours  vasopressin Infusion 0.04 Unit(s)/Min IV Continuous <Continuous>    Drug Dosing Weight  Height (cm): 154.4 (15 Nov 2020 09:25)  Weight (kg): 125 (15 Nov 2020 09:25)  BMI (kg/m2): 52.4 (15 Nov 2020 09:25)  BSA (m2): 2.16 (15 Nov 2020 09:25)    CENTRAL LINE: [ ] YES [ ] NO  LOCATION:   DATE INSERTED:  REMOVE: [ ] YES [ ] NO  EXPLAIN:    MIGUEL ANGEL: [ ] YES [ ] NO    DATE INSERTED:  REMOVE:  [ ] YES [ ] NO  EXPLAIN:    A-LINE:  [ ] YES [ ] NO  LOCATION:   DATE INSERTED:  REMOVE:  [ ] YES [ ] NO  EXPLAIN:    [facility] -reviewed admission note, no change since admission      ABG - ( 27 Nov 2020 17:43 )  pH, Arterial: 7.09  pH, Blood: x     /  pCO2: 77    /  pO2: 76    / HCO3: 22    / Base Excess: -8.1  /  SaO2: 92                    11-26 @ 07:01  -  11-27 @ 07:00  --------------------------------------------------------  IN: 2295.8 mL / OUT: 3511 mL / NET: -1215.2 mL        Mode: AC/ CMV (Assist Control/ Continuous Mandatory Ventilation)  RR (machine): 34  FiO2: 100  PEEP: 14  ITime: 0.8  MAP: 29  PC: 33  PIP: 48      PHYSICAL EXAM:    GENERAL: NAD, well-groomed, well-developed  HEAD:  Atraumatic, Normocephalic  EYES: EOMI, PERRLA, conjunctiva and sclera clear  ENMT: No tonsillar erythema, exudates, or enlargement; Moist mucous membranes, Good dentition, [ ]No lesions  NECK: Supple, normal appearance, No JVD; Normal thyroid; Trachea midline  NERVOUS SYSTEM:  Alert & Oriented X3, Good concentration; Motor Strength 5/5 B/L upper and lower extremities; DTRs 2+ intact and symmetric  CHEST/LUNG: No chest deformity; Normal percussion bilaterally; No rales, rhonchi, wheezing   HEART: Regular rate and rhythm; No murmurs, rubs, or gallops  ABDOMEN: Soft, Nontender, Nondistended;Bowel sounds present  EXTREMITIES:  2+ Peripheral Pulses, No clubbing, cyanosis, or edema  LYMPH: No lymphadenopathy noted  SKIN: No rashes or lesions; Good capillary refill      LABS:  CBC Full  -  ( 27 Nov 2020 06:11 )  WBC Count : 17.77 K/uL  RBC Count : 3.55 M/uL  Hemoglobin : 10.7 g/dL  Hematocrit : 34.0 %  Platelet Count - Automated : 238 K/uL  Mean Cell Volume : 95.8 fl  Mean Cell Hemoglobin : 30.1 pg  Mean Cell Hemoglobin Concentration : 31.5 gm/dL  Auto Neutrophil # : x  Auto Lymphocyte # : x  Auto Monocyte # : x  Auto Eosinophil # : x  Auto Basophil # : x  Auto Neutrophil % : x  Auto Lymphocyte % : x  Auto Monocyte % : x  Auto Eosinophil % : x  Auto Basophil % : x    11-27    134<L>  |  97  |  52<H>  ----------------------------<  143<H>  5.1   |  24  |  7.36<H>    Ca    8.2<L>      27 Nov 2020 17:03  Phos  8.9     11-27  Mg     2.5     11-27    TPro  6.4  /  Alb  2.2<L>  /  TBili  0.9  /  DBili  x   /  AST  202<H>  /  ALT  228<H>  /  AlkPhos  123<H>  11-27    PTT - ( 27 Nov 2020 02:34 )  PTT:85.4 sec        RADIOLOGY & ADDITIONAL STUDIES REVIEWED:  ***    [ ]GOALS OF CARE DISCUSSION WITH PATIENT/FAMILY/PROXY:    CRITICAL CARE TIME SPENT: 35 minutes INTERVAL HPI/OVERNIGHT EVENTS: patient was noted to desaturate to 83% on 1005%FIO2 and PEEP of 14. Vent settings were adjusted, PEEP increased to 17. Patient was proned  after which sats improved to 90%.     PRESSORS: [ x] YES [ ] NO  WHICH:        Antimicrobial:  azithromycin  IVPB 500 milliGRAM(s) IV Intermittent every 24 hours  azithromycin  IVPB      cefepime   IVPB      cefepime   IVPB 1000 milliGRAM(s) IV Intermittent every 12 hours  vancomycin  IVPB      vancomycin  IVPB 1000 milliGRAM(s) IV Intermittent every 24 hours    Cardiovascular:  digoxin     Tablet 0.125 milliGRAM(s) Oral every other day  phenylephrine    Infusion 3.2 MICROgram(s)/kG/Min IV Continuous <Continuous>    Pulmonary:  ALBUTerol    90 MICROgram(s) HFA Inhaler 2 Puff(s) Inhalation every 6 hours PRN    Hematalogic:  heparin  Infusion.  Unit(s)/Hr IV Continuous <Continuous>    Other:  calcium acetate 1334 milliGRAM(s) Oral three times a day with meals  chlorhexidine 0.12% Liquid 15 milliLiter(s) Oral Mucosa two times a day  chlorhexidine 2% Cloths 1 Application(s) Topical <User Schedule>  cisatracurium Infusion 3 MICROgram(s)/kG/Min IV Continuous <Continuous>  fentaNYL   Infusion 4 MICROgram(s)/kG/Hr IV Continuous <Continuous>  glucagon  Injectable 1 milliGRAM(s) IntraMuscular once  insulin lispro (ADMELOG) corrective regimen sliding scale   SubCutaneous every 6 hours  lubricant eye ont 1 Application(s) 1 Application(s) Both EYES two times a day  pantoprazole  Injectable 40 milliGRAM(s) IV Push daily  polyethylene glycol 3350 17 Gram(s) Oral daily  propofol Infusion 50 MICROgram(s)/kG/Min IV Continuous <Continuous>  senna 2 Tablet(s) Oral at bedtime  vasopressin Infusion 0.04 Unit(s)/Min IV Continuous <Continuous>    ALBUTerol    90 MICROgram(s) HFA Inhaler 2 Puff(s) Inhalation every 6 hours PRN  azithromycin  IVPB 500 milliGRAM(s) IV Intermittent every 24 hours  azithromycin  IVPB      calcium acetate 1334 milliGRAM(s) Oral three times a day with meals  cefepime   IVPB      cefepime   IVPB 1000 milliGRAM(s) IV Intermittent every 12 hours  chlorhexidine 0.12% Liquid 15 milliLiter(s) Oral Mucosa two times a day  chlorhexidine 2% Cloths 1 Application(s) Topical <User Schedule>  cisatracurium Infusion 3 MICROgram(s)/kG/Min IV Continuous <Continuous>  digoxin     Tablet 0.125 milliGRAM(s) Oral every other day  fentaNYL   Infusion 4 MICROgram(s)/kG/Hr IV Continuous <Continuous>  glucagon  Injectable 1 milliGRAM(s) IntraMuscular once  heparin  Infusion.  Unit(s)/Hr IV Continuous <Continuous>  insulin lispro (ADMELOG) corrective regimen sliding scale   SubCutaneous every 6 hours  lubricant eye ont 1 Application(s) 1 Application(s) Both EYES two times a day  pantoprazole  Injectable 40 milliGRAM(s) IV Push daily  phenylephrine    Infusion 3.2 MICROgram(s)/kG/Min IV Continuous <Continuous>  polyethylene glycol 3350 17 Gram(s) Oral daily  propofol Infusion 50 MICROgram(s)/kG/Min IV Continuous <Continuous>  senna 2 Tablet(s) Oral at bedtime  vancomycin  IVPB      vancomycin  IVPB 1000 milliGRAM(s) IV Intermittent every 24 hours  vasopressin Infusion 0.04 Unit(s)/Min IV Continuous <Continuous>    Drug Dosing Weight  Height (cm): 154.4 (15 Nov 2020 09:25)  Weight (kg): 125 (15 Nov 2020 09:25)  BMI (kg/m2): 52.4 (15 Nov 2020 09:25)  BSA (m2): 2.16 (15 Nov 2020 09:25)        PM[facility] -reviewed admission note, no change since admission      ABG - ( 27 Nov 2020 17:43 )  pH, Arterial: 7.09  pH, Blood: x     /  pCO2: 77    /  pO2: 76    / HCO3: 22    / Base Excess: -8.1  /  SaO2: 92                    11-26 @ 07:01  -  11-27 @ 07:00  --------------------------------------------------------  IN: 2295.8 mL / OUT: 3511 mL / NET: -1215.2 mL        Mode: AC/ CMV (Assist Control/ Continuous Mandatory Ventilation)  RR (machine): 34  FiO2: 100  PEEP: 14  ITime: 0.8  MAP: 29  PC: 33  PIP: 48    PHYSICAL EXAM:  GENERAL: obese, +ETT, sedated, supine, OGT  HEAD:  Atraumatic, Normocephalic  EYES: not compressed, supined   ENT: Moist mucous membranes  NECK: Supple  NERVOUS SYSTEM:  sedated  CHEST/LUNG: lungs CTA  HEART: S1S2 normal, tachy  ABDOMEN: Soft, obese  EXTREMITIES:  2+ Peripheral Pulse, generalized edema  PSYCH: sedated  SKIN: No rashes or lesionslesions        LABS:  CBC Full  -  ( 27 Nov 2020 06:11 )  WBC Count : 17.77 K/uL  RBC Count : 3.55 M/uL  Hemoglobin : 10.7 g/dL  Hematocrit : 34.0 %  Platelet Count - Automated : 238 K/uL  Mean Cell Volume : 95.8 fl  Mean Cell Hemoglobin : 30.1 pg  Mean Cell Hemoglobin Concentration : 31.5 gm/dL  Auto Neutrophil # : x  Auto Lymphocyte # : x  Auto Monocyte # : x  Auto Eosinophil # : x  Auto Basophil # : x  Auto Neutrophil % : x  Auto Lymphocyte % : x  Auto Monocyte % : x  Auto Eosinophil % : x  Auto Basophil % : x    11-27    134<L>  |  97  |  52<H>  ----------------------------<  143<H>  5.1   |  24  |  7.36<H>    Ca    8.2<L>      27 Nov 2020 17:03  Phos  8.9     11-27  Mg     2.5     11-27    TPro  6.4  /  Alb  2.2<L>  /  TBili  0.9  /  DBili  x   /  AST  202<H>  /  ALT  228<H>  /  AlkPhos  123<H>  11-27    PTT - ( 27 Nov 2020 02:34 )  PTT:85.4 sec        RADIOLOGY & ADDITIONAL STUDIES REVIEWED:  ***    [ ]GOALS OF CARE DISCUSSION WITH PATIENT/FAMILY/PROXY:    CRITICAL CARE TIME SPENT: 35 minutes INTERVAL HPI/OVERNIGHT EVENTS: patient was noted to desaturate to 83% on 1005%FIO2 and PEEP of 14. Vent settings were adjusted, PEEP increased to 17. Patient was proned  after which sats improved to 90%.     PRESSORS: [ x] YES [ ] NO  WHICH:    m    Antimicrobial:  azithromycin  IVPB 500 milliGRAM(s) IV Intermittent every 24 hours  azithromycin  IVPB      cefepime   IVPB      cefepime   IVPB 1000 milliGRAM(s) IV Intermittent every 12 hours  vancomycin  IVPB      vancomycin  IVPB 1000 milliGRAM(s) IV Intermittent every 24 hours    Cardiovascular:  digoxin     Tablet 0.125 milliGRAM(s) Oral every other day  phenylephrine    Infusion 3.2 MICROgram(s)/kG/Min IV Continuous <Continuous>    Pulmonary:  ALBUTerol    90 MICROgram(s) HFA Inhaler 2 Puff(s) Inhalation every 6 hours PRN    Hematalogic:  heparin  Infusion.  Unit(s)/Hr IV Continuous <Continuous>    Other:  calcium acetate 1334 milliGRAM(s) Oral three times a day with meals  chlorhexidine 0.12% Liquid 15 milliLiter(s) Oral Mucosa two times a day  chlorhexidine 2% Cloths 1 Application(s) Topical <User Schedule>  cisatracurium Infusion 3 MICROgram(s)/kG/Min IV Continuous <Continuous>  fentaNYL   Infusion 4 MICROgram(s)/kG/Hr IV Continuous <Continuous>  glucagon  Injectable 1 milliGRAM(s) IntraMuscular once  insulin lispro (ADMELOG) corrective regimen sliding scale   SubCutaneous every 6 hours  lubricant eye ont 1 Application(s) 1 Application(s) Both EYES two times a day  pantoprazole  Injectable 40 milliGRAM(s) IV Push daily  polyethylene glycol 3350 17 Gram(s) Oral daily  propofol Infusion 50 MICROgram(s)/kG/Min IV Continuous <Continuous>  senna 2 Tablet(s) Oral at bedtime  vasopressin Infusion 0.04 Unit(s)/Min IV Continuous <Continuous>    ALBUTerol    90 MICROgram(s) HFA Inhaler 2 Puff(s) Inhalation every 6 hours PRN  azithromycin  IVPB 500 milliGRAM(s) IV Intermittent every 24 hours  azithromycin  IVPB      calcium acetate 1334 milliGRAM(s) Oral three times a day with meals  cefepime   IVPB      cefepime   IVPB 1000 milliGRAM(s) IV Intermittent every 12 hours  chlorhexidine 0.12% Liquid 15 milliLiter(s) Oral Mucosa two times a day  chlorhexidine 2% Cloths 1 Application(s) Topical <User Schedule>  cisatracurium Infusion 3 MICROgram(s)/kG/Min IV Continuous <Continuous>  digoxin     Tablet 0.125 milliGRAM(s) Oral every other day  fentaNYL   Infusion 4 MICROgram(s)/kG/Hr IV Continuous <Continuous>  glucagon  Injectable 1 milliGRAM(s) IntraMuscular once  heparin  Infusion.  Unit(s)/Hr IV Continuous <Continuous>  insulin lispro (ADMELOG) corrective regimen sliding scale   SubCutaneous every 6 hours  lubricant eye ont 1 Application(s) 1 Application(s) Both EYES two times a day  pantoprazole  Injectable 40 milliGRAM(s) IV Push daily  phenylephrine    Infusion 3.2 MICROgram(s)/kG/Min IV Continuous <Continuous>  polyethylene glycol 3350 17 Gram(s) Oral daily  propofol Infusion 50 MICROgram(s)/kG/Min IV Continuous <Continuous>  senna 2 Tablet(s) Oral at bedtime  vancomycin  IVPB      vancomycin  IVPB 1000 milliGRAM(s) IV Intermittent every 24 hours  vasopressin Infusion 0.04 Unit(s)/Min IV Continuous <Continuous>    Drug Dosing Weight  Height (cm): 154.4 (15 Nov 2020 09:25)  Weight (kg): 125 (15 Nov 2020 09:25)  BMI (kg/m2): 52.4 (15 Nov 2020 09:25)  BSA (m2): 2.16 (15 Nov 2020 09:25)        PMH -reviewed admission note, no change since admission      ABG - ( 27 Nov 2020 17:43 )  pH, Arterial: 7.09  pH, Blood: x     /  pCO2: 77    /  pO2: 76    / HCO3: 22    / Base Excess: -8.1  /  SaO2: 92                    11-26 @ 07:01  -  11-27 @ 07:00  --------------------------------------------------------  IN: 2295.8 mL / OUT: 3511 mL / NET: -1215.2 mL        Mode: AC/ CMV (Assist Control/ Continuous Mandatory Ventilation)  RR (machine): 34  FiO2: 100  PEEP: 14  ITime: 0.8  MAP: 29  PC: 33  PIP: 48    PHYSICAL EXAM:  GENERAL: obese, +ETT, sedated, supine, OGT  HEAD:  Atraumatic, Normocephalic  EYES: not compressed, supined   ENT: Moist mucous membranes  NECK: Supple  NERVOUS SYSTEM:  sedated  CHEST/LUNG: lungs CTA  HEART: S1S2 normal, tachy  ABDOMEN: Soft, obese  EXTREMITIES:  2+ Peripheral Pulse, generalized edema  PSYCH: sedated  SKIN: No rashes or lesionslesions        LABS:  CBC Full  -  ( 27 Nov 2020 06:11 )  WBC Count : 17.77 K/uL  RBC Count : 3.55 M/uL  Hemoglobin : 10.7 g/dL  Hematocrit : 34.0 %  Platelet Count - Automated : 238 K/uL  Mean Cell Volume : 95.8 fl  Mean Cell Hemoglobin : 30.1 pg  Mean Cell Hemoglobin Concentration : 31.5 gm/dL  Auto Neutrophil # : x  Auto Lymphocyte # : x  Auto Monocyte # : x  Auto Eosinophil # : x  Auto Basophil # : x  Auto Neutrophil % : x  Auto Lymphocyte % : x  Auto Monocyte % : x  Auto Eosinophil % : x  Auto Basophil % : x    11-27    134<L>  |  97  |  52<H>  ----------------------------<  143<H>  5.1   |  24  |  7.36<H>    Ca    8.2<L>      27 Nov 2020 17:03  Phos  8.9     11-27  Mg     2.5     11-27    TPro  6.4  /  Alb  2.2<L>  /  TBili  0.9  /  DBili  x   /  AST  202<H>  /  ALT  228<H>  /  AlkPhos  123<H>  11-27    PTT - ( 27 Nov 2020 02:34 )  PTT:85.4 sec        RADIOLOGY & ADDITIONAL STUDIES REVIEWED:  ***    [ ]GOALS OF CARE DISCUSSION WITH PATIENT/FAMILY/PROXY:    CRITICAL CARE TIME SPENT: 35 minutes INTERVAL HPI/OVERNIGHT EVENTS: Patient was noted to desaturate to 83% on 100% FIO2 and PEEP of 14. Vent settings were adjusted, PEEP increased to 17. Patient was proned after which sats improved to 90% briefly before again desatting. ABG this am 6.99/108/77/25. Given 10mEq HCO3 and started on HCO3 gtt. HD today. Continued on digoxin. Next VT 12/1. Wife updated today.    PRESSORS: [ x] YES [ ] NO  WHICH:    m    Antimicrobial:  azithromycin  IVPB 500 milliGRAM(s) IV Intermittent every 24 hours  azithromycin  IVPB      cefepime   IVPB      cefepime   IVPB 1000 milliGRAM(s) IV Intermittent every 12 hours  vancomycin  IVPB      vancomycin  IVPB 1000 milliGRAM(s) IV Intermittent every 24 hours    Cardiovascular:  digoxin     Tablet 0.125 milliGRAM(s) Oral every other day  phenylephrine    Infusion 3.2 MICROgram(s)/kG/Min IV Continuous <Continuous>    Pulmonary:  ALBUTerol    90 MICROgram(s) HFA Inhaler 2 Puff(s) Inhalation every 6 hours PRN    Hematalogic:  heparin  Infusion.  Unit(s)/Hr IV Continuous <Continuous>    Other:  calcium acetate 1334 milliGRAM(s) Oral three times a day with meals  chlorhexidine 0.12% Liquid 15 milliLiter(s) Oral Mucosa two times a day  chlorhexidine 2% Cloths 1 Application(s) Topical <User Schedule>  cisatracurium Infusion 3 MICROgram(s)/kG/Min IV Continuous <Continuous>  fentaNYL   Infusion 4 MICROgram(s)/kG/Hr IV Continuous <Continuous>  glucagon  Injectable 1 milliGRAM(s) IntraMuscular once  insulin lispro (ADMELOG) corrective regimen sliding scale   SubCutaneous every 6 hours  lubricant eye ont 1 Application(s) 1 Application(s) Both EYES two times a day  pantoprazole  Injectable 40 milliGRAM(s) IV Push daily  polyethylene glycol 3350 17 Gram(s) Oral daily  propofol Infusion 50 MICROgram(s)/kG/Min IV Continuous <Continuous>  senna 2 Tablet(s) Oral at bedtime  vasopressin Infusion 0.04 Unit(s)/Min IV Continuous <Continuous>    ALBUTerol    90 MICROgram(s) HFA Inhaler 2 Puff(s) Inhalation every 6 hours PRN  azithromycin  IVPB 500 milliGRAM(s) IV Intermittent every 24 hours  azithromycin  IVPB      calcium acetate 1334 milliGRAM(s) Oral three times a day with meals  cefepime   IVPB      cefepime   IVPB 1000 milliGRAM(s) IV Intermittent every 12 hours  chlorhexidine 0.12% Liquid 15 milliLiter(s) Oral Mucosa two times a day  chlorhexidine 2% Cloths 1 Application(s) Topical <User Schedule>  cisatracurium Infusion 3 MICROgram(s)/kG/Min IV Continuous <Continuous>  digoxin     Tablet 0.125 milliGRAM(s) Oral every other day  fentaNYL   Infusion 4 MICROgram(s)/kG/Hr IV Continuous <Continuous>  glucagon  Injectable 1 milliGRAM(s) IntraMuscular once  heparin  Infusion.  Unit(s)/Hr IV Continuous <Continuous>  insulin lispro (ADMELOG) corrective regimen sliding scale   SubCutaneous every 6 hours  lubricant eye ont 1 Application(s) 1 Application(s) Both EYES two times a day  pantoprazole  Injectable 40 milliGRAM(s) IV Push daily  phenylephrine    Infusion 3.2 MICROgram(s)/kG/Min IV Continuous <Continuous>  polyethylene glycol 3350 17 Gram(s) Oral daily  propofol Infusion 50 MICROgram(s)/kG/Min IV Continuous <Continuous>  senna 2 Tablet(s) Oral at bedtime  vancomycin  IVPB      vancomycin  IVPB 1000 milliGRAM(s) IV Intermittent every 24 hours  vasopressin Infusion 0.04 Unit(s)/Min IV Continuous <Continuous>    Drug Dosing Weight  Height (cm): 154.4 (15 Nov 2020 09:25)  Weight (kg): 125 (15 Nov 2020 09:25)  BMI (kg/m2): 52.4 (15 Nov 2020 09:25)  BSA (m2): 2.16 (15 Nov 2020 09:25)        PMH -reviewed admission note, no change since admission      ABG - ( 27 Nov 2020 17:43 )  pH, Arterial: 7.09  pH, Blood: x     /  pCO2: 77    /  pO2: 76    / HCO3: 22    / Base Excess: -8.1  /  SaO2: 92                    11-26 @ 07:01  -  11-27 @ 07:00  --------------------------------------------------------  IN: 2295.8 mL / OUT: 3511 mL / NET: -1215.2 mL        Mode: AC/ CMV (Assist Control/ Continuous Mandatory Ventilation)  RR (machine): 34  FiO2: 100  PEEP: 14  ITime: 0.8  MAP: 29  PC: 33  PIP: 48    PHYSICAL EXAM:  GENERAL: obese, +ETT, sedated, supine, OGT  HEAD:  Atraumatic, Normocephalic  EYES: not compressed, supined   ENT: Moist mucous membranes  NECK: Supple  NERVOUS SYSTEM:  sedated  CHEST/LUNG: lungs CTA  HEART: S1S2 normal, tachy  ABDOMEN: Soft, obese  EXTREMITIES:  2+ Peripheral Pulse, generalized edema  PSYCH: sedated  SKIN: No rashes or lesionslesions        LABS:  CBC Full  -  ( 27 Nov 2020 06:11 )  WBC Count : 17.77 K/uL  RBC Count : 3.55 M/uL  Hemoglobin : 10.7 g/dL  Hematocrit : 34.0 %  Platelet Count - Automated : 238 K/uL  Mean Cell Volume : 95.8 fl  Mean Cell Hemoglobin : 30.1 pg  Mean Cell Hemoglobin Concentration : 31.5 gm/dL  Auto Neutrophil # : x  Auto Lymphocyte # : x  Auto Monocyte # : x  Auto Eosinophil # : x  Auto Basophil # : x  Auto Neutrophil % : x  Auto Lymphocyte % : x  Auto Monocyte % : x  Auto Eosinophil % : x  Auto Basophil % : x    11-27    134<L>  |  97  |  52<H>  ----------------------------<  143<H>  5.1   |  24  |  7.36<H>    Ca    8.2<L>      27 Nov 2020 17:03  Phos  8.9     11-27  Mg     2.5     11-27    TPro  6.4  /  Alb  2.2<L>  /  TBili  0.9  /  DBili  x   /  AST  202<H>  /  ALT  228<H>  /  AlkPhos  123<H>  11-27    PTT - ( 27 Nov 2020 02:34 )  PTT:85.4 sec        RADIOLOGY & ADDITIONAL STUDIES REVIEWED:  ***    [ ]GOALS OF CARE DISCUSSION WITH PATIENT/FAMILY/PROXY:    CRITICAL CARE TIME SPENT: 35 minutes

## 2020-11-28 NOTE — PROGRESS NOTE ADULT - PROBLEM SELECTOR PLAN 2
Patient with hyperkalemia due to ATN/renal failure now with improvement  K level 5.2  Continue RRt/HD   Renal feeding

## 2020-11-28 NOTE — PROGRESS NOTE ADULT - ASSESSMENT
ASSESSMENT AND PLAN: 49M without PMH from home with wife admitted to ICU for of AHRF 2/2 COVID PNA.    Neuro: #sedation  -sedated with propofol, fentanyl  -paralyzed with Nimbex     CVS: #hypotension and tachycardia  -SBPs improved 90-100s  -dc levophed, continue vasopressin and phenylephrine  -s/p dig loading, start 0.125mg dig every other day (renal dosing)  -EKG with sinus tach->AF  -bedside Echo without global dysfunction  -[place] monitoring    -#troponemia  -9->8.2->8.3 11/21  -cardio Dr. Tsang following, not NSTEMI, likely from leaking from general inflammation, do not need asa and Plavix    Pulmonary: #AHRF 2/2 COVID PNA  -ETT placed 11/19, sedated and paralyzed as above  -RR 36>34/PC 32/100/10>15, I:E 0.7, ABG continuing to remain acidotic 7.11/72/70/22, will repeat this pm  -added NO, will fu metHb level  -supinated since 3pm 11/22  -ESR, procal, ferritin increasing, ddimer, LDH, CRP decreasing, will trend daily  -s/p dexamethasone IV 6mg Qd (10 day course through 11/24)  -worsening infiltrates noted on CXR 11/25, no recent changes  -IgA positive, IgG negative, indicating new infection, Dr. Amato approved remdesivir (11/17) but held from 11/21am given worsening renal function with CrCl <45    ID:   -history and management as above  -changed LIJ to femoral line, removed LIJ 11/25  -WBC 18->11->13.2->15.6->17.8  -RVP +COVID, procal increased to 3.13 as noted above, started cefepime 11/21, added vanc 11/24, added azithromycin given worsening CXR noted above 11/25  -BCx NGF  -fu sputum cx    Nephro: #AGUSTO  -CrCl 44, Cr 1.1 on admission, acutely worsened 11/21, possibly 2/2 covid injury and/or low BP, this am 6.41  -RIJ HD placed 11/21 and HD started 11/21, 11/22, 11/24, 11/25, and 11/26, will hold off today  -fu urine lytes if able to obtain (scant urine at present)  -continue phoslo, s/p HCO3 gtt    GI: #TF  -OGT in place  -TF with Nepro at 10cc/hr  -monitor LFTs AST/ALT acute elevation this week, today slightly improved to 248/243, likely 2/2 COVID, hep Dr. Sabillon consulted  -will trend BID  -hold off mucomyst for now as unlikely to be related to acetaminophen toxicity  -Protonix IV for PPx    Heme: #thrombocytopenia  -resolved, 238 today  -monitor    Endocrine: #BG elevated  -A1c 6, average glucose 126  -TSH wnl  -vit D moderately low to 22, c/w 1000U/day   -FS q6 for TF  -HSS    Skin/Catheters:   #LIJ placed 11/19-25  #right femoral line 11/25  #RIJ HD catheter 11/21  #left radial artery 11/19  #FC (pressure)  #eye drops    Prophylaxis:  -heparin gtt for elevated D-dimer

## 2020-11-29 NOTE — PROGRESS NOTE ADULT - PROBLEM SELECTOR PLAN 1
Oligoanuric AGUSTO likely ATN from septic shock/ARDS requiring RRT/HD  - S/p HD treatment on 11/268 with net 2.9  L fluid removal  - Avoid Nephrotoxic agents  - Monitor BMP and electrolytes  - Maintain MAP>65-70 mm hG  - Adjust antibiotics as per renal dose clearances  - Volume status and electrolytes noted.  - Defer HD today.  - Next anticipated IHD on 11/30

## 2020-11-29 NOTE — PROGRESS NOTE ADULT - PROBLEM SELECTOR PLAN 2
Patient with hyperkalemia due to ATN/renal failure now with improvement  K level 3.9  Continue RRt/HD   Renal feeding

## 2020-11-29 NOTE — PROGRESS NOTE ADULT - PROBLEM SELECTOR PLAN 3
Patient with Calcium 7.7 with albumin 2.1, Phos  6.7, Mag 2.3  Continue  Phoslo 1331 mg po tid  Monitor BMP, calcium, mag, phos level

## 2020-11-29 NOTE — PROGRESS NOTE ADULT - ASSESSMENT
ASSESSMENT AND PLAN: 49M without PMH from home with wife admitted to ICU for of AHRF 2/2 COVID PNA.    Neuro: #sedation  -sedated with propofol, fentanyl  -paralyzed with Nimbex     CVS: #hypotension and tachycardia  -SBPs improved 90-100s  -dc levophed, continue vasopressin and phenylephrine  -s/p dig loading, start 0.125mg dig every other day (renal dosing)  -EKG with sinus tach->AF  -bedside Echo without global dysfunction  -[place] monitoring    -#troponemia  -9->8.2->8.3 11/21  -cardio Dr. Tsang following, not NSTEMI, likely from leaking from general inflammation, do not need asa and Plavix    Pulmonary: #AHRF 2/2 COVID PNA  -ETT placed 11/19, sedated and paralyzed as above  -RR 36>34/PC 32/100/10>15, I:E 0.7, ABG continuing to remain acidotic 7.11/72/70/22, will repeat this pm  -added NO, will fu metHb level  -supinated since 3pm 11/22  -ESR, procal, ferritin increasing, ddimer, LDH, CRP decreasing, will trend daily  -s/p dexamethasone IV 6mg Qd (10 day course through 11/24)  -worsening infiltrates noted on CXR 11/25, no recent changes  -IgA positive, IgG negative, indicating new infection, Dr. Amato approved remdesivir (11/17) but held from 11/21am given worsening renal function with CrCl <45    ID:   -history and management as above  -changed LIJ to femoral line, removed LIJ 11/25  -WBC 18->11->13.2->15.6->17.8  -RVP +COVID, procal increased to 3.13 as noted above, started cefepime 11/21, added vanc 11/24, added azithromycin given worsening CXR noted above 11/25  -BCx NGF  -fu sputum cx    Nephro: #AGUSTO  -CrCl 44, Cr 1.1 on admission, acutely worsened 11/21, possibly 2/2 covid injury and/or low BP, this am 6.41  -RIJ HD placed 11/21 and HD started 11/21, 11/22, 11/24, 11/25, and 11/26, will hold off today  -fu urine lytes if able to obtain (scant urine at present)  -continue phoslo, s/p HCO3 gtt    GI: #TF  -OGT in place  -TF with Nepro at 10cc/hr  -monitor LFTs AST/ALT acute elevation this week, today slightly improved to 248/243, likely 2/2 COVID, hep Dr. Sabillon consulted  -will trend BID  -hold off mucomyst for now as unlikely to be related to acetaminophen toxicity  -Protonix IV for PPx    Heme: #thrombocytopenia  -resolved, 238 today  -monitor    Endocrine: #BG elevated  -A1c 6, average glucose 126  -TSH wnl  -vit D moderately low to 22, c/w 1000U/day   -FS q6 for TF  -HSS    Skin/Catheters:   #LIJ placed 11/19-25  #right femoral line 11/25  #RIJ HD catheter 11/21  #left radial artery 11/19  #FC (pressure)  #eye drops    Prophylaxis:  -heparin gtt for elevated D-dimer ASSESSMENT AND PLAN: 49M without PMH from home with wife admitted to ICU for of AHRF 2/2 COVID PNA.    Neuro: #sedation  -sedated with propofol, fentanyl  -d/jos paralytics     CVS: #hypotension and tachycardia  -SBPs improved 90-100s  - off pressors   -s/p dig loading, start 0.125mg dig every other day (renal dosing)  -EKG with sinus tach->AF  -bedside Echo without global dysfunction  -[place] monitoring    -#troponemia  -9->8.2->8.3 11/21  -cardio Dr. Tsang following, not NSTEMI, likely from leaking from general inflammation, do not need asa and Plavix    Pulmonary: #AHRF 2/2 COVID PNA  -ETT placed 11/19, sedated and paralyzed as above  -33/28/100/8 +NO 7.24/64/70/93   will fu metHb level  -supinated since 3pm 11/22  -ESR, procal, ferritin increasing, ddimer, LDH, CRP decreasing, will trend daily  -s/p dexamethasone IV 6mg Qd (10 day course through 11/24)  -worsening infiltrates noted on CXR 11/25, no recent changes  -IgA positive, IgG negative, indicating new infection, Dr. Amato approved remdesivir (11/17) but held from 11/21am given worsening renal function with CrCl <45    ID:   -history and management as above  -changed LIJ to femoral line, removed LIJ 11/25  -WBC 18->11->13.2->15.6->17.8  -RVP +COVID, procal increased to 3.13 as noted above, started cefepime 11/21, added vanc 11/24, added azithromycin given worsening CXR noted above 11/25  -BCx NGF  - Currently on vanco, azithromycin and cefepime   - f/u VT       Nephro: #AGUSTO  -CrCl 44, Cr 1.1 on admission, acutely worsened 11/21, possibly 2/2 covid injury and/or low BP, this am 6.41  -RIJ HD placed 11/21 and HD started 11/21, 11/22, 11/24, 11/25,11/26, 10/28 will hold off today  -fu urine lytes if able to obtain (scant urine at present)  -continue phoslo, s/p HCO3 gtt    GI: #TF  -OGT in place  -TF with Nepro at 10cc/hr  -monitor LFTs AST/ALT acute elevation this week, today slightly improved to 248/243, likely 2/2 COVID, hep Dr. Sabillon consulted  -will trend BID  -hold off mucomyst for now as unlikely to be related to acetaminophen toxicity  -Protonix IV for PPx    Heme: #thrombocytopenia  -resolved    Endocrine: #BG elevated  -A1c 6, average glucose 126  -TSH wnl  -vit D moderately low to 22, c/w 1000U/day   -FS q6 for TF  -HSS    Skin/Catheters:   #LIJ placed 11/19-25  #right femoral line 11/25  #RIJ HD catheter 11/21  #left radial artery 11/19  #FC (pressure)  #eye drops    Prophylaxis:  -heparin gtt for elevated D-dimer

## 2020-11-29 NOTE — PROGRESS NOTE ADULT - SUBJECTIVE AND OBJECTIVE BOX
INTERVAL HPI/OVERNIGHT EVENTS: ***    REVIEW OF SYSTEMS:    CONSTITUTIONAL: No weakness, fevers or chills  NECK: No pain or stiffness  RESPIRATORY: No cough, wheezing, hemoptysis; No shortness of breath  CARDIOVASCULAR: No chest pain or palpitations  GASTROINTESTINAL: No abdominal or epigastric pain. No nausea, vomiting, No diarrhea or constipation. No melena or hematochezia.  GENITOURINARY: No dysuria, frequency or hematuria  NEUROLOGICAL: No numbness or weakness  All other review of systems is negative unless indicated above      PRESSORS: [] YES [] NO  WHICH: N/A    Antimicrobial:  azithromycin  IVPB      azithromycin  IVPB 500 milliGRAM(s) IV Intermittent every 24 hours  cefepime   IVPB      cefepime   IVPB 1000 milliGRAM(s) IV Intermittent every 12 hours  vancomycin  IVPB      vancomycin  IVPB 1000 milliGRAM(s) IV Intermittent every 24 hours    Cardiovascular:  digoxin     Tablet 0.125 milliGRAM(s) Oral every other day  phenylephrine    Infusion 6 MICROgram(s)/kG/Min IV Continuous <Continuous>    Pulmonary:  ALBUTerol    90 MICROgram(s) HFA Inhaler 2 Puff(s) Inhalation every 6 hours PRN    Hematalogic:  heparin  Infusion.  Unit(s)/Hr IV Continuous <Continuous>    Other:  calcium acetate 1334 milliGRAM(s) Oral three times a day with meals  chlorhexidine 0.12% Liquid 15 milliLiter(s) Oral Mucosa two times a day  chlorhexidine 2% Cloths 1 Application(s) Topical <User Schedule>  dextrose 5% 1000 milliLiter(s) IV Continuous <Continuous>  fentaNYL   Infusion 1 MICROgram(s)/kG/Hr IV Continuous <Continuous>  glucagon  Injectable 1 milliGRAM(s) IntraMuscular once  insulin lispro (ADMELOG) corrective regimen sliding scale   SubCutaneous every 6 hours  lubricant eye ont 1 Application(s) 1 Application(s) Both EYES two times a day  pantoprazole  Injectable 40 milliGRAM(s) IV Push daily  polyethylene glycol 3350 17 Gram(s) Oral daily  propofol Infusion 50 MICROgram(s)/kG/Min IV Continuous <Continuous>  senna 2 Tablet(s) Oral at bedtime  vasopressin Infusion 0.04 Unit(s)/Min IV Continuous <Continuous>    ALBUTerol    90 MICROgram(s) HFA Inhaler 2 Puff(s) Inhalation every 6 hours PRN  azithromycin  IVPB      azithromycin  IVPB 500 milliGRAM(s) IV Intermittent every 24 hours  calcium acetate 1334 milliGRAM(s) Oral three times a day with meals  cefepime   IVPB      cefepime   IVPB 1000 milliGRAM(s) IV Intermittent every 12 hours  chlorhexidine 0.12% Liquid 15 milliLiter(s) Oral Mucosa two times a day  chlorhexidine 2% Cloths 1 Application(s) Topical <User Schedule>  dextrose 5% 1000 milliLiter(s) IV Continuous <Continuous>  digoxin     Tablet 0.125 milliGRAM(s) Oral every other day  fentaNYL   Infusion 1 MICROgram(s)/kG/Hr IV Continuous <Continuous>  glucagon  Injectable 1 milliGRAM(s) IntraMuscular once  heparin  Infusion.  Unit(s)/Hr IV Continuous <Continuous>  insulin lispro (ADMELOG) corrective regimen sliding scale   SubCutaneous every 6 hours  lubricant eye ont 1 Application(s) 1 Application(s) Both EYES two times a day  pantoprazole  Injectable 40 milliGRAM(s) IV Push daily  phenylephrine    Infusion 6 MICROgram(s)/kG/Min IV Continuous <Continuous>  polyethylene glycol 3350 17 Gram(s) Oral daily  propofol Infusion 50 MICROgram(s)/kG/Min IV Continuous <Continuous>  senna 2 Tablet(s) Oral at bedtime  vancomycin  IVPB      vancomycin  IVPB 1000 milliGRAM(s) IV Intermittent every 24 hours  vasopressin Infusion 0.04 Unit(s)/Min IV Continuous <Continuous>    Drug Dosing Weight  Height (cm): 154.4 (15 Nov 2020 09:25)  Weight (kg): 125 (15 Nov 2020 09:25)  BMI (kg/m2): 52.4 (15 Nov 2020 09:25)  BSA (m2): 2.16 (15 Nov 2020 09:25)      CENTRAL LINE: [] YES [] NO  LOCATION: N/A    MICHELLE: [] YES [] NO        A-LINE:  [] YES [] NO  LOCATION: N/A    ICU Vital Signs Last 24 Hrs  T(C): 37.7 (28 Nov 2020 20:15), Max: 37.7 (28 Nov 2020 20:15)  T(F): 99.8 (28 Nov 2020 20:15), Max: 99.8 (28 Nov 2020 20:15)  HR: 94 (28 Nov 2020 23:30) (70 - 141)  BP: 145/79 (28 Nov 2020 23:00) (93/44 - 146/75)  BP(mean): 95 (28 Nov 2020 23:00) (55 - 95)  ABP: 129/61 (28 Nov 2020 23:30) (70/40 - 170/80)  ABP(mean): 79 (28 Nov 2020 23:30) (0 - 233)  RR: 19 (28 Nov 2020 23:30) (13 - 38)  SpO2: 96% (28 Nov 2020 23:30) (81% - 99%)      ABG - ( 28 Nov 2020 09:37 )  pH, Arterial: 6.98  pH, Blood: x     /  pCO2: 101   /  pO2: 109   / HCO3: 23    / Base Excess: -10.3 /  SaO2: 96                    11-27 @ 07:01  -  11-28 @ 07:00  --------------------------------------------------------  IN: 4107.6 mL / OUT: 25 mL / NET: 4082.6 mL        Mode: AC/ CMV (Assist Control/ Continuous Mandatory Ventilation)  RR (machine): 35  FiO2: 100  PEEP: 17  ITime: 0.59  MAP: 31  PC: 33  PIP: 51        PHYSICAL EXAM:    GENERAL: NAD, well-groomed, well-developed, AAOx3  EYES: EOMI, PERRLA,   NECK: Supple, No JVD; Normal thyroid; Trachea midline  NERVOUS SYSTEM: Motor Strength 5/5 B/L upper and lower extremities; DTRs 2+ intact and symmetric  CHEST/LUNG: No rales, rhonchi, wheezing   HEART: Regular rate and rhythm; No murmurs,   ABDOMEN: Soft, Nontender, Nondistended; Bowel sounds present  EXTREMITIES:  2+ Peripheral Pulses, No clubbing, cyanosis, or edema    LABS:  CBC Full  -  ( 28 Nov 2020 07:37 )  WBC Count : 21.18 K/uL  RBC Count : 3.24 M/uL  Hemoglobin : 9.7 g/dL  Hematocrit : 31.4 %  Platelet Count - Automated : 278 K/uL  Mean Cell Volume : 96.9 fl  Mean Cell Hemoglobin : 29.9 pg  Mean Cell Hemoglobin Concentration : 30.9 gm/dL  Auto Neutrophil # : x  Auto Lymphocyte # : x  Auto Monocyte # : x  Auto Eosinophil # : x  Auto Basophil # : x  Auto Neutrophil % : x  Auto Lymphocyte % : x  Auto Monocyte % : x  Auto Eosinophil % : x  Auto Basophil % : x    11-28    132<L>  |  96  |  57<H>  ----------------------------<  150<H>  5.2   |  24  |  8.12<H>    Ca    8.1<L>      28 Nov 2020 07:37  Phos  10.4     11-28  Mg     2.8     11-28    TPro  5.8<L>  /  Alb  2.2<L>  /  TBili  0.9  /  DBili  x   /  AST  180<H>  /  ALT  203<H>  /  AlkPhos  130<H>  11-28    PTT - ( 28 Nov 2020 07:37 )  PTT:48.2 sec        RADIOLOGY & ADDITIONAL STUDIES REVIEWED:  ***    [ ]GOALS OF CARE DISCUSSION WITH PATIENT/FAMILY/PROXY:    CRITICAL CARE TIME SPENT: 35 minutes INTERVAL HPI/OVERNIGHT EVENTS: No acute events overnight. Patient remains sedated and intubated    REVIEW OF SYSTEMS:  - intubated and sedated    Antimicrobial:  azithromycin  IVPB      azithromycin  IVPB 500 milliGRAM(s) IV Intermittent every 24 hours  cefepime   IVPB      cefepime   IVPB 1000 milliGRAM(s) IV Intermittent every 12 hours  vancomycin  IVPB      vancomycin  IVPB 1000 milliGRAM(s) IV Intermittent every 24 hours    Cardiovascular:  digoxin     Tablet 0.125 milliGRAM(s) Oral every other day  phenylephrine    Infusion 6 MICROgram(s)/kG/Min IV Continuous <Continuous>    Pulmonary:  ALBUTerol    90 MICROgram(s) HFA Inhaler 2 Puff(s) Inhalation every 6 hours PRN    Hematalogic:  heparin  Infusion.  Unit(s)/Hr IV Continuous <Continuous>    Other:  calcium acetate 1334 milliGRAM(s) Oral three times a day with meals  chlorhexidine 0.12% Liquid 15 milliLiter(s) Oral Mucosa two times a day  chlorhexidine 2% Cloths 1 Application(s) Topical <User Schedule>  dextrose 5% 1000 milliLiter(s) IV Continuous <Continuous>  fentaNYL   Infusion 1 MICROgram(s)/kG/Hr IV Continuous <Continuous>  glucagon  Injectable 1 milliGRAM(s) IntraMuscular once  insulin lispro (ADMELOG) corrective regimen sliding scale   SubCutaneous every 6 hours  lubricant eye ont 1 Application(s) 1 Application(s) Both EYES two times a day  pantoprazole  Injectable 40 milliGRAM(s) IV Push daily  polyethylene glycol 3350 17 Gram(s) Oral daily  propofol Infusion 50 MICROgram(s)/kG/Min IV Continuous <Continuous>  senna 2 Tablet(s) Oral at bedtime  vasopressin Infusion 0.04 Unit(s)/Min IV Continuous <Continuous>    ALBUTerol    90 MICROgram(s) HFA Inhaler 2 Puff(s) Inhalation every 6 hours PRN  azithromycin  IVPB      azithromycin  IVPB 500 milliGRAM(s) IV Intermittent every 24 hours  calcium acetate 1334 milliGRAM(s) Oral three times a day with meals  cefepime   IVPB      cefepime   IVPB 1000 milliGRAM(s) IV Intermittent every 12 hours  chlorhexidine 0.12% Liquid 15 milliLiter(s) Oral Mucosa two times a day  chlorhexidine 2% Cloths 1 Application(s) Topical <User Schedule>  dextrose 5% 1000 milliLiter(s) IV Continuous <Continuous>  digoxin     Tablet 0.125 milliGRAM(s) Oral every other day  fentaNYL   Infusion 1 MICROgram(s)/kG/Hr IV Continuous <Continuous>  glucagon  Injectable 1 milliGRAM(s) IntraMuscular once  heparin  Infusion.  Unit(s)/Hr IV Continuous <Continuous>  insulin lispro (ADMELOG) corrective regimen sliding scale   SubCutaneous every 6 hours  lubricant eye ont 1 Application(s) 1 Application(s) Both EYES two times a day  pantoprazole  Injectable 40 milliGRAM(s) IV Push daily  phenylephrine    Infusion 6 MICROgram(s)/kG/Min IV Continuous <Continuous>  polyethylene glycol 3350 17 Gram(s) Oral daily  propofol Infusion 50 MICROgram(s)/kG/Min IV Continuous <Continuous>  senna 2 Tablet(s) Oral at bedtime  vancomycin  IVPB      vancomycin  IVPB 1000 milliGRAM(s) IV Intermittent every 24 hours  vasopressin Infusion 0.04 Unit(s)/Min IV Continuous <Continuous>    Drug Dosing Weight  Height (cm): 154.4 (15 Nov 2020 09:25)  Weight (kg): 125 (15 Nov 2020 09:25)  BMI (kg/m2): 52.4 (15 Nov 2020 09:25)  BSA (m2): 2.16 (15 Nov 2020 09:25)        ICU Vital Signs Last 24 Hrs  T(C): 37.7 (28 Nov 2020 20:15), Max: 37.7 (28 Nov 2020 20:15)  T(F): 99.8 (28 Nov 2020 20:15), Max: 99.8 (28 Nov 2020 20:15)  HR: 94 (28 Nov 2020 23:30) (70 - 141)  BP: 145/79 (28 Nov 2020 23:00) (93/44 - 146/75)  BP(mean): 95 (28 Nov 2020 23:00) (55 - 95)  ABP: 129/61 (28 Nov 2020 23:30) (70/40 - 170/80)  ABP(mean): 79 (28 Nov 2020 23:30) (0 - 233)  RR: 19 (28 Nov 2020 23:30) (13 - 38)  SpO2: 96% (28 Nov 2020 23:30) (81% - 99%)      ABG - ( 28 Nov 2020 09:37 )  pH, Arterial: 6.98  pH, Blood: x     /  pCO2: 101   /  pO2: 109   / HCO3: 23    / Base Excess: -10.3 /  SaO2: 96                    11-27 @ 07:01  -  11-28 @ 07:00  --------------------------------------------------------  IN: 4107.6 mL / OUT: 25 mL / NET: 4082.6 mL        Mode: AC/ CMV (Assist Control/ Continuous Mandatory Ventilation)  RR (machine): 35  FiO2: 100  PEEP: 17  ITime: 0.59  MAP: 31  PC: 33  PIP: 51        PHYSICAL EXAM:    GENERAL: intubated and sedated    NECK: Supple, No JVD; Normal thyroid; Trachea midline  CHEST/LUNG: No rales, rhonchi, wheezing   HEART: Regular rate and rhythm; No murmurs,   ABDOMEN: Soft, Nontender, Nondistended; Bowel sounds present  EXTREMITIES:  2+ Peripheral Pulses, No clubbing, cyanosis, or edema    LABS:  CBC Full  -  ( 28 Nov 2020 07:37 )  WBC Count : 21.18 K/uL  RBC Count : 3.24 M/uL  Hemoglobin : 9.7 g/dL  Hematocrit : 31.4 %  Platelet Count - Automated : 278 K/uL  Mean Cell Volume : 96.9 fl  Mean Cell Hemoglobin : 29.9 pg  Mean Cell Hemoglobin Concentration : 30.9 gm/dL  Auto Neutrophil # : x  Auto Lymphocyte # : x  Auto Monocyte # : x  Auto Eosinophil # : x  Auto Basophil # : x  Auto Neutrophil % : x  Auto Lymphocyte % : x  Auto Monocyte % : x  Auto Eosinophil % : x  Auto Basophil % : x    11-28    132<L>  |  96  |  57<H>  ----------------------------<  150<H>  5.2   |  24  |  8.12<H>    Ca    8.1<L>      28 Nov 2020 07:37  Phos  10.4     11-28  Mg     2.8     11-28    TPro  5.8<L>  /  Alb  2.2<L>  /  TBili  0.9  /  DBili  x   /  AST  180<H>  /  ALT  203<H>  /  AlkPhos  130<H>  11-28    PTT - ( 28 Nov 2020 07:37 )  PTT:48.2 sec        RADIOLOGY & ADDITIONAL STUDIES REVIEWED:  ***    [ ]GOALS OF CARE DISCUSSION WITH PATIENT/FAMILY/PROXY:    CRITICAL CARE TIME SPENT: 35 minutes INTERVAL HPI/OVERNIGHT EVENTS: No acute events overnight. Patient remains sedated and intubated    REVIEW OF SYSTEMS:  - intubated and sedated    Antimicrobial:  azithromycin  IVPB      azithromycin  IVPB 500 milliGRAM(s) IV Intermittent every 24 hours  cefepime   IVPB      cefepime   IVPB 1000 milliGRAM(s) IV Intermittent every 12 hours  vancomycin  IVPB      vancomycin  IVPB 1000 milliGRAM(s) IV Intermittent every 24 hours    Cardiovascular:  digoxin     Tablet 0.125 milliGRAM(s) Oral every other day  phenylephrine    Infusion 6 MICROgram(s)/kG/Min IV Continuous <Continuous>    Pulmonary:  ALBUTerol    90 MICROgram(s) HFA Inhaler 2 Puff(s) Inhalation every 6 hours PRN    Hematalogic:  heparin  Infusion.  Unit(s)/Hr IV Continuous <Continuous>    Other:  calcium acetate 1334 milliGRAM(s) Oral three times a day with meals  chlorhexidine 0.12% Liquid 15 milliLiter(s) Oral Mucosa two times a day  chlorhexidine 2% Cloths 1 Application(s) Topical <User Schedule>  dextrose 5% 1000 milliLiter(s) IV Continuous <Continuous>  fentaNYL   Infusion 1 MICROgram(s)/kG/Hr IV Continuous <Continuous>  glucagon  Injectable 1 milliGRAM(s) IntraMuscular once  insulin lispro (ADMELOG) corrective regimen sliding scale   SubCutaneous every 6 hours  lubricant eye ont 1 Application(s) 1 Application(s) Both EYES two times a day  pantoprazole  Injectable 40 milliGRAM(s) IV Push daily  polyethylene glycol 3350 17 Gram(s) Oral daily  propofol Infusion 50 MICROgram(s)/kG/Min IV Continuous <Continuous>  senna 2 Tablet(s) Oral at bedtime  vasopressin Infusion 0.04 Unit(s)/Min IV Continuous <Continuous>    ALBUTerol    90 MICROgram(s) HFA Inhaler 2 Puff(s) Inhalation every 6 hours PRN  azithromycin  IVPB      azithromycin  IVPB 500 milliGRAM(s) IV Intermittent every 24 hours  calcium acetate 1334 milliGRAM(s) Oral three times a day with meals  cefepime   IVPB      cefepime   IVPB 1000 milliGRAM(s) IV Intermittent every 12 hours  chlorhexidine 0.12% Liquid 15 milliLiter(s) Oral Mucosa two times a day  chlorhexidine 2% Cloths 1 Application(s) Topical <User Schedule>  dextrose 5% 1000 milliLiter(s) IV Continuous <Continuous>  digoxin     Tablet 0.125 milliGRAM(s) Oral every other day  fentaNYL   Infusion 1 MICROgram(s)/kG/Hr IV Continuous <Continuous>  glucagon  Injectable 1 milliGRAM(s) IntraMuscular once  heparin  Infusion.  Unit(s)/Hr IV Continuous <Continuous>  insulin lispro (ADMELOG) corrective regimen sliding scale   SubCutaneous every 6 hours  lubricant eye ont 1 Application(s) 1 Application(s) Both EYES two times a day  pantoprazole  Injectable 40 milliGRAM(s) IV Push daily  phenylephrine    Infusion 6 MICROgram(s)/kG/Min IV Continuous <Continuous>  polyethylene glycol 3350 17 Gram(s) Oral daily  propofol Infusion 50 MICROgram(s)/kG/Min IV Continuous <Continuous>  senna 2 Tablet(s) Oral at bedtime  vancomycin  IVPB      vancomycin  IVPB 1000 milliGRAM(s) IV Intermittent every 24 hours  vasopressin Infusion 0.04 Unit(s)/Min IV Continuous <Continuous>    Drug Dosing Weight  Height (cm): 154.4 (15 Nov 2020 09:25)  Weight (kg): 125 (15 Nov 2020 09:25)  BMI (kg/m2): 52.4 (15 Nov 2020 09:25)  BSA (m2): 2.16 (15 Nov 2020 09:25)        ICU Vital Signs Last 24 Hrs  T(C): 37.7 (28 Nov 2020 20:15), Max: 37.7 (28 Nov 2020 20:15)  T(F): 99.8 (28 Nov 2020 20:15), Max: 99.8 (28 Nov 2020 20:15)  HR: 94 (28 Nov 2020 23:30) (70 - 141)  BP: 145/79 (28 Nov 2020 23:00) (93/44 - 146/75)  BP(mean): 95 (28 Nov 2020 23:00) (55 - 95)  ABP: 129/61 (28 Nov 2020 23:30) (70/40 - 170/80)  ABP(mean): 79 (28 Nov 2020 23:30) (0 - 233)  RR: 19 (28 Nov 2020 23:30) (13 - 38)  SpO2: 96% (28 Nov 2020 23:30) (81% - 99%)      ABG - ( 28 Nov 2020 09:37 )  pH, Arterial: 6.98  pH, Blood: x     /  pCO2: 101   /  pO2: 109   / HCO3: 23    / Base Excess: -10.3 /  SaO2: 96                    11-27 @ 07:01  -  11-28 @ 07:00  --------------------------------------------------------  IN: 4107.6 mL / OUT: 25 mL / NET: 4082.6 mL        Mode: AC/ CMV (Assist Control/ Continuous Mandatory Ventilation)  RR (machine): 35  FiO2: 100  PEEP: 17  ITime: 0.59  MAP: 31  PC: 33  PIP: 51        PHYSICAL EXAM:    GENERAL: obese, +ETT, sedated, supine, OGT  HEAD:  Atraumatic, Normocephalic  EYES: not compressed, supined   ENT: Moist mucous membranes  NECK: Supple  NERVOUS SYSTEM:  sedated  CHEST/LUNG: lungs CTA  HEART: S1S2 normal, tachy  ABDOMEN: Soft, obese  EXTREMITIES:  2+ Peripheral Pulse, generalized edema  PSYCH: sedated  SKIN: No rashes or lesionslesions      LABS:  CBC Full  -  ( 28 Nov 2020 07:37 )  WBC Count : 21.18 K/uL  RBC Count : 3.24 M/uL  Hemoglobin : 9.7 g/dL  Hematocrit : 31.4 %  Platelet Count - Automated : 278 K/uL  Mean Cell Volume : 96.9 fl  Mean Cell Hemoglobin : 29.9 pg  Mean Cell Hemoglobin Concentration : 30.9 gm/dL  Auto Neutrophil # : x  Auto Lymphocyte # : x  Auto Monocyte # : x  Auto Eosinophil # : x  Auto Basophil # : x  Auto Neutrophil % : x  Auto Lymphocyte % : x  Auto Monocyte % : x  Auto Eosinophil % : x  Auto Basophil % : x    11-28    132<L>  |  96  |  57<H>  ----------------------------<  150<H>  5.2   |  24  |  8.12<H>    Ca    8.1<L>      28 Nov 2020 07:37  Phos  10.4     11-28  Mg     2.8     11-28    TPro  5.8<L>  /  Alb  2.2<L>  /  TBili  0.9  /  DBili  x   /  AST  180<H>  /  ALT  203<H>  /  AlkPhos  130<H>  11-28    PTT - ( 28 Nov 2020 07:37 )  PTT:48.2 sec        RADIOLOGY & ADDITIONAL STUDIES REVIEWED:  ***    [ ]GOALS OF CARE DISCUSSION WITH PATIENT/FAMILY/PROXY:    CRITICAL CARE TIME SPENT: 35 minutes INTERVAL HPI/OVERNIGHT EVENTS: No acute events overnight. Patient remains sedated and intubated, s/p HD last night, improving oxygen demand now pt DNR.      REVIEW OF SYSTEMS:  - intubated and sedated    Antimicrobial:  azithromycin  IVPB      azithromycin  IVPB 500 milliGRAM(s) IV Intermittent every 24 hours  cefepime   IVPB      cefepime   IVPB 1000 milliGRAM(s) IV Intermittent every 12 hours  vancomycin  IVPB      vancomycin  IVPB 1000 milliGRAM(s) IV Intermittent every 24 hours    Cardiovascular:  digoxin     Tablet 0.125 milliGRAM(s) Oral every other day  phenylephrine    Infusion 6 MICROgram(s)/kG/Min IV Continuous <Continuous>    Pulmonary:  ALBUTerol    90 MICROgram(s) HFA Inhaler 2 Puff(s) Inhalation every 6 hours PRN    Hematalogic:  heparin  Infusion.  Unit(s)/Hr IV Continuous <Continuous>    Other:  calcium acetate 1334 milliGRAM(s) Oral three times a day with meals  chlorhexidine 0.12% Liquid 15 milliLiter(s) Oral Mucosa two times a day  chlorhexidine 2% Cloths 1 Application(s) Topical <User Schedule>  dextrose 5% 1000 milliLiter(s) IV Continuous <Continuous>  fentaNYL   Infusion 1 MICROgram(s)/kG/Hr IV Continuous <Continuous>  glucagon  Injectable 1 milliGRAM(s) IntraMuscular once  insulin lispro (ADMELOG) corrective regimen sliding scale   SubCutaneous every 6 hours  lubricant eye ont 1 Application(s) 1 Application(s) Both EYES two times a day  pantoprazole  Injectable 40 milliGRAM(s) IV Push daily  polyethylene glycol 3350 17 Gram(s) Oral daily  propofol Infusion 50 MICROgram(s)/kG/Min IV Continuous <Continuous>  senna 2 Tablet(s) Oral at bedtime  vasopressin Infusion 0.04 Unit(s)/Min IV Continuous <Continuous>    ALBUTerol    90 MICROgram(s) HFA Inhaler 2 Puff(s) Inhalation every 6 hours PRN  azithromycin  IVPB      azithromycin  IVPB 500 milliGRAM(s) IV Intermittent every 24 hours  calcium acetate 1334 milliGRAM(s) Oral three times a day with meals  cefepime   IVPB      cefepime   IVPB 1000 milliGRAM(s) IV Intermittent every 12 hours  chlorhexidine 0.12% Liquid 15 milliLiter(s) Oral Mucosa two times a day  chlorhexidine 2% Cloths 1 Application(s) Topical <User Schedule>  dextrose 5% 1000 milliLiter(s) IV Continuous <Continuous>  digoxin     Tablet 0.125 milliGRAM(s) Oral every other day  fentaNYL   Infusion 1 MICROgram(s)/kG/Hr IV Continuous <Continuous>  glucagon  Injectable 1 milliGRAM(s) IntraMuscular once  heparin  Infusion.  Unit(s)/Hr IV Continuous <Continuous>  insulin lispro (ADMELOG) corrective regimen sliding scale   SubCutaneous every 6 hours  lubricant eye ont 1 Application(s) 1 Application(s) Both EYES two times a day  pantoprazole  Injectable 40 milliGRAM(s) IV Push daily  phenylephrine    Infusion 6 MICROgram(s)/kG/Min IV Continuous <Continuous>  polyethylene glycol 3350 17 Gram(s) Oral daily  propofol Infusion 50 MICROgram(s)/kG/Min IV Continuous <Continuous>  senna 2 Tablet(s) Oral at bedtime  vancomycin  IVPB      vancomycin  IVPB 1000 milliGRAM(s) IV Intermittent every 24 hours  vasopressin Infusion 0.04 Unit(s)/Min IV Continuous <Continuous>    Drug Dosing Weight  Height (cm): 154.4 (15 Nov 2020 09:25)  Weight (kg): 125 (15 Nov 2020 09:25)  BMI (kg/m2): 52.4 (15 Nov 2020 09:25)  BSA (m2): 2.16 (15 Nov 2020 09:25)        ICU Vital Signs Last 24 Hrs  T(C): 37.7 (28 Nov 2020 20:15), Max: 37.7 (28 Nov 2020 20:15)  T(F): 99.8 (28 Nov 2020 20:15), Max: 99.8 (28 Nov 2020 20:15)  HR: 94 (28 Nov 2020 23:30) (70 - 141)  BP: 145/79 (28 Nov 2020 23:00) (93/44 - 146/75)  BP(mean): 95 (28 Nov 2020 23:00) (55 - 95)  ABP: 129/61 (28 Nov 2020 23:30) (70/40 - 170/80)  ABP(mean): 79 (28 Nov 2020 23:30) (0 - 233)  RR: 19 (28 Nov 2020 23:30) (13 - 38)  SpO2: 96% (28 Nov 2020 23:30) (81% - 99%)      ABG - ( 28 Nov 2020 09:37 )  pH, Arterial: 6.98  pH, Blood: x     /  pCO2: 101   /  pO2: 109   / HCO3: 23    / Base Excess: -10.3 /  SaO2: 96                    11-27 @ 07:01  -  11-28 @ 07:00  --------------------------------------------------------  IN: 4107.6 mL / OUT: 25 mL / NET: 4082.6 mL        Mode: AC/ CMV (Assist Control/ Continuous Mandatory Ventilation)  RR (machine): 35  FiO2: 100  PEEP: 17  ITime: 0.59  MAP: 31  PC: 33  PIP: 51        PHYSICAL EXAM:    GENERAL: obese, +ETT, sedated, supine, OGT  HEAD:  Atraumatic, Normocephalic  EYES: not compressed, supined   ENT: Moist mucous membranes  NECK: Supple  NERVOUS SYSTEM:  sedated  CHEST/LUNG: lungs CTA  HEART: S1S2 normal, tachy  ABDOMEN: Soft, obese  EXTREMITIES:  2+ Peripheral Pulse, generalized edema  PSYCH: sedated  SKIN: No rashes or lesionslesions      LABS:  CBC Full  -  ( 28 Nov 2020 07:37 )  WBC Count : 21.18 K/uL  RBC Count : 3.24 M/uL  Hemoglobin : 9.7 g/dL  Hematocrit : 31.4 %  Platelet Count - Automated : 278 K/uL  Mean Cell Volume : 96.9 fl  Mean Cell Hemoglobin : 29.9 pg  Mean Cell Hemoglobin Concentration : 30.9 gm/dL  Auto Neutrophil # : x  Auto Lymphocyte # : x  Auto Monocyte # : x  Auto Eosinophil # : x  Auto Basophil # : x  Auto Neutrophil % : x  Auto Lymphocyte % : x  Auto Monocyte % : x  Auto Eosinophil % : x  Auto Basophil % : x    11-28    132<L>  |  96  |  57<H>  ----------------------------<  150<H>  5.2   |  24  |  8.12<H>    Ca    8.1<L>      28 Nov 2020 07:37  Phos  10.4     11-28  Mg     2.8     11-28    TPro  5.8<L>  /  Alb  2.2<L>  /  TBili  0.9  /  DBili  x   /  AST  180<H>  /  ALT  203<H>  /  AlkPhos  130<H>  11-28    PTT - ( 28 Nov 2020 07:37 )  PTT:48.2 sec        RADIOLOGY & ADDITIONAL STUDIES REVIEWED:  ***    [ ]GOALS OF CARE DISCUSSION WITH PATIENT/FAMILY/PROXY:    CRITICAL CARE TIME SPENT: 35 minutes

## 2020-11-29 NOTE — PROGRESS NOTE ADULT - SUBJECTIVE AND OBJECTIVE BOX
Chandan Awad MD (Nephrology progress note)  [address], [facility],  [address],  [facility]  TEl: [phone]  Cell: [phone]    Patient is a 50y Male seen and evaluated at bedside. Vital signs, laboratory data, imaging studies, consult notes reviewed done within past 24 hours. Overnight events noted. Patient remains critically ill on ventilatory support on IV pressors. Interval HD on 11/28 with 2.9 L fluid removal.    Allergies    No Known Allergies    Intolerances        ROS:  Limited. Sedated and intubated on ventilatory support    VITALS:    T(C): 37.6 (11-29-20 @ 04:00), Max: 37.8 (11-29-20 @ 00:00)  HR: 113 (11-29-20 @ 10:45) (70 - 113)  BP: 135/67 (11-29-20 @ 10:00) (106/50 - 148/77)  RR: 25 (11-29-20 @ 10:45) (13 - 35)  SpO2: 94% (11-29-20 @ 10:45) (91% - 99%)  CAPILLARY BLOOD GLUCOSE      POCT Blood Glucose.: 187 mg/dL (29 Nov 2020 05:05)  POCT Blood Glucose.: 131 mg/dL (29 Nov 2020 00:17)  POCT Blood Glucose.: 144 mg/dL (28 Nov 2020 16:52)      11-28-20 @ 07:01  -  11-29-20 @ 07:00  --------------------------------------------------------  IN: 6050.7 mL / OUT: 3515 mL / NET: 2535.6 mL    11-29-20 @ 07:01  -  11-29-20 @ 10:58  --------------------------------------------------------  IN: 493.8 mL / OUT: 5 mL / NET: 488.8 mL      MEDICATIONS  (STANDING):  azithromycin  IVPB      azithromycin  IVPB 500 milliGRAM(s) IV Intermittent every 24 hours  calcium acetate 1334 milliGRAM(s) Oral three times a day with meals  cefepime   IVPB 1000 milliGRAM(s) IV Intermittent every 12 hours  cefepime   IVPB      chlorhexidine 0.12% Liquid 15 milliLiter(s) Oral Mucosa two times a day  chlorhexidine 2% Cloths 1 Application(s) Topical <User Schedule>  dexAMETHasone  Injectable 6 milliGRAM(s) IV Push daily  dextrose 5% 1000 milliLiter(s) (100 mL/Hr) IV Continuous <Continuous>  digoxin     Tablet 0.125 milliGRAM(s) Oral every other day  fentaNYL   Infusion 1 MICROgram(s)/kG/Hr (12.5 mL/Hr) IV Continuous <Continuous>  glucagon  Injectable 1 milliGRAM(s) IntraMuscular once  heparin  Infusion.  Unit(s)/Hr (22 mL/Hr) IV Continuous <Continuous>  insulin lispro (ADMELOG) corrective regimen sliding scale   SubCutaneous every 6 hours  lubricant eye ont 1 Application(s) 1 Application(s) Both EYES two times a day  pantoprazole  Injectable 40 milliGRAM(s) IV Push daily  phenylephrine    Infusion 6 MICROgram(s)/kG/Min (141 mL/Hr) IV Continuous <Continuous>  polyethylene glycol 3350 17 Gram(s) Oral daily  propofol Infusion 50 MICROgram(s)/kG/Min (37.5 mL/Hr) IV Continuous <Continuous>  senna 2 Tablet(s) Oral at bedtime  vancomycin  IVPB      vancomycin  IVPB 1000 milliGRAM(s) IV Intermittent every 24 hours  vasopressin Infusion 0.04 Unit(s)/Min (2.4 mL/Hr) IV Continuous <Continuous>    MEDICATIONS  (PRN):  ALBUTerol    90 MICROgram(s) HFA Inhaler 2 Puff(s) Inhalation every 6 hours PRN Shortness of Breath      PHYSICAL EXAM:  GENERAL: Sedated and intubated on ventilatory support  HEENT: LEONOR, EOMI, neck supple, no JVP, no carotid bruit, oral mucosa moist and pink.  CHEST/LUNG: Bilateral decreased breath sounds, bibasilar rales and rhonchi  HEART: Regular rate and rhythm, ERNESTO II/VI at LPSB, no gallops, no rub   ABDOMEN: Soft, nontender, non distended, bowel sounds present, no palpable organomegaly  : No flank or supra pubic tenderness.  EXTREMITIES: Peripheral pulses are palpable, no pedal edema  Neurology: Sedated and intubated on ventilatory support  SKIN: No rash or skin lesion  Musculoskeletal: Bilateral leg edema      Vascular ACCESS:     LABS:                        8.5    16.19 )-----------( 221      ( 29 Nov 2020 06:34 )             26.8     11-29    133<L>  |  93<L>  |  39<H>  ----------------------------<  126<H>  3.9   |  29  |  6.43<H>    Ca    7.7<L>      29 Nov 2020 06:34  Phos  6.7     11-29  Mg     2.3     11-29    TPro  5.6<L>  /  Alb  2.1<L>  /  TBili  0.9  /  DBili  x   /  AST  176<H>  /  ALT  157<H>  /  AlkPhos  122<H>  11-29      PT/INR - ( 29 Nov 2020 06:34 )   PT: 12.5 sec;   INR: 1.05 ratio         PTT - ( 29 Nov 2020 06:34 )  PTT:47.2 sec          RADIOLOGY & ADDITIONAL STUDIES:  r< from: Xray Chest 1 View-PORTABLE IMMEDIATE (Xray Chest 1 View-PORTABLE IMMEDIATE .) (11.28.20 @ 04:42) >    EXAM:  XR CHEST PORTABLE IMMED 1V                            PROCEDURE DATE:  11/28/2020          INTERPRETATION:  AP chest with comparison to 11/27/2020.    Clinical indications: Hypoxia.    IMPRESSION: There is unchanged extensive bilateral airspace disease. The tubes and lines are unchanged in appearance. The heart size cannot be well evaluated.            SOPHIA APARICIO MD; Attending Radiologist  This document has been electronically signed. Nov 28 2020  9:51AM    < end of copied text >    Imaging Personally Reviewed:  [x] YES  [ ] NO    Consultant(s) Notes Reviewed:  [x] YES  [ ] NO    Care Discussed with Primary team/ Other Providers [x] YES  [ ] NO

## 2020-11-30 NOTE — CONSULT NOTE ADULT - PROBLEM SELECTOR RECOMMENDATION 4
2/2 critical disease; dx per dietary. Patient with albumin 2.3, + anasarca. High risk of skin failure. Continues TF via OG.
Patient with ARDS/acute hypoxic respiratory failure on ventilatoru support  Continue ventilatory support  Monitor respiratory status  Monitor vital signs/respiratory status  Ventilatory management per ICU team.

## 2020-11-30 NOTE — CONSULT NOTE ADULT - PROBLEM SELECTOR RECOMMENDATION 6
Patient with NSTEMi/Demand ischemia from severe septic shock  Continue antiplatelet agents and AC per primary/Cardiology team  Supportive care  Advanced directives per family
See GOC section above. Patient's wife/Naima ([phone]) is primary surrogate. MOLST on file with DNR.

## 2020-11-30 NOTE — CONSULT NOTE ADULT - CONVERSATION DETAILS
11/30/20: Utilized  ID#[id number]. Spoke with patient's wife/Naima and daughter-in-law/Roseanne on the phone; PC SW and ICU resident present. Wife requesting daughter-in-law to act as  and terminator  services. Again called wife and daughter-in-law. Discussed status; reviewed medications, labs, diagnostics. Family acknowledges patient in multi-organ failure with high risk of mortality. DNR status confirmed.  services offered and accepted. All questions answered; supportive counseling provided.

## 2020-11-30 NOTE — CONSULT NOTE ADULT - SUBJECTIVE AND OBJECTIVE BOX
HPI:  Pt is a 49 yr old male with no PMH from home lives with wife who presented with shortness of breath on 11/15. Pt stated that we has been sick for a week with shortness of breath. He was diagnosed with covid 3 days ago and has been taking antibiotics and aspirin. His breathing worsened and he presented to ED.  Pt was noted to be hypoxic in ED and was started on non-rebreather. Pt denied smoking and occasional alcohol intake. Per EMS, Pt was hypoxic to 50's and saturation improved on non-rebreather.    BRIEF HOSPITAL COURSE: Pt was hypoxic in ED, was placed on NRB with saturation in 80's. Pt admitted to ICU for acute hypoxic resp failure requiring high flow nasal canula. Patient was intubated 11/19, started on HD, continues 1 pressor. Patient's wife made patient DNR 11/29.     PAST MEDICAL & SURGICAL HISTORY:  No pertinent past medical history  No significant past surgical history    SOCIAL HISTORY:    Admitted from:  home,   Substance abuse history: none           Tobacco hx:  none                Alcohol hx: social             Home Opioid hx: none  Congregational:  [employer]                                 Preferred Language:  Icelandic    Surrogate/HCP/Guardian:  Naima Gillette (wife): [phone]    FAMILY HISTORY: Unable to obtain 2/2 mental status    Baseline ADLs (prior to admission): Unable to obtain 2/2 mental status    No Known Allergies    Present Symptoms:   Review of Systems: : Unable to obtain 2/2 mental status    MEDICATIONS  (STANDING):  azithromycin  IVPB      azithromycin  IVPB 500 milliGRAM(s) IV Intermittent every 24 hours  calcium acetate 1334 milliGRAM(s) Oral three times a day with meals  cefepime   IVPB 1000 milliGRAM(s) IV Intermittent every 12 hours  cefepime   IVPB      chlorhexidine 0.12% Liquid 15 milliLiter(s) Oral Mucosa two times a day  chlorhexidine 2% Cloths 1 Application(s) Topical <User Schedule>  dexAMETHasone  Injectable 6 milliGRAM(s) IV Push daily  digoxin     Tablet 0.125 milliGRAM(s) Oral every other day  fentaNYL   Infusion 1 MICROgram(s)/kG/Hr (12.5 mL/Hr) IV Continuous <Continuous>  glucagon  Injectable 1 milliGRAM(s) IntraMuscular once  insulin lispro (ADMELOG) corrective regimen sliding scale   SubCutaneous every 6 hours  lubricant eye ont 1 Application(s) 1 Application(s) Both EYES two times a day  pantoprazole  Injectable 40 milliGRAM(s) IV Push daily  polyethylene glycol 3350 17 Gram(s) Oral daily  propofol Infusion 50 MICROgram(s)/kG/Min (37.5 mL/Hr) IV Continuous <Continuous>  senna 2 Tablet(s) Oral at bedtime    MEDICATIONS  (PRN):  ALBUTerol    90 MICROgram(s) HFA Inhaler 2 Puff(s) Inhalation every 6 hours PRN Shortness of Breath      PHYSICAL EXAM:    Vital Signs Last 24 Hrs  T(C): 36.4 (30 Nov 2020 04:15), Max: 37.5 (29 Nov 2020 12:30)  T(F): 97.6 (30 Nov 2020 04:15), Max: 99.5 (29 Nov 2020 12:30)  HR: 98 (30 Nov 2020 11:23) (84 - 118)  BP: 118/66 (30 Nov 2020 10:00) (78/38 - 148/74)  BP(mean): 78 (30 Nov 2020 10:00) (47 - 91)  RR: 22 (30 Nov 2020 10:15) (18 - 44)  SpO2: 90% (30 Nov 2020 11:23) (84% - 95%)    General: Patient not medically stable for full physical exam. Patient remains sedated/intubated, on pressor. No signs of distress.   Karnofsky Performance Score/Palliative Performance Status Version2:     10%    HEENT:   ET tube   Lungs: comfortable, on ventilator. Continues fentanyl, propofol  CV:    tachycardia, on pressor  GI:   incontinent, OG, + morbid obestity  :   cruz, on HD  Musculoskeletal: patient sedated/intubated, dependent on ADLs  Skin: +anasarca, R nostril DTI  Neuro: patient sedated/intubated, dependent on ADLs  Oral intake ability: unable/only mouth care    Diet: NPO; TF via OG    LABS:                        8.2    18.86 )-----------( 221      ( 30 Nov 2020 07:21 )             26.7     11-30    131<L>  |  90<L>  |  52<H>  ----------------------------<  148<H>  4.0   |  30  |  7.59<H>    Ca    7.8<L>      30 Nov 2020 07:21  Phos  8.1     11-30  Mg     2.3     11-30    TPro  6.0  /  Alb  2.3<L>  /  TBili  0.9  /  DBili  x   /  AST  149<H>  /  ALT  148<H>  /  AlkPhos  143<H>  11-30        RADIOLOGY & ADDITIONAL STUDIES:  < from: Xray Chest 1 View- PORTABLE-Routine (Xray Chest 1 View- PORTABLE-Routine in AM.) (11.30.20 @ 08:05) >  EXAM:  XR CHEST PORTABLE ROUTINE 1V                        PROCEDURE DATE:  11/30/2020    INTERPRETATION:  CLINICAL STATEMENT: Follow-up chest pain.  TECHNIQUE: AP view of the chest.  COMPARISON: 11/29/2020  FINDINGS/  IMPRESSION:  Right central line, ET tube and feeding tube again noted. No pneumothorax.  Increased interstitial lung markings without significant change. Superimposed mild opacities right lung. Small right pleural effusion.  Heart size cannot be accurately assessed in this projection.  < end of copied text >      ADVANCE DIRECTIVES: MOLST: DNR HPI:  Pt is a 49 yr old male with no PMH from home lives with wife who presented with shortness of breath on 11/15. Pt stated that we has been sick for a week with shortness of breath. He was diagnosed with covid 3 days ago and has been taking antibiotics and aspirin. His breathing worsened and he presented to ED.  Pt was noted to be hypoxic in ED and was started on non-rebreather. Pt denied smoking and occasional alcohol intake. Per EMS, Pt was hypoxic to 50's and saturation improved on non-rebreather.    BRIEF HOSPITAL COURSE: Pt was hypoxic in ED, was placed on NRB with saturation in 80's. Pt admitted to ICU for acute hypoxic resp failure requiring high flow nasal canula. Patient was intubated 11/19, started on HD, continues 1 pressor. Patient's wife made patient DNR 11/29.     PAST MEDICAL & SURGICAL HISTORY:  No pertinent past medical history  No significant past surgical history    SOCIAL HISTORY:    Admitted from:  home,   Substance abuse history: none           Tobacco hx:  none                Alcohol hx: social             Home Opioid hx: none  Taoism:  Hoahaoism                             Preferred Language:  Bulgarian    Surrogate/HCP/Guardian:  Naima Gillette (wife): [phone]    FAMILY HISTORY: Unable to obtain 2/2 mental status    Baseline ADLs (prior to admission): Unable to obtain 2/2 mental status    No Known Allergies    Present Symptoms:   Review of Systems: : Unable to obtain 2/2 mental status    MEDICATIONS  (STANDING):  azithromycin  IVPB      azithromycin  IVPB 500 milliGRAM(s) IV Intermittent every 24 hours  calcium acetate 1334 milliGRAM(s) Oral three times a day with meals  cefepime   IVPB 1000 milliGRAM(s) IV Intermittent every 12 hours  cefepime   IVPB      chlorhexidine 0.12% Liquid 15 milliLiter(s) Oral Mucosa two times a day  chlorhexidine 2% Cloths 1 Application(s) Topical <User Schedule>  dexAMETHasone  Injectable 6 milliGRAM(s) IV Push daily  digoxin     Tablet 0.125 milliGRAM(s) Oral every other day  fentaNYL   Infusion 1 MICROgram(s)/kG/Hr (12.5 mL/Hr) IV Continuous <Continuous>  glucagon  Injectable 1 milliGRAM(s) IntraMuscular once  insulin lispro (ADMELOG) corrective regimen sliding scale   SubCutaneous every 6 hours  lubricant eye ont 1 Application(s) 1 Application(s) Both EYES two times a day  pantoprazole  Injectable 40 milliGRAM(s) IV Push daily  polyethylene glycol 3350 17 Gram(s) Oral daily  propofol Infusion 50 MICROgram(s)/kG/Min (37.5 mL/Hr) IV Continuous <Continuous>  senna 2 Tablet(s) Oral at bedtime    MEDICATIONS  (PRN):  ALBUTerol    90 MICROgram(s) HFA Inhaler 2 Puff(s) Inhalation every 6 hours PRN Shortness of Breath    PHYSICAL EXAM:    Vital Signs Last 24 Hrs  T(C): 36.4 (30 Nov 2020 04:15), Max: 37.5 (29 Nov 2020 12:30)  T(F): 97.6 (30 Nov 2020 04:15), Max: 99.5 (29 Nov 2020 12:30)  HR: 98 (30 Nov 2020 11:23) (84 - 118)  BP: 118/66 (30 Nov 2020 10:00) (78/38 - 148/74)  BP(mean): 78 (30 Nov 2020 10:00) (47 - 91)  RR: 22 (30 Nov 2020 10:15) (18 - 44)  SpO2: 90% (30 Nov 2020 11:23) (84% - 95%)    General: Patient not medically stable for full physical exam. Patient remains sedated/intubated, on pressor. No signs of distress.   Karnofsky Performance Score/Palliative Performance Status Version2:     10%    HEENT:   ET tube   Lungs: comfortable, on ventilator. Continues fentanyl, propofol  CV:    tachycardia, on pressor  GI:   incontinent, OG, + morbid obestity  :   cruz, on HD  Musculoskeletal: patient sedated/intubated, dependent on ADLs  Skin: +anasarca, R nostril DTI  Neuro: patient sedated/intubated, dependent on ADLs  Oral intake ability: unable/only mouth care    Diet: NPO; TF via OG    LABS:                        8.2    18.86 )-----------( 221      ( 30 Nov 2020 07:21 )             26.7     11-30    131<L>  |  90<L>  |  52<H>  ----------------------------<  148<H>  4.0   |  30  |  7.59<H>    Ca    7.8<L>      30 Nov 2020 07:21  Phos  8.1     11-30  Mg     2.3     11-30    TPro  6.0  /  Alb  2.3<L>  /  TBili  0.9  /  DBili  x   /  AST  149<H>  /  ALT  148<H>  /  AlkPhos  143<H>  11-30    RADIOLOGY & ADDITIONAL STUDIES:  < from: Xray Chest 1 View- PORTABLE-Routine (Xray Chest 1 View- PORTABLE-Routine in AM.) (11.30.20 @ 08:05) >  EXAM:  XR CHEST PORTABLE ROUTINE 1V                        PROCEDURE DATE:  11/30/2020    INTERPRETATION:  CLINICAL STATEMENT: Follow-up chest pain.  TECHNIQUE: AP view of the chest.  COMPARISON: 11/29/2020  FINDINGS/  IMPRESSION:  Right central line, ET tube and feeding tube again noted. No pneumothorax.  Increased interstitial lung markings without significant change. Superimposed mild opacities right lung. Small right pleural effusion.  Heart size cannot be accurately assessed in this projection.  < end of copied text >      ADVANCE DIRECTIVES: MOLST: DNR

## 2020-11-30 NOTE — CONSULT NOTE ADULT - PROBLEM SELECTOR RECOMMENDATION 9
2/2 shock due to ARDS 2/2 COVID-19; involving lungs (patient intubated); heart (on pressor); kidneys (AGUSTO, Cr7.59, last HD 11/28). WBC 18.86. CXR indicates Increased interstitial lung markings without significant change; Superimposed mild opacities right lung; Small right pleural effusion. Patient remains sedated/intubated, on IV abx, + pressor, HD. Wife is surrogate; DNR on file. 2/2 shock due to ARDS 2/2 COVID-19; involving lungs (patient intubated); heart (on pressor); kidneys (AGUSTO, Cr 7.59, on HD, last HD session 11/28). WBC 18.86. CXR indicates Increased interstitial lung markings without significant change; Superimposed mild opacities right lung; Small right pleural effusion. Patient remains sedated/intubated, on IV abx, + pressor, HD. Wife is surrogate; DNR on file.

## 2020-11-30 NOTE — CONSULT NOTE ADULT - ATTENDING COMMENTS
Thank you for the courtesy of a consultation, please contact me for any additional questions
49 y w no signif PMH under ICU care for acute hypoxic resp failure 2/2 COVID 19, ARDS, req intubation. With MOF, on pressor support, started on HD. DNR on file. Palliative will follow for ongoing GOC discussions pending clinical course. Wife is surrogate.

## 2020-11-30 NOTE — PROGRESS NOTE ADULT - SUBJECTIVE AND OBJECTIVE BOX
Chandan Awad MD (Nephrology progress note)  [address], [facility],  [address],  [id number]  TEl: [phone]  Cell: [phone]    Patient is a 50y Male seen and evaluated at bedside. Vital signs, laboratory data, imaging studies, consult notes reviewed done within past 24 hours. Overnight events noted. Patient remains critically ill on ventilatory support and IV pressors. Patient with persitent oligoanuria requiring RRT. Interval last HD on 11/28 with 2.9 L fluid removal.    Allergies    No Known Allergies    Intolerances        ROS:  Sedated and intubated on ventilatory support    VITALS:    T(C): 36.4 (11-30-20 @ 04:15), Max: 37.5 (11-29-20 @ 12:30)  HR: 86 (11-30-20 @ 08:12) (86 - 121)  BP: 110/61 (11-30-20 @ 07:00) (78/38 - 148/74)  RR: 39 (11-30-20 @ 07:00) (18 - 44)  SpO2: 90% (11-30-20 @ 08:12) (77% - 96%)  CAPILLARY BLOOD GLUCOSE      POCT Blood Glucose.: 185 mg/dL (30 Nov 2020 05:11)  POCT Blood Glucose.: 180 mg/dL (30 Nov 2020 00:16)  POCT Blood Glucose.: 140 mg/dL (29 Nov 2020 16:38)  POCT Blood Glucose.: 138 mg/dL (29 Nov 2020 10:54)      11-29-20 @ 07:01  -  11-30-20 @ 07:00  --------------------------------------------------------  IN: 3574.8 mL / OUT: 10 mL / NET: 3564.8 mL      MEDICATIONS  (STANDING):  azithromycin  IVPB      azithromycin  IVPB 500 milliGRAM(s) IV Intermittent every 24 hours  calcium acetate 1334 milliGRAM(s) Oral three times a day with meals  cefepime   IVPB 1000 milliGRAM(s) IV Intermittent every 12 hours  cefepime   IVPB      chlorhexidine 0.12% Liquid 15 milliLiter(s) Oral Mucosa two times a day  chlorhexidine 2% Cloths 1 Application(s) Topical <User Schedule>  dexAMETHasone  Injectable 6 milliGRAM(s) IV Push daily  digoxin     Tablet 0.125 milliGRAM(s) Oral every other day  fentaNYL   Infusion 1 MICROgram(s)/kG/Hr (12.5 mL/Hr) IV Continuous <Continuous>  glucagon  Injectable 1 milliGRAM(s) IntraMuscular once  insulin lispro (ADMELOG) corrective regimen sliding scale   SubCutaneous every 6 hours  lubricant eye ont 1 Application(s) 1 Application(s) Both EYES two times a day  pantoprazole  Injectable 40 milliGRAM(s) IV Push daily  polyethylene glycol 3350 17 Gram(s) Oral daily  propofol Infusion 50 MICROgram(s)/kG/Min (37.5 mL/Hr) IV Continuous <Continuous>  senna 2 Tablet(s) Oral at bedtime    MEDICATIONS  (PRN):  ALBUTerol    90 MICROgram(s) HFA Inhaler 2 Puff(s) Inhalation every 6 hours PRN Shortness of Breath      PHYSICAL EXAM:  GENERAL: Sedated and intubated on ventilatory support  HEENT: LEONOR, EOMI, neck supple, no JVP, no carotid bruit, oral mucosa moist and pale, ETT in place  CHEST/LUNG: Bilateral decreased breath sounds, bibasilar rales and crepitations, no wheezing  HEART: Regular rate and rhythm, ERNESTO II/VI at LPSB, no gallops, no rub   ABDOMEN: Soft, nontender, non distended, bowel sounds present, no palpable organomegaly  : No flank or supra pubic tenderness.  EXTREMITIES: Peripheral pulses are palpable, bilaeral leg edema+nt  Neurology: Sedated and intubated on ventilatory supprort  SKIN: No rash or skin lesion  Musculoskeletal: No joint deformity       LABS:                        8.2    18.86 )-----------( 221      ( 30 Nov 2020 07:21 )             26.7     11-30    131<L>  |  90<L>  |  52<H>  ----------------------------<  148<H>  4.0   |  30  |  7.59<H>    Ca    7.8<L>      30 Nov 2020 07:21  Phos  8.1     11-30  Mg     2.3     11-30    TPro  6.0  /  Alb  2.3<L>  /  TBili  0.9  /  DBili  x   /  AST  149<H>  /  ALT  148<H>  /  AlkPhos  143<H>  11-30      PT/INR - ( 29 Nov 2020 06:34 )   PT: 12.5 sec;   INR: 1.05 ratio         PTT - ( 29 Nov 2020 22:57 )  PTT:83.2 sec          RADIOLOGY & ADDITIONAL STUDIES:  ra< from: Xray Chest 1 View- PORTABLE-Routine (Xray Chest 1 View- PORTABLE-Routine in AM.) (11.30.20 @ 08:05) >    EXAM:  XR CHEST PORTABLE ROUTINE 1V                            PROCEDURE DATE:  11/30/2020          INTERPRETATION:  CLINICAL STATEMENT: Follow-up chest pain.    TECHNIQUE: AP view of the chest.    COMPARISON: 11/29/2020    FINDINGS/  IMPRESSION:  Right central line, ET tube and feeding tube again noted. No pneumothorax.    Increased interstitial lung markings without significant change. Superimposed mild opacities right lung. Small right pleural effusion.    Heart size cannot be accurately assessed in this projection.              GAMALIEL GEORGE MD; Attending Radiologist  This document has been electronically signed. Nov 30 2020  9:17AM    < end of copied text >    Imaging Personally Reviewed:  [x] YES  [ ] NO    Consultant(s) Notes Reviewed:  [x] YES  [ ] NO    Care Discussed with Primary team/ Other Providers [x] YES  [ ] NO

## 2020-11-30 NOTE — PROGRESS NOTE ADULT - ASSESSMENT
ASSESSMENT AND PLAN: 49M without PMH from home with wife admitted to ICU for of AHRF 2/2 COVID PNA.    Neuro: #sedation  -sedated with propofol, fentanyl  -d/jos paralytics     CVS: #hypotension and tachycardia  -SBPs improved 90-100s  - off pressors   -s/p dig loading, start 0.125mg dig every other day (renal dosing)  -EKG with sinus tach->AF  -bedside Echo without global dysfunction  - Will get digoxin Levels and adjust dose  -A-line monitoring    -#troponemia  -9->8.2->8.3 11/21  -cardio Dr. Tsang following, not NSTEMI, likely from leaking from general inflammation, do not need asa and Plavix    Pulmonary: #AHRF 2/2 COVID PNA  -ETT placed 11/19, sedated and paralyzed as above  -33/28/100/8 +NO 7.24/64/70/93   will fu metHb level  -supinated since 3pm 11/22  -ESR, procal, ferritin increasing, ddimer, LDH, CRP decreasing, will trend daily  -dexamethasone IV 6mg Qd (10 day course through 11/24)  -worsening infiltrates noted on CXR 11/30, no recent changes  -IgA positive, IgG negative, indicating new infection, Dr. Amato approved remdesivir (11/17) but held from 11/21am given worsening renal function with CrCl <45  - NO     ID:   -history and management as above  -changed LIJ to femoral line, removed LIJ 11/25  -WBC 18->11->13.2->15.6->17.8  -RVP +COVID, procal increased to 3.13 as noted above, started cefepime 11/21, added vanc 11/24, added azithromycin given worsening CXR noted above 11/25  -BCx NGF  - Currently on, azithromycin and cefepime , f/u Vancomycin Trough and restart vancomycin as needed  - f/u VT       Nephro: #AGUSTO  -CrCl 44, Cr 1.1 on admission, acutely worsened 11/21, possibly 2/2 covid injury and/or low BP, this am 6.41  -RIJ HD placed 11/21 and HD started 11/21, 11/22, 11/24, 11/25,11/26, 10/28 will hold off today  -fu urine lytes if able to obtain (scant urine at present)  -continue phoslo, s/p HCO3 gtt  - hemodialysis tday on 11/30, monitor post dialysis BMP    GI: #TF  -OGT in place  -TF with Nepro at 10cc/hr  -monitor LFTs AST/ALT acute elevation this week, today slightly improved to 248/243, likely 2/2 COVID, hep Dr. Sabillon consulted  -will trend BID  -hold off mucomyst for now as unlikely to be related to acetaminophen toxicity  -Protonix IV for PPx  -f/u TG level    Heme: #thrombocytopenia  -resolved    Endocrine: #BG elevated  -A1c 6, average glucose 126  -TSH wnl  -vit D moderately low to 22, c/w 1000U/day   -FS q6 for TF  -HSS    Skin/Catheters:   #LIJ placed 11/19-25  #right femoral line 11/25  #RIJ HD catheter 11/21  #left radial artery 11/19  #FC (pressure)  #eye drops    Prophylaxis:  -patient

## 2020-11-30 NOTE — PROGRESS NOTE ADULT - ATTENDING COMMENTS
49 yr  old obese male with out any significant medical history  who presented with sob due to acute hypoxic respiratory failure due to covid pneumonia.    Assessment:  1. Acute Hypoxic respiratory Failure / ARDS  2. COVID-19 PNA  3. Morbid Obesity   4. Elevated lactate  5. Hypotension - sedation related  6. Type 2 MI - likely due to hypoxia  7. Acute kidney injury     Plan  - intubated 11/19 for worsening hypoxia and worsening ARDS  - vent management with lung protective strategy   -peak pressures are high  -started on  NO  -monitor blood gas  -start dig for afib  - keep o2 sat >90%  - Proning 16 hours a day  -change lines  -d/c bicarb gtt  -dig and vanco level  - Sedation and paralytics for vent synchrony  - Mechanical ventilation on PC for now, adjusted to ABG  - spike fever, TLC change , continue antibx  - Nephrology for HD   - Holding remdesivir due to renal failure  - Monitor renal and hepatic function  - Vasopressor support to maintain MAP > 65  - DVT/ GI prophy  - Trickle tube feeds  - Add bowel regimen  - isolation : contact and airborne  - Prognosis is guarded .   - Pain control .

## 2020-11-30 NOTE — CONSULT NOTE ADULT - PROBLEM SELECTOR RECOMMENDATION 5
2/2 multi-organ failure/ARDS/shock due to covid-19. Patient now sedated/intubated, on IV abx, + pressor. Last received HD 11/28.
Patient with COVID-19 with ARDS/NSTEMi/Respiratory failure  Plan per primary/ID team

## 2020-11-30 NOTE — PROGRESS NOTE ADULT - PROBLEM SELECTOR PLAN 6
Patient with COVID-19 with ARDS/Respiratory failure  Continue ventilatory support and IV pressors  Plan per primary/ID team.

## 2020-11-30 NOTE — PROGRESS NOTE ADULT - PROBLEM SELECTOR PLAN 1
Oligoanuric AGUSTO likely ATN from septic shock/ARDS requiring RRT/HD  - S/p HD treatment on 11/28 with net 2.9  L fluid removal  - Avoid Nephrotoxic agents  - Monitor BMP and electrolytes  - Maintain MAP>65-70 mm hG  - Adjust antibiotics as per renal dose clearances  - Volume status and electrolytes noted.  - HD treatment as per ordered.  - Monitor Digoxin level

## 2020-11-30 NOTE — PROGRESS NOTE ADULT - PROBLEM SELECTOR PLAN 2
Patient with hyperkalemia due to ATN/renal failure now with improvement  K level 4.0  Continue RRt/HD   Renal feeding

## 2020-11-30 NOTE — CONSULT NOTE ADULT - PROBLEM SELECTOR RECOMMENDATION 3
2/2 ARDS due to COVID-19; comorbid multi-organ failure involving lungs, heart, kidneys. WBC 18.86. CXR indicates Increased interstitial lung markings without significant change; Superimposed mild opacities right lung; Small right pleural effusion. Patient remains sedated/intubated, on IV abx, + pressor, HD. Wife is surrogate; DNR on file.
Patient with severe sepsis/ARDS from COVID-19 pneumonia on ventilatory support and IV pressors  Maintain MAP>65-70 mm hg  Optimal treatment of COVID-19/sepsis per primary ICU/pulmonary/ID team  Monitor vital signs  Adjust antibiotics as per renal dose clearance for Cr cl<10 ml/min

## 2020-11-30 NOTE — PROGRESS NOTE ADULT - PROBLEM SELECTOR PLAN 3
Patient with Calcium 7.8 with albumin 2.1, Phos  8.1, Mag 2.3  Continue  Phoslo 1331 mg po tid  Monitor BMP, calcium, mag, phos level

## 2020-11-30 NOTE — CONSULT NOTE ADULT - NSHPATTENDINGPLANDISCUSS_GEN_ALL_CORE
ICU team residents and attending Dr. Clark
Dr. Tafoya, ICU team, Dr. Awad, Dr. Tsang
Patient/primary team/Family

## 2020-11-30 NOTE — CONSULT NOTE ADULT - PROBLEM SELECTOR RECOMMENDATION 2
2/2 COVID-19; comorbid shock/multi-organ failure involving lungs, heart, kidneys. WBC 18.86. CXR indicates Increased interstitial lung markings without significant change; Superimposed mild opacities right lung; Small right pleural effusion. Patient remains sedated/intubated, on IV abx, + pressor, HD. Wife is surrogate; DNR on file.
Patient with hyperkalemia with K level 5.7 with oligoanuric AGUSTO from ATN/septic shock/ARDs  EKG  Treatment of hyperkalemia with IV Insulin, glucose and Calcium gluconate  Will initiate RRT/HD if no urine output after Lasix challange

## 2020-12-01 NOTE — CHART NOTE - NSCHARTNOTEFT_GEN_A_CORE
Right femoral CVC removed 12/1 at 8:30pm. Patient in Trendelenburg position. Pressure held for 10-15 minutes, no bleeding noted.

## 2020-12-01 NOTE — PROGRESS NOTE ADULT - PROBLEM SELECTOR PLAN 1
Oligoanuric AGUSTO likely ATN from septic shock/ARDS requiring RRT/HD  - S/p HD treatment on 11/30 with net 2.8  L fluid removal  - Avoid Nephrotoxic agents  - Monitor BMP and electrolytes  - Maintain MAP>65-70 mm hG  - Adjust antibiotics as per renal dose clearances  - Volume status and electrolytes noted.  - Additional HD treatment as per ordered.  - Monitor Digoxin level

## 2020-12-01 NOTE — PROGRESS NOTE ADULT - ATTENDING COMMENTS
49 yr  old obese male with out any significant medical history  who presented with sob due to acute hypoxic respiratory failure due to covid pneumonia.    Assessment:  1. Acute Hypoxic respiratory Failure / ARDS  2. COVID-19 PNA  3. Morbid Obesity   4. Elevated lactate  5. Hypotension - sedation related  6. Type 2 MI - likely due to hypoxia  7. Acute kidney injury     Plan  - intubated 11/19 for worsening hypoxia and worsening ARDS  - vent management with lung protective strategy   -peak pressures are high  -off  NO  -propofol held due to high TG  -add versed gtt  -resume hep gtt  -monitor blood gas  -start dig for afib  - keep o2 sat >90%  - Proning 16 hours a day  -change lines  -dig and vanco level  - Mechanical ventilation on PC for now, adjusted to ABG  - spike fever, TLC change , continue antibx  - Nephrology for HD   - Holding remdesivir due to renal failure  - Monitor renal and hepatic function  - Vasopressor support to maintain MAP > 65  - DVT/ GI prophy  - Trickle tube feeds  - Add bowel regimen  - isolation : contact and airborne  - Prognosis is guarded .   - Pain control .

## 2020-12-01 NOTE — PROGRESS NOTE ADULT - ASSESSMENT
ASSESSMENT AND PLAN: 49M without PMH from home with wife admitted to ICU for of AHRF 2/2 COVID PNA.    Neuro: #sedation  -sedated with fentanyl  - Off propofol as TG levels were elevated  -d/jos paralytics     CVS: #hypotension and tachycardia  -SBPs improved 90-100s  - off pressors   -s/p dig loading, start 0.125mg dig every other day (renal dosing)  -EKG with sinus tach->AF  -bedside Echo without global dysfunction  - Digoxin levels low  -A-line monitoring    -#troponemia  -9->8.2->8.3 11/21  -cardio Dr. Tsang following, not NSTEMI, likely from leaking from general inflammation, do not need asa and Plavix    Pulmonary: #AHRF 2/2 COVID PNA  -ETT placed 11/19, sedated and paralyzed as above  -33/28/100/8 +NO 7.24/64/70/93   will fu metHb level  -supinated since 3pm 11/22  -ESR, procal, ferritin increasing, ddimer, LDH, CRP decreasing, will trend daily  -dexamethasone IV 6mg Qd (10 day course through 11/24)  -worsening infiltrates noted on CXR 11/30, no recent changes  -IgA positive, IgG negative, indicating new infection, Dr. Amato approved remdesivir (11/17) but held from 11/21am given worsening renal function with CrCl <45  - NO     ID:   -history and management as above  -changed LIJ to femoral line, removed LIJ 11/25  -WBC 18->11->13.2->15.6->17.8  -RVP +COVID, procal increased to 3.13 as noted above, started cefepime 11/21, added vanc 11/24, added azithromycin given worsening CXR noted above 11/25  -BCx NGF  - Currently on, azithromycin and cefepime , f/u Vancomycin Trough and restart vancomycin as needed  - f/u VT       Nephro: #AGUSTO  -CrCl 44, Cr 1.1 on admission, acutely worsened 11/21, possibly 2/2 covid injury and/or low BP, this am 6.41  -RIJ HD placed 11/21 and HD started 11/21, 11/22, 11/24, 11/25,11/26,  -continue phoslo, off bicarb drip  - hemodialysis on 11/30,    GI: #TF  -OGT in place  -TF with Nepro at 10cc/hr  -monitor LFTs AST/ALT acute elevation this week, today slightly improved to 248/243, likely 2/2 COVID, hep Dr. Sabillon consulted  -will trend BID  -hold off mucomyst for now as unlikely to be related to acetaminophen toxicity  -Protonix IV for PPx  -f/u TG level    Heme: #thrombocytopenia  -resolved    Endocrine: #BG elevated  -A1c 6, average glucose 126  -TSH wnl  -vit D moderately low to 22, c/w 1000U/day   -FS q6 for TF  -HSS    Skin/Catheters:   #LIJ placed 11/19-25  #right femoral line 11/25  #RIJ HD catheter 11/21  #left radial artery 11/19  #FC (pressure)  #eye drops  - Plan to place LIJ today     Prophylaxis:  -patient is on heparin SQ , Protonix for GI ppx ASSESSMENT AND PLAN: 49M without PMH from home with wife admitted to ICU for of Acute Respiratory failure 2/2 COVID PNA. Patient is currently in multi organ failure and on Dialysis. Requiring pressor support    Acute respiratory failure secondary to Covid pneumonia  Septic Shock and Paroxysmal Atrial fibrillation    Neuro: #sedation  -sedated with fentanyl  - Off propofol as TG levels were elevated  - Will Add Versed to help with Ventricular synchrony  - d/jos paralytics     CVS: #Septic Shock and Paroxysmal Atrial fibrillation  -SBPs improved 90-100s  - patient is currently on Levophed,   -s/p dig loading, start 0.125mg dig every other day (renal dosing)  -EKG with sinus tach->AF  -bedside Echo without global dysfunction  - Digoxin levels in range ,will continue current regimen   - A-line monitoring, will likely change it today    -#troponemia  -9->8.2->8.3 11/21 trended down   -cardio Dr. Tsang saw patient before, Currently he does not need Aspirin and Plavix,   - Will Consult him PRN in case patient's condition changes    Pulmonary: #Acute respiratory failure 2/2 COVID PNA  -ETT placed 11/19, sedated as above  - 33/33/100/8, ABG 7.14/84/68/27  - NO discontinued.  - supinated since 3pm 11/22  - ESR, procal, ferritin decreasing, d-dimer, LDH, CRP decreasing, will trend every 3rd day  - Dexamethasone IV 6mg Qd (10 day course through 11/24)  - worsening infiltrates noted on CXR 11/30, no recent changes  -IgA positive, IgG negative, indicating new infection, No Remdesivir given      ID:   -history and management as above  -changed LIJ to femoral line, removed LIJ 11/25  -WBC 18->11->13.2->15.6->17.8  -RVP +COVID, procal increased to 3.13 as noted above, started cefepime 11/21, added vanc 11/24, added azithromycin given worsening CXR noted above 11/25  -BCx NGF  - Currently on, azithromycin and cefepime , f/u Vancomycin Trough and restart vancomycin as needed  - VT low, Will give Vancomycin after dialysis   - Add Cefepime and will consult Dr Marshall for approval       Nephro: #AGUSTO due to ATN secondary to likely Covid pneumonia   -CrCl 44, Cr 1.1 on admission, acutely worsened 11/21, possibly 2/2 covid injury and/or low BP, this am 6.41  -RIJ HD placed 11/21 and HD started 11/21, 11/22, 11/24, 11/25,11/26,  -continue phoslo, off bicarb drip  - hemodialysis on 11/30,    GI: #TF  -OGT in place  -TF with Nepro at 10cc/hr  -monitor LFTs AST/ALT acute elevation this week, today slightly improved to 248/243, likely 2/2 COVID, hep Dr. Sabillon consulted  -will trend BID  -hold off mucomyst for now as unlikely to be related to acetaminophen toxicity  -Protonix IV for PPx  -f/u TG level    Heme: #thrombocytopenia  -resolved    Endocrine: #BG elevated  -A1c 6, average glucose 126  -TSH wnl  -vit D moderately low to 22, c/w 1000U/day   -FS q6 for TF  -HSS    Skin/Catheters:   #LIJ placed 11/19-25  #right femoral line 11/25  #RIJ HD catheter 11/21  #left radial artery 11/19  #FC (pressure)  #eye drops  - Plan to place LIJ today     Prophylaxis:  -patient is on heparin SQ , Protonix for GI ppx ASSESSMENT AND PLAN: 49M without PMH from home with wife admitted to ICU for of Acute Respiratory failure 2/2 COVID PNA. Patient is currently in multi organ failure and on Dialysis. Requiring pressor support    Acute respiratory failure secondary to Covid pneumonia  Septic Shock and Paroxysmal Atrial fibrillation    Neuro: #sedation  -sedated with fentanyl  - Off propofol as TG levels were elevated  - Will Add Versed to help with Ventricular synchrony  - d/jos paralytics     CVS: #Septic Shock and Paroxysmal Atrial fibrillation  -SBPs improved 90-100s  - patient is currently on Levophed,   -s/p dig loading, start 0.125mg dig every other day (renal dosing)  -EKG with sinus tach->AF  -bedside Echo without global dysfunction  - Digoxin levels in range ,will continue current regimen   - A-line monitoring, will likely change it today    -#troponemia  -9->8.2->8.3 11/21 trended down   -cardio Dr. Tsang saw patient before, Currently he does not need Aspirin and Plavix,   - Will Consult him PRN in case patient's condition changes    Pulmonary: #Acute respiratory failure 2/2 COVID PNA  -ETT placed 11/19, sedated as above  - 33/33/100/8, ABG 7.14/84/68/27  - NO discontinued.  - supinated since 3pm 11/22  - ESR, procal, ferritin decreasing, d-dimer, LDH, CRP decreasing, will trend every 3rd day  - Dexamethasone IV 6mg Qd (10 day course through 11/24) , Will slowly taper it down   - worsening infiltrates noted on CXR 11/30, no recent changes  -IgA positive, IgG negative, indicating new infection, No Remdesivir given      ID:   -history and management as above  -changed LIJ to femoral line, removed LIJ 11/25  -WBC 18->11->13.2->15.6->17.8  -RVP +COVID, procal increased to 3.13 as noted above, started cefepime 11/21, added vanc 11/24, added azithromycin given worsening CXR noted above 11/25  -BCx NGF  - Currently on, azithromycin , f/u Vancomycin Trough and restart vancomycin as needed  - VT low, Will give Vancomycin after dialysis   - Add Cefepime and will consult Dr Marshall for approval       Nephro: #AGUSTO due to ATN secondary to likely Covid pneumonia   -CrCl 44, Cr 1.1 on admission, acutely worsened 11/21, possibly 2/2 covid injury and/or low BP, this am 6.41  -RIJ HD placed 11/21 and HD started 11/21, 11/22, 11/24, 11/25,11/26,  -continue phoslo, off bicarb drip  - hemodialysis on 11/30, Likely will get another session today    GI: #TF  -OGT in place  -TF with Nepro at 10cc/hr  -monitor LFTs AST/ALT acute elevation this week, today slightly improved to 248/243, likely 2/2 COVID, hep Dr. Sabillon consulted  -will trend BID  -Protonix IV for PPx  - Senna and Miralax for bowel regimen    Heme: #thrombocytopenia  -resolved    Endocrine: #BG controlled  -A1c 6, average glucose 126  -TSH wnl  -vit D moderately low to 22, c/w 1000U/day   -FS q6 for TF  -HSS    Skin/Catheters:   #LIJ placed 11/19-25  #right femoral line 11/25  #RIJ HD catheter 11/21  #left radial artery 11/19  #FC (pressure)  #eye drops  - Plan to place LIJ today and replace A-Line    Prophylaxis:  -patient is on heparin SQ , Protonix for GI ppx

## 2020-12-01 NOTE — PROGRESS NOTE ADULT - SUBJECTIVE AND OBJECTIVE BOX
Chandan Awad MD (Nephrology progress note)  [address], [facility],  [id number],  [facility]  TEl: [phone]  Cell: [phone]    Patient is a 50y Male seen and evaluated at bedside. Vital signs, laboratory data, imaging studies, consult notes reviewed done within past 24 hours. Overnight events noted. Patient remains critically ill on ventilatory support on IV pressors. Interval HD treatment on 11/30 with 2.8 L fluid removal. Patient still with significant hypoxemia and volume overload and difficult to ventilate at present. Patient still remains oligoanuria at present.    Allergies    No Known Allergies    Intolerances        ROS:  Limited. Sedated and intubated on ventilatory support    VITALS:    T(C): 36.1 (12-01-20 @ 05:00), Max: 37.3 (11-30-20 @ 20:00)  HR: 83 (12-01-20 @ 13:00) (77 - 107)  BP: 105/58 (12-01-20 @ 12:30) (93/50 - 147/76)  RR: 24 (12-01-20 @ 13:00) (18 - 41)  SpO2: 99% (12-01-20 @ 13:00) (82% - 100%)  CAPILLARY BLOOD GLUCOSE      POCT Blood Glucose.: 110 mg/dL (01 Dec 2020 05:01)  POCT Blood Glucose.: 108 mg/dL (01 Dec 2020 02:14)  POCT Blood Glucose.: 119 mg/dL (30 Nov 2020 18:50)  POCT Blood Glucose.: 160 mg/dL (30 Nov 2020 13:25)      11-30-20 @ 07:01  -  12-01-20 @ 07:00  --------------------------------------------------------  IN: 1699.9 mL / OUT: 3489 mL / NET: -1789.1 mL      MEDICATIONS  (STANDING):  azithromycin  IVPB      calcium acetate 1334 milliGRAM(s) Oral three times a day with meals  cefepime   IVPB 1000 milliGRAM(s) IV Intermittent every 24 hours  chlorhexidine 0.12% Liquid 15 milliLiter(s) Oral Mucosa two times a day  chlorhexidine 2% Cloths 1 Application(s) Topical <User Schedule>  dexAMETHasone  Injectable 6 milliGRAM(s) IV Push daily  digoxin     Tablet 0.125 milliGRAM(s) Oral every other day  fentaNYL   Infusion 1 MICROgram(s)/kG/Hr (12.5 mL/Hr) IV Continuous <Continuous>  glucagon  Injectable 1 milliGRAM(s) IntraMuscular once  heparin   Injectable 5000 Unit(s) SubCutaneous every 8 hours  insulin lispro (ADMELOG) corrective regimen sliding scale   SubCutaneous every 6 hours  lubricant eye ont 1 Application(s) 1 Application(s) Both EYES two times a day  midazolam Infusion 0.02 mG/kG/Hr (2.5 mL/Hr) IV Continuous <Continuous>  norepinephrine Infusion 0.05 MICROgram(s)/kG/Min (5.86 mL/Hr) IV Continuous <Continuous>  pantoprazole  Injectable 40 milliGRAM(s) IV Push daily  polyethylene glycol 3350 17 Gram(s) Oral daily  propofol Infusion 50 MICROgram(s)/kG/Min (37.5 mL/Hr) IV Continuous <Continuous>  senna Syrup 8.8 milliLiter(s) Oral at bedtime    MEDICATIONS  (PRN):  ALBUTerol    90 MICROgram(s) HFA Inhaler 2 Puff(s) Inhalation every 6 hours PRN Shortness of Breath      PHYSICAL EXAM:  GENERAL: Sedated and intubated on ventilatory support  HEENT: LEONOR, EOMI, neck supple, no JVP, no carotid bruit, oral mucosa moist and pink.  CHEST/LUNG: Bilateral decreased breath sounds, Bibasilar rales and rhonchi, bilateral crepitations, no wheezing  HEART: Regular rate and rhythm, ERNESTO II/VI at LPSB, no gallops, no rub   ABDOMEN: Soft, nontender, non distended, bowel sounds present, no palpable organomegaly  : No flank or supra pubic tenderness.  EXTREMITIES: Peripheral pulses are palpable, no pedal edema  Neurology: Sedated and intubated on ventilatory support  SKIN: No rash or skin lesion  Musculoskeletal: No joint deformity      Vascular ACCESS:     LABS:                        8.2    18.58 )-----------( 203      ( 01 Dec 2020 04:36 )             26.9     12-01    130<L>  |  93<L>  |  43<H>  ----------------------------<  97  4.1   |  28  |  6.24<H>    Ca    8.2<L>      01 Dec 2020 04:36  Phos  7.3     12-01  Mg     2.3     12-01    TPro  6.2  /  Alb  2.3<L>  /  TBili  0.9  /  DBili  x   /  AST  119<H>  /  ALT  141<H>  /  AlkPhos  160<H>  12-01      PTT - ( 29 Nov 2020 22:57 )  PTT:83.2 sec          RADIOLOGY & ADDITIONAL STUDIES:  < from: Xray Chest 1 View- PORTABLE-Routine (Xray Chest 1 View- PORTABLE-Routine in AM.) (11.30.20 @ 08:05) >    EXAM:  XR CHEST PORTABLE ROUTINE 1V                            PROCEDURE DATE:  11/30/2020          INTERPRETATION:  CLINICAL STATEMENT: Follow-up chest pain.    TECHNIQUE: AP view of the chest.    COMPARISON: 11/29/2020    FINDINGS/  IMPRESSION:  Right central line, ET tube and feeding tube again noted. No pneumothorax.    Increased interstitial lung markings without significant change. Superimposed mild opacities right lung. Small right pleural effusion.    Heart size cannot be accurately assessed in this projection.              GAMALIEL GEORGE MD; Attending Radiologist  This document has been electronically signed. Nov 30 2020  9:17AM    < end of copied text >    Imaging Personally Reviewed:  [x] YES  [ ] NO    Consultant(s) Notes Reviewed:  [x] YES  [ ] NO    Care Discussed with Primary team/ Other Providers [x] YES  [ ] NO

## 2020-12-01 NOTE — PROGRESS NOTE ADULT - SUBJECTIVE AND OBJECTIVE BOX
INTERVAL HPI/OVERNIGHT EVENTS: Dialysis Last night Post dialysis labs acceptable,   Hypoxic to 90's on Ventilator settings so no change in PEEP (18)      PRESSORS: [ x] YES [ ] NO  WHICH: Levophed     ANTIBIOTICS:                  DATE STARTED:  ANTIBIOTICS:                  DATE STARTED:  ANTIBIOTICS:                  DATE STARTED:    Antimicrobial:  azithromycin  IVPB      azithromycin  IVPB 500 milliGRAM(s) IV Intermittent every 24 hours    Cardiovascular:  digoxin     Tablet 0.125 milliGRAM(s) Oral every other day  norepinephrine Infusion 0.05 MICROgram(s)/kG/Min IV Continuous <Continuous>    Pulmonary:  ALBUTerol    90 MICROgram(s) HFA Inhaler 2 Puff(s) Inhalation every 6 hours PRN    Hematalogic:  heparin   Injectable 5000 Unit(s) SubCutaneous every 8 hours    Other:  calcium acetate 1334 milliGRAM(s) Oral three times a day with meals  chlorhexidine 0.12% Liquid 15 milliLiter(s) Oral Mucosa two times a day  chlorhexidine 2% Cloths 1 Application(s) Topical <User Schedule>  dexAMETHasone  Injectable 6 milliGRAM(s) IV Push daily  fentaNYL   Infusion 1 MICROgram(s)/kG/Hr IV Continuous <Continuous>  glucagon  Injectable 1 milliGRAM(s) IntraMuscular once  insulin lispro (ADMELOG) corrective regimen sliding scale   SubCutaneous every 6 hours  lubricant eye ont 1 Application(s) 1 Application(s) Both EYES two times a day  pantoprazole  Injectable 40 milliGRAM(s) IV Push daily  polyethylene glycol 3350 17 Gram(s) Oral daily  propofol Infusion 50 MICROgram(s)/kG/Min IV Continuous <Continuous>  senna Syrup 8.8 milliLiter(s) Oral at bedtime    ALBUTerol    90 MICROgram(s) HFA Inhaler 2 Puff(s) Inhalation every 6 hours PRN  azithromycin  IVPB      azithromycin  IVPB 500 milliGRAM(s) IV Intermittent every 24 hours  calcium acetate 1334 milliGRAM(s) Oral three times a day with meals  chlorhexidine 0.12% Liquid 15 milliLiter(s) Oral Mucosa two times a day  chlorhexidine 2% Cloths 1 Application(s) Topical <User Schedule>  dexAMETHasone  Injectable 6 milliGRAM(s) IV Push daily  digoxin     Tablet 0.125 milliGRAM(s) Oral every other day  fentaNYL   Infusion 1 MICROgram(s)/kG/Hr IV Continuous <Continuous>  glucagon  Injectable 1 milliGRAM(s) IntraMuscular once  heparin   Injectable 5000 Unit(s) SubCutaneous every 8 hours  insulin lispro (ADMELOG) corrective regimen sliding scale   SubCutaneous every 6 hours  lubricant eye ont 1 Application(s) 1 Application(s) Both EYES two times a day  norepinephrine Infusion 0.05 MICROgram(s)/kG/Min IV Continuous <Continuous>  pantoprazole  Injectable 40 milliGRAM(s) IV Push daily  polyethylene glycol 3350 17 Gram(s) Oral daily  propofol Infusion 50 MICROgram(s)/kG/Min IV Continuous <Continuous>  senna Syrup 8.8 milliLiter(s) Oral at bedtime    Drug Dosing Weight  Height (cm): 154.4 (15 Nov 2020 09:25)  Weight (kg): 125 (15 Nov 2020 09:25)  BMI (kg/m2): 52.4 (15 Nov 2020 09:25)  BSA (m2): 2.16 (15 Nov 2020 09:25)    CENTRAL LINE: [ ] YES [ ] NO  LOCATION:   DATE INSERTED:  REMOVE: [ ] YES [ ] NO  EXPLAIN:    MICHELLE: [ ] YES [ ] NO    DATE INSERTED:  REMOVE:  [ ] YES [ ] NO  EXPLAIN:    A-LINE:  [ ] YES [ ] NO  LOCATION:   DATE INSERTED:  REMOVE:  [ ] YES [ ] NO  EXPLAIN:    PMH -reviewed admission note, no change since admission  PAST MEDICAL & SURGICAL HISTORY:  No pertinent past medical history    No significant past surgical history        ICU Vital Signs Last 24 Hrs  T(C): 36.1 (01 Dec 2020 05:00), Max: 37.3 (30 Nov 2020 20:00)  T(F): 97 (01 Dec 2020 05:00), Max: 99.1 (30 Nov 2020 20:00)  HR: 80 (01 Dec 2020 07:45) (79 - 107)  BP: 97/58 (01 Dec 2020 07:00) (93/50 - 147/76)  BP(mean): 66 (01 Dec 2020 07:00) (60 - 93)  ABP: 103/49 (01 Dec 2020 07:45) (74/56 - 163/81)  ABP(mean): 64 (01 Dec 2020 07:45) (56 - 105)  RR: 18 (01 Dec 2020 07:45) (18 - 41)  SpO2: 92% (01 Dec 2020 07:45) (82% - 100%)      ABG - ( 01 Dec 2020 04:24 )  pH, Arterial: 7.14  pH, Blood: x     /  pCO2: 84    /  pO2: 68    / HCO3: 27    / Base Excess: -2.1  /  SaO2: 92                    11-30 @ 07:01  -  12-01 @ 07:00  --------------------------------------------------------  IN: 1699.9 mL / OUT: 3489 mL / NET: -1789.1 mL        Mode: AC/ CMV (Assist Control/ Continuous Mandatory Ventilation)  RR (machine): 33  FiO2: 100  PEEP: 18  ITime: 0.7  MAP: 31  PC: 29  PIP: 48      PHYSICAL EXAM:    GENERAL: [ ]NAD, [ ]well-groomed, [ ]well-developed  HEAD:  [ ]Atraumatic, [ ]Normocephalic  EYES: [ ]EOMI, [ ]PERRLA, [ ]conjunctiva and sclera clear  ENMT: [ ]No tonsillar erythema, exudates, or enlargement; [ ]Moist mucous membranes, [ ]Good dentition, [ ]No lesions  NECK: [ ]Supple, normal appearance, [ ]No JVD; [ ]Normal thyroid; [ ]Trachea midline  NERVOUS SYSTEM:  [ ]Alert & Oriented X3, [ ]Good concentration; [ ]Motor Strength 5/5 B/L upper and lower extremities; [ ]DTRs 2+ intact and symmetric  CHEST/LUNG: [ ]No chest deformity; [ ]Normal percussion bilaterally; [ ]No rales, rhonchi, wheezing   HEART: [ ]Regular rate and rhythm; [ ]No murmurs, rubs, or gallops  ABDOMEN: [ ]Soft, Nontender, Nondistended; [ ]Bowel sounds present  EXTREMITIES:  [ ]2+ Peripheral Pulses, [ ]No clubbing, cyanosis, or edema  LYMPH: [ ]No lymphadenopathy noted  SKIN: [ ]No rashes or lesions; [ ]Good capillary refill      LABS:  CBC Full  -  ( 01 Dec 2020 04:36 )  WBC Count : 18.58 K/uL  RBC Count : 2.75 M/uL  Hemoglobin : 8.2 g/dL  Hematocrit : 26.9 %  Platelet Count - Automated : 203 K/uL  Mean Cell Volume : 97.8 fl  Mean Cell Hemoglobin : 29.8 pg  Mean Cell Hemoglobin Concentration : 30.5 gm/dL  Auto Neutrophil # : x  Auto Lymphocyte # : x  Auto Monocyte # : x  Auto Eosinophil # : x  Auto Basophil # : x  Auto Neutrophil % : x  Auto Lymphocyte % : x  Auto Monocyte % : x  Auto Eosinophil % : x  Auto Basophil % : x    12-01    130<L>  |  93<L>  |  43<H>  ----------------------------<  97  4.1   |  28  |  6.24<H>    Ca    8.2<L>      01 Dec 2020 04:36  Phos  7.3     12-01  Mg     2.3     12-01    TPro  6.2  /  Alb  2.3<L>  /  TBili  0.9  /  DBili  x   /  AST  119<H>  /  ALT  141<H>  /  AlkPhos  160<H>  12-01    PTT - ( 29 Nov 2020 22:57 )  PTT:83.2 sec        RADIOLOGY & ADDITIONAL STUDIES REVIEWED:  ***    [ ]GOALS OF CARE DISCUSSION WITH PATIENT/FAMILY/PROXY:    CRITICAL CARE TIME SPENT: 35 minutes INTERVAL HPI/OVERNIGHT EVENTS: Dialysis Last night Post dialysis labs acceptable,   Hypoxic to 90's on Ventilator settings so no change in PEEP (18)      PRESSORS: [ x] YES [ ] NO  WHICH: Levophed     ANTIBIOTICS:                  DATE STARTED:  ANTIBIOTICS:                  DATE STARTED:  ANTIBIOTICS:                  DATE STARTED:    Antimicrobial:  azithromycin  IVPB      azithromycin  IVPB 500 milliGRAM(s) IV Intermittent every 24 hours    Cardiovascular:  digoxin     Tablet 0.125 milliGRAM(s) Oral every other day  norepinephrine Infusion 0.05 MICROgram(s)/kG/Min IV Continuous <Continuous>    Pulmonary:  ALBUTerol    90 MICROgram(s) HFA Inhaler 2 Puff(s) Inhalation every 6 hours PRN    Hematalogic:  heparin   Injectable 5000 Unit(s) SubCutaneous every 8 hours    Other:  calcium acetate 1334 milliGRAM(s) Oral three times a day with meals  chlorhexidine 0.12% Liquid 15 milliLiter(s) Oral Mucosa two times a day  chlorhexidine 2% Cloths 1 Application(s) Topical <User Schedule>  dexAMETHasone  Injectable 6 milliGRAM(s) IV Push daily  fentaNYL   Infusion 1 MICROgram(s)/kG/Hr IV Continuous <Continuous>  glucagon  Injectable 1 milliGRAM(s) IntraMuscular once  insulin lispro (ADMELOG) corrective regimen sliding scale   SubCutaneous every 6 hours  lubricant eye ont 1 Application(s) 1 Application(s) Both EYES two times a day  pantoprazole  Injectable 40 milliGRAM(s) IV Push daily  polyethylene glycol 3350 17 Gram(s) Oral daily  propofol Infusion 50 MICROgram(s)/kG/Min IV Continuous <Continuous>  senna Syrup 8.8 milliLiter(s) Oral at bedtime    ALBUTerol    90 MICROgram(s) HFA Inhaler 2 Puff(s) Inhalation every 6 hours PRN  azithromycin  IVPB      azithromycin  IVPB 500 milliGRAM(s) IV Intermittent every 24 hours  calcium acetate 1334 milliGRAM(s) Oral three times a day with meals  chlorhexidine 0.12% Liquid 15 milliLiter(s) Oral Mucosa two times a day  chlorhexidine 2% Cloths 1 Application(s) Topical <User Schedule>  dexAMETHasone  Injectable 6 milliGRAM(s) IV Push daily  digoxin     Tablet 0.125 milliGRAM(s) Oral every other day  fentaNYL   Infusion 1 MICROgram(s)/kG/Hr IV Continuous <Continuous>  glucagon  Injectable 1 milliGRAM(s) IntraMuscular once  heparin   Injectable 5000 Unit(s) SubCutaneous every 8 hours  insulin lispro (ADMELOG) corrective regimen sliding scale   SubCutaneous every 6 hours  lubricant eye ont 1 Application(s) 1 Application(s) Both EYES two times a day  norepinephrine Infusion 0.05 MICROgram(s)/kG/Min IV Continuous <Continuous>  pantoprazole  Injectable 40 milliGRAM(s) IV Push daily  polyethylene glycol 3350 17 Gram(s) Oral daily  propofol Infusion 50 MICROgram(s)/kG/Min IV Continuous <Continuous>  senna Syrup 8.8 milliLiter(s) Oral at bedtime    Drug Dosing Weight  Height (cm): 154.4 (15 Nov 2020 09:25)  Weight (kg): 125 (15 Nov 2020 09:25)  BMI (kg/m2): 52.4 (15 Nov 2020 09:25)  BSA (m2): 2.16 (15 Nov 2020 09:25)    CENTRAL LINE: [ x] YES [ ] NO  LOCATION: RF  DATE INSERTED: 11/25  REMOVE: [x ] YES [ ] NO  EXPLAIN: 12/1    MICHELLE: [x ] YES [ ] NO    DATE INSERTED: 11/19  REMOVE:  [ ] YES [ ] NO  EXPLAIN:    A-LINE:  [x ] YES [ ] NO  LOCATION: LR  DATE INSERTED: 11/19  REMOVE:  [ ] YES [ ] NO  EXPLAIN:    PMH -reviewed admission note, no change since admission  PAST MEDICAL & SURGICAL HISTORY:  No pertinent past medical history    No significant past surgical history        ICU Vital Signs Last 24 Hrs  T(C): 36.1 (01 Dec 2020 05:00), Max: 37.3 (30 Nov 2020 20:00)  T(F): 97 (01 Dec 2020 05:00), Max: 99.1 (30 Nov 2020 20:00)  HR: 80 (01 Dec 2020 07:45) (79 - 107)  BP: 97/58 (01 Dec 2020 07:00) (93/50 - 147/76)  BP(mean): 66 (01 Dec 2020 07:00) (60 - 93)  ABP: 103/49 (01 Dec 2020 07:45) (74/56 - 163/81)  ABP(mean): 64 (01 Dec 2020 07:45) (56 - 105)  RR: 18 (01 Dec 2020 07:45) (18 - 41)  SpO2: 92% (01 Dec 2020 07:45) (82% - 100%)      ABG - ( 01 Dec 2020 04:24 )  pH, Arterial: 7.14  pH, Blood: x     /  pCO2: 84    /  pO2: 68    / HCO3: 27    / Base Excess: -2.1  /  SaO2: 92                    11-30 @ 07:01  -  12-01 @ 07:00  --------------------------------------------------------  IN: 1699.9 mL / OUT: 3489 mL / NET: -1789.1 mL        Mode: AC/ CMV (Assist Control/ Continuous Mandatory Ventilation)  RR (machine): 33  FiO2: 100  PEEP: 18  ITime: 0.7  MAP: 31  PC: 29  PIP: 48      PHYSICAL EXAM:    GENERAL: Sedated, intubated  HEAD:  [ x]Atraumatic, [x ]Normocephalic  EYES: [ ]EOMI, [ ]PERRLA, [x ]conjunctiva and sclera clear  ENMT: [ ]No tonsillar erythema, exudates, or enlargement; [ ]Moist mucous membranes, [ ]Good dentition, [ ]No lesions  NECK: [ ]Supple, normal appearance, [x ]No JVD; [ ]Normal thyroid; [x ]Trachea midline  NERVOUS SYSTEM:  [ ]Alert & Oriented X0, [ ]Good concentration; [ ]Motor Strength 5/5 B/L upper and lower extremities; [ ]DTRs 2+ intact and symmetric  CHEST/LUNG: [ ]No chest deformity; [ ]Normal percussion bilaterally; [ ] B/L rales,   HEART: [ x]Regular rate and rhythm; [ ]No murmurs, rubs, or gallops  ABDOMEN: [ ]Soft, Nontender, Nondistended; [ ]Bowel sounds hypoactive  EXTREMITIES:  [ ]1+ Peripheral Pulses, [ ]No clubbing, cyanosis, or edema  LYMPH: [ ]No lymphadenopathy noted  SKIN: [x ]No rashes or lesions; [ ]Good capillary refill      LABS:  CBC Full  -  ( 01 Dec 2020 04:36 )  WBC Count : 18.58 K/uL  RBC Count : 2.75 M/uL  Hemoglobin : 8.2 g/dL  Hematocrit : 26.9 %  Platelet Count - Automated : 203 K/uL  Mean Cell Volume : 97.8 fl  Mean Cell Hemoglobin : 29.8 pg  Mean Cell Hemoglobin Concentration : 30.5 gm/dL  Auto Neutrophil # : x  Auto Lymphocyte # : x  Auto Monocyte # : x  Auto Eosinophil # : x  Auto Basophil # : x  Auto Neutrophil % : x  Auto Lymphocyte % : x  Auto Monocyte % : x  Auto Eosinophil % : x  Auto Basophil % : x    12-01    130<L>  |  93<L>  |  43<H>  ----------------------------<  97  4.1   |  28  |  6.24<H>    Ca    8.2<L>      01 Dec 2020 04:36  Phos  7.3     12-01  Mg     2.3     12-01    TPro  6.2  /  Alb  2.3<L>  /  TBili  0.9  /  DBili  x   /  AST  119<H>  /  ALT  141<H>  /  AlkPhos  160<H>  12-01    PTT - ( 29 Nov 2020 22:57 )  PTT:83.2 sec        RADIOLOGY & ADDITIONAL STUDIES REVIEWED:  Yes    [x ]GOALS OF CARE DISCUSSION WITH PATIENT/FAMILY/PROXY: DNR    CRITICAL CARE TIME SPENT: 35 minutes

## 2020-12-01 NOTE — PROGRESS NOTE ADULT - PROBLEM SELECTOR PLAN 5
Patient with ARDS/acute hypoxic/hypercapnic respiratory failure on ventilatory support with COVID-19 pneumonia/HCAP  Continue ventilatory support  Monitor vital signs/respiratory status  Ventilatory management per ICU team.  Surveillance COVID-19  Additional HD treatment

## 2020-12-01 NOTE — PROGRESS NOTE ADULT - PROBLEM SELECTOR PLAN 3
Patient with Calcium 8.2 with albumin 2.3, Phos  8.1, Mag 2.3  Continue  Phoslo 1331 mg po tid  Monitor BMP, calcium, mag, phos level

## 2020-12-01 NOTE — PROGRESS NOTE ADULT - PROBLEM SELECTOR PLAN 2
Patient with hyperkalemia due to ATN/renal failure now with improvement  K level 4.1  Continue RRt/HD   Renal feeding

## 2020-12-02 NOTE — PROGRESS NOTE ADULT - PROBLEM SELECTOR PLAN 1
2/2 shock due to ARDS 2/2 COVID-19; involving lungs (patient intubated since 11/19); heart (on pressor); kidneys (AGUSTO, Cr 5.42, on HD, last HD session 12/1); liver (Alk Phos 165, AST 88, ). WBC 17.07. CXR indicates Bilateral interstitial and airspace infiltrates, slightly worse than prior.  Patient remains sedated/intubated, on IV abx, + pressor, HD. Wife is surrogate; DNR on file. 2/2 shock due to ARDS 2/2 COVID-19; involving lungs (patient intubated since 11/19); heart (on pressor); kidneys (AGUSTO, Cr 5.42, on HD, last HD session 12/1); liver (Alk Phos 165, AST 88, ). WBC 17.07. CXR indicates Bilateral interstitial and airspace infiltrates, slightly worse than prior. Wife is surrogate; DNR on file.

## 2020-12-02 NOTE — PROGRESS NOTE ADULT - PROBLEM SELECTOR PLAN 1
Oligoanuric AGUSTO likely ATN from septic shock/ARDS requiring RRT/HD  - S/p HD treatment on 12/01 with net 2.9  L fluid removal  - Avoid Nephrotoxic agents  - Monitor BMP and electrolytes  - Maintain MAP>65-70 mm hG  - Adjust antibiotics as per renal dose clearances  - Volume status and electrolytes noted.  - Additional HD treatment for UF only treatment today

## 2020-12-02 NOTE — PROGRESS NOTE ADULT - ASSESSMENT
ASSESSMENT AND PLAN: 49M without PMH from home with wife admitted to ICU for of Acute Respiratory failure 2/2 COVID PNA. Patient is currently in multi organ failure and on Dialysis. Requiring pressor support    Acute respiratory failure secondary to Covid pneumonia  Septic Shock and Paroxysmal Atrial fibrillation    Neuro: #sedation  -sedated with fentanyl  - Off propofol as TG levels were elevated  - Will Add Versed to help with Ventricular synchrony  - d/jos paralytics     CVS: #Septic Shock and Paroxysmal Atrial fibrillation  -SBPs improved 90-100s  - patient is currently on Levophed,   -s/p dig loading, start 0.125mg dig every other day (renal dosing)  -EKG with sinus tach->AF  -bedside Echo without global dysfunction  - Digoxin levels in range ,will continue current regimen   - A-line monitoring, will likely change it today    -#troponemia  -9->8.2->8.3 11/21 trended down   -cardio Dr. Tsang saw patient before, Currently he does not need Aspirin and Plavix,   - Will Consult him PRN in case patient's condition changes    Pulmonary: #Acute respiratory failure 2/2 COVID PNA  -ETT placed 11/19, sedated as above  - 33/33/100/8, ABG 7.14/84/68/27  - NO discontinued.  - supinated since 3pm 11/22  - ESR, procal, ferritin decreasing, d-dimer, LDH, CRP decreasing, will trend every 3rd day  - Dexamethasone IV 6mg Qd (10 day course through 11/24) , Will slowly taper it down   - worsening infiltrates noted on CXR 11/30, no recent changes  -IgA positive, IgG negative, indicating new infection, No Remdesivir given      ID:   -history and management as above  -changed LIJ to femoral line, removed LIJ 11/25  -WBC 18->11->13.2->15.6->17.8  -RVP +COVID, procal increased to 3.13 as noted above, started cefepime 11/21, added vanc 11/24, added azithromycin given worsening CXR noted above 11/25  -BCx NGF  - Currently on, azithromycin , f/u Vancomycin Trough and restart vancomycin as needed  - VT low, Will give Vancomycin after dialysis   - Add Cefepime and will consult Dr Marshall for approval       Nephro: #AGUSTO due to ATN secondary to likely Covid pneumonia   -CrCl 44, Cr 1.1 on admission, acutely worsened 11/21, possibly 2/2 covid injury and/or low BP, this am 6.41  -RIJ HD placed 11/21 and HD started 11/21, 11/22, 11/24, 11/25,11/26,  -continue phoslo, off bicarb drip  - hemodialysis on 11/30, Likely will get another session today    GI: #TF  -OGT in place  -TF with Nepro at 10cc/hr  -monitor LFTs AST/ALT acute elevation this week, today slightly improved to 248/243, likely 2/2 COVID, hep Dr. Sabillon consulted  -will trend BID  -Protonix IV for PPx  - Senna and Miralax for bowel regimen    Heme: #thrombocytopenia  -resolved    Endocrine: #BG controlled  -A1c 6, average glucose 126  -TSH wnl  -vit D moderately low to 22, c/w 1000U/day   -FS q6 for TF  -HSS    Skin/Catheters:   #LIJ placed 11/19-25  #right femoral line 11/25  #RIJ HD catheter 11/21  #left radial artery 11/19  #FC (pressure)  #eye drops  - Plan to place LIJ today and replace A-Line    Prophylaxis:  -patient is on heparin SQ , Protonix for GI ppx ASSESSMENT AND PLAN: 49M without PMH from home with wife admitted to ICU for of Acute Respiratory failure 2/2 COVID PNA. Patient is currently in multi organ failure and on Dialysis. Requiring pressor support    Acute respiratory failure secondary to Covid pneumonia  Septic Shock and Paroxysmal Atrial fibrillation  AGUSTO ,ATN on dialysis      Neuro: #sedation  -sedated with fentanyl  - Off propofol as TG levels were elevated  - Will Add Versed to help with Ventricular synchrony  - d/jos paralytics     CVS: #Septic Shock and Paroxysmal Atrial fibrillation  -SBPs improved 90-100s  - patient is currently on Levophed,   -s/p dig loading, start 0.125mg dig every other day (renal dosing)  -EKG with sinus tach->AF  -bedside Echo without global dysfunction  - Digoxin levels in range ,will continue current regimen   - A-line monitoring,    -#troponemia  -9->8.2->8.3 11/21 trended down   -cardio Dr. Tsang saw patient before, Currently he does not need Aspirin and Plavix,   - Will Consult him PRN in case patient's condition changes    Pulmonary: #Acute respiratory failure 2/2 COVID PNA  -ETT placed 11/19, sedated as above  - 33/33/100/8, ABG 7.14/84/68/27  - NO discontinued.  - supinated since 3pm 11/22  - ESR, procal, ferritin decreasing, d-dimer, LDH, CRP decreasing, will trend every 3rd day  - Dexamethasone IV 6mg Qd (10 day course through 11/24) , Will slowly taper it down   - worsening infiltrates noted on CXR 11/30, no recent changes  -IgA positive, IgG negative, indicating new infection, No Remdesivir given      ID:   -history and management as above  -changed LIJ to femoral line, removed LIJ 11/25  -WBC 18->11->13.2->15.6->17.8  -RVP +COVID, procal increased to 3.13 as noted above, started cefepime 11/21, added vanc 11/24, added azithromycin given worsening CXR noted above 11/25  -BCx NGF  - Currently on, azithromycin , f/u Vancomycin Trough and restart vancomycin as needed  - VT elevated on 12/2, Will hold vanco today   - Add Cefepime and will consult Dr Marshall for approval       Nephro: #AGUSTO due to ATN secondary to likely Covid pneumonia   -CrCl 44, Cr 1.1 on admission, acutely worsened 11/21, possibly 2/2 covid injury and/or low BP, this am 6.41  -RIJ HD placed 11/21 and HD started 11/21, 11/22, 11/24, 11/25,11/26,  -continue phoslo, off bicarb drip  - f/u with nephro for further   - hemodialysis on 12/1 Likely will get another session today    GI: #TF  -OGT in place  -TF with Nepro at 10cc/hr  -monitor LFTs AST/ALT acute elevation this week, today slightly improved to 248/243, likely 2/2 COVID, hep Dr. Sabillon consulted  -will trend BID  -Protonix IV for PPx  - Senna and Miralax for bowel regimen    Heme: #thrombocytopenia  -resolved    Endocrine: #BG controlled  -A1c 6, average glucose 126  -TSH wnl  -vit D moderately low to 22, c/w 1000U/day   -FS q6 for TF  -HSS    Skin/Catheters:   #LIJ placed 12/1  #right femoral line 11/25  #RIJ HD catheter 11/21  #left radial artery 11/19  #FC (pressure)  #eye drops    Prophylaxis:  -patient is on heparin Drip  , Protonix for GI ppx

## 2020-12-02 NOTE — PROGRESS NOTE ADULT - PROBLEM SELECTOR PLAN 3
2/2 ARDS due to COVID-19; comorbid multi-organ failure involving lungs, heart, kidneys. WBC 17.07. CXR indicates Bilateral interstitial and airspace infiltrates, slightly worse than prior. Patient remains sedated/intubated, on IV abx, + pressor, HD. Wife is surrogate; DNR on file.

## 2020-12-02 NOTE — PROGRESS NOTE ADULT - PROBLEM SELECTOR PLAN 5
2/2 multi-organ failure/ARDS/shock due to covid-19. Patient remains sedated/intubated, on IV abx, + pressor. Last received HD 12/1.

## 2020-12-02 NOTE — PROGRESS NOTE ADULT - SUBJECTIVE AND OBJECTIVE BOX
INTERVAL HPI/OVERNIGHT EVENTS: Dialysis on 12/1      PRESSORS: [ ] YES [ ] NO  WHICH:    ANTIBIOTICS:                  DATE STARTED:  ANTIBIOTICS:                  DATE STARTED:  ANTIBIOTICS:                  DATE STARTED:    Antimicrobial:  azithromycin  IVPB      azithromycin  IVPB 500 milliGRAM(s) IV Intermittent every 24 hours  cefepime   IVPB 1000 milliGRAM(s) IV Intermittent every 24 hours    Cardiovascular:  digoxin     Tablet 0.125 milliGRAM(s) Oral every other day  norepinephrine Infusion 0.05 MICROgram(s)/kG/Min IV Continuous <Continuous>    Pulmonary:  ALBUTerol    90 MICROgram(s) HFA Inhaler 2 Puff(s) Inhalation every 6 hours PRN    Hematalogic:  heparin  Infusion.  Unit(s)/Hr IV Continuous <Continuous>    Other:  calcium acetate 1334 milliGRAM(s) Oral three times a day with meals  chlorhexidine 0.12% Liquid 15 milliLiter(s) Oral Mucosa two times a day  chlorhexidine 2% Cloths 1 Application(s) Topical <User Schedule>  dexAMETHasone  Injectable 6 milliGRAM(s) IV Push daily  fentaNYL   Infusion 1 MICROgram(s)/kG/Hr IV Continuous <Continuous>  glucagon  Injectable 1 milliGRAM(s) IntraMuscular once  insulin lispro (ADMELOG) corrective regimen sliding scale   SubCutaneous every 6 hours  lubricant eye ont 1 Application(s) 1 Application(s) Both EYES two times a day  midazolam Infusion 0.02 mG/kG/Hr IV Continuous <Continuous>  pantoprazole  Injectable 40 milliGRAM(s) IV Push daily  polyethylene glycol 3350 17 Gram(s) Oral daily  senna Syrup 8.8 milliLiter(s) Oral at bedtime  sodium chloride 0.9% lock flush 10 milliLiter(s) IV Push every 1 hour PRN    ALBUTerol    90 MICROgram(s) HFA Inhaler 2 Puff(s) Inhalation every 6 hours PRN  azithromycin  IVPB      azithromycin  IVPB 500 milliGRAM(s) IV Intermittent every 24 hours  calcium acetate 1334 milliGRAM(s) Oral three times a day with meals  cefepime   IVPB 1000 milliGRAM(s) IV Intermittent every 24 hours  chlorhexidine 0.12% Liquid 15 milliLiter(s) Oral Mucosa two times a day  chlorhexidine 2% Cloths 1 Application(s) Topical <User Schedule>  dexAMETHasone  Injectable 6 milliGRAM(s) IV Push daily  digoxin     Tablet 0.125 milliGRAM(s) Oral every other day  fentaNYL   Infusion 1 MICROgram(s)/kG/Hr IV Continuous <Continuous>  glucagon  Injectable 1 milliGRAM(s) IntraMuscular once  heparin  Infusion.  Unit(s)/Hr IV Continuous <Continuous>  insulin lispro (ADMELOG) corrective regimen sliding scale   SubCutaneous every 6 hours  lubricant eye ont 1 Application(s) 1 Application(s) Both EYES two times a day  midazolam Infusion 0.02 mG/kG/Hr IV Continuous <Continuous>  norepinephrine Infusion 0.05 MICROgram(s)/kG/Min IV Continuous <Continuous>  pantoprazole  Injectable 40 milliGRAM(s) IV Push daily  polyethylene glycol 3350 17 Gram(s) Oral daily  senna Syrup 8.8 milliLiter(s) Oral at bedtime  sodium chloride 0.9% lock flush 10 milliLiter(s) IV Push every 1 hour PRN    Drug Dosing Weight  Height (cm): 154.4 (15 Nov 2020 09:25)  Weight (kg): 125 (15 Nov 2020 09:25)  BMI (kg/m2): 52.4 (15 Nov 2020 09:25)  BSA (m2): 2.16 (15 Nov 2020 09:25)    CENTRAL LINE: [ ] YES [ ] NO  LOCATION:   DATE INSERTED:  REMOVE: [ ] YES [ ] NO  EXPLAIN:    MICHELLE: [ ] YES [ ] NO    DATE INSERTED:  REMOVE:  [ ] YES [ ] NO  EXPLAIN:    A-LINE:  [ ] YES [ ] NO  LOCATION:   DATE INSERTED:  REMOVE:  [ ] YES [ ] NO  EXPLAIN:    PMH -reviewed admission note, no change since admission  PAST MEDICAL & SURGICAL HISTORY:  No pertinent past medical history    No significant past surgical history        ICU Vital Signs Last 24 Hrs  T(C): 36.7 (02 Dec 2020 04:00), Max: 37 (01 Dec 2020 20:20)  T(F): 98.1 (02 Dec 2020 04:00), Max: 98.6 (01 Dec 2020 20:20)  HR: 88 (02 Dec 2020 06:45) (77 - 103)  BP: 92/57 (02 Dec 2020 06:00) (92/57 - 151/84)  BP(mean): 63 (02 Dec 2020 06:00) (63 - 120)  ABP: 108/52 (02 Dec 2020 06:45) (70/57 - 165/80)  ABP(mean): 67 (02 Dec 2020 06:45) (53 - 104)  RR: 25 (02 Dec 2020 06:45) (20 - 38)  SpO2: 99% (02 Dec 2020 06:45) (93% - 100%)      ABG - ( 02 Dec 2020 04:26 )  pH, Arterial: 7.12  pH, Blood: x     /  pCO2: 64    /  pO2: 88    / HCO3: 20    / Base Excess: -8.2  /  SaO2: 96                    12-01 @ 07:01  -  12-02 @ 07:00  --------------------------------------------------------  IN: 2118.2 mL / OUT: 3500 mL / NET: -1381.8 mL        Mode: AC/ CMV (Assist Control/ Continuous Mandatory Ventilation)  RR (machine): 33  FiO2: 100  PEEP: 14  ITime: 0.9  MAP: 29  PC: 30  PIP: 46      PHYSICAL EXAM:    GENERAL: [ ]NAD, [ ]well-groomed, [ ]well-developed  HEAD:  [ ]Atraumatic, [ ]Normocephalic  EYES: [ ]EOMI, [ ]PERRLA, [ ]conjunctiva and sclera clear  ENMT: [ ]No tonsillar erythema, exudates, or enlargement; [ ]Moist mucous membranes, [ ]Good dentition, [ ]No lesions  NECK: [ ]Supple, normal appearance, [ ]No JVD; [ ]Normal thyroid; [ ]Trachea midline  NERVOUS SYSTEM:  [ ]Alert & Oriented X3, [ ]Good concentration; [ ]Motor Strength 5/5 B/L upper and lower extremities; [ ]DTRs 2+ intact and symmetric  CHEST/LUNG: [ ]No chest deformity; [ ]Normal percussion bilaterally; [ ]No rales, rhonchi, wheezing   HEART: [ ]Regular rate and rhythm; [ ]No murmurs, rubs, or gallops  ABDOMEN: [ ]Soft, Nontender, Nondistended; [ ]Bowel sounds present  EXTREMITIES:  [ ]2+ Peripheral Pulses, [ ]No clubbing, cyanosis, or edema  LYMPH: [ ]No lymphadenopathy noted  SKIN: [ ]No rashes or lesions; [ ]Good capillary refill      LABS:  CBC Full  -  ( 02 Dec 2020 03:47 )  WBC Count : 17.07 K/uL  RBC Count : 2.60 M/uL  Hemoglobin : 7.8 g/dL  Hematocrit : 25.5 %  Platelet Count - Automated : 199 K/uL  Mean Cell Volume : 98.1 fl  Mean Cell Hemoglobin : 30.0 pg  Mean Cell Hemoglobin Concentration : 30.6 gm/dL  Auto Neutrophil # : 14.68 K/uL  Auto Lymphocyte # : 1.19 K/uL  Auto Monocyte # : 0.34 K/uL  Auto Eosinophil # : 0.51 K/uL  Auto Basophil # : 0.00 K/uL  Auto Neutrophil % : 83.0 %  Auto Lymphocyte % : 7.0 %  Auto Monocyte % : 2.0 %  Auto Eosinophil % : 3.0 %  Auto Basophil % : 0.0 %    12-02    131<L>  |  92<L>  |  37<H>  ----------------------------<  76  3.5   |  28  |  5.42<H>    Ca    8.4      02 Dec 2020 03:47  Phos  5.9     12-02  Mg     2.3     12-02    TPro  6.0  /  Alb  2.2<L>  /  TBili  1.0  /  DBili  x   /  AST  88<H>  /  ALT  108<H>  /  AlkPhos  165<H>  12-02    PTT - ( 02 Dec 2020 02:54 )  PTT:23.9 sec        RADIOLOGY & ADDITIONAL STUDIES REVIEWED:  ***    [ ]GOALS OF CARE DISCUSSION WITH PATIENT/FAMILY/PROXY:    CRITICAL CARE TIME SPENT: 35 minutes INTERVAL HPI/OVERNIGHT EVENTS: No significant event overnight   Diaslysis removed 3.5L but patient is still net positive       PRESSORS: [ ] YES [x ] NO  WHICH:    ANTIBIOTICS:                  DATE STARTED:  ANTIBIOTICS:                  DATE STARTED:  ANTIBIOTICS:                  DATE STARTED:    Antimicrobial:  azithromycin  IVPB      azithromycin  IVPB 500 milliGRAM(s) IV Intermittent every 24 hours  cefepime   IVPB 1000 milliGRAM(s) IV Intermittent every 24 hours    Cardiovascular:  digoxin     Tablet 0.125 milliGRAM(s) Oral every other day  norepinephrine Infusion 0.05 MICROgram(s)/kG/Min IV Continuous <Continuous>    Pulmonary:  ALBUTerol    90 MICROgram(s) HFA Inhaler 2 Puff(s) Inhalation every 6 hours PRN    Hematalogic:  heparin  Infusion.  Unit(s)/Hr IV Continuous <Continuous>    Other:  calcium acetate 1334 milliGRAM(s) Oral three times a day with meals  chlorhexidine 0.12% Liquid 15 milliLiter(s) Oral Mucosa two times a day  chlorhexidine 2% Cloths 1 Application(s) Topical <User Schedule>  dexAMETHasone  Injectable 6 milliGRAM(s) IV Push daily  fentaNYL   Infusion 1 MICROgram(s)/kG/Hr IV Continuous <Continuous>  glucagon  Injectable 1 milliGRAM(s) IntraMuscular once  insulin lispro (ADMELOG) corrective regimen sliding scale   SubCutaneous every 6 hours  lubricant eye ont 1 Application(s) 1 Application(s) Both EYES two times a day  midazolam Infusion 0.02 mG/kG/Hr IV Continuous <Continuous>  pantoprazole  Injectable 40 milliGRAM(s) IV Push daily  polyethylene glycol 3350 17 Gram(s) Oral daily  senna Syrup 8.8 milliLiter(s) Oral at bedtime  sodium chloride 0.9% lock flush 10 milliLiter(s) IV Push every 1 hour PRN    ALBUTerol    90 MICROgram(s) HFA Inhaler 2 Puff(s) Inhalation every 6 hours PRN  azithromycin  IVPB      azithromycin  IVPB 500 milliGRAM(s) IV Intermittent every 24 hours  calcium acetate 1334 milliGRAM(s) Oral three times a day with meals  cefepime   IVPB 1000 milliGRAM(s) IV Intermittent every 24 hours  chlorhexidine 0.12% Liquid 15 milliLiter(s) Oral Mucosa two times a day  chlorhexidine 2% Cloths 1 Application(s) Topical <User Schedule>  dexAMETHasone  Injectable 6 milliGRAM(s) IV Push daily  digoxin     Tablet 0.125 milliGRAM(s) Oral every other day  fentaNYL   Infusion 1 MICROgram(s)/kG/Hr IV Continuous <Continuous>  glucagon  Injectable 1 milliGRAM(s) IntraMuscular once  heparin  Infusion.  Unit(s)/Hr IV Continuous <Continuous>  insulin lispro (ADMELOG) corrective regimen sliding scale   SubCutaneous every 6 hours  lubricant eye ont 1 Application(s) 1 Application(s) Both EYES two times a day  midazolam Infusion 0.02 mG/kG/Hr IV Continuous <Continuous>  norepinephrine Infusion 0.05 MICROgram(s)/kG/Min IV Continuous <Continuous>  pantoprazole  Injectable 40 milliGRAM(s) IV Push daily  polyethylene glycol 3350 17 Gram(s) Oral daily  senna Syrup 8.8 milliLiter(s) Oral at bedtime  sodium chloride 0.9% lock flush 10 milliLiter(s) IV Push every 1 hour PRN    Drug Dosing Weight  Height (cm): 154.4 (15 Nov 2020 09:25)  Weight (kg): 125 (15 Nov 2020 09:25)  BMI (kg/m2): 52.4 (15 Nov 2020 09:25)  BSA (m2): 2.16 (15 Nov 2020 09:25)    CENTRAL LINE: [x ] YES [ ] NO  LOCATION: [facility]   DATE INSERTED: 12/1  REMOVE: [ ] YES [ ] NO  EXPLAIN:    MICHELLE: [x ] YES [ ] NO    DATE INSERTED: 11/19  REMOVE:  [ ] YES [ ] NO  EXPLAIN:    A-LINE:  [ x] YES [ ] NO  LOCATION:  [facility] DATE INSERTED: 11/19  REMOVE:  [ ] YES [ ] NO  EXPLAIN:    PMH -reviewed admission note, no change since admission  PAST MEDICAL & SURGICAL HISTORY:  No pertinent past medical history    No significant past surgical history        ICU Vital Signs Last 24 Hrs  T(C): 36.7 (02 Dec 2020 04:00), Max: 37 (01 Dec 2020 20:20)  T(F): 98.1 (02 Dec 2020 04:00), Max: 98.6 (01 Dec 2020 20:20)  HR: 88 (02 Dec 2020 06:45) (77 - 103)  BP: 92/57 (02 Dec 2020 06:00) (92/57 - 151/84)  BP(mean): 63 (02 Dec 2020 06:00) (63 - 120)  ABP: 108/52 (02 Dec 2020 06:45) (70/57 - 165/80)  ABP(mean): 67 (02 Dec 2020 06:45) (53 - 104)  RR: 25 (02 Dec 2020 06:45) (20 - 38)  SpO2: 99% (02 Dec 2020 06:45) (93% - 100%)      ABG - ( 02 Dec 2020 04:26 )  pH, Arterial: 7.12  pH, Blood: x     /  pCO2: 64    /  pO2: 88    / HCO3: 20    / Base Excess: -8.2  /  SaO2: 96                    12-01 @ 07:01  -  12-02 @ 07:00  --------------------------------------------------------  IN: 2118.2 mL / OUT: 3500 mL / NET: -1381.8 mL        Mode: AC/ CMV (Assist Control/ Continuous Mandatory Ventilation)  RR (machine): 33  FiO2: 100  PEEP: 14  ITime: 0.9  MAP: 29  PC: 30  PIP: 46      PHYSICAL EXAM:    GENERAL: Sedated and Intubated   HEAD:  [ ]Atraumatic, [ ]Normocephalic  EYES: [ ]EOMI, [ ]PERRLA, [ ]conjunctiva and sclera clear  ENMT: [ ]No tonsillar erythema, exudates, or enlargement; [ ]Moist mucous membranes, [ ]Good dentition, [ ]No lesions  NECK: [ ]Supple, normal appearance, [ ]No JVD; [ ]Normal thyroid; [ ]Trachea midline  NERVOUS SYSTEM:  [ ]Alert & Oriented X0,   CHEST/LUNG: B/L Rales   HEART: [x ]Regular rate and rhythm; [ x]No murmurs, rubs, or gallops  ABDOMEN: [ x]Soft, Nontender, distended; [ ]Bowel sounds not audible  EXTREMITIES:  Peripheral Pulses not palpable , Anasarca   Genitourinary: Swollen Scrotum and Penis   LYMPH: [x ]No lymphadenopathy noted  SKIN: [ ]No rashes or lesions; [ ]Good capillary refill      LABS:  CBC Full  -  ( 02 Dec 2020 03:47 )  WBC Count : 17.07 K/uL  RBC Count : 2.60 M/uL  Hemoglobin : 7.8 g/dL  Hematocrit : 25.5 %  Platelet Count - Automated : 199 K/uL  Mean Cell Volume : 98.1 fl  Mean Cell Hemoglobin : 30.0 pg  Mean Cell Hemoglobin Concentration : 30.6 gm/dL  Auto Neutrophil # : 14.68 K/uL  Auto Lymphocyte # : 1.19 K/uL  Auto Monocyte # : 0.34 K/uL  Auto Eosinophil # : 0.51 K/uL  Auto Basophil # : 0.00 K/uL  Auto Neutrophil % : 83.0 %  Auto Lymphocyte % : 7.0 %  Auto Monocyte % : 2.0 %  Auto Eosinophil % : 3.0 %  Auto Basophil % : 0.0 %    12-02    131<L>  |  92<L>  |  37<H>  ----------------------------<  76  3.5   |  28  |  5.42<H>    Ca    8.4      02 Dec 2020 03:47  Phos  5.9     12-02  Mg     2.3     12-02    TPro  6.0  /  Alb  2.2<L>  /  TBili  1.0  /  DBili  x   /  AST  88<H>  /  ALT  108<H>  /  AlkPhos  165<H>  12-02    PTT - ( 02 Dec 2020 02:54 )  PTT:23.9 sec        RADIOLOGY & ADDITIONAL STUDIES REVIEWED:  ***    [ ]GOALS OF CARE DISCUSSION WITH PATIENT/FAMILY/PROXY:    CRITICAL CARE TIME SPENT: 35 minutes

## 2020-12-02 NOTE — PROGRESS NOTE ADULT - SUBJECTIVE AND OBJECTIVE BOX
Chandan Awad MD (Nephrology dialysis Note)  [address], [facility],  [address],  [id number]  TEl: [id number]  Cell: [phone]      Patient was seen and evaluated on dialysis.   HR: 98 (12-02-20 @ 17:45)  BP: 90/46 (12-02-20 @ 16:00)  Wt(kg): --  12-02    131<L>  |  92<L>  |  37<H>  ----------------------------<  76  3.5   |  28  |  5.42<H>    Ca    8.4      02 Dec 2020 03:47  Phos  5.9     12-02  Mg     2.3     12-02    TPro  6.0  /  Alb  2.2<L>  /  TBili  1.0  /  DBili  x   /  AST  88<H>  /  ALT  108<H>  /  AlkPhos  165<H>  12-02      Continue dialysis:   Dialyzer: Revaclear    400         QB: 400             QD:   500       K bath: 2  Goal UF 2.5 over 2.5 Hours    Patient is tolerating the procedure well.   Continue full hemodialysis treatment as prescribed.

## 2020-12-02 NOTE — CHART NOTE - NSCHARTNOTEFT_GEN_A_CORE
Assessment:   Patient is a 50y old  Male who presents with a chief complaint of Shortness of breath D/t Covid (02 Dec 2020 11:21)Pt continues in ICU, intubated with MOF on HD and trickle feeds (at times). Unable to see pt due to Covid isolation (except partial through glass window). RN had stopped trickle feeds in anticipation of ?SBT trial, was planning to restart. Reports pt not currently on Propofol. Pt being followed by Palliative care, noted to have anasarca      Factors impacting intake: [ ] none [ ] nausea  [ ] vomiting [ ] diarrhea [ ] constipation  [ ]chewing problems [ ] swallowing issues  [x ] other: see above    Diet Prescription: Diet, NPO with Tube Feed:   Tube Feeding Modality: Nasogastric  Nepro with Carb Steady  Total Volume for 24 Hours (mL): 240  Continuous  Starting Tube Feed Rate {mL per Hour}: 10  Until Goal Tube Feed Rate (mL per Hour): 10  Tube Feed Duration (in Hours): 24  Tube Feed Start Time: 12:00 (20 @ 11:40)    Intake: Nepro 10 ml/hr (at times)    Daily     Daily Weight in k.5 (01 Dec 2020 15:30)  Weight in k.4 (01 Dec 2020 12:28)  Weight in k.3 (2020 07:45)  Weight in k.1 (2020 14:00)  Weight in k.9 (2020 10:23)  Weight in k.6 (2020 07:02)  Weight in k.3 (2020 16:50)  Weight in k (2020 13:20)  Weight in k (2020 07:00)  Weight in k.1 (2020 00:25)  Weight in k.4 (2020 21:50)  Weight in k.8 (2020 18:20)  Weight in k.6 (2020 15:20)  Weight in k (2020 07:00)  Weight in k.6 (2020 03:05)  Weight in k.9 (2020 23:31)  Weight in k.8 (2020 08:00)  Weight in k.2 (2020 20:31)  Weight in k.1 (2020 17:31)  Weight in k.7 (2020 07:45)  Weight in k.2 (2020 23:20)  Weight in k.3 (2020 21:15)  Weight in k.8 (2020 07:00)    % Weight Change: 3+ generalized, 4+ scrotal edema noted    Pertinent Medications: MEDICATIONS  (STANDING):  azithromycin  IVPB      azithromycin  IVPB 500 milliGRAM(s) IV Intermittent every 24 hours  calcium acetate 1334 milliGRAM(s) Oral three times a day with meals  cefepime   IVPB 1000 milliGRAM(s) IV Intermittent every 24 hours  chlorhexidine 0.12% Liquid 15 milliLiter(s) Oral Mucosa two times a day  chlorhexidine 2% Cloths 1 Application(s) Topical <User Schedule>  dexAMETHasone  Injectable 6 milliGRAM(s) IV Push daily  digoxin     Tablet 0.125 milliGRAM(s) Oral every other day  fentaNYL   Infusion 1 MICROgram(s)/kG/Hr (12.5 mL/Hr) IV Continuous <Continuous>  glucagon  Injectable 1 milliGRAM(s) IntraMuscular once  heparin  Infusion.  Unit(s)/Hr (22 mL/Hr) IV Continuous <Continuous>  insulin lispro (ADMELOG) corrective regimen sliding scale   SubCutaneous every 6 hours  lubricant eye ont 1 Application(s) 1 Application(s) Both EYES two times a day  midazolam Infusion 0.02 mG/kG/Hr (2.5 mL/Hr) IV Continuous <Continuous>  norepinephrine Infusion 0.05 MICROgram(s)/kG/Min (5.86 mL/Hr) IV Continuous <Continuous>  pantoprazole  Injectable 40 milliGRAM(s) IV Push daily  polyethylene glycol 3350 17 Gram(s) Oral daily  senna Syrup 8.8 milliLiter(s) Oral at bedtime    MEDICATIONS  (PRN):  ALBUTerol    90 MICROgram(s) HFA Inhaler 2 Puff(s) Inhalation every 6 hours PRN Shortness of Breath  sodium chloride 0.9% lock flush 10 milliLiter(s) IV Push every 1 hour PRN Pre/post blood products, medications, blood draw, and to maintain line patency    Pertinent Labs:  Na131 mmol/L<L> Glu 76 mg/dL K+ 3.5 mmol/L Cr  5.42 mg/dL<H> BUN 37 mg/dL<H>  Phos 5.9 mg/dL<H>  Alb 2.2 g/dL<L>  Chol --    LDL --    HDL --    Trig 414 mg/dL<H>     CAPILLARY BLOOD GLUCOSE      POCT Blood Glucose.: 87 mg/dL (02 Dec 2020 06:42)  POCT Blood Glucose.: 80 mg/dL (02 Dec 2020 00:57)  POCT Blood Glucose.: 94 mg/dL (01 Dec 2020 17:11)  POCT Blood Glucose.: 122 mg/dL (01 Dec 2020 13:33)        Estimated Needs:   [ ] no change since previous assessment  [x ] recalculated: N/A as TF remains at trickle      Previous Nutrition Diagnosis:   [ ] Altered GI function  [ ]Inadequate Oral Intake [ ] Swallowing Difficulty   [ ] Altered nutrition related labs [ ] Increased Nutrient Needs [ ] Overweight/Obesity   [ ] Unintended Weight Loss [ ] Food & Nutrition Related Knowledge Deficit [x ] Malnutrition (severe)  [ ] Other:     Nutrition Diagnosis is [X ] ongoing  [ ] resolved [ ] not applicable         Interventions:   Recommend  [ ] Change Diet To:  [ ] Nutrition Supplement  [x ] Nutrition Support: TF as medically feasible and consistent with goals of care (see note )  [x ] Other: MD to monitor. RD available.     Monitoring and Evaluation:   [ x ] follow up per protocol  [ ] other:

## 2020-12-02 NOTE — PROGRESS NOTE ADULT - SUBJECTIVE AND OBJECTIVE BOX
OVERNIGHT EVENTS: no acute events; s/p HD 12/1; continues pressor support    Present Symptoms:   Review of Systems: Unable to obtain due to poor mentation    MEDICATIONS  (STANDING):  azithromycin  IVPB      azithromycin  IVPB 500 milliGRAM(s) IV Intermittent every 24 hours  calcium acetate 1334 milliGRAM(s) Oral three times a day with meals  cefepime   IVPB 1000 milliGRAM(s) IV Intermittent every 24 hours  chlorhexidine 0.12% Liquid 15 milliLiter(s) Oral Mucosa two times a day  chlorhexidine 2% Cloths 1 Application(s) Topical <User Schedule>  dexAMETHasone  Injectable 6 milliGRAM(s) IV Push daily  digoxin     Tablet 0.125 milliGRAM(s) Oral every other day  fentaNYL   Infusion 1 MICROgram(s)/kG/Hr (12.5 mL/Hr) IV Continuous <Continuous>  glucagon  Injectable 1 milliGRAM(s) IntraMuscular once  heparin  Infusion.  Unit(s)/Hr (22 mL/Hr) IV Continuous <Continuous>  insulin lispro (ADMELOG) corrective regimen sliding scale   SubCutaneous every 6 hours  lubricant eye ont 1 Application(s) 1 Application(s) Both EYES two times a day  midazolam Infusion 0.02 mG/kG/Hr (2.5 mL/Hr) IV Continuous <Continuous>  norepinephrine Infusion 0.05 MICROgram(s)/kG/Min (5.86 mL/Hr) IV Continuous <Continuous>  pantoprazole  Injectable 40 milliGRAM(s) IV Push daily  polyethylene glycol 3350 17 Gram(s) Oral daily  senna Syrup 8.8 milliLiter(s) Oral at bedtime    MEDICATIONS  (PRN):  ALBUTerol    90 MICROgram(s) HFA Inhaler 2 Puff(s) Inhalation every 6 hours PRN Shortness of Breath  sodium chloride 0.9% lock flush 10 milliLiter(s) IV Push every 1 hour PRN Pre/post blood products, medications, blood draw, and to maintain line patency      PHYSICAL EXAM:  Vital Signs Last 24 Hrs  T(C): 36.6 (02 Dec 2020 08:30), Max: 37 (01 Dec 2020 20:20)  T(F): 97.8 (02 Dec 2020 08:30), Max: 98.6 (01 Dec 2020 20:20)  HR: 87 (02 Dec 2020 11:00) (80 - 103)  BP: 97/52 (02 Dec 2020 08:00) (92/57 - 151/84)  BP(mean): 63 (02 Dec 2020 08:00) (63 - 120)  RR: 27 (02 Dec 2020 11:00) (20 - 35)  SpO2: 99% (02 Dec 2020 11:00) (94% - 100%)  General: Patient not medically stable for full physical exam. Patient remains sedated/intubated, on pressor. No signs of distress.   Karnofsky Performance Score/Palliative Performance Status Version2:     10%    HEENT:   ET tube   Lungs: tachypnea, on ventilator. Continues fentanyl, versed  CV:     on pressor  GI:   incontinent, OG, + morbid obesity  :   cruz, on HD  Musculoskeletal: patient sedated/intubated, dependent on ADLs  Skin: +anasarca, R nostril DTI  Neuro: patient sedated/intubated, dependent on ADLs  Oral intake ability: unable/only mouth care    Diet: NPO; TF via OG        LABS:                          7.8    17.07 )-----------( 199      ( 02 Dec 2020 03:47 )             25.5     12-02    131<L>  |  92<L>  |  37<H>  ----------------------------<  76  3.5   |  28  |  5.42<H>    Ca    8.4      02 Dec 2020 03:47  Phos  5.9     12-02  Mg     2.3     12-02    TPro  6.0  /  Alb  2.2<L>  /  TBili  1.0  /  DBili  x   /  AST  88<H>  /  ALT  108<H>  /  AlkPhos  165<H>  12-02    RADIOLOGY & ADDITIONAL STUDIES:  < from: Xray Chest 1 View- PORTABLE-Urgent (Xray Chest 1 View- PORTABLE-Urgent .) (12.02.20 @ 01:53) >  EXAM:  XR CHEST PORTABLE URGENT 1V                        PROCEDURE DATE:  12/02/2020    INTERPRETATION:  CLINICAL INDICATION: 50 years  Male with ngt.  COMPARISON: 6:32 PM the same day  The right jugular catheter tip overlies superior vena cava. The left jugular catheter tip does not cross the midline. The tip of the ET tube overlies the mid trachea. The tip of the feeding tube is below the diaphragm.  AP view of the chest demonstrates bilateral interstitial and airspace infiltrates, slightly increased from prior. There is no pleural effusion. There is no pneumothorax.  The heart is enlarged.. The mediastinum and cachorro cannot be assessed due to projection.  Mild thoracic degenerative changes are present.    IMPRESSION:  Feeding tube is now in satisfactory position.  Right central line in satisfactory position. Left central line does not cross the midline. Its position is indeterminate.  ET tube in satisfactory position.  Bilateral interstitial and airspace infiltrates consistent with history of Covid pneumonia, slightly worse than prior.  < end of copied text >      ADVANCE DIRECTIVES:  MOLST: DNR

## 2020-12-02 NOTE — PROGRESS NOTE ADULT - PROBLEM SELECTOR PLAN 6
Patient with COVID-19 pneumonia with ARDS/Respiratory failure  Continue ventilatory support and IV pressors  Plan per primary/ID team.

## 2020-12-02 NOTE — PROGRESS NOTE ADULT - PROBLEM SELECTOR PLAN 3
Patient with Calcium 8.4 with albumin 2.3, Phos  5,9, Mag 2.3  Continue  Phoslo 1331 mg po tid  Monitor BMP, calcium, mag, phos level

## 2020-12-02 NOTE — PROGRESS NOTE ADULT - PROBLEM SELECTOR PLAN 2
2/2 COVID-19; comorbid shock/multi-organ failure involving lungs, heart, kidneys. WBC 17.07. CXR indicates Bilateral interstitial and airspace infiltrates, slightly worse than prior. Patient remains sedated/intubated, on IV abx, + pressor, HD. Wife is surrogate; DNR on file.

## 2020-12-02 NOTE — CONSULT NOTE ADULT - REASON FOR ADMISSION
Shortness of breath D/t Covid

## 2020-12-02 NOTE — CONSULT NOTE ADULT - ASSESSMENT
COVID - 19 infection/ Pneumonia  Multiorgan failure  Leukocytosis - secondary to decadron    Plan  Given patients labs and PH over the last 1 week or more , his prognosis is dismal and Mortality is a given, his PH being so low for such a prolonged time is not compatible with life or a meaningful recovery.  No acute infection source other than COVID.  Suggest to DC all abxs as it is not going to help him in any way.  Mortality is expected despite any and all efforts already done for this patient.

## 2020-12-02 NOTE — PROGRESS NOTE ADULT - SUBJECTIVE AND OBJECTIVE BOX
Chandan Awad MD (Nephrology progress note)  [address], [facility],  [id number],  [facility]  TEl: [phone]  Cell: [phone]    Patient is a 50y Male seen and evaluated at bedside. Vital signs, laboratory data, imaging studies, consult notes reviewed done within past 24 hours. Overnight events noted. Patient remains critically ill on ventilatory support and IV pressors. Patient remains anuric on RRT. Interval HD on 12/01 with 2.9 L fluid removal. Tolerated procedure well. Hgb slowly downtrending to 7.3.    Allergies    No Known Allergies    Intolerances        ROS:  Sedated and intubated on ventilatory support.    VITALS:    T(C): 36.4 (12-02-20 @ 17:03), Max: 37 (12-01-20 @ 20:20)  HR: 98 (12-02-20 @ 17:45) (85 - 109)  BP: 90/46 (12-02-20 @ 16:00) (90/46 - 151/84)  RR: 28 (12-02-20 @ 17:45) (22 - 35)  SpO2: 91% (12-02-20 @ 17:45) (89% - 100%)  CAPILLARY BLOOD GLUCOSE      POCT Blood Glucose.: 108 mg/dL (02 Dec 2020 16:41)  POCT Blood Glucose.: 127 mg/dL (02 Dec 2020 12:20)  POCT Blood Glucose.: 87 mg/dL (02 Dec 2020 06:42)  POCT Blood Glucose.: 80 mg/dL (02 Dec 2020 00:57)      12-01-20 @ 07:01  -  12-02-20 @ 07:00  --------------------------------------------------------  IN: 2227.2 mL / OUT: 3500 mL / NET: -1272.8 mL    12-02-20 @ 07:01  -  12-02-20 @ 18:28  --------------------------------------------------------  IN: 965 mL / OUT: 0 mL / NET: 965 mL      MEDICATIONS  (STANDING):  azithromycin  IVPB      azithromycin  IVPB 500 milliGRAM(s) IV Intermittent every 24 hours  calcium acetate 1334 milliGRAM(s) Oral three times a day with meals  cefepime   IVPB 1000 milliGRAM(s) IV Intermittent every 24 hours  chlorhexidine 0.12% Liquid 15 milliLiter(s) Oral Mucosa two times a day  chlorhexidine 2% Cloths 1 Application(s) Topical <User Schedule>  dexAMETHasone  Injectable 6 milliGRAM(s) IV Push daily  digoxin     Tablet 0.125 milliGRAM(s) Oral every other day  fentaNYL   Infusion 1 MICROgram(s)/kG/Hr (12.5 mL/Hr) IV Continuous <Continuous>  glucagon  Injectable 1 milliGRAM(s) IntraMuscular once  heparin  Infusion.  Unit(s)/Hr (22 mL/Hr) IV Continuous <Continuous>  insulin lispro (ADMELOG) corrective regimen sliding scale   SubCutaneous every 6 hours  lubricant eye ont 1 Application(s) 1 Application(s) Both EYES two times a day  midazolam Infusion 0.02 mG/kG/Hr (2.5 mL/Hr) IV Continuous <Continuous>  norepinephrine Infusion 0.05 MICROgram(s)/kG/Min (5.86 mL/Hr) IV Continuous <Continuous>  pantoprazole  Injectable 40 milliGRAM(s) IV Push daily  polyethylene glycol 3350 17 Gram(s) Oral daily  senna Syrup 8.8 milliLiter(s) Oral at bedtime    MEDICATIONS  (PRN):  ALBUTerol    90 MICROgram(s) HFA Inhaler 2 Puff(s) Inhalation every 6 hours PRN Shortness of Breath  sodium chloride 0.9% lock flush 10 milliLiter(s) IV Push every 1 hour PRN Pre/post blood products, medications, blood draw, and to maintain line patency      PHYSICAL EXAM:  GENERAL: Sedated and intubated on ventilatory support  HEENT: LEONOR, EOMI, neck supple, no JVP, no carotid bruit, oral mucosa moist pale, ETT in place.  CHEST/LUNG: Bilateral decreased breath sounds, bibasilar rales and rhonchi, no wheezing  HEART: Regular rate and rhythm, ERNESTO II/VI at LPSB, no gallops, no rub   ABDOMEN: Soft, nontender, non distended, bowel sounds present, no palpable organomegaly  : No flank or supra pubic tenderness.  EXTREMITIES: Bilateral pitting pedal edema++nt  Neurology: Sedated and intubated on ventilatory support  SKIN: No rash or skin lesion  Musculoskeletal: No joint deformity or spinal tenderness.      Vascular ACCESS:     LABS:                        7.4    18.23 )-----------( 199      ( 02 Dec 2020 10:22 )             23.9     12-02    131<L>  |  92<L>  |  37<H>  ----------------------------<  76  3.5   |  28  |  5.42<H>    Ca    8.4      02 Dec 2020 03:47  Phos  5.9     12-02  Mg     2.3     12-02    TPro  6.0  /  Alb  2.2<L>  /  TBili  1.0  /  DBili  x   /  AST  88<H>  /  ALT  108<H>  /  AlkPhos  165<H>  12-02      PTT - ( 02 Dec 2020 16:50 )  PTT:126.6 sec          RADIOLOGY & ADDITIONAL STUDIES:  ra< from: Xray Chest 1 View- PORTABLE-Routine (Xray Chest 1 View- PORTABLE-Routine in AM.) (12.02.20 @ 10:05) >    EXAM:  XR CHEST PORTABLE ROUTINE 1V                            PROCEDURE DATE:  12/02/2020          INTERPRETATION:  INDICATION: Covid pneumonia, renal insufficiency.    PRIORS: 12/2/2020.    VIEWS: Portable AP radiography of the chest performed.    FINDINGS: ETT, NGT and bilateral IJ CVC catheters are without significant change in position. Heart size cannot be quantitated on this portable evaluation. Superior mediastinal contours unchanged. Extensive bilateral lung parenchymal airspace opacities are stable. Small pleural effusions cannot be excluded. No evidence for pneumothorax. No mediastinal shift.    IMPRESSION: No significant interval change.              SOPHIA CHOUDHARY MD; Attending Radiologist  This document has been electronically signed. Dec  2 2020 12:46PM    < end of copied text >    Imaging Personally Reviewed:  [x] YES  [ ] NO    Consultant(s) Notes Reviewed:  [x] YES  [ ] NO    Care Discussed with Primary team/ Other Providers [x] YES  [ ] NO

## 2020-12-02 NOTE — PROGRESS NOTE ADULT - PROBLEM SELECTOR PLAN 5
Patient with ARDS/acute hypoxic/hypercapnic respiratory failure on ventilatory support with COVID-19 pneumonia/HCAP  Continue ventilatory support  Monitor vital signs/respiratory status  Ventilatory management per ICU team.  Additional HD treatment for UF only treatment

## 2020-12-02 NOTE — CONSULT NOTE ADULT - RS GEN PE MLT RESP DETAILS PC
no rhonchi/clear to auscultation bilaterally/breath sounds equal/no wheezes/good air movement/no rales

## 2020-12-02 NOTE — PROGRESS NOTE ADULT - PROBLEM SELECTOR PLAN 2
Patient with hyperkalemia due to ATN/renal failure now with improvement  K level 3.5  Continue RRt/HD   Renal feeding

## 2020-12-02 NOTE — CONSULT NOTE ADULT - SUBJECTIVE AND OBJECTIVE BOX
HPI:  Patient is a 49y old  Male who presents with a chief complaint of     Initial HPI on admission:  HPI:  Pt is a 49 yr old male with no PMH from home lives with wife who presented with shortness of breath. Pt states that we has been sick for a week with shortness of breath. He was diagnosed with covid 3 days ago and has been taking antibiotics and aspirin. Today his breathing was worse and he presented to ED. Pt denies any headache, abd pain pain, chest pain, nausea, vomiting or diarrhea. Pt was noted to be hypoxic in ED and was started on non-rebreather. Pt denies smoking and occasional alcohol intake. Per EMS, Pt was hypoxic to 50's and saturation improved on non-rebreather.    BRIEF HOSPITAL COURSE: Pt was hypoxic in ED, was placed on NRB with saturation in 80's. Pt admitted to ICU for acute hypoxic resp failure requiring high flow nasal canula    PAST MEDICAL & SURGICAL HISTORY:    Allergies    No Known Allergies  Intolerances      FAMILY HISTORY:    Medications:  chlorhexidine 2% Cloths 1 Application(s) Topical <User Schedule>  dexAMETHasone  Injectable 6 milliGRAM(s) IV Push norbert    Vital Signs Last 24 Hrs  T(C): 37.2 (15 Nov 2020 07:56), Max: 37.2 (15 Nov 2020 07:56)  T(F): 98.9 (15 Nov 2020 07:56), Max: 98.9 (15 Nov 2020 07:56)  HR: 94 (15 Nov 2020 07:27) (94 - 94)  BP: 145/95 (15 Nov 2020 07:27) (145/95 - 145/95)  BP(mean): --  RR: 20 (15 Nov 2020 07:27) (20 - 20)  SpO2: 87% (15 Nov 2020 07:27) (87% - 87%)      LABS:                        16.6   6.50  )-----------( 106      ( 15 Nov 2020 08:37 )             49.0                 CAPILLARY BLOOD GLUCOSE      POCT Blood Glucose.: 136 mg/dL (15 Nov 2020 07:46)    PT/INR - ( 15 Nov 2020 08:38 )   PT: 11.0 sec;   INR: 0.92 ratio         PTT - ( 15 Nov 2020 08:38 )  PTT:27.2 sec    CULTURES:        Physical Examination:  PHYSICAL EXAM:  GENERAL: Obese male sitting on bed, + resp distress noted  HEAD:  Atraumatic, Normocephalic  EYES: EOMI, PERRLA  NECK: Supple, No JVD  CHEST/LUNG: B/l decreased breath sounds  HEART: Regular rate and rhythm; No murmurs;   ABDOMEN: soft, full, no tenderness noted  EXTREMITIES:  2+ Peripheral Pulses, No cyanosis or edema  PSYCH: AAOx3  NEUROLOGY: non-focal  SKIN: No rashes or lesions      RADIOLOGY REVIEWED ***    CXR with b/l pneumonia    EKG with sinus tachycardia, LVH, no acute ischemic changes noted           (15 Nov 2020 09:03)      PAST MEDICAL & SURGICAL HISTORY:  No pertinent past medical history    No significant past surgical history        No Known Allergies      Meds:  ALBUTerol    90 MICROgram(s) HFA Inhaler 2 Puff(s) Inhalation every 6 hours PRN  azithromycin  IVPB      azithromycin  IVPB 500 milliGRAM(s) IV Intermittent every 24 hours  calcium acetate 1334 milliGRAM(s) Oral three times a day with meals  cefepime   IVPB 1000 milliGRAM(s) IV Intermittent every 24 hours  chlorhexidine 0.12% Liquid 15 milliLiter(s) Oral Mucosa two times a day  chlorhexidine 2% Cloths 1 Application(s) Topical <User Schedule>  dexAMETHasone  Injectable 6 milliGRAM(s) IV Push daily  digoxin     Tablet 0.125 milliGRAM(s) Oral every other day  fentaNYL   Infusion 1 MICROgram(s)/kG/Hr IV Continuous <Continuous>  glucagon  Injectable 1 milliGRAM(s) IntraMuscular once  heparin  Infusion.  Unit(s)/Hr IV Continuous <Continuous>  insulin lispro (ADMELOG) corrective regimen sliding scale   SubCutaneous every 6 hours  lubricant eye ont 1 Application(s) 1 Application(s) Both EYES two times a day  midazolam Infusion 0.02 mG/kG/Hr IV Continuous <Continuous>  norepinephrine Infusion 0.05 MICROgram(s)/kG/Min IV Continuous <Continuous>  pantoprazole  Injectable 40 milliGRAM(s) IV Push daily  polyethylene glycol 3350 17 Gram(s) Oral daily  senna Syrup 8.8 milliLiter(s) Oral at bedtime  sodium chloride 0.9% lock flush 10 milliLiter(s) IV Push every 1 hour PRN      SOCIAL HISTORY:  Smoker:  YES / NO        PACK YEARS:                         WHEN QUIT?  ETOH use:  YES / NO               FREQUENCY / QUANTITY:  Ilicit Drug use:  YES / NO  Occupation:  Assisted device use (Cane / Walker):  Live with:    FAMILY HISTORY:      VITALS:  Vital Signs Last 24 Hrs  T(C): 36.6 (02 Dec 2020 08:30), Max: 37 (01 Dec 2020 20:20)  T(F): 97.8 (02 Dec 2020 08:30), Max: 98.6 (01 Dec 2020 20:20)  HR: 100 (02 Dec 2020 14:45) (86 - 103)  BP: 97/52 (02 Dec 2020 08:00) (92/57 - 151/84)  BP(mean): 63 (02 Dec 2020 08:00) (63 - 120)  RR: 26 (02 Dec 2020 14:45) (21 - 35)  SpO2: 91% (02 Dec 2020 14:45) (89% - 100%)    LABS/DIAGNOSTIC TESTS:                          7.4    18.23 )-----------( 199      ( 02 Dec 2020 10:22 )             23.9     WBC Count: 18.23 K/uL (12-02 @ 10:22)  WBC Count: 17.07 K/uL (12-02 @ 03:47)  WBC Count: 18.58 K/uL (12-01 @ 04:36)  WBC Count: 18.86 K/uL (11-30 @ 07:21)      12-02    131<L>  |  92<L>  |  37<H>  ----------------------------<  76  3.5   |  28  |  5.42<H>    Ca    8.4      02 Dec 2020 03:47  Phos  5.9     12-02  Mg     2.3     12-02    TPro  6.0  /  Alb  2.2<L>  /  TBili  1.0  /  DBili  x   /  AST  88<H>  /  ALT  108<H>  /  AlkPhos  165<H>  12-02          LIVER FUNCTIONS - ( 02 Dec 2020 03:47 )  Alb: 2.2 g/dL / Pro: 6.0 g/dL / ALK PHOS: 165 U/L / ALT: 108 U/L DA / AST: 88 U/L / GGT: x             PTT - ( 02 Dec 2020 10:22 )  PTT:88.8 sec    LACTATE:    ABG - ABG - ( 02 Dec 2020 04:26 )  pH, Arterial: 7.12  pH, Blood: x     /  pCO2: 64    /  pO2: 88    / HCO3: 20    / Base Excess: -8.2  /  SaO2: 96                  CULTURES:   .Blood Blood-Peripheral  11-15 @ 11:19   No Growth Final  --  --      .Blood Blood-Peripheral  11-15 @ 11:17   No Growth Final  --  --          RADIOLOGY:< from: Xray Chest 1 View- PORTABLE-Routine (Xray Chest 1 View- PORTABLE-Routine in AM.) (12.02.20 @ 10:05) >  EXAM:  XR CHEST PORTABLE ROUTINE 1V                            PROCEDURE DATE:  12/02/2020          INTERPRETATION:  INDICATION: Covid pneumonia, renal insufficiency.    PRIORS: 12/2/2020.    VIEWS: Portable AP radiography of the chest performed.    FINDINGS: ETT, NGT and bilateral IJ CVC catheters are without significant change in position. Heart size cannot be quantitated on this portable evaluation. Superior mediastinal contours unchanged. Extensive bilateral lung parenchymal airspace opacities are stable. Small pleural effusions cannot be excluded. No evidence for pneumothorax. No mediastinal shift.    IMPRESSION: No significant interval change.              SOPHIA CHOUDHARY MD; Attending Radiologist  This document has been electronically signed. Dec  2 2020 12:46PM    < end of copied text >        ROS  [  ] UNABLE TO ELICIT                   Initial HPI on admission:  HPI:  Pt is a 49 yr old male with no PMH from home lives with wife who presented with shortness of breath. Pt states that we has been sick for a week with shortness of breath. He was diagnosed with covid 3 days ago and has been taking antibiotics and aspirin. Today his breathing was worse and he presented to ED. Pt denies any headache, abd pain pain, chest pain, nausea, vomiting or diarrhea. Pt was noted to be hypoxic in ED and was started on non-rebreather. Pt denies smoking and occasional alcohol intake. Per EMS, Pt was hypoxic to 50's and saturation improved on non-rebreather.  BRIEF HOSPITAL COURSE: Pt was hypoxic in ED, was placed on NRB with saturation in 80's. Pt admitted to ICU for acute hypoxic resp failure requiring high flow nasal canula.    History as above, Pt who was admitted with COVID -19 infection and has been here for several weeks already, He is in the ICU intubated , sedated , vent dependent and on a pressor, he is on dialysis also which he is getting currently. He was just restarted on antibiotics again yesterday. He is doing extremely poorly and his PH on his ABG despite being on a Ventilator and dialysis has not gone above 7.24 and is usually much lower for 8 days now.            PAST MEDICAL & SURGICAL HISTORY:  No pertinent past medical history    No significant past surgical history        No Known Allergies      Meds:  ALBUTerol    90 MICROgram(s) HFA Inhaler 2 Puff(s) Inhalation every 6 hours PRN  azithromycin  IVPB      azithromycin  IVPB 500 milliGRAM(s) IV Intermittent every 24 hours  calcium acetate 1334 milliGRAM(s) Oral three times a day with meals  cefepime   IVPB 1000 milliGRAM(s) IV Intermittent every 24 hours  chlorhexidine 0.12% Liquid 15 milliLiter(s) Oral Mucosa two times a day  chlorhexidine 2% Cloths 1 Application(s) Topical <User Schedule>  dexAMETHasone  Injectable 6 milliGRAM(s) IV Push daily  digoxin     Tablet 0.125 milliGRAM(s) Oral every other day  fentaNYL   Infusion 1 MICROgram(s)/kG/Hr IV Continuous <Continuous>  glucagon  Injectable 1 milliGRAM(s) IntraMuscular once  heparin  Infusion.  Unit(s)/Hr IV Continuous <Continuous>  insulin lispro (ADMELOG) corrective regimen sliding scale   SubCutaneous every 6 hours  lubricant eye ont 1 Application(s) 1 Application(s) Both EYES two times a day  midazolam Infusion 0.02 mG/kG/Hr IV Continuous <Continuous>  norepinephrine Infusion 0.05 MICROgram(s)/kG/Min IV Continuous <Continuous>  pantoprazole  Injectable 40 milliGRAM(s) IV Push daily  polyethylene glycol 3350 17 Gram(s) Oral daily  senna Syrup 8.8 milliLiter(s) Oral at bedtime  sodium chloride 0.9% lock flush 10 milliLiter(s) IV Push every 1 hour PRN      SOCIAL HISTORY: unknown    FAMILY HISTORY: unknown      VITALS:  Vital Signs Last 24 Hrs  T(C): 36.6 (02 Dec 2020 08:30), Max: 37 (01 Dec 2020 20:20)  T(F): 97.8 (02 Dec 2020 08:30), Max: 98.6 (01 Dec 2020 20:20)  HR: 100 (02 Dec 2020 14:45) (86 - 103)  BP: 97/52 (02 Dec 2020 08:00) (92/57 - 151/84)  BP(mean): 63 (02 Dec 2020 08:00) (63 - 120)  RR: 26 (02 Dec 2020 14:45) (21 - 35)  SpO2: 91% (02 Dec 2020 14:45) (89% - 100%)    LABS/DIAGNOSTIC TESTS:                          7.4    18.23 )-----------( 199      ( 02 Dec 2020 10:22 )             23.9     WBC Count: 18.23 K/uL (12-02 @ 10:22)  WBC Count: 17.07 K/uL (12-02 @ 03:47)  WBC Count: 18.58 K/uL (12-01 @ 04:36)  WBC Count: 18.86 K/uL (11-30 @ 07:21)      12-02    131<L>  |  92<L>  |  37<H>  ----------------------------<  76  3.5   |  28  |  5.42<H>    Ca    8.4      02 Dec 2020 03:47  Phos  5.9     12-02  Mg     2.3     12-02    TPro  6.0  /  Alb  2.2<L>  /  TBili  1.0  /  DBili  x   /  AST  88<H>  /  ALT  108<H>  /  AlkPhos  165<H>  12-02          LIVER FUNCTIONS - ( 02 Dec 2020 03:47 )  Alb: 2.2 g/dL / Pro: 6.0 g/dL / ALK PHOS: 165 U/L / ALT: 108 U/L DA / AST: 88 U/L / GGT: x             PTT - ( 02 Dec 2020 10:22 )  PTT:88.8 sec    LACTATE:    ABG - ABG - ( 02 Dec 2020 04:26 )  pH, Arterial: 7.12  pH, Blood: x     /  pCO2: 64    /  pO2: 88    / HCO3: 20    / Base Excess: -8.2  /  SaO2: 96                  CULTURES:   .Blood Blood-Peripheral  11-15 @ 11:19   No Growth Final  --  --      .Blood Blood-Peripheral  11-15 @ 11:17   No Growth Final  --  --          RADIOLOGY:< from: Xray Chest 1 View- PORTABLE-Routine (Xray Chest 1 View- PORTABLE-Routine in AM.) (12.02.20 @ 10:05) >  EXAM:  XR CHEST PORTABLE ROUTINE 1V                            PROCEDURE DATE:  12/02/2020          INTERPRETATION:  INDICATION: Covid pneumonia, renal insufficiency.    PRIORS: 12/2/2020.    VIEWS: Portable AP radiography of the chest performed.    FINDINGS: ETT, NGT and bilateral IJ CVC catheters are without significant change in position. Heart size cannot be quantitated on this portable evaluation. Superior mediastinal contours unchanged. Extensive bilateral lung parenchymal airspace opacities are stable. Small pleural effusions cannot be excluded. No evidence for pneumothorax. No mediastinal shift.    IMPRESSION: No significant interval change.              SOPHIA CHOUDHARY MD; Attending Radiologist  This document has been electronically signed. Dec  2 2020 12:46PM    < end of copied text >        ROS  [ x ] UNABLE TO ELICIT

## 2020-12-03 NOTE — PROGRESS NOTE ADULT - ASSESSMENT
ASSESSMENT AND PLAN: 49M without PMH from home with wife admitted to ICU for of Acute Respiratory failure 2/2 COVID PNA. Patient is currently in multi organ failure and on Dialysis. Requiring pressor support    Acute respiratory failure secondary to Covid pneumonia  Septic Shock and Paroxysmal Atrial fibrillation  AGUSTO ,ATN on dialysis      Neuro: #sedation  -sedated with fentanyl  - Off propofol as TG levels were elevated  - Will Add Versed to help with Ventricular synchrony  - d/jos paralytics     CVS: #Septic Shock and Paroxysmal Atrial fibrillation  -SBPs improved 90-100s  - patient is currently on Levophed,   -s/p dig loading, start 0.125mg dig every other day (renal dosing)  -EKG with sinus tach->AF  -bedside Echo without global dysfunction  - Digoxin levels in range ,will continue current regimen   - A-line monitoring,    -#troponemia  -9->8.2->8.3 11/21 trended down   -cardio Dr. Tsang saw patient before, Currently he does not need Aspirin and Plavix,   - Will Consult him PRN in case patient's condition changes    Pulmonary: #Acute respiratory failure 2/2 COVID PNA  -ETT placed 11/19, sedated as above  - 33/33/100/8, ABG 7.14/84/68/27  - NO discontinued.  - supinated since 3pm 11/22  - ESR, procal, ferritin decreasing, d-dimer, LDH, CRP decreasing, will trend every 3rd day  - Dexamethasone IV 6mg Qd (10 day course through 11/24) , Will slowly taper it down   - worsening infiltrates noted on CXR 11/30, no recent changes  -IgA positive, IgG negative, indicating new infection, No Remdesivir given      ID:   -history and management as above  -changed LIJ to femoral line, removed LIJ 11/25  -WBC 18->11->13.2->15.6->17.8  -RVP +COVID, procal increased to 3.13 as noted above, started cefepime 11/21, added vanc 11/24, added azithromycin given worsening CXR noted above 11/25  -BCx NGF  - Currently on, azithromycin , f/u Vancomycin Trough and restart vancomycin as needed  - VT elevated on 12/2, Will hold vanco today   - Add Cefepime and will consult Dr Marshall for approval       Nephro: #AGUSTO due to ATN secondary to likely Covid pneumonia   -CrCl 44, Cr 1.1 on admission, acutely worsened 11/21, possibly 2/2 covid injury and/or low BP, this am 6.41  -RIJ HD placed 11/21 and HD started 11/21, 11/22, 11/24, 11/25,11/26,  -continue phoslo, off bicarb drip  - f/u with nephro for further   - hemodialysis on 12/1 Likely will get another session today    GI: #TF  -OGT in place  -TF with Nepro at 10cc/hr  -monitor LFTs AST/ALT acute elevation this week, today slightly improved to 248/243, likely 2/2 COVID, hep Dr. Sabillon consulted  -will trend BID  -Protonix IV for PPx  - Senna and Miralax for bowel regimen    Heme: #thrombocytopenia  -resolved    Endocrine: #BG controlled  -A1c 6, average glucose 126  -TSH wnl  -vit D moderately low to 22, c/w 1000U/day   -FS q6 for TF  -HSS    Skin/Catheters:   #LIJ placed 12/1  #right femoral line 11/25  #RIJ HD catheter 11/21  #left radial artery 11/19  #FC (pressure)  #eye drops    Prophylaxis:  -patient is on heparin Drip  , Protonix for GI ppx ASSESSMENT AND PLAN: 49M without PMH from home with wife admitted to ICU for of Acute Respiratory failure 2/2 COVID PNA. Patient is currently in multi organ failure and on Dialysis. Requiring pressor support    Acute respiratory failure secondary to Covid pneumonia  Septic Shock and Paroxysmal Atrial fibrillation  AGUSTO ,ATN on dialysis      Neuro: #sedation  -sedated with fentanyl  - Off propofol as TG levels were elevated  - Will Add Versed to help with Ventricular synchrony  - d/jos paralytics     CVS: #Septic Shock and Paroxysmal Atrial fibrillation  -SBPs improved 90-100s  - patient is currently on Levophed,   -s/p dig loading, start 0.125mg dig every other day (renal dosing)  -EKG with sinus tach->AF  -bedside Echo without global dysfunction  - Digoxin levels in range ,will continue current regimen   - A-line monitoring,    -#troponemia  -9->8.2->8.3 11/21 trended down   -cardio Dr. Tsang saw patient before, Currently he does not need Aspirin and Plavix,   - Will Consult him PRN in case patient's condition changes    Pulmonary: #Acute respiratory failure 2/2 COVID PNA  -ETT placed 11/19, sedated as above  - 33/33/100/8, ABG 7.14/84/68/27  - NO discontinued.  - supinated since 3pm 11/22  - ESR, procal, ferritin decreasing, d-dimer, LDH, CRP decreasing, will trend every 3rd day  - Dexamethasone IV 6mg Qd (10 day course through 11/24) , Will slowly taper it down   - worsening infiltrates noted on CXR 11/30, no recent changes  -IgA positive, IgG negative, indicating new infection, No Remdesivir given      ID:   -history and management as above  -changed LIJ to femoral line, removed LIJ 11/25  -WBC 18->11->13.2->15.6->17.8  -RVP +COVID, procal increased to 3.13 as noted above, started cefepime 11/21, added vanc 11/24, added azithromycin given worsening CXR noted above 11/25  -BCx NGF  - Currently on, azithromycin , f/u Vancomycin Trough and restart vancomycin as needed  - VT elevated on 12/2, Will hold vanco today   - Continue Cefepime and Azithromycin, No Vanco  - Remove [facility]       Nephro: #AGUSTO due to ATN secondary to likely Covid pneumonia   -CrCl 44, Cr 1.1 on admission, acutely worsened 11/21, possibly 2/2 covid injury and/or low BP, this am 6.41  -RIJ HD placed 11/21 and HD started 11/21, 11/22, 11/24, 11/25,11/26,  -continue phoslo, off bicarb drip  - f/u with nephro for further   - hemodialysis on 12/2 with 3 L removal  - Plan for dialysis tomorrow    GI: #TF  -OGT in place  -TF with Nepro at 10cc/hr  -monitor LFTs AST/ALT acute elevation this week, today slightly improved to 248/243, likely 2/2 COVID, trending down slowly  -Daily CMP  -Protonix IV for PPx  - Senna and Miralax for bowel regimen    Heme: #thrombocytopenia  -resolved    Endocrine: #BG controlled  -A1c 6, average glucose 126  -TSH wnl  -vit D moderately low to 22, c/w 1000U/day   -FS q6 for TF  -HSS    Skin/Catheters:   #LIJ placed 12/1  #right femoral line 11/25  #RIJ HD catheter 11/21, Plan to remove today and replace tomorrow   #left radial artery 11/19  #FC (pressure)  #eye drops    Prophylaxis:  -patient is on heparin Drip  , Protonix for GI ppx

## 2020-12-03 NOTE — PROGRESS NOTE ADULT - PROBLEM SELECTOR PLAN 5
Patient with ARDS/acute hypoxic/hypercapnic respiratory failure on ventilatory support with COVID-19 pneumonia/HCAP  Continue ventilatory support  Monitor vital signs/respiratory status  Ventilatory management per ICU team.  Next anticipated IHD on 12/4

## 2020-12-03 NOTE — PROGRESS NOTE ADULT - PROBLEM SELECTOR PLAN 3
Patient with Calcium 8.5 with albumin 2.1, Phos  6.3, Mag 2.3  Continue  Phoslo 1331 mg po tid  Monitor BMP, calcium, mag, phos level

## 2020-12-03 NOTE — PROGRESS NOTE ADULT - PROBLEM SELECTOR PLAN 2
Patient with hyperkalemia due to ATN/renal failure now resolve  K level 3.4  Continue RRt/HD as required for   Renal feeding

## 2020-12-03 NOTE — PROGRESS NOTE ADULT - ATTENDING COMMENTS
49 yr  old obese male with out any significant medical history  who presented with sob due to acute hypoxic respiratory failure due to covid pneumonia.    Assessment:  1. Acute Hypoxic respiratory Failure / ARDS  2. COVID-19 PNA  3. Morbid Obesity   4. Elevated lactate  5. Hypotension - sedation related  6. Type 2 MI - likely due to hypoxia  7. Acute kidney injury     Plan  - intubated 11/19 for worsening hypoxia and worsening ARDS  - vent management with lung protective strategy   -peak pressures are high  -propofol held due to high TG  -Cont versed gtt  -hep gtt  -monitor blood gas  - dig for afib  - keep o2 sat >90%  - Antibiotics per ID  - Nephrology for HD   - Holding remdesivir due to renal failure  - Monitor renal and hepatic function  - Vasopressor support to maintain MAP > 65  - Trickle tube feeds  - Add bowel regimen  - isolation : contact and airborne  - Pain control  - Poor prognosis at this time, given multiple organ failure   - DVT/ GI prophy  - Prognosis is guarded

## 2020-12-03 NOTE — PROGRESS NOTE ADULT - SUBJECTIVE AND OBJECTIVE BOX
INTERVAL HPI/OVERNIGHT EVENTS: Dialysis session on 12/2 with removal of net 3L     PRESSORS: [ x] YES [ ] NO  WHICH:    ANTIBIOTICS:                  DATE STARTED:  ANTIBIOTICS:                  DATE STARTED:  ANTIBIOTICS:                  DATE STARTED:    Antimicrobial:  azithromycin  IVPB      azithromycin  IVPB 500 milliGRAM(s) IV Intermittent every 24 hours  cefepime   IVPB 1000 milliGRAM(s) IV Intermittent every 24 hours    Cardiovascular:  digoxin     Tablet 0.125 milliGRAM(s) Oral every other day  norepinephrine Infusion 0.05 MICROgram(s)/kG/Min IV Continuous <Continuous>    Pulmonary:  ALBUTerol    90 MICROgram(s) HFA Inhaler 2 Puff(s) Inhalation every 6 hours PRN    Hematalogic:  heparin  Infusion.  Unit(s)/Hr IV Continuous <Continuous>    Other:  calcium acetate 1334 milliGRAM(s) Oral three times a day with meals  chlorhexidine 0.12% Liquid 15 milliLiter(s) Oral Mucosa two times a day  chlorhexidine 2% Cloths 1 Application(s) Topical <User Schedule>  dexAMETHasone  Injectable 6 milliGRAM(s) IV Push daily  fentaNYL   Infusion 1 MICROgram(s)/kG/Hr IV Continuous <Continuous>  glucagon  Injectable 1 milliGRAM(s) IntraMuscular once  insulin lispro (ADMELOG) corrective regimen sliding scale   SubCutaneous every 6 hours  lubricant eye ont 1 Application(s) 1 Application(s) Both EYES two times a day  midazolam Infusion 0.02 mG/kG/Hr IV Continuous <Continuous>  pantoprazole  Injectable 40 milliGRAM(s) IV Push daily  polyethylene glycol 3350 17 Gram(s) Oral daily  senna Syrup 8.8 milliLiter(s) Oral at bedtime  sodium chloride 0.9% lock flush 10 milliLiter(s) IV Push every 1 hour PRN    ALBUTerol    90 MICROgram(s) HFA Inhaler 2 Puff(s) Inhalation every 6 hours PRN  azithromycin  IVPB      azithromycin  IVPB 500 milliGRAM(s) IV Intermittent every 24 hours  calcium acetate 1334 milliGRAM(s) Oral three times a day with meals  cefepime   IVPB 1000 milliGRAM(s) IV Intermittent every 24 hours  chlorhexidine 0.12% Liquid 15 milliLiter(s) Oral Mucosa two times a day  chlorhexidine 2% Cloths 1 Application(s) Topical <User Schedule>  dexAMETHasone  Injectable 6 milliGRAM(s) IV Push daily  digoxin     Tablet 0.125 milliGRAM(s) Oral every other day  fentaNYL   Infusion 1 MICROgram(s)/kG/Hr IV Continuous <Continuous>  glucagon  Injectable 1 milliGRAM(s) IntraMuscular once  heparin  Infusion.  Unit(s)/Hr IV Continuous <Continuous>  insulin lispro (ADMELOG) corrective regimen sliding scale   SubCutaneous every 6 hours  lubricant eye ont 1 Application(s) 1 Application(s) Both EYES two times a day  midazolam Infusion 0.02 mG/kG/Hr IV Continuous <Continuous>  norepinephrine Infusion 0.05 MICROgram(s)/kG/Min IV Continuous <Continuous>  pantoprazole  Injectable 40 milliGRAM(s) IV Push daily  polyethylene glycol 3350 17 Gram(s) Oral daily  senna Syrup 8.8 milliLiter(s) Oral at bedtime  sodium chloride 0.9% lock flush 10 milliLiter(s) IV Push every 1 hour PRN    Drug Dosing Weight  Height (cm): 154.4 (15 Nov 2020 09:25)  Weight (kg): 125 (15 Nov 2020 09:25)  BMI (kg/m2): 52.4 (15 Nov 2020 09:25)  BSA (m2): 2.16 (15 Nov 2020 09:25)    CENTRAL LINE: [ ] YES [ ] NO  LOCATION:   DATE INSERTED:  REMOVE: [ ] YES [ ] NO  EXPLAIN:    MICHELLE: [ ] YES [ ] NO    DATE INSERTED:  REMOVE:  [ ] YES [ ] NO  EXPLAIN:    A-LINE:  [ ] YES [ ] NO  LOCATION:   DATE INSERTED:  REMOVE:  [ ] YES [ ] NO  EXPLAIN:    [facility] -reviewed admission note, no change since admission  PAST MEDICAL & SURGICAL HISTORY:  No pertinent past medical history    No significant past surgical history        ICU Vital Signs Last 24 Hrs  T(C): 36.4 (03 Dec 2020 04:00), Max: 36.9 (02 Dec 2020 19:25)  T(F): 97.6 (03 Dec 2020 04:00), Max: 98.4 (02 Dec 2020 19:25)  HR: 94 (03 Dec 2020 07:30) (85 - 109)  BP: 96/57 (03 Dec 2020 04:00) (90/46 - 105/56)  BP(mean): 65 (03 Dec 2020 04:00) (56 - 67)  ABP: 94/53 (03 Dec 2020 07:30) (83/56 - 144/69)  ABP(mean): 66 (03 Dec 2020 07:30) (37 - 89)  RR: 26 (03 Dec 2020 07:30) (23 - 34)  SpO2: 91% (03 Dec 2020 07:30) (79% - 100%)      ABG - ( 03 Dec 2020 04:22 )  pH, Arterial: 7.20  pH, Blood: x     /  pCO2: 65    /  pO2: 66    / HCO3: 24    / Base Excess: -3.8  /  SaO2: 90                    12-02 @ 07:01  -  12-03 @ 07:00  --------------------------------------------------------  IN: 2352 mL / OUT: 3000 mL / NET: -648 mL        Mode: AC/ CMV (Assist Control/ Continuous Mandatory Ventilation)  RR (machine): 33  FiO2: 80  PEEP: 12  ITime: 0.9  MAP: 28  PC: 30  PIP: 43      PHYSICAL EXAM:    GENERAL: [ ]NAD, [ ]well-groomed, [ ]well-developed  HEAD:  [ ]Atraumatic, [ ]Normocephalic  EYES: [ ]EOMI, [ ]PERRLA, [ ]conjunctiva and sclera clear  ENMT: [ ]No tonsillar erythema, exudates, or enlargement; [ ]Moist mucous membranes, [ ]Good dentition, [ ]No lesions  NECK: [ ]Supple, normal appearance, [ ]No JVD; [ ]Normal thyroid; [ ]Trachea midline  NERVOUS SYSTEM:  [ ]Alert & Oriented X3, [ ]Good concentration; [ ]Motor Strength 5/5 B/L upper and lower extremities; [ ]DTRs 2+ intact and symmetric  CHEST/LUNG: [ ]No chest deformity; [ ]Normal percussion bilaterally; [ ]No rales, rhonchi, wheezing   HEART: [ ]Regular rate and rhythm; [ ]No murmurs, rubs, or gallops  ABDOMEN: [ ]Soft, Nontender, Nondistended; [ ]Bowel sounds present  EXTREMITIES:  [ ]2+ Peripheral Pulses, [ ]No clubbing, cyanosis, or edema  LYMPH: [ ]No lymphadenopathy noted  SKIN: [ ]No rashes or lesions; [ ]Good capillary refill      LABS:  CBC Full  -  ( 03 Dec 2020 04:49 )  WBC Count : 17.47 K/uL  RBC Count : 2.50 M/uL  Hemoglobin : 7.5 g/dL  Hematocrit : 24.0 %  Platelet Count - Automated : 202 K/uL  Mean Cell Volume : 96.0 fl  Mean Cell Hemoglobin : 30.0 pg  Mean Cell Hemoglobin Concentration : 31.3 gm/dL  Auto Neutrophil # : x  Auto Lymphocyte # : x  Auto Monocyte # : x  Auto Eosinophil # : x  Auto Basophil # : x  Auto Neutrophil % : x  Auto Lymphocyte % : x  Auto Monocyte % : x  Auto Eosinophil % : x  Auto Basophil % : x    12-03    130<L>  |  92<L>  |  56<H>  ----------------------------<  77  3.4<L>   |  24  |  6.45<H>    Ca    8.5      03 Dec 2020 04:49  Phos  6.3     12-03  Mg     2.1     12-03    TPro  5.7<L>  /  Alb  2.1<L>  /  TBili  0.8  /  DBili  x   /  AST  63<H>  /  ALT  85<H>  /  AlkPhos  151<H>  12-03    PTT - ( 03 Dec 2020 05:45 )  PTT:92.1 sec        RADIOLOGY & ADDITIONAL STUDIES REVIEWED:  ***    [ ]GOALS OF CARE DISCUSSION WITH PATIENT/FAMILY/PROXY:    CRITICAL CARE TIME SPENT: 35 minutes INTERVAL HPI/OVERNIGHT EVENTS: Dialysis session on 12/2 with removal of net 3L   No other significant event overnight    PRESSORS: [ x] YES [ ] NO  WHICH: levophed    ANTIBIOTICS:                  DATE STARTED:  ANTIBIOTICS:                  DATE STARTED:  ANTIBIOTICS:                  DATE STARTED:    Antimicrobial:  azithromycin  IVPB      azithromycin  IVPB 500 milliGRAM(s) IV Intermittent every 24 hours  cefepime   IVPB 1000 milliGRAM(s) IV Intermittent every 24 hours    Cardiovascular:  digoxin     Tablet 0.125 milliGRAM(s) Oral every other day  norepinephrine Infusion 0.05 MICROgram(s)/kG/Min IV Continuous <Continuous>    Pulmonary:  ALBUTerol    90 MICROgram(s) HFA Inhaler 2 Puff(s) Inhalation every 6 hours PRN    Hematalogic:  heparin  Infusion.  Unit(s)/Hr IV Continuous <Continuous>    Other:  calcium acetate 1334 milliGRAM(s) Oral three times a day with meals  chlorhexidine 0.12% Liquid 15 milliLiter(s) Oral Mucosa two times a day  chlorhexidine 2% Cloths 1 Application(s) Topical <User Schedule>  dexAMETHasone  Injectable 6 milliGRAM(s) IV Push daily  fentaNYL   Infusion 1 MICROgram(s)/kG/Hr IV Continuous <Continuous>  glucagon  Injectable 1 milliGRAM(s) IntraMuscular once  insulin lispro (ADMELOG) corrective regimen sliding scale   SubCutaneous every 6 hours  lubricant eye ont 1 Application(s) 1 Application(s) Both EYES two times a day  midazolam Infusion 0.02 mG/kG/Hr IV Continuous <Continuous>  pantoprazole  Injectable 40 milliGRAM(s) IV Push daily  polyethylene glycol 3350 17 Gram(s) Oral daily  senna Syrup 8.8 milliLiter(s) Oral at bedtime  sodium chloride 0.9% lock flush 10 milliLiter(s) IV Push every 1 hour PRN    ALBUTerol    90 MICROgram(s) HFA Inhaler 2 Puff(s) Inhalation every 6 hours PRN  azithromycin  IVPB      azithromycin  IVPB 500 milliGRAM(s) IV Intermittent every 24 hours  calcium acetate 1334 milliGRAM(s) Oral three times a day with meals  cefepime   IVPB 1000 milliGRAM(s) IV Intermittent every 24 hours  chlorhexidine 0.12% Liquid 15 milliLiter(s) Oral Mucosa two times a day  chlorhexidine 2% Cloths 1 Application(s) Topical <User Schedule>  dexAMETHasone  Injectable 6 milliGRAM(s) IV Push daily  digoxin     Tablet 0.125 milliGRAM(s) Oral every other day  fentaNYL   Infusion 1 MICROgram(s)/kG/Hr IV Continuous <Continuous>  glucagon  Injectable 1 milliGRAM(s) IntraMuscular once  heparin  Infusion.  Unit(s)/Hr IV Continuous <Continuous>  insulin lispro (ADMELOG) corrective regimen sliding scale   SubCutaneous every 6 hours  lubricant eye ont 1 Application(s) 1 Application(s) Both EYES two times a day  midazolam Infusion 0.02 mG/kG/Hr IV Continuous <Continuous>  norepinephrine Infusion 0.05 MICROgram(s)/kG/Min IV Continuous <Continuous>  pantoprazole  Injectable 40 milliGRAM(s) IV Push daily  polyethylene glycol 3350 17 Gram(s) Oral daily  senna Syrup 8.8 milliLiter(s) Oral at bedtime  sodium chloride 0.9% lock flush 10 milliLiter(s) IV Push every 1 hour PRN    Drug Dosing Weight  Height (cm): 154.4 (15 Nov 2020 09:25)  Weight (kg): 125 (15 Nov 2020 09:25)  BMI (kg/m2): 52.4 (15 Nov 2020 09:25)  BSA (m2): 2.16 (15 Nov 2020 09:25)    CENTRAL LINE: [ x] YES [ ] NO  LOCATION:   [facility]  DATE INSERTED: 12/1, 11/19  REMOVE: [ x] YES (RI ) [ ] NO  EXPLAIN:    MICHELLE: [ ] YES [ x] NO    DATE INSERTED:  REMOVE:  [ ] YES [ ] NO  EXPLAIN:    A-LINE:  [ ] YES [ ] NO  LOCATION:   DATE INSERTED: 11/19  REMOVE:  [ x] YES [ ] NO  EXPLAIN:    [facility] -reviewed admission note, no change since admission  PAST MEDICAL & SURGICAL HISTORY:  No pertinent past medical history    No significant past surgical history        ICU Vital Signs Last 24 Hrs  T(C): 36.4 (03 Dec 2020 04:00), Max: 36.9 (02 Dec 2020 19:25)  T(F): 97.6 (03 Dec 2020 04:00), Max: 98.4 (02 Dec 2020 19:25)  HR: 94 (03 Dec 2020 07:30) (85 - 109)  BP: 96/57 (03 Dec 2020 04:00) (90/46 - 105/56)  BP(mean): 65 (03 Dec 2020 04:00) (56 - 67)  ABP: 94/53 (03 Dec 2020 07:30) (83/56 - 144/69)  ABP(mean): 66 (03 Dec 2020 07:30) (37 - 89)  RR: 26 (03 Dec 2020 07:30) (23 - 34)  SpO2: 91% (03 Dec 2020 07:30) (79% - 100%)      ABG - ( 03 Dec 2020 04:22 )  pH, Arterial: 7.20  pH, Blood: x     /  pCO2: 65    /  pO2: 66    / HCO3: 24    / Base Excess: -3.8  /  SaO2: 90                    12-02 @ 07:01  -  12-03 @ 07:00  --------------------------------------------------------  IN: 2352 mL / OUT: 3000 mL / NET: -648 mL        Mode: AC/ CMV (Assist Control/ Continuous Mandatory Ventilation)  RR (machine): 33  FiO2: 80  PEEP: 12  ITime: 0.9  MAP: 28  PC: 30  PIP: 43      PHYSICAL EXAM:    GENERAL: Sedated and Intubated   HEAD:  [ ]Atraumatic, [ ]Normocephalic  EYES: [ ]EOMI, [ ]PERRLA, [x ]conjunctiva and sclera clear  ENMT: [ ]No tonsillar erythema, exudates, or enlargement; [x ]Moist mucous membranes, [ ]Good dentition, [ ]No lesions  NECK: [ ]Supple, normal appearance, [x ]No JVD; [ ]Normal thyroid; [ ]Trachea midline  NERVOUS SYSTEM:  [x ]Alert & Oriented X0,   CHEST/LUNG: B/L Rales   HEART: [x ]Regular rate and rhythm; [ x]No murmurs, rubs, or gallops  ABDOMEN: [ x]Soft, Nontender, distended; [ ]Bowel sounds not audible  EXTREMITIES:  Peripheral Pulses not palpable , Anasarca   Genitourinary: Swollen Scrotum and Penis   LYMPH: [x ]No lymphadenopathy noted  SKIN: [ ]No rashes or lesions; [ ]Good capillary refill      LABS:  CBC Full  -  ( 03 Dec 2020 04:49 )  WBC Count : 17.47 K/uL  RBC Count : 2.50 M/uL  Hemoglobin : 7.5 g/dL  Hematocrit : 24.0 %  Platelet Count - Automated : 202 K/uL  Mean Cell Volume : 96.0 fl  Mean Cell Hemoglobin : 30.0 pg  Mean Cell Hemoglobin Concentration : 31.3 gm/dL  Auto Neutrophil # : x  Auto Lymphocyte # : x  Auto Monocyte # : x  Auto Eosinophil # : x  Auto Basophil # : x  Auto Neutrophil % : x  Auto Lymphocyte % : x  Auto Monocyte % : x  Auto Eosinophil % : x  Auto Basophil % : x    12-03    130<L>  |  92<L>  |  56<H>  ----------------------------<  77  3.4<L>   |  24  |  6.45<H>    Ca    8.5      03 Dec 2020 04:49  Phos  6.3     12-03  Mg     2.1     12-03    TPro  5.7<L>  /  Alb  2.1<L>  /  TBili  0.8  /  DBili  x   /  AST  63<H>  /  ALT  85<H>  /  AlkPhos  151<H>  12-03    PTT - ( 03 Dec 2020 05:45 )  PTT:92.1 sec        RADIOLOGY & ADDITIONAL STUDIES REVIEWED:  Yes    [ ]GOALS OF CARE DISCUSSION WITH PATIENT/FAMILY/PROXY:    CRITICAL CARE TIME SPENT: 35 minutes

## 2020-12-03 NOTE — PROGRESS NOTE ADULT - PROBLEM SELECTOR PLAN 1
Oligoanuric AGUSTO from ATN due to septic shock/ARDS requiring RRT/HD  - S/p HD treatment on 12/02 with net 2.4  L fluid removal  - Avoid Nephrotoxic agents  - Monitor BMP and electrolytes  - Maintain MAP>65-70 mm hg  - Adjust antibiotics as per renal dose clearances  - Volume status and electrolytes noted.  - Defer HD today   - Next anticipated IHD on 12/4

## 2020-12-03 NOTE — PROGRESS NOTE ADULT - SUBJECTIVE AND OBJECTIVE BOX
Chandan Awad MD (Nephrology progress note)  [address], [facility],  [address],  [facility]  TEl: [phone]  Cell: [phone]    Patient is a 50y Male seen and evaluated at bedside. Vital signs, laboratory data, imaging studies, consult notes reviewed done within past 24 hours. Overnight events noted. Patient remains critically ill on ventilatory support and IV pressors at present. Interval UF only HD treatment on 12/2 with 2.4 L fluid removal. Patient still remains anuric     Allergies    No Known Allergies    Intolerances        ROS:  Limited. Sedated and intubated on ventilatory support    VITALS:    T(C): 36.4 (12-03-20 @ 04:00), Max: 36.9 (12-02-20 @ 19:25)  HR: 89 (12-03-20 @ 09:00) (85 - 109)  BP: 94/53 (12-03-20 @ 08:00) (90/46 - 105/56)  RR: 31 (12-03-20 @ 09:00) (20 - 34)  SpO2: 93% (12-03-20 @ 09:00) (79% - 100%)  CAPILLARY BLOOD GLUCOSE      POCT Blood Glucose.: 73 mg/dL (03 Dec 2020 00:13)  POCT Blood Glucose.: 108 mg/dL (02 Dec 2020 16:41)  POCT Blood Glucose.: 127 mg/dL (02 Dec 2020 12:20)      12-02-20 @ 07:01  -  12-03-20 @ 07:00  --------------------------------------------------------  IN: 2400 mL / OUT: 3000 mL / NET: -600 mL    12-03-20 @ 07:01  -  12-03-20 @ 10:18  --------------------------------------------------------  IN: 96 mL / OUT: 0 mL / NET: 96 mL      MEDICATIONS  (STANDING):  azithromycin  IVPB      azithromycin  IVPB 500 milliGRAM(s) IV Intermittent every 24 hours  calcium acetate 1334 milliGRAM(s) Oral three times a day with meals  cefepime   IVPB 1000 milliGRAM(s) IV Intermittent every 24 hours  chlorhexidine 0.12% Liquid 15 milliLiter(s) Oral Mucosa two times a day  chlorhexidine 2% Cloths 1 Application(s) Topical <User Schedule>  dexAMETHasone  Injectable 6 milliGRAM(s) IV Push daily  digoxin     Tablet 0.125 milliGRAM(s) Oral every other day  fentaNYL   Infusion 1 MICROgram(s)/kG/Hr (12.5 mL/Hr) IV Continuous <Continuous>  glucagon  Injectable 1 milliGRAM(s) IntraMuscular once  heparin  Infusion.  Unit(s)/Hr (22 mL/Hr) IV Continuous <Continuous>  insulin lispro (ADMELOG) corrective regimen sliding scale   SubCutaneous every 6 hours  lubricant eye ont 1 Application(s) 1 Application(s) Both EYES two times a day  midazolam Infusion 0.02 mG/kG/Hr (2.5 mL/Hr) IV Continuous <Continuous>  norepinephrine Infusion 0.05 MICROgram(s)/kG/Min (5.86 mL/Hr) IV Continuous <Continuous>  pantoprazole  Injectable 40 milliGRAM(s) IV Push daily  polyethylene glycol 3350 17 Gram(s) Oral daily  senna Syrup 8.8 milliLiter(s) Oral at bedtime    MEDICATIONS  (PRN):  ALBUTerol    90 MICROgram(s) HFA Inhaler 2 Puff(s) Inhalation every 6 hours PRN Shortness of Breath  sodium chloride 0.9% lock flush 10 milliLiter(s) IV Push every 1 hour PRN Pre/post blood products, medications, blood draw, and to maintain line patency      PHYSICAL EXAM:  GENERAL: Sedated and intubated on ventilatory support  HEENT: LEONOR, EOMI, neck supple, no JVP, no carotid bruit, oral mucosa moist and pink.  CHEST/LUNG: Bilateral decreased breath sounds, bibasilar rales and crepitations  HEART: Iregular rate and rhythm, ERNESTO II/VI at LPSB, no gallops, no rub   ABDOMEN: Soft, nontender, non distended, bowel sounds present, no palpable organomegaly  : No flank or supra pubic tenderness.  EXTREMITIES: generalized anasarca  Neurology: AAOx3, no focal neurological deficit  SKIN: No rash or skin lesion  Musculoskeletal: No joint deformity      Vascular ACCESS:     LABS:                        7.5    17.47 )-----------( 202      ( 03 Dec 2020 04:49 )             24.0     12-03    130<L>  |  92<L>  |  56<H>  ----------------------------<  77  3.4<L>   |  24  |  6.45<H>    Ca    8.5      03 Dec 2020 04:49  Phos  6.3     12-03  Mg     2.1     12-03    TPro  5.7<L>  /  Alb  2.1<L>  /  TBili  0.8  /  DBili  x   /  AST  63<H>  /  ALT  85<H>  /  AlkPhos  151<H>  12-03      PTT - ( 03 Dec 2020 05:45 )  PTT:92.1 sec          RADIOLOGY & ADDITIONAL STUDIES:  ra< from: Xray Chest 1 View- PORTABLE-Routine (Xray Chest 1 View- PORTABLE-Routine in AM.) (12.02.20 @ 10:05) >    EXAM:  XR CHEST PORTABLE ROUTINE 1V                            PROCEDURE DATE:  12/02/2020          INTERPRETATION:  INDICATION: Covid pneumonia, renal insufficiency.    PRIORS: 12/2/2020.    VIEWS: Portable AP radiography of the chest performed.    FINDINGS: ETT, NGT and bilateral IJ CVC catheters are without significant change in position. Heart size cannot be quantitated on this portable evaluation. Superior mediastinal contours unchanged. Extensive bilateral lung parenchymal airspace opacities are stable. Small pleural effusions cannot be excluded. No evidence for pneumothorax. No mediastinal shift.    IMPRESSION: No significant interval change.              SOPHIA CHOUDHARY MD; Attending Radiologist  This document has been electronically signed. Dec  2 2020 12:46PM    < end of copied text >    Imaging Personally Reviewed:  [x] YES  [ ] NO    Consultant(s) Notes Reviewed:  [x] YES  [ ] NO    Care Discussed with Primary team/ Other Providers [x] YES  [ ] NO

## 2020-12-04 NOTE — PROGRESS NOTE ADULT - PROBLEM SELECTOR PLAN 6
Patient with COVID-19 pneumonia with ARDS/Respiratory failure  Continue ventilatory support and IV pressors  Plan per primary/ID team.  Antibiotics as per renal dose clearances

## 2020-12-04 NOTE — PROGRESS NOTE ADULT - SUBJECTIVE AND OBJECTIVE BOX
Chandan Awad MD (Nephrology progress note)  [address], [facility],  [id number],  [id number]  TEl: [id number]  Cell: [phone]    Patient is a 50y Male seen and evaluated at bedside. Vital signs, laboratory data, imaging studies, consult notes reviewed done within past 24 hours. Overnight events noted. Patient remains critically ill on ventilatory support and IV pressors at present. Stil remains oligoanuric at present.    Allergies    No Known Allergies    Intolerances        ROS:  Limited. Sedated and intubated on ventilatory support  VITALS:    T(C): 36.1 (12-04-20 @ 08:00), Max: 37.1 (12-03-20 @ 23:30)  HR: 83 (12-04-20 @ 11:00) (81 - 98)  BP: 112/56 (12-04-20 @ 01:45) (95/51 - 112/56)  RR: 35 (12-04-20 @ 11:00) (19 - 39)  SpO2: 96% (12-04-20 @ 11:00) (79% - 98%)  CAPILLARY BLOOD GLUCOSE      POCT Blood Glucose.: 106 mg/dL (04 Dec 2020 06:10)  POCT Blood Glucose.: 124 mg/dL (03 Dec 2020 23:22)  POCT Blood Glucose.: 68 mg/dL (03 Dec 2020 23:19)  POCT Blood Glucose.: 132 mg/dL (03 Dec 2020 17:21)      12-03-20 @ 07:01  -  12-04-20 @ 07:00  --------------------------------------------------------  IN: 3849.7 mL / OUT: 0 mL / NET: 3849.7 mL    12-04-20 @ 07:01  -  12-04-20 @ 11:26  --------------------------------------------------------  IN: 420 mL / OUT: 0 mL / NET: 420 mL      MEDICATIONS  (STANDING):  calcium acetate 1334 milliGRAM(s) Oral three times a day with meals  chlorhexidine 0.12% Liquid 15 milliLiter(s) Oral Mucosa two times a day  chlorhexidine 2% Cloths 1 Application(s) Topical <User Schedule>  digoxin     Tablet 0.125 milliGRAM(s) Oral every other day  fentaNYL   Infusion 1 MICROgram(s)/kG/Hr (12.5 mL/Hr) IV Continuous <Continuous>  glucagon  Injectable 1 milliGRAM(s) IntraMuscular once  heparin  Infusion.  Unit(s)/Hr (22 mL/Hr) IV Continuous <Continuous>  insulin lispro (ADMELOG) corrective regimen sliding scale   SubCutaneous every 6 hours  lubricant eye ont 1 Application(s) 1 Application(s) Both EYES two times a day  midazolam Infusion 0.02 mG/kG/Hr (2.5 mL/Hr) IV Continuous <Continuous>  norepinephrine Infusion 0.085 MICROgram(s)/kG/Min (9.96 mL/Hr) IV Continuous <Continuous>  pantoprazole  Injectable 40 milliGRAM(s) IV Push daily  polyethylene glycol 3350 17 Gram(s) Oral daily  senna Syrup 8.8 milliLiter(s) Oral at bedtime    MEDICATIONS  (PRN):  ALBUTerol    90 MICROgram(s) HFA Inhaler 2 Puff(s) Inhalation every 6 hours PRN Shortness of Breath  bisacodyl Suppository 10 milliGRAM(s) Rectal daily PRN Constipation  sodium chloride 0.9% lock flush 10 milliLiter(s) IV Push every 1 hour PRN Pre/post blood products, medications, blood draw, and to maintain line patency      PHYSICAL EXAM:  GENERAL: Sedated and intubated on ventilatory support  HEENT: LEONOR, EOMI, neck supple, no JVP, no carotid bruit, oral mucosa moist and pink.  CHEST/LUNG: Bilateral decreased breath sounds, bilateral rales and crepitations,  HEART: Regular rate and rhythm, ERNESTO II/VI at LPSB, no gallops, no rub   ABDOMEN: Soft,distended, bowel sounds present, no palpable organomegaly  : No flank or supra pubic tenderness.  EXTREMITIES: Bilateral upper and lower extremity swelling with pitting edema  Neurology: Sedated and intubated on ventilatory support  SKIN: No rash or skin lesion  Musculoskeletal: No joint deformity or spinal tenderness.    LABS:                        7.1    17.14 )-----------( 208      ( 04 Dec 2020 06:34 )             22.7     12-04    130<L>  |  92<L>  |  74<H>  ----------------------------<  95  3.9   |  22  |  7.61<H>    Ca    8.7      04 Dec 2020 06:34  Phos  7.5     12-04  Mg     2.4     12-04    TPro  5.9<L>  /  Alb  2.1<L>  /  TBili  0.7  /  DBili  x   /  AST  49<H>  /  ALT  69<H>  /  AlkPhos  154<H>  12-04      PTT - ( 04 Dec 2020 06:49 )  PTT:80.1 sec          RADIOLOGY & ADDITIONAL STUDIES:  r< from: Xray Chest 1 View- PORTABLE-Routine (Xray Chest 1 View- PORTABLE-Routine in AM.) (12.04.20 @ 07:58) >    EXAM:  XR CHEST PORTABLE ROUTINE 1V                            PROCEDURE DATE:  12/04/2020          INTERPRETATION:  CLINICAL STATEMENT: Follow-up chest pain.    TECHNIQUE: AP view of the chest.    COMPARISON: 12/2/2020    FINDINGS/  IMPRESSION:  ET tube, feeding tube, left central line again noted at tip of left central line overlies the region of the aortic arch. Correlate for venous return if not performed. Right central line removed.    No pneumothorax. Increased interstitial lung markings with bilateral airspace opacities without significant change.    No significant pleural effusion    Heart size cannot be accurately assessed in this projection.              GAMALIEL GEORGE MD; Attending Radiologist  This document has been electronically signed. Dec  4 2020  9:21AM    < end of copied text >    Imaging Personally Reviewed:  [x] YES  [ ] NO    Consultant(s) Notes Reviewed:  [x] YES  [ ] NO    Care Discussed with Primary team/ Other Providers [x] YES  [ ] NO

## 2020-12-04 NOTE — PROGRESS NOTE ADULT - PROBLEM SELECTOR PLAN 3
Patient with Calcium 8.7 with albumin 2.1, Phos  7.5, Mag 2.3  Continue  Phoslo 1331 mg po tid  Monitor BMP, calcium, mag, phos level

## 2020-12-04 NOTE — PROGRESS NOTE ADULT - SUBJECTIVE AND OBJECTIVE BOX
INTERVAL HPI/OVERNIGHT EVENTS: No significant events overnight, ABG Still acidotic       PRESSORS: [ x] YES [ ] NO  WHICH: Levo     ANTIBIOTICS:                  DATE STARTED:  ANTIBIOTICS:                  DATE STARTED:  ANTIBIOTICS:                  DATE STARTED:    Antimicrobial:  azithromycin  IVPB      azithromycin  IVPB 500 milliGRAM(s) IV Intermittent every 24 hours  cefepime   IVPB 1000 milliGRAM(s) IV Intermittent every 24 hours    Cardiovascular:  digoxin     Tablet 0.125 milliGRAM(s) Oral every other day  norepinephrine Infusion 0.085 MICROgram(s)/kG/Min IV Continuous <Continuous>    Pulmonary:  ALBUTerol    90 MICROgram(s) HFA Inhaler 2 Puff(s) Inhalation every 6 hours PRN    Hematalogic:  heparin  Infusion.  Unit(s)/Hr IV Continuous <Continuous>    Other:  bisacodyl Suppository 10 milliGRAM(s) Rectal daily PRN  calcium acetate 1334 milliGRAM(s) Oral three times a day with meals  chlorhexidine 0.12% Liquid 15 milliLiter(s) Oral Mucosa two times a day  chlorhexidine 2% Cloths 1 Application(s) Topical <User Schedule>  fentaNYL   Infusion 1 MICROgram(s)/kG/Hr IV Continuous <Continuous>  glucagon  Injectable 1 milliGRAM(s) IntraMuscular once  insulin lispro (ADMELOG) corrective regimen sliding scale   SubCutaneous every 6 hours  lubricant eye ont 1 Application(s) 1 Application(s) Both EYES two times a day  midazolam Infusion 0.02 mG/kG/Hr IV Continuous <Continuous>  pantoprazole  Injectable 40 milliGRAM(s) IV Push daily  polyethylene glycol 3350 17 Gram(s) Oral daily  senna Syrup 8.8 milliLiter(s) Oral at bedtime  sodium chloride 0.9% lock flush 10 milliLiter(s) IV Push every 1 hour PRN    ALBUTerol    90 MICROgram(s) HFA Inhaler 2 Puff(s) Inhalation every 6 hours PRN  azithromycin  IVPB      azithromycin  IVPB 500 milliGRAM(s) IV Intermittent every 24 hours  bisacodyl Suppository 10 milliGRAM(s) Rectal daily PRN  calcium acetate 1334 milliGRAM(s) Oral three times a day with meals  cefepime   IVPB 1000 milliGRAM(s) IV Intermittent every 24 hours  chlorhexidine 0.12% Liquid 15 milliLiter(s) Oral Mucosa two times a day  chlorhexidine 2% Cloths 1 Application(s) Topical <User Schedule>  digoxin     Tablet 0.125 milliGRAM(s) Oral every other day  fentaNYL   Infusion 1 MICROgram(s)/kG/Hr IV Continuous <Continuous>  glucagon  Injectable 1 milliGRAM(s) IntraMuscular once  heparin  Infusion.  Unit(s)/Hr IV Continuous <Continuous>  insulin lispro (ADMELOG) corrective regimen sliding scale   SubCutaneous every 6 hours  lubricant eye ont 1 Application(s) 1 Application(s) Both EYES two times a day  midazolam Infusion 0.02 mG/kG/Hr IV Continuous <Continuous>  norepinephrine Infusion 0.085 MICROgram(s)/kG/Min IV Continuous <Continuous>  pantoprazole  Injectable 40 milliGRAM(s) IV Push daily  polyethylene glycol 3350 17 Gram(s) Oral daily  senna Syrup 8.8 milliLiter(s) Oral at bedtime  sodium chloride 0.9% lock flush 10 milliLiter(s) IV Push every 1 hour PRN    Drug Dosing Weight  Height (cm): 154.4 (15 Nov 2020 09:25)  Weight (kg): 125 (15 Nov 2020 09:25)  BMI (kg/m2): 52.4 (15 Nov 2020 09:25)  BSA (m2): 2.16 (15 Nov 2020 09:25)    CENTRAL LINE: [x ] YES [ ] NO  LOCATION: [facility]  DATE INSERTED: 12/1  REMOVE: [ ] YES [ ] NO  EXPLAIN:    MICHELLE: x[ ] YES [ ] NO    DATE INSERTED:  REMOVE:  [ ] YES [ ] NO  EXPLAIN:    A-LINE:  [x ] YES [ ] NO  LOCATION: [facility]  DATE INSERTED: 11/29  REMOVE:  [ ] YES [ ] NO  EXPLAIN:    PMH -reviewed admission note, no change since admission  PAST MEDICAL & SURGICAL HISTORY:  No pertinent past medical history    No significant past surgical history        ICU Vital Signs Last 24 Hrs  T(C): 36.9 (04 Dec 2020 05:15), Max: 37.1 (03 Dec 2020 23:30)  T(F): 98.4 (04 Dec 2020 05:15), Max: 98.7 (03 Dec 2020 23:30)  HR: 88 (04 Dec 2020 06:45) (81 - 99)  BP: 112/56 (04 Dec 2020 01:45) (94/53 - 112/56)  BP(mean): 68 (04 Dec 2020 01:45) (61 - 73)  ABP: 113/54 (04 Dec 2020 06:45) (79/48 - 133/71)  ABP(mean): 70 (04 Dec 2020 06:45) (58 - 88)  RR: 21 (04 Dec 2020 06:45) (19 - 39)  SpO2: 96% (04 Dec 2020 06:45) (79% - 98%)      ABG - ( 04 Dec 2020 04:41 )  pH, Arterial: 7.13  pH, Blood: x     /  pCO2: 76    /  pO2: 83    / HCO3: 24    / Base Excess: -4.3  /  SaO2: 94                    12-03 @ 07:01  -  12-04 @ 07:00  --------------------------------------------------------  IN: 3786.2 mL / OUT: 0 mL / NET: 3786.2 mL        Mode: AC/ CMV (Assist Control/ Continuous Mandatory Ventilation)  RR (machine): 30  TV (machine): 430  FiO2: 100  PEEP: 14  ITime: 0.75  MAP: 24  PC: 28  PIP: 41      PHYSICAL EXAM:    GENERAL: [ ]NAD, [ ]well-groomed, [ ]well-developed  HEAD:  [ ]Atraumatic, [ ]Normocephalic  EYES: [ ]EOMI, [ ]PERRLA, [ ]conjunctiva and sclera clear  ENMT: [ ]No tonsillar erythema, exudates, or enlargement; [ ]Moist mucous membranes, [ ]Good dentition, [ ]No lesions  NECK: [ ]Supple, normal appearance, [ ]No JVD; [ ]Normal thyroid; [ ]Trachea midline  NERVOUS SYSTEM:  [ ]Alert & Oriented X3, [ ]Good concentration; [ ]Motor Strength 5/5 B/L upper and lower extremities; [ ]DTRs 2+ intact and symmetric  CHEST/LUNG: [ ]No chest deformity; [ ]Normal percussion bilaterally; [ ]No rales, rhonchi, wheezing   HEART: [ ]Regular rate and rhythm; [ ]No murmurs, rubs, or gallops  ABDOMEN: [ ]Soft, Nontender, Nondistended; [ ]Bowel sounds present  EXTREMITIES:  [ ]2+ Peripheral Pulses, [ ]No clubbing, cyanosis, or edema  LYMPH: [ ]No lymphadenopathy noted  SKIN: [ ]No rashes or lesions; [ ]Good capillary refill      LABS:  CBC Full  -  ( 04 Dec 2020 06:34 )  WBC Count : x  RBC Count : 2.35 M/uL  Hemoglobin : 7.1 g/dL  Hematocrit : 22.7 %  Platelet Count - Automated : 208 K/uL  Mean Cell Volume : 96.6 fl  Mean Cell Hemoglobin : 30.2 pg  Mean Cell Hemoglobin Concentration : 31.3 gm/dL  Auto Neutrophil # : x  Auto Lymphocyte # : x  Auto Monocyte # : x  Auto Eosinophil # : x  Auto Basophil # : x  Auto Neutrophil % : x  Auto Lymphocyte % : x  Auto Monocyte % : x  Auto Eosinophil % : x  Auto Basophil % : x    12-04    130<L>  |  92<L>  |  74<H>  ----------------------------<  95  3.9   |  22  |  7.61<H>    Ca    8.7      04 Dec 2020 06:34  Phos  7.5     12-04  Mg     2.4     12-04    TPro  5.9<L>  /  Alb  2.1<L>  /  TBili  0.7  /  DBili  x   /  AST  49<H>  /  ALT  69<H>  /  AlkPhos  154<H>  12-04    PTT - ( 04 Dec 2020 06:49 )  PTT:80.1 sec        RADIOLOGY & ADDITIONAL STUDIES REVIEWED:  yes  [ ]GOALS OF CARE DISCUSSION WITH PATIENT/FAMILY/PROXY:    CRITICAL CARE TIME SPENT: 35 minutes INTERVAL HPI/OVERNIGHT EVENTS: No significant events overnight, ABG Still acidotic       PRESSORS: [ x] YES [ ] NO  WHICH: Levo     ANTIBIOTICS:                  DATE STARTED:  ANTIBIOTICS:                  DATE STARTED:  ANTIBIOTICS:                  DATE STARTED:    Antimicrobial:  azithromycin  IVPB      azithromycin  IVPB 500 milliGRAM(s) IV Intermittent every 24 hours  cefepime   IVPB 1000 milliGRAM(s) IV Intermittent every 24 hours    Cardiovascular:  digoxin     Tablet 0.125 milliGRAM(s) Oral every other day  norepinephrine Infusion 0.085 MICROgram(s)/kG/Min IV Continuous <Continuous>    Pulmonary:  ALBUTerol    90 MICROgram(s) HFA Inhaler 2 Puff(s) Inhalation every 6 hours PRN    Hematalogic:  heparin  Infusion.  Unit(s)/Hr IV Continuous <Continuous>    Other:  bisacodyl Suppository 10 milliGRAM(s) Rectal daily PRN  calcium acetate 1334 milliGRAM(s) Oral three times a day with meals  chlorhexidine 0.12% Liquid 15 milliLiter(s) Oral Mucosa two times a day  chlorhexidine 2% Cloths 1 Application(s) Topical <User Schedule>  fentaNYL   Infusion 1 MICROgram(s)/kG/Hr IV Continuous <Continuous>  glucagon  Injectable 1 milliGRAM(s) IntraMuscular once  insulin lispro (ADMELOG) corrective regimen sliding scale   SubCutaneous every 6 hours  lubricant eye ont 1 Application(s) 1 Application(s) Both EYES two times a day  midazolam Infusion 0.02 mG/kG/Hr IV Continuous <Continuous>  pantoprazole  Injectable 40 milliGRAM(s) IV Push daily  polyethylene glycol 3350 17 Gram(s) Oral daily  senna Syrup 8.8 milliLiter(s) Oral at bedtime  sodium chloride 0.9% lock flush 10 milliLiter(s) IV Push every 1 hour PRN    ALBUTerol    90 MICROgram(s) HFA Inhaler 2 Puff(s) Inhalation every 6 hours PRN  azithromycin  IVPB      azithromycin  IVPB 500 milliGRAM(s) IV Intermittent every 24 hours  bisacodyl Suppository 10 milliGRAM(s) Rectal daily PRN  calcium acetate 1334 milliGRAM(s) Oral three times a day with meals  cefepime   IVPB 1000 milliGRAM(s) IV Intermittent every 24 hours  chlorhexidine 0.12% Liquid 15 milliLiter(s) Oral Mucosa two times a day  chlorhexidine 2% Cloths 1 Application(s) Topical <User Schedule>  digoxin     Tablet 0.125 milliGRAM(s) Oral every other day  fentaNYL   Infusion 1 MICROgram(s)/kG/Hr IV Continuous <Continuous>  glucagon  Injectable 1 milliGRAM(s) IntraMuscular once  heparin  Infusion.  Unit(s)/Hr IV Continuous <Continuous>  insulin lispro (ADMELOG) corrective regimen sliding scale   SubCutaneous every 6 hours  lubricant eye ont 1 Application(s) 1 Application(s) Both EYES two times a day  midazolam Infusion 0.02 mG/kG/Hr IV Continuous <Continuous>  norepinephrine Infusion 0.085 MICROgram(s)/kG/Min IV Continuous <Continuous>  pantoprazole  Injectable 40 milliGRAM(s) IV Push daily  polyethylene glycol 3350 17 Gram(s) Oral daily  senna Syrup 8.8 milliLiter(s) Oral at bedtime  sodium chloride 0.9% lock flush 10 milliLiter(s) IV Push every 1 hour PRN    Drug Dosing Weight  Height (cm): 154.4 (15 Nov 2020 09:25)  Weight (kg): 125 (15 Nov 2020 09:25)  BMI (kg/m2): 52.4 (15 Nov 2020 09:25)  BSA (m2): 2.16 (15 Nov 2020 09:25)    CENTRAL LINE: [x ] YES [ ] NO  LOCATION: [facility]  DATE INSERTED: 12/1  REMOVE: [ ] YES [ ] NO  EXPLAIN:    MICHELLE: x[ ] YES [ ] NO    DATE INSERTED:  REMOVE:  [ ] YES [ ] NO  EXPLAIN:    A-LINE:  [x ] YES [ ] NO  LOCATION: [facility]  DATE INSERTED: 11/29  REMOVE:  [ ] YES [ ] NO  EXPLAIN:    PMH -reviewed admission note, no change since admission  PAST MEDICAL & SURGICAL HISTORY:  No pertinent past medical history    No significant past surgical history        ICU Vital Signs Last 24 Hrs  T(C): 36.9 (04 Dec 2020 05:15), Max: 37.1 (03 Dec 2020 23:30)  T(F): 98.4 (04 Dec 2020 05:15), Max: 98.7 (03 Dec 2020 23:30)  HR: 88 (04 Dec 2020 06:45) (81 - 99)  BP: 112/56 (04 Dec 2020 01:45) (94/53 - 112/56)  BP(mean): 68 (04 Dec 2020 01:45) (61 - 73)  ABP: 113/54 (04 Dec 2020 06:45) (79/48 - 133/71)  ABP(mean): 70 (04 Dec 2020 06:45) (58 - 88)  RR: 21 (04 Dec 2020 06:45) (19 - 39)  SpO2: 96% (04 Dec 2020 06:45) (79% - 98%)      ABG - ( 04 Dec 2020 04:41 )  pH, Arterial: 7.13  pH, Blood: x     /  pCO2: 76    /  pO2: 83    / HCO3: 24    / Base Excess: -4.3  /  SaO2: 94                    12-03 @ 07:01  -  12-04 @ 07:00  --------------------------------------------------------  IN: 3786.2 mL / OUT: 0 mL / NET: 3786.2 mL        Mode: AC/ CMV (Assist Control/ Continuous Mandatory Ventilation)  RR (machine): 30  TV (machine): 430  FiO2: 100  PEEP: 14  ITime: 0.75  MAP: 24  PC: 28  PIP: 41      PHYSICAL EXAM:    GENERAL: Sedated and Intubated   HEAD:  [ x]Atraumatic, [ ]Normocephalic  EYES: [ x]EOMI, [ x]PERRLA, [ ]conjunctiva and sclera clear  NECK: [ ]Supple, normal appearance, [ ]No JVD; [ ]Normal thyroid; [ ]Trachea midline  NERVOUS SYSTEM:  [ ]Alert & Oriented X0,   CHEST/LUNG: [ ]No chest deformity; [ ]Normal percussion bilaterally; [x ]No rales, rhonchi, wheezing   HEART: [ ]Regular rate and rhythm; [ ]No murmurs, rubs, or gallops  ABDOMEN: [ ]Soft, Nontender, Nondistended; [ ]Bowel sounds present  EXTREMITIES:  [ ]2+ Peripheral Pulses, Severe edema  LYMPH: [x ]No lymphadenopathy noted  SKIN: [ ]No rashes or lesions; [ ]Good capillary refill      LABS:  CBC Full  -  ( 04 Dec 2020 06:34 )  WBC Count : x  RBC Count : 2.35 M/uL  Hemoglobin : 7.1 g/dL  Hematocrit : 22.7 %  Platelet Count - Automated : 208 K/uL  Mean Cell Volume : 96.6 fl  Mean Cell Hemoglobin : 30.2 pg  Mean Cell Hemoglobin Concentration : 31.3 gm/dL  Auto Neutrophil # : x  Auto Lymphocyte # : x  Auto Monocyte # : x  Auto Eosinophil # : x  Auto Basophil # : x  Auto Neutrophil % : x  Auto Lymphocyte % : x  Auto Monocyte % : x  Auto Eosinophil % : x  Auto Basophil % : x    12-04    130<L>  |  92<L>  |  74<H>  ----------------------------<  95  3.9   |  22  |  7.61<H>    Ca    8.7      04 Dec 2020 06:34  Phos  7.5     12-04  Mg     2.4     12-04    TPro  5.9<L>  /  Alb  2.1<L>  /  TBili  0.7  /  DBili  x   /  AST  49<H>  /  ALT  69<H>  /  AlkPhos  154<H>  12-04    PTT - ( 04 Dec 2020 06:49 )  PTT:80.1 sec        RADIOLOGY & ADDITIONAL STUDIES REVIEWED:  yes  [ ]GOALS OF CARE DISCUSSION WITH PATIENT/FAMILY/PROXY:    CRITICAL CARE TIME SPENT: 35 minutes

## 2020-12-04 NOTE — PROGRESS NOTE ADULT - PROBLEM SELECTOR PLAN 1
Oligoanuric AGUSTO from ATN due to septic shock/ARDS requiring RRT/HD  - S/p HD treatment on 12/02 with net 2.4  L fluid removal  - Avoid Nephrotoxic agents  - Monitor BMP and electrolytes  - Maintain MAP>65-70 mm hg  - Adjust antibiotics as per renal dose clearances  - Volume status and electrolytes noted.  - No HD today due to schedule issue  - Next anticipated IHD on 12/5

## 2020-12-04 NOTE — PROGRESS NOTE ADULT - ASSESSMENT
ASSESSMENT AND PLAN: 49M without PMH from home with wife admitted to ICU for of Acute Respiratory failure 2/2 COVID PNA. Patient is currently in multi organ failure and on Dialysis. Requiring pressor support    Acute respiratory failure secondary to Covid pneumonia  Septic Shock and Paroxysmal Atrial fibrillation  AGUSTO ,ATN on dialysis      Neuro: #sedation  -sedated with fentanyl  - Off propofol as TG levels were elevated  - Continue Versed to help with Ventricular synchrony  - d/ojs paralytics     CVS: #Septic Shock and Paroxysmal Atrial fibrillation  -SBPs improved 90-100s  - patient is currently on Levophed,   -s/p dig loading, start 0.125mg dig every other day (renal dosing)  -EKG with sinus tach->AF  -bedside Echo without global dysfunction  - Digoxin levels in range ,will continue current regimen   - A-line monitoring,    -#troponemia  -9->8.2->8.3 11/21 trended down   -cardio Dr. Tsang saw patient before, Currently he does not need Aspirin and Plavix,   - Will Consult him PRN in case patient's condition changes    Pulmonary: #Acute respiratory failure 2/2 COVID PNA  -ETT placed 11/19, sedated as above  - 33/33/100/8, ABG 7.14/84/68/27  - NO discontinued.  - supinated since 3pm 11/22  - ESR, procal, ferritin decreasing, d-dimer, LDH, CRP decreasing, will trend every 3rd day  - Dexamethasone IV 6mg Qd (10 day course through 11/24) , Finished on 12/4  - worsening infiltrates noted on CXR 11/30, no recent changes till 12/4  - IgA positive, IgG negative, indicating new infection, No Remdesivir given      ID:   -history and management as above  -changed LIJ to femoral line, removed LIJ 11/25  -WBC 18->11->13.2->15.6->17.8  -RVP +COVID, procal increased to 3.13 as noted above, started cefepime 11/21, added vanc 11/24, added azithromycin given worsening CXR noted above 11/25  -BCx NGF  - Discontinue Abx      Nephro: #AGUSTO due to ATN secondary to likely Covid pneumonia   -CrCl 44, Cr 1.1 on admission, acutely worsened 11/21, possibly 2/2 covid injury and/or low BP, this am 6.41  -RIJ HD placed 11/21 and HD started 11/21, 11/22, 11/24, 11/25,11/26,  -continue phoslo, off bicarb drip  - f/u with nephro for further   - hemodialysis on 12/2 with 3 L removal  - Plan for dialysis tomorrow    GI: #TF  -OGT in place  -TF with Nepro at 10cc/hr, increase rate  -monitor LFTs AST/ALT acute elevation this week, today slightly improved to 248/243, likely 2/2 COVID, trending down slowly  -Daily CMP  -Protonix IV for PPx  - Senna and Miralax for bowel regimen    Heme: #thrombocytopenia  -resolved    Endocrine: #BG controlled  -A1c 6, average glucose 126  -TSH wnl  -vit D moderately low to 22, c/w 1000U/day   -FS q6 for TF  -HSS    Skin/Catheters:   #LIJ placed 12/1  #right femoral line 11/25  #RIJ HD catheter 11/21, Plan to replace today  #left radial artery 11/19, plan to replace 12/4  #FC (pressure)  #eye drops    Prophylaxis:  -patient is on heparin Drip, held for Shiley and A line , Protonix for GI ppx

## 2020-12-04 NOTE — PROGRESS NOTE ADULT - PROBLEM SELECTOR PLAN 2
Hypervolemic hyponatremia likely dilutional in nature and ADH response  COntinue aggressive HD treatment for volume overload as tolerated  Sodium profiling with HD  Optimal pain control  Limit vasopressin infusion as possible with concentrated IV drips

## 2020-12-04 NOTE — PROGRESS NOTE ADULT - ATTENDING COMMENTS
49 yr  old obese male with out any significant medical history  who presented with sob due to acute hypoxic respiratory failure due to covid pneumonia.    Assessment:  1. Acute Hypoxic respiratory Failure / ARDS  2. COVID-19 PNA  3. Morbid Obesity   4. Elevated lactate  5. Hypotension - sedation related  6. Type 2 MI - likely due to hypoxia  7. Acute kidney injury     Plan  - intubated 11/19 for worsening hypoxia and worsening ARDS  - vent management with lung protective strategy   -peak pressures are high  -propofol held due to high TG  -Cont versed gtt  -hep gtt, will need line change, will place new HD catheter   -monitor blood gas  - dig for afib  - keep o2 sat >90%  - Antibiotics per ID  - Nephrology for HD likely tomorrow  - Repeat ABG  - Holding remdesivir due to renal failure  - Monitor renal and hepatic function  - Vasopressor support to maintain MAP > 65  - Trickle tube feeds  - Add bowel regimen  - isolation : contact and airborne  - Pain control  - Poor prognosis at this time, given multiple organ failure   - DVT/ GI prophy  - Prognosis is guarded

## 2020-12-05 NOTE — PROGRESS NOTE ADULT - PROBLEM SELECTOR PLAN 3
Patient with severe sepsis/ARDS from COVID-19 pneumonia on ventilatory support and IV pressors  Maintain MAP>65-70 mm hg  Continue IV pressors and ventilatory support  Optimal treatment of COVID-19/sepsis per primary ICU/pulmonary/ID team  Monitor vital signs  Surveillance COVID-19 positive  Adjust antibiotics as per renal dose clearance for Cr cl<10 ml/min.

## 2020-12-05 NOTE — PROGRESS NOTE ADULT - PROBLEM SELECTOR PLAN 2
Patient with Calcium 8.8 with albumin 2.2, Phos  7.7, Mag 2.5  Continue  Phoslo 1331 mg po tid  Monitor BMP, calcium, mag, phos level

## 2020-12-05 NOTE — PROGRESS NOTE ADULT - ASSESSMENT
ASSESSMENT AND PLAN: 49M without PMH from home with wife admitted to ICU for of Acute Respiratory failure 2/2 COVID PNA. Patient is currently in multi organ failure and on Dialysis. Requiring pressor support    Acute respiratory failure secondary to Covid pneumonia  Septic Shock and Paroxysmal Atrial fibrillation  AGUSTO ,ATN on dialysis      Neuro: #sedation  -sedated with fentanyl  - Off propofol as TG levels were elevated  - Continue Versed to help with Ventricular synchrony  - d/jos paralytics     CVS: #Septic Shock and Paroxysmal Atrial fibrillation  -SBPs improved 90-100s  - patient is currently on Levophed,   -s/p dig loading, start 0.125mg dig every other day (renal dosing)  -EKG with sinus tach->AF  -bedside Echo without global dysfunction  - Digoxin levels in range ,will continue current regimen   - A-line monitoring,    -#troponemia  -9->8.2->8.3 11/21 trended down   -cardio Dr. Tsang saw patient before, Currently he does not need Aspirin and Plavix,   - Will Consult him PRN in case patient's condition changes    Pulmonary: #Acute respiratory failure 2/2 COVID PNA  -ETT placed 11/19, sedated as above  - 33/33/100/8, ABG 7.14/84/68/27  - NO discontinued.  - supinated since 3pm 11/22  - ESR, procal, ferritin decreasing, d-dimer, LDH, CRP decreasing, will trend every 3rd day  - Dexamethasone IV 6mg Qd (10 day course through 11/24) , Finished on 12/4  - worsening infiltrates noted on CXR 11/30, no recent changes till 12/4  - IgA positive, IgG negative, indicating new infection, No Remdesivir given      ID:   -history and management as above  -changed LIJ to femoral line, removed LIJ 11/25  -WBC 18->11->13.2->15.6->17.8  -RVP +COVID, procal increased to 3.13 as noted above, started cefepime 11/21, added vanc 11/24, added azithromycin given worsening CXR noted above 11/25  -BCx NGF  - Discontinue Abx      Nephro: #AGUSTO due to ATN secondary to likely Covid pneumonia   -CrCl 44, Cr 1.1 on admission, acutely worsened 11/21, possibly 2/2 covid injury and/or low BP, this am 6.41  -RIJ HD placed 11/21 and HD started 11/21, 11/22, 11/24, 11/25,11/26,  -continue phoslo, off bicarb drip  - f/u with nephro for further   - hemodialysis on 12/2 with 3 L removal  - Plan for dialysis tomorrow    GI: #TF  -OGT in place  -TF with Nepro at 10cc/hr, increase rate  -monitor LFTs AST/ALT acute elevation this week, today slightly improved to 248/243, likely 2/2 COVID, trending down slowly  -Daily CMP  -Protonix IV for PPx  - Senna and Miralax for bowel regimen    Heme: #thrombocytopenia  -resolved    Endocrine: #BG controlled  -A1c 6, average glucose 126  -TSH wnl  -vit D moderately low to 22, c/w 1000U/day   -FS q6 for TF  -HSS    Skin/Catheters:   #LIJ placed 12/1  #right femoral line 11/25  #RIJ HD catheter 11/21, Plan to replace today  #left radial artery 11/19, plan to replace 12/4  #FC (pressure)  #eye drops    Prophylaxis:  -patient is on heparin Drip, held for Shiley and A line , Protonix for GI ppx ASSESSMENT AND PLAN: 49M without PMH from home with wife admitted to ICU for of Acute Respiratory failure 2/2 COVID PNA. Patient is currently in multi organ failure and on Dialysis. Requiring pressor support    Acute respiratory failure secondary to Covid pneumonia  Septic Shock and Paroxysmal Atrial fibrillation  AGUSTO ,ATN on dialysis      Neuro: #sedation  - sedated with fentanyl  - Off propofol as TG levels were elevated  - Continue Versed to help with Ventricular synchrony  - d/jos paralytics     CVS: #Septic Shock and Paroxysmal Atrial fibrillation  -SBPs improved 90-100s  - patient is currently on Levophed,   -s/p dig loading, start 0.125mg dig every other day (renal dosing)  -EKG with sinus tach->AF  -bedside Echo without global dysfunction  - Digoxin levels in range ,will continue current regimen   - A-line monitoring,    -#troponemia  -9->8.2->8.3 11/21 trended down   -cardio Dr. Tsang saw patient before, Currently he does not need Aspirin and Plavix,   - Will Consult him PRN in case patient's condition changes    Pulmonary: #Acute respiratory failure 2/2 COVID PNA  -ETT placed 11/19, sedated as above  - 33/33/100/8, ABG 7.14/84/68/27  - NO discontinued.  - supinated since 3pm 11/22  - ESR, procal, ferritin decreasing, d-dimer, LDH, CRP decreasing, will trend every 3rd day  - Dexamethasone IV 6mg Qd (10 day course through 11/24) , Finished on 12/4  - worsening infiltrates noted on CXR 11/30, no recent changes till 12/4  - IgA positive, IgG negative, indicating new infection, No Remdesivir given      ID:   -history and management as above  -changed LIJ to femoral line, removed LIJ 11/25  -WBC 18->11->13.2->15.6->17.8  -RVP +COVID, procal increased to 3.13 as noted above, started cefepime 11/21, added vanc 11/24, added azithromycin given worsening CXR noted above 11/25  -BCx NGF  - Discontinue Abx      Nephro: #AGUSTO due to ATN secondary to likely Covid pneumonia   -CrCl 44, Cr 1.1 on admission, acutely worsened 11/21, possibly 2/2 covid injury and/or low BP, this am 6.41  -RIJ HD placed 11/21 and HD started 11/21, 11/22, 11/24, 11/25,11/26,  -continue phoslo, off bicarb drip  - f/u with nephro for further   - hemodialysis on 12/2 with 3 L removal  - Plan for dialysis tomorrow    GI: #TF  -OGT in place  -TF with Nepro at 10cc/hr, increase rate  -monitor LFTs AST/ALT acute elevation this week, today slightly improved to 248/243, likely 2/2 COVID, trending down slowly  -Daily CMP  -Protonix IV for PPx  - Senna and Miralax for bowel regimen    Heme: #thrombocytopenia  -resolved    Endocrine: #BG controlled  -A1c 6, average glucose 126  -TSH wnl  -vit D moderately low to 22, c/w 1000U/day   -FS q6 for TF  -HSS    Skin/Catheters:   #LIJ placed 12/1  #right femoral line 11/25  #RIJ HD catheter 12/05  #R radial artery 12/04  #FC (pressure)  #eye drops    Prophylaxis:  -patient is on heparin Drip, held for Shiley and A line , Protonix for GI ppx

## 2020-12-05 NOTE — PROGRESS NOTE ADULT - SUBJECTIVE AND OBJECTIVE BOX
Chandan Awad MD (Nephrology progress note)  [address], [facility],  [id number],  [id number]  TEl: [phone]  Cell: [phone]    Patient is a 50y Male seen and evaluated at bedside. Vital signs, laboratory data, imaging studies, consult notes reviewed done within past 24 hours. Overnight events noted. Patient remains critically ill on ventilatory support and IV pressors. Still anuric requiring RRT. Hgb downtrending to 7.0    Allergies    No Known Allergies    Intolerances        ROS:  Sedated and intubated on ventilatory support    VITALS:    T(C): 37.1 (12-05-20 @ 04:45), Max: 37.3 (12-05-20 @ 00:30)  HR: 88 (12-05-20 @ 06:15) (81 - 92)  BP: 116/63 (12-05-20 @ 06:00) (113/61 - 143/63)  RR: 36 (12-05-20 @ 06:15) (18 - 38)  SpO2: 91% (12-05-20 @ 06:15) (83% - 98%)  CAPILLARY BLOOD GLUCOSE      POCT Blood Glucose.: 133 mg/dL (05 Dec 2020 05:16)  POCT Blood Glucose.: 132 mg/dL (04 Dec 2020 23:27)  POCT Blood Glucose.: 151 mg/dL (04 Dec 2020 17:10)  POCT Blood Glucose.: 140 mg/dL (04 Dec 2020 11:48)      12-04-20 @ 07:01  -  12-05-20 @ 07:00  --------------------------------------------------------  IN: 1774 mL / OUT: 0 mL / NET: 1774 mL      MEDICATIONS  (STANDING):  calcium acetate 1334 milliGRAM(s) Oral three times a day with meals  chlorhexidine 0.12% Liquid 15 milliLiter(s) Oral Mucosa two times a day  chlorhexidine 2% Cloths 1 Application(s) Topical <User Schedule>  digoxin     Tablet 0.125 milliGRAM(s) Oral every other day  fentaNYL   Infusion 1 MICROgram(s)/kG/Hr (12.5 mL/Hr) IV Continuous <Continuous>  glucagon  Injectable 1 milliGRAM(s) IntraMuscular once  insulin lispro (ADMELOG) corrective regimen sliding scale   SubCutaneous every 6 hours  lubricant eye ont 1 Application(s) 1 Application(s) Both EYES two times a day  midazolam Infusion 0.02 mG/kG/Hr (2.5 mL/Hr) IV Continuous <Continuous>  norepinephrine Infusion 0.085 MICROgram(s)/kG/Min (9.96 mL/Hr) IV Continuous <Continuous>  pantoprazole  Injectable 40 milliGRAM(s) IV Push daily  polyethylene glycol 3350 17 Gram(s) Oral daily  senna Syrup 8.8 milliLiter(s) Oral at bedtime    MEDICATIONS  (PRN):  ALBUTerol    90 MICROgram(s) HFA Inhaler 2 Puff(s) Inhalation every 6 hours PRN Shortness of Breath  bisacodyl Suppository 10 milliGRAM(s) Rectal daily PRN Constipation  sodium chloride 0.9% lock flush 10 milliLiter(s) IV Push every 1 hour PRN Pre/post blood products, medications, blood draw, and to maintain line patency      PHYSICAL EXAM:  GENERAL: Sedated and intubated on ventilatory support  HEENT: LEONOR, EOMI, neck supple, JVP,  no carotid bruit, oral mucosa moist and pale, ETT in place.  CHEST/LUNG: Bilateral decreased breath sounds, bibasilar rales and rhonchi  HEART: Irregular rate and rhythm, ERNESTO II/VI at LPSB, no gallops, no rub   ABDOMEN: Soft, nontender, non distended, bowel sounds present, no palpable organomegaly  : No flank or supra pubic tenderness.  EXTREMITIES: Bilateral pitting upper and lower extremity pedal edema+nt  Neurology: Sedated and intubated on ventilatory support  SKIN: No rash or skin lesion  Musculoskeletal: No joint deformity or spinal tenderness.      LABS:                        7.0    16.51 )-----------( 225      ( 05 Dec 2020 06:25 )             22.9     12-05    130<L>  |  92<L>  |  92<H>  ----------------------------<  115<H>  3.9   |  23  |  8.75<H>    Ca    8.8      05 Dec 2020 06:25  Phos  7.7     12-05  Mg     2.5     12-05    TPro  6.1  /  Alb  2.2<L>  /  TBili  0.7  /  DBili  x   /  AST  43<H>  /  ALT  60  /  AlkPhos  148<H>  12-05      PTT - ( 05 Dec 2020 06:25 )  PTT:70.9 sec          RADIOLOGY & ADDITIONAL STUDIES:  ra< from: Xray Chest 1 View- PORTABLE-Routine (Xray Chest 1 View- PORTABLE-Routine in AM.) (12.04.20 @ 07:58) >    EXAM:  XR CHEST PORTABLE ROUTINE 1V                            PROCEDURE DATE:  12/04/2020          INTERPRETATION:  CLINICAL STATEMENT: Follow-up chest pain.    TECHNIQUE: AP view of the chest.    COMPARISON: 12/2/2020    FINDINGS/  IMPRESSION:  ET tube, feeding tube, left central line again noted at tip of left central line overlies the region of the aortic arch. Correlate for venous return if not performed. Right central line removed.    No pneumothorax. Increased interstitial lung markings with bilateral airspace opacities without significant change.    No significant pleural effusion    Heart size cannot be accurately assessed in this projection.              GAMALIEL GEORGE MD; Attending Radiologist  This document has been electronically signed. Dec  4 2020  9:21AM    < end of copied text >    Imaging Personally Reviewed:  [x] YES  [ ] NO    Consultant(s) Notes Reviewed:  [x] YES  [ ] NO    Care Discussed with Primary team/ Other Providers [x] YES  [ ] NO

## 2020-12-05 NOTE — PROGRESS NOTE ADULT - PROBLEM SELECTOR PLAN 6
Anemia of chronic illness with downtrending hgb 7.0  Stool guiac  Monitor serial CBC  Transfuse PRBC per primary team

## 2020-12-05 NOTE — PROGRESS NOTE ADULT - ATTENDING COMMENTS
49 yr  old obese male with out any significant medical history  who presented with sob due to acute hypoxic respiratory failure due to covid pneumonia.    Assessment:  1. Acute Hypoxic respiratory Failure / ARDS  2. COVID-19 PNA  3. Morbid Obesity   4. Elevated lactate  5. Hypotension - sedation related  6. Type 2 MI - likely due to hypoxia  7. Acute kidney injury     Plan  - intubated 11/19 for worsening hypoxia and worsening ARDS  - vent management with lung protective strategy   -peak pressures are high, attempted to adjust ventilator settings but remains with severe respiratory and metabolic acidosis   -propofol held due to high TG  -Cont versed gtt  -hep gtt, will need line change, will place new HD catheter   -monitor blood gas  - dig for afib  - keep o2 sat >90%  - Antibiotics per ID  - Nephrology for HD likely today  - Right IJ HD catheter placed  - Holding remdesivir due to renal failure  - Monitor renal and hepatic function  - Vasopressor support to maintain MAP > 65  - Trickle tube feeds  - bowel regimen  - isolation : contact and airborne  - Pain control  - Poor prognosis at this time, given multiple organ failure   - DVT/ GI prophy  - Overall prognosis is poor at this time given refractory pulmonary dynamics and multiple organ failure.

## 2020-12-05 NOTE — PROGRESS NOTE ADULT - SUBJECTIVE AND OBJECTIVE BOX
INTERVAL HPI/OVERNIGHT EVENTS: NAEON. SpO2 high 80s overnight. ABGs continuing to show acidosis with adequate oxygenation. No urine noted on BS, will undergo HD 12/5. A-line replaced 12/4.     ICU Vital Signs Last 24 Hrs  T(C): 37.3 (05 Dec 2020 00:30), Max: 37.3 (05 Dec 2020 00:30)  T(F): 99.2 (05 Dec 2020 00:30), Max: 99.2 (05 Dec 2020 00:30)  HR: 87 (05 Dec 2020 01:00) (81 - 92)  BP: 119/63 (05 Dec 2020 00:00) (119/63 - 143/63)  BP(mean): 74 (05 Dec 2020 00:00) (74 - 77)  ABP: 117/51 (05 Dec 2020 01:00) (70/63 - 135/60)  ABP(mean): 69 (05 Dec 2020 01:00) (61 - 79)  RR: 21 (05 Dec 2020 01:00) (18 - 38)  SpO2: 88% (05 Dec 2020 01:00) (83% - 98%)    I&O's Summary    03 Dec 2020 07:01  -  04 Dec 2020 07:00  --------------------------------------------------------  IN: 3849.7 mL / OUT: 0 mL / NET: 3849.7 mL    04 Dec 2020 07:01  -  05 Dec 2020 02:27  --------------------------------------------------------  IN: 1300 mL / OUT: 0 mL / NET: 1300 mL      Mode: AC/ CMV (Assist Control/ Continuous Mandatory Ventilation)  RR (machine): 28  TV (machine): 450  FiO2: 100  PEEP: 12  ITime: 0.75  MAP: 21  PIP: 43      LABS:                        7.1    17.14 )-----------( 208      ( 04 Dec 2020 06:34 )             22.7     12-04    130<L>  |  92<L>  |  74<H>  ----------------------------<  95  3.9   |  22  |  7.61<H>    Ca    8.7      04 Dec 2020 06:34  Phos  7.5     12-04  Mg     2.4     12-04    TPro  5.9<L>  /  Alb  2.1<L>  /  TBili  0.7  /  DBili  x   /  AST  49<H>  /  ALT  69<H>  /  AlkPhos  154<H>  12-04    PTT - ( 04 Dec 2020 06:49 )  PTT:80.1 sec    CAPILLARY BLOOD GLUCOSE      POCT Blood Glucose.: 132 mg/dL (04 Dec 2020 23:27)  POCT Blood Glucose.: 151 mg/dL (04 Dec 2020 17:10)  POCT Blood Glucose.: 140 mg/dL (04 Dec 2020 11:48)  POCT Blood Glucose.: 106 mg/dL (04 Dec 2020 06:10)    ABG - ( 04 Dec 2020 16:28 )  pH, Arterial: 7.20  pH, Blood: x     /  pCO2: 59    /  pO2: 67    / HCO3: 22    / Base Excess: -5.3  /  SaO2: 91                  RADIOLOGY & ADDITIONAL TESTS:    Consultant(s) Notes Reviewed:  [x ] YES  [ ] NO    MEDICATIONS  (STANDING):  calcium acetate 1334 milliGRAM(s) Oral three times a day with meals  chlorhexidine 0.12% Liquid 15 milliLiter(s) Oral Mucosa two times a day  chlorhexidine 2% Cloths 1 Application(s) Topical <User Schedule>  digoxin     Tablet 0.125 milliGRAM(s) Oral every other day  fentaNYL   Infusion 1 MICROgram(s)/kG/Hr (12.5 mL/Hr) IV Continuous <Continuous>  glucagon  Injectable 1 milliGRAM(s) IntraMuscular once  heparin  Infusion.  Unit(s)/Hr (22 mL/Hr) IV Continuous <Continuous>  insulin lispro (ADMELOG) corrective regimen sliding scale   SubCutaneous every 6 hours  lubricant eye ont 1 Application(s) 1 Application(s) Both EYES two times a day  midazolam Infusion 0.02 mG/kG/Hr (2.5 mL/Hr) IV Continuous <Continuous>  norepinephrine Infusion 0.085 MICROgram(s)/kG/Min (9.96 mL/Hr) IV Continuous <Continuous>  pantoprazole  Injectable 40 milliGRAM(s) IV Push daily  polyethylene glycol 3350 17 Gram(s) Oral daily  senna Syrup 8.8 milliLiter(s) Oral at bedtime    MEDICATIONS  (PRN):  ALBUTerol    90 MICROgram(s) HFA Inhaler 2 Puff(s) Inhalation every 6 hours PRN Shortness of Breath  bisacodyl Suppository 10 milliGRAM(s) Rectal daily PRN Constipation  sodium chloride 0.9% lock flush 10 milliLiter(s) IV Push every 1 hour PRN Pre/post blood products, medications, blood draw, and to maintain line patency    PHYSICAL EXAM:    GENERAL: Sedated and Intubated   HEAD:  [ x]Atraumatic, [ ]Normocephalic  EYES: [ x]EOMI, [ x]PERRLA, [ ]conjunctiva and sclera clear  NECK: [ ]Supple, normal appearance, [ ]No JVD; [ ]Normal thyroid; [ ]Trachea midline  NERVOUS SYSTEM:  [ ]Alert & Oriented X0,   CHEST/LUNG: [ ]No chest deformity; [ ]Normal percussion bilaterally; [x ]No rales, rhonchi, wheezing   HEART: [ ]Regular rate and rhythm; [ ]No murmurs, rubs, or gallops  ABDOMEN: [ ]Soft, Nontender, Nondistended; [ ]Bowel sounds present  EXTREMITIES:  [ ]2+ Peripheral Pulses, Severe edema  LYMPH: [x ]No lymphadenopathy noted  SKIN: [ ]No rashes or lesions; [ ]Good capillary refill    Care Discussed with Consultants/Other Providers [ x] YES  [ ] NO INTERVAL HPI/OVERNIGHT EVENTS: NAEON. SpO2 high 80s overnight. ABGs continuing to show acidosis with adequate oxygenation. No urine noted on BS, will undergo HD 12/5. A-line replaced 12/4.   - DA espa[place] placed. Patient is very acidotic. Has a poor prognosis. Family membered made aware.    ICU Vital Signs Last 24 Hrs  T(C): 37.3 (05 Dec 2020 00:30), Max: 37.3 (05 Dec 2020 00:30)  T(F): 99.2 (05 Dec 2020 00:30), Max: 99.2 (05 Dec 2020 00:30)  HR: 87 (05 Dec 2020 01:00) (81 - 92)  BP: 119/63 (05 Dec 2020 00:00) (119/63 - 143/63)  BP(mean): 74 (05 Dec 2020 00:00) (74 - 77)  ABP: 117/51 (05 Dec 2020 01:00) (70/63 - 135/60)  ABP(mean): 69 (05 Dec 2020 01:00) (61 - 79)  RR: 21 (05 Dec 2020 01:00) (18 - 38)  SpO2: 88% (05 Dec 2020 01:00) (83% - 98%)    I&O's Summary    03 Dec 2020 07:01  -  04 Dec 2020 07:00  --------------------------------------------------------  IN: 3849.7 mL / OUT: 0 mL / NET: 3849.7 mL    04 Dec 2020 07:01  -  05 Dec 2020 02:27  --------------------------------------------------------  IN: 1300 mL / OUT: 0 mL / NET: 1300 mL      Mode: AC/ CMV (Assist Control/ Continuous Mandatory Ventilation)  RR (machine): 28  TV (machine): 450  FiO2: 100  PEEP: 12  ITime: 0.75  MAP: 21  PIP: 43      LABS:                        7.1    17.14 )-----------( 208      ( 04 Dec 2020 06:34 )             22.7     12-04    130<L>  |  92<L>  |  74<H>  ----------------------------<  95  3.9   |  22  |  7.61<H>    Ca    8.7      04 Dec 2020 06:34  Phos  7.5     12-04  Mg     2.4     12-04    TPro  5.9<L>  /  Alb  2.1<L>  /  TBili  0.7  /  DBili  x   /  AST  49<H>  /  ALT  69<H>  /  AlkPhos  154<H>  12-04    PTT - ( 04 Dec 2020 06:49 )  PTT:80.1 sec    CAPILLARY BLOOD GLUCOSE      POCT Blood Glucose.: 132 mg/dL (04 Dec 2020 23:27)  POCT Blood Glucose.: 151 mg/dL (04 Dec 2020 17:10)  POCT Blood Glucose.: 140 mg/dL (04 Dec 2020 11:48)  POCT Blood Glucose.: 106 mg/dL (04 Dec 2020 06:10)    ABG - ( 04 Dec 2020 16:28 )  pH, Arterial: 7.20  pH, Blood: x     /  pCO2: 59    /  pO2: 67    / HCO3: 22    / Base Excess: -5.3  /  SaO2: 91                  RADIOLOGY & ADDITIONAL TESTS:    Consultant(s) Notes Reviewed:  [x ] YES  [ ] NO    MEDICATIONS  (STANDING):  calcium acetate 1334 milliGRAM(s) Oral three times a day with meals  chlorhexidine 0.12% Liquid 15 milliLiter(s) Oral Mucosa two times a day  chlorhexidine 2% Cloths 1 Application(s) Topical <User Schedule>  digoxin     Tablet 0.125 milliGRAM(s) Oral every other day  fentaNYL   Infusion 1 MICROgram(s)/kG/Hr (12.5 mL/Hr) IV Continuous <Continuous>  glucagon  Injectable 1 milliGRAM(s) IntraMuscular once  heparin  Infusion.  Unit(s)/Hr (22 mL/Hr) IV Continuous <Continuous>  insulin lispro (ADMELOG) corrective regimen sliding scale   SubCutaneous every 6 hours  lubricant eye ont 1 Application(s) 1 Application(s) Both EYES two times a day  midazolam Infusion 0.02 mG/kG/Hr (2.5 mL/Hr) IV Continuous <Continuous>  norepinephrine Infusion 0.085 MICROgram(s)/kG/Min (9.96 mL/Hr) IV Continuous <Continuous>  pantoprazole  Injectable 40 milliGRAM(s) IV Push daily  polyethylene glycol 3350 17 Gram(s) Oral daily  senna Syrup 8.8 milliLiter(s) Oral at bedtime    MEDICATIONS  (PRN):  ALBUTerol    90 MICROgram(s) HFA Inhaler 2 Puff(s) Inhalation every 6 hours PRN Shortness of Breath  bisacodyl Suppository 10 milliGRAM(s) Rectal daily PRN Constipation  sodium chloride 0.9% lock flush 10 milliLiter(s) IV Push every 1 hour PRN Pre/post blood products, medications, blood draw, and to maintain line patency    PHYSICAL EXAM:    GENERAL: Sedated and Intubated   HEAD:  [ x]Atraumatic, [ ]Normocephalic  EYES: [ x]EOMI, [ x]PERRLA, [ ]conjunctiva and sclera clear  NECK: [ ]Supple, normal appearance, [ ]No JVD; [ ]Normal thyroid; [ ]Trachea midline  NERVOUS SYSTEM:  [ ]Alert & Oriented X0,   CHEST/LUNG: [ ]No chest deformity; [ ]Normal percussion bilaterally; [x ]No rales, rhonchi, wheezing   HEART: [ ]Regular rate and rhythm; [ ]No murmurs, rubs, or gallops  ABDOMEN: [ ]Soft, Nontender, Nondistended; [ ]Bowel sounds present  EXTREMITIES:  [ ]2+ Peripheral Pulses, Severe edema  LYMPH: [x ]No lymphadenopathy noted  SKIN: [ ]No rashes or lesions; [ ]Good capillary refill    Care Discussed with Consultants/Other Providers [ x] YES  [ ] NO

## 2020-12-05 NOTE — PROGRESS NOTE ADULT - PROBLEM SELECTOR PLAN 1
COPD Coordinator Progress Note:    Per the patient's nurse, Mr. Schuster is ineligible for education at this time.  Should a change occur, a coordinator is available at [phone] on an as-needed basis.   Anuric AGUSTO from ATN due to septic shock/ARDS requiring RRT/HD  - S/p HD treatment on 12/02 with net 2.4  L fluid removal  - Avoid Nephrotoxic agents  - Monitor BMP and electrolytes  - Maintain MAP>65-70 mm hg  - Adjust antibiotics as per renal dose clearances  - Volume status and electrolytes noted.  - HD treatment as per ordered

## 2020-12-06 NOTE — PROGRESS NOTE ADULT - ATTENDING COMMENTS
49 yr  old obese male with out any significant medical history  who presented with sob due to acute hypoxic respiratory failure due to covid pneumonia.    Assessment:  1. Acute Hypoxic respiratory Failure / ARDS  2. COVID-19 PNA  3. Morbid Obesity   4. Elevated lactate  5. Hypotension - sedation related  6. Type 2 MI - likely due to hypoxia  7. Acute kidney injury     Plan  - Cont. HD with aggressive fluid removal, as patient with increased pulmonary edema and difficulty ventilating.  - intubated 11/19 for worsening hypoxia and worsening ARDS  - vent management with lung protective strategy   -propofol held due to high TG  -Cont versed/fentanyl gtt  -Cont. heparin infusion  -monitor blood gas  - dig for afib  - keep o2 sat >90%  - Antibiotics per ID  - Monitor renal and hepatic function  - Vasopressor support to maintain MAP > 65  - Tube feedings held due to increased volume and overall edema  - isolation : contact and airborne  - Pain control  - Poor prognosis at this time, given multiple organ failure   - DVT/ GI prophy

## 2020-12-06 NOTE — PROGRESS NOTE ADULT - PROBLEM SELECTOR PLAN 6
Anemia of chronic illness with downtrending hgb 8.1  Received 1 unit PRBC  Stool guiac  Monitor serial CBC  Transfuse PRBC per primary team

## 2020-12-06 NOTE — PROGRESS NOTE ADULT - ASSESSMENT
ASSESSMENT AND PLAN: 49M without PMH from home with wife admitted to ICU for of Acute Respiratory failure 2/2 COVID PNA. Patient is currently in multi organ failure and on Dialysis. Requiring pressor support    Acute respiratory failure secondary to Covid pneumonia  Septic Shock and Paroxysmal Atrial fibrillation  AGUSTO ,ATN on dialysis      Neuro: #sedation  - sedated with fentanyl  - Off propofol as TG levels were elevated  - Continue Versed to help with Ventricular synchrony  - d/jos paralytics     CVS: #Septic Shock and Paroxysmal Atrial fibrillation  -SBPs improved 90-100s  - patient is currently on Levophed,   -s/p dig loading, start 0.125mg dig every other day (renal dosing) started on 11/26  -EKG with sinus tach->AF  -bedside Echo without global dysfunction  - Digoxin levels in range ,will continue current regimen   - A-line monitoring,    -#troponemia  -9->8.2->8.3 11/21 trended down   -cardio Dr. Tsang saw patient before, Currently he does not need Aspirin and Plavix,   - Will Consult him PRN in case patient's condition changes    Pulmonary: #Acute respiratory failure 2/2 COVID PNA  -ETT placed 11/19, sedated as above  - 33/33/100/8, ABG 7.14/84/68/27  - NO discontinued.  - supinated since 3pm 11/22  - ESR, procal, ferritin decreasing, d-dimer, LDH, CRP decreasing, will trend every 3rd day  - Dexamethasone IV 6mg Qd (10 day course through 11/24) , Finished on 12/4  - worsening infiltrates noted on CXR 11/30, no recent changes till 12/4  - IgA positive, IgG negative, indicating new infection, No Remdesivir given      ID:   -history and management as above  -changed LIJ to femoral line, removed LIJ 11/25  -WBC 18->11->13.2->15.6->17.8  -RVP +COVID, procal increased to 3.13 as noted above, started cefepime 11/21, added vanc 11/24, added azithromycin given worsening CXR noted above 11/25  -BCx NGF  - Discontinue Abx      Nephro: #AGUSTO due to ATN secondary to likely Covid pneumonia   -CrCl 44, Cr 1.1 on admission, acutely worsened 11/21, possibly 2/2 covid injury and/or low BP, this am 6.41  -RIJ HD replaced 12/5 and HD started 11/21, 11/22, 11/24, 11/25,11/26,12/2, 12/5  -continue phoslo, off bicarb drip  - f/u with nephro for further   - hemodialysis on 12/5, ABG and kidney function worsening    GI: #TF  -OGT in place  -TF with Nepro at 10cc/hr, increase rate  -monitor LFTs AST/ALT acute elevation this week, today slightly improved to 248/243, likely 2/2 COVID, trending down slowly  -Daily CMP  -Protonix IV for PPx  - Senna and Miralax for bowel regimen    Heme: #thrombocytopenia  -resolved    Endocrine: #BG controlled  -A1c 6, average glucose 126  -TSH wnl  -vit D moderately low to 22, c/w 1000U/day   -FS q6 for TF  -HSS    Skin/Catheters:   #LIJ placed 12/1  #right femoral line 11/25  #RIJ HD catheter 12/05  #R radial artery 12/04  #FC (pressure)  #eye drops    Prophylaxis:  -patient is on heparin Drip, held for Shiley and A line , Protonix for GI ppx    Prognosis:   Poor, DNR< Family informed ASSESSMENT AND PLAN: 49M without PMH from home with wife admitted to ICU for of Acute Respiratory failure 2/2 COVID PNA. Patient is currently in multi organ failure and on Dialysis. Requiring pressor support    Acute respiratory failure secondary to Covid pneumonia  Septic Shock and Paroxysmal Atrial fibrillation  AGUSTO ,ATN on dialysis      Neuro:   - sedated with fentanyl  - Off propofol as TG levels were elevated  - Continue Versed to help with Ventricular synchrony  - d/jos paralytics     CVS: #Septic Shock and Paroxysmal Atrial fibrillation  -SBPs improved 90-100s  - patient is currently on Levophed,   -s/p dig loading, c/w 0.125mg dig every other day (renal dosing) started on 11/26  - EKG with sinus tach->AF  - bedside Echo without global dysfunction  - Digoxin levels in range ,will continue current regimen   - A-line monitoring    -#troponemia  -9->8.2->8.3 11/21 trended down   -cardio Dr. Tsang saw patient before, Currently he does not need Aspirin and Plavix,   - Will Consult PRN in case patient's condition changes    Pulmonary:   #Acute respiratory failure 2/2 COVID PNA  -ETT placed 11/19, sedated as above  - 28/450/100/12, ABG 7.09/80/58/23  - NO discontinued.  - supinated since 3pm 11/22  - ESR, procal, ferritin decreasing, d-dimer, LDH, CRP decreasing, will trend every 3rd day  - Dexamethasone IV 6mg Qd (10 day course through 11/24) , Finished on 12/4  - worsening infiltrates noted on CXR 11/30, no recent changes till 12/4  - IgA positive, IgG negative, indicating new infection, No Remdesivir given      ID:   -history and management as above  -changed LIJ to femoral line, removed LIJ 11/25  -WBC 18->11->13.2->15.6->17.8>18  -RVP +COVID, procal increased to 3.13 as noted above, started cefepime 11/21, added vanc 11/24, added azithromycin given worsening CXR noted above 11/25  - completed abx  -BCx NGF  - Discontinue Abx      Nephro: #AGUSTO due to ATN secondary to likely Covid pneumonia   -CrCl 44, Cr 1.1 on admission, acutely worsened 11/21, possibly 2/2 covid injury and/or low BP, this am 6.41  -RIJ HD replaced 12/5 and HD started 11/21, 11/22, 11/24, 11/25,11/26,12/2, 12/5  - continue phoslo, off bicarb drip  - f/u with nephro for further   - hemodialysis on 12/5, ABG and kidney function worsening  - asking nephro to dialyze him today (12/6) too    GI: #TF  - OG tube is not working - will change NG tube  - Holding TF with Nepro  -monitor LFTs AST/ALT acute elevation this week, today slightly improved to 248/243, likely 2/2 COVID, trending down slowly  -Daily CMP  -Protonix IV for PPx  - Senna and Miralax for bowel regimen    Heme:   - thrombocytopenia: resolved    # Anemia  - monitor CBC    Endocrine: #BG controlled  -A1c 6, average glucose 126  -TSH wnl  -vit D moderately low to 22, c/w 1000U/day   -FS q6 for TF or NPO  -HSS    Skin/Catheters:   #LIJ placed 12/1  #right femoral line 11/25  #RIJ HD catheter 12/05  #R radial artery 12/04  #FC (pressure)  #eye drops    Prophylaxis:  -patient is on heparin Drip , Protonix for GI ppx    Prognosis:   Poor, DNR< Family informed on12/5 ASSESSMENT AND PLAN: 49M without PMH from home with wife admitted to ICU for of Acute Respiratory failure 2/2 COVID PNA. Patient is currently in multi organ failure and on Dialysis. Requiring pressor support    Acute respiratory failure secondary to Covid pneumonia  Septic Shock and Paroxysmal Atrial fibrillation  AGUSTO ,ATN on dialysis      Neuro:   - sedated with fentanyl  - Off propofol as TG levels were elevated  - Continue Versed to help with Ventricular synchrony  - d/jos paralytics     CVS: #Septic Shock and Paroxysmal Atrial fibrillation  -SBPs improved 90-100s  - patient is currently on Levophed,   -s/p dig loading, c/w 0.125mg dig every other day (renal dosing) started on 11/26  - EKG with sinus tach->AF  - bedside Echo without global dysfunction  - Digoxin levels in range ,will continue current regimen   - A-line monitoring    -#troponemia  -9->8.2->8.3 11/21 trended down   -cardio Dr. Tsang saw patient before, Currently he does not need Aspirin and Plavix,   - Will Consult PRN in case patient's condition changes    Pulmonary:   #Acute respiratory failure 2/2 COVID PNA  -ETT placed 11/19, sedated as above  - 28/450/100/12, ABG 7.09/80/58/23  - NO discontinued.  - supinated since 3pm 11/22  - ESR, procal, ferritin decreasing, d-dimer, LDH, CRP decreasing, will trend every 3rd day  - Dexamethasone IV 6mg Qd (10 day course through 11/24) , Finished on 12/4  - worsening infiltrates noted on CXR 11/30, no recent changes till 12/4  - IgA positive, IgG negative, indicating new infection, No Remdesivir given      ID:   -history and management as above  -changed LIJ to femoral line, removed LIJ 11/25  -WBC 18->11->13.2->15.6->17.8>18  -RVP +COVID, procal increased to 3.13 as noted above, started cefepime 11/21, added vanc 11/24, added azithromycin given worsening CXR noted above 11/25  - completed abx  -BCx NGF  - Discontinue Abx      Nephro: #AGUSTO due to ATN secondary to likely Covid pneumonia   -CrCl 44, Cr 1.1 on admission, acutely worsened 11/21, possibly 2/2 covid injury and/or low BP, this am 6.41  -RIJ HD replaced 12/5 and HD started 11/21, 11/22, 11/24, 11/25,11/26,12/2, 12/5  - continue phoslo, off bicarb drip  - f/u with nephro for further   - hemodialysis on 12/5, ABG and kidney function worsening  - Asked pharmacy to concentrate the drip to decrease pt's intake, however pharmacy cannot concentrate any more  - asking nephro to dialyze him today (12/6) too    GI: #TF  - OG tube is not working - will change NG tube  - Holding TF with Nepro  -monitor LFTs AST/ALT acute elevation this week, today slightly improved to 248/243, likely 2/2 COVID, trending down slowly  -Daily CMP  -Protonix IV for PPx  - Senna and Miralax for bowel regimen    Heme:   - thrombocytopenia: resolved    # Anemia  - monitor CBC    Endocrine: #BG controlled  -A1c 6, average glucose 126  -TSH wnl  -vit D moderately low to 22, c/w 1000U/day   -FS q6 for TF or NPO  -HSS    Skin/Catheters:   #LIJ placed 12/1  #right femoral line 11/25  #RIJ HD catheter 12/05  #R radial artery 12/04  #FC (pressure)  #eye drops    Prophylaxis:  -patient is on heparin Drip , Protonix for GI ppx    Prognosis:   Poor, DNR< Family informed on12/5

## 2020-12-06 NOTE — PROGRESS NOTE ADULT - SUBJECTIVE AND OBJECTIVE BOX
Chandan Awad MD (Nephrology progress note)  [address], [facility],  [id number],  [facility]  TEl: [phone]  Cell: [phone]    Patient is a 50y Male seen and evaluated at bedside. Vital signs, laboratory data, imaging studies, consult notes reviewed done within past 24 hours. Overnight events noted. Patient remains critically ill on ventilatory support and IV pressors with difficult to ventilate. Interval HD treatment on 12/5 with 3L fluid removal. Hgb with improvement with 1 PRBC with 8.1 today morning.    Allergies    No Known Allergies    Intolerances        ROS:  Limited. Sedated and intubated on ventilatory support    VITALS:    T(C): 37.3 (12-06-20 @ 07:30), Max: 37.4 (12-05-20 @ 13:30)  HR: 94 (12-06-20 @ 11:00) (78 - 101)  BP: 108/57 (12-06-20 @ 11:00) (46/30 - 128/104)  RR: 32 (12-06-20 @ 11:00) (0 - 33)  SpO2: 82% (12-06-20 @ 11:00) (57% - 94%)  CAPILLARY BLOOD GLUCOSE      POCT Blood Glucose.: 100 mg/dL (06 Dec 2020 05:04)  POCT Blood Glucose.: 101 mg/dL (05 Dec 2020 23:06)  POCT Blood Glucose.: 108 mg/dL (05 Dec 2020 18:06)  POCT Blood Glucose.: 125 mg/dL (05 Dec 2020 13:00)      12-05-20 @ 07:01  -  12-06-20 @ 07:00  --------------------------------------------------------  IN: 3710.8 mL / OUT: 4002 mL / NET: -291.2 mL      MEDICATIONS  (STANDING):  bisacodyl Suppository 10 milliGRAM(s) Rectal daily  calcium acetate 1334 milliGRAM(s) Oral three times a day with meals  chlorhexidine 0.12% Liquid 15 milliLiter(s) Oral Mucosa every 12 hours  chlorhexidine 0.12% Liquid 15 milliLiter(s) Oral Mucosa two times a day  chlorhexidine 2% Cloths 1 Application(s) Topical <User Schedule>  digoxin     Tablet 0.125 milliGRAM(s) Oral every other day  fentaNYL   Infusion 3 MICROgram(s)/kG/Hr (37.5 mL/Hr) IV Continuous <Continuous>  glucagon  Injectable 1 milliGRAM(s) IntraMuscular once  heparin  Infusion.  Unit(s)/Hr (22 mL/Hr) IV Continuous <Continuous>  insulin lispro (ADMELOG) corrective regimen sliding scale   SubCutaneous every 6 hours  lubricant eye ont 1 Application(s) 1 Application(s) Both EYES two times a day  midazolam Infusion 0.02 mG/kG/Hr (2.5 mL/Hr) IV Continuous <Continuous>  norepinephrine Infusion 0.5 MICROgram(s)/kG/Min (58.6 mL/Hr) IV Continuous <Continuous>  pantoprazole  Injectable 40 milliGRAM(s) IV Push daily  polyethylene glycol 3350 17 Gram(s) Oral daily  senna Syrup 8.8 milliLiter(s) Oral at bedtime    MEDICATIONS  (PRN):  ALBUTerol    90 MICROgram(s) HFA Inhaler 2 Puff(s) Inhalation every 6 hours PRN Shortness of Breath  sodium chloride 0.9% lock flush 10 milliLiter(s) IV Push every 1 hour PRN Pre/post blood products, medications, blood draw, and to maintain line patency      PHYSICAL EXAM:  GENERAL: Sedated and intubated on ventilatory support  HEENT: LEONOR, EOMI, neck supple, no JVP, no carotid bruit, oral mucosa moist and pink.  CHEST/LUNG: Bilateral decreased breath sounds, bilateral rales and crepitations, no wheezing  HEART: Regular rate and rhythm, ERNESTO II/VI at LPSB, no gallops, no rub   ABDOMEN: Soft, distended, bowel sounds present, no palpable organomegaly  : No flank or supra pubic tenderness.  EXTREMITIES: Bilateral upper and lower extremity pitting pedal edema  Neurology: Sedated and intubated on ventilatory support  SKIN: No rash or skin lesion  Musculoskeletal: Bilateral pitting pedal edema      Vascular ACCESS: Right IJ HD catheter    LABS:                        8.1    18.10 )-----------( 215      ( 06 Dec 2020 06:04 )             26.8     12-06    131<L>  |  93<L>  |  69<H>  ----------------------------<  84  4.1   |  23  |  7.58<H>    Ca    8.4      06 Dec 2020 06:04  Phos  7.3     12-06  Mg     2.3     12-06    TPro  6.4  /  Alb  2.2<L>  /  TBili  0.9  /  DBili  x   /  AST  60<H>  /  ALT  57  /  AlkPhos  167<H>  12-06      PTT - ( 06 Dec 2020 06:04 )  PTT:99.1 sec          RADIOLOGY & ADDITIONAL STUDIES:  r< from: Xray Chest 1 View- PORTABLE-Urgent (Xray Chest 1 View- PORTABLE-Urgent .) (12.05.20 @ 13:08) >    EXAM:  XR CHEST PORTABLE URGENT 1V                            PROCEDURE DATE:  12/05/2020          INTERPRETATION:  Portable chest radiograph    CLINICAL INFORMATION: Central line placement    TECHNIQUE:  Portable  AP view of the chest was obtained.    COMPARISON: No previous examinations are available for review.    FINDINGS: A RIGHT IJ catheter tip in SVC.  ET tube tip above tracheal bifurcation.  NG tube tip beyond GE junction.  LEFT IJ catheter tip in RIGHT cephalic vein.  The lungs show a similar diffuse some airspace disease unchanged from prior examination.  The  heart is enlarged in transverse diameter. No hilar mass.   Visualized osseous structures are intact.        IMPRESSION:   Catheters and tubes in place. No pneumothorax. Unchanged diffuse airspace disease.              ROALNDO GONZALEZ MD; Attending Radiologist  This document has been electronically signed. Dec  5 2020  2:04PM    < end of copied text >    Imaging Personally Reviewed:  [x] YES  [ ] NO    Consultant(s) Notes Reviewed:  [x] YES  [ ] NO    Care Discussed with Primary team/ Other Providers [x] YES  [ ] NO

## 2020-12-06 NOTE — PROGRESS NOTE ADULT - SUBJECTIVE AND OBJECTIVE BOX
INTERVAL HPI/OVERNIGHT EVENTS: Patient received dialysis, Posti dialyss BMP shows slight improvement in Creatinine, ABG 7.09/88/58/85    PRESSORS: [x ] YES [ ] NO  WHICH: levophed    ANTIBIOTICS:                  DATE STARTED:  ANTIBIOTICS:                  DATE STARTED:  ANTIBIOTICS:                  DATE STARTED:    Antimicrobial:    Cardiovascular:  digoxin     Tablet 0.125 milliGRAM(s) Oral every other day  norepinephrine Infusion 0.5 MICROgram(s)/kG/Min IV Continuous <Continuous>    Pulmonary:  ALBUTerol    90 MICROgram(s) HFA Inhaler 2 Puff(s) Inhalation every 6 hours PRN    Hematalogic:  heparin  Infusion.  Unit(s)/Hr IV Continuous <Continuous>    Other:  bisacodyl Suppository 10 milliGRAM(s) Rectal daily  calcium acetate 1334 milliGRAM(s) Oral three times a day with meals  chlorhexidine 0.12% Liquid 15 milliLiter(s) Oral Mucosa every 12 hours  chlorhexidine 0.12% Liquid 15 milliLiter(s) Oral Mucosa two times a day  chlorhexidine 2% Cloths 1 Application(s) Topical <User Schedule>  fentaNYL   Infusion 3 MICROgram(s)/kG/Hr IV Continuous <Continuous>  glucagon  Injectable 1 milliGRAM(s) IntraMuscular once  insulin lispro (ADMELOG) corrective regimen sliding scale   SubCutaneous every 6 hours  lubricant eye ont 1 Application(s) 1 Application(s) Both EYES two times a day  midazolam Infusion 0.02 mG/kG/Hr IV Continuous <Continuous>  pantoprazole  Injectable 40 milliGRAM(s) IV Push daily  polyethylene glycol 3350 17 Gram(s) Oral daily  senna Syrup 8.8 milliLiter(s) Oral at bedtime  sodium chloride 0.9% lock flush 10 milliLiter(s) IV Push every 1 hour PRN    ALBUTerol    90 MICROgram(s) HFA Inhaler 2 Puff(s) Inhalation every 6 hours PRN  bisacodyl Suppository 10 milliGRAM(s) Rectal daily  calcium acetate 1334 milliGRAM(s) Oral three times a day with meals  chlorhexidine 0.12% Liquid 15 milliLiter(s) Oral Mucosa every 12 hours  chlorhexidine 0.12% Liquid 15 milliLiter(s) Oral Mucosa two times a day  chlorhexidine 2% Cloths 1 Application(s) Topical <User Schedule>  digoxin     Tablet 0.125 milliGRAM(s) Oral every other day  fentaNYL   Infusion 3 MICROgram(s)/kG/Hr IV Continuous <Continuous>  glucagon  Injectable 1 milliGRAM(s) IntraMuscular once  heparin  Infusion.  Unit(s)/Hr IV Continuous <Continuous>  insulin lispro (ADMELOG) corrective regimen sliding scale   SubCutaneous every 6 hours  lubricant eye ont 1 Application(s) 1 Application(s) Both EYES two times a day  midazolam Infusion 0.02 mG/kG/Hr IV Continuous <Continuous>  norepinephrine Infusion 0.5 MICROgram(s)/kG/Min IV Continuous <Continuous>  pantoprazole  Injectable 40 milliGRAM(s) IV Push daily  polyethylene glycol 3350 17 Gram(s) Oral daily  senna Syrup 8.8 milliLiter(s) Oral at bedtime  sodium chloride 0.9% lock flush 10 milliLiter(s) IV Push every 1 hour PRN    Drug Dosing Weight  Height (cm): 154.4 (15 Nov 2020 09:25)  Weight (kg): 125 (15 Nov 2020 09:25)  BMI (kg/m2): 52.4 (15 Nov 2020 09:25)  BSA (m2): 2.16 (15 Nov 2020 09:25)    CENTRAL LINE: [ x] YES [ ] NO  LOCATION: J  DATE INSERTED: 12/1  REMOVE: [ ] YES [ ] NO  EXPLAIN:    MICHELLE: [1] YES [x ] NO    DATE INSERTED:  REMOVE:  [ ] YES [ ] NO  EXPLAIN:    A-LINE:  [x ] YES [ ] NO  LOCATION: [facility]   DATE INSERTED: 12/4  REMOVE:  [ ] YES [ ] NO  EXPLAIN:    PMH -reviewed admission note, no change since admission  PAST MEDICAL & SURGICAL HISTORY:  No pertinent past medical history    No significant past surgical history        ICU Vital Signs Last 24 Hrs  T(C): 37 (05 Dec 2020 20:00), Max: 37.4 (05 Dec 2020 13:30)  T(F): 98.6 (05 Dec 2020 20:00), Max: 99.3 (05 Dec 2020 13:30)  HR: 98 (06 Dec 2020 00:15) (78 - 101)  BP: 122/62 (06 Dec 2020 00:00) (46/30 - 128/104)  BP(mean): 76 (06 Dec 2020 00:00) (34 - 110)  ABP: 111/65 (06 Dec 2020 00:15) (66/56 - 139/60)  ABP(mean): 78 (06 Dec 2020 00:15) (53 - 191)  RR: 32 (06 Dec 2020 00:15) (0 - 38)  SpO2: 87% (06 Dec 2020 00:15) (57% - 94%)      ABG - ( 05 Dec 2020 23:39 )  pH, Arterial: 7.09  pH, Blood: x     /  pCO2: 88    /  pO2: 58    / HCO3: 25    / Base Excess: -5.1  /  SaO2: 85                    12-04 @ 07:01  -  12-05 @ 07:00  --------------------------------------------------------  IN: 1825.5 mL / OUT: 0 mL / NET: 1825.5 mL        Mode: AC/ CMV (Assist Control/ Continuous Mandatory Ventilation)  RR (machine): 32  TV (machine): 400  FiO2: 100  PEEP: 12  ITime: 1  MAP: 23  PC: 30  PIP: 41      PHYSICAL EXAM:    GENERAL: Sedated and intubated  HEAD:  [ ]Atraumatic, [ ]Normocephalic  EYES: [ ]EOMI, [ ]PERRLA, [ ]conjunctiva and sclera clear  ENMT: [ ]No tonsillar erythema, exudates, or enlargement; [ ]Moist mucous membranes, [ ]Good dentition, [ ]No lesions  NECK: [ ]Supple, normal appearance, [ ]No JVD; [ ]Normal thyroid; [ ]Trachea midline  NERVOUS SYSTEM:  [ ]Alert & Oriented X3, [ ]Good concentration; [ ]Motor Strength 5/5 B/L upper and lower extremities; [ ]DTRs 2+ intact and symmetric  CHEST/LUNG: [ ]No chest deformity; [ ]Normal percussion bilaterally; [ ]No rales, rhonchi, wheezing   HEART: Irregular rate and rhythm; [ ]No murmurs, rubs, or gallops  ABDOMEN: [ ]Soft, Nontender, Nondistended; [ ]Bowel sounds present  EXTREMITIES:  [ ]2+ Peripheral Pulses, Generalized Edema  LYMPH: [ ]No lymphadenopathy noted  SKIN: [ ]No rashes or lesions; [ ]Good capillary refill      LABS:  CBC Full  -  ( 05 Dec 2020 23:33 )  WBC Count : 18.28 K/uL  RBC Count : 2.79 M/uL  Hemoglobin : 8.3 g/dL  Hematocrit : 26.8 %  Platelet Count - Automated : 217 K/uL  Mean Cell Volume : 96.1 fl  Mean Cell Hemoglobin : 29.7 pg  Mean Cell Hemoglobin Concentration : 31.0 gm/dL  Auto Neutrophil # : x  Auto Lymphocyte # : x  Auto Monocyte # : x  Auto Eosinophil # : x  Auto Basophil # : x  Auto Neutrophil % : x  Auto Lymphocyte % : x  Auto Monocyte % : x  Auto Eosinophil % : x  Auto Basophil % : x    12-05    130<L>  |  92<L>  |  66<H>  ----------------------------<  84  4.0   |  25  |  6.76<H>    Ca    8.7      05 Dec 2020 23:33  Phos  7.7     12-05  Mg     2.5     12-05    TPro  6.1  /  Alb  2.2<L>  /  TBili  0.7  /  DBili  x   /  AST  43<H>  /  ALT  60  /  AlkPhos  148<H>  12-05    PTT - ( 05 Dec 2020 06:25 )  PTT:70.9 sec        RADIOLOGY & ADDITIONAL STUDIES REVIEWED:  ***    [ ]GOALS OF CARE DISCUSSION WITH PATIENT/FAMILY/PROXY:    CRITICAL CARE TIME SPENT: 35 minutes INTERVAL HPI/OVERNIGHT EVENTS: Patient received dialysis, Posti dialyss BMP shows slight improvement in Creatinine, ABG 7.09/88/58/85    PRESSORS: [x ] YES [ ] NO  WHICH: levophed    ANTIBIOTICS:                  DATE STARTED:  ANTIBIOTICS:                  DATE STARTED:  ANTIBIOTICS:                  DATE STARTED:    Antimicrobial:    Cardiovascular:  digoxin     Tablet 0.125 milliGRAM(s) Oral every other day  norepinephrine Infusion 0.5 MICROgram(s)/kG/Min IV Continuous <Continuous>    Pulmonary:  ALBUTerol    90 MICROgram(s) HFA Inhaler 2 Puff(s) Inhalation every 6 hours PRN    Hematalogic:  heparin  Infusion.  Unit(s)/Hr IV Continuous <Continuous>    Other:  bisacodyl Suppository 10 milliGRAM(s) Rectal daily  calcium acetate 1334 milliGRAM(s) Oral three times a day with meals  chlorhexidine 0.12% Liquid 15 milliLiter(s) Oral Mucosa every 12 hours  chlorhexidine 0.12% Liquid 15 milliLiter(s) Oral Mucosa two times a day  chlorhexidine 2% Cloths 1 Application(s) Topical <User Schedule>  fentaNYL   Infusion 3 MICROgram(s)/kG/Hr IV Continuous <Continuous>  glucagon  Injectable 1 milliGRAM(s) IntraMuscular once  insulin lispro (ADMELOG) corrective regimen sliding scale   SubCutaneous every 6 hours  lubricant eye ont 1 Application(s) 1 Application(s) Both EYES two times a day  midazolam Infusion 0.02 mG/kG/Hr IV Continuous <Continuous>  pantoprazole  Injectable 40 milliGRAM(s) IV Push daily  polyethylene glycol 3350 17 Gram(s) Oral daily  senna Syrup 8.8 milliLiter(s) Oral at bedtime  sodium chloride 0.9% lock flush 10 milliLiter(s) IV Push every 1 hour PRN    ALBUTerol    90 MICROgram(s) HFA Inhaler 2 Puff(s) Inhalation every 6 hours PRN  bisacodyl Suppository 10 milliGRAM(s) Rectal daily  calcium acetate 1334 milliGRAM(s) Oral three times a day with meals  chlorhexidine 0.12% Liquid 15 milliLiter(s) Oral Mucosa every 12 hours  chlorhexidine 0.12% Liquid 15 milliLiter(s) Oral Mucosa two times a day  chlorhexidine 2% Cloths 1 Application(s) Topical <User Schedule>  digoxin     Tablet 0.125 milliGRAM(s) Oral every other day  fentaNYL   Infusion 3 MICROgram(s)/kG/Hr IV Continuous <Continuous>  glucagon  Injectable 1 milliGRAM(s) IntraMuscular once  heparin  Infusion.  Unit(s)/Hr IV Continuous <Continuous>  insulin lispro (ADMELOG) corrective regimen sliding scale   SubCutaneous every 6 hours  lubricant eye ont 1 Application(s) 1 Application(s) Both EYES two times a day  midazolam Infusion 0.02 mG/kG/Hr IV Continuous <Continuous>  norepinephrine Infusion 0.5 MICROgram(s)/kG/Min IV Continuous <Continuous>  pantoprazole  Injectable 40 milliGRAM(s) IV Push daily  polyethylene glycol 3350 17 Gram(s) Oral daily  senna Syrup 8.8 milliLiter(s) Oral at bedtime  sodium chloride 0.9% lock flush 10 milliLiter(s) IV Push every 1 hour PRN    Drug Dosing Weight  Height (cm): 154.4 (15 Nov 2020 09:25)  Weight (kg): 125 (15 Nov 2020 09:25)  BMI (kg/m2): 52.4 (15 Nov 2020 09:25)  BSA (m2): 2.16 (15 Nov 2020 09:25)    CENTRAL LINE: [ x] YES [ ] NO  LOCATION: [facility],  DATE INSERTED: 12/1  RI patria inserted on 12/5  REMOVE: [ ] YES [ ] NO  EXPLAIN:    MICHELLE: [x] YES [ ] NO    DATE INSERTED:  REMOVE:  [ ] YES [ ] NO  EXPLAIN:    A-LINE:  [x ] YES [ ] NO  LOCATION:    DATE INSERTED: 12/4  REMOVE:  [ ] YES [ ] NO  EXPLAIN:    PMH -reviewed admission note, no change since admission  PAST MEDICAL & SURGICAL HISTORY:  No pertinent past medical history    No significant past surgical history        ICU Vital Signs Last 24 Hrs  T(C): 37 (05 Dec 2020 20:00), Max: 37.4 (05 Dec 2020 13:30)  T(F): 98.6 (05 Dec 2020 20:00), Max: 99.3 (05 Dec 2020 13:30)  HR: 98 (06 Dec 2020 00:15) (78 - 101)  BP: 122/62 (06 Dec 2020 00:00) (46/30 - 128/104)  BP(mean): 76 (06 Dec 2020 00:00) (34 - 110)  ABP: 111/65 (06 Dec 2020 00:15) (66/56 - 139/60)  ABP(mean): 78 (06 Dec 2020 00:15) (53 - 191)  RR: 32 (06 Dec 2020 00:15) (0 - 38)  SpO2: 87% (06 Dec 2020 00:15) (57% - 94%)      ABG - ( 05 Dec 2020 23:39 )  pH, Arterial: 7.09  pH, Blood: x     /  pCO2: 88    /  pO2: 58    / HCO3: 25    / Base Excess: -5.1  /  SaO2: 85                    12-04 @ 07:01  -  12-05 @ 07:00  --------------------------------------------------------  IN: 1825.5 mL / OUT: 0 mL / NET: 1825.5 mL        Mode: AC/ CMV (Assist Control/ Continuous Mandatory Ventilation)  RR (machine): 32  TV (machine): 400  FiO2: 100  PEEP: 12  ITime: 1  MAP: 23  PC: 30  PIP: 41      PHYSICAL EXAM:    GENERAL: Sedated and Intubated   HEAD:  Atraumatic, Normocephalic  EYES:  PERRLA, conjunctiva and sclera clear  NECK: Supple, normal appearance, Trachea midline  NERVOUS SYSTEM:  sedated, Alert & Oriented X0,   CHEST/LUNG: No chest deformity; Normal percussion bilaterally; No rales, rhonchi, wheezing   HEART: Regular rate and rhythm; No murmurs, rubs, or gallops  ABDOMEN: Soft, Nontender, obese; [Bowel sounds present  EXTREMITIES:  2+ Peripheral Pulses, Severe edema  LYMPH: No lymphadenopathy noted  SKIN: No rashes or lesions; edematous     LABS:  CBC Full  -  ( 05 Dec 2020 23:33 )  WBC Count : 18.28 K/uL  RBC Count : 2.79 M/uL  Hemoglobin : 8.3 g/dL  Hematocrit : 26.8 %  Platelet Count - Automated : 217 K/uL  Mean Cell Volume : 96.1 fl  Mean Cell Hemoglobin : 29.7 pg  Mean Cell Hemoglobin Concentration : 31.0 gm/dL  Auto Neutrophil # : x  Auto Lymphocyte # : x  Auto Monocyte # : x  Auto Eosinophil # : x  Auto Basophil # : x  Auto Neutrophil % : x  Auto Lymphocyte % : x  Auto Monocyte % : x  Auto Eosinophil % : x  Auto Basophil % : x    12-05    130<L>  |  92<L>  |  66<H>  ----------------------------<  84  4.0   |  25  |  6.76<H>    Ca    8.7      05 Dec 2020 23:33  Phos  7.7     12-05  Mg     2.5     12-05    TPro  6.1  /  Alb  2.2<L>  /  TBili  0.7  /  DBili  x   /  AST  43<H>  /  ALT  60  /  AlkPhos  148<H>  12-05    PTT - ( 05 Dec 2020 06:25 )  PTT:70.9 sec        RADIOLOGY & ADDITIONAL STUDIES REVIEWED:      < from: Xray Chest 1 View- PORTABLE-Urgent (Xray Chest 1 View- PORTABLE-Urgent .) (12.05.20 @ 13:08) >  FINDINGS: A RIGHT IJ catheter tip in SVC.  ET tube tip above tracheal bifurcation.  NG tube tip beyond GE junction.  LEFT IJ catheter tip in RIGHT cephalic vein.  The lungs show a similar diffuse some airspace disease unchanged from prior examination.  The  heart is enlarged in transverse diameter. No hilar mass.   Visualized osseous structures are intact.        IMPRESSION:   Catheters and tubes in place. No pneumothorax. Unchanged diffuse airspace disease.      < end of copied text >      [ ]GOALS OF CARE DISCUSSION WITH PATIENT/FAMILY/PROXY:    CRITICAL CARE TIME SPENT: 35 minutes INTERVAL HPI/OVERNIGHT EVENTS: Patient received dialysis, Posti dialyss BMP shows slight improvement in Creatinine, ABG 7.09/88/58/85  ETT was advanced 2cm    PRESSORS: [x ] YES [ ] NO  WHICH: levophed    ANTIBIOTICS:                  DATE STARTED:  ANTIBIOTICS:                  DATE STARTED:  ANTIBIOTICS:                  DATE STARTED:    Antimicrobial:    Cardiovascular:  digoxin     Tablet 0.125 milliGRAM(s) Oral every other day  norepinephrine Infusion 0.5 MICROgram(s)/kG/Min IV Continuous <Continuous>    Pulmonary:  ALBUTerol    90 MICROgram(s) HFA Inhaler 2 Puff(s) Inhalation every 6 hours PRN    Hematalogic:  heparin  Infusion.  Unit(s)/Hr IV Continuous <Continuous>    Other:  bisacodyl Suppository 10 milliGRAM(s) Rectal daily  calcium acetate 1334 milliGRAM(s) Oral three times a day with meals  chlorhexidine 0.12% Liquid 15 milliLiter(s) Oral Mucosa every 12 hours  chlorhexidine 0.12% Liquid 15 milliLiter(s) Oral Mucosa two times a day  chlorhexidine 2% Cloths 1 Application(s) Topical <User Schedule>  fentaNYL   Infusion 3 MICROgram(s)/kG/Hr IV Continuous <Continuous>  glucagon  Injectable 1 milliGRAM(s) IntraMuscular once  insulin lispro (ADMELOG) corrective regimen sliding scale   SubCutaneous every 6 hours  lubricant eye ont 1 Application(s) 1 Application(s) Both EYES two times a day  midazolam Infusion 0.02 mG/kG/Hr IV Continuous <Continuous>  pantoprazole  Injectable 40 milliGRAM(s) IV Push daily  polyethylene glycol 3350 17 Gram(s) Oral daily  senna Syrup 8.8 milliLiter(s) Oral at bedtime  sodium chloride 0.9% lock flush 10 milliLiter(s) IV Push every 1 hour PRN    ALBUTerol    90 MICROgram(s) HFA Inhaler 2 Puff(s) Inhalation every 6 hours PRN  bisacodyl Suppository 10 milliGRAM(s) Rectal daily  calcium acetate 1334 milliGRAM(s) Oral three times a day with meals  chlorhexidine 0.12% Liquid 15 milliLiter(s) Oral Mucosa every 12 hours  chlorhexidine 0.12% Liquid 15 milliLiter(s) Oral Mucosa two times a day  chlorhexidine 2% Cloths 1 Application(s) Topical <User Schedule>  digoxin     Tablet 0.125 milliGRAM(s) Oral every other day  fentaNYL   Infusion 3 MICROgram(s)/kG/Hr IV Continuous <Continuous>  glucagon  Injectable 1 milliGRAM(s) IntraMuscular once  heparin  Infusion.  Unit(s)/Hr IV Continuous <Continuous>  insulin lispro (ADMELOG) corrective regimen sliding scale   SubCutaneous every 6 hours  lubricant eye ont 1 Application(s) 1 Application(s) Both EYES two times a day  midazolam Infusion 0.02 mG/kG/Hr IV Continuous <Continuous>  norepinephrine Infusion 0.5 MICROgram(s)/kG/Min IV Continuous <Continuous>  pantoprazole  Injectable 40 milliGRAM(s) IV Push daily  polyethylene glycol 3350 17 Gram(s) Oral daily  senna Syrup 8.8 milliLiter(s) Oral at bedtime  sodium chloride 0.9% lock flush 10 milliLiter(s) IV Push every 1 hour PRN    Drug Dosing Weight  Height (cm): 154.4 (15 Nov 2020 09:25)  Weight (kg): 125 (15 Nov 2020 09:25)  BMI (kg/m2): 52.4 (15 Nov 2020 09:25)  BSA (m2): 2.16 (15 Nov 2020 09:25)    CENTRAL LINE: [ x] YES [ ] NO  LOCATION: [facility],  DATE INSERTED: 12/1  RI patria inserted on 12/5  REMOVE: [ ] YES [ ] NO  EXPLAIN:    MICHELLE: [x] YES [ ] NO    DATE INSERTED:  REMOVE:  [ ] YES [ ] NO  EXPLAIN:    A-LINE:  [x ] YES [ ] NO  LOCATION:    DATE INSERTED: 12/4  REMOVE:  [ ] YES [ ] NO  EXPLAIN:    PMH -reviewed admission note, no change since admission  PAST MEDICAL & SURGICAL HISTORY:  No pertinent past medical history    No significant past surgical history        ICU Vital Signs Last 24 Hrs  T(C): 37 (05 Dec 2020 20:00), Max: 37.4 (05 Dec 2020 13:30)  T(F): 98.6 (05 Dec 2020 20:00), Max: 99.3 (05 Dec 2020 13:30)  HR: 98 (06 Dec 2020 00:15) (78 - 101)  BP: 122/62 (06 Dec 2020 00:00) (46/30 - 128/104)  BP(mean): 76 (06 Dec 2020 00:00) (34 - 110)  ABP: 111/65 (06 Dec 2020 00:15) (66/56 - 139/60)  ABP(mean): 78 (06 Dec 2020 00:15) (53 - 191)  RR: 32 (06 Dec 2020 00:15) (0 - 38)  SpO2: 87% (06 Dec 2020 00:15) (57% - 94%)      ABG - ( 05 Dec 2020 23:39 )  pH, Arterial: 7.09  pH, Blood: x     /  pCO2: 88    /  pO2: 58    / HCO3: 25    / Base Excess: -5.1  /  SaO2: 85                    12-04 @ 07:01  -  12-05 @ 07:00  --------------------------------------------------------  IN: 1825.5 mL / OUT: 0 mL / NET: 1825.5 mL        Mode: AC/ CMV (Assist Control/ Continuous Mandatory Ventilation)  RR (machine): 32  TV (machine): 400  FiO2: 100  PEEP: 12  ITime: 1  MAP: 23  PC: 30  PIP: 41      PHYSICAL EXAM:    GENERAL: Sedated and Intubated   HEAD:  Atraumatic, Normocephalic  EYES:  PERRLA, conjunctiva and sclera clear  NECK: Supple, normal appearance, Trachea midline  NERVOUS SYSTEM:  sedated, Alert & Oriented X0,   CHEST/LUNG: No chest deformity; Normal percussion bilaterally; No rales, rhonchi, wheezing   HEART: Regular rate and rhythm; No murmurs, rubs, or gallops  ABDOMEN: Soft, Nontender, obese; [Bowel sounds present  EXTREMITIES:  2+ Peripheral Pulses, Severe edema  LYMPH: No lymphadenopathy noted  SKIN: No rashes or lesions; edematous     LABS:  CBC Full  -  ( 05 Dec 2020 23:33 )  WBC Count : 18.28 K/uL  RBC Count : 2.79 M/uL  Hemoglobin : 8.3 g/dL  Hematocrit : 26.8 %  Platelet Count - Automated : 217 K/uL  Mean Cell Volume : 96.1 fl  Mean Cell Hemoglobin : 29.7 pg  Mean Cell Hemoglobin Concentration : 31.0 gm/dL  Auto Neutrophil # : x  Auto Lymphocyte # : x  Auto Monocyte # : x  Auto Eosinophil # : x  Auto Basophil # : x  Auto Neutrophil % : x  Auto Lymphocyte % : x  Auto Monocyte % : x  Auto Eosinophil % : x  Auto Basophil % : x    12-05    130<L>  |  92<L>  |  66<H>  ----------------------------<  84  4.0   |  25  |  6.76<H>    Ca    8.7      05 Dec 2020 23:33  Phos  7.7     12-05  Mg     2.5     12-05    TPro  6.1  /  Alb  2.2<L>  /  TBili  0.7  /  DBili  x   /  AST  43<H>  /  ALT  60  /  AlkPhos  148<H>  12-05    PTT - ( 05 Dec 2020 06:25 )  PTT:70.9 sec        RADIOLOGY & ADDITIONAL STUDIES REVIEWED:      < from: Xray Chest 1 View- PORTABLE-Urgent (Xray Chest 1 View- PORTABLE-Urgent .) (12.05.20 @ 13:08) >  FINDINGS: A RIGHT IJ catheter tip in SVC.  ET tube tip above tracheal bifurcation.  NG tube tip beyond GE junction.  LEFT IJ catheter tip in RIGHT cephalic vein.  The lungs show a similar diffuse some airspace disease unchanged from prior examination.  The  heart is enlarged in transverse diameter. No hilar mass.   Visualized osseous structures are intact.        IMPRESSION:   Catheters and tubes in place. No pneumothorax. Unchanged diffuse airspace disease.      < end of copied text >      [ ]GOALS OF CARE DISCUSSION WITH PATIENT/FAMILY/PROXY:    CRITICAL CARE TIME SPENT: 35 minutes

## 2020-12-06 NOTE — PROGRESS NOTE ADULT - PROBLEM SELECTOR PLAN 2
Patient with Calcium 8.4 with albumin 2.2, Phos  7.3, Mag 2.5  Continue  Phoslo 1331 mg po tid  Monitor BMP, calcium, mag, phos level

## 2020-12-06 NOTE — PROGRESS NOTE ADULT - PROBLEM SELECTOR PLAN 1
Anuric AGUSTO from ATN due to septic shock/ARDS requiring RRT/HD  - S/p HD treatment on 12/05 with net 2.9 L fluid removal  - Avoid Nephrotoxic agents  - Monitor BMP and electrolytes  - Maintain MAP>65-70 mm hg  - Adjust antibiotics as per renal dose clearances  - Volume status and electrolytes noted.  - HD treatment as per ordered for UF and clearances

## 2020-12-07 NOTE — PROGRESS NOTE ADULT - PROBLEM SELECTOR PLAN 6
Anemia of chronic illness with downtrending hgb 7.6  Monitor serial CBC  Transfuse PRBC per primary team

## 2020-12-07 NOTE — PROGRESS NOTE ADULT - PROBLEM SELECTOR PLAN 2
Patient with Calcium 8.4 with albumin 2.2, Phos  6.2, Mag 2.5  Continue  Phoslo 1331 mg po tid  Monitor BMP, calcium, mag, phos level

## 2020-12-07 NOTE — PROGRESS NOTE ADULT - ATTENDING COMMENTS
49 yr  old obese male with out any significant medical history  who presented with sob due to acute hypoxic respiratory failure due to covid pneumonia.    Assessment:  1. Acute Hypoxic respiratory Failure / ARDS  2. COVID-19 PNA  3. Morbid Obesity   4. Elevated lactate  5. Hypotension - sedation related  6. Type 2 MI - likely due to hypoxia  7. Acute kidney injury     Plan  - Cont. HD with aggressive fluid removal, as patient with increased pulmonary edema and difficulty ventilating.  - intubated 11/19 for worsening hypoxia and worsening ARDS  - vent management with lung protective strategy   -propofol held due to high TG  -Cont versed/fentanyl gtt  -Cont. heparin infusion  -monitor blood gas  - dig for afib  - keep o2 sat >90%  - Antibiotics per ID  - Monitor renal and hepatic function  - Vasopressor support to maintain MAP > 65  - Tube feedings held due to increased volume and overall edema  - isolation : contact and airborne  - Pain control  - Poor prognosis at this time, given multiple organ failure   - DVT/ GI prophy .

## 2020-12-07 NOTE — PROGRESS NOTE ADULT - PROBLEM SELECTOR PLAN 1
Anuric AGUSTO from ATN due to septic shock/ARDS requiring RRT/HD  - S/p HD treatment on 12/06 with net 3.5 L fluid removal  - Avoid Nephrotoxic agents  - Monitor BMP and electrolytes  - Maintain MAP>65-70 mm hg  - Adjust antibiotics as per renal dose clearances  - Volume status and electrolytes noted.  - Additional HD treatment as per ordered for UF and clearances

## 2020-12-07 NOTE — CHART NOTE - NSCHARTNOTEFT_GEN_A_CORE
Patient had drop in SpO2 and patient was not pulling enough Tidal volume. RT was called and patient was found to have leak in the cuff. ETT was replaced. 50mg of Rocuronium was pushed STAT. Patient tolerated the procedure well.

## 2020-12-07 NOTE — PROGRESS NOTE ADULT - SUBJECTIVE AND OBJECTIVE BOX
INTERVAL HPI/OVERNIGHT EVENTS: Feeds on Hold, Net positive fluid     PRESSORS: [x ] YES [ ] NO  WHICH:    ANTIBIOTICS:                  DATE STARTED:  ANTIBIOTICS:                  DATE STARTED:  ANTIBIOTICS:                  DATE STARTED:    Antimicrobial:    Cardiovascular:  digoxin     Tablet 0.125 milliGRAM(s) Oral every other day  norepinephrine Infusion 0.5 MICROgram(s)/kG/Min IV Continuous <Continuous>    Pulmonary:  ALBUTerol    90 MICROgram(s) HFA Inhaler 2 Puff(s) Inhalation every 6 hours PRN    Hematalogic:  heparin  Infusion.  Unit(s)/Hr IV Continuous <Continuous>    Other:  bisacodyl Suppository 10 milliGRAM(s) Rectal daily  calcium acetate 1334 milliGRAM(s) Oral three times a day with meals  chlorhexidine 0.12% Liquid 15 milliLiter(s) Oral Mucosa every 12 hours  chlorhexidine 2% Cloths 1 Application(s) Topical <User Schedule>  fentaNYL   Infusion 3 MICROgram(s)/kG/Hr IV Continuous <Continuous>  glucagon  Injectable 1 milliGRAM(s) IntraMuscular once  insulin lispro (ADMELOG) corrective regimen sliding scale   SubCutaneous every 6 hours  lubricant eye ont 1 Application(s) 1 Application(s) Both EYES two times a day  midazolam Infusion 0.02 mG/kG/Hr IV Continuous <Continuous>  pantoprazole  Injectable 40 milliGRAM(s) IV Push daily  polyethylene glycol 3350 17 Gram(s) Oral daily  senna Syrup 8.8 milliLiter(s) Oral at bedtime  sodium chloride 0.9% lock flush 10 milliLiter(s) IV Push every 1 hour PRN    ALBUTerol    90 MICROgram(s) HFA Inhaler 2 Puff(s) Inhalation every 6 hours PRN  bisacodyl Suppository 10 milliGRAM(s) Rectal daily  calcium acetate 1334 milliGRAM(s) Oral three times a day with meals  chlorhexidine 0.12% Liquid 15 milliLiter(s) Oral Mucosa every 12 hours  chlorhexidine 2% Cloths 1 Application(s) Topical <User Schedule>  digoxin     Tablet 0.125 milliGRAM(s) Oral every other day  fentaNYL   Infusion 3 MICROgram(s)/kG/Hr IV Continuous <Continuous>  glucagon  Injectable 1 milliGRAM(s) IntraMuscular once  heparin  Infusion.  Unit(s)/Hr IV Continuous <Continuous>  insulin lispro (ADMELOG) corrective regimen sliding scale   SubCutaneous every 6 hours  lubricant eye ont 1 Application(s) 1 Application(s) Both EYES two times a day  midazolam Infusion 0.02 mG/kG/Hr IV Continuous <Continuous>  norepinephrine Infusion 0.5 MICROgram(s)/kG/Min IV Continuous <Continuous>  pantoprazole  Injectable 40 milliGRAM(s) IV Push daily  polyethylene glycol 3350 17 Gram(s) Oral daily  senna Syrup 8.8 milliLiter(s) Oral at bedtime  sodium chloride 0.9% lock flush 10 milliLiter(s) IV Push every 1 hour PRN    Drug Dosing Weight  Height (cm): 154.4 (15 Nov 2020 09:25)  Weight (kg): 125 (15 Nov 2020 09:25)  BMI (kg/m2): 52.4 (15 Nov 2020 09:25)  BSA (m2): 2.16 (15 Nov 2020 09:25)    CENTRAL LINE: [ ] YES [ ] NO  LOCATION:   DATE INSERTED:  REMOVE: [ ] YES [ ] NO  EXPLAIN:    MICHELLE: [ ] YES [ ] NO    DATE INSERTED:  REMOVE:  [ ] YES [ ] NO  EXPLAIN:    A-LINE:  [ ] YES [ ] NO  LOCATION:   DATE INSERTED:  REMOVE:  [ ] YES [ ] NO  EXPLAIN:    PMH -reviewed admission note, no change since admission  PAST MEDICAL & SURGICAL HISTORY:  No pertinent past medical history    No significant past surgical history        ICU Vital Signs Last 24 Hrs  T(C): 36.3 (07 Dec 2020 04:00), Max: 38 (06 Dec 2020 16:33)  T(F): 97.3 (07 Dec 2020 04:00), Max: 100.4 (06 Dec 2020 16:33)  HR: 92 (07 Dec 2020 07:00) (84 - 96)  BP: 118/57 (07 Dec 2020 07:00) (57/35 - 135/64)  BP(mean): 71 (07 Dec 2020 07:00) (40 - 467)  ABP: 107/50 (07 Dec 2020 07:00) (49/32 - 132/65)  ABP(mean): 65 (07 Dec 2020 07:00) (38 - 85)  RR: 32 (07 Dec 2020 07:00) (21 - 32)  SpO2: 91% (07 Dec 2020 07:00) (80% - 94%)      ABG - ( 07 Dec 2020 04:18 )  pH, Arterial: 7.10  pH, Blood: x     /  pCO2: 83    /  pO2: 77    / HCO3: 25    / Base Excess: -5.9  /  SaO2: 93                    12-06 @ 07:01  -  12-07 @ 07:00  --------------------------------------------------------  IN: 3213.7 mL / OUT: 4000 mL / NET: -786.3 mL        Mode: AC/ CMV (Assist Control/ Continuous Mandatory Ventilation)  RR (machine): 32  TV (machine): 400  FiO2: 100  PEEP: 12  ITime: 1  MAP: 24  PIP: 43      PHYSICAL EXAM:    GENERAL: [ ]NAD, [ ]well-groomed, [ ]well-developed  HEAD:  [ ]Atraumatic, [ ]Normocephalic  EYES: [ ]EOMI, [ ]PERRLA, [ ]conjunctiva and sclera clear  ENMT: [ ]No tonsillar erythema, exudates, or enlargement; [ ]Moist mucous membranes, [ ]Good dentition, [ ]No lesions  NECK: [ ]Supple, normal appearance, [ ]No JVD; [ ]Normal thyroid; [ ]Trachea midline  NERVOUS SYSTEM:  [ ]Alert & Oriented X3, [ ]Good concentration; [ ]Motor Strength 5/5 B/L upper and lower extremities; [ ]DTRs 2+ intact and symmetric  CHEST/LUNG: [ ]No chest deformity; [ ]Normal percussion bilaterally; [ ]No rales, rhonchi, wheezing   HEART: [ ]Regular rate and rhythm; [ ]No murmurs, rubs, or gallops  ABDOMEN: [ ]Soft, Nontender, Nondistended; [ ]Bowel sounds present  EXTREMITIES:  [ ]2+ Peripheral Pulses, [ ]No clubbing, cyanosis, or edema  LYMPH: [ ]No lymphadenopathy noted  SKIN: [ ]No rashes or lesions; [ ]Good capillary refill      LABS:  CBC Full  -  ( 07 Dec 2020 05:29 )  WBC Count : 15.06 K/uL  RBC Count : 2.55 M/uL  Hemoglobin : 7.6 g/dL  Hematocrit : 25.1 %  Platelet Count - Automated : 179 K/uL  Mean Cell Volume : 98.4 fl  Mean Cell Hemoglobin : 29.8 pg  Mean Cell Hemoglobin Concentration : 30.3 gm/dL  Auto Neutrophil # : 10.39 K/uL  Auto Lymphocyte # : 2.11 K/uL  Auto Monocyte # : 0.75 K/uL  Auto Eosinophil # : 0.60 K/uL  Auto Basophil # : 0.00 K/uL  Auto Neutrophil % : 67.0 %  Auto Lymphocyte % : 14.0 %  Auto Monocyte % : 5.0 %  Auto Eosinophil % : 4.0 %  Auto Basophil % : 0.0 %    12-07    132<L>  |  95<L>  |  54<H>  ----------------------------<  91  3.7   |  27  |  5.75<H>    Ca    8.4      07 Dec 2020 05:29  Phos  6.2     12-07  Mg     2.1     12-07    TPro  6.1  /  Alb  2.1<L>  /  TBili  0.8  /  DBili  x   /  AST  51<H>  /  ALT  51  /  AlkPhos  172<H>  12-07    PTT - ( 07 Dec 2020 05:29 )  PTT:84.9 sec        RADIOLOGY & ADDITIONAL STUDIES REVIEWED:  ***    [ ]GOALS OF CARE DISCUSSION WITH PATIENT/FAMILY/PROXY:    CRITICAL CARE TIME SPENT: 35 minutes INTERVAL HPI/OVERNIGHT EVENTS: No improvement in patient's condition overall. Feeds on Hold, Net positive fluid     PRESSORS: [x ] YES [ ] NO  WHICH:    ANTIBIOTICS:                  DATE STARTED:  ANTIBIOTICS:                  DATE STARTED:  ANTIBIOTICS:                  DATE STARTED:    Antimicrobial:    Cardiovascular:  digoxin     Tablet 0.125 milliGRAM(s) Oral every other day  norepinephrine Infusion 0.5 MICROgram(s)/kG/Min IV Continuous <Continuous>    Pulmonary:  ALBUTerol    90 MICROgram(s) HFA Inhaler 2 Puff(s) Inhalation every 6 hours PRN    Hematalogic:  heparin  Infusion.  Unit(s)/Hr IV Continuous <Continuous>    Other:  bisacodyl Suppository 10 milliGRAM(s) Rectal daily  calcium acetate 1334 milliGRAM(s) Oral three times a day with meals  chlorhexidine 0.12% Liquid 15 milliLiter(s) Oral Mucosa every 12 hours  chlorhexidine 2% Cloths 1 Application(s) Topical <User Schedule>  fentaNYL   Infusion 3 MICROgram(s)/kG/Hr IV Continuous <Continuous>  glucagon  Injectable 1 milliGRAM(s) IntraMuscular once  insulin lispro (ADMELOG) corrective regimen sliding scale   SubCutaneous every 6 hours  lubricant eye ont 1 Application(s) 1 Application(s) Both EYES two times a day  midazolam Infusion 0.02 mG/kG/Hr IV Continuous <Continuous>  pantoprazole  Injectable 40 milliGRAM(s) IV Push daily  polyethylene glycol 3350 17 Gram(s) Oral daily  senna Syrup 8.8 milliLiter(s) Oral at bedtime  sodium chloride 0.9% lock flush 10 milliLiter(s) IV Push every 1 hour PRN    ALBUTerol    90 MICROgram(s) HFA Inhaler 2 Puff(s) Inhalation every 6 hours PRN  bisacodyl Suppository 10 milliGRAM(s) Rectal daily  calcium acetate 1334 milliGRAM(s) Oral three times a day with meals  chlorhexidine 0.12% Liquid 15 milliLiter(s) Oral Mucosa every 12 hours  chlorhexidine 2% Cloths 1 Application(s) Topical <User Schedule>  digoxin     Tablet 0.125 milliGRAM(s) Oral every other day  fentaNYL   Infusion 3 MICROgram(s)/kG/Hr IV Continuous <Continuous>  glucagon  Injectable 1 milliGRAM(s) IntraMuscular once  heparin  Infusion.  Unit(s)/Hr IV Continuous <Continuous>  insulin lispro (ADMELOG) corrective regimen sliding scale   SubCutaneous every 6 hours  lubricant eye ont 1 Application(s) 1 Application(s) Both EYES two times a day  midazolam Infusion 0.02 mG/kG/Hr IV Continuous <Continuous>  norepinephrine Infusion 0.5 MICROgram(s)/kG/Min IV Continuous <Continuous>  pantoprazole  Injectable 40 milliGRAM(s) IV Push daily  polyethylene glycol 3350 17 Gram(s) Oral daily  senna Syrup 8.8 milliLiter(s) Oral at bedtime  sodium chloride 0.9% lock flush 10 milliLiter(s) IV Push every 1 hour PRN    Drug Dosing Weight  Height (cm): 154.4 (15 Nov 2020 09:25)  Weight (kg): 125 (15 Nov 2020 09:25)  BMI (kg/m2): 52.4 (15 Nov 2020 09:25)  BSA (m2): 2.16 (15 Nov 2020 09:25)    CENTRAL LINE: [x ] YES [ ] NO  LOCATION: [facility] 12/1  DATE INSERTED:  REMOVE: [ ] YES [ ] NO  EXPLAIN:    MICHELLE: [ ] YES [ x] NO    DATE INSERTED:  REMOVE:  [ ] YES [ ] NO  EXPLAIN:    A-LINE:  [ x] YES [ ] NO  LOCATION:   DATE INSERTED: 12/4  REMOVE:  [ ] YES [ ] NO  EXPLAIN:    PMH -reviewed admission note, no change since admission  PAST MEDICAL & SURGICAL HISTORY:  No pertinent past medical history    No significant past surgical history        ICU Vital Signs Last 24 Hrs  T(C): 36.3 (07 Dec 2020 04:00), Max: 38 (06 Dec 2020 16:33)  T(F): 97.3 (07 Dec 2020 04:00), Max: 100.4 (06 Dec 2020 16:33)  HR: 92 (07 Dec 2020 07:00) (84 - 96)  BP: 118/57 (07 Dec 2020 07:00) (57/35 - 135/64)  BP(mean): 71 (07 Dec 2020 07:00) (40 - 467)  ABP: 107/50 (07 Dec 2020 07:00) (49/32 - 132/65)  ABP(mean): 65 (07 Dec 2020 07:00) (38 - 85)  RR: 32 (07 Dec 2020 07:00) (21 - 32)  SpO2: 91% (07 Dec 2020 07:00) (80% - 94%)      ABG - ( 07 Dec 2020 04:18 )  pH, Arterial: 7.10  pH, Blood: x     /  pCO2: 83    /  pO2: 77    / HCO3: 25    / Base Excess: -5.9  /  SaO2: 93                    12-06 @ 07:01  -  12-07 @ 07:00  --------------------------------------------------------  IN: 3213.7 mL / OUT: 4000 mL / NET: -786.3 mL        Mode: AC/ CMV (Assist Control/ Continuous Mandatory Ventilation)  RR (machine): 32  TV (machine): 400  FiO2: 100  PEEP: 12  ITime: 1  MAP: 24  PIP: 43      PHYSICAL EXAM:    GENERAL: SEDATED  and Intubated, No change  HEAD:  [x ]Atraumatic, [x ]Normocephalic  EYES: [ x]conjunctiva and sclera clear  ENMT: [ x]No tonsillar erythema, exudates, or enlargement; [x ]Moist mucous membranes,   NECK: [ ]Swollen , Unable to assess  NERVOUS SYSTEM:  [x ]Alert & Oriented X0,   CHEST/LUNG: [x ]No chest deformity; [x] B/L rales,   HEART: [ x]Regular rate and rhythm; [x ]No murmurs, rubs, or gallops  ABDOMEN: [ x]Soft, Nontender, Nondistended; [ x]Bowel sounds present  EXTREMITIES: Severe pitting edema  LYMPH: [x ]No lymphadenopathy noted  SKIN: [x ]No rashes or lesions;       LABS:  CBC Full  -  ( 07 Dec 2020 05:29 )  WBC Count : 15.06 K/uL  RBC Count : 2.55 M/uL  Hemoglobin : 7.6 g/dL  Hematocrit : 25.1 %  Platelet Count - Automated : 179 K/uL  Mean Cell Volume : 98.4 fl  Mean Cell Hemoglobin : 29.8 pg  Mean Cell Hemoglobin Concentration : 30.3 gm/dL  Auto Neutrophil # : 10.39 K/uL  Auto Lymphocyte # : 2.11 K/uL  Auto Monocyte # : 0.75 K/uL  Auto Eosinophil # : 0.60 K/uL  Auto Basophil # : 0.00 K/uL  Auto Neutrophil % : 67.0 %  Auto Lymphocyte % : 14.0 %  Auto Monocyte % : 5.0 %  Auto Eosinophil % : 4.0 %  Auto Basophil % : 0.0 %    12-07    132<L>  |  95<L>  |  54<H>  ----------------------------<  91  3.7   |  27  |  5.75<H>    Ca    8.4      07 Dec 2020 05:29  Phos  6.2     12-07  Mg     2.1     12-07    TPro  6.1  /  Alb  2.1<L>  /  TBili  0.8  /  DBili  x   /  AST  51<H>  /  ALT  51  /  AlkPhos  172<H>  12-07    PTT - ( 07 Dec 2020 05:29 )  PTT:84.9 sec        RADIOLOGY & ADDITIONAL STUDIES REVIEWED:  Yes    [ ]GOALS OF CARE DISCUSSION WITH PATIENT/FAMILY/PROXY:    CRITICAL CARE TIME SPENT: 35 minutes

## 2020-12-07 NOTE — AIRWAY PLACEMENT NOTE ADULT - POST AIRWAY PLACEMENT ASSESSMENT:
positive end tidal CO2 noted/breath sounds bilateral/breath sounds equal
breath sounds bilateral/CXR pending/chest excursion noted/breath sounds equal/positive end tidal CO2 noted/skin color improved

## 2020-12-07 NOTE — PROGRESS NOTE ADULT - SUBJECTIVE AND OBJECTIVE BOX
Chandan Awad MD (Nephrology progress note)  [address], [facility],  [address],  [id number]  TEl: [phone]  Cell: [phone]    Patient is a 50y Male seen and evaluated at bedside. Vital signs, laboratory data, imaging studies, consult notes reviewed done within past 24 hours. Overnight events noted. Patient lying in bed in no distress remains critically ill on ventilatory support and IV pressors. Interval HD treatment yesterday on 12/0 with 3.5 L fluid removal.      Allergies    No Known Allergies    Intolerances        ROS:  Limited. Sedated and intubated on ventilatory support.    VITALS:    T(C): 36.4 (12-07-20 @ 07:45), Max: 38 (12-06-20 @ 16:33)  HR: 102 (12-07-20 @ 14:30) (67 - 130)  BP: 140/70 (12-07-20 @ 14:00) (57/35 - 140/70)  RR: 32 (12-07-20 @ 14:30) (21 - 32)  SpO2: 89% (12-07-20 @ 14:30) (67% - 94%)  CAPILLARY BLOOD GLUCOSE      POCT Blood Glucose.: 108 mg/dL (07 Dec 2020 12:09)  POCT Blood Glucose.: 100 mg/dL (07 Dec 2020 06:00)  POCT Blood Glucose.: 92 mg/dL (06 Dec 2020 23:07)  POCT Blood Glucose.: 97 mg/dL (06 Dec 2020 18:39)      12-06-20 @ 07:01  -  12-07-20 @ 07:00  --------------------------------------------------------  IN: 3213.7 mL / OUT: 4000 mL / NET: -786.3 mL    12-07-20 @ 07:01  -  12-07-20 @ 14:58  --------------------------------------------------------  IN: 665.4 mL / OUT: 0 mL / NET: 665.4 mL      MEDICATIONS  (STANDING):  bisacodyl Suppository 10 milliGRAM(s) Rectal daily  calcium acetate 1334 milliGRAM(s) Oral three times a day with meals  chlorhexidine 0.12% Liquid 15 milliLiter(s) Oral Mucosa every 12 hours  chlorhexidine 2% Cloths 1 Application(s) Topical <User Schedule>  digoxin     Tablet 0.125 milliGRAM(s) Oral every other day  fentaNYL   Infusion 3 MICROgram(s)/kG/Hr (37.5 mL/Hr) IV Continuous <Continuous>  glucagon  Injectable 1 milliGRAM(s) IntraMuscular once  heparin  Infusion.  Unit(s)/Hr (22 mL/Hr) IV Continuous <Continuous>  insulin lispro (ADMELOG) corrective regimen sliding scale   SubCutaneous every 6 hours  lubricant eye ont 1 Application(s) 1 Application(s) Both EYES two times a day  midazolam Infusion 0.02 mG/kG/Hr (2.5 mL/Hr) IV Continuous <Continuous>  norepinephrine Infusion 0.5 MICROgram(s)/kG/Min (58.6 mL/Hr) IV Continuous <Continuous>  pantoprazole  Injectable 40 milliGRAM(s) IV Push daily  polyethylene glycol 3350 17 Gram(s) Oral daily  senna Syrup 8.8 milliLiter(s) Oral at bedtime    MEDICATIONS  (PRN):  ALBUTerol    90 MICROgram(s) HFA Inhaler 2 Puff(s) Inhalation every 6 hours PRN Shortness of Breath  sodium chloride 0.9% lock flush 10 milliLiter(s) IV Push every 1 hour PRN Pre/post blood products, medications, blood draw, and to maintain line patency      PHYSICAL EXAM:  GENERAL: Sedated and intubated on ventilatory support  HEENT: LEONOR, EOMI, neck supple, no JVP, no carotid bruit, oral mucosa moist and pink.  CHEST/LUNG: Bilateral decreased breath sounds, bibasilar rales and rhonchi  HEART: Regular rate and rhythm, ERNESTO II/VI at LPSB, no gallops, no rub   ABDOMEN: Soft, nontender, non distended, bowel sounds present, no palpable organomegaly  : No flank or supra pubic tenderness.  EXTREMITIES: Bilateral upper and lower extremity edema++nt  Neurology: Sedated and intubated on ventilatory support.  SKIN: No rash or skin lesion  Musculoskeletal: No joint deformity    Vascular ACCESS:     LABS:                        7.6    15.06 )-----------( 179      ( 07 Dec 2020 05:29 )             25.1     12-07    132<L>  |  95<L>  |  54<H>  ----------------------------<  91  3.7   |  27  |  5.75<H>    Ca    8.4      07 Dec 2020 05:29  Phos  6.2     12-07  Mg     2.1     12-07    TPro  6.1  /  Alb  2.1<L>  /  TBili  0.8  /  DBili  x   /  AST  51<H>  /  ALT  51  /  AlkPhos  172<H>  12-07      PTT - ( 07 Dec 2020 12:31 )  PTT:65.5 sec          RADIOLOGY & ADDITIONAL STUDIES:  < from: Xray Chest 1 View- PORTABLE-Routine (Xray Chest 1 View- PORTABLE-Routine in AM.) (12.06.20 @ 09:08) >    EXAM:  XR CHEST PORTABLE ROUTINE 1V                            PROCEDURE DATE:  12/06/2020          INTERPRETATION:  TIME OF EXAM: December 6, 2020 at 8:28 AM.    CLINICAL INFORMATION: COVID pneumonia. Acute kidney injury.    COMPARISON:  December 5, 2020 at 12:56 PM.    TECHNIQUE:   AP Portable chest x-ray.    INTERPRETATION:    Heart size and the mediastinum cannot be accurately evaluated on this projection.  ET tube tip is approximately 5.7 cm above the paula. Enteric tube extends into the abdomen. Tip not included on image. Right IJ catheter present with tip at the SVC.  There is no significant interval change in diffuse bilateral airspace opacity.  No definite right pleural effusion is seen.  There is continued left basilar and retrocardiac opacity with obscuration of the left hemidiaphragm.  No pneumothorax is seen.  Indeterminate 2.5 cm dense oval nodular opacity again projects over the medial right lower chest.      IMPRESSION:  No significant interval change in diffuse bilateral airspace opacity, findings which could be due to multifocal pneumonia, pulmonary edema, and/or ARDS.    Continued left basilar and retrocardiac opacity which may be due to a left pleural effusion with passive atelectasis, atelectasis of other cause, and/or pneumonia.    Indeterminate 2.5 cm dense oval nodular opacity projecting over the medial right lower chest again seen.            DEVANTE SKELTON MD; Attending Radiologist  This document has been electronically signed. Dec  6 2020 12:55PM    < end of copied text >    Imaging Personally Reviewed:  [x] YES  [ ] NO    Consultant(s) Notes Reviewed:  [x] YES  [ ] NO    Care Discussed with Primary team/ Other Providers [x] YES  [ ] NO

## 2020-12-07 NOTE — PROGRESS NOTE ADULT - ASSESSMENT
ASSESSMENT AND PLAN: 49M without PMH from home with wife admitted to ICU for of Acute Respiratory failure 2/2 COVID PNA. Patient is currently in multi organ failure and on Dialysis. Requiring pressor support    Acute respiratory failure secondary to Covid pneumonia  Septic Shock and Paroxysmal Atrial fibrillation  AGUSTO ,ATN on dialysis      Neuro:   - sedated with fentanyl  - Off propofol as TG levels were elevated  - Continue Versed to help with Ventricular synchrony  - d/jos paralytics   - No improvement in condition or sedative requirement     CVS: #Septic Shock and Paroxysmal Atrial fibrillation  -SBPs improved 90-100s  - patient is currently on Levophed,   - s/p dig loading, c/w 0.125mg dig every other day (renal dosing) started on 11/26  - EKG with sinus tach->AF  - bedside Echo without global dysfunction  - Digoxin levels in range ,will continue current regimen   - A-line monitoring    -#troponemia  -9->8.2->8.3 11/21 trended down   -cardio Dr. Tsang saw patient before, Currently he does not need Aspirin and Plavix,   - Will Consult PRN in case patient's condition changes    Pulmonary:   #Acute respiratory failure 2/2 COVID PNA  -ETT placed 11/19, replaced on 12/7 as patient was having a leak in the balloon, replaced w/o complication  - 32/500/100/17, ABG 7.10/83/77/93- NO discontinued.  - supinated since 3pm 11/22  - ESR, procal, ferritin decreasing, d-dimer, LDH, CRP decreasing, will trend every 3rd day  - Dexamethasone IV 6mg Qd (10 day course through 11/24) , Finished on 12/4  - worsening infiltrates noted on CXR 11/30, no recent changes till 12/4  - IgA positive, IgG negative, indicating new infection, No Remdesivir given due to bad kidney function      ID:   -history and management as above  -changed LIJ to femoral line, removed LIJ 11/25  -WBC 18->11->13.2->15.6->17.8>18  -RVP +COVID, procal increased to 3.13 as noted above, started cefepime 11/21, added vanc 11/24, added azithromycin given worsening CXR  11/25  - completed abx  -BCx NGF  - Discontinue Abx      Nephro: #AGUSTO due to ATN secondary to likely Covid pneumonia   -CrCl 44, Cr 1.1 on admission, acutely worsened 11/21, possibly 2/2 covid injury and/or low BP, this am 6.41  -RIJ HD replaced 12/5 and HD started 11/21, 11/22, 11/24, 11/25,11/26,12/2, 12/5  - continue phoslo, off bicarb drip  - f/u with nephro for further   - hemodialysis on 12/5, ABG and kidney function worsening  - Asked pharmacy to concentrate the drip to decrease pt's intake, however pharmacy cannot concentrate any more  - Patient will likely get dialysis today 12/7, Net fluid positive 22L    GI: #TF  - OG tube is not working - will change NG tube  - Holding TF with Nepro as patient is fluid overload  - monitor LFTs AST/ALT,  likely 2/2 COVID, trending down slowly  -Daily CMP  -Protonix IV for PPx  - Senna and Miralax for bowel regimen    Heme:   - thrombocytopenia: resolved    # Anemia  - monitor CBC    Endocrine: #BG controlled  -A1c 6, average glucose 126  -TSH wnl  -vit D moderately low to 22, c/w 1000U/day   -FS q6 for TF or NPO  -HSS    Skin/Catheters:   #LIJ placed 12/1  #RIJ HD catheter 12/05  #R radial artery 12/04  #eye drops    Prophylaxis:  -patient is on heparin Drip , Protonix for GI ppx    Prognosis:   Poor, DNR< Family informed on12/5

## 2020-12-08 NOTE — PROGRESS NOTE ADULT - SUBJECTIVE AND OBJECTIVE BOX
Chandan Awad MD (Nephrology progress note)  [address], [facility],  [address],  [id number]  TEl: [phone]  Cell: [phone]    Patient is a 50y Male seen and evaluated at bedside. Vital signs, laboratory data, imaging studies, consult notes reviewed done within past 24 hours. Overnight events noted. Patient lying in bed remains critically ill on ventilatory support and IV pressors. Interval HD treatment on 12/7 with 3.5 L fluid removal. Patient still remains hypoxic with volume overloaded.     Allergies    No Known Allergies    Intolerances        ROS:  Limited and sedated on ventilatory support  VITALS:    T(C): 36.3 (12-08-20 @ 13:05), Max: 37.6 (12-07-20 @ 23:45)  HR: 84 (12-08-20 @ 14:30) (81 - 98)  BP: 132/70 (12-08-20 @ 08:00) (98/49 - 141/75)  RR: 32 (12-08-20 @ 14:30) (9 - 33)  SpO2: 98% (12-08-20 @ 14:30) (60% - 98%)  CAPILLARY BLOOD GLUCOSE      POCT Blood Glucose.: 122 mg/dL (08 Dec 2020 13:29)  POCT Blood Glucose.: 95 mg/dL (08 Dec 2020 05:17)  POCT Blood Glucose.: 101 mg/dL (07 Dec 2020 23:37)  POCT Blood Glucose.: 104 mg/dL (07 Dec 2020 18:51)      12-07-20 @ 07:01  -  12-08-20 @ 07:00  --------------------------------------------------------  IN: 3150.7 mL / OUT: 3600 mL / NET: -449.3 mL    12-08-20 @ 07:01  -  12-08-20 @ 15:36  --------------------------------------------------------  IN: 757.4 mL / OUT: 0 mL / NET: 757.4 mL      MEDICATIONS  (STANDING):  bisacodyl Suppository 10 milliGRAM(s) Rectal daily  chlorhexidine 0.12% Liquid 15 milliLiter(s) Oral Mucosa every 12 hours  chlorhexidine 2% Cloths 1 Application(s) Topical <User Schedule>  fentaNYL   Infusion 3 MICROgram(s)/kG/Hr (37.5 mL/Hr) IV Continuous <Continuous>  glucagon  Injectable 1 milliGRAM(s) IntraMuscular once  heparin  Infusion.  Unit(s)/Hr (22 mL/Hr) IV Continuous <Continuous>  insulin lispro (ADMELOG) corrective regimen sliding scale   SubCutaneous every 6 hours  lubricant eye ont 1 Application(s) 1 Application(s) Both EYES two times a day  midazolam Infusion 0.02 mG/kG/Hr (2.5 mL/Hr) IV Continuous <Continuous>  norepinephrine Infusion 0.5 MICROgram(s)/kG/Min (58.6 mL/Hr) IV Continuous <Continuous>  pantoprazole  Injectable 40 milliGRAM(s) IV Push daily    MEDICATIONS  (PRN):  ALBUTerol    90 MICROgram(s) HFA Inhaler 2 Puff(s) Inhalation every 6 hours PRN Shortness of Breath  sodium chloride 0.9% lock flush 10 milliLiter(s) IV Push every 1 hour PRN Pre/post blood products, medications, blood draw, and to maintain line patency      PHYSICAL EXAM:  GENERAL: Sedated and intubated on ventilatory support  HEENT: LEONOR, EOMI, neck supple, no JVP, no carotid bruit, oral mucosa moist and pink.  CHEST/LUNG: Bilateral decreased breath sounds, bibasilar rales and crepitations, no wheezing  HEART: Regular rate and rhythm, ERNESTO II/VI at LPSB, no gallops, no rub   ABDOMEN: Soft, nontender, non distended, bowel sounds present, no palpable organomegaly  : No flank or supra pubic tenderness.  EXTREMITIES: Bilatera upper and lower extremity edema  Neurology: Sedated and intubated on ventilatory support  SKIN: No rash or skin lesion  Musculoskeletal: No joint deformity    Vascular ACCESS: Right IJ HD catheter    LABS:                        7.4    13.97 )-----------( 195      ( 08 Dec 2020 06:07 )             24.2     12-08    138  |  99  |  39<H>  ----------------------------<  85  3.9   |  24  |  4.70<H>    Ca    8.6      08 Dec 2020 06:07  Phos  4.5     12-08  Mg     2.2     12-08    TPro  6.1  /  Alb  2.1<L>  /  TBili  0.8  /  DBili  x   /  AST  56<H>  /  ALT  44  /  AlkPhos  154<H>  12-08      PT/INR - ( 08 Dec 2020 06:07 )   PT: 13.5 sec;   INR: 1.14 ratio         PTT - ( 08 Dec 2020 06:07 )  PTT:58.5 sec          RADIOLOGY & ADDITIONAL STUDIES:  rad< from: Xray Chest 1 View-PORTABLE IMMEDIATE (Xray Chest 1 View-PORTABLE IMMEDIATE .) (12.07.20 @ 15:11) >    EXAM:  XR CHEST PORTABLE IMMED 1V                            PROCEDURE DATE:  12/07/2020          INTERPRETATION:  CLINICAL STATEMENT: Follow-up chest pain.    TECHNIQUE: AP view of the chest.    COMPARISON: 12/6/2020    FINDINGS/  IMPRESSION:  ET tube again noted. Feeding tube removed. Bilateral central lines noted.    No pneumothorax. Increased density lung markings without significant change. Superimposed bilateral airspace opacities overall progressed compared to prior exam.    Probable small left pleural effusion    Heart size cannot be accurately assessed in this projection.              GAMALIEL GEORGE MD; Attending Radiologist  This document has been electronically signed. Dec  7 2020  3:55PM    < end of copied text >    Imaging Personally Reviewed:  [x] YES  [ ] NO    Consultant(s) Notes Reviewed:  [x] YES  [ ] NO    Care Discussed with Primary team/ Other Providers [x] YES  [ ] NO

## 2020-12-08 NOTE — PROGRESS NOTE ADULT - SUBJECTIVE AND OBJECTIVE BOX
INTERVAL HPI/OVERNIGHT EVENTS: ***    PRESSORS: [ ] YES [ ] NO  WHICH:    ANTIBIOTICS:                  DATE STARTED:  ANTIBIOTICS:                  DATE STARTED:  ANTIBIOTICS:                  DATE STARTED:    Antimicrobial:    Cardiovascular:  digoxin     Tablet 0.125 milliGRAM(s) Oral every other day  norepinephrine Infusion 0.5 MICROgram(s)/kG/Min IV Continuous <Continuous>    Pulmonary:  ALBUTerol    90 MICROgram(s) HFA Inhaler 2 Puff(s) Inhalation every 6 hours PRN    Hematalogic:  heparin  Infusion.  Unit(s)/Hr IV Continuous <Continuous>    Other:  bisacodyl Suppository 10 milliGRAM(s) Rectal daily  calcium acetate 1334 milliGRAM(s) Oral three times a day with meals  chlorhexidine 0.12% Liquid 15 milliLiter(s) Oral Mucosa every 12 hours  chlorhexidine 2% Cloths 1 Application(s) Topical <User Schedule>  fentaNYL   Infusion 3 MICROgram(s)/kG/Hr IV Continuous <Continuous>  glucagon  Injectable 1 milliGRAM(s) IntraMuscular once  insulin lispro (ADMELOG) corrective regimen sliding scale   SubCutaneous every 6 hours  lubricant eye ont 1 Application(s) 1 Application(s) Both EYES two times a day  midazolam Infusion 0.02 mG/kG/Hr IV Continuous <Continuous>  pantoprazole  Injectable 40 milliGRAM(s) IV Push daily  polyethylene glycol 3350 17 Gram(s) Oral daily  senna Syrup 8.8 milliLiter(s) Oral at bedtime  sodium chloride 0.9% lock flush 10 milliLiter(s) IV Push every 1 hour PRN    ALBUTerol    90 MICROgram(s) HFA Inhaler 2 Puff(s) Inhalation every 6 hours PRN  bisacodyl Suppository 10 milliGRAM(s) Rectal daily  calcium acetate 1334 milliGRAM(s) Oral three times a day with meals  chlorhexidine 0.12% Liquid 15 milliLiter(s) Oral Mucosa every 12 hours  chlorhexidine 2% Cloths 1 Application(s) Topical <User Schedule>  digoxin     Tablet 0.125 milliGRAM(s) Oral every other day  fentaNYL   Infusion 3 MICROgram(s)/kG/Hr IV Continuous <Continuous>  glucagon  Injectable 1 milliGRAM(s) IntraMuscular once  heparin  Infusion.  Unit(s)/Hr IV Continuous <Continuous>  insulin lispro (ADMELOG) corrective regimen sliding scale   SubCutaneous every 6 hours  lubricant eye ont 1 Application(s) 1 Application(s) Both EYES two times a day  midazolam Infusion 0.02 mG/kG/Hr IV Continuous <Continuous>  norepinephrine Infusion 0.5 MICROgram(s)/kG/Min IV Continuous <Continuous>  pantoprazole  Injectable 40 milliGRAM(s) IV Push daily  polyethylene glycol 3350 17 Gram(s) Oral daily  senna Syrup 8.8 milliLiter(s) Oral at bedtime  sodium chloride 0.9% lock flush 10 milliLiter(s) IV Push every 1 hour PRN    Drug Dosing Weight  Height (cm): 154.4 (15 Nov 2020 09:25)  Weight (kg): 125 (15 Nov 2020 09:25)  BMI (kg/m2): 52.4 (15 Nov 2020 09:25)  BSA (m2): 2.16 (15 Nov 2020 09:25)    CENTRAL LINE: [ ] YES [ ] NO  LOCATION:   DATE INSERTED:  REMOVE: [ ] YES [ ] NO  EXPLAIN:    MICHELLE: [ ] YES [ ] NO    DATE INSERTED:  REMOVE:  [ ] YES [ ] NO  EXPLAIN:    A-LINE:  [ ] YES [ ] NO  LOCATION:   DATE INSERTED:  REMOVE:  [ ] YES [ ] NO  EXPLAIN:    PMH -reviewed admission note, no change since admission  PAST MEDICAL & SURGICAL HISTORY:  No pertinent past medical history    No significant past surgical history        ICU Vital Signs Last 24 Hrs  T(C): 37.3 (08 Dec 2020 04:46), Max: 37.6 (07 Dec 2020 23:45)  T(F): 99.1 (08 Dec 2020 04:46), Max: 99.7 (07 Dec 2020 23:45)  HR: 91 (08 Dec 2020 07:00) (67 - 130)  BP: 140/71 (08 Dec 2020 06:00) (95/45 - 141/75)  BP(mean): 87 (08 Dec 2020 06:00) (57 - 92)  ABP: 134/61 (08 Dec 2020 07:00) (68/40 - 148/66)  ABP(mean): 81 (08 Dec 2020 07:00) (45 - 112)  RR: 28 (08 Dec 2020 04:00) (13 - 32)  SpO2: 90% (08 Dec 2020 07:00) (60% - 95%)      ABG - ( 08 Dec 2020 04:02 )  pH, Arterial: 7.17  pH, Blood: x     /  pCO2: 73    /  pO2: 82    / HCO3: 25    / Base Excess: -3.3  /  SaO2: 95                    12-07 @ 07:01  -  12-08 @ 07:00  --------------------------------------------------------  IN: 3039.8 mL / OUT: 3600 mL / NET: -560.2 mL        Mode: AC/ CMV (Assist Control/ Continuous Mandatory Ventilation)  RR (machine): 32  TV (machine): 400  FiO2: 100  PEEP: 12  ITime: 0.85  MAP: 26  PIP: 43      PHYSICAL EXAM:    GENERAL: [ ]NAD, [ ]well-groomed, [ ]well-developed  HEAD:  [ ]Atraumatic, [ ]Normocephalic  EYES: [ ]EOMI, [ ]PERRLA, [ ]conjunctiva and sclera clear  ENMT: [ ]No tonsillar erythema, exudates, or enlargement; [ ]Moist mucous membranes, [ ]Good dentition, [ ]No lesions  NECK: [ ]Supple, normal appearance, [ ]No JVD; [ ]Normal thyroid; [ ]Trachea midline  NERVOUS SYSTEM:  [ ]Alert & Oriented X3, [ ]Good concentration; [ ]Motor Strength 5/5 B/L upper and lower extremities; [ ]DTRs 2+ intact and symmetric  CHEST/LUNG: [ ]No chest deformity; [ ]Normal percussion bilaterally; [ ]No rales, rhonchi, wheezing   HEART: [ ]Regular rate and rhythm; [ ]No murmurs, rubs, or gallops  ABDOMEN: [ ]Soft, Nontender, Nondistended; [ ]Bowel sounds present  EXTREMITIES:  [ ]2+ Peripheral Pulses, [ ]No clubbing, cyanosis, or edema  LYMPH: [ ]No lymphadenopathy noted  SKIN: [ ]No rashes or lesions; [ ]Good capillary refill      LABS:  CBC Full  -  ( 08 Dec 2020 06:07 )  WBC Count : 13.97 K/uL  RBC Count : 2.47 M/uL  Hemoglobin : 7.4 g/dL  Hematocrit : 24.2 %  Platelet Count - Automated : 195 K/uL  Mean Cell Volume : 98.0 fl  Mean Cell Hemoglobin : 30.0 pg  Mean Cell Hemoglobin Concentration : 30.6 gm/dL  Auto Neutrophil # : 9.75 K/uL  Auto Lymphocyte # : 2.07 K/uL  Auto Monocyte # : 0.76 K/uL  Auto Eosinophil # : 0.26 K/uL  Auto Basophil # : 0.05 K/uL  Auto Neutrophil % : 69.8 %  Auto Lymphocyte % : 14.8 %  Auto Monocyte % : 5.4 %  Auto Eosinophil % : 1.9 %  Auto Basophil % : 0.4 %    12-08    138  |  99  |  39<H>  ----------------------------<  85  3.9   |  24  |  4.70<H>    Ca    8.6      08 Dec 2020 06:07  Phos  4.5     12-08  Mg     2.2     12-08    TPro  6.1  /  Alb  2.1<L>  /  TBili  0.8  /  DBili  x   /  AST  56<H>  /  ALT  44  /  AlkPhos  154<H>  12-08    PT/INR - ( 08 Dec 2020 06:07 )   PT: 13.5 sec;   INR: 1.14 ratio         PTT - ( 08 Dec 2020 06:07 )  PTT:58.5 sec        RADIOLOGY & ADDITIONAL STUDIES REVIEWED:  ***    [ ]GOALS OF CARE DISCUSSION WITH PATIENT/FAMILY/PROXY:    CRITICAL CARE TIME SPENT: 35 minutes INTERVAL HPI/OVERNIGHT EVENTS: No significant event overnight, Received dialysis on 12/7    PRESSORS: [x ] YES [ ] NO  WHICH:    ANTIBIOTICS:                  DATE STARTED:  ANTIBIOTICS:                  DATE STARTED:  ANTIBIOTICS:                  DATE STARTED:    Antimicrobial:    Cardiovascular:  digoxin     Tablet 0.125 milliGRAM(s) Oral every other day  norepinephrine Infusion 0.5 MICROgram(s)/kG/Min IV Continuous <Continuous>    Pulmonary:  ALBUTerol    90 MICROgram(s) HFA Inhaler 2 Puff(s) Inhalation every 6 hours PRN    Hematalogic:  heparin  Infusion.  Unit(s)/Hr IV Continuous <Continuous>    Other:  bisacodyl Suppository 10 milliGRAM(s) Rectal daily  calcium acetate 1334 milliGRAM(s) Oral three times a day with meals  chlorhexidine 0.12% Liquid 15 milliLiter(s) Oral Mucosa every 12 hours  chlorhexidine 2% Cloths 1 Application(s) Topical <User Schedule>  fentaNYL   Infusion 3 MICROgram(s)/kG/Hr IV Continuous <Continuous>  glucagon  Injectable 1 milliGRAM(s) IntraMuscular once  insulin lispro (ADMELOG) corrective regimen sliding scale   SubCutaneous every 6 hours  lubricant eye ont 1 Application(s) 1 Application(s) Both EYES two times a day  midazolam Infusion 0.02 mG/kG/Hr IV Continuous <Continuous>  pantoprazole  Injectable 40 milliGRAM(s) IV Push daily  polyethylene glycol 3350 17 Gram(s) Oral daily  senna Syrup 8.8 milliLiter(s) Oral at bedtime  sodium chloride 0.9% lock flush 10 milliLiter(s) IV Push every 1 hour PRN    ALBUTerol    90 MICROgram(s) HFA Inhaler 2 Puff(s) Inhalation every 6 hours PRN  bisacodyl Suppository 10 milliGRAM(s) Rectal daily  calcium acetate 1334 milliGRAM(s) Oral three times a day with meals  chlorhexidine 0.12% Liquid 15 milliLiter(s) Oral Mucosa every 12 hours  chlorhexidine 2% Cloths 1 Application(s) Topical <User Schedule>  digoxin     Tablet 0.125 milliGRAM(s) Oral every other day  fentaNYL   Infusion 3 MICROgram(s)/kG/Hr IV Continuous <Continuous>  glucagon  Injectable 1 milliGRAM(s) IntraMuscular once  heparin  Infusion.  Unit(s)/Hr IV Continuous <Continuous>  insulin lispro (ADMELOG) corrective regimen sliding scale   SubCutaneous every 6 hours  lubricant eye ont 1 Application(s) 1 Application(s) Both EYES two times a day  midazolam Infusion 0.02 mG/kG/Hr IV Continuous <Continuous>  norepinephrine Infusion 0.5 MICROgram(s)/kG/Min IV Continuous <Continuous>  pantoprazole  Injectable 40 milliGRAM(s) IV Push daily  polyethylene glycol 3350 17 Gram(s) Oral daily  senna Syrup 8.8 milliLiter(s) Oral at bedtime  sodium chloride 0.9% lock flush 10 milliLiter(s) IV Push every 1 hour PRN    Drug Dosing Weight  Height (cm): 154.4 (15 Nov 2020 09:25)  Weight (kg): 125 (15 Nov 2020 09:25)  BMI (kg/m2): 52.4 (15 Nov 2020 09:25)  BSA (m2): 2.16 (15 Nov 2020 09:25)    CENTRAL LINE: [x ] YES [ ] NO  LOCATION: [facility] 12/1 , [facility] 12/5 DATE INSERTED:  REMOVE: [ ] YES [ ] NO  EXPLAIN:    MICHELLE: [ ] YES [x ] NO    DATE INSERTED:  REMOVE:  [ ] YES [ ] NO  EXPLAIN:    A-LINE:  [ x] YES [ ] NO  LOCATION:  12/4  DATE INSERTED:  REMOVE:  [ ] YES [ ] NO  EXPLAIN:    PMH -reviewed admission note, no change since admission  PAST MEDICAL & SURGICAL HISTORY:  No pertinent past medical history    No significant past surgical history        ICU Vital Signs Last 24 Hrs  T(C): 37.3 (08 Dec 2020 04:46), Max: 37.6 (07 Dec 2020 23:45)  T(F): 99.1 (08 Dec 2020 04:46), Max: 99.7 (07 Dec 2020 23:45)  HR: 91 (08 Dec 2020 07:00) (67 - 130)  BP: 140/71 (08 Dec 2020 06:00) (95/45 - 141/75)  BP(mean): 87 (08 Dec 2020 06:00) (57 - 92)  ABP: 134/61 (08 Dec 2020 07:00) (68/40 - 148/66)  ABP(mean): 81 (08 Dec 2020 07:00) (45 - 112)  RR: 28 (08 Dec 2020 04:00) (13 - 32)  SpO2: 90% (08 Dec 2020 07:00) (60% - 95%)      ABG - ( 08 Dec 2020 04:02 )  pH, Arterial: 7.17  pH, Blood: x     /  pCO2: 73    /  pO2: 82    / HCO3: 25    / Base Excess: -3.3  /  SaO2: 95                    12-07 @ 07:01  -  12-08 @ 07:00  --------------------------------------------------------  IN: 3039.8 mL / OUT: 3600 mL / NET: -560.2 mL        Mode: AC/ CMV (Assist Control/ Continuous Mandatory Ventilation)  RR (machine): 32  TV (machine): 400  FiO2: 100  PEEP: 12  ITime: 0.85  MAP: 26  PIP: 43      PHYSICAL EXAM:    GENERAL: Sedated and Intubated, No change   HEAD:  [ x]Atraumatic, [x ]Normocephalic  EYES: [x ]conjunctiva and sclera clear  ENMT: [x ]Moist mucous membranes, Tongue swollen ,  NECK: [x ]Supple, normal appearance,  NERVOUS SYSTEM:  [x ]Alert & Oriented X0,  CHEST/LUNG: [x ]No chest deformity; [ x]Normal percussion bilaterally; [ ]No rales, rhonchi, wheezing   HEART: [ x]Regular rate and rhythm; [ x]No murmurs, rubs, or gallops  ABDOMEN: [ x]Soft, Nontender, Nondistended; [ x]Bowel sounds present  EXTREMITIES:  Generalized edema  LYMPH: [ x]No lymphadenopathy noted  SKIN: [ x]No rashes or lesions; [ ]Good capillary refill      LABS:  CBC Full  -  ( 08 Dec 2020 06:07 )  WBC Count : 13.97 K/uL  RBC Count : 2.47 M/uL  Hemoglobin : 7.4 g/dL  Hematocrit : 24.2 %  Platelet Count - Automated : 195 K/uL  Mean Cell Volume : 98.0 fl  Mean Cell Hemoglobin : 30.0 pg  Mean Cell Hemoglobin Concentration : 30.6 gm/dL  Auto Neutrophil # : 9.75 K/uL  Auto Lymphocyte # : 2.07 K/uL  Auto Monocyte # : 0.76 K/uL  Auto Eosinophil # : 0.26 K/uL  Auto Basophil # : 0.05 K/uL  Auto Neutrophil % : 69.8 %  Auto Lymphocyte % : 14.8 %  Auto Monocyte % : 5.4 %  Auto Eosinophil % : 1.9 %  Auto Basophil % : 0.4 %    12-08    138  |  99  |  39<H>  ----------------------------<  85  3.9   |  24  |  4.70<H>    Ca    8.6      08 Dec 2020 06:07  Phos  4.5     12-08  Mg     2.2     12-08    TPro  6.1  /  Alb  2.1<L>  /  TBili  0.8  /  DBili  x   /  AST  56<H>  /  ALT  44  /  AlkPhos  154<H>  12-08    PT/INR - ( 08 Dec 2020 06:07 )   PT: 13.5 sec;   INR: 1.14 ratio         PTT - ( 08 Dec 2020 06:07 )  PTT:58.5 sec        RADIOLOGY & ADDITIONAL STUDIES REVIEWED:  yes    [ ]GOALS OF CARE DISCUSSION WITH PATIENT/FAMILY/PROXY:    CRITICAL CARE TIME SPENT: 35 minutes INTERVAL HPI/OVERNIGHT EVENTS: No significant event overnight, Received dialysis on 12/7    PRESSORS: [x ] YES [ ] NO  WHICH:    ANTIBIOTICS:                  DATE STARTED:  ANTIBIOTICS:                  DATE STARTED:  ANTIBIOTICS:                  DATE STARTED:    Antimicrobial:    Cardiovascular:  digoxin     Tablet 0.125 milliGRAM(s) Oral every other day  norepinephrine Infusion 0.5 MICROgram(s)/kG/Min IV Continuous <Continuous>    Pulmonary:  ALBUTerol    90 MICROgram(s) HFA Inhaler 2 Puff(s) Inhalation every 6 hours PRN    Hematalogic:  heparin  Infusion.  Unit(s)/Hr IV Continuous <Continuous>    Other:  bisacodyl Suppository 10 milliGRAM(s) Rectal daily  calcium acetate 1334 milliGRAM(s) Oral three times a day with meals  chlorhexidine 0.12% Liquid 15 milliLiter(s) Oral Mucosa every 12 hours  chlorhexidine 2% Cloths 1 Application(s) Topical <User Schedule>  fentaNYL   Infusion 3 MICROgram(s)/kG/Hr IV Continuous <Continuous>  glucagon  Injectable 1 milliGRAM(s) IntraMuscular once  insulin lispro (ADMELOG) corrective regimen sliding scale   SubCutaneous every 6 hours  lubricant eye ont 1 Application(s) 1 Application(s) Both EYES two times a day  midazolam Infusion 0.02 mG/kG/Hr IV Continuous <Continuous>  pantoprazole  Injectable 40 milliGRAM(s) IV Push daily  polyethylene glycol 3350 17 Gram(s) Oral daily  senna Syrup 8.8 milliLiter(s) Oral at bedtime  sodium chloride 0.9% lock flush 10 milliLiter(s) IV Push every 1 hour PRN    ALBUTerol    90 MICROgram(s) HFA Inhaler 2 Puff(s) Inhalation every 6 hours PRN  bisacodyl Suppository 10 milliGRAM(s) Rectal daily  calcium acetate 1334 milliGRAM(s) Oral three times a day with meals  chlorhexidine 0.12% Liquid 15 milliLiter(s) Oral Mucosa every 12 hours  chlorhexidine 2% Cloths 1 Application(s) Topical <User Schedule>  digoxin     Tablet 0.125 milliGRAM(s) Oral every other day  fentaNYL   Infusion 3 MICROgram(s)/kG/Hr IV Continuous <Continuous>  glucagon  Injectable 1 milliGRAM(s) IntraMuscular once  heparin  Infusion.  Unit(s)/Hr IV Continuous <Continuous>  insulin lispro (ADMELOG) corrective regimen sliding scale   SubCutaneous every 6 hours  lubricant eye ont 1 Application(s) 1 Application(s) Both EYES two times a day  midazolam Infusion 0.02 mG/kG/Hr IV Continuous <Continuous>  norepinephrine Infusion 0.5 MICROgram(s)/kG/Min IV Continuous <Continuous>  pantoprazole  Injectable 40 milliGRAM(s) IV Push daily  polyethylene glycol 3350 17 Gram(s) Oral daily  senna Syrup 8.8 milliLiter(s) Oral at bedtime  sodium chloride 0.9% lock flush 10 milliLiter(s) IV Push every 1 hour PRN    Drug Dosing Weight  Height (cm): 154.4 (15 Nov 2020 09:25)  Weight (kg): 125 (15 Nov 2020 09:25)  BMI (kg/m2): 52.4 (15 Nov 2020 09:25)  BSA (m2): 2.16 (15 Nov 2020 09:25)    CENTRAL LINE: [x ] YES [ ] NO  LOCATION: [facility] 12/1 , [facility] 12/5 DATE INSERTED:  REMOVE: [ ] YES [ ] NO  EXPLAIN:    MICHELLE: [ ] YES [x ] NO    DATE INSERTED:  REMOVE:  [ ] YES [ ] NO  EXPLAIN:    A-LINE:  [ x] YES [ ] NO  LOCATION:  12/4  DATE INSERTED:  REMOVE:  [ ] YES [ ] NO  EXPLAIN:    PMH -reviewed admission note, no change since admission  PAST MEDICAL & SURGICAL HISTORY:  No pertinent past medical history    No significant past surgical history        ICU Vital Signs Last 24 Hrs  T(C): 37.3 (08 Dec 2020 04:46), Max: 37.6 (07 Dec 2020 23:45)  T(F): 99.1 (08 Dec 2020 04:46), Max: 99.7 (07 Dec 2020 23:45)  HR: 91 (08 Dec 2020 07:00) (67 - 130)  BP: 140/71 (08 Dec 2020 06:00) (95/45 - 141/75)  BP(mean): 87 (08 Dec 2020 06:00) (57 - 92)  ABP: 134/61 (08 Dec 2020 07:00) (68/40 - 148/66)  ABP(mean): 81 (08 Dec 2020 07:00) (45 - 112)  RR: 28 (08 Dec 2020 04:00) (13 - 32)  SpO2: 90% (08 Dec 2020 07:00) (60% - 95%)      ABG - ( 08 Dec 2020 04:02 )  pH, Arterial: 7.17  pH, Blood: x     /  pCO2: 73    /  pO2: 82    / HCO3: 25    / Base Excess: -3.3  /  SaO2: 95                    12-07 @ 07:01  -  12-08 @ 07:00  --------------------------------------------------------  IN: 3039.8 mL / OUT: 3600 mL / NET: -560.2 mL        Mode: AC/ CMV (Assist Control/ Continuous Mandatory Ventilation)  RR (machine): 32  TV (machine): 400  FiO2: 100  PEEP: 12  ITime: 0.85  MAP: 26  PIP: 43      PHYSICAL EXAM:    GENERAL: Sedated and Intubated, No change   HEAD:  [ x]Atraumatic, [x ]Normocephalic  EYES: [x ]conjunctiva and sclera clear  ENMT: [x ]Moist mucous membranes, Tongue swollen ,  NECK: [x ]Supple, normal appearance,  NERVOUS SYSTEM:  sedated/ comatose   CHEST/LUNG: [x ]No chest deformity; [ x]Normal percussion bilaterally; [ ]No rales, rhonchi, wheezing   HEART: [ x]Regular rate and rhythm; [ x]No murmurs, rubs, or gallops  ABDOMEN: [ x]Soft, Nontender, Nondistended; [ x]Bowel sounds present  EXTREMITIES:  Generalized edema  LYMPH: [ x]No lymphadenopathy noted  SKIN: [ x]No rashes or lesions; [ ]Good capillary refill      LABS:  CBC Full  -  ( 08 Dec 2020 06:07 )  WBC Count : 13.97 K/uL  RBC Count : 2.47 M/uL  Hemoglobin : 7.4 g/dL  Hematocrit : 24.2 %  Platelet Count - Automated : 195 K/uL  Mean Cell Volume : 98.0 fl  Mean Cell Hemoglobin : 30.0 pg  Mean Cell Hemoglobin Concentration : 30.6 gm/dL  Auto Neutrophil # : 9.75 K/uL  Auto Lymphocyte # : 2.07 K/uL  Auto Monocyte # : 0.76 K/uL  Auto Eosinophil # : 0.26 K/uL  Auto Basophil # : 0.05 K/uL  Auto Neutrophil % : 69.8 %  Auto Lymphocyte % : 14.8 %  Auto Monocyte % : 5.4 %  Auto Eosinophil % : 1.9 %  Auto Basophil % : 0.4 %    12-08    138  |  99  |  39<H>  ----------------------------<  85  3.9   |  24  |  4.70<H>    Ca    8.6      08 Dec 2020 06:07  Phos  4.5     12-08  Mg     2.2     12-08    TPro  6.1  /  Alb  2.1<L>  /  TBili  0.8  /  DBili  x   /  AST  56<H>  /  ALT  44  /  AlkPhos  154<H>  12-08    PT/INR - ( 08 Dec 2020 06:07 )   PT: 13.5 sec;   INR: 1.14 ratio         PTT - ( 08 Dec 2020 06:07 )  PTT:58.5 sec        RADIOLOGY & ADDITIONAL STUDIES REVIEWED:  yes    CXR:  < from: Xray Chest 1 View-PORTABLE IMMEDIATE (Xray Chest 1 View-PORTABLE IMMEDIATE .) (12.07.20 @ 15:11) >    EXAM:  XR CHEST PORTABLE IMMED 1V                            PROCEDURE DATE:  12/07/2020          INTERPRETATION:  CLINICAL STATEMENT: Follow-up chest pain.    TECHNIQUE: AP view of the chest.    COMPARISON: 12/6/2020    FINDINGS/  IMPRESSION:  ET tube again noted. Feeding tube removed. Bilateral central lines noted.    No pneumothorax. Increased density lung markings without significant change. Superimposed bilateral airspace opacities overall progressed compared to prior exam.    Probable small left pleural effusion    Heart size cannot be accurately assessed in this projection.              GAMALIEL GEORGE MD; Attending Radiologist  This document has been electronically signed. Dec  7 2020  3:55PM    < end of copied text >  [ ]GOALS OF CARE DISCUSSION WITH PATIENT/FAMILY/PROXY:    CRITICAL CARE TIME SPENT: 35 minutes

## 2020-12-08 NOTE — PROGRESS NOTE ADULT - PROBLEM SELECTOR PLAN 1
Anuric AGUSTO from ATN due to septic shock/ARDS requiring RRT/HD  - S/p HD treatment on 12/07 with net 3.5 L fluid removal  - Avoid Nephrotoxic agents  - Monitor BMP and electrolytes  - Maintain MAP>65-70 mm hg  - Adjust antibiotics as per renal dose clearances  - Volume status and electrolytes noted.  - Additional HD treatment for UF only today for fluid removal.

## 2020-12-08 NOTE — PROGRESS NOTE ADULT - ASSESSMENT
ASSESSMENT AND PLAN: 49M without PMH from home with wife admitted to ICU for of Acute Respiratory failure 2/2 COVID PNA. Patient is currently in multi organ failure and on Dialysis. Requiring pressor support    Acute respiratory failure secondary to Covid pneumonia  Septic Shock and Paroxysmal Atrial fibrillation  AGUSTO ,ATN on dialysis      Neuro:   - sedated with fentanyl  - Off propofol as TG levels were elevated  - Continue Versed to help with Ventricular synchrony  - d/jos paralytics   - No improvement in condition or sedative requirement     CVS: #Septic Shock and Paroxysmal Atrial fibrillation  -SBPs improved 90-100s  - patient is currently on Levophed,   - s/p dig loading, c/w 0.125mg dig every other day (renal dosing) started on 11/26  - EKG with sinus tach->AF  - bedside Echo without global dysfunction  - Digoxin levels in range ,will continue current regimen   - A-line monitoring    -#troponemia  -9->8.2->8.3 11/21 trended down   -cardio Dr. Tsang saw patient before, Currently he does not need Aspirin and Plavix,   - Will Consult PRN in case patient's condition changes    Pulmonary:   #Acute respiratory failure 2/2 COVID PNA  -ETT placed 11/19, replaced on 12/7 as patient was having a leak in the balloon, replaced w/o complication  - 32/500/100/17, ABG 7.10/83/77/93- NO discontinued.  - supinated since 3pm 11/22  - ESR, procal, ferritin decreasing, d-dimer, LDH, CRP decreasing, will trend every 3rd day  - Dexamethasone IV 6mg Qd (10 day course through 11/24) , Finished on 12/4  - worsening infiltrates noted on CXR 11/30, no recent changes till 12/4  - IgA positive, IgG negative, indicating new infection, No Remdesivir given due to bad kidney function      ID:   -history and management as above  -changed LIJ to femoral line, removed LIJ 11/25  -WBC 18->11->13.2->15.6->17.8>18  -RVP +COVID, procal increased to 3.13 as noted above, started cefepime 11/21, added vanc 11/24, added azithromycin given worsening CXR  11/25  - completed abx  -BCx NGF  - Discontinue Abx      Nephro: #AGUSTO due to ATN secondary to likely Covid pneumonia   -CrCl 44, Cr 1.1 on admission, acutely worsened 11/21, possibly 2/2 covid injury and/or low BP, this am 6.41  -RIJ HD replaced 12/5 and HD started 11/21, 11/22, 11/24, 11/25,11/26,12/2, 12/5  - continue phoslo, off bicarb drip  - f/u with nephro for further   - hemodialysis on 12/5, ABG and kidney function worsening  - Asked pharmacy to concentrate the drip to decrease pt's intake, however pharmacy cannot concentrate any more  - Patient will likely get dialysis today 12/7, Net fluid positive 22L    GI: #TF  - OG tube is not working - will change NG tube  - Holding TF with Nepro as patient is fluid overload  - monitor LFTs AST/ALT,  likely 2/2 COVID, trending down slowly  -Daily CMP  -Protonix IV for PPx  - Senna and Miralax for bowel regimen    Heme:   - thrombocytopenia: resolved    # Anemia  - monitor CBC    Endocrine: #BG controlled  -A1c 6, average glucose 126  -TSH wnl  -vit D moderately low to 22, c/w 1000U/day   -FS q6 for TF or NPO  -HSS    Skin/Catheters:   #LIJ placed 12/1  #RIJ HD catheter 12/05  #R radial artery 12/04  #eye drops    Prophylaxis:  -patient is on heparin Drip , Protonix for GI ppx    Prognosis:   Poor, DNR< Family informed on12/5 ASSESSMENT AND PLAN: 49M without PMH from home with wife admitted to ICU for of Acute Respiratory failure 2/2 COVID PNA. Patient is currently in multi organ failure and on Dialysis. Requiring pressor support    Acute respiratory failure secondary to Covid pneumonia  Septic Shock and Paroxysmal Atrial fibrillation  AGUSTO ,ATN on dialysis      Neuro:   - sedated with fentanyl  - Off propofol as TG levels were elevated  - Continue Versed to help with Ventricular synchrony  - d/jos paralytics   - No improvement in condition or sedative requirement     CVS: #Septic Shock and Paroxysmal Atrial fibrillation  -SBPs improved 90-100s  - patient is currently on Levophed,   - s/p dig loading, c/w 0.125mg dig every other day (renal dosing) started on 11/26  - EKG with sinus tach->AF  - bedside Echo without global dysfunction  - Digoxin levels in range ,will continue current regimen   - A-line monitoring    -#troponemia  -9->8.2->8.3 11/21 trended down   -cardio Dr. Tsang saw patient before, Currently he does not need Aspirin and Plavix,   - Will Consult PRN in case patient's condition changes    Pulmonary:   #Acute respiratory failure 2/2 COVID PNA  -ETT placed 11/19, replaced on 12/7 as patient was having a leak in the balloon, replaced w/o complication  - 32/500/100/17, ABG 7.10/83/77/93- NO discontinued.  - ESR, procal, ferritin decreasing, d-dimer, LDH, CRP decreasing, will trend every 3rd day  - Dexamethasone IV 6mg Qd (10 day course through 11/24) , Finished on 12/4  - worsening infiltrates noted on CXR 11/30, no recent changes till 12/4  - IgA positive, IgG negative, indicating new infection, No Remdesivir given due to bad kidney function      ID:   -history and management as above  -changed LIJ to femoral line, removed LIJ 11/25  -WBC 18->11->13.2->15.6->17.8>18  -RVP +COVID, procal increased to 3.13 as noted above, started cefepime 11/21, added vanc 11/24, added azithromycin given worsening CXR  11/25  - completed abx  -BCx NGF  - Discontinue Abx      Nephro: #AGUSTO due to ATN secondary to likely Covid pneumonia   -CrCl 44, Cr 1.1 on admission, acutely worsened 11/21, possibly 2/2 covid injury and/or low BP, this am 6.41  -RIJ HD replaced 12/5 and HD started 11/21, 11/22, 11/24, 11/25,11/26,12/2, 12/5, 12/7  - continue phoslo, off bicarb drip  - f/u with nephro for further   - hemodialysis on 12/5, ABG and kidney function worsening  - Asked pharmacy to concentrate the drip to decrease pt's intake, however pharmacy cannot concentrate any more  - Patient will likely get dialysis today 12/8, Net fluid positive 22L    GI: #TF  - OG tube is not working - will change NG tube  - Holding TF with Nepro as patient is fluid overload  - monitor LFTs AST/ALT,  likely 2/2 COVID, trending down slowly  -Daily CMP  -Protonix IV for PPx  - Senna and Miralax for bowel regimen    Heme:   - thrombocytopenia: resolved    # Anemia  - monitor CBC    Endocrine: #BG controlled  -A1c 6, average glucose 126  -TSH wnl  -vit D moderately low to 22, c/w 1000U/day   -FS q6 for TF or NPO  -HSS    Skin/Catheters:   #LIJ placed 12/1  #RIJ HD catheter 12/05  #R radial artery 12/04  #eye drops    Prophylaxis:  -patient is on heparin Drip , Protonix for GI ppx    Prognosis:   Poor, DNR< Family informed on12/5

## 2020-12-08 NOTE — PROGRESS NOTE ADULT - PROBLEM SELECTOR PLAN 2
Patient with Calcium 8.6 with albumin 2.2, Phos  4.5, Mag 2.5  Continue  Phoslo 1331 mg po tid  Monitor BMP, calcium, mag, phos level

## 2020-12-08 NOTE — PROGRESS NOTE ADULT - PROBLEM SELECTOR PLAN 6
Anemia of chronic illness with downtrending hgb 7.4  Monitor serial CBC  Transfuse PRBC per primary team

## 2020-12-09 NOTE — PROGRESS NOTE ADULT - SUBJECTIVE AND OBJECTIVE BOX
Chandan Awad MD (Nephrology progress note)  [address] [facility],  [id number],  [id number]  TEl: [phone]  Cell: [phone]    Patient is a 50y Male seen and evaluated at bedside. Vital signs, laboratory data, imaging studies, consult notes reviewed done within past 24 hours. Overnight events noted. Patient remains critically ill on ventilatory support and IV pressors. Interval HD treatment with 3 L fluid removal. Patient remains anuric requiring RRT/HD.     Allergies    No Known Allergies    Intolerances        ROS:  Limited. Sedated and intubated on ventilatory support    VITALS:    T(C): 35.9 (12-09-20 @ 07:00), Max: 36.3 (12-08-20 @ 13:05)  HR: 89 (12-09-20 @ 11:15) (78 - 91)  BP: 105/56 (12-09-20 @ 08:45) (105/56 - 105/56)  RR: 0 (12-09-20 @ 11:15) (0 - 34)  SpO2: 99% (12-09-20 @ 11:15) (95% - 99%)  CAPILLARY BLOOD GLUCOSE      POCT Blood Glucose.: 86 mg/dL (09 Dec 2020 11:55)  POCT Blood Glucose.: 104 mg/dL (09 Dec 2020 05:20)  POCT Blood Glucose.: 117 mg/dL (09 Dec 2020 01:02)  POCT Blood Glucose.: 112 mg/dL (08 Dec 2020 16:07)  POCT Blood Glucose.: 122 mg/dL (08 Dec 2020 13:29)      12-08-20 @ 07:01  -  12-09-20 @ 07:00  --------------------------------------------------------  IN: 3097.7 mL / OUT: 3700 mL / NET: -602.3 mL      MEDICATIONS  (STANDING):  bisacodyl Suppository 10 milliGRAM(s) Rectal daily  chlorhexidine 0.12% Liquid 15 milliLiter(s) Oral Mucosa every 12 hours  chlorhexidine 2% Cloths 1 Application(s) Topical <User Schedule>  fentaNYL   Infusion 3 MICROgram(s)/kG/Hr (37.5 mL/Hr) IV Continuous <Continuous>  glucagon  Injectable 1 milliGRAM(s) IntraMuscular once  heparin  Infusion.  Unit(s)/Hr (22 mL/Hr) IV Continuous <Continuous>  insulin lispro (ADMELOG) corrective regimen sliding scale   SubCutaneous every 6 hours  lubricant eye ont 1 Application(s) 1 Application(s) Both EYES two times a day  midazolam Infusion 0.15 mG/kG/Hr (18.8 mL/Hr) IV Continuous <Continuous>  norepinephrine Infusion 0.5 MICROgram(s)/kG/Min (58.6 mL/Hr) IV Continuous <Continuous>  pantoprazole  Injectable 40 milliGRAM(s) IV Push daily    MEDICATIONS  (PRN):  ALBUTerol    90 MICROgram(s) HFA Inhaler 2 Puff(s) Inhalation every 6 hours PRN Shortness of Breath  sodium chloride 0.9% lock flush 10 milliLiter(s) IV Push every 1 hour PRN Pre/post blood products, medications, blood draw, and to maintain line patency      PHYSICAL EXAM:  GENERAL: Sedated and intubated on ventilatory support  HEENT: LEONOR, EOMI, neck supple, no JVP, no carotid bruit, oral mucosa moist and pink.  CHEST/LUNG: Bilateral decreased breath sounds, bibasilar rales and crepitations, no wheezing  HEART: Regular rate and rhythm, ERNESTO II/VI at LPSB, no gallops, no rub   ABDOMEN: Soft, nontender, non distended, bowel sounds present, no palpable organomegaly  : No flank or supra pubic tenderness.  EXTREMITIES: Bilateral upper and lower extremity swelling noted  Neurology: Sedated and intubated on ventilatory support  SKIN: No rash or skin lesion  Musculoskeletal: No joint deformity     LABS:                        7.8    15.87 )-----------( 176      ( 09 Dec 2020 06:38 )             27.4     12-09    135  |  96  |  49<H>  ----------------------------<  86  4.7   |  25  |  5.90<H>    Ca    9.2      09 Dec 2020 06:38  Phos  4.5     12-08  Mg     2.2     12-08    TPro  6.6  /  Alb  2.3<L>  /  TBili  0.5  /  DBili  x   /  AST  60<H>  /  ALT  43  /  AlkPhos  165<H>  12-09      PT/INR - ( 08 Dec 2020 06:07 )   PT: 13.5 sec;   INR: 1.14 ratio         PTT - ( 09 Dec 2020 06:38 )  PTT:89.2 sec          RADIOLOGY & ADDITIONAL STUDIES:  rad< from: Xray Chest 1 View- PORTABLE-Routine (Xray Chest 1 View- PORTABLE-Routine in AM.) (12.09.20 @ 08:13) >    EXAM:  XR CHEST PORTABLE ROUTINE 1V                            PROCEDURE DATE:  12/09/2020          INTERPRETATION:  Chest one view    HISTORY: COVID Pneumonia    COMPARISON STUDY: 12/7/2020    Frontal expiratory view of the chest shows the heart to be similar in size. Endotracheal tube reaches the T1 level. Left jugular catheter and right jugular dialysis catheter are again noted.    The lungs show partial clearing with reduction of left effusion and there is no evidence of pneumothorax nor right pleural effusion.    IMPRESSION:  Partial clearing of the lungs. ET tube above the medial clavicles as noted.    Thank you for the courtesy of this referral.            ALEXIA FERNANDES MD; Attending Interventional Radiologist  This document has been electronically signed. Dec  9 2020 11:22AM    < end of copied text >    Imaging Personally Reviewed:  [x] YES  [ ] NO    Consultant(s) Notes Reviewed:  [x] YES  [ ] NO    Care Discussed with Primary team/ Other Providers [x] YES  [ ] NO

## 2020-12-09 NOTE — PROGRESS NOTE ADULT - PROBLEM SELECTOR PLAN 1
Anuric AGUSTO from ATN due to septic shock/ARDS requiring RRT/HD  - S/p HD treatment on 12/08 with net 3 L fluid removal  - Avoid Nephrotoxic agents  - Monitor BMP and electrolytes  - Maintain MAP>65-70 mm hg  - Adjust antibiotics as per renal dose clearances  - Volume status and electrolytes noted.  - Defer HD treatment today.  - NExt anticipated IHD 12/10.

## 2020-12-09 NOTE — PROGRESS NOTE ADULT - PROBLEM SELECTOR PLAN 5
Anemia of chronic illness with downtrending hgb 7.8  Monitor serial CBC  Transfuse PRBC per primary team

## 2020-12-09 NOTE — CHART NOTE - NSCHARTNOTEFT_GEN_A_CORE
Assessment:   Patient is a 50y old  Male who presents with a chief complaint of Shortness of breath D/t Covid (09 Dec 2020 12:12). Pt continues NPO. PGY 3 reports NPO due to fluid overload (4+ generalized edema, I>O). Pt on HD. Poor prognosis noted      Factors impacting intake: [ ] none [ ] nausea  [ ] vomiting [ ] diarrhea [ ] constipation  [ ]chewing problems [ ] swallowing issues  [x ] other: altered GI fx    Diet Prescription: Diet, NPO (20 @ 10:57)      Daily     Daily Weight in k (09 Dec 2020 07:00)  Weight in k (08 Dec 2020 16:10)  Weight in k.5 (08 Dec 2020 13:05)  Weight in k (08 Dec 2020 07:00)  Weight in k.6 (07 Dec 2020 07:45)  Weight in k.3 (06 Dec 2020 17:20)  Weight in k (06 Dec 2020 13:50)  Weight in k.6 (06 Dec 2020 07:00)  Weight in k (05 Dec 2020 07:00)  Weight in k.8 (04 Dec 2020 07:00)  Weight in k.8 (02 Dec 2020 19:25)  Weight in k.8 (02 Dec 2020 16:45)  Weight in k.5 (01 Dec 2020 15:30)  Weight in k.4 (01 Dec 2020 12:28)  Weight in k.3 (2020 07:45)  Weight in k.1 (2020 14:00)  Weight in k.9 (2020 10:23)  Weight in k.6 (2020 07:02)  Weight in k.3 (2020 16:50)  Weight in k (2020 13:20)  Weight in k (2020 07:00)  Weight in k.1 (2020 00:25)  Weight in k.4 (2020 21:50)    % Weight Change: Fluid overload, 4+ generalized edema noted    Pertinent Medications: MEDICATIONS  (STANDING):  bisacodyl Suppository 10 milliGRAM(s) Rectal daily  chlorhexidine 0.12% Liquid 15 milliLiter(s) Oral Mucosa every 12 hours  chlorhexidine 2% Cloths 1 Application(s) Topical <User Schedule>  fentaNYL   Infusion 3 MICROgram(s)/kG/Hr (37.5 mL/Hr) IV Continuous <Continuous>  glucagon  Injectable 1 milliGRAM(s) IntraMuscular once  heparin  Infusion.  Unit(s)/Hr (22 mL/Hr) IV Continuous <Continuous>  insulin lispro (ADMELOG) corrective regimen sliding scale   SubCutaneous every 6 hours  lubricant eye ont 1 Application(s) 1 Application(s) Both EYES two times a day  midazolam Infusion 0.15 mG/kG/Hr (18.8 mL/Hr) IV Continuous <Continuous>  norepinephrine Infusion 0.5 MICROgram(s)/kG/Min (58.6 mL/Hr) IV Continuous <Continuous>  pantoprazole  Injectable 40 milliGRAM(s) IV Push daily    MEDICATIONS  (PRN):  ALBUTerol    90 MICROgram(s) HFA Inhaler 2 Puff(s) Inhalation every 6 hours PRN Shortness of Breath  sodium chloride 0.9% lock flush 10 milliLiter(s) IV Push every 1 hour PRN Pre/post blood products, medications, blood draw, and to maintain line patency    Pertinent Labs:  Na135 mmol/L Glu 86 mg/dL K+ 4.7 mmol/L Cr  5.90 mg/dL<H> BUN 49 mg/dL<H>  Phos 4.5 mg/dL  Alb 2.3 g/dL<L>  Chol --    LDL --    HDL --    Trig 414 mg/dL<H>     CAPILLARY BLOOD GLUCOSE      POCT Blood Glucose.: 86 mg/dL (09 Dec 2020 11:55)  POCT Blood Glucose.: 104 mg/dL (09 Dec 2020 05:20)  POCT Blood Glucose.: 117 mg/dL (09 Dec 2020 01:02)  POCT Blood Glucose.: 112 mg/dL (08 Dec 2020 16:07)          Previous Nutrition Diagnosis:   [ ] Altered GI function  [ ]Inadequate Oral Intake [ ] Swallowing Difficulty   [ ] Altered nutrition related labs [ ] Increased Nutrient Needs [ ] Overweight/Obesity   [ ] Unintended Weight Loss [ ] Food & Nutrition Related Knowledge Deficit [x ] Malnutrition (severe PCM)  [ ] Other:     Nutrition Diagnosis is [x] ongoing  [ ] resolved [ ] not applicable           Interventions:   Recommend  [ ] Change Diet To:  [ ] Nutrition Supplement  [ ] Nutrition Support  [x ] Other: Diet advancement per MD    Monitoring and Evaluation:   [ x ] follow up per protocol

## 2020-12-09 NOTE — PROGRESS NOTE ADULT - ASSESSMENT
ASSESSMENT AND PLAN: 49M without PMH from home with wife admitted to ICU for of Acute Respiratory failure 2/2 COVID PNA. Patient is currently in multi organ failure and on Dialysis. Requiring pressor support    Acute respiratory failure secondary to Covid pneumonia  Septic Shock and Paroxysmal Atrial fibrillation  AGUSTO ,ATN on dialysis      Neuro:   - sedated with fentanyl  - Off propofol as TG levels were elevated  - Continue Versed to help with Ventricular synchrony  - d/jos paralytics   - No improvement in condition or sedative requirement     CVS: #Septic Shock and Paroxysmal Atrial fibrillation  -SBPs improved 90-100s  - patient is currently on Levophed,   - s/p dig loading, c/w 0.125mg dig every other day (renal dosing) started on 11/26  - EKG with sinus tach->AF  - bedside Echo without global dysfunction  - Digoxin levels in range ,will continue current regimen   - A-line monitoring    -#troponemia  -9->8.2->8.3 11/21 trended down   -cardio Dr. Tsang saw patient before, Currently he does not need Aspirin and Plavix,   - Will Consult PRN in case patient's condition changes    Pulmonary:   #Acute respiratory failure 2/2 COVID PNA  -ETT placed 11/19, replaced on 12/7 as patient was having a leak in the balloon, replaced w/o complication  - 32/500/100/17, ABG 7.10/83/77/93- NO discontinued.  - ESR, procal, ferritin decreasing, d-dimer, LDH, CRP decreasing, will trend every 3rd day  - Dexamethasone IV 6mg Qd (10 day course through 11/24) , Finished on 12/4  - worsening infiltrates noted on CXR 11/30, no recent changes till 12/4  - IgA positive, IgG negative, indicating new infection, No Remdesivir given due to bad kidney function      ID:   -history and management as above  -changed LIJ to femoral line, removed LIJ 11/25  -WBC 18->11->13.2->15.6->17.8>18  -RVP +COVID, procal increased to 3.13 as noted above, started cefepime 11/21, added vanc 11/24, added azithromycin given worsening CXR  11/25  - completed abx  -BCx NGF  - Discontinue Abx      Nephro: #AGUSTO due to ATN secondary to likely Covid pneumonia   -CrCl 44, Cr 1.1 on admission, acutely worsened 11/21, possibly 2/2 covid injury and/or low BP, this am 6.41  -RIJ HD replaced 12/5 and HD started 11/21, 11/22, 11/24, 11/25,11/26,12/2, 12/5, 12/7  - continue phoslo, off bicarb drip  - f/u with nephro for further   - hemodialysis on 12/5, ABG and kidney function worsening  - Asked pharmacy to concentrate the drip to decrease pt's intake, however pharmacy cannot concentrate any more  - Patient will likely get dialysis today 12/8, Net fluid positive 22L    GI: #TF  - OG tube is not working - will change NG tube  - Holding TF with Nepro as patient is fluid overload  - monitor LFTs AST/ALT,  likely 2/2 COVID, trending down slowly  -Daily CMP  -Protonix IV for PPx  - Senna and Miralax for bowel regimen    Heme:   - thrombocytopenia: resolved    # Anemia  - monitor CBC    Endocrine: #BG controlled  -A1c 6, average glucose 126  -TSH wnl  -vit D moderately low to 22, c/w 1000U/day   -FS q6 for TF or NPO  -HSS    Skin/Catheters:   #LIJ placed 12/1  #RIJ HD catheter 12/05  #R radial artery 12/04  #eye drops    Prophylaxis:  -patient is on heparin Drip , Protonix for GI ppx    Prognosis:   Poor, DNR< Family informed on12/5 ASSESSMENT AND PLAN: 49M without PMH from home with wife admitted to ICU for of Acute Respiratory failure 2/2 COVID PNA. Patient is currently in multi organ failure and on Dialysis. Requiring pressor support    Acute respiratory failure secondary to Covid pneumonia  Septic Shock and Paroxysmal Atrial fibrillation  AGUSTO ,ATN on dialysis      Neuro:   - sedated with fentanyl  - Off propofol as TG levels were elevated  - Continue Versed to help with Ventricular synchrony  - d/jos paralytics   - No improvement in condition or sedative requirement     CVS: #Septic Shock and Paroxysmal Atrial fibrillation  -SBPs improved 90-100s  - patient is currently on Levophed,   - s/p dig loading, c/w 0.125mg dig every other day (renal dosing) started on 11/26  - EKG with sinus tach->AF  - bedside Echo without global dysfunction  - Digoxin levels in range ,will continue current regimen   - A-line monitoring    -#troponemia  -9->8.2->8.3 11/21 trended down   -cardio Dr. Tsang saw patient before, Currently he does not need Aspirin and Plavix,   - Will Consult PRN in case patient's condition changes    Pulmonary:   #Acute respiratory failure 2/2 COVID PNA  -ETT placed 11/19, replaced on 12/7 as patient was having a leak in the balloon, replaced w/o complication  - 32/500/100/17, ABG 7.10/83/77/93- NO discontinued.  - ESR, procal, ferritin decreasing, d-dimer, LDH, CRP decreasing, will trend every 3rd day  - Dexamethasone IV 6mg Qd (10 day course through 11/24) , Finished on 12/4  - worsening infiltrates noted on CXR 11/30, no recent changes till 12/4  - IgA positive, IgG negative, indicating new infection, No Remdesivir given due to bad kidney function      ID:   -history and management as above  -changed LIJ to femoral line, removed LIJ 11/25  -WBC 18->11->13.2->15.6->17.8>18  -RVP +COVID, procal increased to 3.13 as noted above, started cefepime 11/21, added vanc 11/24, added azithromycin given worsening CXR  11/25  - completed abx  -BCx NGF  - Discontinue Abx      Nephro: #AGUSTO due to ATN secondary to likely Covid pneumonia   -CrCl 44, Cr 1.1 on admission, acutely worsened 11/21, possibly 2/2 covid injury and/or low BP, this am 6.41  -RIJ HD replaced 12/5 and HD started 11/21, 11/22, 11/24, 11/25,11/26,12/2, 12/5, 12/7  - continue phoslo, off bicarb drip  - f/u with nephro for further   - hemodialysis on 12/8, ABG and kidney function worsening  - Asked pharmacy to concentrate the drip to decrease pt's intake, however pharmacy cannot concentrate any more  - Patient received dialysis today 12/8, 3.6L removed  - Will likely get dialysis 12/10    GI: #TF  - OG tube is not working - will change NG tube  - Holding TF with Nepro as patient is fluid overload  - monitor LFTs AST/ALT,  likely 2/2 COVID, trending down slowly  -Daily CMP  -Protonix IV for PPx  - Senna and Miralax for bowel regimen    Heme:   - thrombocytopenia: resolved    # Anemia  - monitor CBC    Endocrine: #BG controlled  -A1c 6, average glucose 126  -TSH wnl  -vit D moderately low to 22, c/w 1000U/day   -FS q6 for TF or NPO  -HSS    Skin/Catheters:   #LIJ placed 12/1  #RIJ HD catheter 12/05  #R radial artery 12/04  #eye drops    Prophylaxis:  -patient is on heparin Drip , Protonix for GI ppx    Prognosis:   Poor, DNR< Family informed on12/5 , being updated daily

## 2020-12-09 NOTE — PROGRESS NOTE ADULT - SUBJECTIVE AND OBJECTIVE BOX
INTERVAL HPI/OVERNIGHT EVENTS: ***    PRESSORS: [ ] YES [ ] NO  WHICH:    ANTIBIOTICS:                  DATE STARTED:  ANTIBIOTICS:                  DATE STARTED:  ANTIBIOTICS:                  DATE STARTED:    Antimicrobial:    Cardiovascular:  norepinephrine Infusion 0.5 MICROgram(s)/kG/Min IV Continuous <Continuous>    Pulmonary:  ALBUTerol    90 MICROgram(s) HFA Inhaler 2 Puff(s) Inhalation every 6 hours PRN    Hematalogic:  heparin  Infusion.  Unit(s)/Hr IV Continuous <Continuous>    Other:  bisacodyl Suppository 10 milliGRAM(s) Rectal daily  chlorhexidine 0.12% Liquid 15 milliLiter(s) Oral Mucosa every 12 hours  chlorhexidine 2% Cloths 1 Application(s) Topical <User Schedule>  fentaNYL   Infusion 3 MICROgram(s)/kG/Hr IV Continuous <Continuous>  glucagon  Injectable 1 milliGRAM(s) IntraMuscular once  insulin lispro (ADMELOG) corrective regimen sliding scale   SubCutaneous every 6 hours  lubricant eye ont 1 Application(s) 1 Application(s) Both EYES two times a day  midazolam Infusion 0.15 mG/kG/Hr IV Continuous <Continuous>  pantoprazole  Injectable 40 milliGRAM(s) IV Push daily  sodium chloride 0.9% lock flush 10 milliLiter(s) IV Push every 1 hour PRN    ALBUTerol    90 MICROgram(s) HFA Inhaler 2 Puff(s) Inhalation every 6 hours PRN  bisacodyl Suppository 10 milliGRAM(s) Rectal daily  chlorhexidine 0.12% Liquid 15 milliLiter(s) Oral Mucosa every 12 hours  chlorhexidine 2% Cloths 1 Application(s) Topical <User Schedule>  fentaNYL   Infusion 3 MICROgram(s)/kG/Hr IV Continuous <Continuous>  glucagon  Injectable 1 milliGRAM(s) IntraMuscular once  heparin  Infusion.  Unit(s)/Hr IV Continuous <Continuous>  insulin lispro (ADMELOG) corrective regimen sliding scale   SubCutaneous every 6 hours  lubricant eye ont 1 Application(s) 1 Application(s) Both EYES two times a day  midazolam Infusion 0.15 mG/kG/Hr IV Continuous <Continuous>  norepinephrine Infusion 0.5 MICROgram(s)/kG/Min IV Continuous <Continuous>  pantoprazole  Injectable 40 milliGRAM(s) IV Push daily  sodium chloride 0.9% lock flush 10 milliLiter(s) IV Push every 1 hour PRN    Drug Dosing Weight  Height (cm): 154.4 (15 Nov 2020 09:25)  Weight (kg): 125 (15 Nov 2020 09:25)  BMI (kg/m2): 52.4 (15 Nov 2020 09:25)  BSA (m2): 2.16 (15 Nov 2020 09:25)    CENTRAL LINE: [ ] YES [ ] NO  LOCATION:   DATE INSERTED:  REMOVE: [ ] YES [ ] NO  EXPLAIN:    MICHELLE: [ ] YES [ ] NO    DATE INSERTED:  REMOVE:  [ ] YES [ ] NO  EXPLAIN:    A-LINE:  [ ] YES [ ] NO  LOCATION:   DATE INSERTED:  REMOVE:  [ ] YES [ ] NO  EXPLAIN:    PMH -reviewed admission note, no change since admission  PAST MEDICAL & SURGICAL HISTORY:  No pertinent past medical history    No significant past surgical history        ICU Vital Signs Last 24 Hrs  T(C): 35.6 (09 Dec 2020 04:45), Max: 36.3 (08 Dec 2020 13:05)  T(F): 96 (09 Dec 2020 04:45), Max: 97.4 (08 Dec 2020 13:05)  HR: 84 (09 Dec 2020 07:00) (78 - 91)  BP: --  BP(mean): --  ABP: 88/44 (09 Dec 2020 07:00) (68/60 - 144/64)  ABP(mean): 59 (09 Dec 2020 07:00) (59 - 225)  RR: 11 (09 Dec 2020 07:00) (9 - 34)  SpO2: 98% (09 Dec 2020 07:00) (83% - 99%)      ABG - ( 09 Dec 2020 04:28 )  pH, Arterial: 6.94  pH, Blood: x     /  pCO2: 119   /  pO2: 134   / HCO3: 25    / Base Excess: -8.5  /  SaO2: 97                    12-08 @ 07:01  -  12-09 @ 07:00  --------------------------------------------------------  IN: 3097.7 mL / OUT: 3700 mL / NET: -602.3 mL        Mode: AC/ CMV (Assist Control/ Continuous Mandatory Ventilation)  RR (machine): 32  TV (machine): 400  FiO2: 100  PEEP: 12  ITime: 0.85  MAP: 22  PIP: 30      PHYSICAL EXAM:    GENERAL: [ ]NAD, [ ]well-groomed, [ ]well-developed  HEAD:  [ ]Atraumatic, [ ]Normocephalic  EYES: [ ]EOMI, [ ]PERRLA, [ ]conjunctiva and sclera clear  ENMT: [ ]No tonsillar erythema, exudates, or enlargement; [ ]Moist mucous membranes, [ ]Good dentition, [ ]No lesions  NECK: [ ]Supple, normal appearance, [ ]No JVD; [ ]Normal thyroid; [ ]Trachea midline  NERVOUS SYSTEM:  [ ]Alert & Oriented X3, [ ]Good concentration; [ ]Motor Strength 5/5 B/L upper and lower extremities; [ ]DTRs 2+ intact and symmetric  CHEST/LUNG: [ ]No chest deformity; [ ]Normal percussion bilaterally; [ ]No rales, rhonchi, wheezing   HEART: [ ]Regular rate and rhythm; [ ]No murmurs, rubs, or gallops  ABDOMEN: [ ]Soft, Nontender, Nondistended; [ ]Bowel sounds present  EXTREMITIES:  [ ]2+ Peripheral Pulses, [ ]No clubbing, cyanosis, or edema  LYMPH: [ ]No lymphadenopathy noted  SKIN: [ ]No rashes or lesions; [ ]Good capillary refill      LABS:  CBC Full  -  ( 09 Dec 2020 06:38 )  WBC Count : 15.87 K/uL  RBC Count : 2.68 M/uL  Hemoglobin : 7.8 g/dL  Hematocrit : 27.4 %  Platelet Count - Automated : 176 K/uL  Mean Cell Volume : 102.2 fl  Mean Cell Hemoglobin : 29.1 pg  Mean Cell Hemoglobin Concentration : 28.5 gm/dL  Auto Neutrophil # : 12.25 K/uL  Auto Lymphocyte # : 1.74 K/uL  Auto Monocyte # : 0.72 K/uL  Auto Eosinophil # : 0.23 K/uL  Auto Basophil # : 0.07 K/uL  Auto Neutrophil % : 77.3 %  Auto Lymphocyte % : 11.0 %  Auto Monocyte % : 4.5 %  Auto Eosinophil % : 1.4 %  Auto Basophil % : 0.4 %    12-09    135  |  96  |  49<H>  ----------------------------<  86  4.7   |  25  |  5.90<H>    Ca    9.2      09 Dec 2020 06:38  Phos  4.5     12-08  Mg     2.2     12-08    TPro  6.6  /  Alb  2.3<L>  /  TBili  0.5  /  DBili  x   /  AST  60<H>  /  ALT  43  /  AlkPhos  165<H>  12-09    PT/INR - ( 08 Dec 2020 06:07 )   PT: 13.5 sec;   INR: 1.14 ratio         PTT - ( 09 Dec 2020 06:38 )  PTT:89.2 sec        RADIOLOGY & ADDITIONAL STUDIES REVIEWED:  ***    [ ]GOALS OF CARE DISCUSSION WITH PATIENT/FAMILY/PROXY:    CRITICAL CARE TIME SPENT: 35 minutes INTERVAL HPI/OVERNIGHT EVENTS: No significant event overnight     PRESSORS: [x] YES [ ] NO  WHICH: Levophed    ANTIBIOTICS:                  DATE STARTED:  ANTIBIOTICS:                  DATE STARTED:  ANTIBIOTICS:                  DATE STARTED:    Antimicrobial:    Cardiovascular:  norepinephrine Infusion 0.5 MICROgram(s)/kG/Min IV Continuous <Continuous>    Pulmonary:  ALBUTerol    90 MICROgram(s) HFA Inhaler 2 Puff(s) Inhalation every 6 hours PRN    Hematalogic:  heparin  Infusion.  Unit(s)/Hr IV Continuous <Continuous>    Other:  bisacodyl Suppository 10 milliGRAM(s) Rectal daily  chlorhexidine 0.12% Liquid 15 milliLiter(s) Oral Mucosa every 12 hours  chlorhexidine 2% Cloths 1 Application(s) Topical <User Schedule>  fentaNYL   Infusion 3 MICROgram(s)/kG/Hr IV Continuous <Continuous>  glucagon  Injectable 1 milliGRAM(s) IntraMuscular once  insulin lispro (ADMELOG) corrective regimen sliding scale   SubCutaneous every 6 hours  lubricant eye ont 1 Application(s) 1 Application(s) Both EYES two times a day  midazolam Infusion 0.15 mG/kG/Hr IV Continuous <Continuous>  pantoprazole  Injectable 40 milliGRAM(s) IV Push daily  sodium chloride 0.9% lock flush 10 milliLiter(s) IV Push every 1 hour PRN    ALBUTerol    90 MICROgram(s) HFA Inhaler 2 Puff(s) Inhalation every 6 hours PRN  bisacodyl Suppository 10 milliGRAM(s) Rectal daily  chlorhexidine 0.12% Liquid 15 milliLiter(s) Oral Mucosa every 12 hours  chlorhexidine 2% Cloths 1 Application(s) Topical <User Schedule>  fentaNYL   Infusion 3 MICROgram(s)/kG/Hr IV Continuous <Continuous>  glucagon  Injectable 1 milliGRAM(s) IntraMuscular once  heparin  Infusion.  Unit(s)/Hr IV Continuous <Continuous>  insulin lispro (ADMELOG) corrective regimen sliding scale   SubCutaneous every 6 hours  lubricant eye ont 1 Application(s) 1 Application(s) Both EYES two times a day  midazolam Infusion 0.15 mG/kG/Hr IV Continuous <Continuous>  norepinephrine Infusion 0.5 MICROgram(s)/kG/Min IV Continuous <Continuous>  pantoprazole  Injectable 40 milliGRAM(s) IV Push daily  sodium chloride 0.9% lock flush 10 milliLiter(s) IV Push every 1 hour PRN    Drug Dosing Weight  Height (cm): 154.4 (15 Nov 2020 09:25)  Weight (kg): 125 (15 Nov 2020 09:25)  BMI (kg/m2): 52.4 (15 Nov 2020 09:25)  BSA (m2): 2.16 (15 Nov 2020 09:25)    CENTRAL LINE: [x] YES [ ] NO  LOCATION: [facility] 12/2 INSERTED:  REMOVE: [ ] YES [ ] NO  EXPLAIN:    MICHELLE: [ ] YES [ x] NO    DATE INSERTED:  REMOVE:  [ ] YES [ ] NO  EXPLAIN:    A-LINE:  [x ] YES [ ] NO  LOCATION:    DATE INSERTED: 12/4  REMOVE:  [ ] YES [ ] NO  EXPLAIN:    PMH -reviewed admission note, no change since admission  PAST MEDICAL & SURGICAL HISTORY:  No pertinent past medical history    No significant past surgical history        ICU Vital Signs Last 24 Hrs  T(C): 35.6 (09 Dec 2020 04:45), Max: 36.3 (08 Dec 2020 13:05)  T(F): 96 (09 Dec 2020 04:45), Max: 97.4 (08 Dec 2020 13:05)  HR: 84 (09 Dec 2020 07:00) (78 - 91)  ABP: 88/44 (09 Dec 2020 07:00) (68/60 - 144/64)  ABP(mean): 59 (09 Dec 2020 07:00) (59 - 225)  RR: 11 (09 Dec 2020 07:00) (9 - 34)  SpO2: 98% (09 Dec 2020 07:00) (83% - 99%)      ABG - ( 09 Dec 2020 04:28 )  pH, Arterial: 6.94  pH, Blood: x     /  pCO2: 119   /  pO2: 134   / HCO3: 25    / Base Excess: -8.5  /  SaO2: 97                    12-08 @ 07:01  -  12-09 @ 07:00  --------------------------------------------------------  IN: 3097.7 mL / OUT: 3700 mL / NET: -602.3 mL        Mode: AC/ CMV (Assist Control/ Continuous Mandatory Ventilation)  RR (machine): 32  TV (machine): 400  FiO2: 100  PEEP: 12  ITime: 0.85  MAP: 22  PIP: 30      PHYSICAL EXAM:    GENERAL: Sedated and Intubated, No change   HEAD:  [ x]Atraumatic, [x ]Normocephalic  EYES: [x ]conjunctiva and sclera clear  ENMT: [x ]Moist mucous membranes, Tongue swollen ,  NECK: [x ]Supple, normal appearance,  NERVOUS SYSTEM:  sedated/ comatose   CHEST/LUNG: [x ]No chest deformity; [ x]Normal percussion bilaterally; [ ]No rales, rhonchi, wheezing   HEART: [ x]Regular rate and rhythm; [ x]No murmurs, rubs, or gallops  ABDOMEN: [ x]Soft, Nontender, Nondistended; [ x]Bowel sounds present  EXTREMITIES:  Generalized edema  LYMPH: [ x]No lymphadenopathy noted  SKIN: [ x]No rashes or lesions; [ ]Good capillary       LABS:  CBC Full  -  ( 09 Dec 2020 06:38 )  WBC Count : 15.87 K/uL  RBC Count : 2.68 M/uL  Hemoglobin : 7.8 g/dL  Hematocrit : 27.4 %  Platelet Count - Automated : 176 K/uL  Mean Cell Volume : 102.2 fl  Mean Cell Hemoglobin : 29.1 pg  Mean Cell Hemoglobin Concentration : 28.5 gm/dL  Auto Neutrophil # : 12.25 K/uL  Auto Lymphocyte # : 1.74 K/uL  Auto Monocyte # : 0.72 K/uL  Auto Eosinophil # : 0.23 K/uL  Auto Basophil # : 0.07 K/uL  Auto Neutrophil % : 77.3 %  Auto Lymphocyte % : 11.0 %  Auto Monocyte % : 4.5 %  Auto Eosinophil % : 1.4 %  Auto Basophil % : 0.4 %    12-09    135  |  96  |  49<H>  ----------------------------<  86  4.7   |  25  |  5.90<H>    Ca    9.2      09 Dec 2020 06:38  Phos  4.5     12-08  Mg     2.2     12-08    TPro  6.6  /  Alb  2.3<L>  /  TBili  0.5  /  DBili  x   /  AST  60<H>  /  ALT  43  /  AlkPhos  165<H>  12-09    PT/INR - ( 08 Dec 2020 06:07 )   PT: 13.5 sec;   INR: 1.14 ratio         PTT - ( 09 Dec 2020 06:38 )  PTT:89.2 sec        RADIOLOGY & ADDITIONAL STUDIES REVIEWED:  yes    [ ]GOALS OF CARE DISCUSSION WITH PATIENT/FAMILY/PROXY:    CRITICAL CARE TIME SPENT: 35 minutes

## 2020-12-09 NOTE — PROGRESS NOTE ADULT - PROBLEM SELECTOR PLAN 2
Patient with Calcium 9.2 with albumin 2.2, Phos  4.5, Mag 2.5  Continue  Phoslo 1331 mg po tid  Monitor BMP, calcium, mag, phos level

## 2020-12-10 NOTE — PROGRESS NOTE ADULT - SUBJECTIVE AND OBJECTIVE BOX
Chandan Awad MD (Nephrology progress note)  [address], [facility],  [address],  [id number]  TEl: [phone]  Cell: [phone]    Patient is a 50y Male seen and evaluated at bedside. Vital signs, laboratory data, imaging studies, consult notes reviewed done within past 24 hours. Overnight events noted.    Allergies    No Known Allergies    Intolerances        ROS:  Limited. Sedated and intubated on ventilatory support    VITALS:    T(C): 37.2 (12-10-20 @ 07:15), Max: 37.2 (12-10-20 @ 07:15)  HR: 100 (12-10-20 @ 10:30) (86 - 112)  BP: --  RR: 5 (12-10-20 @ 10:30) (0 - 32)  SpO2: 94% (12-10-20 @ 10:30) (74% - 99%)  CAPILLARY BLOOD GLUCOSE      POCT Blood Glucose.: 87 mg/dL (10 Dec 2020 06:01)  POCT Blood Glucose.: 93 mg/dL (10 Dec 2020 00:47)  POCT Blood Glucose.: 92 mg/dL (09 Dec 2020 17:26)  POCT Blood Glucose.: 86 mg/dL (09 Dec 2020 11:55)      12-09-20 @ 07:01  -  12-10-20 @ 07:00  --------------------------------------------------------  IN: 2777.5 mL / OUT: 0 mL / NET: 2777.5 mL    12-10-20 @ 07:01  -  12-10-20 @ 10:59  --------------------------------------------------------  IN: 357.6 mL / OUT: 0 mL / NET: 357.6 mL      MEDICATIONS  (STANDING):  albumin human 25% IVPB 50 milliLiter(s) IV Intermittent every 8 hours  bisacodyl Suppository 10 milliGRAM(s) Rectal daily  chlorhexidine 0.12% Liquid 15 milliLiter(s) Oral Mucosa every 12 hours  chlorhexidine 2% Cloths 1 Application(s) Topical <User Schedule>  fentaNYL   Infusion 3 MICROgram(s)/kG/Hr (37.5 mL/Hr) IV Continuous <Continuous>  glucagon  Injectable 1 milliGRAM(s) IntraMuscular once  heparin  Infusion.  Unit(s)/Hr (22 mL/Hr) IV Continuous <Continuous>  insulin lispro (ADMELOG) corrective regimen sliding scale   SubCutaneous every 6 hours  lubricant eye ont 1 Application(s) 1 Application(s) Both EYES two times a day  midazolam Infusion 0.15 mG/kG/Hr (18.8 mL/Hr) IV Continuous <Continuous>  norepinephrine Infusion 0.5 MICROgram(s)/kG/Min (58.6 mL/Hr) IV Continuous <Continuous>  pantoprazole  Injectable 40 milliGRAM(s) IV Push daily    MEDICATIONS  (PRN):  ALBUTerol    90 MICROgram(s) HFA Inhaler 2 Puff(s) Inhalation every 6 hours PRN Shortness of Breath  sodium chloride 0.9% lock flush 10 milliLiter(s) IV Push every 1 hour PRN Pre/post blood products, medications, blood draw, and to maintain line patency      PHYSICAL EXAM:  GENERAL: Sedated and intubated on ventilatory support  HEENT: LEONOR, EOMI, neck supple, no JVP, no carotid bruit, oral mucosa moist and pink.  CHEST/LUNG: Bilateral decreased breath sounds, bibasilar rales and crepitations, no wheezing  HEART: Regular rate and rhythm, ERNESTO II/VI at LPSB, no gallops, no rub   ABDOMEN: Soft, nontender, non distended, bowel sounds present, no palpable organomegaly  : No flank or supra pubic tenderness.  EXTREMITIES: Peripheral pulses are palpable, no pedal edema  Neurology: Sedated and intubated on ventilatory support  SKIN: No rash or skin lesion  Musculoskeletal: Bilateral pitting pedal edema of upper and lower extremity       Vascular ACCESS: Right IJ HD catheter.    LABS:                        7.8    15.87 )-----------( 176      ( 09 Dec 2020 06:38 )             27.4     12-10    135  |  98  |  61<H>  ----------------------------<  73  4.0   |  21<L>  |  6.96<H>    Ca    9.2      10 Dec 2020 07:00    TPro  6.2  /  Alb  2.2<L>  /  TBili  0.9  /  DBili  x   /  AST  45<H>  /  ALT  35  /  AlkPhos  148<H>  12-10      PT/INR - ( 10 Dec 2020 07:00 )   PT: 13.0 sec;   INR: 1.10 ratio         PTT - ( 10 Dec 2020 07:00 )  PTT:71.1 sec          RADIOLOGY & ADDITIONAL STUDIES:  ra< from: Xray Chest 1 View- PORTABLE-Routine (Xray Chest 1 View- PORTABLE-Routine in AM.) (12.09.20 @ 08:13) >    EXAM:  XR CHEST PORTABLE ROUTINE 1V                            PROCEDURE DATE:  12/09/2020          INTERPRETATION:  Chest one view    HISTORY: COVID Pneumonia    COMPARISON STUDY: 12/7/2020    Frontal expiratory view of the chest shows the heart to be similar in size. Endotracheal tube reaches the T1 level. Left jugular catheter and right jugular dialysis catheter are again noted.    The lungs show partial clearing with reduction of left effusion and there is no evidence of pneumothorax nor right pleural effusion.    IMPRESSION:  Partial clearing of the lungs. ET tube above the medial clavicles as noted.    Thank you for the courtesy of this referral.            ALEXIA FERNANDES MD; Attending Interventional Radiologist  This document has been electronically signed. Dec  9 2020 11:22AM    < end of copied text >    Imaging Personally Reviewed:  [x] YES  [ ] NO    Consultant(s) Notes Reviewed:  [x] YES  [ ] NO    Care Discussed with Primary team/ Other Providers [x] YES  [ ] NO

## 2020-12-10 NOTE — PROGRESS NOTE ADULT - SUBJECTIVE AND OBJECTIVE BOX
INTERVAL HPI/OVERNIGHT EVENTS: ***    PRESSORS: [ ] YES [ ] NO  WHICH:    ANTIBIOTICS:                  DATE STARTED:  ANTIBIOTICS:                  DATE STARTED:  ANTIBIOTICS:                  DATE STARTED:    Antimicrobial:    Cardiovascular:  norepinephrine Infusion 0.5 MICROgram(s)/kG/Min IV Continuous <Continuous>    Pulmonary:  ALBUTerol    90 MICROgram(s) HFA Inhaler 2 Puff(s) Inhalation every 6 hours PRN    Hematalogic:  heparin  Infusion.  Unit(s)/Hr IV Continuous <Continuous>    Other:  bisacodyl Suppository 10 milliGRAM(s) Rectal daily  chlorhexidine 0.12% Liquid 15 milliLiter(s) Oral Mucosa every 12 hours  chlorhexidine 2% Cloths 1 Application(s) Topical <User Schedule>  fentaNYL   Infusion 3 MICROgram(s)/kG/Hr IV Continuous <Continuous>  glucagon  Injectable 1 milliGRAM(s) IntraMuscular once  insulin lispro (ADMELOG) corrective regimen sliding scale   SubCutaneous every 6 hours  lubricant eye ont 1 Application(s) 1 Application(s) Both EYES two times a day  midazolam Infusion 0.15 mG/kG/Hr IV Continuous <Continuous>  pantoprazole  Injectable 40 milliGRAM(s) IV Push daily  sodium chloride 0.9% lock flush 10 milliLiter(s) IV Push every 1 hour PRN    ALBUTerol    90 MICROgram(s) HFA Inhaler 2 Puff(s) Inhalation every 6 hours PRN  bisacodyl Suppository 10 milliGRAM(s) Rectal daily  chlorhexidine 0.12% Liquid 15 milliLiter(s) Oral Mucosa every 12 hours  chlorhexidine 2% Cloths 1 Application(s) Topical <User Schedule>  fentaNYL   Infusion 3 MICROgram(s)/kG/Hr IV Continuous <Continuous>  glucagon  Injectable 1 milliGRAM(s) IntraMuscular once  heparin  Infusion.  Unit(s)/Hr IV Continuous <Continuous>  insulin lispro (ADMELOG) corrective regimen sliding scale   SubCutaneous every 6 hours  lubricant eye ont 1 Application(s) 1 Application(s) Both EYES two times a day  midazolam Infusion 0.15 mG/kG/Hr IV Continuous <Continuous>  norepinephrine Infusion 0.5 MICROgram(s)/kG/Min IV Continuous <Continuous>  pantoprazole  Injectable 40 milliGRAM(s) IV Push daily  sodium chloride 0.9% lock flush 10 milliLiter(s) IV Push every 1 hour PRN    Drug Dosing Weight  Height (cm): 154.4 (15 Nov 2020 09:25)  Weight (kg): 125 (15 Nov 2020 09:25)  BMI (kg/m2): 52.4 (15 Nov 2020 09:25)  BSA (m2): 2.16 (15 Nov 2020 09:25)    CENTRAL LINE: [ ] YES [ ] NO  LOCATION:   DATE INSERTED:  REMOVE: [ ] YES [ ] NO  EXPLAIN:    MICHELLE: [ ] YES [ ] NO    DATE INSERTED:  REMOVE:  [ ] YES [ ] NO  EXPLAIN:    A-LINE:  [ ] YES [ ] NO  LOCATION:   DATE INSERTED:  REMOVE:  [ ] YES [ ] NO  EXPLAIN:    PMH -reviewed admission note, no change since admission  PAST MEDICAL & SURGICAL HISTORY:  No pertinent past medical history    No significant past surgical history        ICU Vital Signs Last 24 Hrs  T(C): 36.9 (10 Dec 2020 04:21), Max: 36.9 (10 Dec 2020 04:21)  T(F): 98.5 (10 Dec 2020 04:21), Max: 98.5 (10 Dec 2020 04:21)  HR: 112 (10 Dec 2020 06:45) (84 - 112)  BP: 105/56 (09 Dec 2020 08:45) (105/56 - 105/56)  BP(mean): 66 (09 Dec 2020 08:45) (66 - 66)  ABP: 136/68 (10 Dec 2020 06:45) (54/29 - 163/114)  ABP(mean): 89 (10 Dec 2020 06:45) (36 - 128)  RR: 17 (10 Dec 2020 06:45) (0 - 32)  SpO2: 74% (10 Dec 2020 06:45) (74% - 99%)      ABG - ( 10 Dec 2020 04:15 )  pH, Arterial: 7.21  pH, Blood: x     /  pCO2: 50    /  pO2: 164   / HCO3: 20    / Base Excess: -7.3  /  SaO2: 99                    12-09 @ 07:01  -  12-10 @ 07:00  --------------------------------------------------------  IN: 2658.3 mL / OUT: 0 mL / NET: 2658.3 mL        Mode: AC/ CMV (Assist Control/ Continuous Mandatory Ventilation)  RR (machine): 32  TV (machine): 400  FiO2: 100  PEEP: 12  ITime: 0.85  MAP: 24  PIP: 40      PHYSICAL EXAM:    GENERAL: [ ]NAD, [ ]well-groomed, [ ]well-developed  HEAD:  [ ]Atraumatic, [ ]Normocephalic  EYES: [ ]EOMI, [ ]PERRLA, [ ]conjunctiva and sclera clear  ENMT: [ ]No tonsillar erythema, exudates, or enlargement; [ ]Moist mucous membranes, [ ]Good dentition, [ ]No lesions  NECK: [ ]Supple, normal appearance, [ ]No JVD; [ ]Normal thyroid; [ ]Trachea midline  NERVOUS SYSTEM:  [ ]Alert & Oriented X3, [ ]Good concentration; [ ]Motor Strength 5/5 B/L upper and lower extremities; [ ]DTRs 2+ intact and symmetric  CHEST/LUNG: [ ]No chest deformity; [ ]Normal percussion bilaterally; [ ]No rales, rhonchi, wheezing   HEART: [ ]Regular rate and rhythm; [ ]No murmurs, rubs, or gallops  ABDOMEN: [ ]Soft, Nontender, Nondistended; [ ]Bowel sounds present  EXTREMITIES:  [ ]2+ Peripheral Pulses, [ ]No clubbing, cyanosis, or edema  LYMPH: [ ]No lymphadenopathy noted  SKIN: [ ]No rashes or lesions; [ ]Good capillary refill      LABS:  CBC Full  -  ( 09 Dec 2020 06:38 )  WBC Count : 15.87 K/uL  RBC Count : 2.68 M/uL  Hemoglobin : 7.8 g/dL  Hematocrit : 27.4 %  Platelet Count - Automated : 176 K/uL  Mean Cell Volume : 102.2 fl  Mean Cell Hemoglobin : 29.1 pg  Mean Cell Hemoglobin Concentration : 28.5 gm/dL  Auto Neutrophil # : 12.25 K/uL  Auto Lymphocyte # : 1.74 K/uL  Auto Monocyte # : 0.72 K/uL  Auto Eosinophil # : 0.23 K/uL  Auto Basophil # : 0.07 K/uL  Auto Neutrophil % : 77.3 %  Auto Lymphocyte % : 11.0 %  Auto Monocyte % : 4.5 %  Auto Eosinophil % : 1.4 %  Auto Basophil % : 0.4 %    12-10    135  |  98  |  61<H>  ----------------------------<  73  4.0   |  x   |  x     Ca    9.2      09 Dec 2020 06:38    TPro  6.6  /  Alb  2.3<L>  /  TBili  0.5  /  DBili  x   /  AST  60<H>  /  ALT  43  /  AlkPhos  165<H>  12-09    PT/INR - ( 10 Dec 2020 07:00 )   PT: 13.0 sec;   INR: 1.10 ratio         PTT - ( 10 Dec 2020 07:00 )  PTT:71.1 sec        RADIOLOGY & ADDITIONAL STUDIES REVIEWED:  ***    [ ]GOALS OF CARE DISCUSSION WITH PATIENT/FAMILY/PROXY:    CRITICAL CARE TIME SPENT: 35 minutes INTERVAL HPI/OVERNIGHT EVENTS: No significsnt event overnight, ABG is improved from previous    PRESSORS: [x ] YES [ ] NO  WHICH: Levophed    ANTIBIOTICS:                  DATE STARTED:  ANTIBIOTICS:                  DATE STARTED:  ANTIBIOTICS:                  DATE STARTED:    Antimicrobial:    Cardiovascular:  norepinephrine Infusion 0.5 MICROgram(s)/kG/Min IV Continuous <Continuous>    Pulmonary:  ALBUTerol    90 MICROgram(s) HFA Inhaler 2 Puff(s) Inhalation every 6 hours PRN    Hematalogic:  heparin  Infusion.  Unit(s)/Hr IV Continuous <Continuous>    Other:  bisacodyl Suppository 10 milliGRAM(s) Rectal daily  chlorhexidine 0.12% Liquid 15 milliLiter(s) Oral Mucosa every 12 hours  chlorhexidine 2% Cloths 1 Application(s) Topical <User Schedule>  fentaNYL   Infusion 3 MICROgram(s)/kG/Hr IV Continuous <Continuous>  glucagon  Injectable 1 milliGRAM(s) IntraMuscular once  insulin lispro (ADMELOG) corrective regimen sliding scale   SubCutaneous every 6 hours  lubricant eye ont 1 Application(s) 1 Application(s) Both EYES two times a day  midazolam Infusion 0.15 mG/kG/Hr IV Continuous <Continuous>  pantoprazole  Injectable 40 milliGRAM(s) IV Push daily  sodium chloride 0.9% lock flush 10 milliLiter(s) IV Push every 1 hour PRN    ALBUTerol    90 MICROgram(s) HFA Inhaler 2 Puff(s) Inhalation every 6 hours PRN  bisacodyl Suppository 10 milliGRAM(s) Rectal daily  chlorhexidine 0.12% Liquid 15 milliLiter(s) Oral Mucosa every 12 hours  chlorhexidine 2% Cloths 1 Application(s) Topical <User Schedule>  fentaNYL   Infusion 3 MICROgram(s)/kG/Hr IV Continuous <Continuous>  glucagon  Injectable 1 milliGRAM(s) IntraMuscular once  heparin  Infusion.  Unit(s)/Hr IV Continuous <Continuous>  insulin lispro (ADMELOG) corrective regimen sliding scale   SubCutaneous every 6 hours  lubricant eye ont 1 Application(s) 1 Application(s) Both EYES two times a day  midazolam Infusion 0.15 mG/kG/Hr IV Continuous <Continuous>  norepinephrine Infusion 0.5 MICROgram(s)/kG/Min IV Continuous <Continuous>  pantoprazole  Injectable 40 milliGRAM(s) IV Push daily  sodium chloride 0.9% lock flush 10 milliLiter(s) IV Push every 1 hour PRN    Drug Dosing Weight  Height (cm): 154.4 (15 Nov 2020 09:25)  Weight (kg): 125 (15 Nov 2020 09:25)  BMI (kg/m2): 52.4 (15 Nov 2020 09:25)  BSA (m2): 2.16 (15 Nov 2020 09:25)    CENTRAL LINE: [x] YES [ ] NO  LOCATION: [facility] 12/2 INSERTED:  REMOVE: [ ] YES [ ] NO  EXPLAIN:    MICHELLE: [ ] YES [ x] NO    DATE INSERTED:  REMOVE:  [ ] YES [ ] NO  EXPLAIN:    A-LINE:  [x ] YES [ ] NO  LOCATION:    DATE INSERTED: 12/4  REMOVE:  [ ] YES [ ] NO  EXPLAIN:    PMH -reviewed admission note, no change since admission  PAST MEDICAL & SURGICAL HISTORY:  No pertinent past medical history    No significant past surgical history        ICU Vital Signs Last 24 Hrs  T(C): 36.9 (10 Dec 2020 04:21), Max: 36.9 (10 Dec 2020 04:21)  T(F): 98.5 (10 Dec 2020 04:21), Max: 98.5 (10 Dec 2020 04:21)  HR: 112 (10 Dec 2020 06:45) (84 - 112)  BP: 105/56 (09 Dec 2020 08:45) (105/56 - 105/56)  BP(mean): 66 (09 Dec 2020 08:45) (66 - 66)  ABP: 136/68 (10 Dec 2020 06:45) (54/29 - 163/114)  ABP(mean): 89 (10 Dec 2020 06:45) (36 - 128)  RR: 17 (10 Dec 2020 06:45) (0 - 32)  SpO2: 74% (10 Dec 2020 06:45) (74% - 99%)      ABG - ( 10 Dec 2020 04:15 )  pH, Arterial: 7.21  pH, Blood: x     /  pCO2: 50    /  pO2: 164   / HCO3: 20    / Base Excess: -7.3  /  SaO2: 99                    12-09 @ 07:01  -  12-10 @ 07:00  --------------------------------------------------------  IN: 2658.3 mL / OUT: 0 mL / NET: 2658.3 mL        Mode: AC/ CMV (Assist Control/ Continuous Mandatory Ventilation)  RR (machine): 32  TV (machine): 400  FiO2: 100  PEEP: 12  ITime: 0.85  MAP: 24  PIP: 40      PHYSICAL EXAM:    GENERAL: Sedated and Intubated, No change   HEAD:  [ x]Atraumatic, [x ]Normocephalic  EYES: [x ]conjunctiva and sclera clear  ENMT: [x ]Moist mucous membranes, Tongue swollen ,  NECK: [x ]Supple, normal appearance,  NERVOUS SYSTEM:  sedated/ comatose   CHEST/LUNG: [x ]No chest deformity; [ x]Normal percussion bilaterally; [ ]No rales, rhonchi, wheezing   HEART: [ x]Regular rate and rhythm; [ x]No murmurs, rubs, or gallops  ABDOMEN: [ x]Soft, Nontender, Nondistended; [ x]Bowel sounds present  EXTREMITIES:  Generalized edema  LYMPH: [ x]No lymphadenopathy noted  SKIN: [ x]No rashes or lesions; [ ]Good capillary     LABS:  CBC Full  -  ( 09 Dec 2020 06:38 )  WBC Count : 15.87 K/uL  RBC Count : 2.68 M/uL  Hemoglobin : 7.8 g/dL  Hematocrit : 27.4 %  Platelet Count - Automated : 176 K/uL  Mean Cell Volume : 102.2 fl  Mean Cell Hemoglobin : 29.1 pg  Mean Cell Hemoglobin Concentration : 28.5 gm/dL  Auto Neutrophil # : 12.25 K/uL  Auto Lymphocyte # : 1.74 K/uL  Auto Monocyte # : 0.72 K/uL  Auto Eosinophil # : 0.23 K/uL  Auto Basophil # : 0.07 K/uL  Auto Neutrophil % : 77.3 %  Auto Lymphocyte % : 11.0 %  Auto Monocyte % : 4.5 %  Auto Eosinophil % : 1.4 %  Auto Basophil % : 0.4 %    12-10    135  |  98  |  61<H>  ----------------------------<  73  4.0   |  x   |  x     Ca    9.2      09 Dec 2020 06:38    TPro  6.6  /  Alb  2.3<L>  /  TBili  0.5  /  DBili  x   /  AST  60<H>  /  ALT  43  /  AlkPhos  165<H>  12-09    PT/INR - ( 10 Dec 2020 07:00 )   PT: 13.0 sec;   INR: 1.10 ratio         PTT - ( 10 Dec 2020 07:00 )  PTT:71.1 sec        RADIOLOGY & ADDITIONAL STUDIES REVIEWED:  ***    [ ]GOALS OF CARE DISCUSSION WITH PATIENT/FAMILY/PROXY:    CRITICAL CARE TIME SPENT: 35 minutes

## 2020-12-10 NOTE — CHART NOTE - NSCHARTNOTEFT_GEN_A_CORE
PC  received a telephone call from the patient's spouse Saima Gillette [phone] regarding the patient's medicaid number.  Mrs. Gillette wanted to provide the insurance information to the hospital.  Sw advised the spouse to contact the admitting office and also provided the contact information for the [facility] medicaid office should any addl. medicaid needs arise.  Family denied addl. needs at this time.

## 2020-12-10 NOTE — PROGRESS NOTE ADULT - ASSESSMENT
ASSESSMENT AND PLAN: 49M without PMH from home with wife admitted to ICU for of Acute Respiratory failure 2/2 COVID PNA. Patient is currently in multi organ failure and on Dialysis. Requiring pressor support    Acute respiratory failure secondary to Covid pneumonia  Septic Shock and Paroxysmal Atrial fibrillation  AGUSTO ,ATN on dialysis      Neuro:   - sedated with fentanyl  - Off propofol as TG levels were elevated  - Continue Versed to help with Ventricular synchrony  - d/jos paralytics   - No improvement in condition or sedative requirement     CVS: #Septic Shock and Paroxysmal Atrial fibrillation  -SBPs improved 90-100s  - patient is currently on Levophed,   - s/p dig loading, c/w 0.125mg dig every other day (renal dosing) started on 11/26  - EKG with sinus tach->AF  - bedside Echo without global dysfunction  - Digoxin levels in range ,will continue current regimen   - A-line monitoring    -#troponemia  -9->8.2->8.3 11/21 trended down   -cardio Dr. Tsang saw patient before, Currently he does not need Aspirin and Plavix,   - Will Consult PRN in case patient's condition changes    Pulmonary:   #Acute respiratory failure 2/2 COVID PNA  -ETT placed 11/19, replaced on 12/7 as patient was having a leak in the balloon, replaced w/o complication  - 32/500/100/17, ABG 7.10/83/77/93- NO discontinued.  - ESR, procal, ferritin decreasing, d-dimer, LDH, CRP decreasing, will trend every 3rd day  - Dexamethasone IV 6mg Qd (10 day course through 11/24) , Finished on 12/4  - worsening infiltrates noted on CXR 11/30, no recent changes till 12/4  - IgA positive, IgG negative, indicating new infection, No Remdesivir given due to bad kidney function      ID:   -history and management as above  -changed LIJ to femoral line, removed LIJ 11/25  -WBC 18->11->13.2->15.6->17.8>18  -RVP +COVID, procal increased to 3.13 as noted above, started cefepime 11/21, added vanc 11/24, added azithromycin given worsening CXR  11/25  - completed abx  -BCx NGF  - Discontinue Abx      Nephro: #AGUSTO due to ATN secondary to likely Covid pneumonia   -CrCl 44, Cr 1.1 on admission, acutely worsened 11/21, possibly 2/2 covid injury and/or low BP, this am 6.41  -RIJ HD replaced 12/5 and HD started 11/21, 11/22, 11/24, 11/25,11/26,12/2, 12/5, 12/7  - continue phoslo, off bicarb drip  - f/u with nephro for further   - hemodialysis on 12/8, ABG and kidney function worsening  - Asked pharmacy to concentrate the drip to decrease pt's intake, however pharmacy cannot concentrate any more  - Patient received dialysis today 12/8, 3.6L removed  - Will likely get dialysis 12/10    GI: #TF  - OG tube is not working - will change NG tube  - Holding TF with Nepro as patient is fluid overload  - monitor LFTs AST/ALT,  likely 2/2 COVID, trending down slowly  -Daily CMP  -Protonix IV for PPx  - Senna and Miralax for bowel regimen    Heme:   - thrombocytopenia: resolved    # Anemia  - monitor CBC    Endocrine: #BG controlled  -A1c 6, average glucose 126  -TSH wnl  -vit D moderately low to 22, c/w 1000U/day   -FS q6 for TF or NPO  -HSS    Skin/Catheters:   #LIJ placed 12/1  #RIJ HD catheter 12/05  #R radial artery 12/04  #eye drops    Prophylaxis:  -patient is on heparin Drip , Protonix for GI ppx    Prognosis:   Poor, DNR< Family informed on12/5 , being updated daily ASSESSMENT AND PLAN: 49M without PMH from home with wife admitted to ICU for of Acute Respiratory failure 2/2 COVID PNA. Patient is currently in multi organ failure and on Dialysis. Requiring pressor support    Acute respiratory failure secondary to Covid pneumonia  Septic Shock and Paroxysmal Atrial fibrillation  AGUSTO ,ATN on dialysis      Neuro:   - sedated with fentanyl  - Off propofol as TG levels were elevated  - Continue Versed to help with Ventricular synchrony  - d/jos paralytics   - No improvement in condition or sedative requirement     CVS: #Septic Shock and Paroxysmal Atrial fibrillation  -SBPs improved 90-100s  - patient is currently on Levophed,   - s/p dig loading, c/w 0.125mg dig every other day (renal dosing) started on 11/26  - EKG with sinus tach->AF  - bedside Echo without global dysfunction  - Digoxin levels in range ,will continue current regimen   - A-line monitoring    -#troponemia  -9->8.2->8.3 11/21 trended down   -cardio Dr. Tsang saw patient before, Currently he does not need Aspirin and Plavix,   - Will Consult PRN in case patient's condition changes    Pulmonary:   #Acute respiratory failure 2/2 COVID PNA  -ETT placed 11/19, replaced on 12/7 as patient was having a leak in the balloon, replaced w/o complication  - 32/500/100/17, ABG 7.10/83/77/93- NO discontinued.  - ESR, procal, ferritin decreasing, d-dimer, LDH, CRP decreasing, will trend every 3rd day  - Dexamethasone IV 6mg Qd (10 day course through 11/24) , Finished on 12/4  - worsening infiltrates noted on CXR 11/30, no recent changes till 12/4  - IgA positive, IgG negative, indicating new infection, No Remdesivir given due to bad kidney function      ID:   -history and management as above  -changed LIJ to femoral line, removed LIJ 11/25  -WBC 18->11->13.2->15.6->17.8>18  -RVP +COVID, procal increased to 3.13 as noted above, started cefepime 11/21, added vanc 11/24, added azithromycin given worsening CXR  11/25  - completed abx  -BCx NGF  - Discontinue Abx      Nephro: #AGUSTO due to ATN secondary to likely Covid pneumonia   -CrCl 44, Cr 1.1 on admission, acutely worsened 11/21, possibly 2/2 covid injury and/or low BP, this am 6.41  -RIJ HD replaced 12/5 and HD started 11/21, 11/22, 11/24, 11/25,11/26,12/2, 12/5, 12/7  - continue phoslo, off bicarb drip  - f/u with nephro for further   - hemodialysis on 12/8, ABG and kidney function worsening  - Asked pharmacy to concentrate the drip to decrease pt's intake, however pharmacy cannot concentrate any more  - Patient received dialysis today 12/8, 3.6L removed  - Plan for Long dialysis with UF removal 12/10    GI: #TF  - OG tube is not working - will change NG tube  - Holding TF with Nepro as patient is fluid overload  - monitor LFTs AST/ALT,  likely 2/2 COVID, trending down slowly  -Daily CMP  -Protonix IV for PPx  - Senna and Miralax for bowel regimen    Heme:   - thrombocytopenia: resolved    # Anemia  - monitor CBC    Endocrine: #BG controlled  -A1c 6, average glucose 126  -TSH wnl  -vit D moderately low to 22, c/w 1000U/day   -FS q6 for TF or NPO  -HSS    Skin/Catheters:   #LIJ placed 12/1  #RIJ HD catheter 12/05  #R radial artery 12/04  #eye drops    Prophylaxis:  -patient is on heparin Drip , Protonix for GI ppx    Prognosis:   Poor, DNR< Family informed on12/5 , being updated daily

## 2020-12-10 NOTE — PROGRESS NOTE ADULT - ATTENDING COMMENTS
49 yr  old obese male with out any significant medical history  who presented with sob due to acute hypoxic respiratory failure due to covid pneumonia.    Assessment:  1. Acute Hypoxic respiratory Failure / ARDS  2. COVID-19 PNA  3. Morbid Obesity   4. Elevated lactate  5. Hypotension - sedation related  6. Type 2 MI - likely due to hypoxia  7. Acute kidney injury     Plan  - Cont. HD with aggressive fluid removal, as patient with increased pulmonary edema and difficulty ventilating.  - intubated 11/19 for worsening hypoxia and worsening ARDS  - vent management with lung protective strategy   -propofol held due to high TG  -Cont versed/fentanyl gtt  -Cont. heparin infusion  -monitor blood gas  - dig for afib  - keep o2 sat >90%  - Antibiotics per ID  - Monitor renal and hepatic function  - Vasopressor support to maintain MAP > 65  - Tube feedings held due to increased volume and overall edema  - isolation : contact and airborne  - Pain control  - Poor prognosis at this time, given multiple organ failure   -DNR  - DVT/ GI prophy .

## 2020-12-10 NOTE — PROGRESS NOTE ADULT - PROBLEM SELECTOR PLAN 1
Anuric AGUSTO from ATN due to septic shock/ARDS requiring RRT/HD  - S/p HD treatment on 12/08 with net 3 L fluid removal  - Avoid Nephrotoxic agents  - Monitor BMP and electrolytes  - Maintain MAP>65-70 mm hg  - Adjust antibiotics as per renal dose clearances  - Volume status and electrolytes noted.  - Long HD treatment today for UF and clearances as per ordered.

## 2020-12-11 NOTE — PROGRESS NOTE ADULT - PROBLEM SELECTOR PLAN 3
Patient with severe sepsis/ARDS from COVID-19 pneumonia on ventilatory support and IV pressors  Maintain MAP>65-70 mm hg  Continue ventilatory support  Optimal treatment of COVID-19/sepsis per primary ICU/pulmonary/ID team  Monitor vital signs  Surveillance COVID-19 positive  Adjust antibiotics as per renal dose clearance for Cr cl<10 ml/min.

## 2020-12-11 NOTE — PROGRESS NOTE ADULT - SUBJECTIVE AND OBJECTIVE BOX
Chandan Awad MD (Nephrology progress note)  [address], [facility],  [address],  [id number]  TEl: [phone]  Cell: [phone]    Patient is a 50y Male seen and evaluated at bedside. Vital signs, laboratory data, imaging studies, consult notes reviewed done within past 24 hours. Overnight events noted. Patient remain critically ill on ventilatory support and IV pressors. Interval HD treatment on 12/10 with 4 L fluid removal.     Allergies    No Known Allergies    Intolerances        ROS:  Limited. Sedated and intubated     VITALS:    T(C): 37.7 (12-11-20 @ 08:00), Max: 37.7 (12-11-20 @ 08:00)  HR: 104 (12-11-20 @ 12:15) (96 - 111)  BP: --  RR: 17 (12-11-20 @ 12:15) (0 - 33)  SpO2: 92% (12-11-20 @ 12:15) (50% - 98%)  CAPILLARY BLOOD GLUCOSE      POCT Blood Glucose.: 119 mg/dL (11 Dec 2020 10:47)  POCT Blood Glucose.: 105 mg/dL (11 Dec 2020 06:25)  POCT Blood Glucose.: 113 mg/dL (11 Dec 2020 02:59)  POCT Blood Glucose.: 91 mg/dL (10 Dec 2020 18:22)      12-10-20 @ 07:01  -  12-11-20 @ 07:00  --------------------------------------------------------  IN: 4101.8 mL / OUT: 4800 mL / NET: -698.2 mL    12-11-20 @ 07:01  -  12-11-20 @ 13:53  --------------------------------------------------------  IN: 425.4 mL / OUT: 0 mL / NET: 425.4 mL      MEDICATIONS  (STANDING):  bisacodyl Suppository 10 milliGRAM(s) Rectal daily  chlorhexidine 0.12% Liquid 15 milliLiter(s) Oral Mucosa every 12 hours  chlorhexidine 2% Cloths 1 Application(s) Topical <User Schedule>  glucagon  Injectable 1 milliGRAM(s) IntraMuscular once  heparin  Infusion.  Unit(s)/Hr (22 mL/Hr) IV Continuous <Continuous>  HYDROmorphone Infusion 1.5 mG/Hr (1.5 mL/Hr) IV Continuous <Continuous>  insulin lispro (ADMELOG) corrective regimen sliding scale   SubCutaneous every 6 hours  lubricant eye ont 1 Application(s) 1 Application(s) Both EYES two times a day  midazolam Infusion 0.15 mG/kG/Hr (18.8 mL/Hr) IV Continuous <Continuous>  norepinephrine Infusion 0.5 MICROgram(s)/kG/Min (58.6 mL/Hr) IV Continuous <Continuous>  pantoprazole  Injectable 40 milliGRAM(s) IV Push daily    MEDICATIONS  (PRN):  ALBUTerol    90 MICROgram(s) HFA Inhaler 2 Puff(s) Inhalation every 6 hours PRN Shortness of Breath  sodium chloride 0.9% lock flush 10 milliLiter(s) IV Push every 1 hour PRN Pre/post blood products, medications, blood draw, and to maintain line patency      PHYSICAL EXAM:  GENERAL: Alert, awake and oriented x3 in no distress  HEENT: LEONOR, EOMI, neck supple, no JVP, no carotid bruit, oral mucosa moist and pink.  CHEST/LUNG: Bilateral decreased breath sounds, bibasilar rales and crepitations, no wheezing  HEART: Regular rate and rhythm, ERNESTO II/VI at LPSB, no gallops, no rub   ABDOMEN: Soft, nontender, non distended, bowel sounds present, no palpable organomegaly  : No flank or supra pubic tenderness.  EXTREMITIES: Bilateral upper and lower extremity edema++nt  Neurology: Sedated and intubated on ventilatory support  SKIN: No rash or skin lesion  Musculoskeletal: Bilateral upper and lower extremity edema++nt.      Vascular ACCESS:     LABS:                        6.6    9.39  )-----------( 128      ( 11 Dec 2020 06:33 )             22.4     12-11    136  |  101  |  42<H>  ----------------------------<  107<H>  4.0   |  23  |  5.73<H>    Ca    8.6      11 Dec 2020 06:33  Phos  5.4     12-11  Mg     2.1     12-11    TPro  6.3  /  Alb  2.4<L>  /  TBili  0.9  /  DBili  x   /  AST  47<H>  /  ALT  34  /  AlkPhos  139<H>  12-11      PT/INR - ( 11 Dec 2020 06:33 )   PT: 13.6 sec;   INR: 1.15 ratio         PTT - ( 11 Dec 2020 06:33 )  PTT:67.0 sec          RADIOLOGY & ADDITIONAL STUDIES:  rad< from: Xray Chest 1 View- PORTABLE-Routine (Xray Chest 1 View- PORTABLE-Routine in AM.) (12.11.20 @ 09:26) >    EXAM:  XR CHEST PORTABLE ROUTINE 1V                            PROCEDURE DATE:  12/11/2020          INTERPRETATION:  CLINICAL STATEMENT: Follow-up chest pain.    TECHNIQUE: AP view of the chest.    COMPARISON: 12/9/2020    FINDINGS/  IMPRESSION:  ET tube, bilateral central lines again noted. Left central line courses over the aortic arch. Correlate for venous return if not performed yet. No pneumothorax.    Additional tubelike structure overlies left upper chest. Correlate clinically    Increased interstitial lung markings with bilateral airspace opacities with slightly better aeration right lung base compared to prior exam. Left lung opacification progressed.    No significant pleural effusion.    Heart size cannot be accurately assessed in this projection, but appear enlarged.            GAMALIEL GEORGE MD; Attending Radiologist  This document has been electronically signed. Dec 11 2020 10:54AM    < end of copied text >    Imaging Personally Reviewed:  [x] YES  [ ] NO    Consultant(s) Notes Reviewed:  [x] YES  [ ] NO    Care Discussed with Primary team/ Other Providers [x] YES  [ ] NO

## 2020-12-11 NOTE — PROGRESS NOTE ADULT - PROBLEM SELECTOR PLAN 2
Patient with Calcium 8.6 with albumin 2.2, Phos  5.4, Mag 2.5  Monitor BMP, calcium, mag, phos level  Continue HD treatment

## 2020-12-11 NOTE — PROGRESS NOTE ADULT - PROBLEM SELECTOR PLAN 1
Anuric AGUSTO from ATN due to septic shock/ARDS requiring RRT/HD  - S/p HD treatment on 12/10 with net 4 L fluid removal  - Avoid Nephrotoxic agents  - Monitor BMP and electrolytes  - Maintain MAP>65-70 mm hg  - Adjust antibiotics as per renal dose clearances  - Volume status and electrolytes noted.  - Defer HD today  - Next anticipated IHD on 12/12

## 2020-12-11 NOTE — PROGRESS NOTE ADULT - PROBLEM SELECTOR PLAN 5
Anemia of chronic illness with downtrending hgb 6.6  Monitor serial CBC  Transfuse PRBC per primary team

## 2020-12-11 NOTE — PROGRESS NOTE ADULT - SUBJECTIVE AND OBJECTIVE BOX
INTERVAL HPI/OVERNIGHT EVENTS: ***    PRESSORS: [ ] YES [ ] NO    Antimicrobial:    Cardiovascular:  norepinephrine Infusion 0.5 MICROgram(s)/kG/Min IV Continuous <Continuous>    Pulmonary:  ALBUTerol    90 MICROgram(s) HFA Inhaler 2 Puff(s) Inhalation every 6 hours PRN    Hematalogic:  heparin  Infusion.  Unit(s)/Hr IV Continuous <Continuous>    Other:  bisacodyl Suppository 10 milliGRAM(s) Rectal daily  chlorhexidine 0.12% Liquid 15 milliLiter(s) Oral Mucosa every 12 hours  chlorhexidine 2% Cloths 1 Application(s) Topical <User Schedule>  fentaNYL   Infusion 3 MICROgram(s)/kG/Hr IV Continuous <Continuous>  glucagon  Injectable 1 milliGRAM(s) IntraMuscular once  insulin lispro (ADMELOG) corrective regimen sliding scale   SubCutaneous every 6 hours  lubricant eye ont 1 Application(s) 1 Application(s) Both EYES two times a day  midazolam Infusion 0.15 mG/kG/Hr IV Continuous <Continuous>  pantoprazole  Injectable 40 milliGRAM(s) IV Push daily  sodium chloride 0.9% lock flush 10 milliLiter(s) IV Push every 1 hour PRN    ALBUTerol    90 MICROgram(s) HFA Inhaler 2 Puff(s) Inhalation every 6 hours PRN  bisacodyl Suppository 10 milliGRAM(s) Rectal daily  chlorhexidine 0.12% Liquid 15 milliLiter(s) Oral Mucosa every 12 hours  chlorhexidine 2% Cloths 1 Application(s) Topical <User Schedule>  fentaNYL   Infusion 3 MICROgram(s)/kG/Hr IV Continuous <Continuous>  glucagon  Injectable 1 milliGRAM(s) IntraMuscular once  heparin  Infusion.  Unit(s)/Hr IV Continuous <Continuous>  insulin lispro (ADMELOG) corrective regimen sliding scale   SubCutaneous every 6 hours  lubricant eye ont 1 Application(s) 1 Application(s) Both EYES two times a day  midazolam Infusion 0.15 mG/kG/Hr IV Continuous <Continuous>  norepinephrine Infusion 0.5 MICROgram(s)/kG/Min IV Continuous <Continuous>  pantoprazole  Injectable 40 milliGRAM(s) IV Push daily  sodium chloride 0.9% lock flush 10 milliLiter(s) IV Push every 1 hour PRN    Drug Dosing Weight  Height (cm): 154.4 (15 Nov 2020 09:25)  Weight (kg): 125 (15 Nov 2020 09:25)  BMI (kg/m2): 52.4 (15 Nov 2020 09:25)  BSA (m2): 2.16 (15 Nov 2020 09:25)    CENTRAL LINE: [ ] YES [ ] NO  LOCATION:   DATE INSERTED:  REMOVE: [ ] YES [ ] NO  EXPLAIN:    MICHELLE: [ ] YES [ ] NO    DATE INSERTED:  REMOVE:  [ ] YES [ ] NO  EXPLAIN:    A-LINE:  [ ] YES [ ] NO  LOCATION:   DATE INSERTED:  REMOVE:  [ ] YES [ ] NO  EXPLAIN:    PMH -reviewed admission note, no change since admission      ABG - ( 11 Dec 2020 03:57 )  pH, Arterial: 7.14  pH, Blood: x     /  pCO2: 68    /  pO2: 72    / HCO3: 22    / Base Excess: -7.0  /  SaO2: 92                    12-10 @ 07:01  -  12-11 @ 07:00  --------------------------------------------------------  IN: 3976.7 mL / OUT: 4800 mL / NET: -823.3 mL        Mode: AC/ CMV (Assist Control/ Continuous Mandatory Ventilation)  RR (machine): 32  TV (machine): 450  FiO2: 80  PEEP: 10  ITime: 0.85  MAP: 24  PIP: 40      PHYSICAL EXAM:    GENERAL: NAD, well-groomed, well-developed  HEAD:  Atraumatic, Normocephalic  EYES: EOMI, PERRLA, conjunctiva and sclera clear  ENMT: No tonsillar erythema, exudates, or enlargement; Moist mucous membranes, Good dentition, [ ]No lesions  NECK: Supple, normal appearance, No JVD; Normal thyroid; Trachea midline  NERVOUS SYSTEM:  Alert & Oriented X3, Good concentration; Motor Strength 5/5 B/L upper and lower extremities; DTRs 2+ intact and symmetric  CHEST/LUNG: No chest deformity; Normal percussion bilaterally; No rales, rhonchi, wheezing   HEART: Regular rate and rhythm; No murmurs, rubs, or gallops  ABDOMEN: Soft, Nontender, Nondistended;Bowel sounds present  EXTREMITIES:  2+ Peripheral Pulses, No clubbing, cyanosis, or edema  LYMPH: No lymphadenopathy noted  SKIN: No rashes or lesions; Good capillary refill      LABS:  CBC Full  -  ( 11 Dec 2020 06:33 )  WBC Count : 9.39 K/uL  RBC Count : 2.24 M/uL  Hemoglobin : 6.6 g/dL  Hematocrit : 22.4 %  Platelet Count - Automated : 128 K/uL  Mean Cell Volume : 100.0 fl  Mean Cell Hemoglobin : 29.5 pg  Mean Cell Hemoglobin Concentration : 29.5 gm/dL  Auto Neutrophil # : 6.87 K/uL  Auto Lymphocyte # : 1.34 K/uL  Auto Monocyte # : 0.76 K/uL  Auto Eosinophil # : 0.10 K/uL  Auto Basophil # : 0.02 K/uL  Auto Neutrophil % : 73.1 %  Auto Lymphocyte % : 14.3 %  Auto Monocyte % : 8.1 %  Auto Eosinophil % : 1.1 %  Auto Basophil % : 0.2 %    12-11    136  |  101  |  42<H>  ----------------------------<  107<H>  4.0   |  23  |  5.73<H>    Ca    8.6      11 Dec 2020 06:33  Phos  5.4     12-11  Mg     2.1     12-11    TPro  6.3  /  Alb  2.4<L>  /  TBili  0.9  /  DBili  x   /  AST  47<H>  /  ALT  34  /  AlkPhos  139<H>  12-11    PT/INR - ( 11 Dec 2020 06:33 )   PT: 13.6 sec;   INR: 1.15 ratio         PTT - ( 11 Dec 2020 06:33 )  PTT:67.0 sec        RADIOLOGY & ADDITIONAL STUDIES REVIEWED:  ***    [ ]GOALS OF CARE DISCUSSION WITH PATIENT/FAMILY/PROXY:    CRITICAL CARE TIME SPENT: 35 minutes INTERVAL HPI/OVERNIGHT EVENTS: Patient was tachycardiac overnight to 100s. Patient is on levophed, fentanyl, versed and heparin drips. Hb dropped to 6.6 this am so will give one unit of blood.    PRESSORS: [x ] YES [ ] NO  levophed 0.3    Antimicrobial:    Cardiovascular:  norepinephrine Infusion 0.5 MICROgram(s)/kG/Min IV Continuous <Continuous>    Pulmonary:  ALBUTerol    90 MICROgram(s) HFA Inhaler 2 Puff(s) Inhalation every 6 hours PRN    Hematalogic:  heparin  Infusion.  Unit(s)/Hr IV Continuous <Continuous>    Other:  bisacodyl Suppository 10 milliGRAM(s) Rectal daily  chlorhexidine 0.12% Liquid 15 milliLiter(s) Oral Mucosa every 12 hours  chlorhexidine 2% Cloths 1 Application(s) Topical <User Schedule>  fentaNYL   Infusion 3 MICROgram(s)/kG/Hr IV Continuous <Continuous>  glucagon  Injectable 1 milliGRAM(s) IntraMuscular once  insulin lispro (ADMELOG) corrective regimen sliding scale   SubCutaneous every 6 hours  lubricant eye ont 1 Application(s) 1 Application(s) Both EYES two times a day  midazolam Infusion 0.15 mG/kG/Hr IV Continuous <Continuous>  pantoprazole  Injectable 40 milliGRAM(s) IV Push daily  sodium chloride 0.9% lock flush 10 milliLiter(s) IV Push every 1 hour PRN    ALBUTerol    90 MICROgram(s) HFA Inhaler 2 Puff(s) Inhalation every 6 hours PRN  bisacodyl Suppository 10 milliGRAM(s) Rectal daily  chlorhexidine 0.12% Liquid 15 milliLiter(s) Oral Mucosa every 12 hours  chlorhexidine 2% Cloths 1 Application(s) Topical <User Schedule>  fentaNYL   Infusion 3 MICROgram(s)/kG/Hr IV Continuous <Continuous>  glucagon  Injectable 1 milliGRAM(s) IntraMuscular once  heparin  Infusion.  Unit(s)/Hr IV Continuous <Continuous>  insulin lispro (ADMELOG) corrective regimen sliding scale   SubCutaneous every 6 hours  lubricant eye ont 1 Application(s) 1 Application(s) Both EYES two times a day  midazolam Infusion 0.15 mG/kG/Hr IV Continuous <Continuous>  norepinephrine Infusion 0.5 MICROgram(s)/kG/Min IV Continuous <Continuous>  pantoprazole  Injectable 40 milliGRAM(s) IV Push daily  sodium chloride 0.9% lock flush 10 milliLiter(s) IV Push every 1 hour PRN    Drug Dosing Weight  Height (cm): 154.4 (15 Nov 2020 09:25)  Weight (kg): 125 (15 Nov 2020 09:25)  BMI (kg/m2): 52.4 (15 Nov 2020 09:25)  BSA (m2): 2.16 (15 Nov 2020 09:25)    CENTRAL LINE: [x ] YES [ ] NO  LOCATION:  [facility] 12/2 DATE INSERTED:  REMOVE: [ ] YES [ ] NO  EXPLAIN:    MICHELLE: [ ] YES [ ] NO    DATE INSERTED:  REMOVE:  [ ] YES [ ] NO  EXPLAIN:    A-LINE:  [x ] YES [ ] NO  LOCATION:   DATE INSERTED: 12/4  REMOVE:  [ ] YES [ ] NO  EXPLAIN:    PMH -reviewed admission note, no change since admission      ABG - ( 11 Dec 2020 03:57 )  pH, Arterial: 7.14  pH, Blood: x     /  pCO2: 68    /  pO2: 72    / HCO3: 22    / Base Excess: -7.0  /  SaO2: 92                    12-10 @ 07:01  -  12-11 @ 07:00  --------------------------------------------------------  IN: 3976.7 mL / OUT: 4800 mL / NET: -823.3 mL        Mode: AC/ CMV (Assist Control/ Continuous Mandatory Ventilation)  RR (machine): 32  TV (machine): 450  FiO2: 80  PEEP: 10  ITime: 0.85  MAP: 24  PIP: 40      PHYSICAL EXAM:    GENERAL: Sedated and Intubated, anasarca   HEAD:  Atraumatic, Normocephalic  EYES: EOMI, PERRLA, conjunctiva and sclera clear  ENMT: tongue swollen. Moist mucous membranes  NECK: Supple, normal appearance, No JVD; Normal thyroid; Trachea midline  NERVOUS SYSTEM:  sedated, comatose  CHEST/LUNG: decreased breath sounds b/l. No rales, rhonchi, wheezing   HEART: Regular rate and rhythm; No murmurs, rubs, or gallops  ABDOMEN: Soft, Nontender, Nondistended;Bowel sounds present  EXTREMITIES:  generalized edema  LYMPH: No lymphadenopathy noted  SKIN: No rashes or lesions; Good capillary refill      LABS:  CBC Full  -  ( 11 Dec 2020 06:33 )  WBC Count : 9.39 K/uL  RBC Count : 2.24 M/uL  Hemoglobin : 6.6 g/dL  Hematocrit : 22.4 %  Platelet Count - Automated : 128 K/uL  Mean Cell Volume : 100.0 fl  Mean Cell Hemoglobin : 29.5 pg  Mean Cell Hemoglobin Concentration : 29.5 gm/dL  Auto Neutrophil # : 6.87 K/uL  Auto Lymphocyte # : 1.34 K/uL  Auto Monocyte # : 0.76 K/uL  Auto Eosinophil # : 0.10 K/uL  Auto Basophil # : 0.02 K/uL  Auto Neutrophil % : 73.1 %  Auto Lymphocyte % : 14.3 %  Auto Monocyte % : 8.1 %  Auto Eosinophil % : 1.1 %  Auto Basophil % : 0.2 %    12-11    136  |  101  |  42<H>  ----------------------------<  107<H>  4.0   |  23  |  5.73<H>    Ca    8.6      11 Dec 2020 06:33  Phos  5.4     12-11  Mg     2.1     12-11    TPro  6.3  /  Alb  2.4<L>  /  TBili  0.9  /  DBili  x   /  AST  47<H>  /  ALT  34  /  AlkPhos  139<H>  12-11    PT/INR - ( 11 Dec 2020 06:33 )   PT: 13.6 sec;   INR: 1.15 ratio         PTT - ( 11 Dec 2020 06:33 )  PTT:67.0 sec        RADIOLOGY & ADDITIONAL STUDIES REVIEWED:      [ ]GOALS OF CARE DISCUSSION WITH PATIENT/FAMILY/PROXY:    CRITICAL CARE TIME SPENT: 35 minutes

## 2020-12-11 NOTE — PROGRESS NOTE ADULT - ASSESSMENT
ASSESSMENT AND PLAN: 49M without PMH from home with wife admitted to ICU for of Acute Respiratory failure 2/2 COVID PNA. Patient is currently in multi organ failure and on Dialysis. Requiring pressor support    Acute respiratory failure secondary to Covid pneumonia  Septic Shock and Paroxysmal Atrial fibrillation  AGUSTO ,ATN on dialysis      Neuro:   - Off propofol as TG levels were elevated  - Continue Versed to help with Ventricular synchrony  - switch fentanyl to dilaudid drip to minimize fluid being given to patient  - No improvement in condition or sedative requirement     CVS: #Septic Shock and Paroxysmal Atrial fibrillation  - patient is currently on Levophed,   - s/p dig loading, c/w 0.125mg dig every other day (renal dosing) started on 11/26  - EKG with sinus tach->AF  - bedside Echo without global dysfunction  - Digoxin levels in range ,will continue current regimen   - A-line monitoring  - unable to give levo in 250ml as infusion pump cannot infuse that concentration    -#troponemia  -9->8.2->8.3 11/21 trended down   -cardio Dr. Tsang saw patient before, Currently he does not need Aspirin and Plavix,   - Will Consult PRN in case patient's condition changes    Pulmonary:   #Acute respiratory failure 2/2 COVID PNA  -ETT placed 11/19, replaced on 12/7 as patient was having a leak in the balloon, replaced w/o complication  - 32/500/100/17, ABG 7.10/83/77/93- NO discontinued.  - ESR, procal, ferritin decreasing, d-dimer, LDH, CRP decreasing, will trend every 3rd day  - Dexamethasone IV 6mg Qd (10 day course through 11/24) , Finished on 12/4  - CXR 12/11 shows increased interstitial lung markings with bilateral airspace opacities   - IgA positive, IgG negative, indicating new infection, No Remdesivir given due to bad kidney function      ID:   -history and management as above  -changed LIJ to femoral line, removed LIJ 11/25  -WBC 18->11->13.2->15.6->17.8>18  -RVP +COVID, procal increased to 3.13 as noted above, started cefepime 11/21, added vanc 11/24, added azithromycin given worsening CXR  11/25  - completed abx  -BCx NGF  - Discontinue Abx      Nephro: #AGUSTO due to ATN secondary to likely Covid pneumonia   -CrCl 44, Cr 1.1 on admission, acutely worsened 11/21, possibly 2/2 covid injury and/or low BP, this am 6.41  -RIJ HD replaced 12/5 and HD started 11/21, 11/22, 11/24, 11/25,11/26,12/2, 12/5, 12/6, 12/8, 12/10  - continue phoslo, off bicarb drip  - f/u with nephro for further   - hemodialysis on 12/8, ABG and kidney function worsening  - s/p Long dialysis with UF removal 12/10  - hold HD for today and transfuse 1 unit of blood,plan for HD tomorrow  - discussed with Dr Awad regarding bicarb-no indication as patient has respiratory acidosis    GI: #TF  - OG tube is not working - will change NG tube  - Holding TF with Nepro as patient is fluid overload  - monitor LFTs AST/ALT,  likely 2/2 COVID, trending down slowly  -Daily CMP  -Protonix IV for PPx  - Senna and Miralax for bowel regimen    Heme:   - Platelets 128k  - monitor for now    # Anemia  - Hb 6.6  - will transfuse 1u pRBC today  - follow post transfusion cbc    Endocrine: #BG controlled  -A1c 6, average glucose 126  -TSH wnl  -vit D moderately low to 22, c/w 1000U/day   -FS q6 for TF or NPO  -HSS    Skin/Catheters:   #LIJ placed 12/1  #RIJ HD catheter 12/05  #R radial artery 12/04  #eye drops    Prophylaxis:  -patient is on heparin Drip , Protonix for GI ppx    Prognosis:   Poor, DNR< Family informed on12/5 , being updated daily

## 2020-12-12 NOTE — PROGRESS NOTE ADULT - SUBJECTIVE AND OBJECTIVE BOX
INTERVAL HPI/OVERNIGHT EVENTS: ***    REVIEW OF SYSTEMS:    CONSTITUTIONAL: No weakness, fevers or chills  NECK: No pain or stiffness  RESPIRATORY: No cough, wheezing, hemoptysis; No shortness of breath  CARDIOVASCULAR: No chest pain or palpitations  GASTROINTESTINAL: No abdominal or epigastric pain. No nausea, vomiting, No diarrhea or constipation. No melena or hematochezia.  GENITOURINARY: No dysuria, frequency or hematuria  NEUROLOGICAL: No numbness or weakness  All other review of systems is negative unless indicated above    PRESSORS: [] YES [] NO  WHICH: N/A    Antimicrobial:    Cardiovascular:  norepinephrine Infusion 0.5 MICROgram(s)/kG/Min IV Continuous <Continuous>    Pulmonary:  ALBUTerol    90 MICROgram(s) HFA Inhaler 2 Puff(s) Inhalation every 6 hours PRN    Hematalogic:  heparin  Infusion.  Unit(s)/Hr IV Continuous <Continuous>    Other:  acetaminophen    Suspension .. 650 milliGRAM(s) Oral once  bisacodyl Suppository 10 milliGRAM(s) Rectal daily  chlorhexidine 0.12% Liquid 15 milliLiter(s) Oral Mucosa every 12 hours  chlorhexidine 2% Cloths 1 Application(s) Topical <User Schedule>  glucagon  Injectable 1 milliGRAM(s) IntraMuscular once  HYDROmorphone Infusion 1.5 mG/Hr IV Continuous <Continuous>  insulin lispro (ADMELOG) corrective regimen sliding scale   SubCutaneous every 6 hours  lubricant eye ont 1 Application(s) 1 Application(s) Both EYES two times a day  midazolam Infusion 0.15 mG/kG/Hr IV Continuous <Continuous>  pantoprazole  Injectable 40 milliGRAM(s) IV Push daily  sodium bicarbonate 1300 milliGRAM(s) Oral two times a day  sodium chloride 0.9% lock flush 10 milliLiter(s) IV Push every 1 hour PRN    acetaminophen    Suspension .. 650 milliGRAM(s) Oral once  ALBUTerol    90 MICROgram(s) HFA Inhaler 2 Puff(s) Inhalation every 6 hours PRN  bisacodyl Suppository 10 milliGRAM(s) Rectal daily  chlorhexidine 0.12% Liquid 15 milliLiter(s) Oral Mucosa every 12 hours  chlorhexidine 2% Cloths 1 Application(s) Topical <User Schedule>  glucagon  Injectable 1 milliGRAM(s) IntraMuscular once  heparin  Infusion.  Unit(s)/Hr IV Continuous <Continuous>  HYDROmorphone Infusion 1.5 mG/Hr IV Continuous <Continuous>  insulin lispro (ADMELOG) corrective regimen sliding scale   SubCutaneous every 6 hours  lubricant eye ont 1 Application(s) 1 Application(s) Both EYES two times a day  midazolam Infusion 0.15 mG/kG/Hr IV Continuous <Continuous>  norepinephrine Infusion 0.5 MICROgram(s)/kG/Min IV Continuous <Continuous>  pantoprazole  Injectable 40 milliGRAM(s) IV Push daily  sodium bicarbonate 1300 milliGRAM(s) Oral two times a day  sodium chloride 0.9% lock flush 10 milliLiter(s) IV Push every 1 hour PRN    Drug Dosing Weight  Height (cm): 154.4 (15 Nov 2020 09:25)  Weight (kg): 125 (15 Nov 2020 09:25)  BMI (kg/m2): 52.4 (15 Nov 2020 09:25)  BSA (m2): 2.16 (15 Nov 2020 09:25)    ICU Vital Signs Last 24 Hrs  T(C): 38.2 (11 Dec 2020 23:00), Max: 38.3 (11 Dec 2020 17:38)  T(F): 100.7 (11 Dec 2020 23:00), Max: 100.9 (11 Dec 2020 17:38)  HR: 95 (12 Dec 2020 00:00) (95 - 105)  BP: --  BP(mean): --  ABP: 96/55 (12 Dec 2020 00:00) (85/41 - 159/73)  ABP(mean): 68 (12 Dec 2020 00:00) (54 - 112)  RR: 24 (12 Dec 2020 00:00) (12 - 33)  SpO2: 94% (12 Dec 2020 00:00) (50% - 96%)      ABG - ( 11 Dec 2020 16:52 )  pH, Arterial: 7.19  pH, Blood: x     /  pCO2: 59    /  pO2: 81    / HCO3: 21    / Base Excess: -5.8  /  SaO2: 95                    12-10 @ 07:01  -  12-11 @ 07:00  --------------------------------------------------------  IN: 4101.8 mL / OUT: 4800 mL / NET: -698.2 mL        Mode: AC/ CMV (Assist Control/ Continuous Mandatory Ventilation)  RR (machine): 32  TV (machine): 450  FiO2: 100  PEEP: 12  ITime: 0.85  MAP: 22  PIP: 32        PHYSICAL EXAM:    GENERAL: NAD, well-groomed, well-developed, AAOx3  EYES: EOMI, PERRLA,   NECK: Supple, No JVD; Normal thyroid; Trachea midline  NERVOUS SYSTEM: Motor Strength 5/5 B/L upper and lower extremities; DTRs 2+ intact and symmetric  CHEST/LUNG: No rales, rhonchi, wheezing   HEART: Regular rate and rhythm; No murmurs,   ABDOMEN: Soft, Nontender, Nondistended; Bowel sounds present  EXTREMITIES:  2+ Peripheral Pulses, No clubbing, cyanosis, or edema  LINES/TUBES/CATHETERS: n/a    LABS:  CBC Full  -  ( 11 Dec 2020 14:29 )  WBC Count : 8.64 K/uL  RBC Count : 2.50 M/uL  Hemoglobin : 7.3 g/dL  Hematocrit : 24.5 %  Platelet Count - Automated : 126 K/uL  Mean Cell Volume : 98.0 fl  Mean Cell Hemoglobin : 29.2 pg  Mean Cell Hemoglobin Concentration : 29.8 gm/dL  Auto Neutrophil # : x  Auto Lymphocyte # : x  Auto Monocyte # : x  Auto Eosinophil # : x  Auto Basophil # : x  Auto Neutrophil % : x  Auto Lymphocyte % : x  Auto Monocyte % : x  Auto Eosinophil % : x  Auto Basophil % : x    12-11    136  |  101  |  42<H>  ----------------------------<  107<H>  4.0   |  23  |  5.73<H>    Ca    8.6      11 Dec 2020 06:33  Phos  5.4     12-11  Mg     2.1     12-11    TPro  6.3  /  Alb  2.4<L>  /  TBili  0.9  /  DBili  x   /  AST  47<H>  /  ALT  34  /  AlkPhos  139<H>  12-11    PT/INR - ( 11 Dec 2020 06:33 )   PT: 13.6 sec;   INR: 1.15 ratio         PTT - ( 11 Dec 2020 06:33 )  PTT:67.0 sec        RADIOLOGY & ADDITIONAL STUDIES REVIEWED:  Yes    CRITICAL CARE TIME SPENT: 35 minutes INTERVAL HPI/OVERNIGHT EVENTS: No acute events overnight. Patient remains sedated and intubated.    PRESSORS: [x] YES [] NO  WHICH: levophed    Antimicrobial:    Cardiovascular:  norepinephrine Infusion 0.5 MICROgram(s)/kG/Min IV Continuous <Continuous>    Pulmonary:  ALBUTerol    90 MICROgram(s) HFA Inhaler 2 Puff(s) Inhalation every 6 hours PRN    Hematalogic:  heparin  Infusion.  Unit(s)/Hr IV Continuous <Continuous>    Other:  acetaminophen    Suspension .. 650 milliGRAM(s) Oral once  bisacodyl Suppository 10 milliGRAM(s) Rectal daily  chlorhexidine 0.12% Liquid 15 milliLiter(s) Oral Mucosa every 12 hours  chlorhexidine 2% Cloths 1 Application(s) Topical <User Schedule>  glucagon  Injectable 1 milliGRAM(s) IntraMuscular once  HYDROmorphone Infusion 1.5 mG/Hr IV Continuous <Continuous>  insulin lispro (ADMELOG) corrective regimen sliding scale   SubCutaneous every 6 hours  lubricant eye ont 1 Application(s) 1 Application(s) Both EYES two times a day  midazolam Infusion 0.15 mG/kG/Hr IV Continuous <Continuous>  pantoprazole  Injectable 40 milliGRAM(s) IV Push daily  sodium bicarbonate 1300 milliGRAM(s) Oral two times a day  sodium chloride 0.9% lock flush 10 milliLiter(s) IV Push every 1 hour PRN    acetaminophen    Suspension .. 650 milliGRAM(s) Oral once  ALBUTerol    90 MICROgram(s) HFA Inhaler 2 Puff(s) Inhalation every 6 hours PRN  bisacodyl Suppository 10 milliGRAM(s) Rectal daily  chlorhexidine 0.12% Liquid 15 milliLiter(s) Oral Mucosa every 12 hours  chlorhexidine 2% Cloths 1 Application(s) Topical <User Schedule>  glucagon  Injectable 1 milliGRAM(s) IntraMuscular once  heparin  Infusion.  Unit(s)/Hr IV Continuous <Continuous>  HYDROmorphone Infusion 1.5 mG/Hr IV Continuous <Continuous>  insulin lispro (ADMELOG) corrective regimen sliding scale   SubCutaneous every 6 hours  lubricant eye ont 1 Application(s) 1 Application(s) Both EYES two times a day  midazolam Infusion 0.15 mG/kG/Hr IV Continuous <Continuous>  norepinephrine Infusion 0.5 MICROgram(s)/kG/Min IV Continuous <Continuous>  pantoprazole  Injectable 40 milliGRAM(s) IV Push daily  sodium bicarbonate 1300 milliGRAM(s) Oral two times a day  sodium chloride 0.9% lock flush 10 milliLiter(s) IV Push every 1 hour PRN    Drug Dosing Weight  Height (cm): 154.4 (15 Nov 2020 09:25)  Weight (kg): 125 (15 Nov 2020 09:25)  BMI (kg/m2): 52.4 (15 Nov 2020 09:25)  BSA (m2): 2.16 (15 Nov 2020 09:25)    ICU Vital Signs Last 24 Hrs  T(C): 38.2 (11 Dec 2020 23:00), Max: 38.3 (11 Dec 2020 17:38)  T(F): 100.7 (11 Dec 2020 23:00), Max: 100.9 (11 Dec 2020 17:38)  HR: 95 (12 Dec 2020 00:00) (95 - 105)  BP: --  BP(mean): --  ABP: 96/55 (12 Dec 2020 00:00) (85/41 - 159/73)  ABP(mean): 68 (12 Dec 2020 00:00) (54 - 112)  RR: 24 (12 Dec 2020 00:00) (12 - 33)  SpO2: 94% (12 Dec 2020 00:00) (50% - 96%)      ABG - ( 11 Dec 2020 16:52 )  pH, Arterial: 7.19  pH, Blood: x     /  pCO2: 59    /  pO2: 81    / HCO3: 21    / Base Excess: -5.8  /  SaO2: 95                    12-10 @ 07:01  -  12-11 @ 07:00  --------------------------------------------------------  IN: 4101.8 mL / OUT: 4800 mL / NET: -698.2 mL        Mode: AC/ CMV (Assist Control/ Continuous Mandatory Ventilation)  RR (machine): 32  TV (machine): 450  FiO2: 100  PEEP: 12  ITime: 0.85  MAP: 22  PIP: 32        PHYSICAL EXAM:    GENERAL: Sedated and Intubated, anasarca   HEAD:  Atraumatic, Normocephalic  EYES: EOMI, PERRLA, conjunctiva and sclera clear  ENMT: tongue swollen. Moist mucous membranes  NECK: Supple, normal appearance, No JVD; Normal thyroid; Trachea midline  NERVOUS SYSTEM:  sedated, comatose  CHEST/LUNG: decreased breath sounds b/l. No rales, rhonchi, wheezing   HEART: Regular rate and rhythm; No murmurs, rubs, or gallops  ABDOMEN: Soft, Nontender, Nondistended;Bowel sounds present  EXTREMITIES:  generalized edema  LYMPH: No lymphadenopathy noted  SKIN: No rashes or lesions; Good capillary refill    LABS:  CBC Full  -  ( 11 Dec 2020 14:29 )  WBC Count : 8.64 K/uL  RBC Count : 2.50 M/uL  Hemoglobin : 7.3 g/dL  Hematocrit : 24.5 %  Platelet Count - Automated : 126 K/uL  Mean Cell Volume : 98.0 fl  Mean Cell Hemoglobin : 29.2 pg  Mean Cell Hemoglobin Concentration : 29.8 gm/dL  Auto Neutrophil # : x  Auto Lymphocyte # : x  Auto Monocyte # : x  Auto Eosinophil # : x  Auto Basophil # : x  Auto Neutrophil % : x  Auto Lymphocyte % : x  Auto Monocyte % : x  Auto Eosinophil % : x  Auto Basophil % : x    12-11    136  |  101  |  42<H>  ----------------------------<  107<H>  4.0   |  23  |  5.73<H>    Ca    8.6      11 Dec 2020 06:33  Phos  5.4     12-11  Mg     2.1     12-11    TPro  6.3  /  Alb  2.4<L>  /  TBili  0.9  /  DBili  x   /  AST  47<H>  /  ALT  34  /  AlkPhos  139<H>  12-11    PT/INR - ( 11 Dec 2020 06:33 )   PT: 13.6 sec;   INR: 1.15 ratio         PTT - ( 11 Dec 2020 06:33 )  PTT:67.0 sec        RADIOLOGY & ADDITIONAL STUDIES REVIEWED:  Yes    CRITICAL CARE TIME SPENT: 35 minutes INTERVAL HPI/OVERNIGHT EVENTS: Patient noted to have elevated temperature of 100.7. Tylenol given. Patient remains sedated and intubated.    PRESSORS: [x] YES [] NO  WHICH: levophed    Antimicrobial:    Cardiovascular:  norepinephrine Infusion 0.5 MICROgram(s)/kG/Min IV Continuous <Continuous>    Pulmonary:  ALBUTerol    90 MICROgram(s) HFA Inhaler 2 Puff(s) Inhalation every 6 hours PRN    Hematalogic:  heparin  Infusion.  Unit(s)/Hr IV Continuous <Continuous>    Other:  acetaminophen    Suspension .. 650 milliGRAM(s) Oral once  bisacodyl Suppository 10 milliGRAM(s) Rectal daily  chlorhexidine 0.12% Liquid 15 milliLiter(s) Oral Mucosa every 12 hours  chlorhexidine 2% Cloths 1 Application(s) Topical <User Schedule>  glucagon  Injectable 1 milliGRAM(s) IntraMuscular once  HYDROmorphone Infusion 1.5 mG/Hr IV Continuous <Continuous>  insulin lispro (ADMELOG) corrective regimen sliding scale   SubCutaneous every 6 hours  lubricant eye ont 1 Application(s) 1 Application(s) Both EYES two times a day  midazolam Infusion 0.15 mG/kG/Hr IV Continuous <Continuous>  pantoprazole  Injectable 40 milliGRAM(s) IV Push daily  sodium bicarbonate 1300 milliGRAM(s) Oral two times a day  sodium chloride 0.9% lock flush 10 milliLiter(s) IV Push every 1 hour PRN    acetaminophen    Suspension .. 650 milliGRAM(s) Oral once  ALBUTerol    90 MICROgram(s) HFA Inhaler 2 Puff(s) Inhalation every 6 hours PRN  bisacodyl Suppository 10 milliGRAM(s) Rectal daily  chlorhexidine 0.12% Liquid 15 milliLiter(s) Oral Mucosa every 12 hours  chlorhexidine 2% Cloths 1 Application(s) Topical <User Schedule>  glucagon  Injectable 1 milliGRAM(s) IntraMuscular once  heparin  Infusion.  Unit(s)/Hr IV Continuous <Continuous>  HYDROmorphone Infusion 1.5 mG/Hr IV Continuous <Continuous>  insulin lispro (ADMELOG) corrective regimen sliding scale   SubCutaneous every 6 hours  lubricant eye ont 1 Application(s) 1 Application(s) Both EYES two times a day  midazolam Infusion 0.15 mG/kG/Hr IV Continuous <Continuous>  norepinephrine Infusion 0.5 MICROgram(s)/kG/Min IV Continuous <Continuous>  pantoprazole  Injectable 40 milliGRAM(s) IV Push daily  sodium bicarbonate 1300 milliGRAM(s) Oral two times a day  sodium chloride 0.9% lock flush 10 milliLiter(s) IV Push every 1 hour PRN    Drug Dosing Weight  Height (cm): 154.4 (15 Nov 2020 09:25)  Weight (kg): 125 (15 Nov 2020 09:25)  BMI (kg/m2): 52.4 (15 Nov 2020 09:25)  BSA (m2): 2.16 (15 Nov 2020 09:25)    ICU Vital Signs Last 24 Hrs  T(C): 38.2 (11 Dec 2020 23:00), Max: 38.3 (11 Dec 2020 17:38)  T(F): 100.7 (11 Dec 2020 23:00), Max: 100.9 (11 Dec 2020 17:38)  HR: 95 (12 Dec 2020 00:00) (95 - 105)  BP: --  BP(mean): --  ABP: 96/55 (12 Dec 2020 00:00) (85/41 - 159/73)  ABP(mean): 68 (12 Dec 2020 00:00) (54 - 112)  RR: 24 (12 Dec 2020 00:00) (12 - 33)  SpO2: 94% (12 Dec 2020 00:00) (50% - 96%)      ABG - ( 11 Dec 2020 16:52 )  pH, Arterial: 7.19  pH, Blood: x     /  pCO2: 59    /  pO2: 81    / HCO3: 21    / Base Excess: -5.8  /  SaO2: 95                    12-10 @ 07:01  -  12-11 @ 07:00  --------------------------------------------------------  IN: 4101.8 mL / OUT: 4800 mL / NET: -698.2 mL        Mode: AC/ CMV (Assist Control/ Continuous Mandatory Ventilation)  RR (machine): 32  TV (machine): 450  FiO2: 100  PEEP: 12  ITime: 0.85  MAP: 22  PIP: 32        PHYSICAL EXAM:    GENERAL: Sedated and Intubated, anasarca   HEAD:  Atraumatic, Normocephalic  EYES: EOMI, PERRLA, conjunctiva and sclera clear  ENMT: tongue swollen. Moist mucous membranes  NECK: Supple, normal appearance, No JVD; Normal thyroid; Trachea midline  NERVOUS SYSTEM:  sedated, comatose  CHEST/LUNG: decreased breath sounds b/l. No rales, rhonchi, wheezing   HEART: Regular rate and rhythm; No murmurs, rubs, or gallops  ABDOMEN: Soft, Nontender, Nondistended;Bowel sounds present  EXTREMITIES:  generalized edema  LYMPH: No lymphadenopathy noted  SKIN: No rashes or lesions; Good capillary refill    LABS:  CBC Full  -  ( 11 Dec 2020 14:29 )  WBC Count : 8.64 K/uL  RBC Count : 2.50 M/uL  Hemoglobin : 7.3 g/dL  Hematocrit : 24.5 %  Platelet Count - Automated : 126 K/uL  Mean Cell Volume : 98.0 fl  Mean Cell Hemoglobin : 29.2 pg  Mean Cell Hemoglobin Concentration : 29.8 gm/dL  Auto Neutrophil # : x  Auto Lymphocyte # : x  Auto Monocyte # : x  Auto Eosinophil # : x  Auto Basophil # : x  Auto Neutrophil % : x  Auto Lymphocyte % : x  Auto Monocyte % : x  Auto Eosinophil % : x  Auto Basophil % : x    12-11    136  |  101  |  42<H>  ----------------------------<  107<H>  4.0   |  23  |  5.73<H>    Ca    8.6      11 Dec 2020 06:33  Phos  5.4     12-11  Mg     2.1     12-11    TPro  6.3  /  Alb  2.4<L>  /  TBili  0.9  /  DBili  x   /  AST  47<H>  /  ALT  34  /  AlkPhos  139<H>  12-11    PT/INR - ( 11 Dec 2020 06:33 )   PT: 13.6 sec;   INR: 1.15 ratio         PTT - ( 11 Dec 2020 06:33 )  PTT:67.0 sec        RADIOLOGY & ADDITIONAL STUDIES REVIEWED:  Yes    CRITICAL CARE TIME SPENT: 35 minutes INTERVAL HPI/OVERNIGHT EVENTS: Patient noted to have elevated temperature of 100.7. Tylenol given. Patient remains sedated and intubated. Pt noted to have Hb 6.9, will give 1 PRBC during HD today.     PRESSORS: [x] YES [] NO  WHICH: levophed    Antimicrobial:    Cardiovascular:  norepinephrine Infusion 0.5 MICROgram(s)/kG/Min IV Continuous <Continuous>    Pulmonary:  ALBUTerol    90 MICROgram(s) HFA Inhaler 2 Puff(s) Inhalation every 6 hours PRN    Hematalogic:  heparin  Infusion.  Unit(s)/Hr IV Continuous <Continuous>    Other:  acetaminophen    Suspension .. 650 milliGRAM(s) Oral once  bisacodyl Suppository 10 milliGRAM(s) Rectal daily  chlorhexidine 0.12% Liquid 15 milliLiter(s) Oral Mucosa every 12 hours  chlorhexidine 2% Cloths 1 Application(s) Topical <User Schedule>  glucagon  Injectable 1 milliGRAM(s) IntraMuscular once  HYDROmorphone Infusion 1.5 mG/Hr IV Continuous <Continuous>  insulin lispro (ADMELOG) corrective regimen sliding scale   SubCutaneous every 6 hours  lubricant eye ont 1 Application(s) 1 Application(s) Both EYES two times a day  midazolam Infusion 0.15 mG/kG/Hr IV Continuous <Continuous>  pantoprazole  Injectable 40 milliGRAM(s) IV Push daily  sodium bicarbonate 1300 milliGRAM(s) Oral two times a day  sodium chloride 0.9% lock flush 10 milliLiter(s) IV Push every 1 hour PRN    acetaminophen    Suspension .. 650 milliGRAM(s) Oral once  ALBUTerol    90 MICROgram(s) HFA Inhaler 2 Puff(s) Inhalation every 6 hours PRN  bisacodyl Suppository 10 milliGRAM(s) Rectal daily  chlorhexidine 0.12% Liquid 15 milliLiter(s) Oral Mucosa every 12 hours  chlorhexidine 2% Cloths 1 Application(s) Topical <User Schedule>  glucagon  Injectable 1 milliGRAM(s) IntraMuscular once  heparin  Infusion.  Unit(s)/Hr IV Continuous <Continuous>  HYDROmorphone Infusion 1.5 mG/Hr IV Continuous <Continuous>  insulin lispro (ADMELOG) corrective regimen sliding scale   SubCutaneous every 6 hours  lubricant eye ont 1 Application(s) 1 Application(s) Both EYES two times a day  midazolam Infusion 0.15 mG/kG/Hr IV Continuous <Continuous>  norepinephrine Infusion 0.5 MICROgram(s)/kG/Min IV Continuous <Continuous>  pantoprazole  Injectable 40 milliGRAM(s) IV Push daily  sodium bicarbonate 1300 milliGRAM(s) Oral two times a day  sodium chloride 0.9% lock flush 10 milliLiter(s) IV Push every 1 hour PRN    Drug Dosing Weight  Height (cm): 154.4 (15 Nov 2020 09:25)  Weight (kg): 125 (15 Nov 2020 09:25)  BMI (kg/m2): 52.4 (15 Nov 2020 09:25)  BSA (m2): 2.16 (15 Nov 2020 09:25)    ICU Vital Signs Last 24 Hrs  T(C): 38.2 (11 Dec 2020 23:00), Max: 38.3 (11 Dec 2020 17:38)  T(F): 100.7 (11 Dec 2020 23:00), Max: 100.9 (11 Dec 2020 17:38)  HR: 95 (12 Dec 2020 00:00) (95 - 105)  BP: --  BP(mean): --  ABP: 96/55 (12 Dec 2020 00:00) (85/41 - 159/73)  ABP(mean): 68 (12 Dec 2020 00:00) (54 - 112)  RR: 24 (12 Dec 2020 00:00) (12 - 33)  SpO2: 94% (12 Dec 2020 00:00) (50% - 96%)      ABG - ( 11 Dec 2020 16:52 )  pH, Arterial: 7.19  pH, Blood: x     /  pCO2: 59    /  pO2: 81    / HCO3: 21    / Base Excess: -5.8  /  SaO2: 95                    12-10 @ 07:01  -  12-11 @ 07:00  --------------------------------------------------------  IN: 4101.8 mL / OUT: 4800 mL / NET: -698.2 mL        Mode: AC/ CMV (Assist Control/ Continuous Mandatory Ventilation)  RR (machine): 32  TV (machine): 450  FiO2: 100  PEEP: 12  ITime: 0.85  MAP: 22  PIP: 32        PHYSICAL EXAM:    GENERAL: Sedated and Intubated, anasarca   HEAD:  Atraumatic, Normocephalic  EYES: EOMI, PERRLA, conjunctiva and sclera clear  ENMT: tongue swollen. Moist mucous membranes  NECK: Supple, normal appearance, No JVD; Normal thyroid; Trachea midline  NERVOUS SYSTEM:  sedated, comatose  CHEST/LUNG: decreased breath sounds b/l. No rales, rhonchi, wheezing   HEART: Regular rate and rhythm; No murmurs, rubs, or gallops  ABDOMEN: Soft, Nontender, Nondistended;Bowel sounds present  EXTREMITIES:  generalized edema  LYMPH: No lymphadenopathy noted  SKIN: No rashes or lesions; Good capillary refill    LABS:  CBC Full  -  ( 11 Dec 2020 14:29 )  WBC Count : 8.64 K/uL  RBC Count : 2.50 M/uL  Hemoglobin : 7.3 g/dL  Hematocrit : 24.5 %  Platelet Count - Automated : 126 K/uL  Mean Cell Volume : 98.0 fl  Mean Cell Hemoglobin : 29.2 pg  Mean Cell Hemoglobin Concentration : 29.8 gm/dL  Auto Neutrophil # : x  Auto Lymphocyte # : x  Auto Monocyte # : x  Auto Eosinophil # : x  Auto Basophil # : x  Auto Neutrophil % : x  Auto Lymphocyte % : x  Auto Monocyte % : x  Auto Eosinophil % : x  Auto Basophil % : x    12-11    136  |  101  |  42<H>  ----------------------------<  107<H>  4.0   |  23  |  5.73<H>    Ca    8.6      11 Dec 2020 06:33  Phos  5.4     12-11  Mg     2.1     12-11    TPro  6.3  /  Alb  2.4<L>  /  TBili  0.9  /  DBili  x   /  AST  47<H>  /  ALT  34  /  AlkPhos  139<H>  12-11    PT/INR - ( 11 Dec 2020 06:33 )   PT: 13.6 sec;   INR: 1.15 ratio         PTT - ( 11 Dec 2020 06:33 )  PTT:67.0 sec        RADIOLOGY & ADDITIONAL STUDIES REVIEWED:  Yes    CRITICAL CARE TIME SPENT: 35 minutes

## 2020-12-12 NOTE — PROGRESS NOTE ADULT - PROBLEM SELECTOR PLAN 1
Anuric AGUSTO from ATN due to septic shock/ARDS requiring RRT/HD  - S/p HD treatment on 12/10 with net 4 L fluid removal  - Avoid Nephrotoxic agents  - Monitor BMP and electrolytes  - Maintain MAP>65-70 mm hg  - Adjust antibiotics as per renal dose clearances  - Volume status and electrolytes noted.  - HD treatment as per ordered with prolonged hours if tolerated

## 2020-12-12 NOTE — PROGRESS NOTE ADULT - PROBLEM SELECTOR PLAN 2
Patient with Calcium 8.9 with albumin 2.5, Phos  5.2, Mag 2.5  Monitor BMP, calcium, mag, phos level  Continue HD treatment for optimal clearnaces

## 2020-12-12 NOTE — PROGRESS NOTE ADULT - PROBLEM SELECTOR PLAN 5
Anemia of chronic illness with downtrending hgb 6.9  Monitor serial CBC  Transfuse PRBC during HD today  Monitor hgb/Hct

## 2020-12-12 NOTE — PROGRESS NOTE ADULT - SUBJECTIVE AND OBJECTIVE BOX
Chandan Awad MD (Nephrology progress note)  [address], [facility],  [address],  [facility]  TEl: [phone]  Cell: [phone]    Patient is a 50y Male seen and evaluated at bedside. Vital signs, laboratory data, imaging studies, consult notes reviewed done within past 24 hours. Overnight events noted. Patient remains critically ill on ventialtory support and IV pressors at present. Still remains anuric requiring RRT/HD. Patient net positive 2L fluid balance in past 24 hours.    Allergies    No Known Allergies    Intolerances        ROS:  Sedated and intubated on ventilatory support    VITALS:    T(C): 37.4 (12-12-20 @ 05:30), Max: 38.3 (12-11-20 @ 17:38)  HR: 91 (12-12-20 @ 07:00) (91 - 105)  BP: --  RR: 0 (12-12-20 @ 07:00) (0 - 33)  SpO2: 96% (12-12-20 @ 07:00) (50% - 97%)  CAPILLARY BLOOD GLUCOSE      POCT Blood Glucose.: 114 mg/dL (12 Dec 2020 06:06)  POCT Blood Glucose.: 110 mg/dL (11 Dec 2020 17:23)  POCT Blood Glucose.: 119 mg/dL (11 Dec 2020 10:47)      12-11-20 @ 07:01  -  12-12-20 @ 07:00  --------------------------------------------------------  IN: 1916.5 mL / OUT: 0 mL / NET: 1916.5 mL      MEDICATIONS  (STANDING):  bisacodyl Suppository 10 milliGRAM(s) Rectal daily  chlorhexidine 0.12% Liquid 15 milliLiter(s) Oral Mucosa every 12 hours  chlorhexidine 2% Cloths 1 Application(s) Topical <User Schedule>  glucagon  Injectable 1 milliGRAM(s) IntraMuscular once  heparin  Infusion.  Unit(s)/Hr (22 mL/Hr) IV Continuous <Continuous>  HYDROmorphone Infusion 1.5 mG/Hr (1.5 mL/Hr) IV Continuous <Continuous>  insulin lispro (ADMELOG) corrective regimen sliding scale   SubCutaneous every 6 hours  lubricant eye ont 1 Application(s) 1 Application(s) Both EYES two times a day  midazolam Infusion 0.15 mG/kG/Hr (18.8 mL/Hr) IV Continuous <Continuous>  norepinephrine Infusion 0.5 MICROgram(s)/kG/Min (58.6 mL/Hr) IV Continuous <Continuous>  pantoprazole  Injectable 40 milliGRAM(s) IV Push daily  sodium bicarbonate 1300 milliGRAM(s) Oral two times a day    MEDICATIONS  (PRN):  ALBUTerol    90 MICROgram(s) HFA Inhaler 2 Puff(s) Inhalation every 6 hours PRN Shortness of Breath  sodium chloride 0.9% lock flush 10 milliLiter(s) IV Push every 1 hour PRN Pre/post blood products, medications, blood draw, and to maintain line patency      PHYSICAL EXAM:  GENERAL: Sedated and intubated on ventilatory support  HEENT: LEONOR, EOMI, neck supple, no JVP, no carotid bruit, oral mucosa moist and pink.  CHEST/LUNG: Bilateral decreased breath sounds, bibasilar rales and rhonchi no wheezing  HEART: Regular rate and rhythm, ERNESTO II/VI at LPSB, no gallops, no rub   ABDOMEN: Soft, nontender, non distended, bowel sounds present, no palpable organomegaly  : No flank or supra pubic tenderness.  EXTREMITIES: Bilateral upper and lower extremity edema++nt  Neurology: Sedated and intubated on ventilatory support  SKIN: No rash or skin lesion  Musculoskeletal: No joint deformity or spinal tenderness.      Vascular ACCESS:     LABS:                        6.9    7.86  )-----------( 126      ( 12 Dec 2020 06:27 )             23.0     12-12    139  |  102  |  53<H>  ----------------------------<  107<H>  3.7   |  24  |  7.08<H>    Ca    8.9      12 Dec 2020 06:27  Phos  5.2     12-12  Mg     2.2     12-12    TPro  5.9<L>  /  Alb  2.5<L>  /  TBili  1.0  /  DBili  x   /  AST  56<H>  /  ALT  36  /  AlkPhos  125<H>  12-12      PT/INR - ( 12 Dec 2020 06:27 )   PT: 13.3 sec;   INR: 1.12 ratio         PTT - ( 12 Dec 2020 06:27 )  PTT:61.3 sec          RADIOLOGY & ADDITIONAL STUDIES:  ra< from: Xray Chest 1 View- PORTABLE-Urgent (Xray Chest 1 View- PORTABLE-Urgent .) (12.11.20 @ 15:06) >    EXAM:  XR CHEST PORTABLE URGENT 1V                            PROCEDURE DATE:  12/11/2020          INTERPRETATION:  AP semierect chest on December 11, 2020 at 3:05 PM. Patient had NG tube placement.    Heart is likely enlarged.    Endotracheal tubeand bilateral jugular line remain.    There are again noted to be a diffuse advanced bilateral infiltrates although there are slightly better aeration on the left compared to December 11 earlier study.    An NG tube is been inserted.    IMPRESSION: Persistent advanced infiltrates showing slightly better aeration on the left. NG tube inserted.            ROLANDO HARPER M.D., ATTENDING RADIOLOGIST  This document has been electronically signed. Dec 11 2020  3:08PM    < end of copied text >    Imaging Personally Reviewed:  [x] YES  [ ] NO    Consultant(s) Notes Reviewed:  [x] YES  [ ] NO    Care Discussed with Primary team/ Other Providers [x] YES  [ ] NO

## 2020-12-12 NOTE — PROGRESS NOTE ADULT - PROBLEM SELECTOR PLAN 3
Patient with severe sepsis/ARDS from COVID-19 pneumonia on ventilatory support and IV pressors  Maintain MAP>65-70 mm hg  Continue ventilatory support  Optimal treatment of COVID-19/sepsis per primary ICU/pulmonary/ID team  Monitor vital signs  Adjust antibiotics as per renal dose clearance for Cr cl<10 ml/min.

## 2020-12-12 NOTE — PROGRESS NOTE ADULT - ATTENDING COMMENTS
49 yr  old obese male with out any significant medical history  who presented with sob due to acute hypoxic respiratory failure due to covid pneumonia.    Assessment:  1. Acute Hypoxic respiratory Failure / ARDS  2. COVID-19 PNA  3. Morbid Obesity   4. Elevated lactate  5. Hypotension - sedation related  6. Type 2 MI - likely due to hypoxia  7. Acute kidney injury     Plan  - Cont. HD with aggressive fluid removal, as patient with increased pulmonary edema and difficulty ventilating.  - intubated 11/19 for worsening hypoxia and worsening ARDS  - vent management with lung protective strategy   -1 unit  PRBC  -propofol held due to high TG  -Cont versed/fentanyl gtt  -Cont. heparin infusion  -monitor blood gas  - dig for afib  - keep o2 sat >90%  - Antibiotics per ID  - Monitor renal and hepatic function  - Vasopressor support to maintain MAP > 65  - Tube feedings held due to increased volume and overall edema  - isolation : contact and airborne  - Pain control  - Poor prognosis at this time, given multiple organ failure   - Bicarb gtt  -DNR  - DVT/ GI prophy .

## 2020-12-13 NOTE — PROCEDURE NOTE - NSINFORMCONSENT_GEN_A_CORE
Benefits, risks, and possible complications of procedure explained to patient/caregiver who verbalized understanding and gave verbal consent.
This was an emergent procedure.
This was an emergent procedure.
Benefits, risks, and possible complications of procedure explained to patient/caregiver who verbalized understanding and gave verbal consent.
Benefits, risks, and possible complications of procedure explained to patient/caregiver who verbalized understanding and gave verbal consent.
This was an emergent procedure.
This was an emergent procedure.
via HCP (wife - Laura Gillette)/Benefits, risks, and possible complications of procedure explained to patient/caregiver who verbalized understanding and gave verbal consent.

## 2020-12-13 NOTE — PROCEDURE NOTE - ADDITIONAL PROCEDURE DETAILS
Vascular team informed that Pt had dialysis yesterday but CL was sluggish and there were high venous pressures causing poor dialysis   On examination line was on the 15cm line , no blood was able to be withdrawn from the venous line but arterial line was brilliant   New 16cm Dialysis catheter was placed in the RIJ   No complications   Post procedure CXR was ordered
`Blood Gas Profile - Venous (11.25.20 @ 14:12)   pH, Venous: 7.03: TYPE:(C=Critical, N=Notification, A=Abnormal) C   TESTS: _PH 7.03   DATE/TIME CALLED: _11/25/20 14:15   CALLED TO: _Dr. YSABEL Banks   READ BACK (2 Patient Identifiers)(Y/N): _Y   READ BACK VALUES (Y/N): _Y   CALLED BY: _Anu Borges rt   pCO2, Venous: 100 mmHg   pO2, Venous: 33 mmHg   HCO3, Venous: 25 mmol/L   Base Excess, Venous: -9.1 mmol/L   Oxygen Saturation, Venous: 50 %   FIO2, Venous: 100   Blood Gas Comments, Venous: vbg drawn by md

## 2020-12-13 NOTE — PROCEDURE NOTE - NSINDICATIONS_GEN_A_CORE
airway protection/mental status change/respiratory distress/respiratory failure
critical illness
dialysis/CRRT
dialysis/CRRT
monitoring purposes/arterial puncture to obtain ABG's/blood sampling/critical patient
hemodynamic monitoring/critical illness/volume resuscitation/venous access
volume resuscitation/venous access/critical illness/hemodynamic monitoring
critical patient/monitoring purposes
dialysis/CRRT

## 2020-12-13 NOTE — PROCEDURE NOTE - NSPROCDETAILS_GEN_ALL_CORE
patient pre-oxygenated, tube inserted, placement confirmed
guidewire recovered
ultrasound guidance/sterile dressing applied/sterile technique, catheter placed/lumen(s) aspirated and flushed/guidewire recovered
Seldinger technique/all materials/supplies accounted for at end of procedure/positive blood return obtained via catheter/connected to a pressurized flush line/location identified, draped/prepped, sterile technique used, needle inserted/introduced/sutured in place/hemostasis with direct pressure, dressing applied/ultrasound guidance
guidewire recovered
guidewire recovered/lumen(s) aspirated and flushed/sterile dressing applied/sterile technique, catheter placed/ultrasound guidance
connected to a pressurized flush line/sutured in place/location identified, draped/prepped, sterile technique used, needle inserted/introduced/positive blood return obtained via catheter
guidewire recovered/sterile technique, catheter placed/lumen(s) aspirated and flushed/sterile dressing applied/ultrasound guidance
sterile technique, catheter placed/sterile dressing applied/ultrasound guidance/guidewire recovered/lumen(s) aspirated and flushed

## 2020-12-13 NOTE — PROGRESS NOTE ADULT - SUBJECTIVE AND OBJECTIVE BOX
INTERVAL HPI/OVERNIGHT EVENTS:     Pt had HD overnight. Janak is not working as per HD nurse. Removed 3600cc. Versed was on hold and pt was still well sedated, and DCd.      Antimicrobial:    Cardiovascular:  norepinephrine Infusion 0.5 MICROgram(s)/kG/Min IV Continuous <Continuous>    Pulmonary:  ALBUTerol    90 MICROgram(s) HFA Inhaler 2 Puff(s) Inhalation every 6 hours PRN    Hematalogic:  heparin  Infusion.  Unit(s)/Hr IV Continuous <Continuous>    Other:  bisacodyl Suppository 10 milliGRAM(s) Rectal daily  chlorhexidine 0.12% Liquid 15 milliLiter(s) Oral Mucosa every 12 hours  chlorhexidine 2% Cloths 1 Application(s) Topical <User Schedule>  glucagon  Injectable 1 milliGRAM(s) IntraMuscular once  HYDROmorphone Infusion 1.5 mG/Hr IV Continuous <Continuous>  insulin lispro (ADMELOG) corrective regimen sliding scale   SubCutaneous every 6 hours  lubricant eye ont 1 Application(s) 1 Application(s) Both EYES two times a day  pantoprazole  Injectable 40 milliGRAM(s) IV Push daily  sodium bicarbonate 1300 milliGRAM(s) Oral two times a day  sodium chloride 0.9% lock flush 10 milliLiter(s) IV Push every 1 hour PRN    ALBUTerol    90 MICROgram(s) HFA Inhaler 2 Puff(s) Inhalation every 6 hours PRN  bisacodyl Suppository 10 milliGRAM(s) Rectal daily  chlorhexidine 0.12% Liquid 15 milliLiter(s) Oral Mucosa every 12 hours  chlorhexidine 2% Cloths 1 Application(s) Topical <User Schedule>  glucagon  Injectable 1 milliGRAM(s) IntraMuscular once  heparin  Infusion.  Unit(s)/Hr IV Continuous <Continuous>  HYDROmorphone Infusion 1.5 mG/Hr IV Continuous <Continuous>  insulin lispro (ADMELOG) corrective regimen sliding scale   SubCutaneous every 6 hours  lubricant eye ont 1 Application(s) 1 Application(s) Both EYES two times a day  norepinephrine Infusion 0.5 MICROgram(s)/kG/Min IV Continuous <Continuous>  pantoprazole  Injectable 40 milliGRAM(s) IV Push daily  sodium bicarbonate 1300 milliGRAM(s) Oral two times a day  sodium chloride 0.9% lock flush 10 milliLiter(s) IV Push every 1 hour PRN    Drug Dosing Weight  Height (cm): 154.4 (15 Nov 2020 09:25)  Weight (kg): 125 (15 Nov 2020 09:25)  BMI (kg/m2): 52.4 (15 Nov 2020 09:25)  BSA (m2): 2.16 (15 Nov 2020 09:25)    PRESSORS: [x ] YES [ ]No  WHICH: levophed    CENTRAL LINE: [x ] YES [ ] NO  LOCATION: [facility] HD cath   DATE INSERTED: 12/5    [facility] 12/1-  REMOVE: [ ] YES [ ] NO  EXPLAIN:    MICHELLE: [x ] YES [ ] NO    DATE INSERTED:  REMOVE:  [ ] YES [ ] NO  EXPLAIN:    A-LINE:  [x ] YES [ ] NO  LOCATION: R radial  DATE INSERTED: 12/4  REMOVE:  [ ] YES [ ] NO  EXPLAIN:    PMH -reviewed admission note, no change since admission  PAST MEDICAL & SURGICAL HISTORY:  No pertinent past medical history    No significant past surgical history        ICU Vital Signs Last 24 Hrs  T(C): 37.4 (13 Dec 2020 00:09), Max: 37.6 (12 Dec 2020 16:45)  T(F): 99.3 (13 Dec 2020 00:09), Max: 99.6 (12 Dec 2020 16:45)  HR: 97 (13 Dec 2020 00:15) (89 - 102)  BP: --  BP(mean): --  ABP: 98/55 (13 Dec 2020 00:15) (61/55 - 133/58)  ABP(mean): 69 (13 Dec 2020 00:15) (53 - 95)  RR: 25 (13 Dec 2020 00:15) (0 - 32)  SpO2: 96% (13 Dec 2020 00:15) (86% - 98%)      ABG - ( 12 Dec 2020 04:47 )  pH, Arterial: 7.20  pH, Blood: x     /  pCO2: 65    /  pO2: 125   / HCO3: 24    / Base Excess: -4.0  /  SaO2: 98                    12-11 @ 07:01  -  12-12 @ 07:00  --------------------------------------------------------  IN: 1967.4 mL / OUT: 0 mL / NET: 1967.4 mL        Mode: AC/ CMV (Assist Control/ Continuous Mandatory Ventilation)  RR (machine): 32  TV (machine): 450  FiO2: 100  PEEP: 12  ITime: 0.85  MAP: 30  PIP: 50      PHYSICAL EXAM:    GENERAL: Sedated and Intubated, anasarca, obese   HEAD:  Atraumatic, Normocephalic  EYES: PERRLA, conjunctiva and sclera clear  ENMT: tongue swollen. Moist mucous membranes  NECK: Supple, normal appearance, No JVD; Normal thyroid; Trachea midline  NERVOUS SYSTEM:  sedated, comatose  CHEST/LUNG: decreased breath sounds b/l. No rales, rhonchi, wheezing   HEART: Regular rate and rhythm; No murmurs, rubs, or gallops  ABDOMEN: Soft, Nontender, Nondistended;Bowel sounds present  EXTREMITIES:  generalized edema  LYMPH: No lymphadenopathy noted  SKIN: No rashes or lesions; Good capillary refill      LABS:  CBC Full  -  ( 12 Dec 2020 06:27 )  WBC Count : 7.86 K/uL  RBC Count : 2.36 M/uL  Hemoglobin : 6.9 g/dL  Hematocrit : 23.0 %  Platelet Count - Automated : 126 K/uL  Mean Cell Volume : 97.5 fl  Mean Cell Hemoglobin : 29.2 pg  Mean Cell Hemoglobin Concentration : 30.0 gm/dL  Auto Neutrophil # : 5.54 K/uL  Auto Lymphocyte # : 1.41 K/uL  Auto Monocyte # : 0.52 K/uL  Auto Eosinophil # : 0.10 K/uL  Auto Basophil # : 0.02 K/uL  Auto Neutrophil % : 70.5 %  Auto Lymphocyte % : 17.9 %  Auto Monocyte % : 6.6 %  Auto Eosinophil % : 1.3 %  Auto Basophil % : 0.3 %    12-12    139  |  102  |  53<H>  ----------------------------<  107<H>  3.7   |  24  |  7.08<H>    Ca    8.9      12 Dec 2020 06:27  Phos  5.2     12-12  Mg     2.2     12-12    TPro  5.9<L>  /  Alb  2.5<L>  /  TBili  1.0  /  DBili  x   /  AST  56<H>  /  ALT  36  /  AlkPhos  125<H>  12-12    PT/INR - ( 12 Dec 2020 06:27 )   PT: 13.3 sec;   INR: 1.12 ratio         PTT - ( 12 Dec 2020 06:27 )  PTT:61.3 sec        RADIOLOGY & ADDITIONAL STUDIES REVIEWED:  ***    [ ]GOALS OF CARE DISCUSSION WITH PATIENT/FAMILY/PROXY:    CRITICAL CARE TIME SPENT: 35 minutes INTERVAL HPI/OVERNIGHT EVENTS:     Pt had HD overnight. Janak is not working as per HD nurse. Removed 3600cc. Versed was on hold and pt was still well sedated, and DCd. Vascular replaced Janak this am, HD today. RVP from core lab +COVID, resulted 12/13. Hb stable 8.0 s/p x1 pRBC yesterday 12/12.       Antimicrobial:    Cardiovascular:  norepinephrine Infusion 0.5 MICROgram(s)/kG/Min IV Continuous <Continuous>    Pulmonary:  ALBUTerol    90 MICROgram(s) HFA Inhaler 2 Puff(s) Inhalation every 6 hours PRN    Hematalogic:  heparin  Infusion.  Unit(s)/Hr IV Continuous <Continuous>    Other:  bisacodyl Suppository 10 milliGRAM(s) Rectal daily  chlorhexidine 0.12% Liquid 15 milliLiter(s) Oral Mucosa every 12 hours  chlorhexidine 2% Cloths 1 Application(s) Topical <User Schedule>  glucagon  Injectable 1 milliGRAM(s) IntraMuscular once  HYDROmorphone Infusion 1.5 mG/Hr IV Continuous <Continuous>  insulin lispro (ADMELOG) corrective regimen sliding scale   SubCutaneous every 6 hours  lubricant eye ont 1 Application(s) 1 Application(s) Both EYES two times a day  pantoprazole  Injectable 40 milliGRAM(s) IV Push daily  sodium bicarbonate 1300 milliGRAM(s) Oral two times a day  sodium chloride 0.9% lock flush 10 milliLiter(s) IV Push every 1 hour PRN    ALBUTerol    90 MICROgram(s) HFA Inhaler 2 Puff(s) Inhalation every 6 hours PRN  bisacodyl Suppository 10 milliGRAM(s) Rectal daily  chlorhexidine 0.12% Liquid 15 milliLiter(s) Oral Mucosa every 12 hours  chlorhexidine 2% Cloths 1 Application(s) Topical <User Schedule>  glucagon  Injectable 1 milliGRAM(s) IntraMuscular once  heparin  Infusion.  Unit(s)/Hr IV Continuous <Continuous>  HYDROmorphone Infusion 1.5 mG/Hr IV Continuous <Continuous>  insulin lispro (ADMELOG) corrective regimen sliding scale   SubCutaneous every 6 hours  lubricant eye ont 1 Application(s) 1 Application(s) Both EYES two times a day  norepinephrine Infusion 0.5 MICROgram(s)/kG/Min IV Continuous <Continuous>  pantoprazole  Injectable 40 milliGRAM(s) IV Push daily  sodium bicarbonate 1300 milliGRAM(s) Oral two times a day  sodium chloride 0.9% lock flush 10 milliLiter(s) IV Push every 1 hour PRN    Drug Dosing Weight  Height (cm): 154.4 (15 Nov 2020 09:25)  Weight (kg): 125 (15 Nov 2020 09:25)  BMI (kg/m2): 52.4 (15 Nov 2020 09:25)  BSA (m2): 2.16 (15 Nov 2020 09:25)    PRESSORS: [x ] YES [ ]No  WHICH: levophed    CENTRAL LINE: [x ] YES [ ] NO  LOCATION: [facility] HD cath   DATE INSERTED: 12/5    [facility] 12/1-  REMOVE: [ ] YES [ ] NO  EXPLAIN:    MICHELLE: [x ] YES [ ] NO    DATE INSERTED:  REMOVE:  [ ] YES [ ] NO  EXPLAIN:    A-LINE:  [x ] YES [ ] NO  LOCATION: R radial  DATE INSERTED: 12/4  REMOVE:  [ ] YES [ ] NO  EXPLAIN:    PMH -reviewed admission note, no change since admission  PAST MEDICAL & SURGICAL HISTORY:  No pertinent past medical history    No significant past surgical history        ICU Vital Signs Last 24 Hrs  T(C): 37.4 (13 Dec 2020 00:09), Max: 37.6 (12 Dec 2020 16:45)  T(F): 99.3 (13 Dec 2020 00:09), Max: 99.6 (12 Dec 2020 16:45)  HR: 97 (13 Dec 2020 00:15) (89 - 102)  BP: --  BP(mean): --  ABP: 98/55 (13 Dec 2020 00:15) (61/55 - 133/58)  ABP(mean): 69 (13 Dec 2020 00:15) (53 - 95)  RR: 25 (13 Dec 2020 00:15) (0 - 32)  SpO2: 96% (13 Dec 2020 00:15) (86% - 98%)      ABG - ( 12 Dec 2020 04:47 )  pH, Arterial: 7.20  pH, Blood: x     /  pCO2: 65    /  pO2: 125   / HCO3: 24    / Base Excess: -4.0  /  SaO2: 98                    12-11 @ 07:01  -  12-12 @ 07:00  --------------------------------------------------------  IN: 1967.4 mL / OUT: 0 mL / NET: 1967.4 mL        Mode: AC/ CMV (Assist Control/ Continuous Mandatory Ventilation)  RR (machine): 32  TV (machine): 450  FiO2: 100  PEEP: 12  ITime: 0.85  MAP: 30  PIP: 50      PHYSICAL EXAM:    GENERAL: Sedated and Intubated, anasarca, obese   HEAD:  Atraumatic, Normocephalic  EYES: PERRLA, conjunctiva and sclera clear  ENMT: tongue swollen. Moist mucous membranes  NECK: Supple, normal appearance, No JVD; Normal thyroid; Trachea midline  NERVOUS SYSTEM:  sedated, comatose  CHEST/LUNG: decreased breath sounds b/l. No rales, rhonchi, wheezing   HEART: Regular rate and rhythm; No murmurs, rubs, or gallops  ABDOMEN: Soft, Nontender, Nondistended;Bowel sounds present  EXTREMITIES:  generalized edema  LYMPH: No lymphadenopathy noted  SKIN: No rashes or lesions; Good capillary refill      LABS:  CBC Full  -  ( 12 Dec 2020 06:27 )  WBC Count : 7.86 K/uL  RBC Count : 2.36 M/uL  Hemoglobin : 6.9 g/dL  Hematocrit : 23.0 %  Platelet Count - Automated : 126 K/uL  Mean Cell Volume : 97.5 fl  Mean Cell Hemoglobin : 29.2 pg  Mean Cell Hemoglobin Concentration : 30.0 gm/dL  Auto Neutrophil # : 5.54 K/uL  Auto Lymphocyte # : 1.41 K/uL  Auto Monocyte # : 0.52 K/uL  Auto Eosinophil # : 0.10 K/uL  Auto Basophil # : 0.02 K/uL  Auto Neutrophil % : 70.5 %  Auto Lymphocyte % : 17.9 %  Auto Monocyte % : 6.6 %  Auto Eosinophil % : 1.3 %  Auto Basophil % : 0.3 %    12-12    139  |  102  |  53<H>  ----------------------------<  107<H>  3.7   |  24  |  7.08<H>    Ca    8.9      12 Dec 2020 06:27  Phos  5.2     12-12  Mg     2.2     12-12    TPro  5.9<L>  /  Alb  2.5<L>  /  TBili  1.0  /  DBili  x   /  AST  56<H>  /  ALT  36  /  AlkPhos  125<H>  12-12    PT/INR - ( 12 Dec 2020 06:27 )   PT: 13.3 sec;   INR: 1.12 ratio         PTT - ( 12 Dec 2020 06:27 )  PTT:61.3 sec        RADIOLOGY & ADDITIONAL STUDIES REVIEWED:  ***    [ ]GOALS OF CARE DISCUSSION WITH PATIENT/FAMILY/PROXY:    CRITICAL CARE TIME SPENT: 35 minutes INTERVAL HPI/OVERNIGHT EVENTS:     Pt had HD overnight. Janak is not working as per HD nurse. Removed 3600cc. Versed was on hold and pt was still well sedated, and DCd. Vascular replaced Janak this am, HD today. RVP from core lab +COVID, resulted 12/13. Hb stable 8.0 s/p x1 pRBC yesterday 12/12.       Antimicrobial:    Cardiovascular:  norepinephrine Infusion 0.5 MICROgram(s)/kG/Min IV Continuous <Continuous>    Pulmonary:  ALBUTerol    90 MICROgram(s) HFA Inhaler 2 Puff(s) Inhalation every 6 hours PRN    Hematalogic:  heparin  Infusion.  Unit(s)/Hr IV Continuous <Continuous>    Other:  bisacodyl Suppository 10 milliGRAM(s) Rectal daily  chlorhexidine 0.12% Liquid 15 milliLiter(s) Oral Mucosa every 12 hours  chlorhexidine 2% Cloths 1 Application(s) Topical <User Schedule>  glucagon  Injectable 1 milliGRAM(s) IntraMuscular once  HYDROmorphone Infusion 1.5 mG/Hr IV Continuous <Continuous>  insulin lispro (ADMELOG) corrective regimen sliding scale   SubCutaneous every 6 hours  lubricant eye ont 1 Application(s) 1 Application(s) Both EYES two times a day  pantoprazole  Injectable 40 milliGRAM(s) IV Push daily  sodium bicarbonate 1300 milliGRAM(s) Oral two times a day  sodium chloride 0.9% lock flush 10 milliLiter(s) IV Push every 1 hour PRN    ALBUTerol    90 MICROgram(s) HFA Inhaler 2 Puff(s) Inhalation every 6 hours PRN  bisacodyl Suppository 10 milliGRAM(s) Rectal daily  chlorhexidine 0.12% Liquid 15 milliLiter(s) Oral Mucosa every 12 hours  chlorhexidine 2% Cloths 1 Application(s) Topical <User Schedule>  glucagon  Injectable 1 milliGRAM(s) IntraMuscular once  heparin  Infusion.  Unit(s)/Hr IV Continuous <Continuous>  HYDROmorphone Infusion 1.5 mG/Hr IV Continuous <Continuous>  insulin lispro (ADMELOG) corrective regimen sliding scale   SubCutaneous every 6 hours  lubricant eye ont 1 Application(s) 1 Application(s) Both EYES two times a day  norepinephrine Infusion 0.5 MICROgram(s)/kG/Min IV Continuous <Continuous>  pantoprazole  Injectable 40 milliGRAM(s) IV Push daily  sodium bicarbonate 1300 milliGRAM(s) Oral two times a day  sodium chloride 0.9% lock flush 10 milliLiter(s) IV Push every 1 hour PRN    Drug Dosing Weight  Height (cm): 154.4 (15 Nov 2020 09:25)  Weight (kg): 125 (15 Nov 2020 09:25)  BMI (kg/m2): 52.4 (15 Nov 2020 09:25)  BSA (m2): 2.16 (15 Nov 2020 09:25)    PRESSORS: [x ] YES [ ]No  WHICH: levophed    CENTRAL LINE: [x ] YES [ ] NO  LOCATION: [facility] HD cath   DATE INSERTED: 12/5    [facility] 12/1-  REMOVE: [ ] YES [ ] NO  EXPLAIN:    MICHELLE: [x ] YES [ ] NO    DATE INSERTED:  REMOVE:  [ ] YES [ ] NO  EXPLAIN:    A-LINE:  [x ] YES [ ] NO  LOCATION: R radial  DATE INSERTED: 12/4  REMOVE:  [ ] YES [ ] NO  EXPLAIN:    PMH -reviewed admission note, no change since admission  PAST MEDICAL & SURGICAL HISTORY:  No pertinent past medical history    No significant past surgical history        ICU Vital Signs Last 24 Hrs  T(C): 37.4 (13 Dec 2020 00:09), Max: 37.6 (12 Dec 2020 16:45)  T(F): 99.3 (13 Dec 2020 00:09), Max: 99.6 (12 Dec 2020 16:45)  HR: 97 (13 Dec 2020 00:15) (89 - 102)  BP: --  BP(mean): --  ABP: 98/55 (13 Dec 2020 00:15) (61/55 - 133/58)  ABP(mean): 69 (13 Dec 2020 00:15) (53 - 95)  RR: 25 (13 Dec 2020 00:15) (0 - 32)  SpO2: 96% (13 Dec 2020 00:15) (86% - 98%)      ABG - ( 12 Dec 2020 04:47 )  pH, Arterial: 7.20  pH, Blood: x     /  pCO2: 65    /  pO2: 125   / HCO3: 24    / Base Excess: -4.0  /  SaO2: 98                    12-11 @ 07:01  -  12-12 @ 07:00  --------------------------------------------------------  IN: 1967.4 mL / OUT: 0 mL / NET: 1967.4 mL        Mode: AC/ CMV (Assist Control/ Continuous Mandatory Ventilation)  RR (machine): 32  TV (machine): 450  FiO2: 100  PEEP: 12  ITime: 0.85  MAP: 30  PIP: 50      PHYSICAL EXAM:    GENERAL: Sedated and Intubated, anasarca, obese   HEAD:  Atraumatic, Normocephalic  EYES: PERRLA, conjunctiva and sclera clear  ENMT: tongue swollen. Moist mucous membranes  NECK: Supple, normal appearance, No JVD; Normal thyroid; Trachea midline  NERVOUS SYSTEM:  sedated, comatose  CHEST/LUNG: decreased breath sounds b/l. No rales, rhonchi, wheezing   HEART: Regular rate and rhythm; No murmurs, rubs, or gallops  ABDOMEN: Soft, Nontender, Nondistended;Bowel sounds present  EXTREMITIES:  generalized edema  LYMPH: No lymphadenopathy noted  SKIN: No rashes or lesions; Good capillary refill      LABS:  CBC Full  -  ( 12 Dec 2020 06:27 )  WBC Count : 7.86 K/uL  RBC Count : 2.36 M/uL  Hemoglobin : 6.9 g/dL  Hematocrit : 23.0 %  Platelet Count - Automated : 126 K/uL  Mean Cell Volume : 97.5 fl  Mean Cell Hemoglobin : 29.2 pg  Mean Cell Hemoglobin Concentration : 30.0 gm/dL  Auto Neutrophil # : 5.54 K/uL  Auto Lymphocyte # : 1.41 K/uL  Auto Monocyte # : 0.52 K/uL  Auto Eosinophil # : 0.10 K/uL  Auto Basophil # : 0.02 K/uL  Auto Neutrophil % : 70.5 %  Auto Lymphocyte % : 17.9 %  Auto Monocyte % : 6.6 %  Auto Eosinophil % : 1.3 %  Auto Basophil % : 0.3 %    12-12    139  |  102  |  53<H>  ----------------------------<  107<H>  3.7   |  24  |  7.08<H>    Ca    8.9      12 Dec 2020 06:27  Phos  5.2     12-12  Mg     2.2     12-12    TPro  5.9<L>  /  Alb  2.5<L>  /  TBili  1.0  /  DBili  x   /  AST  56<H>  /  ALT  36  /  AlkPhos  125<H>  12-12    PT/INR - ( 12 Dec 2020 06:27 )   PT: 13.3 sec;   INR: 1.12 ratio         PTT - ( 12 Dec 2020 06:27 )  PTT:61.3 sec        RADIOLOGY & ADDITIONAL STUDIES REVIEWED:  ***  CXR:< from: Xray Chest 1 View- PORTABLE-Urgent (Xray Chest 1 View- PORTABLE-Urgent .) (12.13.20 @ 11:51) >    EXAM:  XR CHEST PORTABLE URGENT 1V                            PROCEDURE DATE:  12/13/2020          INTERPRETATION:  AP chest on December 13, 2020 at 11:30 AM. Patient had right jugular line placement.    Heart suggest enlargement.    Diffuse advanced bilateral infiltrates again noted.    Endotracheal tube, nasogastric tube, and left jugular line overlying aortic arch again noted.    The above findings are similar to December 12.    Present film shows a right jugular line inserted with tip in superior vena caval area.    IMPRESSION: Right jugular line inserted. Persistent advanced infiltrates.            ROLANDO HARPER M.D., ATTENDING RADIOLOGIST  This document has been electronically signed. Dec 13 2020 12:46PM    < end of copied text >      [ ]GOALS OF CARE DISCUSSION WITH PATIENT/FAMILY/PROXY:    CRITICAL CARE TIME SPENT: 35 minutes

## 2020-12-13 NOTE — PROGRESS NOTE ADULT - PROBLEM SELECTOR PLAN 5
Anemia of chronic illness with downtrending hgb 8.0  Monitor serial CBC  Transfuse PRBC during HD today  Monitor hgb/Hct

## 2020-12-13 NOTE — PROCEDURE NOTE - NSCVLACTUALSITE_VASC_A_CORE
right/internal jugular
right/internal jugular
left
femoral vein/right
internal jugular/left
right/internal jugular

## 2020-12-13 NOTE — PROGRESS NOTE ADULT - ASSESSMENT
ASSESSMENT AND PLAN: 49M without PMH from home with wife admitted to ICU for of Acute Respiratory failure 2/2 COVID PNA. Patient is currently in multi organ failure and on Dialysis. Requiring pressor support    Acute respiratory failure secondary to Covid pneumonia  Septic Shock and Paroxysmal Atrial fibrillation  AGUSTO ,ATN on dialysis      Neuro:   - Off propofol as TG levels were elevated  - Continue Versed to help with Ventricular synchrony  - switch fentanyl to dilaudid drip to minimize fluid being given to patient  - Dcd versed as pt is well sedated without it  - No improvement in condition or sedative requirement     CVS: #Septic Shock and Paroxysmal Atrial fibrillation  - patient is currently on Levophed,   - s/p dig loading, c/w 0.125mg dig every other day (renal dosing) started on 11/26  - EKG with sinus tach->AF  - bedside Echo without global dysfunction  - Digoxin levels in range ,will continue current regimen   - A-line monitoring  - unable to give levo in 250ml as infusion pump cannot infuse that concentration    -#troponemia  -9->8.2->8.3 11/21 trended down   -cardio Dr. Tsang saw patient before, Currently he does not need Aspirin and Plavix,   - Will Consult PRN in case patient's condition changes    Pulmonary:   #Acute respiratory failure 2/2 COVID PNA  -ETT placed 11/19, replaced on 12/7 as patient was having a leak in the balloon, replaced w/o complication  - 32/500/100/17, ABG 7.10/83/77/93- NO discontinued.  - ESR, procal, ferritin decreasing, d-dimer, LDH, CRP decreasing, will trend every 3rd day  - Dexamethasone IV 6mg Qd (10 day course through 11/24) , Finished on 12/4  - CXR 12/11 shows increased interstitial lung markings with bilateral airspace opacities   - IgA positive, IgG negative, indicating new infection, No Remdesivir given due to bad kidney function      ID:   -history and management as above  -changed LIJ to femoral line, removed LIJ 11/25  -WBC 18->11->13.2->15.6->17.8>18  -RVP +COVID, procal increased to 3.13 as noted above, started cefepime 11/21, added vanc 11/24, added azithromycin given worsening CXR  11/25  - completed abx  -BCx NGF  - Discontinue Abx      Nephro: #AGUSTO due to ATN secondary to likely Covid pneumonia   -CrCl 44, Cr 1.1 on admission, acutely worsened 11/21, possibly 2/2 covid injury and/or low BP, this am 6.41  -RIJ HD replaced 12/5 and HD started 11/21, 11/22, 11/24, 11/25,11/26,12/2, 12/5, 12/6, 12/8, 12/10  - continue phoslo, off bicarb drip  - f/u with nephro for further   - hemodialysis on 12/8, ABG and kidney function worsening  - s/p Long dialysis with UF removal 12/10  - s/p HD with 1u pRBC 12/12  - discussed with Dr Awad regarding bicarb-no indication as patient has respiratory acidosis    GI: #TF  - OG tube is not working - will change NG tube  - Holding TF with Nepro as patient is fluid overload  - monitor LFTs AST/ALT,  likely 2/2 COVID, trending down slowly  -Daily CMP  -Protonix IV for PPx  - Senna and Miralax for bowel regimen    Heme:   - Platelets 126k  - monitor for now    # Anemia  - Hb 6.9   - s/p 1u pRBC 12/12, total 4u pRBC so far  - follow post transfusion cbc    Endocrine: #BG controlled  -A1c 6, average glucose 126  -TSH wnl  -vit D moderately low to 22, c/w 1000U/day   -FS q6 for TF or NPO  -HSS    Skin/Catheters:   #LIJ placed 12/1  #RIJ HD catheter 12/05  #R radial artery 12/04  #eye drops    Prophylaxis:  -patient is on heparin Drip , Protonix for GI ppx    Prognosis:   Poor, DNR< Family informed on12/5 , being updated daily ASSESSMENT AND PLAN: 49M without PMH from home with wife admitted to ICU for of Acute Respiratory failure 2/2 COVID PNA. Patient is currently in multi organ failure and on Dialysis. Requiring pressor support    Acute respiratory failure secondary to Covid pneumonia  Septic Shock and Paroxysmal Atrial fibrillation  AGUSTO ,ATN on dialysis      Neuro:   - Off propofol as TG levels were elevated  - s/p Versed to help with Ventricular synchrony, no dc as patient sedated without  - switch fentanyl to dilaudid drip to minimize fluid being given to patient  - No improvement in condition or sedative requirement     CVS: #Septic Shock and Paroxysmal Atrial fibrillation  - patient is currently on Levophed  - s/p dig loading, c/w 0.125mg dig every other day (renal dosing) started on 11/26  - EKG with sinus tach->AF  - bedside Echo without global dysfunction  - Digoxin levels in range ,will continue current regimen   - A-line monitoring  - unable to give levo in 250ml as infusion pump cannot infuse that concentration    -#troponemia  -9->8.2->8.3 11/21 trended down   -cardio Dr. Tsang saw patient before, Currently he does not need Aspirin and Plavix,   - Will Consult PRN in case patient's condition changes    Pulmonary:   #Acute respiratory failure 2/2 COVID PNA  -ETT placed 11/19, replaced on 12/7 as patient was having a leak in the balloon, replaced w/o complication  - 32/450/100/12, ABG 7.16/76/138/26  - ESR, procal, ferritin decreasing, d-dimer, LDH, CRP decreasing, will trend every 3rd day  - Dexamethasone IV 6mg Qd (10 day course through 11/24) , Finished on 12/4  - CXR 12/11 shows persistent  interstitial lung markings with bilateral airspace opacities   - IgA positive, IgG negative, indicating new infection, No Remdesivir given due to bad kidney function      ID:   -history and management as above  -changed LIJ to femoral line, removed LIJ 11/25  -WBC 18->11->13.2->15.6->17.8>18>7.4  -RVP +COVID, procal increased to 3.13 as noted above, started cefepime 11/21, added vanc 11/24, added azithromycin given worsening CXR  11/25  - completed abx  -BCx NGF  - Discontinue Abx      Nephro: #AGUSTO due to ATN secondary to likely Covid pneumonia   -CrCl 44, Cr 1.1 on admission, acutely worsened 11/21, possibly 2/2 covid injury and/or low BP, this am 5.86  -RIJ HD replaced 12/5 and HD started 11/21, 11/22, 11/24, 11/25,11/26,12/2, 12/5, 12/6, 12/8, 12/10  - continue phoslo, off bicarb drip  - f/u with nephro for further   - hemodialysis on 12/8, ABG and kidney function worsening  - s/p Long dialysis with UF removal 12/10  - s/p HD with 1u pRBC 12/12  - discussed with Dr Awad regarding bicarb-no indication as patient has respiratory acidosis  -HD today 12/13, shiley replaced by vascular    GI: #TF  - OG tube is not working - will change NG tube  - Holding TF with Nepro as patient is fluid overload  - monitor LFTs AST/ALT,  likely 2/2 COVID, trending down slowly  -Daily CMP  -Protonix IV for PPx  - Senna and Miralax for bowel regimen    Heme:   - Platelets 104k  - monitor for now    # Anemia  - Hb 68  - s/p 1u pRBC 12/12, total 4u pRBC so far    Endocrine: #BG controlled  -A1c 6, average glucose 126  -TSH wnl  -vit D moderately low to 22, c/w 1000U/day   -FS q6 for TF or NPO  -HSS    Skin/Catheters:   #LIJ placed 12/1  #RIJ HD catheter 12/05  #R radial artery 12/04  #eye drops    Prophylaxis:  -patient is on heparin Drip , Protonix for GI ppx    Prognosis:   Poor, DNR< Family informed on12/5 , being updated daily

## 2020-12-13 NOTE — PROCEDURE NOTE - NSPOSTCAREGUIDE_GEN_A_CORE
Care for catheter as per unit/ICU protocols
Care for catheter as per unit/ICU protocols
Care for catheter as per unit/ICU protocols/Keep the cast/splint/dressing clean and dry
Care for catheter as per unit/ICU protocols
Verbal/written post procedure instructions were given to patient/caregiver/Care for catheter as per unit/ICU protocols
Care for catheter as per unit/ICU protocols

## 2020-12-13 NOTE — PROCEDURE NOTE - PROCEDURE DATE TIME, MLM
01-Dec-2020 17:15
19-Nov-2020 00:00
19-Nov-2020 16:12
25-Nov-2020 17:18
05-Dec-2020 15:16
13-Dec-2020 10:45
19-Nov-2020 16:10
04-Dec-2020 16:10
20-Nov-2020 16:43

## 2020-12-13 NOTE — PROCEDURE NOTE - NSPOSTPRCRAD_GEN_A_CORE
post-procedure radiography performed/central line located in the
central line located in the superior vena cava/post-procedure radiography performed/no pneumothorax
post-procedure radiography performed
central line located in the/central line located in the superior vena cava/post-procedure radiography performed
line adjusted to depth of insertion/central line located in the superior vena cava/post-procedure radiography performed

## 2020-12-13 NOTE — PROCEDURE NOTE - NSSITEPREP_SKIN_A_CORE
Colonoscopy specimens obtained. CO2 used.  
chlorhexidine

## 2020-12-13 NOTE — PROGRESS NOTE ADULT - PROBLEM SELECTOR PLAN 2
Patient with Calcium 9.1 with albumin 2.5, Phos  4.5, Mag 2.5  Monitor BMP, calcium, mag, phos level  HD treatment for optimal clearances

## 2020-12-13 NOTE — PROGRESS NOTE ADULT - PROBLEM SELECTOR PLAN 1
Anuric AGUSTO from ATN due to septic shock/ARDS requiring RRT/HD  - S/p HD treatment on 12/12 with net 3.6 L fluid removal  - Avoid Nephrotoxic agents  - Monitor BMP and electrolytes  - Maintain MAP>65-70 mm hg  - Adjust antibiotics as per renal dose clearances  - Volume status and electrolytes noted.  - Defer HD today  - Next anticipated IHD on 12/14

## 2020-12-13 NOTE — PROGRESS NOTE ADULT - SUBJECTIVE AND OBJECTIVE BOX
Chandan Awad MD (Nephrology progress note)  [address], [facility],  [id number],  [id number]  TEl: [phone]  Cell: [phone]    Patient is a 50y Male seen and evaluated at bedside. Vital signs, laboratory data, imaging studies, consult notes reviewed done within past 24 hours. Overnight events noted. Patient remains critically ill on ventilatory support. Interval HD yesterday with 3.6 L fluid removal. Patient remains with respiratory acidosis and CO2 retention on ventilatory support.     Allergies    No Known Allergies    Intolerances        ROS:  Sedated and intubated on ventilatory support    VITALS:    T(C): 36.7 (12-13-20 @ 08:00), Max: 37.9 (12-13-20 @ 04:36)  HR: 98 (12-13-20 @ 10:00) (89 - 102)  BP: --  RR: 14 (12-13-20 @ 10:00) (0 - 32)  SpO2: 97% (12-13-20 @ 10:00) (86% - 98%)  CAPILLARY BLOOD GLUCOSE      POCT Blood Glucose.: 102 mg/dL (13 Dec 2020 05:34)  POCT Blood Glucose.: 115 mg/dL (12 Dec 2020 23:32)  POCT Blood Glucose.: 108 mg/dL (12 Dec 2020 17:47)      12-12-20 @ 07:01  -  12-13-20 @ 07:00  --------------------------------------------------------  IN: 2772.9 mL / OUT: 4700 mL / NET: -1927.1 mL      MEDICATIONS  (STANDING):  bisacodyl Suppository 10 milliGRAM(s) Rectal daily  chlorhexidine 0.12% Liquid 15 milliLiter(s) Oral Mucosa every 12 hours  chlorhexidine 2% Cloths 1 Application(s) Topical <User Schedule>  glucagon  Injectable 1 milliGRAM(s) IntraMuscular once  heparin  Infusion.  Unit(s)/Hr (22 mL/Hr) IV Continuous <Continuous>  HYDROmorphone Infusion 1.5 mG/Hr (1.5 mL/Hr) IV Continuous <Continuous>  insulin lispro (ADMELOG) corrective regimen sliding scale   SubCutaneous every 6 hours  lubricant eye ont 1 Application(s) 1 Application(s) Both EYES two times a day  norepinephrine Infusion 0.5 MICROgram(s)/kG/Min (58.6 mL/Hr) IV Continuous <Continuous>  pantoprazole  Injectable 40 milliGRAM(s) IV Push daily  sodium bicarbonate 1300 milliGRAM(s) Oral two times a day    MEDICATIONS  (PRN):  ALBUTerol    90 MICROgram(s) HFA Inhaler 2 Puff(s) Inhalation every 6 hours PRN Shortness of Breath  sodium chloride 0.9% lock flush 10 milliLiter(s) IV Push every 1 hour PRN Pre/post blood products, medications, blood draw, and to maintain line patency      PHYSICAL EXAM:  GENERAL: Sedated and intubated on ventilatory support  HEENT: LEONOR, EOMI, neck supple, no JVP, no carotid bruit, oral mucosa moist and pink.  CHEST/LUNG: Bilateral decreased breath sounds, bibasilar rales and crepitation  HEART: Regular rate and rhythm, ERNESTO II/VI at LPSB, no gallops, no rub   ABDOMEN: Soft, nontender, non distended, bowel sounds present, no palpable organomegaly  : No flank or supra pubic tenderness.  EXTREMITIES: Generalized anasarca  Neurology: Sedated and intubated on ventilatory support  Skin: no skin lesion   Musculoskeletal: No joint deformity or spinal tenderness.      Vascular ACCESS:     LABS:                        8.0    7.36  )-----------( 104      ( 13 Dec 2020 05:55 )             26.1     12-13    137  |  100  |  41<H>  ----------------------------<  97  3.7   |  25  |  5.86<H>    Ca    9.1      13 Dec 2020 05:55  Phos  4.5     12-13  Mg     2.1     12-13    TPro  5.9<L>  /  Alb  2.3<L>  /  TBili  0.7  /  DBili  x   /  AST  50<H>  /  ALT  33  /  AlkPhos  124<H>  12-13      PT/INR - ( 12 Dec 2020 06:27 )   PT: 13.3 sec;   INR: 1.12 ratio         PTT - ( 13 Dec 2020 05:55 )  PTT:52.5 sec          RADIOLOGY & ADDITIONAL STUDIES:  < from: Xray Chest 1 View- PORTABLE-Routine (Xray Chest 1 View- PORTABLE-Routine in AM.) (12.12.20 @ 10:31) >    EXAM:  XR CHEST PORTABLE ROUTINE 1V                            PROCEDURE DATE:  12/12/2020          INTERPRETATION:  Portable chest radiograph    CLINICAL INFORMATION: Intubation. Follow-up.    TECHNIQUE:  Portable  AP view of the chest was obtained.    COMPARISON: 12/11/2020 chest radiograph available for review.    FINDINGS:  The lungs show persistent bilateral diffuse airspace disease.. No pneumothorax.  ET tube tip above tracheal bifurcation.  NG tube tip beyond GE junction.  RIGHT/ IJ catheter tip in SVC.  LEFT IJ catheter tip in brachycephalic vein.   The heart and mediastinum are within normal limits.    Visualized osseous structures are intact.        IMPRESSION:   No interval change..              ROLANDO GONZALEZ MD; Attending Radiologist  This document has been electronically signed. Dec 12 2020  1:10PM    < end of copied text >    Imaging Personally Reviewed:  [x] YES  [ ] NO    Consultant(s) Notes Reviewed:  [x] YES  [ ] NO    Care Discussed with Primary team/ Other Providers [x] YES  [ ] NO

## 2020-12-14 NOTE — PROGRESS NOTE ADULT - ASSESSMENT
ASSESSMENT AND PLAN: 49M without PMH from home with wife admitted to ICU for of Acute Respiratory failure 2/2 COVID PNA. Patient is currently in multi organ failure and on Dialysis. Requiring pressor support    Acute respiratory failure secondary to Covid pneumonia  Septic Shock and Paroxysmal Atrial fibrillation  AGUSTO ,ATN on dialysis      Neuro:   -  on fentanyl to dilaudid drip to minimize fluid being given to patient  - No improvement in condition or sedative requirement     CVS: #Septic Shock and Paroxysmal Atrial fibrillation  - patient is currently on Levophed  - s/p dig loading, c/w 0.125mg dig every other day (renal dosing) started on 11/26  - EKG with sinus tach->AF  - bedside Echo without global dysfunction  - Digoxin levels in range ,will continue current regimen   - A-line monitoring  - unable to give levo in 250ml as infusion pump cannot infuse that concentration    -#troponemia  -9->8.2->8.3 11/21 trended down   -cardio Dr. Tsang saw patient before, Currently he does not need Aspirin and Plavix,   - Will Consult PRN in case patient's condition changes    Pulmonary:   #Acute respiratory failure 2/2 COVID PNA  -ETT placed 11/19, replaced on 12/7 as patient was having a leak in the balloon, replaced w/o complication  - 32/450/100/12, ABG 7.16/76/138/26  - ESR, procal, ferritin decreasing, d-dimer, LDH, CRP decreasing, will trend every 3rd day  - Dexamethasone IV 6mg Qd (10 day course through 11/24) , Finished on 12/4  - CXR 12/11 shows persistent  interstitial lung markings with bilateral airspace opacities   - IgA positive, IgG negative, indicating new infection, No Remdesivir given due to bad kidney function      ID:   -history and management as above  -changed LIJ to femoral line, removed LIJ 11/25  -WBC 18->11->13.2->15.6->17.8>18>7.4  -RVP +COVID, procal increased to 3.13 as noted above, started cefepime 11/21, added vanc 11/24, added azithromycin given worsening CXR  11/25  - completed abx  -BCx NGF  - Discontinue Abx      Nephro: #AGUSTO due to ATN secondary to likely Covid pneumonia   -CrCl 44, Cr 1.1 on admission, acutely worsened 11/21, possibly 2/2 covid injury and/or low BP, this am 5.86  -RIJ HD replaced 12/5 and HD started 11/21, 11/22, 11/24, 11/25,11/26,12/2, 12/5, 12/6, 12/8, 12/10  - continue phoslo, off bicarb drip  - f/u with nephro for further   - hemodialysis on 12/8, ABG and kidney function worsening  - s/p Long dialysis with UF removal 12/10  - s/p HD with 1u pRBC 12/12  - discussed with Dr Awad regarding bicarb-no indication as patient has respiratory acidosis  -HD today 12/13, shiley replaced by vascular    GI: #TF  - OG tube is not working - will change NG tube  - Holding TF with Nepro as patient is fluid overload  - monitor LFTs AST/ALT,  likely 2/2 COVID, trending down slowly  -Daily CMP  -Protonix IV for PPx  - Senna and Miralax for bowel regimen    Heme:   - Platelets 104k  - monitor for now    # Anemia  - Hb 68  - s/p 1u pRBC 12/12, total 4u pRBC so far    Endocrine: #BG controlled  -A1c 6, average glucose 126  -TSH wnl  -vit D moderately low to 22, c/w 1000U/day   -FS q6 for TF or NPO  -HSS    Skin/Catheters:   #LIJ placed 12/1  #RIJ HD catheter 12/05  #R radial artery 12/04  #eye drops    Prophylaxis:  -patient is on heparin Drip , Protonix for GI ppx    Prognosis:   Poor, DNR< Family informed on12/5 , being updated daily ASSESSMENT AND PLAN: 49M without PMH from home with wife admitted to ICU for of Acute Respiratory failure 2/2 COVID PNA. Patient is currently in multi organ failure and on Dialysis. Requiring pressor support    Acute respiratory failure secondary to Covid pneumonia  Septic Shock and Paroxysmal Atrial fibrillation  AGUSTO ,ATN on dialysis      Neuro:   -  on fentanyl to dilaudid drip to minimize fluid being given to patient  - No improvement in condition or sedative requirement     CVS: #Septic Shock and Paroxysmal Atrial fibrillation  - patient is currently on Levophed  - s/p dig loading, c/w 0.125mg dig every other day (renal dosing) started on 11/26- now w/o dig   - EKG with sinus tach->AF  - bedside Echo without global dysfunction    -#troponemia  -9->8.2->8.3 11/21 trended down   -cardio Dr. Tsang saw patient before, Currently he does not need Aspirin and Plavix,   - Will Consult PRN in case patient's condition changes    Pulmonary:   #Acute respiratory failure 2/2 COVID PNA  -ETT placed 11/19, replaced on 12/7 as patient was having a leak in the balloon  - ESR, procal, ferritin decreasing, d-dimer, LDH, CRP decreasing, will trend every 3rd day  - Dexamethasone IV 6mg Qd (10 day course through 11/24) , Finished on 12/4  - CXR 12/11 shows persistent  interstitial lung markings with bilateral airspace opacities   - IgA positive, IgG negative, indicating new infection, No Remdesivir given ARF      ID:   -history and management as above  -changed LIJ to femoral line, removed LIJ 11/25  -WBC 18->11->13.2->15.6->17.8>18>7.4  -RVP +COVID, procal increased to 3.13 as noted above, started cefepime 11/21, added vanc 11/24, added azithromycin given worsening CXR  11/25  - completed abx  -BCx NGF      Nephro: #AGUSTO due to ATN secondary to likely Covid pneumonia   -CrCl 44, Cr 1.1 on admission, acutely worsened 11/21, possibly 2/2 covid injury and/or low BP  -Cr 4.39  -RIJ HD replaced 12/13 and HD started 11/21, last HD 12/14  -continue phoslo, on bicarb tabs now 1300 bid, bicarb wnl 1  -s/p HD with 1u pRBC 12/12  -Total 4u prbc    GI: #TF  - monitor LFTs AST/ALT,  likely 2/2 COVID, trending down slowly  -Daily CMP  -Protonix IV for PPx  - Senna and Miralax for bowel regimen    Heme:   - Platelets 105k  - monitor for now    # Anemia  - Hb 68  - s/p 1u pRBC 12/12, total 4u pRBC so far    Endocrine: #BG controlled  -A1c 6, average glucose 126  -TSH wnl  -vit D moderately low to 22, c/w 1000U/day   -HSS    Skin/Catheters:   #LIJ placed 12/5  #RIJ HD catheter 12/13  #R radial artery 12/04  #eye drops    Prophylaxis:  -patient is on heparin Drip , Protonix for GI ppx    Prognosis:   Poor, DNR< Family informed on 12/5 , being updated daily

## 2020-12-14 NOTE — PROGRESS NOTE ADULT - PROBLEM SELECTOR PLAN 2
Patient with Calcium 8.8nwith albumin 2.5, Phos  4.5, Mag 2.5  Monitor BMP, calcium, mag, phos level  HD treatment for optimal clearances

## 2020-12-14 NOTE — PROGRESS NOTE ADULT - PROBLEM SELECTOR PLAN 1
Anuric AGUSTO from ATN due to septic shock/ARDS requiring RRT/HD  - S/p HD treatment on 12/13 with net 3.9 L fluid removal  - Avoid Nephrotoxic agents  - Monitor BMP and electrolytes  - Maintain MAP>65-70 mm hg  - Adjust antibiotics as per renal dose clearances  - Volume status and electrolytes noted.  - Will schedule for additional HD for UF only treatment today

## 2020-12-14 NOTE — PROGRESS NOTE ADULT - SUBJECTIVE AND OBJECTIVE BOX
INTERVAL HPI/OVERNIGHT EVENTS: ***    PRESSORS: [ ] YES [ ] NO  WHICH:    ANTIBIOTICS:                  DATE STARTED:  ANTIBIOTICS:                  DATE STARTED:  ANTIBIOTICS:                  DATE STARTED:    Antimicrobial:    Cardiovascular:  norepinephrine Infusion 0.5 MICROgram(s)/kG/Min IV Continuous <Continuous>    Pulmonary:  ALBUTerol    90 MICROgram(s) HFA Inhaler 2 Puff(s) Inhalation every 6 hours PRN    Hematalogic:  heparin  Infusion.  Unit(s)/Hr IV Continuous <Continuous>    Other:  acetaminophen    Suspension .. 650 milliGRAM(s) Oral every 6 hours PRN  bisacodyl Suppository 10 milliGRAM(s) Rectal daily  chlorhexidine 0.12% Liquid 15 milliLiter(s) Oral Mucosa every 12 hours  chlorhexidine 2% Cloths 1 Application(s) Topical <User Schedule>  glucagon  Injectable 1 milliGRAM(s) IntraMuscular once  HYDROmorphone Infusion 2 mG/Hr IV Continuous <Continuous>  insulin lispro (ADMELOG) corrective regimen sliding scale   SubCutaneous every 6 hours  lubricant eye ont 1 Application(s) 1 Application(s) Both EYES two times a day  magnesium sulfate  IVPB 1 Gram(s) IV Intermittent once  pantoprazole  Injectable 40 milliGRAM(s) IV Push daily  sodium bicarbonate 1300 milliGRAM(s) Oral two times a day  sodium chloride 0.9% lock flush 10 milliLiter(s) IV Push every 1 hour PRN    acetaminophen    Suspension .. 650 milliGRAM(s) Oral every 6 hours PRN  ALBUTerol    90 MICROgram(s) HFA Inhaler 2 Puff(s) Inhalation every 6 hours PRN  bisacodyl Suppository 10 milliGRAM(s) Rectal daily  chlorhexidine 0.12% Liquid 15 milliLiter(s) Oral Mucosa every 12 hours  chlorhexidine 2% Cloths 1 Application(s) Topical <User Schedule>  glucagon  Injectable 1 milliGRAM(s) IntraMuscular once  heparin  Infusion.  Unit(s)/Hr IV Continuous <Continuous>  HYDROmorphone Infusion 2 mG/Hr IV Continuous <Continuous>  insulin lispro (ADMELOG) corrective regimen sliding scale   SubCutaneous every 6 hours  lubricant eye ont 1 Application(s) 1 Application(s) Both EYES two times a day  magnesium sulfate  IVPB 1 Gram(s) IV Intermittent once  norepinephrine Infusion 0.5 MICROgram(s)/kG/Min IV Continuous <Continuous>  pantoprazole  Injectable 40 milliGRAM(s) IV Push daily  sodium bicarbonate 1300 milliGRAM(s) Oral two times a day  sodium chloride 0.9% lock flush 10 milliLiter(s) IV Push every 1 hour PRN    Drug Dosing Weight  Height (cm): 154.4 (15 Nov 2020 09:25)  Weight (kg): 125 (15 Nov 2020 09:25)  BMI (kg/m2): 52.4 (15 Nov 2020 09:25)  BSA (m2): 2.16 (15 Nov 2020 09:25)    CENTRAL LINE: [ ] YES [ ] NO  LOCATION:   DATE INSERTED:    MICHELLE: [ ] YES [ ] NO    DATE INSERTED:    A-LINE:  [ ] YES [ ] NO  LOCATION:   DATE INSERTED:    ICU Vital Signs Last 24 Hrs  T(C): 37.1 (14 Dec 2020 08:00), Max: 38.1 (13 Dec 2020 12:00)  T(F): 98.7 (14 Dec 2020 08:00), Max: 100.5 (13 Dec 2020 12:00)  HR: 97 (14 Dec 2020 10:00) (95 - 110)  BP: --  BP(mean): --  ABP: 97/41 (14 Dec 2020 10:00) (50/41 - 172/77)  ABP(mean): 58 (14 Dec 2020 10:00) (46 - 103)  RR: 20 (14 Dec 2020 10:00) (14 - 32)  SpO2: 96% (14 Dec 2020 10:00) (86% - 99%)      ABG - ( 14 Dec 2020 04:06 )  pH, Arterial: 7.25  pH, Blood: x     /  pCO2: 69    /  pO2: 83    / HCO3: 29    / Base Excess: 1.6   /  SaO2: 96                    12-13 @ 07:01  -  12-14 @ 07:00  --------------------------------------------------------  IN: 1735.4 mL / OUT: 4600 mL / NET: -2864.6 mL        Mode: AC/ CMV (Assist Control/ Continuous Mandatory Ventilation)  RR (machine): 32  TV (machine): 450  FiO2: 100  PEEP: 12  ITime: 0.85  MAP: 23  PIP: 36      REVIEW OF SYSTEMS:    unable to obtain     PHYSICAL EXAM:    GENERAL: sedated lying in bed comfortably  HEAD:  Atraumatic, Normocephalic  EYES: PERRLA, conjunctiva mildly icteric   ENT: Moist mucous membranes  NECK: Supple, No JVD  CHEST/LUNG: Clear to auscultation bilaterally; No rales, rhonchi, wheezing, or rubs.  HEART: Regular rate and rhythm; S1+ S2+  ABDOMEN: Bowel sounds present; Soft, Nontender, mildly distended.  EXTREMITIES:  2+ Peripheral Pulses, brisk capillary refill. No clubbing, cyanosis, or edema  NERVOUS SYSTEM: sedated, non responsive,   SKIN: No rashes or lesions  LABS:  CBC Full  -  ( 14 Dec 2020 05:31 )  WBC Count : 5.77 K/uL  RBC Count : 2.80 M/uL  Hemoglobin : 8.1 g/dL  Hematocrit : 26.6 %  Platelet Count - Automated : 105 K/uL  Mean Cell Volume : 95.0 fl  Mean Cell Hemoglobin : 28.9 pg  Mean Cell Hemoglobin Concentration : 30.5 gm/dL  Auto Neutrophil # : 4.62 K/uL  Auto Lymphocyte # : 0.61 K/uL  Auto Monocyte # : 0.32 K/uL  Auto Eosinophil # : 0.13 K/uL  Auto Basophil # : 0.00 K/uL  Auto Neutrophil % : 80.0 %  Auto Lymphocyte % : 10.6 %  Auto Monocyte % : 5.5 %  Auto Eosinophil % : 2.3 %  Auto Basophil % : 0.0 %    12-14    136  |  99  |  27<H>  ----------------------------<  111<H>  3.4<L>   |  26  |  4.39<H>    Ca    8.8      14 Dec 2020 05:31  Phos  3.0     12-14  Mg     1.7     12-14    TPro  5.7<L>  /  Alb  2.1<L>  /  TBili  0.7  /  DBili  x   /  AST  51<H>  /  ALT  30  /  AlkPhos  124<H>  12-14    PTT - ( 14 Dec 2020 09:26 )  PTT:59.2 sec        RADIOLOGY & ADDITIONAL STUDIES REVIEWED:  ***    [ ]GOALS OF CARE DISCUSSION WITH PATIENT/FAMILY/PROXY:    CRITICAL CARE TIME SPENT: 35 minutes INTERVAL HPI/OVERNIGHT EVENTS: Patient continues to be on pressors     PRESSORS: [ x] YES [ ] NO  WHICH: levophed       Antimicrobial:    Cardiovascular:  norepinephrine Infusion 0.5 MICROgram(s)/kG/Min IV Continuous <Continuous>    Pulmonary:  ALBUTerol    90 MICROgram(s) HFA Inhaler 2 Puff(s) Inhalation every 6 hours PRN    Hematalogic:  heparin  Infusion.  Unit(s)/Hr IV Continuous <Continuous>    Other:  acetaminophen    Suspension .. 650 milliGRAM(s) Oral every 6 hours PRN  bisacodyl Suppository 10 milliGRAM(s) Rectal daily  chlorhexidine 0.12% Liquid 15 milliLiter(s) Oral Mucosa every 12 hours  chlorhexidine 2% Cloths 1 Application(s) Topical <User Schedule>  glucagon  Injectable 1 milliGRAM(s) IntraMuscular once  HYDROmorphone Infusion 2 mG/Hr IV Continuous <Continuous>  insulin lispro (ADMELOG) corrective regimen sliding scale   SubCutaneous every 6 hours  lubricant eye ont 1 Application(s) 1 Application(s) Both EYES two times a day  magnesium sulfate  IVPB 1 Gram(s) IV Intermittent once  pantoprazole  Injectable 40 milliGRAM(s) IV Push daily  sodium bicarbonate 1300 milliGRAM(s) Oral two times a day  sodium chloride 0.9% lock flush 10 milliLiter(s) IV Push every 1 hour PRN    acetaminophen    Suspension .. 650 milliGRAM(s) Oral every 6 hours PRN  ALBUTerol    90 MICROgram(s) HFA Inhaler 2 Puff(s) Inhalation every 6 hours PRN  bisacodyl Suppository 10 milliGRAM(s) Rectal daily  chlorhexidine 0.12% Liquid 15 milliLiter(s) Oral Mucosa every 12 hours  chlorhexidine 2% Cloths 1 Application(s) Topical <User Schedule>  glucagon  Injectable 1 milliGRAM(s) IntraMuscular once  heparin  Infusion.  Unit(s)/Hr IV Continuous <Continuous>  HYDROmorphone Infusion 2 mG/Hr IV Continuous <Continuous>  insulin lispro (ADMELOG) corrective regimen sliding scale   SubCutaneous every 6 hours  lubricant eye ont 1 Application(s) 1 Application(s) Both EYES two times a day  magnesium sulfate  IVPB 1 Gram(s) IV Intermittent once  norepinephrine Infusion 0.5 MICROgram(s)/kG/Min IV Continuous <Continuous>  pantoprazole  Injectable 40 milliGRAM(s) IV Push daily  sodium bicarbonate 1300 milliGRAM(s) Oral two times a day  sodium chloride 0.9% lock flush 10 milliLiter(s) IV Push every 1 hour PRN    Drug Dosing Weight  Height (cm): 154.4 (15 Nov 2020 09:25)  Weight (kg): 125 (15 Nov 2020 09:25)  BMI (kg/m2): 52.4 (15 Nov 2020 09:25)  BSA (m2): 2.16 (15 Nov 2020 09:25)    CENTRAL LINE: [x ] YES [ ] NO  LOCATION:   DATE INSERTED: [facility] 12/13, [facility] 12/5    MICHELLE: [ x] YES [ ] NO    DATE INSERTED:    A-LINE:  x[x ] YES [ ] NO  LOCATION:   DATE INSERTED: RIGHT a line    ICU Vital Signs Last 24 Hrs  T(C): 37.1 (14 Dec 2020 08:00), Max: 38.1 (13 Dec 2020 12:00)  T(F): 98.7 (14 Dec 2020 08:00), Max: 100.5 (13 Dec 2020 12:00)  HR: 97 (14 Dec 2020 10:00) (95 - 110)  BP: --  BP(mean): --  ABP: 97/41 (14 Dec 2020 10:00) (50/41 - 172/77)  ABP(mean): 58 (14 Dec 2020 10:00) (46 - 103)  RR: 20 (14 Dec 2020 10:00) (14 - 32)  SpO2: 96% (14 Dec 2020 10:00) (86% - 99%)      ABG - ( 14 Dec 2020 04:06 )  pH, Arterial: 7.25  pH, Blood: x     /  pCO2: 69    /  pO2: 83    / HCO3: 29    / Base Excess: 1.6   /  SaO2: 96                    12-13 @ 07:01  -  12-14 @ 07:00  --------------------------------------------------------  IN: 1735.4 mL / OUT: 4600 mL / NET: -2864.6 mL        Mode: AC/ CMV (Assist Control/ Continuous Mandatory Ventilation)  RR (machine): 32  TV (machine): 450  FiO2: 100  PEEP: 12  ITime: 0.85  MAP: 23  PIP: 36      REVIEW OF SYSTEMS:    unable to obtain     PHYSICAL EXAM:    GENERAL: sedated lying in bed comfortably  HEAD:  Atraumatic, Normocephalic  EYES: PERRLA, 2m msluggish response  conjunctiva mildly icteric   ENT: Moist mucous membranes  NECK: Supple, No JVD  CHEST/LUNG: Clear to auscultation bilaterally; No rales, rhonchi, wheezing, or rubs.  HEART: Regular rate and rhythm; S1+ S2+  ABDOMEN: Bowel sounds present; Soft, Nontender, mildly distended.  EXTREMITIES:  2+ Peripheral Pulses, brisk capillary refill. No clubbing, cyanosis, or edema  NERVOUS SYSTEM: sedated, non responsive,   SKIN: No rashes or lesions      LABS:  CBC Full  -  ( 14 Dec 2020 05:31 )  WBC Count : 5.77 K/uL  RBC Count : 2.80 M/uL  Hemoglobin : 8.1 g/dL  Hematocrit : 26.6 %  Platelet Count - Automated : 105 K/uL  Mean Cell Volume : 95.0 fl  Mean Cell Hemoglobin : 28.9 pg  Mean Cell Hemoglobin Concentration : 30.5 gm/dL  Auto Neutrophil # : 4.62 K/uL  Auto Lymphocyte # : 0.61 K/uL  Auto Monocyte # : 0.32 K/uL  Auto Eosinophil # : 0.13 K/uL  Auto Basophil # : 0.00 K/uL  Auto Neutrophil % : 80.0 %  Auto Lymphocyte % : 10.6 %  Auto Monocyte % : 5.5 %  Auto Eosinophil % : 2.3 %  Auto Basophil % : 0.0 %    12-14    136  |  99  |  27<H>  ----------------------------<  111<H>  3.4<L>   |  26  |  4.39<H>    Ca    8.8      14 Dec 2020 05:31  Phos  3.0     12-14  Mg     1.7     12-14    TPro  5.7<L>  /  Alb  2.1<L>  /  TBili  0.7  /  DBili  x   /  AST  51<H>  /  ALT  30  /  AlkPhos  124<H>  12-14    PTT - ( 14 Dec 2020 09:26 )  PTT:59.2 sec        RADIOLOGY & ADDITIONAL STUDIES REVIEWED:  ***    [ ]GOALS OF CARE DISCUSSION WITH PATIENT/FAMILY/PROXY: DNR    CRITICAL CARE TIME SPENT: 35 minutes INTERVAL HPI/OVERNIGHT EVENTS: Patient continues to be on pressors     PRESSORS: [ x] YES [ ] NO  WHICH: levophed       Antimicrobial:    Cardiovascular:  norepinephrine Infusion 0.5 MICROgram(s)/kG/Min IV Continuous <Continuous>    Pulmonary:  ALBUTerol    90 MICROgram(s) HFA Inhaler 2 Puff(s) Inhalation every 6 hours PRN    Hematalogic:  heparin  Infusion.  Unit(s)/Hr IV Continuous <Continuous>    Other:  acetaminophen    Suspension .. 650 milliGRAM(s) Oral every 6 hours PRN  bisacodyl Suppository 10 milliGRAM(s) Rectal daily  chlorhexidine 0.12% Liquid 15 milliLiter(s) Oral Mucosa every 12 hours  chlorhexidine 2% Cloths 1 Application(s) Topical <User Schedule>  glucagon  Injectable 1 milliGRAM(s) IntraMuscular once  HYDROmorphone Infusion 2 mG/Hr IV Continuous <Continuous>  insulin lispro (ADMELOG) corrective regimen sliding scale   SubCutaneous every 6 hours  lubricant eye ont 1 Application(s) 1 Application(s) Both EYES two times a day  magnesium sulfate  IVPB 1 Gram(s) IV Intermittent once  pantoprazole  Injectable 40 milliGRAM(s) IV Push daily  sodium bicarbonate 1300 milliGRAM(s) Oral two times a day  sodium chloride 0.9% lock flush 10 milliLiter(s) IV Push every 1 hour PRN    acetaminophen    Suspension .. 650 milliGRAM(s) Oral every 6 hours PRN  ALBUTerol    90 MICROgram(s) HFA Inhaler 2 Puff(s) Inhalation every 6 hours PRN  bisacodyl Suppository 10 milliGRAM(s) Rectal daily  chlorhexidine 0.12% Liquid 15 milliLiter(s) Oral Mucosa every 12 hours  chlorhexidine 2% Cloths 1 Application(s) Topical <User Schedule>  glucagon  Injectable 1 milliGRAM(s) IntraMuscular once  heparin  Infusion.  Unit(s)/Hr IV Continuous <Continuous>  HYDROmorphone Infusion 2 mG/Hr IV Continuous <Continuous>  insulin lispro (ADMELOG) corrective regimen sliding scale   SubCutaneous every 6 hours  lubricant eye ont 1 Application(s) 1 Application(s) Both EYES two times a day  magnesium sulfate  IVPB 1 Gram(s) IV Intermittent once  norepinephrine Infusion 0.5 MICROgram(s)/kG/Min IV Continuous <Continuous>  pantoprazole  Injectable 40 milliGRAM(s) IV Push daily  sodium bicarbonate 1300 milliGRAM(s) Oral two times a day  sodium chloride 0.9% lock flush 10 milliLiter(s) IV Push every 1 hour PRN    Drug Dosing Weight  Height (cm): 154.4 (15 Nov 2020 09:25)  Weight (kg): 125 (15 Nov 2020 09:25)  BMI (kg/m2): 52.4 (15 Nov 2020 09:25)  BSA (m2): 2.16 (15 Nov 2020 09:25)    CENTRAL LINE: [x ] YES [ ] NO  LOCATION:   DATE INSERTED: [facility] 12/13, [facility] 12/5    MICHELLE: [ x] YES [ ] NO    DATE INSERTED:    A-LINE:  [x ] YES [ ] NO  LOCATION:   DATE INSERTED: RIGHT a line    ICU Vital Signs Last 24 Hrs  T(C): 37.1 (14 Dec 2020 08:00), Max: 38.1 (13 Dec 2020 12:00)  T(F): 98.7 (14 Dec 2020 08:00), Max: 100.5 (13 Dec 2020 12:00)  HR: 97 (14 Dec 2020 10:00) (95 - 110)  BP: --  BP(mean): --  ABP: 97/41 (14 Dec 2020 10:00) (50/41 - 172/77)  ABP(mean): 58 (14 Dec 2020 10:00) (46 - 103)  RR: 20 (14 Dec 2020 10:00) (14 - 32)  SpO2: 96% (14 Dec 2020 10:00) (86% - 99%)      ABG - ( 14 Dec 2020 04:06 )  pH, Arterial: 7.25  pH, Blood: x     /  pCO2: 69    /  pO2: 83    / HCO3: 29    / Base Excess: 1.6   /  SaO2: 96                    12-13 @ 07:01  -  12-14 @ 07:00  --------------------------------------------------------  IN: 1735.4 mL / OUT: 4600 mL / NET: -2864.6 mL        Mode: AC/ CMV (Assist Control/ Continuous Mandatory Ventilation)  RR (machine): 32  TV (machine): 450  FiO2: 100  PEEP: 12  ITime: 0.85  MAP: 23  PIP: 36      REVIEW OF SYSTEMS:    unable to obtain     PHYSICAL EXAM:    GENERAL: sedated lying in bed comfortably  HEAD:  Atraumatic, Normocephalic  EYES: PERRLA, 2m msluggish response  conjunctiva mildly icteric   ENT: Moist mucous membranes  NECK: Supple, No JVD  CHEST/LUNG: Clear to auscultation bilaterally; No rales, rhonchi, wheezing, or rubs.  HEART: Regular rate and rhythm; S1+ S2+  ABDOMEN: Bowel sounds present; Soft, Nontender, mildly distended.  EXTREMITIES:  2+ Peripheral Pulses, brisk capillary refill. No clubbing, cyanosis, or edema  NERVOUS SYSTEM: sedated, non responsive,   SKIN: No rashes or lesions      LABS:  CBC Full  -  ( 14 Dec 2020 05:31 )  WBC Count : 5.77 K/uL  RBC Count : 2.80 M/uL  Hemoglobin : 8.1 g/dL  Hematocrit : 26.6 %  Platelet Count - Automated : 105 K/uL  Mean Cell Volume : 95.0 fl  Mean Cell Hemoglobin : 28.9 pg  Mean Cell Hemoglobin Concentration : 30.5 gm/dL  Auto Neutrophil # : 4.62 K/uL  Auto Lymphocyte # : 0.61 K/uL  Auto Monocyte # : 0.32 K/uL  Auto Eosinophil # : 0.13 K/uL  Auto Basophil # : 0.00 K/uL  Auto Neutrophil % : 80.0 %  Auto Lymphocyte % : 10.6 %  Auto Monocyte % : 5.5 %  Auto Eosinophil % : 2.3 %  Auto Basophil % : 0.0 %    12-14    136  |  99  |  27<H>  ----------------------------<  111<H>  3.4<L>   |  26  |  4.39<H>    Ca    8.8      14 Dec 2020 05:31  Phos  3.0     12-14  Mg     1.7     12-14    TPro  5.7<L>  /  Alb  2.1<L>  /  TBili  0.7  /  DBili  x   /  AST  51<H>  /  ALT  30  /  AlkPhos  124<H>  12-14    PTT - ( 14 Dec 2020 09:26 )  PTT:59.2 sec        RADIOLOGY & ADDITIONAL STUDIES REVIEWED:  ***    [ ]GOALS OF CARE DISCUSSION WITH PATIENT/FAMILY/PROXY: DNR    CRITICAL CARE TIME SPENT: 35 minutes

## 2020-12-14 NOTE — PROGRESS NOTE ADULT - PROBLEM SELECTOR PLAN 5
Anemia of chronic illness with stable hgb 8.0  Monitor serial CBC  Transfuse PRBC per primary team  Monitor hgb/Hct

## 2020-12-14 NOTE — PROGRESS NOTE ADULT - SUBJECTIVE AND OBJECTIVE BOX
Chandan Awad MD (Nephrology progress note)  [address], [facility],  [id number],  [id number]  TEl: [phone]  Cell: [phone]    Patient is a 50y Male seen and evaluated at bedside. Vital signs, laboratory data, imaging studies, consult notes reviewed done within past 24 hours. Overnight events noted. Patient remains critically ill on ventilatory support and IV pressors. Remains anuric requiring RRT    Allergies    No Known Allergies    Intolerances        ROS:  Sedated and intubated on ventilatory support    VITALS:    T(C): 37.1 (12-14-20 @ 08:00), Max: 38.1 (12-13-20 @ 12:00)  HR: 97 (12-14-20 @ 10:00) (95 - 110)  BP: --  RR: 20 (12-14-20 @ 10:00) (14 - 32)  SpO2: 96% (12-14-20 @ 10:00) (86% - 99%)  CAPILLARY BLOOD GLUCOSE      POCT Blood Glucose.: 120 mg/dL (14 Dec 2020 05:24)  POCT Blood Glucose.: 135 mg/dL (13 Dec 2020 23:47)  POCT Blood Glucose.: 150 mg/dL (13 Dec 2020 16:34)  POCT Blood Glucose.: 154 mg/dL (13 Dec 2020 12:39)      12-13-20 @ 07:01  -  12-14-20 @ 07:00  --------------------------------------------------------  IN: 1735.4 mL / OUT: 4600 mL / NET: -2864.6 mL      MEDICATIONS  (STANDING):  bisacodyl Suppository 10 milliGRAM(s) Rectal daily  chlorhexidine 0.12% Liquid 15 milliLiter(s) Oral Mucosa every 12 hours  chlorhexidine 2% Cloths 1 Application(s) Topical <User Schedule>  glucagon  Injectable 1 milliGRAM(s) IntraMuscular once  heparin  Infusion.  Unit(s)/Hr (22 mL/Hr) IV Continuous <Continuous>  HYDROmorphone Infusion 2 mG/Hr (2 mL/Hr) IV Continuous <Continuous>  insulin lispro (ADMELOG) corrective regimen sliding scale   SubCutaneous every 6 hours  lubricant eye ont 1 Application(s) 1 Application(s) Both EYES two times a day  magnesium sulfate  IVPB 1 Gram(s) IV Intermittent once  norepinephrine Infusion 0.5 MICROgram(s)/kG/Min (58.6 mL/Hr) IV Continuous <Continuous>  pantoprazole  Injectable 40 milliGRAM(s) IV Push daily  sodium bicarbonate 1300 milliGRAM(s) Oral two times a day    MEDICATIONS  (PRN):  acetaminophen    Suspension .. 650 milliGRAM(s) Oral every 6 hours PRN Temp greater or equal to 38C (100.4F)  ALBUTerol    90 MICROgram(s) HFA Inhaler 2 Puff(s) Inhalation every 6 hours PRN Shortness of Breath  sodium chloride 0.9% lock flush 10 milliLiter(s) IV Push every 1 hour PRN Pre/post blood products, medications, blood draw, and to maintain line patency      PHYSICAL EXAM:  GENERAL: Sedated and intubated on ventilatory support  HEENT: LEONOR, EOMI, neck supple, no JVP, no carotid bruit, oral mucosa moist and pink.  CHEST/LUNG: Bilateral decreased breath sounds, Bibasilar rales and crepitations, no wheezing  HEART: irregular rate and rhythm, ERNESTO II/VI at LPSB, no gallops, no rub   ABDOMEN: Soft, nontender, non distended, bowel sounds present, no palpable organomegaly  : No flank or supra pubic tenderness.  EXTREMITIES: Peripheral pulses are palpable, no pedal edema  Neurology: Sedated and intubated on ventilatory support  SKIN: No rash or skin lesion  Musculoskeletal: No joint deformity      Vascular ACCESS:     LABS:                        8.1    5.77  )-----------( 105      ( 14 Dec 2020 05:31 )             26.6     12-14    136  |  99  |  27<H>  ----------------------------<  111<H>  3.4<L>   |  26  |  4.39<H>    Ca    8.8      14 Dec 2020 05:31  Phos  3.0     12-14  Mg     1.7     12-14    TPro  5.7<L>  /  Alb  2.1<L>  /  TBili  0.7  /  DBili  x   /  AST  51<H>  /  ALT  30  /  AlkPhos  124<H>  12-14      PTT - ( 14 Dec 2020 09:26 )  PTT:59.2 sec          RADIOLOGY & ADDITIONAL STUDIES:  r< from: Xray Chest 1 View- PORTABLE-Urgent (Xray Chest 1 View- PORTABLE-Urgent .) (12.13.20 @ 11:51) >    EXAM:  XR CHEST PORTABLE URGENT 1V                            PROCEDURE DATE:  12/13/2020          INTERPRETATION:  AP chest on December 13, 2020 at 11:30 AM. Patient had right jugular line placement.    Heart suggest enlargement.    Diffuse advanced bilateral infiltrates again noted.    Endotracheal tube, nasogastric tube, and left jugular line overlying aortic arch again noted.    The above findings are similar to December 12.    Present film shows a right jugular line inserted with tip in superior vena caval area.    IMPRESSION: Right jugular line inserted. Persistent advanced infiltrates.            ROLANDO HARPER M.D., ATTENDING RADIOLOGIST  This document has been electronically signed. Dec 13 2020 12:46PM    < end of copied text >    Imaging Personally Reviewed:  [x] YES  [ ] NO    Consultant(s) Notes Reviewed:  [x] YES  [ ] NO    Care Discussed with Primary team/ Other Providers [x] YES  [ ] NO

## 2020-12-14 NOTE — CHART NOTE - NSCHARTNOTEFT_GEN_A_CORE
Assessment:   Patient is a 50y old  Male who presents with a chief complaint of Shortness of breath D/t Covid (14 Dec 2020 11:29). Covid+. Intubated/ TF (not on Propofol). Severe sepsis/ ARDS, anuric, AGUSTO/ ATN on dialysis, poor prognosis noted.      Factors impacting intake: [ ] none [ ] nausea  [ ] vomiting [ ] diarrhea [ ] constipation  [ ]chewing problems [ ] swallowing issues  [x ] other: Covid/ intubated/ TF (see above)    Diet Prescription: Diet, NPO with Tube Feed:   Tube Feeding Modality: Nasogastric  Nepro with Carb Steady  Total Volume for 24 Hours (mL): 960  Continuous  Starting Tube Feed Rate {mL per Hour}: 10  Increase Tube Feed Rate by (mL): 5     Every 8 hours  Until Goal Tube Feed Rate (mL per Hour): 40  Tube Feed Duration (in Hours): 24  Tube Feed Start Time: 17:00 (20 @ 15:50)        Daily     Daily Weight in k.5 (13 Dec 2020 22:35)  Weight in k.5 (13 Dec 2020 18:35)  Weight in k.3 (13 Dec 2020 08:00)  Weight in k.9 (12 Dec 2020 16:45)  Weight in k.6 (11 Dec 2020 08:00)  Weight in k (10 Dec 2020 07:15)  Weight in k (09 Dec 2020 07:00)  Weight in k (08 Dec 2020 16:10)  Weight in k.5 (08 Dec 2020 13:05)  Weight in k (08 Dec 2020 07:00)  Weight in k.6 (07 Dec 2020 07:45)  Weight in k.3 (06 Dec 2020 17:20)  Weight in k (06 Dec 2020 13:50)  Weight in k.6 (06 Dec 2020 07:00)  Weight in k (05 Dec 2020 07:00)  Weight in k.8 (04 Dec 2020 07:00)  Weight in k.8 (02 Dec 2020 19:25)  Weight in k.8 (02 Dec 2020 16:45)  Weight in k.5 (01 Dec 2020 15:30)  Weight in k.4 (01 Dec 2020 12:28)    % Weight Change: anasarca, 3+ generalized edema noted. On dialysis.    Pertinent Medications: MEDICATIONS  (STANDING):  bisacodyl Suppository 10 milliGRAM(s) Rectal daily  chlorhexidine 0.12% Liquid 15 milliLiter(s) Oral Mucosa every 12 hours  chlorhexidine 2% Cloths 1 Application(s) Topical <User Schedule>  glucagon  Injectable 1 milliGRAM(s) IntraMuscular once  heparin  Infusion.  Unit(s)/Hr (22 mL/Hr) IV Continuous <Continuous>  HYDROmorphone Infusion 2 mG/Hr (2 mL/Hr) IV Continuous <Continuous>  insulin lispro (ADMELOG) corrective regimen sliding scale   SubCutaneous every 6 hours  lubricant eye ont 1 Application(s) 1 Application(s) Both EYES two times a day  norepinephrine Infusion 0.5 MICROgram(s)/kG/Min (58.6 mL/Hr) IV Continuous <Continuous>  pantoprazole  Injectable 40 milliGRAM(s) IV Push daily  sodium bicarbonate 1300 milliGRAM(s) Oral two times a day    MEDICATIONS  (PRN):  acetaminophen    Suspension .. 650 milliGRAM(s) Oral every 6 hours PRN Temp greater or equal to 38C (100.4F)  ALBUTerol    90 MICROgram(s) HFA Inhaler 2 Puff(s) Inhalation every 6 hours PRN Shortness of Breath  sodium chloride 0.9% lock flush 10 milliLiter(s) IV Push every 1 hour PRN Pre/post blood products, medications, blood draw, and to maintain line patency    Pertinent Labs:  Na136 mmol/L Glu 111 mg/dL<H> K+ 3.4 mmol/L<L> Cr  4.39 mg/dL<H> BUN 27 mg/dL<H>  Phos 3.0 mg/dL  Alb 2.1 g/dL<L>  Chol --    LDL --    HDL --    Trig 414 mg/dL<H>     CAPILLARY BLOOD GLUCOSE      POCT Blood Glucose.: 130 mg/dL (14 Dec 2020 12:01)  POCT Blood Glucose.: 120 mg/dL (14 Dec 2020 05:24)  POCT Blood Glucose.: 135 mg/dL (13 Dec 2020 23:47)  POCT Blood Glucose.: 150 mg/dL (13 Dec 2020 16:34)      Previous Nutrition Diagnosis:   [ ] Altered GI function  [ ]Inadequate Oral Intake [ ] Swallowing Difficulty   [ ] Altered nutrition related labs [ ] Increased Nutrient Needs [ ] Overweight/Obesity   [ ] Unintended Weight Loss [ ] Food & Nutrition Related Knowledge Deficit [x ] Malnutrition (severe)  [ ] Other:     Nutrition Diagnosis is [x ] ongoing  [ ] resolved [ ] not applicable     Interventions:   Recommend  [ ] Change Diet To:  [ ] Nutrition Supplement  [x ] Nutrition Support: TF as medically feasible and consistent with goals of care . Nepro 40x24: 960 ml, 1728 kcals, 78 gm protein). For additional protein, may add Prosource 1 Pkt BID (+30 gm protein, 120 kcals). MD to monitor. RD available.   [ ] Other:     Monitoring and Evaluation:    [ x ] Tolerance to diet prescription [ x ] weights [ x ] labs[ x ] follow up per protocol  [ ] other:

## 2020-12-15 NOTE — PROGRESS NOTE ADULT - SUBJECTIVE AND OBJECTIVE BOX
INTERVAL HPI/OVERNIGHT EVENTS: ***    PRESSORS: [ ] YES [ ] NO  WHICH:    ANTIBIOTICS:                  DATE STARTED:  ANTIBIOTICS:                  DATE STARTED:  ANTIBIOTICS:                  DATE STARTED:    Antimicrobial:    Cardiovascular:  norepinephrine Infusion 0.5 MICROgram(s)/kG/Min IV Continuous <Continuous>    Pulmonary:  ALBUTerol    90 MICROgram(s) HFA Inhaler 2 Puff(s) Inhalation every 6 hours PRN    Hematalogic:  heparin  Infusion.  Unit(s)/Hr IV Continuous <Continuous>    Other:  acetaminophen    Suspension .. 650 milliGRAM(s) Oral every 6 hours PRN  bisacodyl Suppository 10 milliGRAM(s) Rectal daily  chlorhexidine 0.12% Liquid 15 milliLiter(s) Oral Mucosa every 12 hours  chlorhexidine 2% Cloths 1 Application(s) Topical <User Schedule>  glucagon  Injectable 1 milliGRAM(s) IntraMuscular once  HYDROmorphone Infusion 2 mG/Hr IV Continuous <Continuous>  insulin lispro (ADMELOG) corrective regimen sliding scale   SubCutaneous every 6 hours  lubricant eye ont 1 Application(s) 1 Application(s) Both EYES two times a day  pantoprazole  Injectable 40 milliGRAM(s) IV Push daily  sodium bicarbonate 1300 milliGRAM(s) Oral two times a day  sodium chloride 0.9% lock flush 10 milliLiter(s) IV Push every 1 hour PRN    acetaminophen    Suspension .. 650 milliGRAM(s) Oral every 6 hours PRN  ALBUTerol    90 MICROgram(s) HFA Inhaler 2 Puff(s) Inhalation every 6 hours PRN  bisacodyl Suppository 10 milliGRAM(s) Rectal daily  chlorhexidine 0.12% Liquid 15 milliLiter(s) Oral Mucosa every 12 hours  chlorhexidine 2% Cloths 1 Application(s) Topical <User Schedule>  glucagon  Injectable 1 milliGRAM(s) IntraMuscular once  heparin  Infusion.  Unit(s)/Hr IV Continuous <Continuous>  HYDROmorphone Infusion 2 mG/Hr IV Continuous <Continuous>  insulin lispro (ADMELOG) corrective regimen sliding scale   SubCutaneous every 6 hours  lubricant eye ont 1 Application(s) 1 Application(s) Both EYES two times a day  norepinephrine Infusion 0.5 MICROgram(s)/kG/Min IV Continuous <Continuous>  pantoprazole  Injectable 40 milliGRAM(s) IV Push daily  sodium bicarbonate 1300 milliGRAM(s) Oral two times a day  sodium chloride 0.9% lock flush 10 milliLiter(s) IV Push every 1 hour PRN    Drug Dosing Weight  Height (cm): 154.4 (15 Nov 2020 09:25)  Weight (kg): 125 (15 Nov 2020 09:25)  BMI (kg/m2): 52.4 (15 Nov 2020 09:25)  BSA (m2): 2.16 (15 Nov 2020 09:25)    CENTRAL LINE: [ ] YES [ ] NO  LOCATION:   DATE INSERTED:    MICHELLE: [ ] YES [ ] NO    DATE INSERTED:    A-LINE:  [ ] YES [ ] NO  LOCATION:   DATE INSERTED:    ICU Vital Signs Last 24 Hrs  T(C): 37.6 (15 Dec 2020 11:00), Max: 38.1 (15 Dec 2020 08:00)  T(F): 99.6 (15 Dec 2020 11:00), Max: 100.6 (15 Dec 2020 08:00)  HR: 105 (15 Dec 2020 12:22) (94 - 105)  BP: --  BP(mean): --  ABP: 117/53 (15 Dec 2020 11:00) (95/47 - 148/67)  ABP(mean): 72 (15 Dec 2020 11:00) (61 - 92)  RR: 17 (15 Dec 2020 11:00) (14 - 33)  SpO2: 94% (15 Dec 2020 12:22) (91% - 97%)      ABG - ( 15 Dec 2020 04:27 )  pH, Arterial: 7.23  pH, Blood: x     /  pCO2: 68    /  pO2: 83    / HCO3: 27    / Base Excess: -0.4  /  SaO2: 96                    12-14 @ 07:01  -  12-15 @ 07:00  --------------------------------------------------------  IN: 1643.7 mL / OUT: 4200 mL / NET: -2556.3 mL        Mode: AC/ CMV (Assist Control/ Continuous Mandatory Ventilation)  RR (machine): 32  TV (machine): 450  FiO2: 100  PEEP: 12  ITime: 0.85  MAP: 26  PIP: 42      REVIEW OF SYSTEMS:    CONSTITUTIONAL: No weakness, fevers or chills  NECK: No pain or stiffnes  RESPIRATORY: ++ cough, +wheezing, + dyspnea,   CARDIOVASCULAR: No chest pain or palpitations  GASTROINTESTINAL: No abdominal or epigastric pain. No nausea, vomiting, No diarrhea or constipation. No melena or hematochezia.  GENITOURINARY: No dysuria, frequency or hematuria  NEUROLOGICAL: No numbness or weakness  All other review of systems is negative unless indicated above    PHYSICAL EXAM:    >>> <<<    LABS:  CBC Full  -  ( 15 Dec 2020 06:08 )  WBC Count : 7.56 K/uL  RBC Count : 2.76 M/uL  Hemoglobin : 8.2 g/dL  Hematocrit : 27.0 %  Platelet Count - Automated : 117 K/uL  Mean Cell Volume : 97.8 fl  Mean Cell Hemoglobin : 29.7 pg  Mean Cell Hemoglobin Concentration : 30.4 gm/dL  Auto Neutrophil # : 5.98 K/uL  Auto Lymphocyte # : 0.78 K/uL  Auto Monocyte # : 0.53 K/uL  Auto Eosinophil # : 0.17 K/uL  Auto Basophil # : 0.02 K/uL  Auto Neutrophil % : 79.1 %  Auto Lymphocyte % : 10.3 %  Auto Monocyte % : 7.0 %  Auto Eosinophil % : 2.2 %  Auto Basophil % : 0.3 %    12-15    136  |  98  |  39<H>  ----------------------------<  150<H>  3.5   |  28  |  6.05<H>    Ca    9.1      15 Dec 2020 06:08  Phos  3.6     12-15  Mg     2.2     12-15    TPro  6.0  /  Alb  2.1<L>  /  TBili  0.6  /  DBili  x   /  AST  38  /  ALT  29  /  AlkPhos  141<H>  12-15    PT/INR - ( 15 Dec 2020 06:08 )   PT: 12.5 sec;   INR: 1.05 ratio         PTT - ( 15 Dec 2020 06:08 )  PTT:50.8 sec        RADIOLOGY & ADDITIONAL STUDIES REVIEWED:  ***    [ ]GOALS OF CARE DISCUSSION WITH PATIENT/FAMILY/PROXY:    CRITICAL CARE TIME SPENT: 35 minutes INTERVAL HPI/OVERNIGHT EVENTS: Patient remains sedated and intubated. hgb remains stable     PRESSORS: [x ] YES [ ] NO  WHICH: levophed        Antimicrobial:    Cardiovascular:  norepinephrine Infusion 0.5 MICROgram(s)/kG/Min IV Continuous <Continuous>    Pulmonary:  ALBUTerol    90 MICROgram(s) HFA Inhaler 2 Puff(s) Inhalation every 6 hours PRN    Hematalogic:  heparin  Infusion.  Unit(s)/Hr IV Continuous <Continuous>    Other:  acetaminophen    Suspension .. 650 milliGRAM(s) Oral every 6 hours PRN  bisacodyl Suppository 10 milliGRAM(s) Rectal daily  chlorhexidine 0.12% Liquid 15 milliLiter(s) Oral Mucosa every 12 hours  chlorhexidine 2% Cloths 1 Application(s) Topical <User Schedule>  glucagon  Injectable 1 milliGRAM(s) IntraMuscular once  HYDROmorphone Infusion 2 mG/Hr IV Continuous <Continuous>  insulin lispro (ADMELOG) corrective regimen sliding scale   SubCutaneous every 6 hours  lubricant eye ont 1 Application(s) 1 Application(s) Both EYES two times a day  pantoprazole  Injectable 40 milliGRAM(s) IV Push daily  sodium bicarbonate 1300 milliGRAM(s) Oral two times a day  sodium chloride 0.9% lock flush 10 milliLiter(s) IV Push every 1 hour PRN    acetaminophen    Suspension .. 650 milliGRAM(s) Oral every 6 hours PRN  ALBUTerol    90 MICROgram(s) HFA Inhaler 2 Puff(s) Inhalation every 6 hours PRN  bisacodyl Suppository 10 milliGRAM(s) Rectal daily  chlorhexidine 0.12% Liquid 15 milliLiter(s) Oral Mucosa every 12 hours  chlorhexidine 2% Cloths 1 Application(s) Topical <User Schedule>  glucagon  Injectable 1 milliGRAM(s) IntraMuscular once  heparin  Infusion.  Unit(s)/Hr IV Continuous <Continuous>  HYDROmorphone Infusion 2 mG/Hr IV Continuous <Continuous>  insulin lispro (ADMELOG) corrective regimen sliding scale   SubCutaneous every 6 hours  lubricant eye ont 1 Application(s) 1 Application(s) Both EYES two times a day  norepinephrine Infusion 0.5 MICROgram(s)/kG/Min IV Continuous <Continuous>  pantoprazole  Injectable 40 milliGRAM(s) IV Push daily  sodium bicarbonate 1300 milliGRAM(s) Oral two times a day  sodium chloride 0.9% lock flush 10 milliLiter(s) IV Push every 1 hour PRN    Drug Dosing Weight  Height (cm): 154.4 (15 Nov 2020 09:25)  Weight (kg): 125 (15 Nov 2020 09:25)  BMI (kg/m2): 52.4 (15 Nov 2020 09:25)  BSA (m2): 2.16 (15 Nov 2020 09:25)    CENTRAL LINE: [x ] YES [ ] NO  LOCATION:   DATE INSERTED: DA KIRAN 12/14, GURDEEP 12/1    MICHELLE: [X ] YES [ ] NO    DATE INSERTED:    A-LINE:  [X ] YES [ ] NO  LOCATION:   DATE INSERTED: 12/4    ICU Vital Signs Last 24 Hrs  T(C): 37.6 (15 Dec 2020 11:00), Max: 38.1 (15 Dec 2020 08:00)  T(F): 99.6 (15 Dec 2020 11:00), Max: 100.6 (15 Dec 2020 08:00)  HR: 105 (15 Dec 2020 12:22) (94 - 105)  BP: --  BP(mean): --  ABP: 117/53 (15 Dec 2020 11:00) (95/47 - 148/67)  ABP(mean): 72 (15 Dec 2020 11:00) (61 - 92)  RR: 17 (15 Dec 2020 11:00) (14 - 33)  SpO2: 94% (15 Dec 2020 12:22) (91% - 97%)      ABG - ( 15 Dec 2020 04:27 )  pH, Arterial: 7.23  pH, Blood: x     /  pCO2: 68    /  pO2: 83    / HCO3: 27    / Base Excess: -0.4  /  SaO2: 96                    12-14 @ 07:01  -  12-15 @ 07:00  --------------------------------------------------------  IN: 1643.7 mL / OUT: 4200 mL / NET: -2556.3 mL        Mode: AC/ CMV (Assist Control/ Continuous Mandatory Ventilation)  RR (machine): 32  TV (machine): 450  FiO2: 100  PEEP: 12  ITime: 0.85  MAP: 26  PIP: 42      REVIEW OF SYSTEMS:    UNABLE TO OBTAIN     PHYSICAL EXAM:    GENERAL: sedated lying in bed comfortably  HEAD:  Atraumatic, Normocephalic  EYES: PERRLA, 2m sluggish response,  conjunctiva mildly icteric   ENT: Moist mucous membranes  NECK: Supple, No JVD  CHEST/LUNG: Clear to auscultation bilaterally; No rales, rhonchi, wheezing, or rubs.  HEART: Regular rate and rhythm; S1+ S2+  ABDOMEN: Bowel sounds present; Soft, Nontender, mildly distended.  EXTREMITIES:  2+ Peripheral Pulses, brisk capillary refill. No clubbing, cyanosis, 2+ UE and LE  edema  NERVOUS SYSTEM: sedated, non responsive, Responds to pain   SKIN: sacral decub ulcer     LABS:  CBC Full  -  ( 15 Dec 2020 06:08 )  WBC Count : 7.56 K/uL  RBC Count : 2.76 M/uL  Hemoglobin : 8.2 g/dL  Hematocrit : 27.0 %  Platelet Count - Automated : 117 K/uL  Mean Cell Volume : 97.8 fl  Mean Cell Hemoglobin : 29.7 pg  Mean Cell Hemoglobin Concentration : 30.4 gm/dL  Auto Neutrophil # : 5.98 K/uL  Auto Lymphocyte # : 0.78 K/uL  Auto Monocyte # : 0.53 K/uL  Auto Eosinophil # : 0.17 K/uL  Auto Basophil # : 0.02 K/uL  Auto Neutrophil % : 79.1 %  Auto Lymphocyte % : 10.3 %  Auto Monocyte % : 7.0 %  Auto Eosinophil % : 2.2 %  Auto Basophil % : 0.3 %    12-15    136  |  98  |  39<H>  ----------------------------<  150<H>  3.5   |  28  |  6.05<H>    Ca    9.1      15 Dec 2020 06:08  Phos  3.6     12-15  Mg     2.2     12-15    TPro  6.0  /  Alb  2.1<L>  /  TBili  0.6  /  DBili  x   /  AST  38  /  ALT  29  /  AlkPhos  141<H>  12-15    PT/INR - ( 15 Dec 2020 06:08 )   PT: 12.5 sec;   INR: 1.05 ratio         PTT - ( 15 Dec 2020 06:08 )  PTT:50.8 sec        RADIOLOGY & ADDITIONAL STUDIES REVIEWED:  ***    [ ]GOALS OF CARE DISCUSSION WITH PATIENT/FAMILY/PROXY: DNR    CRITICAL CARE TIME SPENT: 35 minutes

## 2020-12-15 NOTE — PROGRESS NOTE ADULT - PROBLEM SELECTOR PLAN 1
Anuric AGUSTO from ATN due to septic shock/ARDS requiring RRT/HD  - S/p HD treatment on 12/14 with net 3.6 L fluid removal  - Avoid Nephrotoxic agents  - Monitor BMP and electrolytes  - Maintain MAP>65-70 mm hg  - Adjust antibiotics as per renal dose clearances  - Volume status and electrolytes noted.  - Defer HD today  - Next anticipated IHD on 12/16

## 2020-12-15 NOTE — PROGRESS NOTE ADULT - SUBJECTIVE AND OBJECTIVE BOX
Chandan Awad MD (Nephrology progress note)  [address], [facility], [place],  [address],  [facility]  TEl: [id number]  Cell: [phone]    Patient is a 50y Male seen and evaluated at bedside. Vital signs, laboratory data, imaging studies, consult notes reviewed done within past 24 hours. Overnight events noted. Patient remains critically ill on ventilatory support and IV pressors. Remains anuric requiring RRT. Last HD on 12/14 with 3.6 L fluid removal.    Allergies    No Known Allergies    Intolerances        ROS:  Limited. Sedated and intubated on ventilatory support    VITALS:    T(C): 38.1 (12-15-20 @ 08:00), Max: 38.1 (12-15-20 @ 08:00)  HR: 102 (12-15-20 @ 08:20) (94 - 104)  BP: --  RR: 17 (12-15-20 @ 08:00) (14 - 33)  SpO2: 96% (12-15-20 @ 08:20) (91% - 97%)  CAPILLARY BLOOD GLUCOSE      POCT Blood Glucose.: 153 mg/dL (15 Dec 2020 05:22)  POCT Blood Glucose.: 160 mg/dL (14 Dec 2020 23:07)  POCT Blood Glucose.: 151 mg/dL (14 Dec 2020 16:57)  POCT Blood Glucose.: 130 mg/dL (14 Dec 2020 12:01)      12-14-20 @ 07:01  -  12-15-20 @ 07:00  --------------------------------------------------------  IN: 1643.7 mL / OUT: 4200 mL / NET: -2556.3 mL      MEDICATIONS  (STANDING):  bisacodyl Suppository 10 milliGRAM(s) Rectal daily  chlorhexidine 0.12% Liquid 15 milliLiter(s) Oral Mucosa every 12 hours  chlorhexidine 2% Cloths 1 Application(s) Topical <User Schedule>  glucagon  Injectable 1 milliGRAM(s) IntraMuscular once  heparin  Infusion.  Unit(s)/Hr (22 mL/Hr) IV Continuous <Continuous>  HYDROmorphone Infusion 2 mG/Hr (2 mL/Hr) IV Continuous <Continuous>  insulin lispro (ADMELOG) corrective regimen sliding scale   SubCutaneous every 6 hours  lubricant eye ont 1 Application(s) 1 Application(s) Both EYES two times a day  norepinephrine Infusion 0.5 MICROgram(s)/kG/Min (58.6 mL/Hr) IV Continuous <Continuous>  pantoprazole  Injectable 40 milliGRAM(s) IV Push daily  sodium bicarbonate 1300 milliGRAM(s) Oral two times a day    MEDICATIONS  (PRN):  acetaminophen    Suspension .. 650 milliGRAM(s) Oral every 6 hours PRN Temp greater or equal to 38C (100.4F)  ALBUTerol    90 MICROgram(s) HFA Inhaler 2 Puff(s) Inhalation every 6 hours PRN Shortness of Breath  sodium chloride 0.9% lock flush 10 milliLiter(s) IV Push every 1 hour PRN Pre/post blood products, medications, blood draw, and to maintain line patency      PHYSICAL EXAM:  GENERAL: Sedated and intubated on ventilatory support  HEENT: LEONOR, EOMI, neck supple, no JVP, no carotid bruit, oral mucosa moist and pink.  CHEST/LUNG: Bilateral decreased breath sounds, bibasilar rales and crepitations, no wheezing  HEART: Regular rate and rhythm, ERNESTO II/VI at LPSB, no gallops, no rub   ABDOMEN: Soft, nontender, non distended, bowel sounds present, no palpable organomegaly  : No flank or supra pubic tenderness.  EXTREMITIES: Bilateral upper and lower extremity edema+nt  Neurology: Sedated and intubated on ventilatory support  SKIN: No rash or skin lesion  Musculoskeletal: No joint deformity      Vascular ACCESS:     LABS:                        8.2    7.56  )-----------( 117      ( 15 Dec 2020 06:08 )             27.0     12-15    136  |  98  |  39<H>  ----------------------------<  150<H>  3.5   |  28  |  6.05<H>    Ca    9.1      15 Dec 2020 06:08  Phos  3.6     12-15  Mg     2.2     12-15    TPro  6.0  /  Alb  2.1<L>  /  TBili  0.6  /  DBili  x   /  AST  38  /  ALT  29  /  AlkPhos  141<H>  12-15      PT/INR - ( 15 Dec 2020 06:08 )   PT: 12.5 sec;   INR: 1.05 ratio         PTT - ( 15 Dec 2020 06:08 )  PTT:50.8 sec          RADIOLOGY & ADDITIONAL STUDIES:  rad< from: Xray Chest 1 View- PORTABLE-Routine (Xray Chest 1 View- PORTABLE-Routine in AM.) (12.14.20 @ 10:58) >    EXAM:  XR CHEST PORTABLE ROUTINE 1V                            PROCEDURE DATE:  12/14/2020          INTERPRETATION:  CLINICAL STATEMENT: Follow-up chest pain.    TECHNIQUE: AP view of the chest.    COMPARISON: 12/13/2020    FINDINGS/  IMPRESSION:  ET tube, feeding tube, bilateral central lines again noted. No pneumothorax.    Increased interstitial lung markings with bilateral airspace opacities without significant change    Heart size cannot be accurately assessed in this projection.              GAMALIEL GEORGE MD; Attending Radiologist  This document has been electronically signed. Dec 14 2020 11:25AM    < end of copied text >    Imaging Personally Reviewed:  [x] YES  [ ] NO    Consultant(s) Notes Reviewed:  [x] YES  [ ] NO    Care Discussed with Primary team/ Other Providers [x] YES  [ ] NO

## 2020-12-15 NOTE — PROGRESS NOTE ADULT - PROBLEM SELECTOR PLAN 5
Anemia of chronic illness with stable hgb 8.2  Monitor serial CBC  Transfuse PRBC per primary team  Monitor hgb/Hct

## 2020-12-15 NOTE — PROGRESS NOTE ADULT - ASSESSMENT
ASSESSMENT AND PLAN: 49M without PMH from home with wife admitted to ICU for of Acute Respiratory failure 2/2 COVID PNA. Patient is currently in multi organ failure and on Dialysis. Requiring pressor support    Acute respiratory failure secondary to Covid pneumonia  Septic Shock and Paroxysmal Atrial fibrillation  AGUSTO ,ATN on dialysis      Neuro:   - ON dilaudid drip to minimize fluid being given to patient  - No improvement in condition or sedative requirement    CVS: #Septic Shock and Paroxysmal Atrial fibrillation  - patient is currently on Levophed  - s/p dig loading HR well controlled now  - EKG with sinus tach->AF  - bedside Echo without global dysfunction    -#troponemia  -9->8.2->8.3 11/21 trended down   -cardio Dr. Tsang saw patient before, Currently he does not need Aspirin and Plavix,   - Will Consult PRN in case patient's condition changes    Pulmonary:   #Acute respiratory failure 2/2 COVID PNA  -ETT placed 11/19, replaced on 12/7 as patient was having a leak in the balloon  - ESR, procal, ferritin decreasing, d-dimer, LDH, CRP decreasing, will trend every 3rd day  - Dexamethasone IV 6mg Qd (10 day course through 11/24) , Finished on 12/4  - CXR 12/11 shows persistent  interstitial lung markings with bilateral airspace opacities   - IgA positive, IgG negative, indicating new infection, No Remdesivir given ARF      ID:   -history and management as above  -changed LIJ to femoral line, removed LIJ 11/25  -WBC 18->11->13.2->15.6->17.8>18>7.4  -RVP +COVID, procal increased to 3.13 as noted above, started cefepime 11/21, added vanc 11/24, added azithromycin given worsening CXR  11/25  - completed abx  -BCx NGF      Nephro: #AGUSTO due to ATN secondary to likely Covid pneumonia   -CrCl 44, Cr 1.1 on admission, acutely worsened 11/21, possibly 2/2 covid injury and/or low BP  -Cr 4.39  -RIJ HD replaced 12/13 and HD started 11/21, last HD 12/14  -continue phoslo, on bicarb tabs now 1300 bid, bicarb wnl       GI: #TF  - monitor LFTs AST/ALT,  likely 2/2 COVID, trending down slowly  -Daily CMP  -Protonix IV for PPx  - Senna and Miralax for bowel regimen    Heme:   - Platelets 105k  - monitor for now    # Anemia  - Hb 68  - s/p 1u pRBC 12/12, total 4u pRBC so far    Endocrine: #BG controlled  -A1c 6, average glucose 126  -TSH wnl  -vit D moderately low to 22, c/w 1000U/day   -HSS    Skin/Catheters:   #LIJ placed 12/5  #RIJ HD catheter 12/13  #R radial artery 12/04  #eye drops    Prophylaxis:  -patient is on heparin Drip , Protonix for GI ppx    Prognosis:   Poor, DNR< Family informed on 12/5 , being updated daily

## 2020-12-15 NOTE — PROGRESS NOTE ADULT - PROBLEM SELECTOR PLAN 2
Patient with Calcium 9.1 with albumin 2.1, Phos  3.5, Mag 2.2  Monitor BMP, calcium, mag, phos level  HD treatment for optimal clearances

## 2020-12-16 NOTE — CHART NOTE - NSCHARTNOTEFT_GEN_A_CORE
Palliative Care Note:  Reviewed case with ICU team. Attempted to contact patient's wife/Naima ([phone]) but received message "not accepting calls right now." Unable to leave message.

## 2020-12-16 NOTE — PROGRESS NOTE ADULT - ASSESSMENT
ASSESSMENT AND PLAN: 49M without PMH from home with wife admitted to ICU for of Acute Respiratory failure 2/2 COVID PNA. Patient is currently in multi organ failure and on Dialysis. Requiring pressor support    Acute respiratory failure secondary to Covid pneumonia  Septic Shock and Paroxysmal Atrial fibrillation  AGUSTO ,ATN on dialysis      Neuro:   - ON dilaudid drip to minimize fluid being given to patient  - No improvement in condition or sedative requirement    CVS: #Septic Shock and Paroxysmal Atrial fibrillation  - patient is currently on Levophed  - s/p dig loading HR well controlled now  - EKG with sinus tach->AF  - bedside Echo without global dysfunction    -#troponemia  -9->8.2->8.3 11/21 trended down   -cardio Dr. Tsang saw patient before, Currently he does not need Aspirin and Plavix,   - Will Consult PRN in case patient's condition changes    Pulmonary:   #Acute respiratory failure 2/2 COVID PNA  -ETT placed 11/19, replaced on 12/7 as patient was having a leak in the balloon  - ESR, procal, ferritin decreasing, d-dimer, LDH, CRP decreasing, will trend every 3rd day  - Dexamethasone IV 6mg Qd (10 day course through 11/24) , Finished on 12/4  - CXR 12/11 shows persistent  interstitial lung markings with bilateral airspace opacities   - IgA positive, IgG negative, indicating new infection, No Remdesivir given ARF      ID:   -history and management as above  -changed LIJ to femoral line, removed LIJ 11/25  -WBC 18->11->13.2->15.6->17.8>18>7.4  -RVP +COVID, procal increased to 3.13 as noted above, started cefepime 11/21, added vanc 11/24, added azithromycin given worsening CXR  11/25  - completed abx  -BCx NGF      Nephro: #AGUSTO due to ATN secondary to likely Covid pneumonia   -CrCl 44, Cr 1.1 on admission, acutely worsened 11/21, possibly 2/2 covid injury and/or low BP  -Cr 4.39  -RIJ HD replaced 12/13 and HD started 11/21, last HD 12/14,12/16  -continue phoslo, on bicarb tabs now 1300 Bid, bicarb wnl       GI: #TF  - monitor LFTs AST/ALT,  likely 2/2 COVID, trending down slowly  -Daily CMP  -Protonix IV for PPx  - Senna and Miralax for bowel regimen    Heme:   - Platelets 105k  - monitor for now    # Anemia  - Hb 68  - s/p 1u pRBC 12/12, total 4u pRBC so far    Endocrine: #BG controlled  -A1c 6, average glucose 126  -TSH wnl  -vit D moderately low to 22, c/w 1000U/day   -HSS    Skin/Catheters:   #LIJ placed 12/5  #RIJ HD catheter 12/13  #R radial artery 12/04  #eye drops    Prophylaxis:  -patient is on heparin Drip , Protonix for GI ppx    Prognosis:   Poor, DNR< Family informed on 12/5 , being updated daily

## 2020-12-16 NOTE — PROGRESS NOTE ADULT - PROBLEM SELECTOR PLAN 5
Anemia of chronic illness with stable hgb 7.9  Monitor serial CBC  Transfuse PRBC per primary team  Monitor hgb/Hct

## 2020-12-16 NOTE — PROGRESS NOTE ADULT - PROBLEM SELECTOR PLAN 1
Anuric AGUSTO from ATN due to septic shock/ARDS requiring RRT/HD  - S/p HD treatment on 12/14 with net 3.6 L fluid removal  - Avoid Nephrotoxic agents  - Monitor BMP and electrolytes  - Maintain MAP>65-70 mm hg  - Adjust antibiotics as per renal dose clearances  - Volume status and electrolytes noted.  - HD treatment for UF and clearances today

## 2020-12-16 NOTE — CHART NOTE - NSCHARTNOTEFT_GEN_A_CORE
-pat became hypoxic and agitated. Vent showed a significant loss of TV. CXR showed ETT not to be in proper position   -ETT was removed and pat was re-intubated by anesthesiologist

## 2020-12-16 NOTE — PROGRESS NOTE ADULT - SUBJECTIVE AND OBJECTIVE BOX
INTERVAL HPI/OVERNIGHT EVENTS: ***    PRESSORS: [ ] YES [ ] NO  WHICH:    ANTIBIOTICS:                  DATE STARTED:  ANTIBIOTICS:                  DATE STARTED:  ANTIBIOTICS:                  DATE STARTED:    Antimicrobial:    Cardiovascular:  norepinephrine Infusion 0.5 MICROgram(s)/kG/Min IV Continuous <Continuous>    Pulmonary:  ALBUTerol    90 MICROgram(s) HFA Inhaler 2 Puff(s) Inhalation every 6 hours PRN    Hematalogic:  heparin  Infusion.  Unit(s)/Hr IV Continuous <Continuous>    Other:  acetaminophen    Suspension .. 650 milliGRAM(s) Oral every 6 hours PRN  bisacodyl Suppository 10 milliGRAM(s) Rectal daily  chlorhexidine 0.12% Liquid 15 milliLiter(s) Oral Mucosa every 12 hours  chlorhexidine 2% Cloths 1 Application(s) Topical <User Schedule>  etomidate Injectable 25 milliGRAM(s) IV Push once  glucagon  Injectable 1 milliGRAM(s) IntraMuscular once  HYDROmorphone Infusion 2 mG/Hr IV Continuous <Continuous>  insulin lispro (ADMELOG) corrective regimen sliding scale   SubCutaneous every 6 hours  lubricant eye ont 1 Application(s) 1 Application(s) Both EYES two times a day  pantoprazole  Injectable 40 milliGRAM(s) IV Push daily  sodium bicarbonate 1300 milliGRAM(s) Oral three times a day  sodium chloride 0.9% lock flush 10 milliLiter(s) IV Push every 1 hour PRN  veCURonium  Injectable 50 milliGRAM(s) IV Push once    acetaminophen    Suspension .. 650 milliGRAM(s) Oral every 6 hours PRN  ALBUTerol    90 MICROgram(s) HFA Inhaler 2 Puff(s) Inhalation every 6 hours PRN  bisacodyl Suppository 10 milliGRAM(s) Rectal daily  chlorhexidine 0.12% Liquid 15 milliLiter(s) Oral Mucosa every 12 hours  chlorhexidine 2% Cloths 1 Application(s) Topical <User Schedule>  etomidate Injectable 25 milliGRAM(s) IV Push once  glucagon  Injectable 1 milliGRAM(s) IntraMuscular once  heparin  Infusion.  Unit(s)/Hr IV Continuous <Continuous>  HYDROmorphone Infusion 2 mG/Hr IV Continuous <Continuous>  insulin lispro (ADMELOG) corrective regimen sliding scale   SubCutaneous every 6 hours  lubricant eye ont 1 Application(s) 1 Application(s) Both EYES two times a day  norepinephrine Infusion 0.5 MICROgram(s)/kG/Min IV Continuous <Continuous>  pantoprazole  Injectable 40 milliGRAM(s) IV Push daily  sodium bicarbonate 1300 milliGRAM(s) Oral three times a day  sodium chloride 0.9% lock flush 10 milliLiter(s) IV Push every 1 hour PRN  veCURonium  Injectable 50 milliGRAM(s) IV Push once    Drug Dosing Weight  Height (cm): 154.4 (15 Nov 2020 09:25)  Weight (kg): 125 (15 Nov 2020 09:25)  BMI (kg/m2): 52.4 (15 Nov 2020 09:25)  BSA (m2): 2.16 (15 Nov 2020 09:25)    CENTRAL LINE: [ ] YES [ ] NO  LOCATION:   DATE INSERTED:    MICHELLE: [ ] YES [ ] NO    DATE INSERTED:    A-LINE:  [ ] YES [ ] NO  LOCATION:   DATE INSERTED:    ICU Vital Signs Last 24 Hrs  T(C): 37.1 (16 Dec 2020 09:35), Max: 38 (15 Dec 2020 19:45)  T(F): 98.8 (16 Dec 2020 09:35), Max: 100.4 (15 Dec 2020 19:45)  HR: 101 (16 Dec 2020 12:00) (96 - 124)  BP: --  BP(mean): --  ABP: 131/62 (16 Dec 2020 10:30) (89/44 - 131/62)  ABP(mean): 83 (16 Dec 2020 10:30) (59 - 775)  RR: 23 (16 Dec 2020 10:30) (17 - 35)  SpO2: 94% (16 Dec 2020 12:00) (84% - 97%)      ABG - ( 16 Dec 2020 09:17 )  pH, Arterial: 7.20  pH, Blood: x     /  pCO2: 74    /  pO2: 76    / HCO3: 28    / Base Excess: -0.3  /  SaO2: 94                    12-15 @ 07:01  -  12-16 @ 07:00  --------------------------------------------------------  IN: 646.3 mL / OUT: 0 mL / NET: 646.3 mL        Mode: AC/ CMV (Assist Control/ Continuous Mandatory Ventilation)  RR (machine): 35  TV (machine): 450  FiO2: 100  PEEP: 14  ITime: 0.85  MAP: 30  PIP: 45      REVIEW OF SYSTEMS:    CONSTITUTIONAL: No weakness, fevers or chills  NECK: No pain or stiffnes  RESPIRATORY: ++ cough, +wheezing, + dyspnea,   CARDIOVASCULAR: No chest pain or palpitations  GASTROINTESTINAL: No abdominal or epigastric pain. No nausea, vomiting, No diarrhea or constipation. No melena or hematochezia.  GENITOURINARY: No dysuria, frequency or hematuria  NEUROLOGICAL: No numbness or weakness  All other review of systems is negative unless indicated above    PHYSICAL EXAM:    >>> <<<    LABS:  CBC Full  -  ( 16 Dec 2020 06:01 )  WBC Count : 7.93 K/uL  RBC Count : 2.72 M/uL  Hemoglobin : 7.9 g/dL  Hematocrit : 27.0 %  Platelet Count - Automated : 144 K/uL  Mean Cell Volume : 99.3 fl  Mean Cell Hemoglobin : 29.0 pg  Mean Cell Hemoglobin Concentration : 29.3 gm/dL  Auto Neutrophil # : 6.24 K/uL  Auto Lymphocyte # : 0.87 K/uL  Auto Monocyte # : 0.45 K/uL  Auto Eosinophil # : 0.22 K/uL  Auto Basophil # : 0.01 K/uL  Auto Neutrophil % : 78.6 %  Auto Lymphocyte % : 11.0 %  Auto Monocyte % : 5.7 %  Auto Eosinophil % : 2.8 %  Auto Basophil % : 0.1 %    12-16    136  |  96  |  53<H>  ----------------------------<  147<H>  3.8   |  28  |  7.31<H>    Ca    9.6      16 Dec 2020 06:01  Phos  4.8     12-16  Mg     2.3     12-16    TPro  6.3  /  Alb  2.1<L>  /  TBili  0.5  /  DBili  x   /  AST  36  /  ALT  28  /  AlkPhos  133<H>  12-16    PT/INR - ( 15 Dec 2020 06:08 )   PT: 12.5 sec;   INR: 1.05 ratio         PTT - ( 16 Dec 2020 06:01 )  PTT:85.7 sec        RADIOLOGY & ADDITIONAL STUDIES REVIEWED:  ***    [ ]GOALS OF CARE DISCUSSION WITH PATIENT/FAMILY/PROXY:    CRITICAL CARE TIME SPENT: 35 minutes INTERVAL HPI/OVERNIGHT EVENTS: patient remains hypoxic on high vent settings     PRESSORS: [x ] YES [ ] NO  WHICH:        Cardiovascular:  norepinephrine Infusion 0.5 MICROgram(s)/kG/Min IV Continuous <Continuous>    Pulmonary:  ALBUTerol    90 MICROgram(s) HFA Inhaler 2 Puff(s) Inhalation every 6 hours PRN    Hematalogic:  heparin  Infusion.  Unit(s)/Hr IV Continuous <Continuous>    Other:  acetaminophen    Suspension .. 650 milliGRAM(s) Oral every 6 hours PRN  bisacodyl Suppository 10 milliGRAM(s) Rectal daily  chlorhexidine 0.12% Liquid 15 milliLiter(s) Oral Mucosa every 12 hours  chlorhexidine 2% Cloths 1 Application(s) Topical <User Schedule>  etomidate Injectable 25 milliGRAM(s) IV Push once  glucagon  Injectable 1 milliGRAM(s) IntraMuscular once  HYDROmorphone Infusion 2 mG/Hr IV Continuous <Continuous>  insulin lispro (ADMELOG) corrective regimen sliding scale   SubCutaneous every 6 hours  lubricant eye ont 1 Application(s) 1 Application(s) Both EYES two times a day  pantoprazole  Injectable 40 milliGRAM(s) IV Push daily  sodium bicarbonate 1300 milliGRAM(s) Oral three times a day  sodium chloride 0.9% lock flush 10 milliLiter(s) IV Push every 1 hour PRN  veCURonium  Injectable 50 milliGRAM(s) IV Push once    acetaminophen    Suspension .. 650 milliGRAM(s) Oral every 6 hours PRN  ALBUTerol    90 MICROgram(s) HFA Inhaler 2 Puff(s) Inhalation every 6 hours PRN  bisacodyl Suppository 10 milliGRAM(s) Rectal daily  chlorhexidine 0.12% Liquid 15 milliLiter(s) Oral Mucosa every 12 hours  chlorhexidine 2% Cloths 1 Application(s) Topical <User Schedule>  etomidate Injectable 25 milliGRAM(s) IV Push once  glucagon  Injectable 1 milliGRAM(s) IntraMuscular once  heparin  Infusion.  Unit(s)/Hr IV Continuous <Continuous>  HYDROmorphone Infusion 2 mG/Hr IV Continuous <Continuous>  insulin lispro (ADMELOG) corrective regimen sliding scale   SubCutaneous every 6 hours  lubricant eye ont 1 Application(s) 1 Application(s) Both EYES two times a day  norepinephrine Infusion 0.5 MICROgram(s)/kG/Min IV Continuous <Continuous>  pantoprazole  Injectable 40 milliGRAM(s) IV Push daily  sodium bicarbonate 1300 milliGRAM(s) Oral three times a day  sodium chloride 0.9% lock flush 10 milliLiter(s) IV Push every 1 hour PRN  veCURonium  Injectable 50 milliGRAM(s) IV Push once    Drug Dosing Weight  Height (cm): 154.4 (15 Nov 2020 09:25)  Weight (kg): 125 (15 Nov 2020 09:25)  BMI (kg/m2): 52.4 (15 Nov 2020 09:25)  BSA (m2): 2.16 (15 Nov 2020 09:25)    CENTRAL LINE: [x ] YES [ ] NO  LOCATION: [facility]   DATE INSERTED:12/5    MICHELLE: [X ] YES [ ] NO    DATE INSERTED:    A-LINE:  [ X] YES [ ] NO  LOCATION:   DATE INSERTED:    ICU Vital Signs Last 24 Hrs  T(C): 37.1 (16 Dec 2020 09:35), Max: 38 (15 Dec 2020 19:45)  T(F): 98.8 (16 Dec 2020 09:35), Max: 100.4 (15 Dec 2020 19:45)  HR: 101 (16 Dec 2020 12:00) (96 - 124)  BP: --  BP(mean): --  ABP: 131/62 (16 Dec 2020 10:30) (89/44 - 131/62)  ABP(mean): 83 (16 Dec 2020 10:30) (59 - 775)  RR: 23 (16 Dec 2020 10:30) (17 - 35)  SpO2: 94% (16 Dec 2020 12:00) (84% - 97%)      ABG - ( 16 Dec 2020 09:17 )  pH, Arterial: 7.20  pH, Blood: x     /  pCO2: 74    /  pO2: 76    / HCO3: 28    / Base Excess: -0.3  /  SaO2: 94                    12-15 @ 07:01  -  12-16 @ 07:00  --------------------------------------------------------  IN: 646.3 mL / OUT: 0 mL / NET: 646.3 mL        Mode: AC/ CMV (Assist Control/ Continuous Mandatory Ventilation)  RR (machine): 35  TV (machine): 450  FiO2: 100  PEEP: 14  ITime: 0.85  MAP: 30  PIP: 45      REVIEW OF SYSTEMS:  UNABLE TO OBTAIN     PHYSICAL EXAM:    GENERAL: sedated lying in bed comfortably  HEAD:  Atraumatic, Normocephalic  EYES: PERRLA, 2m sluggish response,  conjunctiva mildly icteric   ENT: Moist mucous membranes  NECK: Supple, No JVD  CHEST/LUNG: Clear to auscultation bilaterally; No rales, rhonchi, wheezing, or rubs.  HEART: Regular rate and rhythm; S1+ S2+  ABDOMEN: Bowel sounds present; Soft, Nontender, mildly distended.  EXTREMITIES:  2+ Peripheral Pulses, brisk capillary refill. No clubbing, cyanosis, 2+ UE and LE  edema  NERVOUS SYSTEM: sedated, non responsive, Responds to pain   SKIN: sacral decub ulcer     LABS:  CBC Full  -  ( 16 Dec 2020 06:01 )  WBC Count : 7.93 K/uL  RBC Count : 2.72 M/uL  Hemoglobin : 7.9 g/dL  Hematocrit : 27.0 %  Platelet Count - Automated : 144 K/uL  Mean Cell Volume : 99.3 fl  Mean Cell Hemoglobin : 29.0 pg  Mean Cell Hemoglobin Concentration : 29.3 gm/dL  Auto Neutrophil # : 6.24 K/uL  Auto Lymphocyte # : 0.87 K/uL  Auto Monocyte # : 0.45 K/uL  Auto Eosinophil # : 0.22 K/uL  Auto Basophil # : 0.01 K/uL  Auto Neutrophil % : 78.6 %  Auto Lymphocyte % : 11.0 %  Auto Monocyte % : 5.7 %  Auto Eosinophil % : 2.8 %  Auto Basophil % : 0.1 %    12-16    136  |  96  |  53<H>  ----------------------------<  147<H>  3.8   |  28  |  7.31<H>    Ca    9.6      16 Dec 2020 06:01  Phos  4.8     12-16  Mg     2.3     12-16    TPro  6.3  /  Alb  2.1<L>  /  TBili  0.5  /  DBili  x   /  AST  36  /  ALT  28  /  AlkPhos  133<H>  12-16    PT/INR - ( 15 Dec 2020 06:08 )   PT: 12.5 sec;   INR: 1.05 ratio         PTT - ( 16 Dec 2020 06:01 )  PTT:85.7 sec        RADIOLOGY & ADDITIONAL STUDIES REVIEWED:  ***    [ ]GOALS OF CARE DISCUSSION WITH PATIENT/FAMILY/PROXY: dnr    CRITICAL CARE TIME SPENT: 35 minutes INTERVAL HPI/OVERNIGHT EVENTS: patient remains hypoxic on high vent settings     PRESSORS: [x ] YES [ ] NO  WHICH:        Cardiovascular:  norepinephrine Infusion 0.5 MICROgram(s)/kG/Min IV Continuous <Continuous>    Pulmonary:  ALBUTerol    90 MICROgram(s) HFA Inhaler 2 Puff(s) Inhalation every 6 hours PRN    Hematalogic:  heparin  Infusion.  Unit(s)/Hr IV Continuous <Continuous>    Other:  acetaminophen    Suspension .. 650 milliGRAM(s) Oral every 6 hours PRN  bisacodyl Suppository 10 milliGRAM(s) Rectal daily  chlorhexidine 0.12% Liquid 15 milliLiter(s) Oral Mucosa every 12 hours  chlorhexidine 2% Cloths 1 Application(s) Topical <User Schedule>  etomidate Injectable 25 milliGRAM(s) IV Push once  glucagon  Injectable 1 milliGRAM(s) IntraMuscular once  HYDROmorphone Infusion 2 mG/Hr IV Continuous <Continuous>  insulin lispro (ADMELOG) corrective regimen sliding scale   SubCutaneous every 6 hours  lubricant eye ont 1 Application(s) 1 Application(s) Both EYES two times a day  pantoprazole  Injectable 40 milliGRAM(s) IV Push daily  sodium bicarbonate 1300 milliGRAM(s) Oral three times a day  sodium chloride 0.9% lock flush 10 milliLiter(s) IV Push every 1 hour PRN  veCURonium  Injectable 50 milliGRAM(s) IV Push once    acetaminophen    Suspension .. 650 milliGRAM(s) Oral every 6 hours PRN  ALBUTerol    90 MICROgram(s) HFA Inhaler 2 Puff(s) Inhalation every 6 hours PRN  bisacodyl Suppository 10 milliGRAM(s) Rectal daily  chlorhexidine 0.12% Liquid 15 milliLiter(s) Oral Mucosa every 12 hours  chlorhexidine 2% Cloths 1 Application(s) Topical <User Schedule>  etomidate Injectable 25 milliGRAM(s) IV Push once  glucagon  Injectable 1 milliGRAM(s) IntraMuscular once  heparin  Infusion.  Unit(s)/Hr IV Continuous <Continuous>  HYDROmorphone Infusion 2 mG/Hr IV Continuous <Continuous>  insulin lispro (ADMELOG) corrective regimen sliding scale   SubCutaneous every 6 hours  lubricant eye ont 1 Application(s) 1 Application(s) Both EYES two times a day  norepinephrine Infusion 0.5 MICROgram(s)/kG/Min IV Continuous <Continuous>  pantoprazole  Injectable 40 milliGRAM(s) IV Push daily  sodium bicarbonate 1300 milliGRAM(s) Oral three times a day  sodium chloride 0.9% lock flush 10 milliLiter(s) IV Push every 1 hour PRN  veCURonium  Injectable 50 milliGRAM(s) IV Push once    Drug Dosing Weight  Height (cm): 154.4 (15 Nov 2020 09:25)  Weight (kg): 125 (15 Nov 2020 09:25)  BMI (kg/m2): 52.4 (15 Nov 2020 09:25)  BSA (m2): 2.16 (15 Nov 2020 09:25)    CENTRAL LINE: [x ] YES [ ] NO  LOCATION: [facility]   DATE INSERTED:12/5 ( unable too change central line as Patient has diffuse anasarca and limited access. risk of removing line is greater)     MICHELLE: [X ] YES [ ] NO    DATE INSERTED:    A-LINE:  [ X] YES [ ] NO  LOCATION:   DATE INSERTED:    ICU Vital Signs Last 24 Hrs  T(C): 37.1 (16 Dec 2020 09:35), Max: 38 (15 Dec 2020 19:45)  T(F): 98.8 (16 Dec 2020 09:35), Max: 100.4 (15 Dec 2020 19:45)  HR: 101 (16 Dec 2020 12:00) (96 - 124)  BP: --  BP(mean): --  ABP: 131/62 (16 Dec 2020 10:30) (89/44 - 131/62)  ABP(mean): 83 (16 Dec 2020 10:30) (59 - 775)  RR: 23 (16 Dec 2020 10:30) (17 - 35)  SpO2: 94% (16 Dec 2020 12:00) (84% - 97%)      ABG - ( 16 Dec 2020 09:17 )  pH, Arterial: 7.20  pH, Blood: x     /  pCO2: 74    /  pO2: 76    / HCO3: 28    / Base Excess: -0.3  /  SaO2: 94                    12-15 @ 07:01  -  12-16 @ 07:00  --------------------------------------------------------  IN: 646.3 mL / OUT: 0 mL / NET: 646.3 mL        Mode: AC/ CMV (Assist Control/ Continuous Mandatory Ventilation)  RR (machine): 35  TV (machine): 450  FiO2: 100  PEEP: 14  ITime: 0.85  MAP: 30  PIP: 45      REVIEW OF SYSTEMS:  UNABLE TO OBTAIN     PHYSICAL EXAM:    GENERAL: sedated lying in bed comfortably  HEAD:  Atraumatic, Normocephalic  EYES: PERRLA, 2m sluggish response,  conjunctiva mildly icteric   ENT: Moist mucous membranes  NECK: Supple, No JVD  CHEST/LUNG: Clear to auscultation bilaterally; No rales, rhonchi, wheezing, or rubs.  HEART: Regular rate and rhythm; S1+ S2+  ABDOMEN: Bowel sounds present; Soft, Nontender, mildly distended.  EXTREMITIES:  2+ Peripheral Pulses, brisk capillary refill. No clubbing, cyanosis, 2+ UE and LE  edema  NERVOUS SYSTEM: sedated, non responsive, Responds to pain   SKIN: sacral decub ulcer     LABS:  CBC Full  -  ( 16 Dec 2020 06:01 )  WBC Count : 7.93 K/uL  RBC Count : 2.72 M/uL  Hemoglobin : 7.9 g/dL  Hematocrit : 27.0 %  Platelet Count - Automated : 144 K/uL  Mean Cell Volume : 99.3 fl  Mean Cell Hemoglobin : 29.0 pg  Mean Cell Hemoglobin Concentration : 29.3 gm/dL  Auto Neutrophil # : 6.24 K/uL  Auto Lymphocyte # : 0.87 K/uL  Auto Monocyte # : 0.45 K/uL  Auto Eosinophil # : 0.22 K/uL  Auto Basophil # : 0.01 K/uL  Auto Neutrophil % : 78.6 %  Auto Lymphocyte % : 11.0 %  Auto Monocyte % : 5.7 %  Auto Eosinophil % : 2.8 %  Auto Basophil % : 0.1 %    12-16    136  |  96  |  53<H>  ----------------------------<  147<H>  3.8   |  28  |  7.31<H>    Ca    9.6      16 Dec 2020 06:01  Phos  4.8     12-16  Mg     2.3     12-16    TPro  6.3  /  Alb  2.1<L>  /  TBili  0.5  /  DBili  x   /  AST  36  /  ALT  28  /  AlkPhos  133<H>  12-16    PT/INR - ( 15 Dec 2020 06:08 )   PT: 12.5 sec;   INR: 1.05 ratio         PTT - ( 16 Dec 2020 06:01 )  PTT:85.7 sec        RADIOLOGY & ADDITIONAL STUDIES REVIEWED:  ***    [ ]GOALS OF CARE DISCUSSION WITH PATIENT/FAMILY/PROXY: dnr    CRITICAL CARE TIME SPENT: 35 minutes

## 2020-12-16 NOTE — PROGRESS NOTE ADULT - SUBJECTIVE AND OBJECTIVE BOX
Chandan Awad MD (Nephrology progress note)  [address], [facility],  [address],  [facility] [id number]  TEl: [id number]  Cell: [phone]    Patient is a 50y Male seen and evaluated at bedside. Vital signs, laboratory data, imaging studies, consult notes reviewed done within past 24 hours. Overnight events noted. Patient remains critically ill on ventilatory support and IV pressors. Persistent COVID-19 with respiratory failure and remains anuric requiring RRT/HD. Last HD on 12/14 with 3.6 L fluid removal.     Allergies    No Known Allergies    Intolerances        ROS:  Sedated and intubated on ventilatory support    VITALS:    T(C): 37.1 (12-16-20 @ 09:35), Max: 38 (12-15-20 @ 19:45)  HR: 101 (12-16-20 @ 12:00) (96 - 124)  BP: --  RR: 23 (12-16-20 @ 10:30) (17 - 35)  SpO2: 94% (12-16-20 @ 12:00) (84% - 97%)  CAPILLARY BLOOD GLUCOSE      POCT Blood Glucose.: 125 mg/dL (16 Dec 2020 11:09)  POCT Blood Glucose.: 156 mg/dL (16 Dec 2020 05:08)  POCT Blood Glucose.: 197 mg/dL (15 Dec 2020 23:10)  POCT Blood Glucose.: 143 mg/dL (15 Dec 2020 17:01)  POCT Blood Glucose.: 191 mg/dL (15 Dec 2020 13:12)      12-15-20 @ 07:01  -  12-16-20 @ 07:00  --------------------------------------------------------  IN: 646.3 mL / OUT: 0 mL / NET: 646.3 mL    12-16-20 @ 07:01  -  12-16-20 @ 12:57  --------------------------------------------------------  IN: 76.6 mL / OUT: 0 mL / NET: 76.6 mL      MEDICATIONS  (STANDING):  bisacodyl Suppository 10 milliGRAM(s) Rectal daily  chlorhexidine 0.12% Liquid 15 milliLiter(s) Oral Mucosa every 12 hours  chlorhexidine 2% Cloths 1 Application(s) Topical <User Schedule>  glucagon  Injectable 1 milliGRAM(s) IntraMuscular once  heparin  Infusion.  Unit(s)/Hr (22 mL/Hr) IV Continuous <Continuous>  HYDROmorphone Infusion 2 mG/Hr (2 mL/Hr) IV Continuous <Continuous>  insulin lispro (ADMELOG) corrective regimen sliding scale   SubCutaneous every 6 hours  lubricant eye ont 1 Application(s) 1 Application(s) Both EYES two times a day  norepinephrine Infusion 0.5 MICROgram(s)/kG/Min (58.6 mL/Hr) IV Continuous <Continuous>  pantoprazole  Injectable 40 milliGRAM(s) IV Push daily  sodium bicarbonate 1300 milliGRAM(s) Oral three times a day    MEDICATIONS  (PRN):  acetaminophen    Suspension .. 650 milliGRAM(s) Oral every 6 hours PRN Temp greater or equal to 38C (100.4F)  ALBUTerol    90 MICROgram(s) HFA Inhaler 2 Puff(s) Inhalation every 6 hours PRN Shortness of Breath  sodium chloride 0.9% lock flush 10 milliLiter(s) IV Push every 1 hour PRN Pre/post blood products, medications, blood draw, and to maintain line patency      PHYSICAL EXAM:  GENERAL: Sedated and intubated on ventilatory support  HEENT: LEONOR, EOMI, neck supple, no JVP, no carotid bruit, oral mucosa moist and pink.  CHEST/LUNG: Bilateral decreased breath sounds, Bibasilar rales and rhonchi  HEART: Regular rate and rhythm, ERNESTO II/VI at LPSB, no gallops, no rub   ABDOMEN: Soft, nontender, non distended, bowel sounds present, no palpable organomegaly  : No flank or supra pubic tenderness.  EXTREMITIES: Bilateral upper and lower extremity edema++nt  Neurology: Sedated and intubated on ventilatory support  SKIN: No rash or skin lesion  Musculoskeletal: No joint deformity    Vascular ACCESS:     LABS:                        7.9    7.93  )-----------( 144      ( 16 Dec 2020 06:01 )             27.0     12-16    136  |  96  |  53<H>  ----------------------------<  147<H>  3.8   |  28  |  7.31<H>    Ca    9.6      16 Dec 2020 06:01  Phos  4.8     12-16  Mg     2.3     12-16    TPro  6.3  /  Alb  2.1<L>  /  TBili  0.5  /  DBili  x   /  AST  36  /  ALT  28  /  AlkPhos  133<H>  12-16      PT/INR - ( 15 Dec 2020 06:08 )   PT: 12.5 sec;   INR: 1.05 ratio         PTT - ( 16 Dec 2020 06:01 )  PTT:85.7 sec          RADIOLOGY & ADDITIONAL STUDIES:  < from: Xray Chest 1 View- PORTABLE-Routine (Xray Chest 1 View- PORTABLE-Routine in AM.) (12.15.20 @ 08:00) >    EXAM:  XR CHEST PORTABLE ROUTINE 1V                            PROCEDURE DATE:  12/15/2020          INTERPRETATION:  CLINICAL INDICATION: 50 years  Male with ETT.    COMPARISON: 12/14/2020    The tip of the ET tube is at the clavicular heads. The tip of the feeding tube is below the diaphragm. The left jugular catheter tip again overlies the aortic arch.    AP view of the chest demonstrates stable bilateral interstitial and airspace infiltrates.. There is no pleural effusion. There is no pneumothorax.    The heart, mediastinum and cachorro cannot be assessed due to rotation.    The pulmonary vasculature is normal.    Mild thoracic degenerative changes are present.    IMPRESSION:    ET tube and feeding tube in satisfactory position.    Indeterminate positioning of the left jugular catheter.    Stable bilateral infiltrates.            MILI PERALES MD; Attending Radiologist  This document has been electronically signed. Dec 15 2020 10:57AM    < end of copied text >    Imaging Personally Reviewed:  [x] YES  [ ] NO    Consultant(s) Notes Reviewed:  [x] YES  [ ] NO    Care Discussed with Primary team/ Other Providers [x] YES  [ ] NO

## 2020-12-16 NOTE — PROGRESS NOTE ADULT - SUBJECTIVE AND OBJECTIVE BOX
INTERVAL HPI/OVERNIGHT EVENTS: ***    PRESSORS: [ ] YES [ ] NO  WHICH:    ANTIBIOTICS:                  DATE STARTED:  ANTIBIOTICS:                  DATE STARTED:  ANTIBIOTICS:                  DATE STARTED:    Antimicrobial:    Cardiovascular:  norepinephrine Infusion 0.5 MICROgram(s)/kG/Min IV Continuous <Continuous>    Pulmonary:  ALBUTerol    90 MICROgram(s) HFA Inhaler 2 Puff(s) Inhalation every 6 hours PRN    Hematalogic:  heparin  Infusion.  Unit(s)/Hr IV Continuous <Continuous>    Other:  acetaminophen    Suspension .. 650 milliGRAM(s) Oral every 6 hours PRN  bisacodyl Suppository 10 milliGRAM(s) Rectal daily  chlorhexidine 0.12% Liquid 15 milliLiter(s) Oral Mucosa every 12 hours  chlorhexidine 2% Cloths 1 Application(s) Topical <User Schedule>  etomidate Injectable 25 milliGRAM(s) IV Push once  glucagon  Injectable 1 milliGRAM(s) IntraMuscular once  HYDROmorphone Infusion 2 mG/Hr IV Continuous <Continuous>  insulin lispro (ADMELOG) corrective regimen sliding scale   SubCutaneous every 6 hours  lubricant eye ont 1 Application(s) 1 Application(s) Both EYES two times a day  pantoprazole  Injectable 40 milliGRAM(s) IV Push daily  sodium bicarbonate 1300 milliGRAM(s) Oral three times a day  sodium chloride 0.9% lock flush 10 milliLiter(s) IV Push every 1 hour PRN  veCURonium  Injectable 50 milliGRAM(s) IV Push once    acetaminophen    Suspension .. 650 milliGRAM(s) Oral every 6 hours PRN  ALBUTerol    90 MICROgram(s) HFA Inhaler 2 Puff(s) Inhalation every 6 hours PRN  bisacodyl Suppository 10 milliGRAM(s) Rectal daily  chlorhexidine 0.12% Liquid 15 milliLiter(s) Oral Mucosa every 12 hours  chlorhexidine 2% Cloths 1 Application(s) Topical <User Schedule>  etomidate Injectable 25 milliGRAM(s) IV Push once  glucagon  Injectable 1 milliGRAM(s) IntraMuscular once  heparin  Infusion.  Unit(s)/Hr IV Continuous <Continuous>  HYDROmorphone Infusion 2 mG/Hr IV Continuous <Continuous>  insulin lispro (ADMELOG) corrective regimen sliding scale   SubCutaneous every 6 hours  lubricant eye ont 1 Application(s) 1 Application(s) Both EYES two times a day  norepinephrine Infusion 0.5 MICROgram(s)/kG/Min IV Continuous <Continuous>  pantoprazole  Injectable 40 milliGRAM(s) IV Push daily  sodium bicarbonate 1300 milliGRAM(s) Oral three times a day  sodium chloride 0.9% lock flush 10 milliLiter(s) IV Push every 1 hour PRN  veCURonium  Injectable 50 milliGRAM(s) IV Push once    Drug Dosing Weight  Height (cm): 154.4 (15 Nov 2020 09:25)  Weight (kg): 125 (15 Nov 2020 09:25)  BMI (kg/m2): 52.4 (15 Nov 2020 09:25)  BSA (m2): 2.16 (15 Nov 2020 09:25)    CENTRAL LINE: [ ] YES [ ] NO  LOCATION:   DATE INSERTED:    MICHELLE: [ ] YES [ ] NO    DATE INSERTED:    A-LINE:  [ ] YES [ ] NO  LOCATION:   DATE INSERTED:    ICU Vital Signs Last 24 Hrs  T(C): 37.1 (16 Dec 2020 09:35), Max: 38 (15 Dec 2020 19:45)  T(F): 98.8 (16 Dec 2020 09:35), Max: 100.4 (15 Dec 2020 19:45)  HR: 101 (16 Dec 2020 12:00) (96 - 124)  BP: --  BP(mean): --  ABP: 131/62 (16 Dec 2020 10:30) (89/44 - 131/62)  ABP(mean): 83 (16 Dec 2020 10:30) (59 - 775)  RR: 23 (16 Dec 2020 10:30) (17 - 35)  SpO2: 94% (16 Dec 2020 12:00) (84% - 97%)      ABG - ( 16 Dec 2020 09:17 )  pH, Arterial: 7.20  pH, Blood: x     /  pCO2: 74    /  pO2: 76    / HCO3: 28    / Base Excess: -0.3  /  SaO2: 94                    12-15 @ 07:01  -  12-16 @ 07:00  --------------------------------------------------------  IN: 646.3 mL / OUT: 0 mL / NET: 646.3 mL        Mode: AC/ CMV (Assist Control/ Continuous Mandatory Ventilation)  RR (machine): 35  TV (machine): 450  FiO2: 100  PEEP: 14  ITime: 0.85  MAP: 30  PIP: 45      REVIEW OF SYSTEMS:    CONSTITUTIONAL: No weakness, fevers or chills  NECK: No pain or stiffnes  RESPIRATORY: ++ cough, +wheezing, + dyspnea,   CARDIOVASCULAR: No chest pain or palpitations  GASTROINTESTINAL: No abdominal or epigastric pain. No nausea, vomiting, No diarrhea or constipation. No melena or hematochezia.  GENITOURINARY: No dysuria, frequency or hematuria  NEUROLOGICAL: No numbness or weakness  All other review of systems is negative unless indicated above    PHYSICAL EXAM:    >>> <<<    LABS:  CBC Full  -  ( 16 Dec 2020 06:01 )  WBC Count : 7.93 K/uL  RBC Count : 2.72 M/uL  Hemoglobin : 7.9 g/dL  Hematocrit : 27.0 %  Platelet Count - Automated : 144 K/uL  Mean Cell Volume : 99.3 fl  Mean Cell Hemoglobin : 29.0 pg  Mean Cell Hemoglobin Concentration : 29.3 gm/dL  Auto Neutrophil # : 6.24 K/uL  Auto Lymphocyte # : 0.87 K/uL  Auto Monocyte # : 0.45 K/uL  Auto Eosinophil # : 0.22 K/uL  Auto Basophil # : 0.01 K/uL  Auto Neutrophil % : 78.6 %  Auto Lymphocyte % : 11.0 %  Auto Monocyte % : 5.7 %  Auto Eosinophil % : 2.8 %  Auto Basophil % : 0.1 %    12-16    136  |  96  |  53<H>  ----------------------------<  147<H>  3.8   |  28  |  7.31<H>    Ca    9.6      16 Dec 2020 06:01  Phos  4.8     12-16  Mg     2.3     12-16    TPro  6.3  /  Alb  2.1<L>  /  TBili  0.5  /  DBili  x   /  AST  36  /  ALT  28  /  AlkPhos  133<H>  12-16    PT/INR - ( 15 Dec 2020 06:08 )   PT: 12.5 sec;   INR: 1.05 ratio         PTT - ( 16 Dec 2020 06:01 )  PTT:85.7 sec        RADIOLOGY & ADDITIONAL STUDIES REVIEWED:  ***    [ ]GOALS OF CARE DISCUSSION WITH PATIENT/FAMILY/PROXY:    CRITICAL CARE TIME SPENT: 35 minutes INTERVAL HPI/OVERNIGHT EVENTS: patient remains hypoxic on high vent settings     PRESSORS: [ ] YES [ ] NO  WHICH:    ANTIBIOTICS:                  DATE STARTED:  ANTIBIOTICS:                  DATE STARTED:  ANTIBIOTICS:                  DATE STARTED:    Antimicrobial:    Cardiovascular:  norepinephrine Infusion 0.5 MICROgram(s)/kG/Min IV Continuous <Continuous>    Pulmonary:  ALBUTerol    90 MICROgram(s) HFA Inhaler 2 Puff(s) Inhalation every 6 hours PRN    Hematalogic:  heparin  Infusion.  Unit(s)/Hr IV Continuous <Continuous>    Other:  acetaminophen    Suspension .. 650 milliGRAM(s) Oral every 6 hours PRN  bisacodyl Suppository 10 milliGRAM(s) Rectal daily  chlorhexidine 0.12% Liquid 15 milliLiter(s) Oral Mucosa every 12 hours  chlorhexidine 2% Cloths 1 Application(s) Topical <User Schedule>  etomidate Injectable 25 milliGRAM(s) IV Push once  glucagon  Injectable 1 milliGRAM(s) IntraMuscular once  HYDROmorphone Infusion 2 mG/Hr IV Continuous <Continuous>  insulin lispro (ADMELOG) corrective regimen sliding scale   SubCutaneous every 6 hours  lubricant eye ont 1 Application(s) 1 Application(s) Both EYES two times a day  pantoprazole  Injectable 40 milliGRAM(s) IV Push daily  sodium bicarbonate 1300 milliGRAM(s) Oral three times a day  sodium chloride 0.9% lock flush 10 milliLiter(s) IV Push every 1 hour PRN  veCURonium  Injectable 50 milliGRAM(s) IV Push once    acetaminophen    Suspension .. 650 milliGRAM(s) Oral every 6 hours PRN  ALBUTerol    90 MICROgram(s) HFA Inhaler 2 Puff(s) Inhalation every 6 hours PRN  bisacodyl Suppository 10 milliGRAM(s) Rectal daily  chlorhexidine 0.12% Liquid 15 milliLiter(s) Oral Mucosa every 12 hours  chlorhexidine 2% Cloths 1 Application(s) Topical <User Schedule>  etomidate Injectable 25 milliGRAM(s) IV Push once  glucagon  Injectable 1 milliGRAM(s) IntraMuscular once  heparin  Infusion.  Unit(s)/Hr IV Continuous <Continuous>  HYDROmorphone Infusion 2 mG/Hr IV Continuous <Continuous>  insulin lispro (ADMELOG) corrective regimen sliding scale   SubCutaneous every 6 hours  lubricant eye ont 1 Application(s) 1 Application(s) Both EYES two times a day  norepinephrine Infusion 0.5 MICROgram(s)/kG/Min IV Continuous <Continuous>  pantoprazole  Injectable 40 milliGRAM(s) IV Push daily  sodium bicarbonate 1300 milliGRAM(s) Oral three times a day  sodium chloride 0.9% lock flush 10 milliLiter(s) IV Push every 1 hour PRN  veCURonium  Injectable 50 milliGRAM(s) IV Push once    Drug Dosing Weight  Height (cm): 154.4 (15 Nov 2020 09:25)  Weight (kg): 125 (15 Nov 2020 09:25)  BMI (kg/m2): 52.4 (15 Nov 2020 09:25)  BSA (m2): 2.16 (15 Nov 2020 09:25)    CENTRAL LINE: [ ] YES [ ] NO  LOCATION:   DATE INSERTED:    MICHELLE: [ ] YES [ ] NO    DATE INSERTED:    A-LINE:  [ ] YES [ ] NO  LOCATION:   DATE INSERTED:    ICU Vital Signs Last 24 Hrs  T(C): 37.1 (16 Dec 2020 09:35), Max: 38 (15 Dec 2020 19:45)  T(F): 98.8 (16 Dec 2020 09:35), Max: 100.4 (15 Dec 2020 19:45)  HR: 101 (16 Dec 2020 12:00) (96 - 124)  BP: --  BP(mean): --  ABP: 131/62 (16 Dec 2020 10:30) (89/44 - 131/62)  ABP(mean): 83 (16 Dec 2020 10:30) (59 - 775)  RR: 23 (16 Dec 2020 10:30) (17 - 35)  SpO2: 94% (16 Dec 2020 12:00) (84% - 97%)      ABG - ( 16 Dec 2020 09:17 )  pH, Arterial: 7.20  pH, Blood: x     /  pCO2: 74    /  pO2: 76    / HCO3: 28    / Base Excess: -0.3  /  SaO2: 94                    12-15 @ 07:01  -  12-16 @ 07:00  --------------------------------------------------------  IN: 646.3 mL / OUT: 0 mL / NET: 646.3 mL        Mode: AC/ CMV (Assist Control/ Continuous Mandatory Ventilation)  RR (machine): 35  TV (machine): 450  FiO2: 100  PEEP: 14  ITime: 0.85  MAP: 30  PIP: 45      REVIEW OF SYSTEMS:    CONSTITUTIONAL: No weakness, fevers or chills  NECK: No pain or stiffnes  RESPIRATORY: ++ cough, +wheezing, + dyspnea,   CARDIOVASCULAR: No chest pain or palpitations  GASTROINTESTINAL: No abdominal or epigastric pain. No nausea, vomiting, No diarrhea or constipation. No melena or hematochezia.  GENITOURINARY: No dysuria, frequency or hematuria  NEUROLOGICAL: No numbness or weakness  All other review of systems is negative unless indicated above    PHYSICAL EXAM:    >>> <<<    LABS:  CBC Full  -  ( 16 Dec 2020 06:01 )  WBC Count : 7.93 K/uL  RBC Count : 2.72 M/uL  Hemoglobin : 7.9 g/dL  Hematocrit : 27.0 %  Platelet Count - Automated : 144 K/uL  Mean Cell Volume : 99.3 fl  Mean Cell Hemoglobin : 29.0 pg  Mean Cell Hemoglobin Concentration : 29.3 gm/dL  Auto Neutrophil # : 6.24 K/uL  Auto Lymphocyte # : 0.87 K/uL  Auto Monocyte # : 0.45 K/uL  Auto Eosinophil # : 0.22 K/uL  Auto Basophil # : 0.01 K/uL  Auto Neutrophil % : 78.6 %  Auto Lymphocyte % : 11.0 %  Auto Monocyte % : 5.7 %  Auto Eosinophil % : 2.8 %  Auto Basophil % : 0.1 %    12-16    136  |  96  |  53<H>  ----------------------------<  147<H>  3.8   |  28  |  7.31<H>    Ca    9.6      16 Dec 2020 06:01  Phos  4.8     12-16  Mg     2.3     12-16    TPro  6.3  /  Alb  2.1<L>  /  TBili  0.5  /  DBili  x   /  AST  36  /  ALT  28  /  AlkPhos  133<H>  12-16    PT/INR - ( 15 Dec 2020 06:08 )   PT: 12.5 sec;   INR: 1.05 ratio         PTT - ( 16 Dec 2020 06:01 )  PTT:85.7 sec        RADIOLOGY & ADDITIONAL STUDIES REVIEWED:  ***    [ ]GOALS OF CARE DISCUSSION WITH PATIENT/FAMILY/PROXY:    CRITICAL CARE TIME SPENT: 35 minutes INTERVAL HPI/OVERNIGHT EVENTS: patient remains hypoxic on high vent settings     PRESSORS: [x ] YES [ ] NO  WHICH:        Cardiovascular:  norepinephrine Infusion 0.5 MICROgram(s)/kG/Min IV Continuous <Continuous>    Pulmonary:  ALBUTerol    90 MICROgram(s) HFA Inhaler 2 Puff(s) Inhalation every 6 hours PRN    Hematalogic:  heparin  Infusion.  Unit(s)/Hr IV Continuous <Continuous>    Other:  acetaminophen    Suspension .. 650 milliGRAM(s) Oral every 6 hours PRN  bisacodyl Suppository 10 milliGRAM(s) Rectal daily  chlorhexidine 0.12% Liquid 15 milliLiter(s) Oral Mucosa every 12 hours  chlorhexidine 2% Cloths 1 Application(s) Topical <User Schedule>  etomidate Injectable 25 milliGRAM(s) IV Push once  glucagon  Injectable 1 milliGRAM(s) IntraMuscular once  HYDROmorphone Infusion 2 mG/Hr IV Continuous <Continuous>  insulin lispro (ADMELOG) corrective regimen sliding scale   SubCutaneous every 6 hours  lubricant eye ont 1 Application(s) 1 Application(s) Both EYES two times a day  pantoprazole  Injectable 40 milliGRAM(s) IV Push daily  sodium bicarbonate 1300 milliGRAM(s) Oral three times a day  sodium chloride 0.9% lock flush 10 milliLiter(s) IV Push every 1 hour PRN  veCURonium  Injectable 50 milliGRAM(s) IV Push once    acetaminophen    Suspension .. 650 milliGRAM(s) Oral every 6 hours PRN  ALBUTerol    90 MICROgram(s) HFA Inhaler 2 Puff(s) Inhalation every 6 hours PRN  bisacodyl Suppository 10 milliGRAM(s) Rectal daily  chlorhexidine 0.12% Liquid 15 milliLiter(s) Oral Mucosa every 12 hours  chlorhexidine 2% Cloths 1 Application(s) Topical <User Schedule>  etomidate Injectable 25 milliGRAM(s) IV Push once  glucagon  Injectable 1 milliGRAM(s) IntraMuscular once  heparin  Infusion.  Unit(s)/Hr IV Continuous <Continuous>  HYDROmorphone Infusion 2 mG/Hr IV Continuous <Continuous>  insulin lispro (ADMELOG) corrective regimen sliding scale   SubCutaneous every 6 hours  lubricant eye ont 1 Application(s) 1 Application(s) Both EYES two times a day  norepinephrine Infusion 0.5 MICROgram(s)/kG/Min IV Continuous <Continuous>  pantoprazole  Injectable 40 milliGRAM(s) IV Push daily  sodium bicarbonate 1300 milliGRAM(s) Oral three times a day  sodium chloride 0.9% lock flush 10 milliLiter(s) IV Push every 1 hour PRN  veCURonium  Injectable 50 milliGRAM(s) IV Push once    Drug Dosing Weight  Height (cm): 154.4 (15 Nov 2020 09:25)  Weight (kg): 125 (15 Nov 2020 09:25)  BMI (kg/m2): 52.4 (15 Nov 2020 09:25)  BSA (m2): 2.16 (15 Nov 2020 09:25)    CENTRAL LINE: [x ] YES [ ] NO  LOCATION: [facility]   DATE INSERTED:12/5    MICHELLE: [X ] YES [ ] NO    DATE INSERTED:    A-LINE:  [ X] YES [ ] NO  LOCATION:   DATE INSERTED:    ICU Vital Signs Last 24 Hrs  T(C): 37.1 (16 Dec 2020 09:35), Max: 38 (15 Dec 2020 19:45)  T(F): 98.8 (16 Dec 2020 09:35), Max: 100.4 (15 Dec 2020 19:45)  HR: 101 (16 Dec 2020 12:00) (96 - 124)  BP: --  BP(mean): --  ABP: 131/62 (16 Dec 2020 10:30) (89/44 - 131/62)  ABP(mean): 83 (16 Dec 2020 10:30) (59 - 775)  RR: 23 (16 Dec 2020 10:30) (17 - 35)  SpO2: 94% (16 Dec 2020 12:00) (84% - 97%)      ABG - ( 16 Dec 2020 09:17 )  pH, Arterial: 7.20  pH, Blood: x     /  pCO2: 74    /  pO2: 76    / HCO3: 28    / Base Excess: -0.3  /  SaO2: 94                    12-15 @ 07:01  -  12-16 @ 07:00  --------------------------------------------------------  IN: 646.3 mL / OUT: 0 mL / NET: 646.3 mL        Mode: AC/ CMV (Assist Control/ Continuous Mandatory Ventilation)  RR (machine): 35  TV (machine): 450  FiO2: 100  PEEP: 14  ITime: 0.85  MAP: 30  PIP: 45      REVIEW OF SYSTEMS:  UNABLE TO OBTAIN     PHYSICAL EXAM:    GENERAL: sedated lying in bed comfortably  HEAD:  Atraumatic, Normocephalic  EYES: PERRLA, 2m sluggish response,  conjunctiva mildly icteric   ENT: Moist mucous membranes  NECK: Supple, No JVD  CHEST/LUNG: Clear to auscultation bilaterally; No rales, rhonchi, wheezing, or rubs.  HEART: Regular rate and rhythm; S1+ S2+  ABDOMEN: Bowel sounds present; Soft, Nontender, mildly distended.  EXTREMITIES:  2+ Peripheral Pulses, brisk capillary refill. No clubbing, cyanosis, 2+ UE and LE  edema  NERVOUS SYSTEM: sedated, non responsive, Responds to pain   SKIN: sacral decub ulcer     LABS:  CBC Full  -  ( 16 Dec 2020 06:01 )  WBC Count : 7.93 K/uL  RBC Count : 2.72 M/uL  Hemoglobin : 7.9 g/dL  Hematocrit : 27.0 %  Platelet Count - Automated : 144 K/uL  Mean Cell Volume : 99.3 fl  Mean Cell Hemoglobin : 29.0 pg  Mean Cell Hemoglobin Concentration : 29.3 gm/dL  Auto Neutrophil # : 6.24 K/uL  Auto Lymphocyte # : 0.87 K/uL  Auto Monocyte # : 0.45 K/uL  Auto Eosinophil # : 0.22 K/uL  Auto Basophil # : 0.01 K/uL  Auto Neutrophil % : 78.6 %  Auto Lymphocyte % : 11.0 %  Auto Monocyte % : 5.7 %  Auto Eosinophil % : 2.8 %  Auto Basophil % : 0.1 %    12-16    136  |  96  |  53<H>  ----------------------------<  147<H>  3.8   |  28  |  7.31<H>    Ca    9.6      16 Dec 2020 06:01  Phos  4.8     12-16  Mg     2.3     12-16    TPro  6.3  /  Alb  2.1<L>  /  TBili  0.5  /  DBili  x   /  AST  36  /  ALT  28  /  AlkPhos  133<H>  12-16    PT/INR - ( 15 Dec 2020 06:08 )   PT: 12.5 sec;   INR: 1.05 ratio         PTT - ( 16 Dec 2020 06:01 )  PTT:85.7 sec        RADIOLOGY & ADDITIONAL STUDIES REVIEWED:  ***    [ ]GOALS OF CARE DISCUSSION WITH PATIENT/FAMILY/PROXY: dnr    CRITICAL CARE TIME SPENT: 35 minutes

## 2020-12-16 NOTE — PROGRESS NOTE ADULT - ATTENDING COMMENTS
49 yr  old obese male with out any significant medical history  who presented with sob due to acute hypoxic respiratory failure due to covid pneumonia.    Assessment:  1. Acute Hypoxic respiratory Failure / ARDS  2. COVID-19 PNA  3. Morbid Obesity   4. Elevated lactate  5. Hypotension - sedation related  6. Type 2 MI - likely due to hypoxia  7. Acute kidney injury     Plan  - Cont. HD with aggressive fluid removal, as patient with increased pulmonary edema and difficulty ventilating.  - intubated 11/19 for worsening hypoxia and worsening ARDS  - vent management with lung protective strategy   -pt re-intubated 12/16  -propofol held due to high TG  -Cont versed/fentanyl gtt  -Cont. heparin infusion  -monitor blood gas  - keep o2 sat >90%  - Monitor renal and hepatic function  - dialysis a s per neph  - Vasopressor support to maintain MAP > 65  - Tube feedings held due to increased volume and overall edema  - isolation : contact and airborne  - Pain control  - Poor prognosis at this time, given multiple organ failure   - Bicarb gtt  -DNR  - DVT/ GI prophy .

## 2020-12-16 NOTE — PROGRESS NOTE ADULT - SUBJECTIVE AND OBJECTIVE BOX
Chandan Awad MD (Nephrology dialysis Note)  [address], [facility],  [address],  [id number]  TEl: [phone]  Cell: [phone]      Patient was seen and evaluated on dialysis.   HR: 101 (12-16-20 @ 12:00)  BP: --  Wt(kg): --  12-16    136  |  96  |  53<H>  ----------------------------<  147<H>  3.8   |  28  |  7.31<H>    Ca    9.6      16 Dec 2020 06:01  Phos  4.8     12-16  Mg     2.3     12-16    TPro  6.3  /  Alb  2.1<L>  /  TBili  0.5  /  DBili  x   /  AST  36  /  ALT  28  /  AlkPhos  133<H>  12-16      Continue dialysis:   Dialyzer: Revaclear 400            QB: 400             QD:500          K bath: Protocol  Goal UF 4 KG over 4 Hours    Patient is tolerating the procedure well.   Continue full hemodialysis treatment as prescribed.

## 2020-12-16 NOTE — PROGRESS NOTE ADULT - ASSESSMENT
Respiratory failure 2/2 COVID PNA. Patient is currently in multi organ failure and on Dialysis. Requiring pressor support    Acute respiratory failure secondary to Covid pneumonia  Septic Shock and Paroxysmal Atrial fibrillation  AGUSTO ,ATN on dialysis      Neuro:   - ON dilaudid drip to minimize fluid being given to patient  - No improvement in condition or sedative requirement    CVS: #Septic Shock and Paroxysmal Atrial fibrillation  - patient is currently on Levophed  - s/p dig loading HR well controlled now  - EKG with sinus tach->AF  - bedside Echo without global dysfunction    -#troponemia  -9->8.2->8.3 11/21 trended down   -cardio Dr. Tsang saw patient before, Currently he does not need Aspirin and Plavix,   - Will Consult PRN in case patient's condition changes    Pulmonary:   #Acute respiratory failure 2/2 COVID PNA  -ETT placed 11/19, replaced on 12/7 as patient was having a leak in the balloon  - ESR, procal, ferritin decreasing, d-dimer, LDH, CRP decreasing, will trend every 3rd day  - Dexamethasone IV 6mg Qd (10 day course through 11/24) , Finished on 12/4  - CXR 12/11 shows persistent  interstitial lung markings with bilateral airspace opacities   - IgA positive, IgG negative, indicating new infection, No Remdesivir given ARF      ID:   -history and management as above  -changed LIJ to femoral line, removed LIJ 11/25  -WBC 18->11->13.2->15.6->17.8>18>7.4  -RVP +COVID, procal increased to 3.13 as noted above, started cefepime 11/21, added vanc 11/24, added azithromycin given worsening CXR  11/25  - completed abx  -BCx NGF      Nephro: #AGUSTO due to ATN secondary to likely Covid pneumonia   -CrCl 44, Cr 1.1 on admission, acutely worsened 11/21, possibly 2/2 covid injury and/or low BP  -Cr 4.39  -RIJ HD replaced 12/13 and HD started 11/21, last HD 12/14,12/16  -continue phoslo, on bicarb tabs now 1300 Bid, bicarb wnl       GI: #TF  - monitor LFTs AST/ALT,  likely 2/2 COVID, trending down slowly  -Daily CMP  -Protonix IV for PPx  - Senna and Miralax for bowel regimen    Heme:   - Platelets 105k  - monitor for now    # Anemia  - Hb 68  - s/p 1u pRBC 12/12, total 4u pRBC so far    Endocrine: #BG controlled  -A1c 6, average glucose 126  -TSH wnl  -vit D moderately low to 22, c/w 1000U/day   -HSS    Skin/Catheters:   #LIJ placed 12/5-- unable to replace central line as pt is very swollen   #RIJ HD catheter 12/13  #R radial artery 12/04  #eye drops    Prophylaxis:  -patient is on heparin Drip , Protonix for GI ppx    Prognosis:   Poor, DNR< Family informed on 12/5 , being updated daily   Respiratory failure 2/2 COVID PNA. Patient is currently in multi organ failure and on Dialysis. Requiring pressor support    Acute respiratory failure secondary to Covid pneumonia  Septic Shock and Paroxysmal Atrial fibrillation  AGUSTO ,ATN on dialysis      Neuro:   - ON dilaudid drip to minimize fluid being given to patient  - No improvement in condition or sedative requirement    CVS: #Septic Shock and Paroxysmal Atrial fibrillation  - patient is currently on Levophed  - s/p dig loading HR well controlled now  - EKG with sinus tach->AF  - bedside Echo without global dysfunction    -#troponemia  -9->8.2->8.3 11/21 trended down   -cardio Dr. Tsang saw patient before, Currently he does not need Aspirin and Plavix,   - Will Consult PRN in case patient's condition changes    Pulmonary:   #Acute respiratory failure 2/2 COVID PNA  -ETT placed 11/19, replaced on 12/7 as patient was having a leak in the balloon  - ESR, procal, ferritin decreasing, d-dimer, LDH, CRP decreasing, will trend every 3rd day  - Dexamethasone IV 6mg Qd (10 day course through 11/24) , Finished on 12/4  - CXR 12/11 shows persistent  interstitial lung markings with bilateral airspace opacities   - IgA positive, IgG negative, indicating new infection, No Remdesivir given ARF      ID:   -history and management as above  -changed LIJ to femoral line, removed LIJ 11/25  -WBC 18->11->13.2->15.6->17.8>18>7.4  -RVP +COVID, procal increased to 3.13 as noted above, started cefepime 11/21, added vanc 11/24, added azithromycin given worsening CXR  11/25  - completed abx  -BCx NGF      Nephro: #AGUSTO due to ATN secondary to likely Covid pneumonia   -CrCl 44, Cr 1.1 on admission, acutely worsened 11/21, possibly 2/2 covid injury and/or low BP  -Cr 4.39  -RIJ HD replaced 12/13 and HD started 11/21, last HD 12/14,12/16  -continue phoslo, on bicarb tabs now 1300 Bid, bicarb wnl       GI: #TF  - monitor LFTs AST/ALT,  likely 2/2 COVID, trending down slowly  -Daily CMP  -Protonix IV for PPx  - Senna and Miralax for bowel regimen    Heme:   - Platelets 105k  - monitor for now    # Anemia  - Hb 68  - s/p 1u pRBC 12/12, total 4u pRBC so far    Endocrine: #BG controlled  -A1c 6, average glucose 126  -TSH wnl  -vit D moderately low to 22, c/w 1000U/day   -HSS    Skin/Catheters:   #LIJ placed 12/5-- ( unable too change central line as Patient has diffuse anasarca and limited access. risk of removing line is greater)   #RIJ HD catheter 12/13  #R radial artery 12/04  #eye drops    Prophylaxis:  -patient is on heparin Drip , Protonix for GI ppx    Prognosis:   Poor, DNR< Family informed on 12/5 , being updated daily

## 2020-12-16 NOTE — PROGRESS NOTE ADULT - PROBLEM SELECTOR PLAN 2
Patient with Calcium 9.6 with albumin 2.1, Phos  3.5, Mag 2.2  Monitor BMP, calcium, mag, phos level  HD treatment for optimal clearances

## 2020-12-17 NOTE — PROGRESS NOTE ADULT - PROBLEM SELECTOR PLAN 1
Anuric AGUSTO from ATN due to septic shock/ARDS requiring RRT/HD  - S/p HD treatment on 12/16 with net 5 L fluid removal  - Avoid Nephrotoxic agents  - Monitor BMP and electrolytes  - Maintain MAP>65-70 mm hg  - Adjust antibiotics as per renal dose clearances  - Volume status and electrolytes noted.  - Defer HD today  - Next anticipated IHD on 12/18

## 2020-12-17 NOTE — CHART NOTE - NSCHARTNOTEFT_GEN_A_CORE
Palliative Care Note:  Reviewed case with ICU team. Spoke with patient's wife/Naima ([phone]) and daughter-in-law/Roseanne on the phone. Discussed status; reviewed medications, labs, diagnostics. All questions answered; supportive counseling provided.

## 2020-12-17 NOTE — PROGRESS NOTE ADULT - ASSESSMENT
Respiratory failure 2/2 COVID PNA. Patient is currently in multi organ failure and on Dialysis. Requiring pressor support    Acute respiratory failure secondary to Covid pneumonia  Septic Shock and Paroxysmal Atrial fibrillation  AGUSTO ,ATN on dialysis      Neuro:   - On dilaudid drip to minimize fluid being given to patient  - No improvement in condition or sedative requirement    CVS: #Septic Shock and Paroxysmal Atrial fibrillation  - patient is currently on Levophed  - s/p dig loading HR well controlled now  - EKG with sinus tach->AF  - bedside Echo without global dysfunction    -#troponemia  -9->8.2->8.3 11/21 trended down   -cardio Dr. Tsang saw patient before, Currently he does not need Aspirin and Plavix,   - Will Consult PRN in case patient's condition changes    Pulmonary:   #Acute respiratory failure 2/2 COVID PNA  -ETT placed 11/19, replaced on 12/7 as patient was having a leak in the balloon  - ESR, procal, ferritin decreasing, d-dimer, LDH, CRP decreasing, will trend every 3rd day  - Dexamethasone IV 6mg Qd (10 day course through 11/24) , Finished on 12/4  - CXR 12/11 shows persistent  interstitial lung markings with bilateral airspace opacities   - IgA positive, IgG negative, indicating new infection, No Remdesivir given ARF      ID:   -history and management as above  -changed LIJ to femoral line, removed LIJ 11/25  -WBC 18->11->13.2->15.6->17.8>18>7.4->8.3  -RVP +COVID, procal increased to 3.13 as noted above, started cefepime 11/21, added vanc 11/24, added azithromycin given worsening CXR  11/25  - completed abx  -BCx NGF      Nephro: #AGUSTO due to ATN secondary to likely Covid pneumonia   -CrCl 44, Cr 1.1 on admission, acutely worsened 11/21, possibly 2/2 covid injury and/or low BP  -Cr 4.39  -RIJ HD replaced 12/13 and HD started 11/21, last HD 12/14,12/16  -continue phoslo, on bicarb tabs now 1300 Bid, bicarb wnl       GI: #TF  - monitor LFTs AST/ALT,  likely 2/2 COVID, trending down slowly  -Daily CMP  -Protonix IV for PPx  - Senna and Miralax for bowel regimen    Heme:   - Platelets 105k  - monitor for now    # Anemia  - Hb 68  - s/p 1u pRBC 12/12, total 4u pRBC so far    Endocrine: #BG controlled  -A1c 6, average glucose 126  -TSH wnl  -vit D moderately low to 22, c/w 1000U/day   -HSS    Skin/Catheters:   #LIJ placed 12/5-- ( unable too change central line as Patient has diffuse anasarca and limited access. risk of removing line is greater)   #RIJ HD catheter 12/13  #R radial artery 12/04  #eye drops    Prophylaxis:  -patient is on heparin Drip , Protonix for GI ppx    Prognosis:   Poor, DNR< Family informed on 12/5 , being updated daily

## 2020-12-17 NOTE — PROGRESS NOTE ADULT - PROBLEM SELECTOR PLAN 5
Anemia of chronic illness with stable hgb 8.1  Monitor serial CBC  Transfuse PRBC per primary team  Monitor hgb/Hct

## 2020-12-17 NOTE — PROGRESS NOTE ADULT - ATTENDING COMMENTS
49 yr  old obese male with out any significant medical history  who presented with sob due to acute hypoxic respiratory failure due to covid pneumonia.    Assessment:  1. Acute Hypoxic respiratory Failure / ARDS  2. COVID-19 PNA  3. Morbid Obesity   4. Elevated lactate  5. Hypotension - sedation related  6. Type 2 MI - likely due to hypoxia  7. Acute kidney injury     Plan  - Cont. HD with aggressive fluid removal, as patient with increased pulmonary edema and difficulty ventilating.  - intubated 11/19 for worsening hypoxia and worsening ARDS  - vent management with lung protective strategy   -pt re-intubated 12/16  -propofol held due to high TG  -Cont versed/fentanyl gtt  -Cont. heparin infusion  -monitor blood gas  - keep o2 sat >90%  - Monitor renal and hepatic function  - dialysis a s per neph  - Vasopressor support to maintain MAP > 65  - Tube feedings held due to increased volume and overall edema  - isolation : contact and airborne  - Pain control  - Poor prognosis at this time, given multiple organ failure   - Bicarb gtt  -DNR  - DVT/ GI prophy .  -poor prognosis

## 2020-12-17 NOTE — PROGRESS NOTE ADULT - PROBLEM SELECTOR PLAN 2
Patient with Calcium 9.5 with albumin 2.1, Phos  3.6, Mag 2.2  Monitor BMP, calcium, mag, phos level  HD treatment for optimal clearances

## 2020-12-17 NOTE — PROGRESS NOTE ADULT - SUBJECTIVE AND OBJECTIVE BOX
INTERVAL HPI/OVERNIGHT EVENTS: ***    PRESSORS: [ ] YES [ ] NO  WHICH:    ANTIBIOTICS:                  DATE STARTED:  ANTIBIOTICS:                  DATE STARTED:  ANTIBIOTICS:                  DATE STARTED:    Antimicrobial:    Cardiovascular:  norepinephrine Infusion 0.5 MICROgram(s)/kG/Min IV Continuous <Continuous>    Pulmonary:  ALBUTerol    90 MICROgram(s) HFA Inhaler 2 Puff(s) Inhalation every 6 hours PRN    Hematalogic:    Other:  acetaminophen    Suspension .. 650 milliGRAM(s) Enteral Tube every 6 hours PRN  chlorhexidine 0.12% Liquid 15 milliLiter(s) Oral Mucosa every 12 hours  chlorhexidine 2% Cloths 1 Application(s) Topical <User Schedule>  glucagon  Injectable 1 milliGRAM(s) IntraMuscular once  HYDROmorphone Infusion 3 mG/Hr IV Continuous <Continuous>  insulin lispro (ADMELOG) corrective regimen sliding scale   SubCutaneous every 6 hours  lubricant eye ont 1 Application(s) 1 Application(s) Both EYES two times a day  pantoprazole   Suspension 40 milliGRAM(s) Oral daily  polyethylene glycol 3350 17 Gram(s) Oral daily  senna Syrup 10 milliLiter(s) Oral at bedtime  sodium bicarbonate 1300 milliGRAM(s) Oral three times a day  sodium chloride 0.9% lock flush 10 milliLiter(s) IV Push every 1 hour PRN    acetaminophen    Suspension .. 650 milliGRAM(s) Enteral Tube every 6 hours PRN  ALBUTerol    90 MICROgram(s) HFA Inhaler 2 Puff(s) Inhalation every 6 hours PRN  chlorhexidine 0.12% Liquid 15 milliLiter(s) Oral Mucosa every 12 hours  chlorhexidine 2% Cloths 1 Application(s) Topical <User Schedule>  glucagon  Injectable 1 milliGRAM(s) IntraMuscular once  HYDROmorphone Infusion 3 mG/Hr IV Continuous <Continuous>  insulin lispro (ADMELOG) corrective regimen sliding scale   SubCutaneous every 6 hours  lubricant eye ont 1 Application(s) 1 Application(s) Both EYES two times a day  norepinephrine Infusion 0.5 MICROgram(s)/kG/Min IV Continuous <Continuous>  pantoprazole   Suspension 40 milliGRAM(s) Oral daily  polyethylene glycol 3350 17 Gram(s) Oral daily  senna Syrup 10 milliLiter(s) Oral at bedtime  sodium bicarbonate 1300 milliGRAM(s) Oral three times a day  sodium chloride 0.9% lock flush 10 milliLiter(s) IV Push every 1 hour PRN    Drug Dosing Weight  Height (cm): 154.4 (15 Nov 2020 09:25)  Weight (kg): 125 (15 Nov 2020 09:25)  BMI (kg/m2): 52.4 (15 Nov 2020 09:25)  BSA (m2): 2.16 (15 Nov 2020 09:25)    CENTRAL LINE: [ ] YES [ ] NO  LOCATION:   DATE INSERTED:    MICHELLE: [ ] YES [ ] NO    DATE INSERTED:    A-LINE:  [ ] YES [ ] NO  LOCATION:   DATE INSERTED:    ICU Vital Signs Last 24 Hrs  T(C): 39 (17 Dec 2020 05:30), Max: 39.4 (17 Dec 2020 00:00)  T(F): 102.2 (17 Dec 2020 05:30), Max: 103 (17 Dec 2020 00:00)  HR: 108 (17 Dec 2020 09:45) (99 - 126)  BP: --  BP(mean): --  ABP: 113/59 (17 Dec 2020 09:45) (80/41 - 150/68)  ABP(mean): 74 (17 Dec 2020 09:45) (53 - 97)  RR: 26 (17 Dec 2020 09:45) (13 - 36)  SpO2: 94% (17 Dec 2020 09:45) (54% - 98%)      ABG - ( 17 Dec 2020 04:49 )  pH, Arterial: 7.21  pH, Blood: x     /  pCO2: 65    /  pO2: 78    / HCO3: 25    / Base Excess: -2.4  /  SaO2: 96                    12-16 @ 07:01  -  12-17 @ 07:00  --------------------------------------------------------  IN: 1711.1 mL / OUT: 5700 mL / NET: -3988.9 mL        Mode: AC/ CMV (Assist Control/ Continuous Mandatory Ventilation)  RR (machine): 35  TV (machine): 450  FiO2: 100  PEEP: 14  ITime: 0.9  MAP: 32  PIP: 49      REVIEW OF SYSTEMS:    CONSTITUTIONAL: No weakness, fevers or chills  NECK: No pain or stiffnes  RESPIRATORY: ++ cough, +wheezing, + dyspnea,   CARDIOVASCULAR: No chest pain or palpitations  GASTROINTESTINAL: No abdominal or epigastric pain. No nausea, vomiting, No diarrhea or constipation. No melena or hematochezia.  GENITOURINARY: No dysuria, frequency or hematuria  NEUROLOGICAL: No numbness or weakness  All other review of systems is negative unless indicated above    PHYSICAL EXAM:    >>> <<<    LABS:  CBC Full  -  ( 17 Dec 2020 06:03 )  WBC Count : 8.31 K/uL  RBC Count : 2.81 M/uL  Hemoglobin : 8.1 g/dL  Hematocrit : 28.0 %  Platelet Count - Automated : 185 K/uL  Mean Cell Volume : 99.6 fl  Mean Cell Hemoglobin : 28.8 pg  Mean Cell Hemoglobin Concentration : 28.9 gm/dL  Auto Neutrophil # : 5.98 K/uL  Auto Lymphocyte # : 1.33 K/uL  Auto Monocyte # : 0.58 K/uL  Auto Eosinophil # : 0.33 K/uL  Auto Basophil # : 0.00 K/uL  Auto Neutrophil % : 67.0 %  Auto Lymphocyte % : 16.0 %  Auto Monocyte % : 7.0 %  Auto Eosinophil % : 4.0 %  Auto Basophil % : 0.0 %    12-17    133<L>  |  95<L>  |  42<H>  ----------------------------<  150<H>  4.2   |  29  |  5.86<H>    Ca    9.5      17 Dec 2020 06:03  Phos  3.6     12-17  Mg     2.2     12-17    TPro  6.7  /  Alb  2.0<L>  /  TBili  0.5  /  DBili  x   /  AST  45<H>  /  ALT  29  /  AlkPhos  143<H>  12-17    PTT - ( 17 Dec 2020 06:03 )  PTT:29.2 sec        RADIOLOGY & ADDITIONAL STUDIES REVIEWED:  ***    [ ]GOALS OF CARE DISCUSSION WITH PATIENT/FAMILY/PROXY:    CRITICAL CARE TIME SPENT: 35 minutes INTERVAL HPI/OVERNIGHT EVENTS: Patient continues to require more pressor support     PRESSORS: [ x] YES [ ] NO  WHICH: levophed    ANTIBIOTICS: vanc, cefepime         DATE STARTED: 12/17      Antimicrobial:    Cardiovascular:  norepinephrine Infusion 0.5 MICROgram(s)/kG/Min IV Continuous <Continuous>    Pulmonary:  ALBUTerol    90 MICROgram(s) HFA Inhaler 2 Puff(s) Inhalation every 6 hours PRN    Hematalogic:    Other:  acetaminophen    Suspension .. 650 milliGRAM(s) Enteral Tube every 6 hours PRN  chlorhexidine 0.12% Liquid 15 milliLiter(s) Oral Mucosa every 12 hours  chlorhexidine 2% Cloths 1 Application(s) Topical <User Schedule>  glucagon  Injectable 1 milliGRAM(s) IntraMuscular once  HYDROmorphone Infusion 3 mG/Hr IV Continuous <Continuous>  insulin lispro (ADMELOG) corrective regimen sliding scale   SubCutaneous every 6 hours  lubricant eye ont 1 Application(s) 1 Application(s) Both EYES two times a day  pantoprazole   Suspension 40 milliGRAM(s) Oral daily  polyethylene glycol 3350 17 Gram(s) Oral daily  senna Syrup 10 milliLiter(s) Oral at bedtime  sodium bicarbonate 1300 milliGRAM(s) Oral three times a day  sodium chloride 0.9% lock flush 10 milliLiter(s) IV Push every 1 hour PRN    acetaminophen    Suspension .. 650 milliGRAM(s) Enteral Tube every 6 hours PRN  ALBUTerol    90 MICROgram(s) HFA Inhaler 2 Puff(s) Inhalation every 6 hours PRN  chlorhexidine 0.12% Liquid 15 milliLiter(s) Oral Mucosa every 12 hours  chlorhexidine 2% Cloths 1 Application(s) Topical <User Schedule>  glucagon  Injectable 1 milliGRAM(s) IntraMuscular once  HYDROmorphone Infusion 3 mG/Hr IV Continuous <Continuous>  insulin lispro (ADMELOG) corrective regimen sliding scale   SubCutaneous every 6 hours  lubricant eye ont 1 Application(s) 1 Application(s) Both EYES two times a day  norepinephrine Infusion 0.5 MICROgram(s)/kG/Min IV Continuous <Continuous>  pantoprazole   Suspension 40 milliGRAM(s) Oral daily  polyethylene glycol 3350 17 Gram(s) Oral daily  senna Syrup 10 milliLiter(s) Oral at bedtime  sodium bicarbonate 1300 milliGRAM(s) Oral three times a day  sodium chloride 0.9% lock flush 10 milliLiter(s) IV Push every 1 hour PRN    Drug Dosing Weight  Height (cm): 154.4 (15 Nov 2020 09:25)  Weight (kg): 125 (15 Nov 2020 09:25)  BMI (kg/m2): 52.4 (15 Nov 2020 09:25)  BSA (m2): 2.16 (15 Nov 2020 09:25)    CENTRAL LINE: [x ] YES [ ] NO  LOCATION: [facility]   DATE INSERTED:12/5 ( unable too change central line as Patient has diffuse anasarca and limited access. risk of removing line is greater)     MICHELLE: [X ] YES [ ] NO    DATE INSERTED:    A-LINE:  [ X] YES [ ] NO  LOCATION:   DATE INSERTED:    ICU Vital Signs Last 24 Hrs  T(C): 39 (17 Dec 2020 05:30), Max: 39.4 (17 Dec 2020 00:00)  T(F): 102.2 (17 Dec 2020 05:30), Max: 103 (17 Dec 2020 00:00)  HR: 108 (17 Dec 2020 09:45) (99 - 126)  BP: --  BP(mean): --  ABP: 113/59 (17 Dec 2020 09:45) (80/41 - 150/68)  ABP(mean): 74 (17 Dec 2020 09:45) (53 - 97)  RR: 26 (17 Dec 2020 09:45) (13 - 36)  SpO2: 94% (17 Dec 2020 09:45) (54% - 98%)      ABG - ( 17 Dec 2020 04:49 )  pH, Arterial: 7.21  pH, Blood: x     /  pCO2: 65    /  pO2: 78    / HCO3: 25    / Base Excess: -2.4  /  SaO2: 96                    12-16 @ 07:01  -  12-17 @ 07:00  --------------------------------------------------------  IN: 1711.1 mL / OUT: 5700 mL / NET: -3988.9 mL        Mode: AC/ CMV (Assist Control/ Continuous Mandatory Ventilation)  RR (machine): 35  TV (machine): 450  FiO2: 100  PEEP: 14  ITime: 0.9  MAP: 32  PIP: 49      REVIEW OF SYSTEMS:    unable to obtain     PHYSICAL EXAM:    GENERAL: sedated lying in bed comfortably  HEAD:  Atraumatic, Normocephalic  EYES: PERRLA, 2m sluggish response,  conjunctiva mildly icteric   ENT: Moist mucous membranes  NECK: Supple, No JVD  CHEST/LUNG: Clear to auscultation bilaterally; No rales, rhonchi, wheezing, or rubs.  HEART: Regular rate and rhythm; S1+ S2+  ABDOMEN: Bowel sounds present; Soft, Nontender, mildly distended.  EXTREMITIES:  2+ Peripheral Pulses, brisk capillary refill. No clubbing, cyanosis, 2+ UE and LE  edema  NERVOUS SYSTEM: sedated, non responsive, Responds to pain   SKIN: sacral decub ulcer     LABS:  CBC Full  -  ( 17 Dec 2020 06:03 )  WBC Count : 8.31 K/uL  RBC Count : 2.81 M/uL  Hemoglobin : 8.1 g/dL  Hematocrit : 28.0 %  Platelet Count - Automated : 185 K/uL  Mean Cell Volume : 99.6 fl  Mean Cell Hemoglobin : 28.8 pg  Mean Cell Hemoglobin Concentration : 28.9 gm/dL  Auto Neutrophil # : 5.98 K/uL  Auto Lymphocyte # : 1.33 K/uL  Auto Monocyte # : 0.58 K/uL  Auto Eosinophil # : 0.33 K/uL  Auto Basophil # : 0.00 K/uL  Auto Neutrophil % : 67.0 %  Auto Lymphocyte % : 16.0 %  Auto Monocyte % : 7.0 %  Auto Eosinophil % : 4.0 %  Auto Basophil % : 0.0 %    12-17    133<L>  |  95<L>  |  42<H>  ----------------------------<  150<H>  4.2   |  29  |  5.86<H>    Ca    9.5      17 Dec 2020 06:03  Phos  3.6     12-17  Mg     2.2     12-17    TPro  6.7  /  Alb  2.0<L>  /  TBili  0.5  /  DBili  x   /  AST  45<H>  /  ALT  29  /  AlkPhos  143<H>  12-17    PTT - ( 17 Dec 2020 06:03 )  PTT:29.2 sec        RADIOLOGY & ADDITIONAL STUDIES REVIEWED:  ***    [ x ]GOALS OF CARE DISCUSSION WITH PATIENT/FAMILY/PROXY: DNR     CRITICAL CARE TIME SPENT: 35 minutes

## 2020-12-17 NOTE — PROGRESS NOTE ADULT - SUBJECTIVE AND OBJECTIVE BOX
Chandan Awad MD (Nephrology progress note)  [address], [facility],  [id number],  [facility]  TEl: [phone]  Cell: [phone]    Patient is a 50y Male seen and evaluated at bedside. Vital signs, laboratory data, imaging studies, consult notes reviewed done within past 24 hours. Overnight events noted. Patient remains critically ill on ventilatory support and IV pressors. Still anuric requiring RRT. Interval HD treatment on 12/16 with 5L fluid removal.     Allergies    No Known Allergies    Intolerances        ROS:  Sedated and intubated on ventilatory support    VITALS:    T(C): 38 (12-17-20 @ 13:00), Max: 39.4 (12-17-20 @ 00:00)  HR: 103 (12-17-20 @ 15:15) (101 - 126)  BP: --  RR: 31 (12-17-20 @ 15:15) (18 - 36)  SpO2: 91% (12-17-20 @ 15:00) (84% - 98%)  CAPILLARY BLOOD GLUCOSE      POCT Blood Glucose.: 162 mg/dL (17 Dec 2020 10:51)  POCT Blood Glucose.: 165 mg/dL (17 Dec 2020 04:51)  POCT Blood Glucose.: 172 mg/dL (16 Dec 2020 23:49)  POCT Blood Glucose.: 153 mg/dL (16 Dec 2020 16:45)      12-16-20 @ 07:01  -  12-17-20 @ 07:00  --------------------------------------------------------  IN: 1741.1 mL / OUT: 5700 mL / NET: -3958.9 mL    12-17-20 @ 07:01  -  12-17-20 @ 15:48  --------------------------------------------------------  IN: 500.3 mL / OUT: 0 mL / NET: 500.3 mL      MEDICATIONS  (STANDING):  cefepime   IVPB 2000 milliGRAM(s) IV Intermittent every 24 hours  chlorhexidine 0.12% Liquid 15 milliLiter(s) Oral Mucosa every 12 hours  chlorhexidine 2% Cloths 1 Application(s) Topical <User Schedule>  glucagon  Injectable 1 milliGRAM(s) IntraMuscular once  heparin  Infusion.  Unit(s)/Hr (22 mL/Hr) IV Continuous <Continuous>  HYDROmorphone Infusion 3 mG/Hr (3 mL/Hr) IV Continuous <Continuous>  insulin lispro (ADMELOG) corrective regimen sliding scale   SubCutaneous every 6 hours  lubricant eye ont 1 Application(s) 1 Application(s) Both EYES two times a day  norepinephrine Infusion 0.5 MICROgram(s)/kG/Min (58.6 mL/Hr) IV Continuous <Continuous>  pantoprazole   Suspension 40 milliGRAM(s) Oral daily  polyethylene glycol 3350 17 Gram(s) Oral daily  senna Syrup 10 milliLiter(s) Oral at bedtime  sodium bicarbonate 1300 milliGRAM(s) Oral three times a day  vancomycin  IVPB 500 milliGRAM(s) IV Intermittent once  vancomycin  IVPB        MEDICATIONS  (PRN):  acetaminophen    Suspension .. 650 milliGRAM(s) Enteral Tube every 6 hours PRN Temp greater or equal to 38C (100.4F)  ALBUTerol    90 MICROgram(s) HFA Inhaler 2 Puff(s) Inhalation every 6 hours PRN Shortness of Breath  heparin   Injectable 13932 Unit(s) IV Push every 6 hours PRN For aPTT less than 40  heparin   Injectable 5000 Unit(s) IV Push every 6 hours PRN For aPTT between 40 - 57  sodium chloride 0.9% lock flush 10 milliLiter(s) IV Push every 1 hour PRN Pre/post blood products, medications, blood draw, and to maintain line patency      PHYSICAL EXAM:  GENERAL: Sedated and intubated on ventilatory support  HEENT: LEONOR, EOMI, neck supple, no JVP, no carotid bruit, oral mucosa moist and pale  CHEST/LUNG: Bilateral decreased breath sounds, bibasilar rales and rhonchi, no wheezing  HEART: Regular rate and rhythm, ERNESTO II/VI at LPSB, no gallops, no rub   ABDOMEN: Soft, nontender, non distended, bowel sounds present, no palpable organomegaly  : No flank or supra pubic tenderness.  EXTREMITIES: Bilateral peripheral pulses present, bilateral upper and lower extremity edema+nt  Neurology: Sedated and intubated on ventilatory support  SKIN: No rash or skin lesion  Musculoskeletal: No joint deformity    Vascular ACCESS:     LABS:                        8.1    8.31  )-----------( 185      ( 17 Dec 2020 06:03 )             28.0     12-17    133<L>  |  95<L>  |  42<H>  ----------------------------<  150<H>  4.2   |  29  |  5.86<H>    Ca    9.5      17 Dec 2020 06:03  Phos  3.6     12-17  Mg     2.2     12-17    TPro  6.7  /  Alb  2.0<L>  /  TBili  0.5  /  DBili  x   /  AST  45<H>  /  ALT  29  /  AlkPhos  143<H>  12-17      PTT - ( 17 Dec 2020 06:03 )  PTT:29.2 sec          RADIOLOGY & ADDITIONAL STUDIES:  ra< from: Xray Chest 1 View- PORTABLE-Routine (Xray Chest 1 View- PORTABLE-Routine in AM.) (12.17.20 @ 08:25) >    EXAM:  XR CHEST PORTABLE ROUTINE 1V                            PROCEDURE DATE:  12/17/2020          INTERPRETATION:  CLINICAL STATEMENT: Follow-up chest pain.    TECHNIQUE: AP view of the chest.    COMPARISON: 12/16/2020    FINDINGS/  IMPRESSION:  ET tube, feeding tube, bilateral central lines again noted. No pneumothorax.    Increased interstitial lung markings without significant change. Small left pleural effusion. Better aeration right lung.    Heart size cannot be accurately assessed in this projection.              GAMALIEL GEORGE MD; Attending Radiologist  This document has been electronically signed. Dec 17 2020  9:33AM    < end of copied text >    Imaging Personally Reviewed:  [x] YES  [ ] NO    Consultant(s) Notes Reviewed:  [x] YES  [ ] NO    Care Discussed with Primary team/ Other Providers [x] YES  [ ] NO

## 2020-12-18 NOTE — PROGRESS NOTE ADULT - SUBJECTIVE AND OBJECTIVE BOX
INTERVAL HPI/OVERNIGHT EVENTS: Patient continues to require more pressor support and high ventilator settings. Pt was given 2 amps of bicarb   PRESSORS: [ x] YES [ ] NO  WHICH: levo    ANTIBIOTICS: vanc, cefepime         DATE STARTED: 12/17    Antimicrobial:  cefepime   IVPB 2000 milliGRAM(s) IV Intermittent every 24 hours  vancomycin  IVPB      vancomycin  IVPB 750 milliGRAM(s) IV Intermittent once    Cardiovascular:  norepinephrine Infusion 0.5 MICROgram(s)/kG/Min IV Continuous <Continuous>    Pulmonary:  ALBUTerol    90 MICROgram(s) HFA Inhaler 2 Puff(s) Inhalation every 6 hours PRN    Hematalogic:  heparin   Injectable 89651 Unit(s) IV Push every 6 hours PRN  heparin   Injectable 5000 Unit(s) IV Push every 6 hours PRN  heparin  Infusion.  Unit(s)/Hr IV Continuous <Continuous>    Other:  acetaminophen    Suspension .. 650 milliGRAM(s) Enteral Tube every 6 hours PRN  chlorhexidine 0.12% Liquid 15 milliLiter(s) Oral Mucosa every 12 hours  chlorhexidine 2% Cloths 1 Application(s) Topical <User Schedule>  glucagon  Injectable 1 milliGRAM(s) IntraMuscular once  HYDROmorphone Infusion 3 mG/Hr IV Continuous <Continuous>  insulin lispro (ADMELOG) corrective regimen sliding scale   SubCutaneous every 6 hours  lubricant eye ont 1 Application(s) 1 Application(s) Both EYES two times a day  pantoprazole   Suspension 40 milliGRAM(s) Oral daily  polyethylene glycol 3350 17 Gram(s) Oral daily  senna Syrup 10 milliLiter(s) Oral at bedtime  sodium bicarbonate 1300 milliGRAM(s) Oral three times a day  sodium chloride 0.9% lock flush 10 milliLiter(s) IV Push every 1 hour PRN    acetaminophen    Suspension .. 650 milliGRAM(s) Enteral Tube every 6 hours PRN  ALBUTerol    90 MICROgram(s) HFA Inhaler 2 Puff(s) Inhalation every 6 hours PRN  cefepime   IVPB 2000 milliGRAM(s) IV Intermittent every 24 hours  chlorhexidine 0.12% Liquid 15 milliLiter(s) Oral Mucosa every 12 hours  chlorhexidine 2% Cloths 1 Application(s) Topical <User Schedule>  glucagon  Injectable 1 milliGRAM(s) IntraMuscular once  heparin   Injectable 14494 Unit(s) IV Push every 6 hours PRN  heparin   Injectable 5000 Unit(s) IV Push every 6 hours PRN  heparin  Infusion.  Unit(s)/Hr IV Continuous <Continuous>  HYDROmorphone Infusion 3 mG/Hr IV Continuous <Continuous>  insulin lispro (ADMELOG) corrective regimen sliding scale   SubCutaneous every 6 hours  lubricant eye ont 1 Application(s) 1 Application(s) Both EYES two times a day  norepinephrine Infusion 0.5 MICROgram(s)/kG/Min IV Continuous <Continuous>  pantoprazole   Suspension 40 milliGRAM(s) Oral daily  polyethylene glycol 3350 17 Gram(s) Oral daily  senna Syrup 10 milliLiter(s) Oral at bedtime  sodium bicarbonate 1300 milliGRAM(s) Oral three times a day  sodium chloride 0.9% lock flush 10 milliLiter(s) IV Push every 1 hour PRN  vancomycin  IVPB      vancomycin  IVPB 750 milliGRAM(s) IV Intermittent once    Drug Dosing Weight  Height (cm): 154.4 (15 Nov 2020 09:25)  Weight (kg): 125 (15 Nov 2020 09:25)  BMI (kg/m2): 52.4 (15 Nov 2020 09:25)  BSA (m2): 2.16 (15 Nov 2020 09:25)    CENTRAL LINE: [x ] YES [ ] NO  LOCATION: [facility]   DATE INSERTED:12/5 ( unable too change central line as Patient has diffuse anasarca and limited access. risk of removing line is greater)     MICHELLE: [X ] YES [ ] NO    DATE INSERTED:    A-LINE:  [ X] YES [ ] NO  LOCATION:   DATE INSERTED:    ICU Vital Signs Last 24 Hrs  T(C): 37.2 (18 Dec 2020 12:00), Max: 38 (18 Dec 2020 08:00)  T(F): 99 (18 Dec 2020 12:00), Max: 100.4 (18 Dec 2020 08:00)  HR: 115 (18 Dec 2020 15:15) (98 - 116)  BP: --  BP(mean): --  ABP: 108/50 (18 Dec 2020 15:15) (93/46 - 128/59)  ABP(mean): 68 (18 Dec 2020 15:15) (60 - 83)  RR: 24 (18 Dec 2020 15:00) (16 - 36)  SpO2: 91% (18 Dec 2020 15:00) (87% - 97%)      ABG - ( 18 Dec 2020 10:55 )  pH, Arterial: 7.14  pH, Blood: x     /  pCO2: 89    /  pO2: 76    / HCO3: 29    / Base Excess: -0.5  /  SaO2: 93                    12-17 @ 07:01  -  12-18 @ 07:00  --------------------------------------------------------  IN: 2012.5 mL / OUT: 0 mL / NET: 2012.5 mL        Mode: AC/ CMV (Assist Control/ Continuous Mandatory Ventilation)  RR (machine): 38  TV (machine): 450  FiO2: 100  PEEP: 16  ITime: 0.9  MAP: 31  PIP: 43      REVIEW OF SYSTEMS:  unable to obtain     PHYSICAL EXAM:    GENERAL: sedated lying in bed comfortably  HEAD:  Atraumatic, Normocephalic  EYES: PERRLA, 2m sluggish response,  conjunctiva mildly icteric   ENT: Moist mucous membranes  NECK: Supple, No JVD  CHEST/LUNG: Clear to auscultation bilaterally; No rales, rhonchi, wheezing, or rubs.  HEART: Regular rate and rhythm; S1+ S2+  ABDOMEN: Bowel sounds present; Soft, Nontender, mildly distended.  EXTREMITIES:  2+ Peripheral Pulses, brisk capillary refill. No clubbing, cyanosis, 2+ UE and LE  edema  NERVOUS SYSTEM: sedated, non responsive, Responds to pain   SKIN: sacral decub ulcer     LABS:  CBC Full  -  ( 18 Dec 2020 06:43 )  WBC Count : 6.59 K/uL  RBC Count : 2.62 M/uL  Hemoglobin : 7.6 g/dL  Hematocrit : 26.2 %  Platelet Count - Automated : 218 K/uL  Mean Cell Volume : 100.0 fl  Mean Cell Hemoglobin : 29.0 pg  Mean Cell Hemoglobin Concentration : 29.0 gm/dL  Auto Neutrophil # : 5.14 K/uL  Auto Lymphocyte # : 0.59 K/uL  Auto Monocyte # : 0.53 K/uL  Auto Eosinophil # : 0.20 K/uL  Auto Basophil # : 0.00 K/uL  Auto Neutrophil % : 68.0 %  Auto Lymphocyte % : 9.0 %  Auto Monocyte % : 8.0 %  Auto Eosinophil % : 3.0 %  Auto Basophil % : 0.0 %    12-18    134<L>  |  94<L>  |  58<H>  ----------------------------<  150<H>  4.5   |  29  |  7.36<H>    Ca    9.9      18 Dec 2020 06:43  Phos  6.0     12-18  Mg     2.4     12-18    TPro  6.7  /  Alb  1.8<L>  /  TBili  0.5  /  DBili  x   /  AST  38  /  ALT  28  /  AlkPhos  127<H>  12-18    PT/INR - ( 18 Dec 2020 06:43 )   PT: 13.7 sec;   INR: 1.16 ratio         PTT - ( 18 Dec 2020 03:13 )  PTT:78.5 sec        RADIOLOGY & ADDITIONAL STUDIES REVIEWED:  ***    [ ]GOALS OF CARE DISCUSSION WITH PATIENT/FAMILY/PROXY:    CRITICAL CARE TIME SPENT: 35 minutes INTERVAL HPI/OVERNIGHT EVENTS: Patient continues to require more pressor support and high ventilator settings. Pt was given 2 amps of bicarb   PRESSORS: [ x] YES [ ] NO  WHICH: levo    ANTIBIOTICS: vanc, cefepime         DATE STARTED: 12/17    Antimicrobial:  cefepime   IVPB 2000 milliGRAM(s) IV Intermittent every 24 hours  vancomycin  IVPB      vancomycin  IVPB 750 milliGRAM(s) IV Intermittent once    Cardiovascular:  norepinephrine Infusion 0.5 MICROgram(s)/kG/Min IV Continuous <Continuous>    Pulmonary:  ALBUTerol    90 MICROgram(s) HFA Inhaler 2 Puff(s) Inhalation every 6 hours PRN    Hematalogic:  heparin   Injectable 86468 Unit(s) IV Push every 6 hours PRN  heparin   Injectable 5000 Unit(s) IV Push every 6 hours PRN  heparin  Infusion.  Unit(s)/Hr IV Continuous <Continuous>    Other:  acetaminophen    Suspension .. 650 milliGRAM(s) Enteral Tube every 6 hours PRN  chlorhexidine 0.12% Liquid 15 milliLiter(s) Oral Mucosa every 12 hours  chlorhexidine 2% Cloths 1 Application(s) Topical <User Schedule>  glucagon  Injectable 1 milliGRAM(s) IntraMuscular once  HYDROmorphone Infusion 3 mG/Hr IV Continuous <Continuous>  insulin lispro (ADMELOG) corrective regimen sliding scale   SubCutaneous every 6 hours  lubricant eye ont 1 Application(s) 1 Application(s) Both EYES two times a day  pantoprazole   Suspension 40 milliGRAM(s) Oral daily  polyethylene glycol 3350 17 Gram(s) Oral daily  senna Syrup 10 milliLiter(s) Oral at bedtime  sodium bicarbonate 1300 milliGRAM(s) Oral three times a day  sodium chloride 0.9% lock flush 10 milliLiter(s) IV Push every 1 hour PRN    acetaminophen    Suspension .. 650 milliGRAM(s) Enteral Tube every 6 hours PRN  ALBUTerol    90 MICROgram(s) HFA Inhaler 2 Puff(s) Inhalation every 6 hours PRN  cefepime   IVPB 2000 milliGRAM(s) IV Intermittent every 24 hours  chlorhexidine 0.12% Liquid 15 milliLiter(s) Oral Mucosa every 12 hours  chlorhexidine 2% Cloths 1 Application(s) Topical <User Schedule>  glucagon  Injectable 1 milliGRAM(s) IntraMuscular once  heparin   Injectable 60868 Unit(s) IV Push every 6 hours PRN  heparin   Injectable 5000 Unit(s) IV Push every 6 hours PRN  heparin  Infusion.  Unit(s)/Hr IV Continuous <Continuous>  HYDROmorphone Infusion 3 mG/Hr IV Continuous <Continuous>  insulin lispro (ADMELOG) corrective regimen sliding scale   SubCutaneous every 6 hours  lubricant eye ont 1 Application(s) 1 Application(s) Both EYES two times a day  norepinephrine Infusion 0.5 MICROgram(s)/kG/Min IV Continuous <Continuous>  pantoprazole   Suspension 40 milliGRAM(s) Oral daily  polyethylene glycol 3350 17 Gram(s) Oral daily  senna Syrup 10 milliLiter(s) Oral at bedtime  sodium bicarbonate 1300 milliGRAM(s) Oral three times a day  sodium chloride 0.9% lock flush 10 milliLiter(s) IV Push every 1 hour PRN  vancomycin  IVPB      vancomycin  IVPB 750 milliGRAM(s) IV Intermittent once    Drug Dosing Weight  Height (cm): 154.4 (15 Nov 2020 09:25)  Weight (kg): 125 (15 Nov 2020 09:25)  BMI (kg/m2): 52.4 (15 Nov 2020 09:25)  BSA (m2): 2.16 (15 Nov 2020 09:25)    CENTRAL LINE: [x ] YES [ ] NO  LOCATION: [facility]   DATE INSERTED:12/5 ( unable too change central line as Patient has diffuse anasarca and limited access. risk of removing line is greater)     MICHELLE: [X ] YES [ ] NO    DATE INSERTED:    A-LINE:  [ X] YES [ ] NO  LOCATION:   DATE INSERTED:    ICU Vital Signs Last 24 Hrs  T(C): 37.2 (18 Dec 2020 12:00), Max: 38 (18 Dec 2020 08:00)  T(F): 99 (18 Dec 2020 12:00), Max: 100.4 (18 Dec 2020 08:00)  HR: 115 (18 Dec 2020 15:15) (98 - 116)  BP: --  BP(mean): --  ABP: 108/50 (18 Dec 2020 15:15) (93/46 - 128/59)  ABP(mean): 68 (18 Dec 2020 15:15) (60 - 83)  RR: 24 (18 Dec 2020 15:00) (16 - 36)  SpO2: 91% (18 Dec 2020 15:00) (87% - 97%)      ABG - ( 18 Dec 2020 10:55 )  pH, Arterial: 7.14  pH, Blood: x     /  pCO2: 89    /  pO2: 76    / HCO3: 29    / Base Excess: -0.5  /  SaO2: 93                    12-17 @ 07:01  -  12-18 @ 07:00  --------------------------------------------------------  IN: 2012.5 mL / OUT: 0 mL / NET: 2012.5 mL        Mode: AC/ CMV (Assist Control/ Continuous Mandatory Ventilation)  RR (machine): 38  TV (machine): 450  FiO2: 100  PEEP: 16  ITime: 0.9  MAP: 31  PIP: 43      REVIEW OF SYSTEMS:  unable to obtain     PHYSICAL EXAM:    GENERAL: sedated lying in bed comfortably  HEAD:  Atraumatic, Normocephalic  EYES: PERRLA, 2m sluggish response,  conjunctiva mildly icteric   ENT: Moist mucous membranes  NECK: Supple, No JVD  CHEST/LUNG: Clear to auscultation bilaterally; No rales, rhonchi, wheezing, or rubs.  HEART: Regular rate and rhythm; S1+ S2+  ABDOMEN: Bowel sounds present; Soft, Nontender, mildly distended.  EXTREMITIES:  2+ Peripheral Pulses, brisk capillary refill. No clubbing, cyanosis, 2+ UE and LE  edema  NERVOUS SYSTEM: sedated, non responsive, Responds to pain   SKIN: sacral decub ulcer     LABS:  CBC Full  -  ( 18 Dec 2020 06:43 )  WBC Count : 6.59 K/uL  RBC Count : 2.62 M/uL  Hemoglobin : 7.6 g/dL  Hematocrit : 26.2 %  Platelet Count - Automated : 218 K/uL  Mean Cell Volume : 100.0 fl  Mean Cell Hemoglobin : 29.0 pg  Mean Cell Hemoglobin Concentration : 29.0 gm/dL  Auto Neutrophil # : 5.14 K/uL  Auto Lymphocyte # : 0.59 K/uL  Auto Monocyte # : 0.53 K/uL  Auto Eosinophil # : 0.20 K/uL  Auto Basophil # : 0.00 K/uL  Auto Neutrophil % : 68.0 %  Auto Lymphocyte % : 9.0 %  Auto Monocyte % : 8.0 %  Auto Eosinophil % : 3.0 %  Auto Basophil % : 0.0 %    12-18    134<L>  |  94<L>  |  58<H>  ----------------------------<  150<H>  4.5   |  29  |  7.36<H>    Ca    9.9      18 Dec 2020 06:43  Phos  6.0     12-18  Mg     2.4     12-18    TPro  6.7  /  Alb  1.8<L>  /  TBili  0.5  /  DBili  x   /  AST  38  /  ALT  28  /  AlkPhos  127<H>  12-18    PT/INR - ( 18 Dec 2020 06:43 )   PT: 13.7 sec;   INR: 1.16 ratio         PTT - ( 18 Dec 2020 03:13 )  PTT:78.5 sec        RADIOLOGY & ADDITIONAL STUDIES REVIEWED:  ***    [ ]GOALS OF CARE DISCUSSION WITH PATIENT/FAMILY/PROXY: DNR     CRITICAL CARE TIME SPENT: 35 minutes

## 2020-12-18 NOTE — CHART NOTE - NSCHARTNOTEFT_GEN_A_CORE
Assessment:   Patient is a 50y old  Male who presents with a chief complaint of Shortness of breath D/t Covid (18 Dec 2020 15:21). Intubated, MOF/ HD. Poor prognosis, family deciding on Hospice, noted.      Factors impacting intake: [ ] none [ ] nausea  [ ] vomiting [ ] diarrhea [ ] constipation  [ ]chewing problems [ ] swallowing issues  [x ] other: see above    Diet Prescription: Diet, NPO with Tube Feed:   Tube Feeding Modality: Nasogastric  Nepro with Carb Steady  Total Volume for 24 Hours (mL): 960  Continuous  Starting Tube Feed Rate {mL per Hour}: 10  Increase Tube Feed Rate by (mL): 5     Every 8 hours  Until Goal Tube Feed Rate (mL per Hour): 40  Tube Feed Duration (in Hours): 24  Tube Feed Start Time: 17:00 (20 @ 15:50)    Intake: Nepro 40 ml/hr documented on flow records, at times    Daily     Daily Weight in k.4 (18 Dec 2020 15:35)  Weight in k.4 (18 Dec 2020 10:32)  Weight in k.4 (18 Dec 2020 08:00)  Weight in k.9 (17 Dec 2020 07:45)  Weight in k.5 (13 Dec 2020 22:35)  Weight in k.5 (13 Dec 2020 18:35)  Weight in k.3 (13 Dec 2020 08:00)  Weight in k.9 (12 Dec 2020 16:45)  Weight in k.6 (11 Dec 2020 08:00)  Weight in k (10 Dec 2020 07:15)  Weight in k (09 Dec 2020 07:00)  Weight in k (08 Dec 2020 16:10)  Weight in k.5 (08 Dec 2020 13:05)  Weight in k (08 Dec 2020 07:00)  Weight in k.6 (07 Dec 2020 07:45)  Weight in k.3 (06 Dec 2020 17:20)  Weight in k (06 Dec 2020 13:50)  Weight in k.6 (06 Dec 2020 07:00)  Weight in k (05 Dec 2020 07:00)    % Weight Change: 3+ generalized edema noted, I>O    Pertinent Medications: MEDICATIONS  (STANDING):  cefepime   IVPB 2000 milliGRAM(s) IV Intermittent every 24 hours  chlorhexidine 0.12% Liquid 15 milliLiter(s) Oral Mucosa every 12 hours  chlorhexidine 2% Cloths 1 Application(s) Topical <User Schedule>  glucagon  Injectable 1 milliGRAM(s) IntraMuscular once  heparin  Infusion.  Unit(s)/Hr (22 mL/Hr) IV Continuous <Continuous>  HYDROmorphone Infusion 3 mG/Hr (3 mL/Hr) IV Continuous <Continuous>  insulin lispro (ADMELOG) corrective regimen sliding scale   SubCutaneous every 6 hours  lubricant eye ont 1 Application(s) 1 Application(s) Both EYES two times a day  norepinephrine Infusion 0.5 MICROgram(s)/kG/Min (58.6 mL/Hr) IV Continuous <Continuous>  pantoprazole   Suspension 40 milliGRAM(s) Oral daily  polyethylene glycol 3350 17 Gram(s) Oral daily  senna Syrup 10 milliLiter(s) Oral at bedtime  sodium bicarbonate 1300 milliGRAM(s) Oral three times a day  vancomycin  IVPB        MEDICATIONS  (PRN):  acetaminophen    Suspension .. 650 milliGRAM(s) Enteral Tube every 6 hours PRN Temp greater or equal to 38C (100.4F)  ALBUTerol    90 MICROgram(s) HFA Inhaler 2 Puff(s) Inhalation every 6 hours PRN Shortness of Breath  heparin   Injectable 88927 Unit(s) IV Push every 6 hours PRN For aPTT less than 40  heparin   Injectable 5000 Unit(s) IV Push every 6 hours PRN For aPTT between 40 - 57  sodium chloride 0.9% lock flush 10 milliLiter(s) IV Push every 1 hour PRN Pre/post blood products, medications, blood draw, and to maintain line patency    Pertinent Labs:  Na134 mmol/L<L> Glu 150 mg/dL<H> K+ 4.5 mmol/L Cr  7.36 mg/dL<H> BUN 58 mg/dL<H>  Phos 6.0 mg/dL<H>  Alb 1.8 g/dL<L>  Chol --    LDL --    HDL --    Trig 414 mg/dL<H>     CAPILLARY BLOOD GLUCOSE      POCT Blood Glucose.: 153 mg/dL (18 Dec 2020 11:01)  POCT Blood Glucose.: 153 mg/dL (18 Dec 2020 05:07)  POCT Blood Glucose.: 164 mg/dL (17 Dec 2020 23:14)  POCT Blood Glucose.: 174 mg/dL (17 Dec 2020 23:13)      Previous Nutrition Diagnosis:   [ ] Altered GI function  [ ]Inadequate Oral Intake [ ] Swallowing Difficulty   [ ] Altered nutrition related labs [ ] Increased Nutrient Needs [ ] Overweight/Obesity   [ ] Unintended Weight Loss [ ] Food & Nutrition Related Knowledge Deficit [x ] Malnutrition (severe PCM)  [ ] Other:     Nutrition Diagnosis is [x ] ongoing  [ ] resolved [ ] not applicable     New Nutrition Diagnosis: [ ] not applicable       Interventions:   Recommend  [ ] Change Diet To:  [ ] Nutrition Supplement  [x ] Nutrition Support: TF as medically feasible and consistent with goals of care. MD to monitor. RD available.   [ ] Other:     Monitoring and Evaluation:   [ x ] follow up per protocol  [ ] other:

## 2020-12-18 NOTE — PROGRESS NOTE ADULT - PROBLEM SELECTOR PLAN 1
Anuric AGUSTO from ATN due to septic shock/ARDS requiring RRT/HD  - S/p HD treatment on 12/16 with net 5 L fluid removal  - Avoid Nephrotoxic agents  - Monitor BMP and electrolytes  - Maintain MAP>65-70 mm hg  - Adjust antibiotics as per renal dose clearances  - Volume status and electrolytes noted.  - HD treatment as per ordered with UF and clearances

## 2020-12-18 NOTE — PROGRESS NOTE ADULT - PROBLEM SELECTOR PLAN 2
Patient with Calcium 9.9 with albumin 1.8, Phos  6, Mag 2.4  Monitor BMP, calcium, mag, phos level  HD treatment for optimal clearances

## 2020-12-18 NOTE — PROGRESS NOTE ADULT - PROBLEM SELECTOR PLAN 5
Anemia of chronic illness with stable hgb 7.6  Monitor serial CBC  Transfuse PRBC per primary team  Monitor hgb/Hct

## 2020-12-18 NOTE — PROGRESS NOTE ADULT - ASSESSMENT
Respiratory failure 2/2 COVID PNA. Patient is currently in multi organ failure and on Dialysis. Requiring pressor support    Acute respiratory failure secondary to Covid pneumonia  Septic Shock and Paroxysmal Atrial fibrillation  AGUSTO ,ATN on dialysis      Neuro:   - On dilaudid drip to minimize fluid being given to patient  - No improvement in condition or sedative requirement    CVS: #Septic Shock and Paroxysmal Atrial fibrillation  - patient is currently on Levophed .19  - s/p dig loading HR well controlled now  - EKG with sinus tach->AF  - bedside Echo without global dysfunction    -#troponemia  -9->8.2->8.3 11/21 trended down   -cardio Dr. Tsang saw patient before, Currently he does not need Aspirin and Plavix,   - Will Consult PRN in case patient's condition changes    Pulmonary:   #Acute respiratory failure 2/2 COVID PNA  -ETT placed 11/19, replaced on 12/16 as patient was having a leak in the balloon  - ESR, procal, ferritin decreasing, d-dimer, LDH, CRP decreasing, will trend every 3rd day  - Dexamethasone IV 6mg Qd (10 day course through 11/24) , Finished on 12/4  - CXR 12/11 shows persistent  interstitial lung markings with bilateral airspace opacities   - IgA positive, IgG negative, indicating new infection, No Remdesivir given ARF  - started on vanc and cefepime 12/17 for persistent fevers       ID:   -history and management as above  -changed LIJ to femoral line, removed LIJ 11/25  -WBC 18->11->13.2->15.6->17.8>18>7.4->8.3  -RVP +COVID, procal increased to 3.13 as noted above, started cefepime 11/21, added vanc 11/24, added azithromycin given worsening CXR  11/25  -started on vanc and cefepime 12/17 for persistent fevers, possible source   -BCx NGTD      Nephro: #AGUSTO due to ATN secondary to likely Covid pneumonia   -CrCl 44, Cr 1.1 on admission, acutely worsened 11/21, possibly 2/2 covid injury and/or low BP  -Cr 4.39  -RIJ HD replaced 12/13 and HD started 11/21, last HD 12/14,12/16  -continue phoslo, on bicarb tabs now 1300 tid,     GI: #TF  - monitor LFTs AST/ALT,  likely 2/2 COVID, trending down slowly  -Daily CMP  -Protonix IV for PPx  -Senna and Miralax for bowel regimen    Heme:   - Platelets 105k  - monitor for now    # Anemia  - Hb 68  - s/p 1u pRBC 12/12, total 4u pRBC so far    Endocrine: #BG controlled  -A1c 6, average glucose 126  -TSH wnl  -vit D moderately low to 22, c/w 1000U/day   -HSS    Skin/Catheters:   #LIJ placed 12/5-- ( unable too change central line as Patient has diffuse anasarca and limited access. risk of removing line is greater)   #RIJ HD catheter 12/13  #R radial artery 12/04  #eye drops    Prophylaxis:  -patient is on heparin Drip , Protonix for GI ppx    Prognosis:   Poor, DNR< Family informed on 12/5 , Family deciding on making patient hospice with comfort care

## 2020-12-18 NOTE — PROGRESS NOTE ADULT - SUBJECTIVE AND OBJECTIVE BOX
Chandan Awad MD (Nephrology progress note)  [address], [facility], [place], operated by [facility],  [address],  [id number]  TEl: [phone]  Cell: [phone]    Patient is a 50y Male seen and evaluated at bedside. Vital signs, laboratory data, imaging studies, consult notes reviewed done within past 24 hours. Overnight events noted. Patient remains critically ill on ventilatory support and IV pressors. Last HD on 12/16 with 5L fluid removal.     Allergies    No Known Allergies    Intolerances        ROS:  Limited. Sedated and intubated on ventilatory support  VITALS:    T(C): 35.3 (12-18-20 @ 10:32), Max: 38 (12-17-20 @ 13:00)  HR: 114 (12-18-20 @ 11:30) (98 - 116)  BP: --  RR: 23 (12-18-20 @ 11:30) (19 - 36)  SpO2: 94% (12-18-20 @ 11:30) (87% - 98%)  CAPILLARY BLOOD GLUCOSE      POCT Blood Glucose.: 153 mg/dL (18 Dec 2020 11:01)  POCT Blood Glucose.: 153 mg/dL (18 Dec 2020 05:07)  POCT Blood Glucose.: 164 mg/dL (17 Dec 2020 23:14)  POCT Blood Glucose.: 174 mg/dL (17 Dec 2020 23:13)  POCT Blood Glucose.: 179 mg/dL (17 Dec 2020 16:36)      12-17-20 @ 07:01  -  12-18-20 @ 07:00  --------------------------------------------------------  IN: 2012.5 mL / OUT: 0 mL / NET: 2012.5 mL    12-18-20 @ 07:01  -  12-18-20 @ 12:07  --------------------------------------------------------  IN: 87.3 mL / OUT: 0 mL / NET: 87.3 mL      MEDICATIONS  (STANDING):  cefepime   IVPB 2000 milliGRAM(s) IV Intermittent every 24 hours  chlorhexidine 0.12% Liquid 15 milliLiter(s) Oral Mucosa every 12 hours  chlorhexidine 2% Cloths 1 Application(s) Topical <User Schedule>  glucagon  Injectable 1 milliGRAM(s) IntraMuscular once  heparin  Infusion.  Unit(s)/Hr (22 mL/Hr) IV Continuous <Continuous>  HYDROmorphone Infusion 3 mG/Hr (3 mL/Hr) IV Continuous <Continuous>  insulin lispro (ADMELOG) corrective regimen sliding scale   SubCutaneous every 6 hours  lubricant eye ont 1 Application(s) 1 Application(s) Both EYES two times a day  norepinephrine Infusion 0.5 MICROgram(s)/kG/Min (58.6 mL/Hr) IV Continuous <Continuous>  pantoprazole   Suspension 40 milliGRAM(s) Oral daily  polyethylene glycol 3350 17 Gram(s) Oral daily  senna Syrup 10 milliLiter(s) Oral at bedtime  sodium bicarbonate 1300 milliGRAM(s) Oral three times a day  vancomycin  IVPB 500 milliGRAM(s) IV Intermittent every 24 hours  vancomycin  IVPB        MEDICATIONS  (PRN):  acetaminophen    Suspension .. 650 milliGRAM(s) Enteral Tube every 6 hours PRN Temp greater or equal to 38C (100.4F)  ALBUTerol    90 MICROgram(s) HFA Inhaler 2 Puff(s) Inhalation every 6 hours PRN Shortness of Breath  heparin   Injectable 61529 Unit(s) IV Push every 6 hours PRN For aPTT less than 40  heparin   Injectable 5000 Unit(s) IV Push every 6 hours PRN For aPTT between 40 - 57  sodium chloride 0.9% lock flush 10 milliLiter(s) IV Push every 1 hour PRN Pre/post blood products, medications, blood draw, and to maintain line patency      PHYSICAL EXAM:  GENERAL: Sedated and intubated on ventilatory support  HEENT: LEONOR, EOMI, neck supple, no JVP, no carotid bruit, oral mucosa moist and pale  CHEST/LUNG: Bilateral decreased breath sounds, bibasilar rales and rhonchi, no wheezing  HEART: Regular rate and rhythm, ERNESTO II/VI at LPSB, no gallops, no rub   ABDOMEN: Soft, nontender, non distended, bowel sounds present, no palpable organomegaly  : No flank or supra pubic tenderness.  EXTREMITIES: Bilateral upper and lower extremity pitting edema++nt  Neurology: SEdated and intubated on ventilatory support  SKIN: No rash or skin lesion  Musculoskeletal: No joint deformity        LABS:                        7.6    6.59  )-----------( 218      ( 18 Dec 2020 06:43 )             26.2     12-18    134<L>  |  94<L>  |  58<H>  ----------------------------<  150<H>  4.5   |  29  |  7.36<H>    Ca    9.9      18 Dec 2020 06:43  Phos  6.0     12-18  Mg     2.4     12-18    TPro  6.7  /  Alb  1.8<L>  /  TBili  0.5  /  DBili  x   /  AST  38  /  ALT  28  /  AlkPhos  127<H>  12-18      PT/INR - ( 18 Dec 2020 06:43 )   PT: 13.7 sec;   INR: 1.16 ratio         PTT - ( 18 Dec 2020 03:13 )  PTT:78.5 sec          RADIOLOGY & ADDITIONAL STUDIES:  ra< from: Xray Chest 1 View- PORTABLE-Routine (Xray Chest 1 View- PORTABLE-Routine in AM.) (12.18.20 @ 08:22) >    EXAM:  XR CHEST PORTABLE ROUTINE 1V                            PROCEDURE DATE:  12/18/2020          INTERPRETATION:  INDICATION: Ventilatory support.    PRIORS: 12/17/2020.    VIEWS: Portable AP radiography of the chest performed.    FINDINGS: Heart size appears within normal limits. Since prior no change in indwelling ETT, bilateral CVC or NGT positions. Bilateral lung parenchymal airspace opacities, extensive, without significant change. Small pleural effusions cannot be excluded. No evidencefor pneumothorax. No mediastinal shift. No acute osseous fractures.    IMPRESSION: No significant interval change.              SOPHIA CHOUDHARY MD; Attending Radiologist  This document has been electronically signed. Dec 18 2020 12:04PM    < end of copied text >    Imaging Personally Reviewed:  [x] YES  [ ] NO    Consultant(s) Notes Reviewed:  [x] YES  [ ] NO    Care Discussed with Primary team/ Other Providers [x] YES  [ ] NO

## 2020-12-19 NOTE — PROGRESS NOTE ADULT - SUBJECTIVE AND OBJECTIVE BOX
INTERVAL HPI/OVERNIGHT EVENTS: Pt has been requiring high ventilator settings. Last HD on 12/16 with 5L fluid removal    PRESSORS: [ x] YES [ ] NO    Antimicrobial:  cefepime   IVPB 2000 milliGRAM(s) IV Intermittent every 24 hours  vancomycin  IVPB        Cardiovascular:  norepinephrine Infusion 0.5 MICROgram(s)/kG/Min IV Continuous <Continuous>    Pulmonary:  ALBUTerol    90 MICROgram(s) HFA Inhaler 2 Puff(s) Inhalation every 6 hours PRN    Hematalogic:  heparin   Injectable 99764 Unit(s) IV Push every 6 hours PRN  heparin   Injectable 5000 Unit(s) IV Push every 6 hours PRN  heparin  Infusion.  Unit(s)/Hr IV Continuous <Continuous>    Other:  acetaminophen    Suspension .. 650 milliGRAM(s) Enteral Tube every 6 hours PRN  chlorhexidine 0.12% Liquid 15 milliLiter(s) Oral Mucosa every 12 hours  chlorhexidine 2% Cloths 1 Application(s) Topical <User Schedule>  glucagon  Injectable 1 milliGRAM(s) IntraMuscular once  HYDROmorphone Infusion 3 mG/Hr IV Continuous <Continuous>  insulin lispro (ADMELOG) corrective regimen sliding scale   SubCutaneous every 6 hours  lubricant eye ont 1 Application(s) 1 Application(s) Both EYES two times a day  pantoprazole   Suspension 40 milliGRAM(s) Oral daily  polyethylene glycol 3350 17 Gram(s) Oral daily  senna Syrup 10 milliLiter(s) Oral at bedtime  sodium bicarbonate 1300 milliGRAM(s) Oral three times a day  sodium chloride 0.9% lock flush 10 milliLiter(s) IV Push every 1 hour PRN    acetaminophen    Suspension .. 650 milliGRAM(s) Enteral Tube every 6 hours PRN  ALBUTerol    90 MICROgram(s) HFA Inhaler 2 Puff(s) Inhalation every 6 hours PRN  cefepime   IVPB 2000 milliGRAM(s) IV Intermittent every 24 hours  chlorhexidine 0.12% Liquid 15 milliLiter(s) Oral Mucosa every 12 hours  chlorhexidine 2% Cloths 1 Application(s) Topical <User Schedule>  glucagon  Injectable 1 milliGRAM(s) IntraMuscular once  heparin   Injectable 02217 Unit(s) IV Push every 6 hours PRN  heparin   Injectable 5000 Unit(s) IV Push every 6 hours PRN  heparin  Infusion.  Unit(s)/Hr IV Continuous <Continuous>  HYDROmorphone Infusion 3 mG/Hr IV Continuous <Continuous>  insulin lispro (ADMELOG) corrective regimen sliding scale   SubCutaneous every 6 hours  lubricant eye ont 1 Application(s) 1 Application(s) Both EYES two times a day  norepinephrine Infusion 0.5 MICROgram(s)/kG/Min IV Continuous <Continuous>  pantoprazole   Suspension 40 milliGRAM(s) Oral daily  polyethylene glycol 3350 17 Gram(s) Oral daily  senna Syrup 10 milliLiter(s) Oral at bedtime  sodium bicarbonate 1300 milliGRAM(s) Oral three times a day  sodium chloride 0.9% lock flush 10 milliLiter(s) IV Push every 1 hour PRN  vancomycin  IVPB        Drug Dosing Weight  Height (cm): 154.4 (15 Nov 2020 09:25)  Weight (kg): 125 (15 Nov 2020 09:25)  BMI (kg/m2): 52.4 (15 Nov 2020 09:25)  BSA (m2): 2.16 (15 Nov 2020 09:25)    CENTRAL LINE: [x ] YES [ ] NO  LOCATION:   DATE INSERTED:  REMOVE: [ ] YES [ ] NO  EXPLAIN:    MICHELLE: [x ] YES [ ] NO    DATE INSERTED:  REMOVE:  [ ] YES [ ] NO  EXPLAIN:    A-LINE:  [x ] YES [ ] NO  LOCATION:   DATE INSERTED:  REMOVE:  [ ] YES [ ] NO  EXPLAIN:    PMH -reviewed admission note, no change since admission      ABG - ( 18 Dec 2020 10:55 )  pH, Arterial: 7.14  pH, Blood: x     /  pCO2: 89    /  pO2: 76    / HCO3: 29    / Base Excess: -0.5  /  SaO2: 93                    12-17 @ 07:01  -  12-18 @ 07:00  --------------------------------------------------------  IN: 2012.5 mL / OUT: 0 mL / NET: 2012.5 mL        Mode: AC/ CMV (Assist Control/ Continuous Mandatory Ventilation)  RR (machine): 38  TV (machine): 450  FiO2: 100  PEEP: 16  ITime: 0.9  MAP: 30  PIP: 42      PHYSICAL EXAM:  GENERAL: sedated lying in bed comfortably  HEAD:  Atraumatic, Normocephalic  EYES: PERRLA, 2m sluggish response,  conjunctiva mildly icteric   ENT: Moist mucous membranes  NECK: Supple, No JVD  CHEST/LUNG: Clear to auscultation bilaterally; No rales, rhonchi, wheezing, or rubs.  HEART: Regular rate and rhythm; S1+ S2+  ABDOMEN: Bowel sounds present; Soft, Nontender, mildly distended.  EXTREMITIES:  2+ Peripheral Pulses, brisk capillary refill. No clubbing, cyanosis, 2+ UE and LE  edema  NERVOUS SYSTEM: sedated, non responsive, Responds to pain   SKIN: sacral decub ulcer       LABS:  CBC Full  -  ( 18 Dec 2020 06:43 )  WBC Count : 6.59 K/uL  RBC Count : 2.62 M/uL  Hemoglobin : 7.6 g/dL  Hematocrit : 26.2 %  Platelet Count - Automated : 218 K/uL  Mean Cell Volume : 100.0 fl  Mean Cell Hemoglobin : 29.0 pg  Mean Cell Hemoglobin Concentration : 29.0 gm/dL  Auto Neutrophil # : 5.14 K/uL  Auto Lymphocyte # : 0.59 K/uL  Auto Monocyte # : 0.53 K/uL  Auto Eosinophil # : 0.20 K/uL  Auto Basophil # : 0.00 K/uL  Auto Neutrophil % : 68.0 %  Auto Lymphocyte % : 9.0 %  Auto Monocyte % : 8.0 %  Auto Eosinophil % : 3.0 %  Auto Basophil % : 0.0 %    12-18    134<L>  |  94<L>  |  58<H>  ----------------------------<  150<H>  4.5   |  29  |  7.36<H>    Ca    9.9      18 Dec 2020 06:43  Phos  6.0     12-18  Mg     2.4     12-18    TPro  6.7  /  Alb  1.8<L>  /  TBili  0.5  /  DBili  x   /  AST  38  /  ALT  28  /  AlkPhos  127<H>  12-18    PT/INR - ( 18 Dec 2020 06:43 )   PT: 13.7 sec;   INR: 1.16 ratio         PTT - ( 18 Dec 2020 03:13 )  PTT:78.5 sec        RADIOLOGY & ADDITIONAL STUDIES REVIEWED:  ***    [ ]GOALS OF CARE DISCUSSION WITH PATIENT/FAMILY/PROXY:    CRITICAL CARE TIME SPENT: 35 minutes INTERVAL HPI/OVERNIGHT EVENTS: Pt has been requiring high ventilator settings. Last HD on 12/16 with 5L fluid removal    PRESSORS: [ x] YES [ ] NO levophed    Antimicrobial:  cefepime   IVPB 2000 milliGRAM(s) IV Intermittent every 24 hours  vancomycin  IVPB        Cardiovascular:  norepinephrine Infusion 0.5 MICROgram(s)/kG/Min IV Continuous <Continuous>    Pulmonary:  ALBUTerol    90 MICROgram(s) HFA Inhaler 2 Puff(s) Inhalation every 6 hours PRN    Hematalogic:  heparin   Injectable 30001 Unit(s) IV Push every 6 hours PRN  heparin   Injectable 5000 Unit(s) IV Push every 6 hours PRN  heparin  Infusion.  Unit(s)/Hr IV Continuous <Continuous>    Other:  acetaminophen    Suspension .. 650 milliGRAM(s) Enteral Tube every 6 hours PRN  chlorhexidine 0.12% Liquid 15 milliLiter(s) Oral Mucosa every 12 hours  chlorhexidine 2% Cloths 1 Application(s) Topical <User Schedule>  glucagon  Injectable 1 milliGRAM(s) IntraMuscular once  HYDROmorphone Infusion 3 mG/Hr IV Continuous <Continuous>  insulin lispro (ADMELOG) corrective regimen sliding scale   SubCutaneous every 6 hours  lubricant eye ont 1 Application(s) 1 Application(s) Both EYES two times a day  pantoprazole   Suspension 40 milliGRAM(s) Oral daily  polyethylene glycol 3350 17 Gram(s) Oral daily  senna Syrup 10 milliLiter(s) Oral at bedtime  sodium bicarbonate 1300 milliGRAM(s) Oral three times a day  sodium chloride 0.9% lock flush 10 milliLiter(s) IV Push every 1 hour PRN    acetaminophen    Suspension .. 650 milliGRAM(s) Enteral Tube every 6 hours PRN  ALBUTerol    90 MICROgram(s) HFA Inhaler 2 Puff(s) Inhalation every 6 hours PRN  cefepime   IVPB 2000 milliGRAM(s) IV Intermittent every 24 hours  chlorhexidine 0.12% Liquid 15 milliLiter(s) Oral Mucosa every 12 hours  chlorhexidine 2% Cloths 1 Application(s) Topical <User Schedule>  glucagon  Injectable 1 milliGRAM(s) IntraMuscular once  heparin   Injectable 25485 Unit(s) IV Push every 6 hours PRN  heparin   Injectable 5000 Unit(s) IV Push every 6 hours PRN  heparin  Infusion.  Unit(s)/Hr IV Continuous <Continuous>  HYDROmorphone Infusion 3 mG/Hr IV Continuous <Continuous>  insulin lispro (ADMELOG) corrective regimen sliding scale   SubCutaneous every 6 hours  lubricant eye ont 1 Application(s) 1 Application(s) Both EYES two times a day  norepinephrine Infusion 0.5 MICROgram(s)/kG/Min IV Continuous <Continuous>  pantoprazole   Suspension 40 milliGRAM(s) Oral daily  polyethylene glycol 3350 17 Gram(s) Oral daily  senna Syrup 10 milliLiter(s) Oral at bedtime  sodium bicarbonate 1300 milliGRAM(s) Oral three times a day  sodium chloride 0.9% lock flush 10 milliLiter(s) IV Push every 1 hour PRN  vancomycin  IVPB        Drug Dosing Weight  Height (cm): 154.4 (15 Nov 2020 09:25)  Weight (kg): 125 (15 Nov 2020 09:25)  BMI (kg/m2): 52.4 (15 Nov 2020 09:25)  BSA (m2): 2.16 (15 Nov 2020 09:25)    CENTRAL LINE: [x ] YES [ ] NO  LOCATION: [facility]   DATE INSERTED:12/5 ( unable too change central line as Patient has diffuse anasarca and limited access. risk of removing line is greater)     MIGUEL ANGEL: [X ] YES [ ] NO    DATE INSERTED:    A-LINE:  [ X] YES [ ] NO  LOCATION:   DATE INSERTED:    PMH -reviewed admission note, no change since admission      ABG - ( 18 Dec 2020 10:55 )  pH, Arterial: 7.14  pH, Blood: x     /  pCO2: 89    /  pO2: 76    / HCO3: 29    / Base Excess: -0.5  /  SaO2: 93                    12-17 @ 07:01  -  12-18 @ 07:00  --------------------------------------------------------  IN: 2012.5 mL / OUT: 0 mL / NET: 2012.5 mL        Mode: AC/ CMV (Assist Control/ Continuous Mandatory Ventilation)  RR (machine): 38  TV (machine): 450  FiO2: 100  PEEP: 16  ITime: 0.9  MAP: 30  PIP: 42      PHYSICAL EXAM:  GENERAL: sedated lying in bed comfortably  HEAD:  Atraumatic, Normocephalic  EYES: PERRLA, 2m sluggish response,  conjunctiva mildly icteric   ENT: Moist mucous membranes  NECK: Supple, No JVD  CHEST/LUNG: Clear to auscultation bilaterally; No rales, rhonchi, wheezing, or rubs.  HEART: Regular rate and rhythm; S1+ S2+  ABDOMEN: Bowel sounds present; Soft, Nontender, mildly distended.  EXTREMITIES:  2+ Peripheral Pulses, brisk capillary refill. No clubbing, cyanosis, 2+ UE and LE  edema  NERVOUS SYSTEM: sedated, non responsive, Responds to pain   SKIN: sacral decub ulcer       LABS:  CBC Full  -  ( 18 Dec 2020 06:43 )  WBC Count : 6.59 K/uL  RBC Count : 2.62 M/uL  Hemoglobin : 7.6 g/dL  Hematocrit : 26.2 %  Platelet Count - Automated : 218 K/uL  Mean Cell Volume : 100.0 fl  Mean Cell Hemoglobin : 29.0 pg  Mean Cell Hemoglobin Concentration : 29.0 gm/dL  Auto Neutrophil # : 5.14 K/uL  Auto Lymphocyte # : 0.59 K/uL  Auto Monocyte # : 0.53 K/uL  Auto Eosinophil # : 0.20 K/uL  Auto Basophil # : 0.00 K/uL  Auto Neutrophil % : 68.0 %  Auto Lymphocyte % : 9.0 %  Auto Monocyte % : 8.0 %  Auto Eosinophil % : 3.0 %  Auto Basophil % : 0.0 %    12-18    134<L>  |  94<L>  |  58<H>  ----------------------------<  150<H>  4.5   |  29  |  7.36<H>    Ca    9.9      18 Dec 2020 06:43  Phos  6.0     12-18  Mg     2.4     12-18    TPro  6.7  /  Alb  1.8<L>  /  TBili  0.5  /  DBili  x   /  AST  38  /  ALT  28  /  AlkPhos  127<H>  12-18    PT/INR - ( 18 Dec 2020 06:43 )   PT: 13.7 sec;   INR: 1.16 ratio         PTT - ( 18 Dec 2020 03:13 )  PTT:78.5 sec        RADIOLOGY & ADDITIONAL STUDIES REVIEWED:  ***    [ ]GOALS OF CARE DISCUSSION WITH PATIENT/FAMILY/PROXY:    CRITICAL CARE TIME SPENT: 35 minutes

## 2020-12-19 NOTE — PROGRESS NOTE ADULT - ASSESSMENT
Respiratory failure 2/2 COVID PNA. Patient is currently in multi organ failure and on Dialysis. Requiring pressor support    Acute respiratory failure secondary to Covid pneumonia  Septic Shock and Paroxysmal Atrial fibrillation  AGUSTO ,ATN on dialysis      Neuro:   - On dilaudid drip to minimize fluid being given to patient  - No improvement in condition or sedative requirement    CVS: #Septic Shock and Paroxysmal Atrial fibrillation  - patient is currently on Levophed  - s/p dig loading HR well controlled now  - EKG with sinus tach->AF  - bedside Echo without global dysfunction    -#troponemia  -9->8.2->8.3 11/21 trended down   -cardio Dr. Tsang saw patient before, Currently he does not need Aspirin and Plavix,   - Will Consult PRN in case patient's condition changes    Pulmonary:   #Acute respiratory failure 2/2 COVID PNA  -ETT placed 11/19, replaced on 12/16 as patient was having a leak in the balloon  - ESR, procal, ferritin decreasing, d-dimer, LDH, CRP decreasing, will trend every 3rd day  - Dexamethasone IV 6mg Qd (10 day course through 11/24) , Finished on 12/4  - CXR 12/11 shows persistent  interstitial lung markings with bilateral airspace opacities   - IgA positive, IgG negative, indicating new infection, No Remdesivir given ARF  - started on vanc and cefepime 12/17 for persistent fevers       ID:   -history and management as above  -changed LIJ to femoral line, removed LIJ 11/25  -WBC 18->11->13.2->15.6->17.8>18>7.4->8.3  -RVP +COVID, procal increased to 3.13 as noted above, started cefepime 11/21, added vanc 11/24, added azithromycin given worsening CXR  11/25  -started on vanc and cefepime 12/17 for persistent fevers, possible source   -BCx NGTD      Nephro: #AGUSTO due to ATN secondary to likely Covid pneumonia   -CrCl 44, Cr 1.1 on admission, acutely worsened 11/21, possibly 2/2 covid injury and/or low BP  -Cr 4.39  -RIJ HD replaced 12/13 and HD started 11/21, last HD 12/14,12/16  -continue phoslo, on bicarb tabs now 1300 tid,     GI: #TF  - monitor LFTs AST/ALT,  likely 2/2 COVID, trending down slowly  -Daily CMP  -Protonix IV for PPx  -Senna and Miralax for bowel regimen    Heme:   - Platelets 105k  - monitor for now    # Anemia  - Hb 68  - s/p 1u pRBC 12/12, total 4u pRBC so far    Endocrine: #BG controlled  -A1c 6, average glucose 126  -TSH wnl  -vit D moderately low to 22, c/w 1000U/day   -HSS    Skin/Catheters:   #LIJ placed 12/5-- ( unable too change central line as Patient has diffuse anasarca and limited access. risk of removing line is greater)   #RIJ HD catheter 12/13  #R radial artery 12/04  #eye drops    Prophylaxis:  -patient is on heparin Drip , Protonix for GI ppx    Prognosis:   Poor, DNR< Family informed on 12/5 , Family deciding on making patient hospice with comfort care  Respiratory failure 2/2 COVID PNA. Patient is currently in multi organ failure and on Dialysis. Requiring pressor support    Acute respiratory failure secondary to Covid pneumonia  Septic Shock and Paroxysmal Atrial fibrillation  AGUSTO ,ATN on dialysis      Neuro:   - On dilaudid drip to minimize fluid being given to patient  - No improvement in condition or sedative requirement    CVS: #Septic Shock and Paroxysmal Atrial fibrillation  - patient is currently on Levophed  - s/p dig loading HR well controlled now  - EKG with sinus tach->AF  - bedside Echo without global dysfunction    -#troponemia  -9->8.2->8.3 11/21 trended down   -cardio Dr. Tsang saw patient before, Currently he does not need Aspirin and Plavix,   - Will Consult PRN in case patient's condition changes    Pulmonary:   #Acute respiratory failure 2/2 COVID PNA  -ETT placed 11/19, replaced on 12/16 as patient was having a leak in the balloon  - ESR, procal, ferritin decreasing, d-dimer, LDH, CRP decreasing, will trend every 3rd day  - Dexamethasone IV 6mg Qd (10 day course through 11/24) , Finished on 12/4  - CXR 12/11 shows persistent  interstitial lung markings with bilateral airspace opacities   - IgA positive, IgG negative, indicating new infection, No Remdesivir given ARF  - started on vanc and cefepime 12/17 for persistent fevers-         ID:   -history and management as above  -changed LIJ to femoral line, removed LIJ 11/25  -WBC 18->11->13.2->15.6->17.8>18>7.4->8.3  -RVP +COVID, procal increased to 3.13 as noted above, started cefepime 11/21, added vanc 11/24, added azithromycin given worsening CXR  11/25  -started on vanc and cefepime 12/17 for persistent fevers,   - fu sputum cx and complete 7 days    -BCx NGTD      Nephro: #AGUSTO due to ATN secondary to likely Covid pneumonia   -CrCl 44, Cr 1.1 on admission, acutely worsened 11/21, possibly 2/2 covid injury and/or low BP  -Cr 4.39->  -RIJ HD replaced 12/13 and HD started 11/21, last HD  12/19  -continue phoslo, on bicarb tabs now 1300 tid,     GI: #TF  - monitor LFTs AST/ALT,  likely 2/2 COVID, trending down slowly  -Daily CMP  -Protonix IV for PPx  -Senna and Miralax for bowel regimen    Heme:   - Platelets 105k  - monitor for now    # Anemia  - Hb 68  - s/p 1u pRBC 12/19, total 5u pRBC so far    Endocrine: #BG controlled  -A1c 6, average glucose 126  -TSH wnl  -vit D moderately low to 22, c/w 1000U/day   -HSS    Skin/Catheters:   #LIJ placed 12/5-- ( unable too change central line as Patient has diffuse anasarca and limited access. risk of removing line is greater)   #RIJ HD catheter 12/13  #R radial artery 12/04  #eye drops    Prophylaxis:  -patient is on heparin Drip , Protonix for GI ppx    Prognosis:   Poor, DNR< Family informed on 12/5 , Family deciding on making patient hospice with comfort care

## 2020-12-19 NOTE — PROGRESS NOTE ADULT - SUBJECTIVE AND OBJECTIVE BOX
Chandan Awad MD (Nephrology progress note)  [address] [facility], [place],  [address],  [id number]  TEl: [phone]  Cell: [phone]    Patient is a 50y Male seen and evaluated at bedside. Vital signs, laboratory data, imaging studies, consult notes reviewed done within past 24 hours. Overnight events noted. Patient remains critically ill on ventilatory support and IV Pressors. Remains anuric requiring RRT. Last Hd on 12/18 with 5L UF. Tolerated procedure well.    Allergies    No Known Allergies    Intolerances        ROS:  Limited. Sedated and intubated on ventilatory support    VITALS:    T(C): 37.8 (12-19-20 @ 07:00), Max: 38 (12-18-20 @ 21:40)  HR: 101 (12-19-20 @ 10:30) (98 - 116)  BP: --  RR: 29 (12-19-20 @ 10:30) (16 - 38)  SpO2: 97% (12-19-20 @ 10:30) (87% - 100%)  CAPILLARY BLOOD GLUCOSE      POCT Blood Glucose.: 182 mg/dL (19 Dec 2020 05:13)  POCT Blood Glucose.: 176 mg/dL (18 Dec 2020 17:00)      12-18-20 @ 07:01  -  12-19-20 @ 07:00  --------------------------------------------------------  IN: 3606.5 mL / OUT: 5900 mL / NET: -2293.5 mL    12-19-20 @ 07:01  -  12-19-20 @ 11:10  --------------------------------------------------------  IN: 187 mL / OUT: 0 mL / NET: 187 mL      MEDICATIONS  (STANDING):  cefepime   IVPB 2000 milliGRAM(s) IV Intermittent every 24 hours  chlorhexidine 0.12% Liquid 15 milliLiter(s) Oral Mucosa every 12 hours  chlorhexidine 2% Cloths 1 Application(s) Topical <User Schedule>  glucagon  Injectable 1 milliGRAM(s) IntraMuscular once  heparin  Infusion.  Unit(s)/Hr (22 mL/Hr) IV Continuous <Continuous>  HYDROmorphone Infusion 3 mG/Hr (3 mL/Hr) IV Continuous <Continuous>  insulin lispro (ADMELOG) corrective regimen sliding scale   SubCutaneous every 6 hours  lubricant eye ont 1 Application(s) 1 Application(s) Both EYES two times a day  norepinephrine Infusion 0.5 MICROgram(s)/kG/Min (58.6 mL/Hr) IV Continuous <Continuous>  pantoprazole   Suspension 40 milliGRAM(s) Oral daily  polyethylene glycol 3350 17 Gram(s) Oral daily  senna Syrup 10 milliLiter(s) Oral at bedtime  sodium bicarbonate 1300 milliGRAM(s) Oral three times a day  vancomycin  IVPB        MEDICATIONS  (PRN):  acetaminophen    Suspension .. 650 milliGRAM(s) Enteral Tube every 6 hours PRN Temp greater or equal to 38C (100.4F)  ALBUTerol    90 MICROgram(s) HFA Inhaler 2 Puff(s) Inhalation every 6 hours PRN Shortness of Breath  heparin   Injectable 36847 Unit(s) IV Push every 6 hours PRN For aPTT less than 40  heparin   Injectable 5000 Unit(s) IV Push every 6 hours PRN For aPTT between 40 - 57  sodium chloride 0.9% lock flush 10 milliLiter(s) IV Push every 1 hour PRN Pre/post blood products, medications, blood draw, and to maintain line patency      PHYSICAL EXAM:  GENERAL: Sedated and intubated on ventilatory support  HEENT: LEONOR, EOMI, neck supple, no JVP, no carotid bruit, oral mucosa moist and pale  CHEST/LUNG: Bilateral decreased breath sounds, bilateral rhonchi and crepitations,  HEART: Irregular rate and rhythm, ERNESTO II/VI at LPSB, no gallops, no rub   ABDOMEN: Soft, nontender, non distended, bowel sounds present, no palpable organomegaly  : No flank or supra pubic tenderness.  EXTREMITIES: Bilateral pitting pedal edema++nt  Neurology: Sedated and intubated on ventilatory support  SKIN: No rash or skin lesion  Musculoskeletal: No joint deformity      Vascular ACCESS:     LABS:                        7.0    8.63  )-----------( 268      ( 19 Dec 2020 10:37 )             24.0     12-19    137  |  94<L>  |  43<H>  ----------------------------<  161<H>  3.6   |  32<H>  |  4.95<H>    Ca    9.4      19 Dec 2020 04:31  Phos  3.1     12-19  Mg     2.2     12-19    TPro  6.6  /  Alb  1.8<L>  /  TBili  0.5  /  DBili  x   /  AST  37  /  ALT  28  /  AlkPhos  154<H>  12-19      PT/INR - ( 18 Dec 2020 06:43 )   PT: 13.7 sec;   INR: 1.16 ratio         PTT - ( 19 Dec 2020 10:37 )  PTT:97.5 sec          RADIOLOGY & ADDITIONAL STUDIES:  ra< from: Xray Chest 1 View- PORTABLE-Routine (Xray Chest 1 View- PORTABLE-Routine in AM.) (12.18.20 @ 08:22) >    EXAM:  XR CHEST PORTABLE ROUTINE 1V                            PROCEDURE DATE:  12/18/2020          INTERPRETATION:  INDICATION: Ventilatory support.    PRIORS: 12/17/2020.    VIEWS: Portable AP radiography of the chest performed.    FINDINGS: Heart size appears within normal limits. Since prior no change in indwelling ETT, bilateral CVC or NGT positions. Bilateral lung parenchymal airspace opacities, extensive, without significant change. Small pleural effusions cannot be excluded. No evidencefor pneumothorax. No mediastinal shift. No acute osseous fractures.    IMPRESSION: No significant interval change.              SOPHIA CHOUDHARY MD; Attending Radiologist  This document has been electronically signed. Dec 18 2020 12:04PM    < end of copied text >    Imaging Personally Reviewed:  [x] YES  [ ] NO    Consultant(s) Notes Reviewed:  [x] YES  [ ] NO    Care Discussed with Primary team/ Other Providers [x] YES  [ ] NO

## 2020-12-19 NOTE — PROGRESS NOTE ADULT - PROBLEM SELECTOR PLAN 1
Anuric AGUSTO from ATN due to septic shock/ARDS requiring RRT/HD  - S/p HD treatment on 12/18 with net 5 L fluid removal  - Avoid Nephrotoxic agents  - Monitor BMP and electrolytes  - Maintain MAP>65-70 mm hg  - Adjust antibiotics as per renal dose clearances  - Volume status and electrolytes noted.  - HD treatment as per ordered with UF and clearances Anuric AGUSTO from ATN due to septic shock/ARDS requiring RRT/HD  - S/p HD treatment on 12/18 with net 5 L fluid removal  - Avoid Nephrotoxic agents  - Monitor BMP and electrolytes  - Maintain MAP>65-70 mm hg  - Adjust antibiotics as per renal dose clearances  - Volume status and electrolytes noted.  - HD treatment as per ordered  for UF only treatment today

## 2020-12-19 NOTE — PROGRESS NOTE ADULT - PROBLEM SELECTOR PLAN 2
Patient with Calcium 9.4 with albumin 1.8, Phos  3.1, Mag 2.2  Monitor BMP, calcium, mag, phos level  HD treatment for optimal clearances

## 2020-12-19 NOTE — PROGRESS NOTE ADULT - PROBLEM SELECTOR PLAN 5
Anemia of chronic illness with stable hgb 7.1  Monitor serial CBC  Transfuse PRBC per primary team  Monitor hgb/Hct

## 2020-12-20 NOTE — PROGRESS NOTE ADULT - PROBLEM SELECTOR PLAN 2
Patient with Calcium 10.2 with albumin 1.6, Phos  3.1, Mag 2.2  Monitor BMP, calcium, mag, phos level  Continue HD treatment for optimal clearances  Will consider lower calcium bath with next HD treatment No

## 2020-12-20 NOTE — PROGRESS NOTE ADULT - SUBJECTIVE AND OBJECTIVE BOX
INTERVAL HPI/OVERNIGHT EVENTS: ***    PRESSORS: [x] YES [ ] NO  WHICH: levophed    Antimicrobial:  cefepime   IVPB 2000 milliGRAM(s) IV Intermittent every 24 hours    Cardiovascular:  norepinephrine Infusion 0.5 MICROgram(s)/kG/Min IV Continuous <Continuous>    Pulmonary:  ALBUTerol    90 MICROgram(s) HFA Inhaler 2 Puff(s) Inhalation every 6 hours PRN    Hematalogic:  heparin   Injectable 63061 Unit(s) IV Push every 6 hours PRN  heparin   Injectable 5000 Unit(s) IV Push every 6 hours PRN  heparin  Infusion.  Unit(s)/Hr IV Continuous <Continuous>    Other:  acetaminophen    Suspension .. 650 milliGRAM(s) Enteral Tube every 6 hours PRN  chlorhexidine 0.12% Liquid 15 milliLiter(s) Oral Mucosa every 12 hours  chlorhexidine 2% Cloths 1 Application(s) Topical <User Schedule>  glucagon  Injectable 1 milliGRAM(s) IntraMuscular once  HYDROmorphone Infusion 3 mG/Hr IV Continuous <Continuous>  insulin lispro (ADMELOG) corrective regimen sliding scale   SubCutaneous every 6 hours  lubricant eye ont 1 Application(s) 1 Application(s) Both EYES two times a day  pantoprazole   Suspension 40 milliGRAM(s) Oral daily  polyethylene glycol 3350 17 Gram(s) Oral daily  senna Syrup 10 milliLiter(s) Oral at bedtime  sodium bicarbonate 1300 milliGRAM(s) Oral three times a day  sodium chloride 0.9% lock flush 10 milliLiter(s) IV Push every 1 hour PRN    acetaminophen    Suspension .. 650 milliGRAM(s) Enteral Tube every 6 hours PRN  ALBUTerol    90 MICROgram(s) HFA Inhaler 2 Puff(s) Inhalation every 6 hours PRN  cefepime   IVPB 2000 milliGRAM(s) IV Intermittent every 24 hours  chlorhexidine 0.12% Liquid 15 milliLiter(s) Oral Mucosa every 12 hours  chlorhexidine 2% Cloths 1 Application(s) Topical <User Schedule>  glucagon  Injectable 1 milliGRAM(s) IntraMuscular once  heparin   Injectable 00167 Unit(s) IV Push every 6 hours PRN  heparin   Injectable 5000 Unit(s) IV Push every 6 hours PRN  heparin  Infusion.  Unit(s)/Hr IV Continuous <Continuous>  HYDROmorphone Infusion 3 mG/Hr IV Continuous <Continuous>  insulin lispro (ADMELOG) corrective regimen sliding scale   SubCutaneous every 6 hours  lubricant eye ont 1 Application(s) 1 Application(s) Both EYES two times a day  norepinephrine Infusion 0.5 MICROgram(s)/kG/Min IV Continuous <Continuous>  pantoprazole   Suspension 40 milliGRAM(s) Oral daily  polyethylene glycol 3350 17 Gram(s) Oral daily  senna Syrup 10 milliLiter(s) Oral at bedtime  sodium bicarbonate 1300 milliGRAM(s) Oral three times a day  sodium chloride 0.9% lock flush 10 milliLiter(s) IV Push every 1 hour PRN    Drug Dosing Weight  Height (cm): 154.4 (15 Nov 2020 09:25)  Weight (kg): 125 (15 Nov 2020 09:25)  BMI (kg/m2): 52.4 (15 Nov 2020 09:25)  BSA (m2): 2.16 (15 Nov 2020 09:25)    CENTRAL LINE: [x] YES [] NO  LOCATION:   [facility]      MICHELLE: [x] YES [ ] NO          A-LINE:  [x] YES [ ] NO  LOCATION:   RR          ICU Vital Signs Last 24 Hrs  T(C): 37.6 (19 Dec 2020 23:53), Max: 38 (19 Dec 2020 04:00)  T(F): 99.6 (19 Dec 2020 23:53), Max: 100.4 (19 Dec 2020 04:00)  HR: 103 (20 Dec 2020 01:15) (95 - 106)  BP: 107/57 (20 Dec 2020 00:00) (107/57 - 107/57)  BP(mean): 69 (20 Dec 2020 00:00) (69 - 69)  ABP: 83/57 (20 Dec 2020 01:15) (73/64 - 132/66)  ABP(mean): 66 (20 Dec 2020 01:15) (47 - 86)  RR: 11 (20 Dec 2020 01:15) (5 - 38)  SpO2: 94% (20 Dec 2020 01:15) (91% - 100%)      ABG - ( 19 Dec 2020 08:34 )  pH, Arterial: 7.23  pH, Blood: x     /  pCO2: 79    /  pO2: 114   / HCO3: 32    / Base Excess: 4.2   /  SaO2: 98                    12-18 @ 07:01  -  12-19 @ 07:00  --------------------------------------------------------  IN: 3606.5 mL / OUT: 5900 mL / NET: -2293.5 mL        Mode: AC/ CMV (Assist Control/ Continuous Mandatory Ventilation)  RR (machine): 35  TV (machine): 450  FiO2: 100  PEEP: 14  ITime: 0.9  MAP: 28  PIP: 43      PHYSICAL EXAM:    GENERAL: sedated lying in bed comfortably  HEAD:  Atraumatic, Normocephalic  EYES: PERRLA, 2m sluggish response,  conjunctiva mildly icteric   ENT: Moist mucous membranes  NECK: Supple, No JVD  CHEST/LUNG: Clear to auscultation bilaterally; No rales, rhonchi, wheezing, or rubs.  HEART: Regular rate and rhythm; S1+ S2+  ABDOMEN: Bowel sounds present; Soft, Nontender, mildly distended.  EXTREMITIES:  2+ Peripheral Pulses, brisk capillary refill. No clubbing, cyanosis, 2+ UE and LE  edema  NERVOUS SYSTEM: sedated, non responsive, Responds to pain   SKIN: sacral decub ulcer         LABS:  CBC Full  -  ( 19 Dec 2020 17:30 )  WBC Count : 8.97 K/uL  RBC Count : 2.72 M/uL  Hemoglobin : 7.9 g/dL  Hematocrit : 26.9 %  Platelet Count - Automated : 266 K/uL  Mean Cell Volume : 98.9 fl  Mean Cell Hemoglobin : 29.0 pg  Mean Cell Hemoglobin Concentration : 29.4 gm/dL  Auto Neutrophil # : x  Auto Lymphocyte # : x  Auto Monocyte # : x  Auto Eosinophil # : x  Auto Basophil # : x  Auto Neutrophil % : x  Auto Lymphocyte % : x  Auto Monocyte % : x  Auto Eosinophil % : x  Auto Basophil % : x    12-19    137  |  94<L>  |  43<H>  ----------------------------<  161<H>  3.6   |  32<H>  |  4.95<H>    Ca    9.4      19 Dec 2020 04:31  Phos  3.1     12-19  Mg     2.2     12-19    TPro  6.6  /  Alb  1.8<L>  /  TBili  0.5  /  DBili  x   /  AST  37  /  ALT  28  /  AlkPhos  154<H>  12-19    PT/INR - ( 18 Dec 2020 06:43 )   PT: 13.7 sec;   INR: 1.16 ratio         PTT - ( 19 Dec 2020 17:30 )  PTT:83.7 sec        RADIOLOGY & ADDITIONAL STUDIES REVIEWED:  ***    [ ]GOALS OF CARE DISCUSSION WITH PATIENT/FAMILY/PROXY:    CRITICAL CARE TIME SPENT: 35 minutes INTERVAL HPI/OVERNIGHT EVENTS: No events overnight, patient getting dialysis today. Will start midodrine 5mg tid and try to taper down pressors.     PRESSORS: [x] YES [ ] NO  WHICH: levophed    Antimicrobial:  cefepime   IVPB 2000 milliGRAM(s) IV Intermittent every 24 hours    Cardiovascular:  norepinephrine Infusion 0.5 MICROgram(s)/kG/Min IV Continuous <Continuous>    Pulmonary:  ALBUTerol    90 MICROgram(s) HFA Inhaler 2 Puff(s) Inhalation every 6 hours PRN    Hematalogic:  heparin   Injectable 87741 Unit(s) IV Push every 6 hours PRN  heparin   Injectable 5000 Unit(s) IV Push every 6 hours PRN  heparin  Infusion.  Unit(s)/Hr IV Continuous <Continuous>    Other:  acetaminophen    Suspension .. 650 milliGRAM(s) Enteral Tube every 6 hours PRN  chlorhexidine 0.12% Liquid 15 milliLiter(s) Oral Mucosa every 12 hours  chlorhexidine 2% Cloths 1 Application(s) Topical <User Schedule>  glucagon  Injectable 1 milliGRAM(s) IntraMuscular once  HYDROmorphone Infusion 3 mG/Hr IV Continuous <Continuous>  insulin lispro (ADMELOG) corrective regimen sliding scale   SubCutaneous every 6 hours  lubricant eye ont 1 Application(s) 1 Application(s) Both EYES two times a day  pantoprazole   Suspension 40 milliGRAM(s) Oral daily  polyethylene glycol 3350 17 Gram(s) Oral daily  senna Syrup 10 milliLiter(s) Oral at bedtime  sodium bicarbonate 1300 milliGRAM(s) Oral three times a day  sodium chloride 0.9% lock flush 10 milliLiter(s) IV Push every 1 hour PRN    acetaminophen    Suspension .. 650 milliGRAM(s) Enteral Tube every 6 hours PRN  ALBUTerol    90 MICROgram(s) HFA Inhaler 2 Puff(s) Inhalation every 6 hours PRN  cefepime   IVPB 2000 milliGRAM(s) IV Intermittent every 24 hours  chlorhexidine 0.12% Liquid 15 milliLiter(s) Oral Mucosa every 12 hours  chlorhexidine 2% Cloths 1 Application(s) Topical <User Schedule>  glucagon  Injectable 1 milliGRAM(s) IntraMuscular once  heparin   Injectable 77418 Unit(s) IV Push every 6 hours PRN  heparin   Injectable 5000 Unit(s) IV Push every 6 hours PRN  heparin  Infusion.  Unit(s)/Hr IV Continuous <Continuous>  HYDROmorphone Infusion 3 mG/Hr IV Continuous <Continuous>  insulin lispro (ADMELOG) corrective regimen sliding scale   SubCutaneous every 6 hours  lubricant eye ont 1 Application(s) 1 Application(s) Both EYES two times a day  norepinephrine Infusion 0.5 MICROgram(s)/kG/Min IV Continuous <Continuous>  pantoprazole   Suspension 40 milliGRAM(s) Oral daily  polyethylene glycol 3350 17 Gram(s) Oral daily  senna Syrup 10 milliLiter(s) Oral at bedtime  sodium bicarbonate 1300 milliGRAM(s) Oral three times a day  sodium chloride 0.9% lock flush 10 milliLiter(s) IV Push every 1 hour PRN    Drug Dosing Weight  Height (cm): 154.4 (15 Nov 2020 09:25)  Weight (kg): 125 (15 Nov 2020 09:25)  BMI (kg/m2): 52.4 (15 Nov 2020 09:25)  BSA (m2): 2.16 (15 Nov 2020 09:25)    CENTRAL LINE: [x] YES [] NO  LOCATION:   [facility]      MICHELLE: [x] YES [ ] NO          A-LINE:  [x] YES [ ] NO  LOCATION:   RR          ICU Vital Signs Last 24 Hrs  T(C): 37.6 (19 Dec 2020 23:53), Max: 38 (19 Dec 2020 04:00)  T(F): 99.6 (19 Dec 2020 23:53), Max: 100.4 (19 Dec 2020 04:00)  HR: 103 (20 Dec 2020 01:15) (95 - 106)  BP: 107/57 (20 Dec 2020 00:00) (107/57 - 107/57)  BP(mean): 69 (20 Dec 2020 00:00) (69 - 69)  ABP: 83/57 (20 Dec 2020 01:15) (73/64 - 132/66)  ABP(mean): 66 (20 Dec 2020 01:15) (47 - 86)  RR: 11 (20 Dec 2020 01:15) (5 - 38)  SpO2: 94% (20 Dec 2020 01:15) (91% - 100%)      ABG - ( 19 Dec 2020 08:34 )  pH, Arterial: 7.23  pH, Blood: x     /  pCO2: 79    /  pO2: 114   / HCO3: 32    / Base Excess: 4.2   /  SaO2: 98                    12-18 @ 07:01  -  12-19 @ 07:00  --------------------------------------------------------  IN: 3606.5 mL / OUT: 5900 mL / NET: -2293.5 mL        Mode: AC/ CMV (Assist Control/ Continuous Mandatory Ventilation)  RR (machine): 35  TV (machine): 450  FiO2: 100  PEEP: 14  ITime: 0.9  MAP: 28  PIP: 43      PHYSICAL EXAM:    GENERAL: sedated lying in bed comfortably  HEAD:  Atraumatic, Normocephalic  EYES: PERRLA, 2m sluggish response,  conjunctiva mildly icteric   ENT: Moist mucous membranes  NECK: Supple, No JVD  CHEST/LUNG: Clear to auscultation bilaterally; No rales, rhonchi, wheezing, or rubs.  HEART: Regular rate and rhythm; S1+ S2+  ABDOMEN: Bowel sounds present; Soft, Nontender, mildly distended.  EXTREMITIES:  2+ Peripheral Pulses, brisk capillary refill. No clubbing, cyanosis, 2+ UE and LE  edema  NERVOUS SYSTEM: sedated, non responsive, Responds to pain   SKIN: sacral decub ulcer         LABS:  CBC Full  -  ( 19 Dec 2020 17:30 )  WBC Count : 8.97 K/uL  RBC Count : 2.72 M/uL  Hemoglobin : 7.9 g/dL  Hematocrit : 26.9 %  Platelet Count - Automated : 266 K/uL  Mean Cell Volume : 98.9 fl  Mean Cell Hemoglobin : 29.0 pg  Mean Cell Hemoglobin Concentration : 29.4 gm/dL  Auto Neutrophil # : x  Auto Lymphocyte # : x  Auto Monocyte # : x  Auto Eosinophil # : x  Auto Basophil # : x  Auto Neutrophil % : x  Auto Lymphocyte % : x  Auto Monocyte % : x  Auto Eosinophil % : x  Auto Basophil % : x    12-19    137  |  94<L>  |  43<H>  ----------------------------<  161<H>  3.6   |  32<H>  |  4.95<H>    Ca    9.4      19 Dec 2020 04:31  Phos  3.1     12-19  Mg     2.2     12-19    TPro  6.6  /  Alb  1.8<L>  /  TBili  0.5  /  DBili  x   /  AST  37  /  ALT  28  /  AlkPhos  154<H>  12-19    PT/INR - ( 18 Dec 2020 06:43 )   PT: 13.7 sec;   INR: 1.16 ratio         PTT - ( 19 Dec 2020 17:30 )  PTT:83.7 sec        RADIOLOGY & ADDITIONAL STUDIES REVIEWED:  ***    [ ]GOALS OF CARE DISCUSSION WITH PATIENT/FAMILY/PROXY:    CRITICAL CARE TIME SPENT: 35 minutes

## 2020-12-20 NOTE — PROGRESS NOTE ADULT - SUBJECTIVE AND OBJECTIVE BOX
Chandan Awad MD (Nephrology progress note)  [address], [facility],  [address],  [facility]  TEl: [phone]  Cell: [phone]    Patient is a 50y Male seen and evaluated at bedside. Vital signs, laboratory data, imaging studies, consult notes reviewed done within past 24 hours. Overnight events noted. Patient remains critically ill on ventilatory support on IV pressors. Interval additional HD treatment on 12/19 with 3L UF.     Allergies    No Known Allergies    Intolerances        ROS:  Sedated and intubated on ventilatory support    VITALS:    T(C): 37.7 (12-20-20 @ 08:15), Max: 37.7 (12-19-20 @ 16:23)  HR: 101 (12-20-20 @ 09:30) (95 - 106)  BP: 106/64 (12-20-20 @ 08:15) (101/55 - 107/57)  RR: 0 (12-20-20 @ 09:30) (0 - 36)  SpO2: 98% (12-20-20 @ 09:30) (91% - 98%)  CAPILLARY BLOOD GLUCOSE      POCT Blood Glucose.: 128 mg/dL (20 Dec 2020 05:39)  POCT Blood Glucose.: 135 mg/dL (19 Dec 2020 21:32)  POCT Blood Glucose.: 161 mg/dL (19 Dec 2020 17:21)  POCT Blood Glucose.: 182 mg/dL (19 Dec 2020 11:44)      12-19-20 @ 07:01  -  12-20-20 @ 07:00  --------------------------------------------------------  IN: 3010 mL / OUT: 4000 mL / NET: -990 mL      MEDICATIONS  (STANDING):  cefepime   IVPB 2000 milliGRAM(s) IV Intermittent every 24 hours  chlorhexidine 0.12% Liquid 15 milliLiter(s) Oral Mucosa every 12 hours  chlorhexidine 2% Cloths 1 Application(s) Topical <User Schedule>  glucagon  Injectable 1 milliGRAM(s) IntraMuscular once  heparin  Infusion.  Unit(s)/Hr (22 mL/Hr) IV Continuous <Continuous>  HYDROmorphone Infusion 3 mG/Hr (3 mL/Hr) IV Continuous <Continuous>  insulin lispro (ADMELOG) corrective regimen sliding scale   SubCutaneous every 6 hours  lubricant eye ont 1 Application(s) 1 Application(s) Both EYES two times a day  norepinephrine Infusion 0.5 MICROgram(s)/kG/Min (58.6 mL/Hr) IV Continuous <Continuous>  pantoprazole   Suspension 40 milliGRAM(s) Oral daily  polyethylene glycol 3350 17 Gram(s) Oral daily  senna Syrup 10 milliLiter(s) Oral at bedtime  sodium bicarbonate 1300 milliGRAM(s) Oral three times a day    MEDICATIONS  (PRN):  acetaminophen    Suspension .. 650 milliGRAM(s) Enteral Tube every 6 hours PRN Temp greater or equal to 38C (100.4F)  ALBUTerol    90 MICROgram(s) HFA Inhaler 2 Puff(s) Inhalation every 6 hours PRN Shortness of Breath  heparin   Injectable 45132 Unit(s) IV Push every 6 hours PRN For aPTT less than 40  heparin   Injectable 5000 Unit(s) IV Push every 6 hours PRN For aPTT between 40 - 57  sodium chloride 0.9% lock flush 10 milliLiter(s) IV Push every 1 hour PRN Pre/post blood products, medications, blood draw, and to maintain line patency      PHYSICAL EXAM:  GENERAL: Sedated and intubated on ventilatory support  HEENT: LEONOR, EOMI, neck supple, no JVP, no carotid bruit, oral mucosa moist and pink.  CHEST/LUNG: Bilateral decreased breath sounds, Bibasilar rales and crepitations, no wheezing  HEART: Regular rate and rhythm, ERNESTO II/VI at LPSB, no gallops, no rub   ABDOMEN: Soft, nontender, non distended, bowel sounds present, no palpable organomegaly  : No flank or supra pubic tenderness.  EXTREMITIES: Generalized anasarca with upper and lower extremity edema  Neurology: SEdated and intubated on ventilatory support  SKIN: No rash or skin lesion  Musculoskeletal: No joint deformity      Vascular ACCESS:     LABS:                        7.5    9.57  )-----------( 281      ( 20 Dec 2020 04:16 )             25.2     12-20    137  |  98  |  58<H>  ----------------------------<  135<H>  3.9   |  30  |  6.27<H>    Ca    10.2      20 Dec 2020 04:16  Phos  4.2     12-20  Mg     2.4     12-20    TPro  6.5  /  Alb  1.6<L>  /  TBili  0.5  /  DBili  x   /  AST  61<H>  /  ALT  33  /  AlkPhos  168<H>  12-20      PTT - ( 20 Dec 2020 04:16 )  PTT:78.6 sec          RADIOLOGY & ADDITIONAL STUDIES:  rad< from: Xray Chest 1 View- PORTABLE-Routine (Xray Chest 1 View- PORTABLE-Routine in AM.) (12.19.20 @ 10:06) >    EXAM:  XR CHEST PORTABLE ROUTINE 1V                            PROCEDURE DATE:  12/19/2020          INTERPRETATION:  Portable chest radiograph    CLINICAL INFORMATION: ET tube placement    TECHNIQUE:  Portable  AP view of the chest was obtained.    COMPARISON: No previous examinations are available for review.    FINDINGS:    ET tube tip above tracheal bifurcation.  NG tube tip beyond GE junction.  RIGHT IJ catheter tip in SVC.  LEFT IJ catheter tip in brachycephalic vein.  The lungs show/mild bilateral diffuse airspace disease... No pneumothorax.    The heart and mediastinum are within normal limits.    Visualized osseous structures are intact.        IMPRESSION:   Catheters and tubes in place. Bilateral diffuse mild airspace disease..              ROLANDO GONZALEZ MD; Attending Radiologist  This document has been electronically signed. Dec 20 2020  8:26AM    < end of copied text >    Imaging Personally Reviewed:  [x] YES  [ ] NO    Consultant(s) Notes Reviewed:  [x] YES  [ ] NO    Care Discussed with Primary team/ Other Providers [x] YES  [ ] NO

## 2020-12-20 NOTE — PROGRESS NOTE ADULT - PROBLEM SELECTOR PLAN 1
Anuric AGUSTO from ATN due to septic shock/ARDS requiring RRT/HD  - S/p HD treatment on 12/19 with net 3 L fluid removal  - Avoid Nephrotoxic agents  - Monitor BMP and electrolytes  - Maintain MAP>65-70 mm hg  - Adjust antibiotics as per renal dose clearances  - Volume status and electrolytes noted.  - Defer HD today  - Next anticipated IHD on 12/21

## 2020-12-20 NOTE — PROGRESS NOTE ADULT - ASSESSMENT
Respiratory failure 2/2 COVID PNA. Patient is currently in multi organ failure and on Dialysis. Requiring pressor support    Acute respiratory failure secondary to Covid pneumonia  Septic Shock and Paroxysmal Atrial fibrillation  AGUSTO ,ATN on dialysis      Neuro:   - On dilaudid drip to minimize fluid being given to patient  - No improvement in condition or sedative requirement    CVS: #Septic Shock and Paroxysmal Atrial fibrillation  - patient is currently on Levophed  - s/p dig loading HR well controlled now  - EKG with sinus tach->AF  - bedside Echo without global dysfunction    -#troponemia  -9->8.2->8.3 11/21 trended down   -cardio Dr. Tsang saw patient before, Currently he does not need Aspirin and Plavix,   - Will Consult PRN in case patient's condition changes    Pulmonary:   #Acute respiratory failure 2/2 COVID PNA  -ETT placed 11/19, replaced on 12/16 as patient was having a leak in the balloon  - ESR, procal, ferritin decreasing, d-dimer, LDH, CRP decreasing, will trend every 3rd day  - Dexamethasone IV 6mg Qd (10 day course through 11/24) , Finished on 12/4  - CXR 12/11 shows persistent  interstitial lung markings with bilateral airspace opacities   - IgA positive, IgG negative, indicating new infection, No Remdesivir given ARF  - started on vanc and cefepime 12/17 for persistent fevers-         ID:   -history and management as above  -changed LIJ to femoral line, removed LIJ 11/25  -WBC 18->11->13.2->15.6->17.8>18>7.4->8.3  -RVP +COVID, procal increased to 3.13 as noted above, started cefepime 11/21, added vanc 11/24, added azithromycin given worsening CXR  11/25  -started on vanc and cefepime 12/17 for persistent fevers,   - fu sputum cx and complete 7 days    -BCx NGTD      Nephro: #AGUSTO due to ATN secondary to likely Covid pneumonia   -CrCl 44, Cr 1.1 on admission, acutely worsened 11/21, possibly 2/2 covid injury and/or low BP  -Cr 4.39->  -RIJ HD replaced 12/13 and HD started 11/21, last HD  12/19  -continue phoslo, on bicarb tabs now 1300 tid,     GI: #TF  - monitor LFTs AST/ALT,  likely 2/2 COVID, trending down slowly  -Daily CMP  -Protonix IV for PPx  -Senna and Miralax for bowel regimen    Heme:   - Platelets 105k  - monitor for now    # Anemia  - Hb 68  - s/p 1u pRBC 12/19, total 5u pRBC so far    Endocrine: #BG controlled  -A1c 6, average glucose 126  -TSH wnl  -vit D moderately low to 22, c/w 1000U/day   -HSS    Skin/Catheters:   #LIJ placed 12/5-- ( unable too change central line as Patient has diffuse anasarca and limited access. risk of removing line is greater)   #RIJ HD catheter 12/13  #R radial artery 12/04  #eye drops    Prophylaxis:  -patient is on heparin Drip , Protonix for GI ppx    Prognosis:   Poor, DNR< Family informed on 12/5 , Family deciding on making patient hospice with comfort care  Respiratory failure 2/2 COVID PNA. Patient is currently in multi organ failure and on Dialysis. Requiring pressor support    Acute respiratory failure secondary to Covid pneumonia  Septic Shock and Paroxysmal Atrial fibrillation  AGUSTO ,ATN on dialysis      Neuro:   - On dilaudid drip to minimize fluid being given to patient  - No improvement in condition or sedative requirement    CVS: #Septic Shock and Paroxysmal Atrial fibrillation  - patient is currently on Levophed  - s/p dig loading HR well controlled now  - EKG with sinus tach->AF  - bedside Echo without global dysfunction  - started midodrine 5mg tid    -#troponemia  -9->8.2->8.3 11/21 trended down   -cardio Dr. Tsang saw patient before, Currently he does not need Aspirin and Plavix,   - Will Consult PRN in case patient's condition changes    Pulmonary:   #Acute respiratory failure 2/2 COVID PNA  -ETT placed 11/19, replaced on 12/16 as patient was having a leak in the balloon  - ESR, procal, ferritin decreasing, d-dimer, LDH, CRP decreasing, will trend every 3rd day  - Dexamethasone IV 6mg Qd (10 day course through 11/24) , Finished on 12/4  - CXR 12/11 shows persistent  interstitial lung markings with bilateral airspace opacities   - IgA positive, IgG negative, indicating new infection, No Remdesivir given ARF  - started on vanc and cefepime 12/17 for persistent fevers-         ID:   -history and management as above  -changed LIJ to femoral line, removed LIJ 11/25  -WBC 18->11->13.2->15.6->17.8>18>7.4->8.3  -RVP +COVID, procal increased to 3.13 as noted above, started cefepime 11/21, added vanc 11/24, added azithromycin given worsening CXR  11/25  -started on vanc and cefepime 12/17 for persistent fevers,   - fu sputum cx and complete 7 days    -BCx NGTD      Nephro: #AGUSTO due to ATN secondary to likely Covid pneumonia   -CrCl 44, Cr 1.1 on admission, acutely worsened 11/21, possibly 2/2 covid injury and/or low BP  -Cr 4.39->  -RIJ HD replaced 12/13 and HD started 11/21, last HD  12/19  -continue phoslo, on bicarb tabs now 1300 tid,     GI: #TF  - monitor LFTs AST/ALT,  likely 2/2 COVID, trending down slowly  -Daily CMP  -Protonix IV for PPx  -Senna and Miralax for bowel regimen    Heme:   - Platelets 105k  - monitor for now    # Anemia  - Hb 68  - s/p 1u pRBC 12/19, total 5u pRBC so far    Endocrine: #BG controlled  -A1c 6, average glucose 126  -TSH wnl  -vit D moderately low to 22, c/w 1000U/day   -HSS    Skin/Catheters:   #LIJ placed 12/5-- ( unable too change central line as Patient has diffuse anasarca and limited access. risk of removing line is greater)   #RIJ HD catheter 12/13  #R radial artery 12/04  #eye drops    Prophylaxis:  -patient is on heparin Drip , Protonix for GI ppx    Prognosis:   Poor, DNR< Family informed on 12/5 , Family deciding on making patient hospice with comfort care

## 2020-12-21 NOTE — PROGRESS NOTE ADULT - PROBLEM SELECTOR PLAN 2
COVID-19; comorbid shock/multi-organ failure involving lungs, heart, kidneys. WBC 10.60. CXR indicates similar bilateral pulmonary infiltrates. Patient remains sedated/intubated, on IV abx, + pressor + midodrine, dilaudid, HD. Wife is surrogate; DNR on file.

## 2020-12-21 NOTE — PROGRESS NOTE ADULT - PROBLEM SELECTOR PLAN 2
Patient with Calcium 9.8 with albumin 1.6, Phos  5.3, Mag 2.2  Monitor BMP, calcium, mag, phos level  Continue HD treatment for optimal clearances  Monitor electrolytes

## 2020-12-21 NOTE — PROGRESS NOTE ADULT - SUBJECTIVE AND OBJECTIVE BOX
Chandan Awad MD (Nephrology progress note)  [address], [facility],  [address],  [facility]  TEl: [phone]  Cell: [phone]    Patient is a 50y Male seen and evaluated at bedside. Vital signs, laboratory data, imaging studies, consult notes reviewed done within past 24 hours. Overnight events noted. Patient remains critically ill on ventilatory support and IV pressors. Interval HD on 12/19 with 3L UF. Remains anuric requiring RRT/HD.    Allergies    No Known Allergies    Intolerances        ROS:  LImited. Sedated and intubated on ventilatory support  VITALS:    T(C): 39.1 (12-21-20 @ 08:00), Max: 39.1 (12-21-20 @ 08:00)  HR: 104 (12-21-20 @ 09:15) (94 - 113)  BP: 102/52 (12-21-20 @ 08:00) (98/46 - 114/60)  RR: 12 (12-21-20 @ 09:15) (0 - 39)  SpO2: 88% (12-21-20 @ 09:15) (86% - 97%)  CAPILLARY BLOOD GLUCOSE      POCT Blood Glucose.: 147 mg/dL (20 Dec 2020 18:22)  POCT Blood Glucose.: 141 mg/dL (20 Dec 2020 11:57)      12-20-20 @ 07:01  -  12-21-20 @ 07:00  --------------------------------------------------------  IN: 2103 mL / OUT: 0 mL / NET: 2103 mL    12-21-20 @ 07:01  -  12-21-20 @ 09:41  --------------------------------------------------------  IN: 72 mL / OUT: 150 mL / NET: -78 mL      MEDICATIONS  (STANDING):  cefepime   IVPB 2000 milliGRAM(s) IV Intermittent every 24 hours  chlorhexidine 0.12% Liquid 15 milliLiter(s) Oral Mucosa every 12 hours  chlorhexidine 2% Cloths 1 Application(s) Topical <User Schedule>  glucagon  Injectable 1 milliGRAM(s) IntraMuscular once  heparin  Infusion.  Unit(s)/Hr (22 mL/Hr) IV Continuous <Continuous>  HYDROmorphone Infusion 3 mG/Hr (3 mL/Hr) IV Continuous <Continuous>  insulin lispro (ADMELOG) corrective regimen sliding scale   SubCutaneous every 6 hours  lubricant eye ont 1 Application(s) 1 Application(s) Both EYES two times a day  midodrine 5 milliGRAM(s) Oral every 8 hours  norepinephrine Infusion 0.5 MICROgram(s)/kG/Min (58.6 mL/Hr) IV Continuous <Continuous>  pantoprazole   Suspension 40 milliGRAM(s) Oral daily  polyethylene glycol 3350 17 Gram(s) Oral daily  senna Syrup 10 milliLiter(s) Oral at bedtime  sodium bicarbonate 1300 milliGRAM(s) Oral three times a day    MEDICATIONS  (PRN):  acetaminophen    Suspension .. 650 milliGRAM(s) Enteral Tube every 6 hours PRN Temp greater or equal to 38C (100.4F)  ALBUTerol    90 MICROgram(s) HFA Inhaler 2 Puff(s) Inhalation every 6 hours PRN Shortness of Breath  heparin   Injectable 88365 Unit(s) IV Push every 6 hours PRN For aPTT less than 40  heparin   Injectable 5000 Unit(s) IV Push every 6 hours PRN For aPTT between 40 - 57  sodium chloride 0.9% lock flush 10 milliLiter(s) IV Push every 1 hour PRN Pre/post blood products, medications, blood draw, and to maintain line patency      PHYSICAL EXAM:  GENERAL: Sedated and intubated on ventilatory support  HEENT: LEONOR, EOMI, neck supple, no JVP, no carotid bruit, oral mucosa moist and pink.  CHEST/LUNG: Bilateral decreased breath sounds, Bibasilar rales and crepitations and rhonchi, no wheezing  HEART: Regular rate and rhythm, ERNESTO II/VI at LPSB, no gallops, no rub   ABDOMEN: Soft, nontender, non distended, bowel sounds present, no palpable organomegaly  : No flank or supra pubic tenderness.  EXTREMITIES: Bilateral upper and lower extremity edema+++nt  Neurology: Sedated and intubated on ventilatory support  SKIN: No rash or skin lesion  Musculoskeletal: No joint deformity     Vascular ACCESS:     LABS:                        7.5    10.60 )-----------( 300      ( 21 Dec 2020 05:03 )             25.3     12-21    136  |  96  |  78<H>  ----------------------------<  138<H>  4.5   |  28  |  7.35<H>    Ca    9.8      21 Dec 2020 05:03  Phos  5.3     12-21  Mg     2.3     12-21    TPro  6.7  /  Alb  1.6<L>  /  TBili  0.5  /  DBili  x   /  AST  118<H>  /  ALT  60  /  AlkPhos  218<H>  12-21      PTT - ( 21 Dec 2020 05:03 )  PTT:76.7 sec          RADIOLOGY & ADDITIONAL STUDIES:    < from: Xray Chest 1 View- PORTABLE-Routine (Xray Chest 1 View- PORTABLE-Routine in AM.) (12.20.20 @ 09:47) >    EXAM:  XR CHEST PORTABLE ROUTINE 1V                            PROCEDURE DATE:  12/20/2020          INTERPRETATION:  Clinical indications: ET tube.    Frontal view of the chest is obtained.    Comparison is made with December 19, 2020.    IMPRESSION: Lines and tubes appear unchanged.    No pneumothorax.    Diffuse bilateral airspace disease with no significant interval change.            JANETTE HOPKINS MD; Attending Radiologist  This document has been electronically signed. Dec 20 2020  2:41PM    < end of copied text >  Imaging Personally Reviewed:  [x] YES  [ ] NO    Consultant(s) Notes Reviewed:  [x] YES  [ ] NO    Care Discussed with Primary team/ Other Providers [x] YES  [ ] NO

## 2020-12-21 NOTE — PROGRESS NOTE ADULT - PROBLEM SELECTOR PLAN 1
2/2 shock due to ARDS 2/2 COVID-19; involving lungs (patient intubated since 11/19); heart (on pressor); kidneys (AGUSTO, Cr 7.352, on HD); liver (Alk Phos 218, ). WBC 10.60. CXR indicates similar bilateral pulmonary infiltrates. Patient remains sedated/intubated, on IV abx, pressor + midodrine, dilaudid, HD. Plan for HD today. Wife is surrogate; DNR on file.

## 2020-12-21 NOTE — PROGRESS NOTE ADULT - ASSESSMENT
Respiratory failure 2/2 COVID PNA. Patient is currently in multi organ failure and on Dialysis. Requiring pressor support    Acute respiratory failure secondary to Covid pneumonia  Septic Shock and Paroxysmal Atrial fibrillation  AGUSTO ,ATN on dialysis      Neuro:   - On dilaudid drip to minimize fluid being given to patient  - No improvement in condition or sedative requirement    CVS: #Septic Shock and Paroxysmal Atrial fibrillation  - patient is currently on Levophed  - s/p dig loading HR well controlled now  - EKG with sinus tach->AF  - bedside Echo without global dysfunction  - started midodrine 5mg tid    -#troponemia  -9->8.2->8.3 11/21 trended down   -cardio Dr. Tsang saw patient before, Currently he does not need Aspirin and Plavix,   - Will Consult PRN in case patient's condition changes    Pulmonary:   #Acute respiratory failure 2/2 COVID PNA  -ETT placed 11/19, replaced on 12/16 as patient was having a leak in the balloon  - ESR, procal, ferritin decreasing, d-dimer, LDH, CRP decreasing, will trend every 3rd day  - Dexamethasone IV 6mg Qd (10 day course through 11/24) , Finished on 12/4  - CXR 12/11 shows persistent  interstitial lung markings with bilateral airspace opacities   - IgA positive, IgG negative, indicating new infection, No Remdesivir given ARF  - started on vanc and cefepime 12/17 for persistent fevers-         ID:   -history and management as above  -changed LIJ to femoral line, removed LIJ 11/25  -WBC 18->11->13.2->15.6->17.8>18>7.4->8.3  -RVP +COVID, procal increased to 3.13 as noted above, started cefepime 11/21, added vanc 11/24, added azithromycin given worsening CXR  11/25  -started on vanc and cefepime 12/17 for persistent fevers,   - fu sputum cx and complete 7 days    -BCx NGTD      Nephro: #AGUSTO due to ATN secondary to likely Covid pneumonia   -CrCl 44, Cr 1.1 on admission, acutely worsened 11/21, possibly 2/2 covid injury and/or low BP  -Cr 4.39->  -RIJ HD replaced 12/13 and HD started 11/21, last HD  12/19  -continue phoslo, on bicarb tabs now 1300 tid,     GI: #TF  - monitor LFTs AST/ALT,  likely 2/2 COVID, trending down slowly  -Daily CMP  -Protonix IV for PPx  -Senna and Miralax for bowel regimen    Heme:   - Platelets 105k  - monitor for now    # Anemia  - Hb 68  - s/p 1u pRBC 12/19, total 5u pRBC so far    Endocrine: #BG controlled  -A1c 6, average glucose 126  -TSH wnl  -vit D moderately low to 22, c/w 1000U/day   -HSS    Skin/Catheters:   #LIJ placed 12/5-- ( unable too change central line as Patient has diffuse anasarca and limited access. risk of removing line is greater)   #RIJ HD catheter 12/13  #R radial artery 12/04  #eye drops    Prophylaxis:  -patient is on heparin Drip , Protonix for GI ppx    Prognosis:   Poor, DNR< Family informed on 12/5 , Family deciding on making patient hospice with comfort care  Respiratory failure 2/2 COVID PNA. Patient is currently in multi organ failure and on Dialysis. Requiring pressor support    Acute respiratory failure secondary to Covid pneumonia  Septic Shock and Paroxysmal Atrial fibrillation  AGUSTO ,ATN on dialysis      Neuro:   -Intubated and sedated  - On dilaudid drip to minimize fluid being given to patient. Started on propofol today    CVS:   #Septic Shock and Paroxysmal Atrial fibrillation  - patient is currently on Levophed  - s/p dig loading HR well controlled now  - EKG with sinus tach->AF  - bedside Echo without global dysfunction  - started midodrine 5mg tid    -#troponemia  -9->8.2->8.3 11/21 trended down   -cardio Dr. Tsang saw patient before, Currently he does not need Aspirin and Plavix,   - Will Consult PRN in case patient's condition changes    Pulmonary:   #Acute respiratory failure 2/2 COVID PNA  -ETT placed 11/19, replaced on 12/16 as patient was having a leak in the balloon  - ESR, procal, ferritin decreasing, d-dimer, LDH, CRP decreasing, will trend every 3rd day  - Dexamethasone IV 6mg Qd (10 day course through 11/24) , Finished on 12/4  - CXR 12/11 shows persistent  interstitial lung markings with bilateral airspace opacities   - IgA positive, IgG negative, indicating new infection, No Remdesivir given ARF  - c/w cefepime 12/17 for persistent fevers        ID:   -history and management as above  -changed LIJ to femoral line, removed LIJ 11/25  -WBC 18->11->13.2->15.6->17.8>18>10  -RVP +COVID, procal increased to 3.13 as noted above, started cefepime 11/21, Will complete 7 days of abx  - Sputum Cx: moderate Klebsiella pneumonia  -BCx NGTD      Nephro: #AGUSTO due to ATN secondary to likely Covid pneumonia   -CrCl 44, Cr 1.1 on admission, acutely worsened 11/21, possibly 2/2 covid injury and/or low BP  -Cr 4.39->  -RIJ HD replaced 12/13 and HD started 11/21, last HD  12/19  -continue phoslo, on bicarb tabs now 1300 tid,     GI: #TF  - monitor LFTs AST/ALT,  likely 2/2 COVID, trending down slowly  -Daily CMP  -Protonix IV for PPx  -Senna and Miralax for bowel regimen    Heme:   - Platelets 300 K  - monitor for now    # Anemia  - Hb 7.5  - s/p 1u pRBC 12/19, total 5u pRBC so far    Endocrine: #BG controlled  -A1c 6, average glucose 126  -TSH wnl  -vit D moderately low to 22, c/w 1000U/day   -HSS    Skin/Catheters:   #LIJ placed 12/5-- ( unable too change central line as Patient has diffuse anasarca and limited access. risk of removing line is greater)   #RIJ HD catheter 12/13  #R radial artery 12/04  #eye drops    Prophylaxis:  -patient is on heparin Drip , Protonix for GI ppx    Prognosis:   Poor, DNR< Family informed on 12/5 , Family deciding on making patient hospice with comfort care  Respiratory failure 2/2 COVID PNA. Patient is currently in multi organ failure and on Dialysis. Requiring pressor support    Acute respiratory failure secondary to Covid pneumonia  Septic Shock and Paroxysmal Atrial fibrillation  AGUSTO ,ATN on dialysis      Neuro:   -Intubated and sedated  - On dilaudid drip to minimize fluid being given to patient. Started on propofol today    CVS:   #Septic Shock and Paroxysmal Atrial fibrillation  - patient is currently on Levophed  - s/p dig loading HR well controlled now  - EKG with sinus tach->AF  - bedside Echo without global dysfunction  - started midodrine 5mg tid    -#troponemia  -9->8.2->8.3 11/21 trended down   -cardio Dr. Tsang saw patient before, Currently he does not need Aspirin and Plavix,   - Will Consult PRN in case patient's condition changes    Pulmonary:   #Acute respiratory failure 2/2 COVID PNA  -ETT placed 11/19, replaced on 12/16 as patient was having a leak in the balloon  - ESR, procal, ferritin decreasing, d-dimer, LDH, CRP decreasing, will trend every 3rd day  - Dexamethasone IV 6mg Qd (10 day course through 11/24) , Finished on 12/4  - CXR 12/11 shows persistent  interstitial lung markings with bilateral airspace opacities   - IgA positive, IgG negative, indicating new infection, No Remdesivir given ARF  - c/w cefepime 12/17 for persistent fevers        ID:   -history and management as above  -changed LIJ to femoral line, removed LIJ 11/25  -WBC 18->11->13.2->15.6->17.8>18>10  -RVP +COVID, procal increased to 3.13 as noted above, started cefepime 11/21, Will complete 7 days of abx  - Sputum Cx: moderate Klebsiella pneumonia  -BCx NGTD      Nephro: #AGUSTO due to ATN secondary to likely Covid pneumonia   -CrCl 44, Cr 1.1 on admission, acutely worsened 11/21, possibly 2/2 covid injury and/or low BP  -Cr 7.35 today  -RIJ HD replaced 12/13 and HD started 11/21, last HD  12/19. Plan for HD today  -continue phoslo, on bicarb tabs now 1300 tid    GI: #TF  - monitor LFTs AST/ALT,  likely 2/2 COVID, trending down slowly  -Daily CMP  -Protonix IV for PPx  -Senna and Miralax for bowel regimen    Heme:   - Platelets 300 K  - monitor for now    # Anemia  - Hb 7.5  - s/p 1u pRBC 12/19, total 5u pRBC so far    Endocrine: #BG controlled  -A1c 6, average glucose 126  -TSH wnl  -vit D moderately low to 22, c/w 1000U/day   -HSS    Skin/Catheters:   #LIJ placed 12/5-- ( unable too change central line as Patient has diffuse anasarca and limited access. risk of removing line is greater)   #RIJ HD catheter 12/13  #R radial artery 12/04  #eye drops    Prophylaxis:  -patient is on heparin Drip , Protonix for GI ppx    Prognosis:   Poor, DNR< Family informed on 12/5 , Family deciding on making patient hospice with comfort care

## 2020-12-21 NOTE — PROGRESS NOTE ADULT - PROBLEM SELECTOR PLAN 3
2/2 ARDS due to COVID-19; comorbid multi-organ failure involving lungs, heart, kidneys. WBC 10.607. CXR indicates similar bilateral pulmonary infiltrates. Patient remains sedated/intubated, on IV abx, + pressor, HD. Wife is surrogate; DNR on file.

## 2020-12-21 NOTE — PROGRESS NOTE ADULT - SUBJECTIVE AND OBJECTIVE BOX
OVERNIGHT EVENTS: Pt was started on Propofol drip. Plan for HD today    Present Symptoms:   Review of Systems:  Unable to obtain due to poor mentation    MEDICATIONS  (STANDING):  cefepime   IVPB 2000 milliGRAM(s) IV Intermittent every 24 hours  chlorhexidine 0.12% Liquid 15 milliLiter(s) Oral Mucosa every 12 hours  chlorhexidine 2% Cloths 1 Application(s) Topical <User Schedule>  glucagon  Injectable 1 milliGRAM(s) IntraMuscular once  heparin  Infusion.  Unit(s)/Hr (22 mL/Hr) IV Continuous <Continuous>  HYDROmorphone Infusion 3 mG/Hr (3 mL/Hr) IV Continuous <Continuous>  insulin lispro (ADMELOG) corrective regimen sliding scale   SubCutaneous every 6 hours  lubricant eye ont 1 Application(s) 1 Application(s) Both EYES two times a day  midodrine 5 milliGRAM(s) Oral every 8 hours  norepinephrine Infusion 0.5 MICROgram(s)/kG/Min (58.6 mL/Hr) IV Continuous <Continuous>  pantoprazole   Suspension 40 milliGRAM(s) Oral daily  polyethylene glycol 3350 17 Gram(s) Oral daily  propofol Infusion 15 MICROgram(s)/kG/Min (11.3 mL/Hr) IV Continuous <Continuous>  senna Syrup 10 milliLiter(s) Oral at bedtime  sodium bicarbonate 1300 milliGRAM(s) Oral three times a day    MEDICATIONS  (PRN):  acetaminophen    Suspension .. 650 milliGRAM(s) Enteral Tube every 6 hours PRN Temp greater or equal to 38C (100.4F)  ALBUTerol    90 MICROgram(s) HFA Inhaler 2 Puff(s) Inhalation every 6 hours PRN Shortness of Breath  heparin   Injectable 55287 Unit(s) IV Push every 6 hours PRN For aPTT less than 40  heparin   Injectable 5000 Unit(s) IV Push every 6 hours PRN For aPTT between 40 - 57  sodium chloride 0.9% lock flush 10 milliLiter(s) IV Push every 1 hour PRN Pre/post blood products, medications, blood draw, and to maintain line patency      PHYSICAL EXAM:  Vital Signs Last 24 Hrs  T(C): 37.5 (21 Dec 2020 15:00), Max: 39.1 (21 Dec 2020 08:00)  T(F): 99.5 (21 Dec 2020 15:00), Max: 102.3 (21 Dec 2020 08:00)  HR: 104 (21 Dec 2020 15:15) (94 - 113)  BP: 102/52 (21 Dec 2020 08:00) (98/46 - 114/60)  BP(mean): 63 (21 Dec 2020 08:00) (59 - 71)  RR: 38 (21 Dec 2020 15:00) (0 - 38)  SpO2: 90% (21 Dec 2020 15:00) (86% - 97%)  General: Patient not medically stable for full physical exam. Patient remains sedated/intubated, on pressor.    Karnofsky Performance Score/Palliative Performance Status Version2:     10%    HEENT:   ET tube   Lungs: tachypnea, on ventilator. Continues propofol, dilaudid  CV:     tachycardia, on pressor + midodrine  GI:   incontinent, NGT + morbid obesity  :   cruz, on HD  Musculoskeletal: patient sedated/intubated, dependent on ADLs  Skin: +anasarca, sacrum DTI  Neuro: patient sedated/intubated, dependent on ADLs  Oral intake ability: unable/only mouth care    Diet: NPO; TF via NGT      LABS:                          7.5    10.60 )-----------( 300      ( 21 Dec 2020 05:03 )             25.3     12-21    136  |  96  |  78<H>  ----------------------------<  138<H>  4.5   |  28  |  7.35<H>    Ca    9.8      21 Dec 2020 05:03  Phos  5.3     12-21  Mg     2.3     12-21    TPro  6.7  /  Alb  1.6<L>  /  TBili  0.5  /  DBili  x   /  AST  118<H>  /  ALT  60  /  AlkPhos  218<H>  12-21    RADIOLOGY & ADDITIONAL STUDIES:  < from: Xray Chest 1 View- PORTABLE-Routine (Xray Chest 1 View- PORTABLE-Routine in AM.) (12.21.20 @ 10:14) >  EXAM:  XR CHEST PORTABLE ROUTINE 1V                        PROCEDURE DATE:  12/21/2020    INTERPRETATION:  Chest one view  HISTORY: ET tube placement  COMPARISON STUDY: 12/20/2020  Frontal expiratory view of the chest shows the heart to be similar in size. Right dialysis catheter, left jugular line, feeding tube and endotracheal tube are present, although the endotracheal tube has been retracted to the mid thoracic trachea.  The lungs show similar bilateral pulmonary infiltrates and there is no evidence of pneumothorax nor definite pleural effusion.    IMPRESSION:  ET tube as described.  < end of copied text >      ADVANCE DIRECTIVES: MOLST: DNR

## 2020-12-21 NOTE — PROGRESS NOTE ADULT - PROBLEM SELECTOR PLAN 6
Patient's wife/Naima ([phone]) is primary surrogate. Spoke with wife and daughter-in-law/Roseanne on the phone - updated status; reviewed medications, labs, diagnostics. Family acknowledges overall poor prognosis. MOLST on file with DNR.

## 2020-12-21 NOTE — PROGRESS NOTE ADULT - PROBLEM SELECTOR PLAN 5
Anemia of chronic illness with stable hgb 7.5  Monitor serial CBC  Transfuse PRBC per primary team  Monitor hgb/Hct

## 2020-12-21 NOTE — PROGRESS NOTE ADULT - PROBLEM SELECTOR PLAN 5
2/2 multi-organ failure/ARDS/shock due to covid-19. Patient remains sedated/intubated, on IV abx, + pressor + midodrine, on HD. Overall, poor prognosis.

## 2020-12-21 NOTE — PROGRESS NOTE ADULT - PROBLEM SELECTOR PLAN 1
Anuric AGUSTO from ATN due to septic shock/ARDS requiring RRT/HD  - S/p HD treatment on 12/19 with net 3 L fluid removal  - Avoid Nephrotoxic agents  - Monitor BMP and electrolytes  - Maintain MAP>65-70 mm hg  - Adjust antibiotics as per renal dose clearances  - Volume status and electrolytes noted.  - HD treatment for UF and clearances today

## 2020-12-21 NOTE — PROGRESS NOTE ADULT - SUBJECTIVE AND OBJECTIVE BOX
INTERVAL HPI/OVERNIGHT EVENTS: ***    PRESSORS: [ ] YES [ ] NO    Antimicrobial:  cefepime   IVPB 2000 milliGRAM(s) IV Intermittent every 24 hours    Cardiovascular:  midodrine 5 milliGRAM(s) Oral every 8 hours  norepinephrine Infusion 0.5 MICROgram(s)/kG/Min IV Continuous <Continuous>    Pulmonary:  ALBUTerol    90 MICROgram(s) HFA Inhaler 2 Puff(s) Inhalation every 6 hours PRN    Hematalogic:  heparin   Injectable 25563 Unit(s) IV Push every 6 hours PRN  heparin   Injectable 5000 Unit(s) IV Push every 6 hours PRN  heparin  Infusion.  Unit(s)/Hr IV Continuous <Continuous>    Other:  acetaminophen    Suspension .. 650 milliGRAM(s) Enteral Tube every 6 hours PRN  chlorhexidine 0.12% Liquid 15 milliLiter(s) Oral Mucosa every 12 hours  chlorhexidine 2% Cloths 1 Application(s) Topical <User Schedule>  glucagon  Injectable 1 milliGRAM(s) IntraMuscular once  HYDROmorphone Infusion 3 mG/Hr IV Continuous <Continuous>  insulin lispro (ADMELOG) corrective regimen sliding scale   SubCutaneous every 6 hours  lubricant eye ont 1 Application(s) 1 Application(s) Both EYES two times a day  pantoprazole   Suspension 40 milliGRAM(s) Oral daily  polyethylene glycol 3350 17 Gram(s) Oral daily  senna Syrup 10 milliLiter(s) Oral at bedtime  sodium bicarbonate 1300 milliGRAM(s) Oral three times a day  sodium chloride 0.9% lock flush 10 milliLiter(s) IV Push every 1 hour PRN    acetaminophen    Suspension .. 650 milliGRAM(s) Enteral Tube every 6 hours PRN  ALBUTerol    90 MICROgram(s) HFA Inhaler 2 Puff(s) Inhalation every 6 hours PRN  cefepime   IVPB 2000 milliGRAM(s) IV Intermittent every 24 hours  chlorhexidine 0.12% Liquid 15 milliLiter(s) Oral Mucosa every 12 hours  chlorhexidine 2% Cloths 1 Application(s) Topical <User Schedule>  glucagon  Injectable 1 milliGRAM(s) IntraMuscular once  heparin   Injectable 61291 Unit(s) IV Push every 6 hours PRN  heparin   Injectable 5000 Unit(s) IV Push every 6 hours PRN  heparin  Infusion.  Unit(s)/Hr IV Continuous <Continuous>  HYDROmorphone Infusion 3 mG/Hr IV Continuous <Continuous>  insulin lispro (ADMELOG) corrective regimen sliding scale   SubCutaneous every 6 hours  lubricant eye ont 1 Application(s) 1 Application(s) Both EYES two times a day  midodrine 5 milliGRAM(s) Oral every 8 hours  norepinephrine Infusion 0.5 MICROgram(s)/kG/Min IV Continuous <Continuous>  pantoprazole   Suspension 40 milliGRAM(s) Oral daily  polyethylene glycol 3350 17 Gram(s) Oral daily  senna Syrup 10 milliLiter(s) Oral at bedtime  sodium bicarbonate 1300 milliGRAM(s) Oral three times a day  sodium chloride 0.9% lock flush 10 milliLiter(s) IV Push every 1 hour PRN    Drug Dosing Weight  Height (cm): 154.4 (15 Nov 2020 09:25)  Weight (kg): 125 (15 Nov 2020 09:25)  BMI (kg/m2): 52.4 (15 Nov 2020 09:25)  BSA (m2): 2.16 (15 Nov 2020 09:25)    CENTRAL LINE: [ ] YES [ ] NO  LOCATION:   DATE INSERTED:  REMOVE: [ ] YES [ ] NO  EXPLAIN:    MICHELLE: [ ] YES [ ] NO    DATE INSERTED:  REMOVE:  [ ] YES [ ] NO  EXPLAIN:    A-LINE:  [ ] YES [ ] NO  LOCATION:   DATE INSERTED:  REMOVE:  [ ] YES [ ] NO  EXPLAIN:    PMH -reviewed admission note, no change since admission      ABG - ( 21 Dec 2020 04:04 )  pH, Arterial: 7.26  pH, Blood: x     /  pCO2: 67    /  pO2: 74    / HCO3: 29    / Base Excess: 1.5   /  SaO2: 94                    12-20 @ 07:01  -  12-21 @ 07:00  --------------------------------------------------------  IN: 2103 mL / OUT: 0 mL / NET: 2103 mL        Mode: AC/ CMV (Assist Control/ Continuous Mandatory Ventilation)  RR (machine): 35  TV (machine): 450  FiO2: 100  PEEP: 14  ITime: 0.57  MAP: 27  PIP: 46      PHYSICAL EXAM:    GENERAL: NAD, well-groomed, well-developed  HEAD:  Atraumatic, Normocephalic  EYES: EOMI, PERRLA, conjunctiva and sclera clear  ENMT: No tonsillar erythema, exudates, or enlargement; Moist mucous membranes, Good dentition, [ ]No lesions  NECK: Supple, normal appearance, No JVD; Normal thyroid; Trachea midline  NERVOUS SYSTEM:  Alert & Oriented X3, Good concentration; Motor Strength 5/5 B/L upper and lower extremities; DTRs 2+ intact and symmetric  CHEST/LUNG: No chest deformity; Normal percussion bilaterally; No rales, rhonchi, wheezing   HEART: Regular rate and rhythm; No murmurs, rubs, or gallops  ABDOMEN: Soft, Nontender, Nondistended;Bowel sounds present  EXTREMITIES:  2+ Peripheral Pulses, No clubbing, cyanosis, or edema  LYMPH: No lymphadenopathy noted  SKIN: No rashes or lesions; Good capillary refill      LABS:  CBC Full  -  ( 21 Dec 2020 05:03 )  WBC Count : 10.60 K/uL  RBC Count : 2.59 M/uL  Hemoglobin : 7.5 g/dL  Hematocrit : 25.3 %  Platelet Count - Automated : 300 K/uL  Mean Cell Volume : 97.7 fl  Mean Cell Hemoglobin : 29.0 pg  Mean Cell Hemoglobin Concentration : 29.6 gm/dL  Auto Neutrophil # : 6.84 K/uL  Auto Lymphocyte # : 1.31 K/uL  Auto Monocyte # : 0.50 K/uL  Auto Eosinophil # : 0.57 K/uL  Auto Basophil # : 0.02 K/uL  Auto Neutrophil % : 64.5 %  Auto Lymphocyte % : 12.4 %  Auto Monocyte % : 4.7 %  Auto Eosinophil % : 5.4 %  Auto Basophil % : 0.2 %    12-21    136  |  96  |  78<H>  ----------------------------<  138<H>  4.5   |  28  |  7.35<H>    Ca    9.8      21 Dec 2020 05:03  Phos  5.3     12-21  Mg     2.3     12-21    TPro  6.7  /  Alb  1.6<L>  /  TBili  0.5  /  DBili  x   /  AST  118<H>  /  ALT  60  /  AlkPhos  218<H>  12-21    PTT - ( 21 Dec 2020 05:03 )  PTT:76.7 sec    Culture Results:   Moderate Klebsiella pneumoniae  Normal Respiratory Marsha present (12-19 @ 21:12)      RADIOLOGY & ADDITIONAL STUDIES REVIEWED:  ***    [ ]GOALS OF CARE DISCUSSION WITH PATIENT/FAMILY/PROXY:    CRITICAL CARE TIME SPENT: 35 minutes INTERVAL HPI/OVERNIGHT EVENTS: Pt was started on Propofol drip. Plan for HD today    PRESSORS: [x ] YES [ ] NO    Antimicrobial:  cefepime   IVPB 2000 milliGRAM(s) IV Intermittent every 24 hours    Cardiovascular:  midodrine 5 milliGRAM(s) Oral every 8 hours  norepinephrine Infusion 0.5 MICROgram(s)/kG/Min IV Continuous <Continuous>    Pulmonary:  ALBUTerol    90 MICROgram(s) HFA Inhaler 2 Puff(s) Inhalation every 6 hours PRN    Hematalogic:  heparin   Injectable 10291 Unit(s) IV Push every 6 hours PRN  heparin   Injectable 5000 Unit(s) IV Push every 6 hours PRN  heparin  Infusion.  Unit(s)/Hr IV Continuous <Continuous>    Other:  acetaminophen    Suspension .. 650 milliGRAM(s) Enteral Tube every 6 hours PRN  chlorhexidine 0.12% Liquid 15 milliLiter(s) Oral Mucosa every 12 hours  chlorhexidine 2% Cloths 1 Application(s) Topical <User Schedule>  glucagon  Injectable 1 milliGRAM(s) IntraMuscular once  HYDROmorphone Infusion 3 mG/Hr IV Continuous <Continuous>  insulin lispro (ADMELOG) corrective regimen sliding scale   SubCutaneous every 6 hours  lubricant eye ont 1 Application(s) 1 Application(s) Both EYES two times a day  pantoprazole   Suspension 40 milliGRAM(s) Oral daily  polyethylene glycol 3350 17 Gram(s) Oral daily  senna Syrup 10 milliLiter(s) Oral at bedtime  sodium bicarbonate 1300 milliGRAM(s) Oral three times a day  sodium chloride 0.9% lock flush 10 milliLiter(s) IV Push every 1 hour PRN    acetaminophen    Suspension .. 650 milliGRAM(s) Enteral Tube every 6 hours PRN  ALBUTerol    90 MICROgram(s) HFA Inhaler 2 Puff(s) Inhalation every 6 hours PRN  cefepime   IVPB 2000 milliGRAM(s) IV Intermittent every 24 hours  chlorhexidine 0.12% Liquid 15 milliLiter(s) Oral Mucosa every 12 hours  chlorhexidine 2% Cloths 1 Application(s) Topical <User Schedule>  glucagon  Injectable 1 milliGRAM(s) IntraMuscular once  heparin   Injectable 33352 Unit(s) IV Push every 6 hours PRN  heparin   Injectable 5000 Unit(s) IV Push every 6 hours PRN  heparin  Infusion.  Unit(s)/Hr IV Continuous <Continuous>  HYDROmorphone Infusion 3 mG/Hr IV Continuous <Continuous>  insulin lispro (ADMELOG) corrective regimen sliding scale   SubCutaneous every 6 hours  lubricant eye ont 1 Application(s) 1 Application(s) Both EYES two times a day  midodrine 5 milliGRAM(s) Oral every 8 hours  norepinephrine Infusion 0.5 MICROgram(s)/kG/Min IV Continuous <Continuous>  pantoprazole   Suspension 40 milliGRAM(s) Oral daily  polyethylene glycol 3350 17 Gram(s) Oral daily  senna Syrup 10 milliLiter(s) Oral at bedtime  sodium bicarbonate 1300 milliGRAM(s) Oral three times a day  sodium chloride 0.9% lock flush 10 milliLiter(s) IV Push every 1 hour PRN    Drug Dosing Weight  Height (cm): 154.4 (15 Nov 2020 09:25)  Weight (kg): 125 (15 Nov 2020 09:25)  BMI (kg/m2): 52.4 (15 Nov 2020 09:25)  BSA (m2): 2.16 (15 Nov 2020 09:25)    CENTRAL LINE: [x ] YES [ ] NO  LOCATION:   DATE INSERTED:  REMOVE: [ ] YES [ ] NO  EXPLAIN:    MICHELLE: [x ] YES [ ] NO    DATE INSERTED:  REMOVE:  [ ] YES [ ] NO  EXPLAIN:    A-LINE:  [x ] YES [ ] NO  LOCATION:   DATE INSERTED:  REMOVE:  [ ] YES [ ] NO  EXPLAIN:    PMH -reviewed admission note, no change since admission      ABG - ( 21 Dec 2020 04:04 )  pH, Arterial: 7.26  pH, Blood: x     /  pCO2: 67    /  pO2: 74    / HCO3: 29    / Base Excess: 1.5   /  SaO2: 94                    12-20 @ 07:01  -  12-21 @ 07:00  --------------------------------------------------------  IN: 2103 mL / OUT: 0 mL / NET: 2103 mL        Mode: AC/ CMV (Assist Control/ Continuous Mandatory Ventilation)  RR (machine): 35  TV (machine): 450  FiO2: 100  PEEP: 14  ITime: 0.57  MAP: 27  PIP: 46      PHYSICAL EXAM:    GENERAL: sedated lying in bed comfortably  HEAD:  Atraumatic, Normocephalic  EYES: PERRLA, 2m sluggish response,  conjunctiva mildly icteric   ENT: Moist mucous membranes  NECK: Supple, No JVD  CHEST/LUNG: Clear to auscultation bilaterally; No rales, rhonchi, wheezing, or rubs.  HEART: Regular rate and rhythm; S1+ S2+  ABDOMEN: Bowel sounds present; Soft, Nontender, mildly distended.  EXTREMITIES:  2+ Peripheral Pulses, brisk capillary refill. No clubbing, cyanosis, 2+ UE and LE  edema  NERVOUS SYSTEM: sedated, non responsive, Responds to pain   SKIN: sacral decub ulce      LABS:  CBC Full  -  ( 21 Dec 2020 05:03 )  WBC Count : 10.60 K/uL  RBC Count : 2.59 M/uL  Hemoglobin : 7.5 g/dL  Hematocrit : 25.3 %  Platelet Count - Automated : 300 K/uL  Mean Cell Volume : 97.7 fl  Mean Cell Hemoglobin : 29.0 pg  Mean Cell Hemoglobin Concentration : 29.6 gm/dL  Auto Neutrophil # : 6.84 K/uL  Auto Lymphocyte # : 1.31 K/uL  Auto Monocyte # : 0.50 K/uL  Auto Eosinophil # : 0.57 K/uL  Auto Basophil # : 0.02 K/uL  Auto Neutrophil % : 64.5 %  Auto Lymphocyte % : 12.4 %  Auto Monocyte % : 4.7 %  Auto Eosinophil % : 5.4 %  Auto Basophil % : 0.2 %    12-21    136  |  96  |  78<H>  ----------------------------<  138<H>  4.5   |  28  |  7.35<H>    Ca    9.8      21 Dec 2020 05:03  Phos  5.3     12-21  Mg     2.3     12-21    TPro  6.7  /  Alb  1.6<L>  /  TBili  0.5  /  DBili  x   /  AST  118<H>  /  ALT  60  /  AlkPhos  218<H>  12-21    PTT - ( 21 Dec 2020 05:03 )  PTT:76.7 sec    Culture Results:   Moderate Klebsiella pneumoniae  Normal Respiratory Marsha present (12-19 @ 21:12)      RADIOLOGY & ADDITIONAL STUDIES REVIEWED:  ***    [ ]GOALS OF CARE DISCUSSION WITH PATIENT/FAMILY/PROXY:    CRITICAL CARE TIME SPENT: 35 minutes

## 2020-12-22 NOTE — PROGRESS NOTE ADULT - PROBLEM SELECTOR PLAN 5
Anemia of chronic illness with stable hgb 7.5  Monitor serial CBC  Transfuse PRBC per primary team  Monitor hgb/Hct  Advanced directives and goals of care per family and palliative care team.

## 2020-12-22 NOTE — PROGRESS NOTE ADULT - SUBJECTIVE AND OBJECTIVE BOX
50y Male    Meds:  cefepime   IVPB 2000 milliGRAM(s) IV Intermittent every 24 hours    Allergies    No Known Allergies    Intolerances        VITALS:  Vital Signs Last 24 Hrs  T(C): 38.1 (22 Dec 2020 07:00), Max: 38.7 (22 Dec 2020 00:30)  T(F): 100.6 (22 Dec 2020 07:00), Max: 101.7 (22 Dec 2020 00:30)  HR: 117 (22 Dec 2020 16:30) (98 - 120)  BP: 114/71 (22 Dec 2020 08:00) (91/49 - 114/71)  BP(mean): 80 (22 Dec 2020 08:00) (58 - 80)  RR: 16 (22 Dec 2020 16:30) (10 - 41)  SpO2: 89% (22 Dec 2020 16:30) (80% - 95%)    LABS/DIAGNOSTIC TESTS:                          8.0    13.66 )-----------( 348      ( 22 Dec 2020 04:26 )             26.6         12-22    136  |  97  |  46<H>  ----------------------------<  139<H>  3.7   |  30  |  4.83<H>    Ca    9.5      22 Dec 2020 04:26  Phos  4.0     12-22  Mg     2.3     12-22    TPro  6.7  /  Alb  1.6<L>  /  TBili  0.5  /  DBili  x   /  AST  118<H>  /  ALT  60  /  AlkPhos  218<H>  12-21      LIVER FUNCTIONS - ( 21 Dec 2020 05:03 )  Alb: 1.6 g/dL / Pro: 6.7 g/dL / ALK PHOS: 218 U/L / ALT: 60 U/L DA / AST: 118 U/L / GGT: x             CULTURES: .Sputum Sputum  12-19 @ 21:12   Moderate Klebsiella pneumoniae  Normal Respiratory Marsha present  --  Klebsiella pneumoniae      .Blood Blood-Peripheral  11-15 @ 11:19   No Growth Final  --  --      .Blood Blood-Peripheral  11-15 @ 11:17   No Growth Final  --  --            RADIOLOGY:      ROS:  [  ] UNABLE TO ELICIT ICU VISIT  50y Male who is still in the ICU, intubated, vent dependent and on pressors, he has been having fevers and so was restarted on IV antibiotics, he remains acidotic and edematous but less than before. He remains febrile despite being on Maxipime.    Meds:  cefepime   IVPB 2000 milliGRAM(s) IV Intermittent every 24 hours    Allergies    No Known Allergies    Intolerances        VITALS:  Vital Signs Last 24 Hrs  T(C): 38.1 (22 Dec 2020 07:00), Max: 38.7 (22 Dec 2020 00:30)  T(F): 100.6 (22 Dec 2020 07:00), Max: 101.7 (22 Dec 2020 00:30)  HR: 117 (22 Dec 2020 16:30) (98 - 120)  BP: 114/71 (22 Dec 2020 08:00) (91/49 - 114/71)  BP(mean): 80 (22 Dec 2020 08:00) (58 - 80)  RR: 16 (22 Dec 2020 16:30) (10 - 41)  SpO2: 89% (22 Dec 2020 16:30) (80% - 95%)    LABS/DIAGNOSTIC TESTS:                          8.0    13.66 )-----------( 348      ( 22 Dec 2020 04:26 )             26.6         12-22    136  |  97  |  46<H>  ----------------------------<  139<H>  3.7   |  30  |  4.83<H>    Ca    9.5      22 Dec 2020 04:26  Phos  4.0     12-22  Mg     2.3     12-22    TPro  6.7  /  Alb  1.6<L>  /  TBili  0.5  /  DBili  x   /  AST  118<H>  /  ALT  60  /  AlkPhos  218<H>  12-21      LIVER FUNCTIONS - ( 21 Dec 2020 05:03 )  Alb: 1.6 g/dL / Pro: 6.7 g/dL / ALK PHOS: 218 U/L / ALT: 60 U/L DA / AST: 118 U/L / GGT: x             CULTURES: .Sputum Sputum  12-19 @ 21:12   Moderate Klebsiella pneumoniae  Normal Respiratory Marsha present  --  Klebsiella pneumoniae      .Blood Blood-Peripheral  11-15 @ 11:19   No Growth Final  --  --      .Blood Blood-Peripheral  11-15 @ 11:17   No Growth Final  --  --            RADIOLOGY:      ROS:  [ x ] UNABLE TO ELICIT

## 2020-12-22 NOTE — PROGRESS NOTE ADULT - ASSESSMENT
Fevers  Pneumonia  Septic shock / multiorgan failure   COVID - 19 infection    Plan - Cont Maxipime 2gms iv q24 hrs  Time spent - 30 mins  prognosis is grave.

## 2020-12-22 NOTE — PROGRESS NOTE ADULT - SUBJECTIVE AND OBJECTIVE BOX
INTERVAL HPI/OVERNIGHT EVENTS: ***    PRESSORS: [ ] YES [ ] NO    Antimicrobial:  cefepime   IVPB 2000 milliGRAM(s) IV Intermittent every 24 hours    Cardiovascular:  midodrine 5 milliGRAM(s) Oral every 8 hours  norepinephrine Infusion 0.5 MICROgram(s)/kG/Min IV Continuous <Continuous>    Pulmonary:  ALBUTerol    90 MICROgram(s) HFA Inhaler 2 Puff(s) Inhalation every 6 hours PRN    Hematalogic:  heparin   Injectable 76904 Unit(s) IV Push every 6 hours PRN  heparin   Injectable 5000 Unit(s) IV Push every 6 hours PRN  heparin  Infusion.  Unit(s)/Hr IV Continuous <Continuous>    Other:  acetaminophen    Suspension .. 650 milliGRAM(s) Enteral Tube every 6 hours PRN  chlorhexidine 0.12% Liquid 15 milliLiter(s) Oral Mucosa every 12 hours  chlorhexidine 2% Cloths 1 Application(s) Topical <User Schedule>  glucagon  Injectable 1 milliGRAM(s) IntraMuscular once  HYDROmorphone Infusion 3 mG/Hr IV Continuous <Continuous>  insulin lispro (ADMELOG) corrective regimen sliding scale   SubCutaneous every 6 hours  lubricant eye ont 1 Application(s) 1 Application(s) Both EYES two times a day  pantoprazole   Suspension 40 milliGRAM(s) Oral daily  polyethylene glycol 3350 17 Gram(s) Oral daily  propofol Infusion 15 MICROgram(s)/kG/Min IV Continuous <Continuous>  senna Syrup 10 milliLiter(s) Oral at bedtime  sodium bicarbonate 1300 milliGRAM(s) Oral three times a day  sodium chloride 0.9% lock flush 10 milliLiter(s) IV Push every 1 hour PRN    acetaminophen    Suspension .. 650 milliGRAM(s) Enteral Tube every 6 hours PRN  ALBUTerol    90 MICROgram(s) HFA Inhaler 2 Puff(s) Inhalation every 6 hours PRN  cefepime   IVPB 2000 milliGRAM(s) IV Intermittent every 24 hours  chlorhexidine 0.12% Liquid 15 milliLiter(s) Oral Mucosa every 12 hours  chlorhexidine 2% Cloths 1 Application(s) Topical <User Schedule>  glucagon  Injectable 1 milliGRAM(s) IntraMuscular once  heparin   Injectable 47957 Unit(s) IV Push every 6 hours PRN  heparin   Injectable 5000 Unit(s) IV Push every 6 hours PRN  heparin  Infusion.  Unit(s)/Hr IV Continuous <Continuous>  HYDROmorphone Infusion 3 mG/Hr IV Continuous <Continuous>  insulin lispro (ADMELOG) corrective regimen sliding scale   SubCutaneous every 6 hours  lubricant eye ont 1 Application(s) 1 Application(s) Both EYES two times a day  midodrine 5 milliGRAM(s) Oral every 8 hours  norepinephrine Infusion 0.5 MICROgram(s)/kG/Min IV Continuous <Continuous>  pantoprazole   Suspension 40 milliGRAM(s) Oral daily  polyethylene glycol 3350 17 Gram(s) Oral daily  propofol Infusion 15 MICROgram(s)/kG/Min IV Continuous <Continuous>  senna Syrup 10 milliLiter(s) Oral at bedtime  sodium bicarbonate 1300 milliGRAM(s) Oral three times a day  sodium chloride 0.9% lock flush 10 milliLiter(s) IV Push every 1 hour PRN    Drug Dosing Weight  Height (cm): 154.4 (15 Nov 2020 09:25)  Weight (kg): 125 (15 Nov 2020 09:25)  BMI (kg/m2): 52.4 (15 Nov 2020 09:25)  BSA (m2): 2.16 (15 Nov 2020 09:25)    CENTRAL LINE: [ ] YES [ ] NO  LOCATION:   DATE INSERTED:  REMOVE: [ ] YES [ ] NO  EXPLAIN:    MICHELLE: [ ] YES [ ] NO    DATE INSERTED:  REMOVE:  [ ] YES [ ] NO  EXPLAIN:    A-LINE:  [ ] YES [ ] NO  LOCATION:   DATE INSERTED:  REMOVE:  [ ] YES [ ] NO  EXPLAIN:    PMH -reviewed admission note, no change since admission      ABG - ( 22 Dec 2020 04:08 )  pH, Arterial: 7.21  pH, Blood: x     /  pCO2: 76    /  pO2: 60    / HCO3: 29    / Base Excess: -0.6  /  SaO2: 87                    12-21 @ 07:01  -  12-22 @ 07:00  --------------------------------------------------------  IN: 1902 mL / OUT: 6100 mL / NET: -4198 mL        Mode: AC/ CMV (Assist Control/ Continuous Mandatory Ventilation)  RR (machine): 35  TV (machine): 450  FiO2: 100  PEEP: 14  ITime: 0.6  MAP: 28  PIP: 48      PHYSICAL EXAM:    GENERAL: NAD, well-groomed, well-developed  HEAD:  Atraumatic, Normocephalic  EYES: EOMI, PERRLA, conjunctiva and sclera clear  ENMT: No tonsillar erythema, exudates, or enlargement; Moist mucous membranes, Good dentition, [ ]No lesions  NECK: Supple, normal appearance, No JVD; Normal thyroid; Trachea midline  NERVOUS SYSTEM:  Alert & Oriented X3, Good concentration; Motor Strength 5/5 B/L upper and lower extremities; DTRs 2+ intact and symmetric  CHEST/LUNG: No chest deformity; Normal percussion bilaterally; No rales, rhonchi, wheezing   HEART: Regular rate and rhythm; No murmurs, rubs, or gallops  ABDOMEN: Soft, Nontender, Nondistended;Bowel sounds present  EXTREMITIES:  2+ Peripheral Pulses, No clubbing, cyanosis, or edema  LYMPH: No lymphadenopathy noted  SKIN: No rashes or lesions; Good capillary refill      LABS:  CBC Full  -  ( 22 Dec 2020 04:26 )  WBC Count : 13.66 K/uL  RBC Count : 2.67 M/uL  Hemoglobin : 8.0 g/dL  Hematocrit : 26.6 %  Platelet Count - Automated : 348 K/uL  Mean Cell Volume : 99.6 fl  Mean Cell Hemoglobin : 30.0 pg  Mean Cell Hemoglobin Concentration : 30.1 gm/dL  Auto Neutrophil # : 9.01 K/uL  Auto Lymphocyte # : 1.53 K/uL  Auto Monocyte # : 0.43 K/uL  Auto Eosinophil # : 0.79 K/uL  Auto Basophil # : 0.05 K/uL  Auto Neutrophil % : 66.0 %  Auto Lymphocyte % : 11.2 %  Auto Monocyte % : 3.1 %  Auto Eosinophil % : 5.8 %  Auto Basophil % : 0.4 %    12-22    136  |  97  |  46<H>  ----------------------------<  139<H>  3.7   |  30  |  4.83<H>    Ca    9.5      22 Dec 2020 04:26  Phos  4.0     12-22  Mg     2.3     12-22    TPro  6.7  /  Alb  1.6<L>  /  TBili  0.5  /  DBili  x   /  AST  118<H>  /  ALT  60  /  AlkPhos  218<H>  12-21    PTT - ( 22 Dec 2020 04:26 )  PTT:91.8 sec    Culture Results:   Moderate Klebsiella pneumoniae  Normal Respiratory Marsha present (12-19 @ 21:12)      RADIOLOGY & ADDITIONAL STUDIES REVIEWED:  ***    [ ]GOALS OF CARE DISCUSSION WITH PATIENT/FAMILY/PROXY:    CRITICAL CARE TIME SPENT: 35 minutes INTERVAL HPI/OVERNIGHT EVENTS: Pt got HD yesterday with 5L fluid removal, plan for next HD tomorrow.    PRESSORS: [x ] YES [ ] NO    Antimicrobial:  cefepime   IVPB 2000 milliGRAM(s) IV Intermittent every 24 hours    Cardiovascular:  midodrine 5 milliGRAM(s) Oral every 8 hours  norepinephrine Infusion 0.5 MICROgram(s)/kG/Min IV Continuous <Continuous>    Pulmonary:  ALBUTerol    90 MICROgram(s) HFA Inhaler 2 Puff(s) Inhalation every 6 hours PRN    Hematalogic:  heparin   Injectable 07907 Unit(s) IV Push every 6 hours PRN  heparin   Injectable 5000 Unit(s) IV Push every 6 hours PRN  heparin  Infusion.  Unit(s)/Hr IV Continuous <Continuous>    Other:  acetaminophen    Suspension .. 650 milliGRAM(s) Enteral Tube every 6 hours PRN  chlorhexidine 0.12% Liquid 15 milliLiter(s) Oral Mucosa every 12 hours  chlorhexidine 2% Cloths 1 Application(s) Topical <User Schedule>  glucagon  Injectable 1 milliGRAM(s) IntraMuscular once  HYDROmorphone Infusion 3 mG/Hr IV Continuous <Continuous>  insulin lispro (ADMELOG) corrective regimen sliding scale   SubCutaneous every 6 hours  lubricant eye ont 1 Application(s) 1 Application(s) Both EYES two times a day  pantoprazole   Suspension 40 milliGRAM(s) Oral daily  polyethylene glycol 3350 17 Gram(s) Oral daily  propofol Infusion 15 MICROgram(s)/kG/Min IV Continuous <Continuous>  senna Syrup 10 milliLiter(s) Oral at bedtime  sodium bicarbonate 1300 milliGRAM(s) Oral three times a day  sodium chloride 0.9% lock flush 10 milliLiter(s) IV Push every 1 hour PRN    acetaminophen    Suspension .. 650 milliGRAM(s) Enteral Tube every 6 hours PRN  ALBUTerol    90 MICROgram(s) HFA Inhaler 2 Puff(s) Inhalation every 6 hours PRN  cefepime   IVPB 2000 milliGRAM(s) IV Intermittent every 24 hours  chlorhexidine 0.12% Liquid 15 milliLiter(s) Oral Mucosa every 12 hours  chlorhexidine 2% Cloths 1 Application(s) Topical <User Schedule>  glucagon  Injectable 1 milliGRAM(s) IntraMuscular once  heparin   Injectable 80655 Unit(s) IV Push every 6 hours PRN  heparin   Injectable 5000 Unit(s) IV Push every 6 hours PRN  heparin  Infusion.  Unit(s)/Hr IV Continuous <Continuous>  HYDROmorphone Infusion 3 mG/Hr IV Continuous <Continuous>  insulin lispro (ADMELOG) corrective regimen sliding scale   SubCutaneous every 6 hours  lubricant eye ont 1 Application(s) 1 Application(s) Both EYES two times a day  midodrine 5 milliGRAM(s) Oral every 8 hours  norepinephrine Infusion 0.5 MICROgram(s)/kG/Min IV Continuous <Continuous>  pantoprazole   Suspension 40 milliGRAM(s) Oral daily  polyethylene glycol 3350 17 Gram(s) Oral daily  propofol Infusion 15 MICROgram(s)/kG/Min IV Continuous <Continuous>  senna Syrup 10 milliLiter(s) Oral at bedtime  sodium bicarbonate 1300 milliGRAM(s) Oral three times a day  sodium chloride 0.9% lock flush 10 milliLiter(s) IV Push every 1 hour PRN    Drug Dosing Weight  Height (cm): 154.4 (15 Nov 2020 09:25)  Weight (kg): 125 (15 Nov 2020 09:25)  BMI (kg/m2): 52.4 (15 Nov 2020 09:25)  BSA (m2): 2.16 (15 Nov 2020 09:25)    CENTRAL LINE: [ x] YES [ ] NO  LOCATION:   DATE INSERTED:  REMOVE: [ ] YES [ ] NO  EXPLAIN:    MICHELLE: [x ] YES [ ] NO    DATE INSERTED:  REMOVE:  [ ] YES [ ] NO  EXPLAIN:    A-LINE:  [x ] YES [ ] NO  LOCATION:   DATE INSERTED:  REMOVE:  [ ] YES [ ] NO  EXPLAIN:    PMH -reviewed admission note, no change since admission      ABG - ( 22 Dec 2020 04:08 )  pH, Arterial: 7.21  pH, Blood: x     /  pCO2: 76    /  pO2: 60    / HCO3: 29    / Base Excess: -0.6  /  SaO2: 87                    12-21 @ 07:01  -  12-22 @ 07:00  --------------------------------------------------------  IN: 1902 mL / OUT: 6100 mL / NET: -4198 mL        Mode: AC/ CMV (Assist Control/ Continuous Mandatory Ventilation)  RR (machine): 35  TV (machine): 450  FiO2: 100  PEEP: 14  ITime: 0.6  MAP: 28  PIP: 48      PHYSICAL EXAM:      GENERAL: sedated lying in bed comfortably  HEAD:  Atraumatic, Normocephalic  EYES: PERRLA, 2m sluggish response,  conjunctiva mildly icteric   ENT: Moist mucous membranes  NECK: Supple, No JVD  CHEST/LUNG: Clear to auscultation bilaterally; No rales, rhonchi, wheezing, or rubs.  HEART: Regular rate and rhythm; S1+ S2+  ABDOMEN: Bowel sounds present; Soft, Nontender, mildly distended.  EXTREMITIES:  2+ Peripheral Pulses, brisk capillary refill. No clubbing, cyanosis, 2+ UE and LE  edema  NERVOUS SYSTEM: sedated, non responsive, Responds to pain   SKIN: sacral decub ulcer      LABS:  CBC Full  -  ( 22 Dec 2020 04:26 )  WBC Count : 13.66 K/uL  RBC Count : 2.67 M/uL  Hemoglobin : 8.0 g/dL  Hematocrit : 26.6 %  Platelet Count - Automated : 348 K/uL  Mean Cell Volume : 99.6 fl  Mean Cell Hemoglobin : 30.0 pg  Mean Cell Hemoglobin Concentration : 30.1 gm/dL  Auto Neutrophil # : 9.01 K/uL  Auto Lymphocyte # : 1.53 K/uL  Auto Monocyte # : 0.43 K/uL  Auto Eosinophil # : 0.79 K/uL  Auto Basophil # : 0.05 K/uL  Auto Neutrophil % : 66.0 %  Auto Lymphocyte % : 11.2 %  Auto Monocyte % : 3.1 %  Auto Eosinophil % : 5.8 %  Auto Basophil % : 0.4 %    12-22    136  |  97  |  46<H>  ----------------------------<  139<H>  3.7   |  30  |  4.83<H>    Ca    9.5      22 Dec 2020 04:26  Phos  4.0     12-22  Mg     2.3     12-22    TPro  6.7  /  Alb  1.6<L>  /  TBili  0.5  /  DBili  x   /  AST  118<H>  /  ALT  60  /  AlkPhos  218<H>  12-21    PTT - ( 22 Dec 2020 04:26 )  PTT:91.8 sec    Culture Results:   Moderate Klebsiella pneumoniae  Normal Respiratory Marsha present (12-19 @ 21:12)      RADIOLOGY & ADDITIONAL STUDIES REVIEWED:  ***    [ ]GOALS OF CARE DISCUSSION WITH PATIENT/FAMILY/PROXY:    CRITICAL CARE TIME SPENT: 35 minutes

## 2020-12-22 NOTE — CHART NOTE - NSCHARTNOTEFT_GEN_A_CORE
Palliative Care Note:  Reviewed status with ICU team. Spoke with patient's wife/Naima ([phone]) and daughter-in-law/Roseanne. Updated status; reviewed medications, labs. All questions answered; supportive counseling provided.

## 2020-12-22 NOTE — PROGRESS NOTE ADULT - PROBLEM SELECTOR PLAN 2
Patient with Calcium 9.5 with albumin 1.6, Phos  5.3, Mag 2.2  Monitor BMP, calcium, mag, phos level  HD treatment for clearances and UF  Monitor electrolytes

## 2020-12-22 NOTE — PROGRESS NOTE ADULT - SUBJECTIVE AND OBJECTIVE BOX
Chandan Awad MD (Nephrology progress note)  [address], [facility],  [address],  [facility]  TEl: [id number]  Cell: [phone]    Patient is a 50y Male seen and evaluated at bedside. Vital signs, laboratory data, imaging studies, consult notes reviewed done within past 24 hours. Overnight events noted. Patient remains critically ill on ventilatory support and IV pressors. Interval HD treatment on 12/21 with 5L UF. Tolerated procedure well.    Allergies    No Known Allergies    Intolerances        ROS:  Limited. Sedated and intubated on ventilatory support  VITALS:    T(C): 38.2 (12-22-20 @ 04:00), Max: 38.7 (12-22-20 @ 00:30)  HR: 102 (12-22-20 @ 08:09) (96 - 115)  BP: 113/66 (12-22-20 @ 06:00) (91/49 - 113/66)  RR: 35 (12-22-20 @ 06:45) (14 - 38)  SpO2: 91% (12-22-20 @ 08:09) (82% - 95%)  CAPILLARY BLOOD GLUCOSE      POCT Blood Glucose.: 148 mg/dL (22 Dec 2020 05:21)  POCT Blood Glucose.: 145 mg/dL (22 Dec 2020 00:28)  POCT Blood Glucose.: 137 mg/dL (21 Dec 2020 17:03)  POCT Blood Glucose.: 176 mg/dL (21 Dec 2020 10:51)      12-21-20 @ 07:01  -  12-22-20 @ 07:00  --------------------------------------------------------  IN: 1902 mL / OUT: 6100 mL / NET: -4198 mL      MEDICATIONS  (STANDING):  cefepime   IVPB 2000 milliGRAM(s) IV Intermittent every 24 hours  chlorhexidine 0.12% Liquid 15 milliLiter(s) Oral Mucosa every 12 hours  chlorhexidine 2% Cloths 1 Application(s) Topical <User Schedule>  glucagon  Injectable 1 milliGRAM(s) IntraMuscular once  heparin  Infusion.  Unit(s)/Hr (22 mL/Hr) IV Continuous <Continuous>  HYDROmorphone Infusion 3 mG/Hr (3 mL/Hr) IV Continuous <Continuous>  insulin lispro (ADMELOG) corrective regimen sliding scale   SubCutaneous every 6 hours  lubricant eye ont 1 Application(s) 1 Application(s) Both EYES two times a day  midodrine 5 milliGRAM(s) Oral every 8 hours  norepinephrine Infusion 0.5 MICROgram(s)/kG/Min (58.6 mL/Hr) IV Continuous <Continuous>  pantoprazole   Suspension 40 milliGRAM(s) Oral daily  polyethylene glycol 3350 17 Gram(s) Oral daily  propofol Infusion 15 MICROgram(s)/kG/Min (11.3 mL/Hr) IV Continuous <Continuous>  senna Syrup 10 milliLiter(s) Oral at bedtime  sodium bicarbonate 1300 milliGRAM(s) Oral three times a day    MEDICATIONS  (PRN):  acetaminophen    Suspension .. 650 milliGRAM(s) Enteral Tube every 6 hours PRN Temp greater or equal to 38C (100.4F)  ALBUTerol    90 MICROgram(s) HFA Inhaler 2 Puff(s) Inhalation every 6 hours PRN Shortness of Breath  heparin   Injectable 84567 Unit(s) IV Push every 6 hours PRN For aPTT less than 40  heparin   Injectable 5000 Unit(s) IV Push every 6 hours PRN For aPTT between 40 - 57  sodium chloride 0.9% lock flush 10 milliLiter(s) IV Push every 1 hour PRN Pre/post blood products, medications, blood draw, and to maintain line patency      PHYSICAL EXAM:  GENERAL:  Sedated and intubated on ventilatory support  HEENT: LEONOR, EOMI, neck supple, no JVP, no carotid bruit, oral mucosa moist and pale, ETT in place  CHEST/LUNG: Bilateral decreased breath sounds, bibasilar rhonchi and crepitations, no wheezing  HEART: Regular rate and rhythm, ERNESTO II/VI at LPSB, no gallops, no rub   ABDOMEN: Soft, nontender, non distended, bowel sounds present, no palpable organomegaly  : No flank or supra pubic tenderness.  EXTREMITIES: Bilateral upper and lower extremity pitting pedal edema  Neurology: Sedated and intubated on ventilatory support  SKIN: No rash or skin lesion  Musculoskeletal: No joint deformity     Vascular ACCESS:     LABS:                        8.0    13.66 )-----------( 348      ( 22 Dec 2020 04:26 )             26.6     12-22    136  |  97  |  46<H>  ----------------------------<  139<H>  3.7   |  30  |  4.83<H>    Ca    9.5      22 Dec 2020 04:26  Phos  4.0     12-22  Mg     2.3     12-22    TPro  6.7  /  Alb  1.6<L>  /  TBili  0.5  /  DBili  x   /  AST  118<H>  /  ALT  60  /  AlkPhos  218<H>  12-21      PTT - ( 22 Dec 2020 04:26 )  PTT:91.8 sec          RADIOLOGY & ADDITIONAL STUDIES:  r< from: Xray Chest 1 View- PORTABLE-Routine (Xray Chest 1 View- PORTABLE-Routine in AM.) (12.21.20 @ 10:14) >    EXAM:  XR CHEST PORTABLE ROUTINE 1V                            PROCEDURE DATE:  12/21/2020          INTERPRETATION:  Chest one view    HISTORY: ET tube placement    COMPARISON STUDY: 12/20/2020    Frontal expiratory view of the chest shows the heart to be similar in size. Right dialysis catheter, left jugular line, feeding tube and endotracheal tube are present, although the endotracheal tube has been retracted to the mid thoracic trachea.    The lungs show similar bilateral pulmonary infiltrates and there is no evidence of pneumothorax nor definite pleural effusion.    IMPRESSION:  ET tube as described.    Thank you for the courtesy of this referral.            ALEXIA FERNANDES MD; Attending Interventional Radiologist  This document has been electronically signed. Dec 21 2020 11:06AM    < end of copied text >    Imaging Personally Reviewed:  [x] YES  [ ] NO    Consultant(s) Notes Reviewed:  [x] YES  [ ] NO    Care Discussed with Primary team/ Other Providers [x] YES  [ ] NO

## 2020-12-22 NOTE — PROGRESS NOTE ADULT - ASSESSMENT
Respiratory failure 2/2 COVID PNA. Patient is currently in multi organ failure and on Dialysis. Requiring pressor support    Acute respiratory failure secondary to Covid pneumonia  Septic Shock and Paroxysmal Atrial fibrillation  AGUSTO ,ATN on dialysis      Neuro:   -Intubated and sedated  - On dilaudid drip to minimize fluid being given to patient. Started on propofol today    CVS:   #Septic Shock and Paroxysmal Atrial fibrillation  - patient is currently on Levophed  - s/p dig loading HR well controlled now  - EKG with sinus tach->AF  - bedside Echo without global dysfunction  - started midodrine 5mg tid    -#troponemia  -9->8.2->8.3 11/21 trended down   -cardio Dr. Tsang saw patient before, Currently he does not need Aspirin and Plavix,   - Will Consult PRN in case patient's condition changes    Pulmonary:   #Acute respiratory failure 2/2 COVID PNA  -ETT placed 11/19, replaced on 12/16 as patient was having a leak in the balloon  - ESR, procal, ferritin decreasing, d-dimer, LDH, CRP decreasing, will trend every 3rd day  - Dexamethasone IV 6mg Qd (10 day course through 11/24) , Finished on 12/4  - CXR 12/11 shows persistent  interstitial lung markings with bilateral airspace opacities   - IgA positive, IgG negative, indicating new infection, No Remdesivir given ARF  - c/w cefepime 12/17 for persistent fevers        ID:   -history and management as above  -changed LIJ to femoral line, removed LIJ 11/25  -WBC 18->11->13.2->15.6->17.8>18>10  -RVP +COVID, procal increased to 3.13 as noted above, started cefepime 11/21, Will complete 7 days of abx  - Sputum Cx: moderate Klebsiella pneumonia  -BCx NGTD      Nephro: #AGUSTO due to ATN secondary to likely Covid pneumonia   -CrCl 44, Cr 1.1 on admission, acutely worsened 11/21, possibly 2/2 covid injury and/or low BP  -Cr 7.35 today  -RIJ HD replaced 12/13 and HD started 11/21, last HD  12/19. Plan for HD today  -continue phoslo, on bicarb tabs now 1300 tid    GI: #TF  - monitor LFTs AST/ALT,  likely 2/2 COVID, trending down slowly  -Daily CMP  -Protonix IV for PPx  -Senna and Miralax for bowel regimen    Heme:   - Platelets 300 K  - monitor for now    # Anemia  - Hb 7.5  - s/p 1u pRBC 12/19, total 5u pRBC so far    Endocrine: #BG controlled  -A1c 6, average glucose 126  -TSH wnl  -vit D moderately low to 22, c/w 1000U/day   -HSS    Skin/Catheters:   #LIJ placed 12/5-- ( unable too change central line as Patient has diffuse anasarca and limited access. risk of removing line is greater)   #RIJ HD catheter 12/13  #R radial artery 12/04  #eye drops    Prophylaxis:  -patient is on heparin Drip , Protonix for GI ppx    Prognosis:   Poor, DNR< Family informed on 12/5 , Family deciding on making patient hospice with comfort care  Respiratory failure 2/2 COVID PNA. Patient is currently in multi organ failure and on Dialysis. Requiring pressor support    Acute respiratory failure secondary to Covid pneumonia  Septic Shock and Paroxysmal Atrial fibrillation  AGUSTO ,ATN on dialysis      Neuro:   -Intubated and sedated  - On dilaudid drip to minimize fluid being given to patient. Started on propofol.     CVS:   #Septic Shock and Paroxysmal Atrial fibrillation  - patient is currently on Levophed  - s/p dig loading HR well controlled now  - EKG with sinus tach->AF  - bedside Echo without global dysfunction  - started midodrine 5mg tid    -#troponemia  -9->8.2->8.3 11/21 trended down   -cardio Dr. Tsang saw patient before, Currently he does not need Aspirin and Plavix,   - Will Consult PRN in case patient's condition changes    Pulmonary:   #Acute respiratory failure 2/2 COVID PNA  -ETT placed 11/19, replaced on 12/16 as patient was having a leak in the balloon  - ESR, procal, ferritin decreasing, d-dimer, LDH, CRP decreasing, will trend every 3rd day  - Dexamethasone IV 6mg Qd (10 day course through 11/24) , Finished on 12/4  - CXR 12/11 shows persistent  interstitial lung markings with bilateral airspace opacities   - IgA positive, IgG negative, indicating new infection, No Remdesivir given ARF  - c/w cefepime 12/17 for persistent fevers        ID:   -history and management as above  -changed LIJ to femoral line, removed LIJ 11/25  -WBC 18->11->13.2->15.6->17.8>18>10  -RVP +COVID, procal increased to 3.13 as noted above, started cefepime 11/21, Will complete 7 days of abx  - Sputum Cx: moderate Klebsiella pneumonia  -BCx NGTD      Nephro: #AGUSTO due to ATN secondary to likely Covid pneumonia   -CrCl 44, Cr 1.1 on admission, acutely worsened 11/21, possibly 2/2 covid injury and/or low BP  -Cr 7.35 today  -RIJ HD replaced 12/13 and HD started 11/21, last HD  12/19. Plan for HD today  -continue phoslo, on bicarb tabs now 1300 tid    GI: #TF  - monitor LFTs AST/ALT,  likely 2/2 COVID, trending down slowly  -Daily CMP  -Protonix IV for PPx  -Senna and Miralax for bowel regimen    Heme:   - Platelets 348 K  - monitor for now    # Anemia  - Hb 7.5>8  - s/p 1u pRBC 12/19, total 5u pRBC so far    Endocrine: #BG controlled  -A1c 6, average glucose 126  -TSH wnl  -vit D moderately low to 22, c/w 1000U/day   -HSS    Skin/Catheters:   #LIJ placed 12/5-- ( unable too change central line as Patient has diffuse anasarca and limited access. risk of removing line is greater)   #RIJ HD catheter 12/13  #R radial artery 12/04  #eye drops    Prophylaxis:  -patient is on heparin Drip , Protonix for GI ppx    Prognosis:   Poor, DNR< Family informed on 12/5 , Family deciding on making patient hospice with comfort care

## 2020-12-22 NOTE — PROGRESS NOTE ADULT - PROBLEM SELECTOR PLAN 1
Anuric AGUSTO from ATN due to septic shock/ARDS requiring RRT/HD  - S/p HD treatment on 12/21 with net 5 L fluid removal  - Avoid Nephrotoxic agents  - Monitor BMP and electrolytes  - Maintain MAP>65-70 mm hg  - Adjust antibiotics as per renal dose clearances  - Volume status and electrolytes noted.  - Consider IV albumin infusion to help sequester intravascular volume with generalized anasarca  -  Defer HD today  - Next anticipated IHD on 12/23

## 2020-12-23 NOTE — PROGRESS NOTE ADULT - ASSESSMENT
Respiratory failure 2/2 COVID PNA. Patient is currently in multi organ failure and on Dialysis. Requiring pressor support    Acute respiratory failure secondary to Covid pneumonia  Septic Shock and Paroxysmal Atrial fibrillation  AGUSTO ,ATN on dialysis      Neuro:   -Intubated and sedated  - On dilaudid drip to minimize fluid being given to patient. Started on propofol.     CVS:   #Septic Shock and Paroxysmal Atrial fibrillation  - patient is currently on Levophed  - s/p dig loading HR well controlled now  - EKG with sinus tach->AF  - bedside Echo without global dysfunction  - started midodrine 5mg tid    -#troponemia  -9->8.2->8.3 11/21 trended down   -cardio Dr. Tsang saw patient before, Currently he does not need Aspirin and Plavix,   - Will Consult PRN in case patient's condition changes    Pulmonary:   #Acute respiratory failure 2/2 COVID PNA  -ETT placed 11/19, replaced on 12/16 as patient was having a leak in the balloon  - ESR, procal, ferritin decreasing, d-dimer, LDH, CRP decreasing, will trend every 3rd day  - Dexamethasone IV 6mg Qd (10 day course through 11/24) , Finished on 12/4  - CXR 12/11 shows persistent  interstitial lung markings with bilateral airspace opacities   - IgA positive, IgG negative, indicating new infection, No Remdesivir given ARF  - c/w cefepime 12/17 for persistent fevers        ID:   -history and management as above  -changed LIJ to femoral line, removed LIJ 11/25  -WBC 18->11->13.2->15.6->17.8>18>10  -RVP +COVID, procal increased to 3.13 as noted above, started cefepime 11/21, Will complete 7 days of abx  - Sputum Cx: moderate Klebsiella pneumonia  -BCx NGTD      Nephro: #AGUSTO due to ATN secondary to likely Covid pneumonia   -CrCl 44, Cr 1.1 on admission, acutely worsened 11/21, possibly 2/2 covid injury and/or low BP  -Cr 7.35 today  -RIJ HD replaced 12/13 and HD started 11/21, last HD  12/19. Plan for HD today  -continue phoslo, on bicarb tabs now 1300 tid    GI: #TF  - monitor LFTs AST/ALT,  likely 2/2 COVID, trending down slowly  -Daily CMP  -Protonix IV for PPx  -Senna and Miralax for bowel regimen    Heme:   - Platelets 348 K  - monitor for now    # Anemia  - Hb 7.5>8  - s/p 1u pRBC 12/19, total 5u pRBC so far    Endocrine: #BG controlled  -A1c 6, average glucose 126  -TSH wnl  -vit D moderately low to 22, c/w 1000U/day   -HSS    Skin/Catheters:   #LIJ placed 12/5-- ( unable too change central line as Patient has diffuse anasarca and limited access. risk of removing line is greater)   #RIJ HD catheter 12/13  #R radial artery 12/04  #eye drops    Prophylaxis:  -patient is on heparin Drip , Protonix for GI ppx    Prognosis:   Poor, DNR< Family informed on 12/5 , Family deciding on making patient hospice with comfort care  Respiratory failure 2/2 COVID PNA. Patient is currently in multi organ failure and on Dialysis. Requiring pressor support    Acute respiratory failure secondary to Covid pneumonia  Septic Shock and Paroxysmal Atrial fibrillation  AGUSTO ,ATN on dialysis      Neuro:   -Intubated and sedated  - On dilaudid drip to minimize fluid being given to patient. Started on propofol.     CVS:   #Septic Shock and Paroxysmal Atrial fibrillation  - patient is currently on Levophed  - s/p dig loading HR well controlled now  - EKG with sinus tach->AF  - bedside Echo without global dysfunction  - started midodrine 5mg tid    -#troponemia  -9->8.2->8.3 11/21 trended down   -cardio Dr. Tsang saw patient before, Currently he does not need Aspirin and Plavix,   - Will Consult PRN in case patient's condition changes    Pulmonary:   #Acute respiratory failure 2/2 COVID PNA  -ETT placed 11/19, replaced on 12/16 as patient was having a leak in the balloon  - ESR, procal, ferritin decreasing, d-dimer, LDH, CRP decreasing, will trend every 3rd day  - Dexamethasone IV 6mg Qd (10 day course through 11/24) , Finished on 12/4  - CXR 12/11 shows persistent  interstitial lung markings with bilateral airspace opacities   - IgA positive, IgG negative, indicating new infection, No Remdesivir given ARF  - c/w cefepime 12/17 for persistent fevers        ID:   -history and management as above  -changed LIJ to femoral line, removed LIJ 11/25  -WBC 18->11->10>15  -RVP +COVID, procal increased to 3.13 as noted above, started cefepime 11/21, Will complete 7 days of abx  - Sputum Cx: moderate Klebsiella pneumonia  -BCx NGTD      Nephro: #AGUSTO due to ATN secondary to likely Covid pneumonia   -CrCl 44, Cr 1.1 on admission, acutely worsened 11/21, possibly 2/2 covid injury and/or low BP  -Cr 7.35 today  -RIJ HD replaced 12/13 and HD started 11/21, last HD  12/21. Plan to cancel HD today due to poor prognosis and intra-dialyisis hypotension  -continue phoslo, on bicarb tabs now 1300 tid    GI: #TF  - monitor LFTs AST/ALT,  likely 2/2 COVID, trending down slowly  -Daily CMP  -Protonix IV for PPx  -Senna and Miralax for bowel regimen    Heme:   -No issues  # Anemia  - Hb 7.5>8>7.6  - s/p 1u pRBC 12/19, total 5u pRBC so far    Endocrine: #BG controlled  -A1c 6, average glucose 126  -TSH wnl  -vit D moderately low to 22, c/w 1000U/day   -HSS    Skin/Catheters:   #LIJ placed 12/5-- ( unable too change central line as Patient has diffuse anasarca and limited access. risk of removing line is greater)   #RIJ HD catheter 12/13  #R radial artery 12/04  #eye drops    Prophylaxis:  -patient is on heparin Drip , Protonix for GI ppx    Prognosis:   Poor, DNR< Family informed on 12/5 , Family deciding on making patient hospice with comfort care

## 2020-12-23 NOTE — CHART NOTE - NSCHARTNOTESELECT_GEN_ALL_CORE
Malnutrition Notification
Malnutrition Notification
Event Note
Event Note/Palliative Care 
GOC/Event Note
Nutrition Services
intubation/Event Note

## 2020-12-23 NOTE — PROGRESS NOTE ADULT - SUBJECTIVE AND OBJECTIVE BOX
INTERVAL HPI/OVERNIGHT EVENTS: ***    PRESSORS: [ ] YES [ ] NO    Antimicrobial:  cefepime   IVPB 2000 milliGRAM(s) IV Intermittent every 24 hours    Cardiovascular:  midodrine 5 milliGRAM(s) Oral every 8 hours  norepinephrine Infusion 0.5 MICROgram(s)/kG/Min IV Continuous <Continuous>    Pulmonary:  ALBUTerol    90 MICROgram(s) HFA Inhaler 2 Puff(s) Inhalation every 6 hours PRN    Hematalogic:  heparin   Injectable 24958 Unit(s) IV Push every 6 hours PRN  heparin   Injectable 5000 Unit(s) IV Push every 6 hours PRN  heparin  Infusion.  Unit(s)/Hr IV Continuous <Continuous>    Other:  acetaminophen    Suspension .. 650 milliGRAM(s) Enteral Tube every 6 hours PRN  albumin human 25% IVPB 50 milliLiter(s) IV Intermittent every 6 hours  chlorhexidine 0.12% Liquid 15 milliLiter(s) Oral Mucosa every 12 hours  chlorhexidine 2% Cloths 1 Application(s) Topical <User Schedule>  glucagon  Injectable 1 milliGRAM(s) IntraMuscular once  HYDROmorphone Infusion 3 mG/Hr IV Continuous <Continuous>  insulin lispro (ADMELOG) corrective regimen sliding scale   SubCutaneous every 6 hours  lubricant eye ont 1 Application(s) 1 Application(s) Both EYES two times a day  pantoprazole   Suspension 40 milliGRAM(s) Oral daily  polyethylene glycol 3350 17 Gram(s) Oral daily  propofol Infusion 15 MICROgram(s)/kG/Min IV Continuous <Continuous>  senna Syrup 10 milliLiter(s) Oral at bedtime  sodium bicarbonate 1300 milliGRAM(s) Oral three times a day  sodium chloride 0.9% lock flush 10 milliLiter(s) IV Push every 1 hour PRN    acetaminophen    Suspension .. 650 milliGRAM(s) Enteral Tube every 6 hours PRN  albumin human 25% IVPB 50 milliLiter(s) IV Intermittent every 6 hours  ALBUTerol    90 MICROgram(s) HFA Inhaler 2 Puff(s) Inhalation every 6 hours PRN  cefepime   IVPB 2000 milliGRAM(s) IV Intermittent every 24 hours  chlorhexidine 0.12% Liquid 15 milliLiter(s) Oral Mucosa every 12 hours  chlorhexidine 2% Cloths 1 Application(s) Topical <User Schedule>  glucagon  Injectable 1 milliGRAM(s) IntraMuscular once  heparin   Injectable 41047 Unit(s) IV Push every 6 hours PRN  heparin   Injectable 5000 Unit(s) IV Push every 6 hours PRN  heparin  Infusion.  Unit(s)/Hr IV Continuous <Continuous>  HYDROmorphone Infusion 3 mG/Hr IV Continuous <Continuous>  insulin lispro (ADMELOG) corrective regimen sliding scale   SubCutaneous every 6 hours  lubricant eye ont 1 Application(s) 1 Application(s) Both EYES two times a day  midodrine 5 milliGRAM(s) Oral every 8 hours  norepinephrine Infusion 0.5 MICROgram(s)/kG/Min IV Continuous <Continuous>  pantoprazole   Suspension 40 milliGRAM(s) Oral daily  polyethylene glycol 3350 17 Gram(s) Oral daily  propofol Infusion 15 MICROgram(s)/kG/Min IV Continuous <Continuous>  senna Syrup 10 milliLiter(s) Oral at bedtime  sodium bicarbonate 1300 milliGRAM(s) Oral three times a day  sodium chloride 0.9% lock flush 10 milliLiter(s) IV Push every 1 hour PRN    Drug Dosing Weight  Height (cm): 154.4 (15 Nov 2020 09:25)  Weight (kg): 125 (15 Nov 2020 09:25)  BMI (kg/m2): 52.4 (15 Nov 2020 09:25)  BSA (m2): 2.16 (15 Nov 2020 09:25)    CENTRAL LINE: [ ] YES [ ] NO  LOCATION:   DATE INSERTED:  REMOVE: [ ] YES [ ] NO  EXPLAIN:    MICHELLE: [ ] YES [ ] NO    DATE INSERTED:  REMOVE:  [ ] YES [ ] NO  EXPLAIN:    A-LINE:  [ ] YES [ ] NO  LOCATION:   DATE INSERTED:  REMOVE:  [ ] YES [ ] NO  EXPLAIN:    PMH -reviewed admission note, no change since admission      ABG - ( 23 Dec 2020 04:30 )  pH, Arterial: 7.16  pH, Blood: x     /  pCO2: 73    /  pO2: 98    / HCO3: 25    / Base Excess: -3.4  /  SaO2: 97                    12-22 @ 07:01  -  12-23 @ 07:00  --------------------------------------------------------  IN: 1465.3 mL / OUT: 0 mL / NET: 1465.3 mL        Mode: AC/ CMV (Assist Control/ Continuous Mandatory Ventilation)  RR (machine): 35  TV (machine): 450  FiO2: 100  PEEP: 14  ITime: 0.6  MAP: 27  PIP: 44      PHYSICAL EXAM:    GENERAL: NAD, well-groomed, well-developed  HEAD:  Atraumatic, Normocephalic  EYES: EOMI, PERRLA, conjunctiva and sclera clear  ENMT: No tonsillar erythema, exudates, or enlargement; Moist mucous membranes, Good dentition, [ ]No lesions  NECK: Supple, normal appearance, No JVD; Normal thyroid; Trachea midline  NERVOUS SYSTEM:  Alert & Oriented X3, Good concentration; Motor Strength 5/5 B/L upper and lower extremities; DTRs 2+ intact and symmetric  CHEST/LUNG: No chest deformity; Normal percussion bilaterally; No rales, rhonchi, wheezing   HEART: Regular rate and rhythm; No murmurs, rubs, or gallops  ABDOMEN: Soft, Nontender, Nondistended;Bowel sounds present  EXTREMITIES:  2+ Peripheral Pulses, No clubbing, cyanosis, or edema  LYMPH: No lymphadenopathy noted  SKIN: No rashes or lesions; Good capillary refill      LABS:  CBC Full  -  ( 23 Dec 2020 04:10 )  WBC Count : 15.61 K/uL  RBC Count : 2.63 M/uL  Hemoglobin : 7.6 g/dL  Hematocrit : 26.3 %  Platelet Count - Automated : 383 K/uL  Mean Cell Volume : 100.0 fl  Mean Cell Hemoglobin : 28.9 pg  Mean Cell Hemoglobin Concentration : 28.9 gm/dL  Auto Neutrophil # : 9.69 K/uL  Auto Lymphocyte # : 1.81 K/uL  Auto Monocyte # : 0.85 K/uL  Auto Eosinophil # : 0.84 K/uL  Auto Basophil # : 0.06 K/uL  Auto Neutrophil % : 62.1 %  Auto Lymphocyte % : 11.6 %  Auto Monocyte % : 5.4 %  Auto Eosinophil % : 5.4 %  Auto Basophil % : 0.4 %    12-23    136  |  96  |  61<H>  ----------------------------<  134<H>  4.5   |  28  |  6.37<H>    Ca    9.7      23 Dec 2020 04:10  Phos  6.9     12-23  Mg     2.4     12-23    TPro  7.4  /  Alb  2.3<L>  /  TBili  0.8  /  DBili  x   /  AST  68<H>  /  ALT  51  /  AlkPhos  236<H>  12-23    PTT - ( 23 Dec 2020 04:10 )  PTT:88.7 sec        RADIOLOGY & ADDITIONAL STUDIES REVIEWED:  ***    [ ]GOALS OF CARE DISCUSSION WITH PATIENT/FAMILY/PROXY:    CRITICAL CARE TIME SPENT: 35 minutes INTERVAL HPI/OVERNIGHT EVENTS: Pt spiked temp of 102.4 this morning. Will defer HD today due to poor prognosis and intra-dialysis hypotension    PRESSORS: [ x] YES [ ] NO    Antimicrobial:  cefepime   IVPB 2000 milliGRAM(s) IV Intermittent every 24 hours    Cardiovascular:  midodrine 5 milliGRAM(s) Oral every 8 hours  norepinephrine Infusion 0.5 MICROgram(s)/kG/Min IV Continuous <Continuous>    Pulmonary:  ALBUTerol    90 MICROgram(s) HFA Inhaler 2 Puff(s) Inhalation every 6 hours PRN    Hematalogic:  heparin   Injectable 76733 Unit(s) IV Push every 6 hours PRN  heparin   Injectable 5000 Unit(s) IV Push every 6 hours PRN  heparin  Infusion.  Unit(s)/Hr IV Continuous <Continuous>    Other:  acetaminophen    Suspension .. 650 milliGRAM(s) Enteral Tube every 6 hours PRN  albumin human 25% IVPB 50 milliLiter(s) IV Intermittent every 6 hours  chlorhexidine 0.12% Liquid 15 milliLiter(s) Oral Mucosa every 12 hours  chlorhexidine 2% Cloths 1 Application(s) Topical <User Schedule>  glucagon  Injectable 1 milliGRAM(s) IntraMuscular once  HYDROmorphone Infusion 3 mG/Hr IV Continuous <Continuous>  insulin lispro (ADMELOG) corrective regimen sliding scale   SubCutaneous every 6 hours  lubricant eye ont 1 Application(s) 1 Application(s) Both EYES two times a day  pantoprazole   Suspension 40 milliGRAM(s) Oral daily  polyethylene glycol 3350 17 Gram(s) Oral daily  propofol Infusion 15 MICROgram(s)/kG/Min IV Continuous <Continuous>  senna Syrup 10 milliLiter(s) Oral at bedtime  sodium bicarbonate 1300 milliGRAM(s) Oral three times a day  sodium chloride 0.9% lock flush 10 milliLiter(s) IV Push every 1 hour PRN    acetaminophen    Suspension .. 650 milliGRAM(s) Enteral Tube every 6 hours PRN  albumin human 25% IVPB 50 milliLiter(s) IV Intermittent every 6 hours  ALBUTerol    90 MICROgram(s) HFA Inhaler 2 Puff(s) Inhalation every 6 hours PRN  cefepime   IVPB 2000 milliGRAM(s) IV Intermittent every 24 hours  chlorhexidine 0.12% Liquid 15 milliLiter(s) Oral Mucosa every 12 hours  chlorhexidine 2% Cloths 1 Application(s) Topical <User Schedule>  glucagon  Injectable 1 milliGRAM(s) IntraMuscular once  heparin   Injectable 76003 Unit(s) IV Push every 6 hours PRN  heparin   Injectable 5000 Unit(s) IV Push every 6 hours PRN  heparin  Infusion.  Unit(s)/Hr IV Continuous <Continuous>  HYDROmorphone Infusion 3 mG/Hr IV Continuous <Continuous>  insulin lispro (ADMELOG) corrective regimen sliding scale   SubCutaneous every 6 hours  lubricant eye ont 1 Application(s) 1 Application(s) Both EYES two times a day  midodrine 5 milliGRAM(s) Oral every 8 hours  norepinephrine Infusion 0.5 MICROgram(s)/kG/Min IV Continuous <Continuous>  pantoprazole   Suspension 40 milliGRAM(s) Oral daily  polyethylene glycol 3350 17 Gram(s) Oral daily  propofol Infusion 15 MICROgram(s)/kG/Min IV Continuous <Continuous>  senna Syrup 10 milliLiter(s) Oral at bedtime  sodium bicarbonate 1300 milliGRAM(s) Oral three times a day  sodium chloride 0.9% lock flush 10 milliLiter(s) IV Push every 1 hour PRN    Drug Dosing Weight  Height (cm): 154.4 (15 Nov 2020 09:25)  Weight (kg): 125 (15 Nov 2020 09:25)  BMI (kg/m2): 52.4 (15 Nov 2020 09:25)  BSA (m2): 2.16 (15 Nov 2020 09:25)    CENTRAL LINE: [ x] YES [ ] NO  LOCATION:   DATE INSERTED:  REMOVE: [ ] YES [ ] NO  EXPLAIN:    MICHELLE: [x ] YES [ ] NO    DATE INSERTED:  REMOVE:  [ ] YES [ ] NO  EXPLAIN:    A-LINE:  [x ] YES [ ] NO  LOCATION:   DATE INSERTED:  REMOVE:  [ ] YES [ ] NO  EXPLAIN:    PMH -reviewed admission note, no change since admission      ABG - ( 23 Dec 2020 04:30 )  pH, Arterial: 7.16  pH, Blood: x     /  pCO2: 73    /  pO2: 98    / HCO3: 25    / Base Excess: -3.4  /  SaO2: 97                    12-22 @ 07:01  -  12-23 @ 07:00  --------------------------------------------------------  IN: 1465.3 mL / OUT: 0 mL / NET: 1465.3 mL        Mode: AC/ CMV (Assist Control/ Continuous Mandatory Ventilation)  RR (machine): 35  TV (machine): 450  FiO2: 100  PEEP: 14  ITime: 0.6  MAP: 27  PIP: 44      PHYSICAL EXAM:    GENERAL: sedated lying in bed comfortably  HEAD:  Atraumatic, Normocephalic  EYES: PERRLA, 2m sluggish response,  conjunctiva mildly icteric   ENT: Moist mucous membranes  NECK: Supple, No JVD  CHEST/LUNG: Clear to auscultation bilaterally; No rales, rhonchi, wheezing, or rubs.  HEART: Regular rate and rhythm; S1+ S2+  ABDOMEN: Bowel sounds present; Soft, Nontender, mildly distended.  EXTREMITIES:  2+ Peripheral Pulses, brisk capillary refill. No clubbing, cyanosis, 2+ UE and LE  edema  NERVOUS SYSTEM: sedated, non responsive, Responds to pain   SKIN: sacral decub ulcer        LABS:  CBC Full  -  ( 23 Dec 2020 04:10 )  WBC Count : 15.61 K/uL  RBC Count : 2.63 M/uL  Hemoglobin : 7.6 g/dL  Hematocrit : 26.3 %  Platelet Count - Automated : 383 K/uL  Mean Cell Volume : 100.0 fl  Mean Cell Hemoglobin : 28.9 pg  Mean Cell Hemoglobin Concentration : 28.9 gm/dL  Auto Neutrophil # : 9.69 K/uL  Auto Lymphocyte # : 1.81 K/uL  Auto Monocyte # : 0.85 K/uL  Auto Eosinophil # : 0.84 K/uL  Auto Basophil # : 0.06 K/uL  Auto Neutrophil % : 62.1 %  Auto Lymphocyte % : 11.6 %  Auto Monocyte % : 5.4 %  Auto Eosinophil % : 5.4 %  Auto Basophil % : 0.4 %    12-23    136  |  96  |  61<H>  ----------------------------<  134<H>  4.5   |  28  |  6.37<H>    Ca    9.7      23 Dec 2020 04:10  Phos  6.9     12-23  Mg     2.4     12-23    TPro  7.4  /  Alb  2.3<L>  /  TBili  0.8  /  DBili  x   /  AST  68<H>  /  ALT  51  /  AlkPhos  236<H>  12-23    PTT - ( 23 Dec 2020 04:10 )  PTT:88.7 sec        RADIOLOGY & ADDITIONAL STUDIES REVIEWED:  ***    [ ]GOALS OF CARE DISCUSSION WITH PATIENT/FAMILY/PROXY:    CRITICAL CARE TIME SPENT: 35 minutes

## 2020-12-23 NOTE — PROGRESS NOTE ADULT - PROBLEM SELECTOR PLAN 2
Patient with Calcium 9.7 with albumin 2.3, Phos  6.3, Mag 2.2  Monitor BMP, calcium, mag, phos level  HD treatment for clearances and UF if family agrees  Monitor electrolytes

## 2020-12-23 NOTE — GOALS OF CARE CONVERSATION - ADVANCED CARE PLANNING - CONVERSATION/DISCUSSION
Diagnosis/Prognosis/MOLST Discussed/Treatment Options/Palliative Care Referral
Diagnosis/Prognosis/Treatment Options/MOLST Discussed/Palliative Care Referral

## 2020-12-23 NOTE — PROGRESS NOTE ADULT - PROBLEM SELECTOR PROBLEM 4
Acute respiratory failure with hypoxia
Severe protein-calorie malnutrition
Acute respiratory failure with hypoxia
Septic shock
Acute respiratory failure with hypoxia

## 2020-12-23 NOTE — PHARMACOTHERAPY INTERVENTION NOTE - PROVIDER CONTACTED
Jamie Olivares
Archana Garza
Jazzmine Clarke
Rogers Banks
Rogers Banks
Jojo Fuller

## 2020-12-23 NOTE — PROGRESS NOTE ADULT - PROBLEM SELECTOR PLAN 1
Anuric AGUSTO from ATN due to septic shock/ARDS requiring RRT/HD  - S/p HD treatment on 12/21 with net 5 L fluid removal  - Avoid Nephrotoxic agents  - Monitor BMP and electrolytes  - Maintain MAP>65-70 mm hg  - Adjust antibiotics as per renal dose clearances  - Volume status and electrolytes noted.  - Continue IV albumin  - Defer HD today per family and primary team request   -GOC and palliative care team follow up

## 2020-12-23 NOTE — PROGRESS NOTE ADULT - PROBLEM SELECTOR PLAN 4
Patient with ARDS/acute hypoxic/hypercapnic respiratory failure on ventilatory support with COVID-19 pneumonia with respiratory acidosis  Continue ventilatory support  Abx per renal dose adjustment  Ventilatory management per ICU team.
Patient with ARDS/acute hypoxic/hypercapnic respiratory failure on ventilatory support with COVID-19 pneumonia with respiratory acidosis  Continue ventilatory support  Ventilatory management per ICU team.
2/2 critical disease; dx per dietary. Patient with albumin 2.2, + anasarca, + skin failure. High risk of skin failure. Continues TF via OG.
Patient with ARDS/acute hypoxic/hypercapnic respiratory failure on ventilatory support with COVID-19 pneumonia  ABG with pH 7. 26 and PCO2 67  Continue ventilatory support  Monitor vital signs/respiratory status  Ventilatory management per ICU team.
Patient with ARDS/acute hypoxic/hypercapnic respiratory failure on ventilatory support with COVID-19 pneumonia  ABG with pH 7.17 and PCO2 76  Continue ventilatory support  Monitor vital signs/respiratory status  Ventilatory management per ICU team.
Patient with ARDS/acute hypoxic/hypercapnic respiratory failure on ventilatory support with COVID-19 pneumonia  Continue ventilatory support  Monitor vital signs/respiratory status  Ventilatory management per ICU team.
Patient with ARDS/acute hypoxic/hypercapnic respiratory failure on ventilatory support with COVID-19 pneumonia  Continue ventilatory support  Monitor vital signs/respiratory status  Ventilatory management per ICU team.  Defer HD today  Next anticipated IHD on 12/10
Patient with ARDS/acute hypoxic/hypercapnic respiratory failure on ventilatory support with COVID-19 pneumonia  Continue ventilatory support  Monitor vital signs/respiratory status  Ventilatory management per ICU team.  HD treatment as per ordered.
Patient with ARDS/acute hypoxic/hypercapnic respiratory failure on ventilatory support with COVID-19 pneumonia  Continue ventilatory support  Monitor vital signs/respiratory status  Ventilatory management per ICU team.  HD treatment as per ordered.
Patient with ARDS/acute hypoxic/hypercapnic respiratory failure on ventilatory support with COVID-19 pneumonia with respiratory acidosis  Continue ventilatory support  Abx per renal dose adjustment  Ventilatory management per ICU team.
Patient with ARDS/acute hypoxic/hypercapnic respiratory failure on ventilatory support with COVID-19 pneumonia with respiratory acidosis  Continue ventilatory support  Ventilatory management per ICU team.
Patient with ARDS/acute hypoxic/hypercapnic respiratory failure on ventilatory support with COVID-19 pneumonia/HCAP  Continue ventilatory support  Monitor vital signs/respiratory status  Ventilatory management per ICU team.  HD treatment as per ordered.
Patient with ARDS/acute hypoxic/hypercapnic respiratory failure on ventilatory support with COVID-19 pneumonia/HCAP  Continue ventilatory support  Monitor vital signs/respiratory status  Ventilatory management per ICU team.  Next anticipated IHD on 12/4
Patient with severe sepsis/ARDS from COVID-19 pneumonia on ventilatory support and IV pressors  Maintain MAP>65-70 mm hg  Continue IV pressors and ventilatory support  Optimal treatment of COVID-19/sepsis per primary ICU/pulmonary/ID team  Monitor vital signs  Adjust antibiotics as per renal dose clearance for Cr cl<10 ml/min.
Patient with severe sepsis/ARDS from COVID-19 pneumonia on ventilatory support and IV pressors  Maintain MAP>65-70 mm hg  Continue IV pressors and ventilatory support  Optimal treatment of COVID-19/sepsis per primary ICU/pulmonary/ID team  Monitor vital signs  Surveillance COVID-19  Adjust antibiotics as per renal dose clearance for Cr cl<10 ml/min.
Patient with severe sepsis/ARDS from COVID-19 pneumonia on ventilatory support and IV pressors  Maintain MAP>65-70 mm hg  Continue IV pressors and ventilatory support  Optimal treatment of COVID-19/sepsis per primary ICU/pulmonary/ID team  Monitor vital signs  Surveillance COVID-19 positive  Adjust antibiotics as per renal dose clearance for Cr cl<10 ml/min.
Patient with severe sepsis/ARDS from COVID-19 pneumonia on ventilatory support and IV pressors  Maintain MAP>65-70 mm hg  Continue IV pressors and ventilatory support  Optimal treatment of COVID-19/sepsis per primary ICU/pulmonary/ID team  Monitor vital signs  Surveillant COVID-19  Adjust antibiotics as per renal dose clearance for Cr cl<10 ml/min.
Patient with ARDS/acute hypoxic/hypercapnic respiratory failure on ventilatory support with COVID-19 pneumonia with respiratory acidosis  Continue ventilatory support  Ventilatory management per ICU team.
2/2 critical disease; dx per dietary. Patient with albumin 1.6, + anasarca, + skin failure. High risk of skin failure. Continues TF via NGT.
2/2 critical disease; dx per dietary. Patient with albumin 2.3 - improved from 1.6, + anasarca, + skin failure. On albumin. High risk of skin failure. Continues TF via NGT.
Patient with ARDS/acute hypoxic/hypercapnic respiratory failure on ventilatory support with COVID-19 pneumonia with respiratory acidosis  Continue ventilatory support  Ventilatory management per ICU team.

## 2020-12-23 NOTE — PROGRESS NOTE ADULT - PROBLEM SELECTOR PLAN 5
2/2 multi-organ failure/ARDS/shock due to covid-19. Patient remains sedated/intubated, on IV abx, + pressor + midodrine, albumin, HD - dependent.  Overall, patient with very poor prognosis. High risk of mortality during this admission. 2/2 multi-organ failure/ARDS/shock due to covid-19. Patient remains sedated/intubated, on IV abx, + pressor + midodrine, albumin, HD - dependent.  Overall, patient with grave prognosis. High risk of mortality during this admission.

## 2020-12-23 NOTE — PROGRESS NOTE ADULT - PROBLEM SELECTOR PLAN 1
2/2 shock due to ARDS 2/2 COVID-19; involving lungs (patient intubated since 11/19); heart (on pressor); kidneys (AGUSTO, Cr 6.37, on HD); liver (Alk Phos 236, AST 68). WBC 15.61. pH7.16, pCO2 73. CXR 12/22 indicated Bilateral lung parenchymal airspace opacities are unchanged. Patient remains sedated/intubated, on IV abx, albumin, pressor + midodrine, dilaudid, albuterol. HD dependent. Plan for HD today. Overall, patient with very poor prognosis. Wife is surrogate; DNR on file. 2/2 shock due to ARDS 2/2 COVID-19; involving lungs (patient intubated since 11/19); heart (on pressor); kidneys (AGUSTO, Cr 6.37, on HD); liver (Alk Phos 236, AST 68). WBC 15.61. pH7.16, pCO2 73. CXR 12/22 indicated Bilateral lung parenchymal airspace opacities are unchanged. Patient remains sedated/intubated, on IV abx, albumin, pressor + midodrine, dilaudid, albuterol. HD dependent. Overall, patient with grave prognosis. Wife is surrogate. New MOLST completed: DNR / NO ESCALATION OF CARE / NO FURTHER HD.

## 2020-12-23 NOTE — PHARMACOTHERAPY INTERVENTION NOTE - COMMENTS
Recommended switching from cefepime to ceftriaxone 1gm Q24hrs due to sputum culture growing Kleb. pneumoniae. However, cefepime will be discontinued after the patient receives today's dose because it will be a completion of 7 days of therapy.
During Critical Care Rounds, it was recommended to switch dexamethasone and pantoprazole from IV to PO due to patient's diet of Regular.
During Critical Care rounds, dexamethasone and pantoprazole were discussed to be changed from PO to IV because the patient's diet status is now NPO.
Informed that norepinephrine can become concentrated to 32mg/250mL, if nursing/medicine is fine with the concentration (will manually will to be entered into the pump). Medicine team will discuss and follow up with the pharmacy.
Pantoprazole 40 mg IV PUSH daily changed to oral suspension 40 mg NG TUBE daily
Recommended switching pantoprazole from IV to Suspension because patient can take medication via NG tube.
three times a day before meals changed to every 6 hours-NPO
During Critical Care rounds, recommended obtaining ID consult because patient is currently on cefepime, azithromycin, and vancomycin.

## 2020-12-23 NOTE — CHART NOTE - NSCHARTNOTEFT_GEN_A_CORE
Assessment:   Patient is a 50y old  Male who presents with a chief complaint of Shortness of breath D/t Covid (23 Dec 2020 11:58) Intubated/ MOF, overall grave prognosis  noted. Now no escalation care, no HD. Noted to be on Wzivmhwk15.3 ml/hr. Still with  TF order with no TF documented on flow record today.       Factors impacting intake: [ ] none [ ] nausea  [ ] vomiting [ ] diarrhea [ ] constipation  [ ]chewing problems [ ] swallowing issues  [x ] other: see above    Diet Prescription: Diet, NPO with Tube Feed:   Tube Feeding Modality: Nasogastric  Nepro with Carb Steady  Total Volume for 24 Hours (mL): 960  Continuous  Starting Tube Feed Rate {mL per Hour}: 10  Increase Tube Feed Rate by (mL): 5     Every 8 hours  Until Goal Tube Feed Rate (mL per Hour): 40  Tube Feed Duration (in Hours): 24  Tube Feed Start Time: 17:00 (20 @ 15:50)        Daily     Daily Weight in k.7 (23 Dec 2020 07:00)  Weight in k.4 (21 Dec 2020 18:25)  Weight in k.3 (21 Dec 2020 07:30)  Weight in k.5 (20 Dec 2020 08:15)  Weight in k.9 (19 Dec 2020 14:03)  Weight in k (19 Dec 2020 11:03)  Weight in k.4 (18 Dec 2020 15:35)  Weight in k.4 (18 Dec 2020 10:32)  Weight in k.4 (18 Dec 2020 08:00)  Weight in k.9 (17 Dec 2020 07:45)  Weight in k.5 (13 Dec 2020 22:35)  Weight in k.5 (13 Dec 2020 18:35)  Weight in k.3 (13 Dec 2020 08:00)  Weight in k.9 (12 Dec 2020 16:45)  Weight in k.6 (11 Dec 2020 08:00)  Weight in k (10 Dec 2020 07:15)    % Weight Change    Pertinent Medications: MEDICATIONS  (STANDING):  albumin human 25% IVPB 50 milliLiter(s) IV Intermittent every 6 hours  cefepime   IVPB 2000 milliGRAM(s) IV Intermittent every 24 hours  chlorhexidine 0.12% Liquid 15 milliLiter(s) Oral Mucosa every 12 hours  chlorhexidine 2% Cloths 1 Application(s) Topical <User Schedule>  glucagon  Injectable 1 milliGRAM(s) IntraMuscular once  heparin  Infusion.  Unit(s)/Hr (22 mL/Hr) IV Continuous <Continuous>  HYDROmorphone Infusion 3 mG/Hr (3 mL/Hr) IV Continuous <Continuous>  insulin lispro (ADMELOG) corrective regimen sliding scale   SubCutaneous every 6 hours  lubricant eye ont 1 Application(s) 1 Application(s) Both EYES two times a day  midodrine 5 milliGRAM(s) Oral every 8 hours  norepinephrine Infusion 0.5 MICROgram(s)/kG/Min (58.6 mL/Hr) IV Continuous <Continuous>  pantoprazole   Suspension 40 milliGRAM(s) Oral daily  polyethylene glycol 3350 17 Gram(s) Oral daily  propofol Infusion 15 MICROgram(s)/kG/Min (11.3 mL/Hr) IV Continuous <Continuous>  senna Syrup 10 milliLiter(s) Oral at bedtime  sodium bicarbonate 1300 milliGRAM(s) Oral three times a day    MEDICATIONS  (PRN):  acetaminophen    Suspension .. 650 milliGRAM(s) Enteral Tube every 6 hours PRN Temp greater or equal to 38C (100.4F)  ALBUTerol    90 MICROgram(s) HFA Inhaler 2 Puff(s) Inhalation every 6 hours PRN Shortness of Breath  heparin   Injectable 65514 Unit(s) IV Push every 6 hours PRN For aPTT less than 40  heparin   Injectable 5000 Unit(s) IV Push every 6 hours PRN For aPTT between 40 - 57  sodium chloride 0.9% lock flush 10 milliLiter(s) IV Push every 1 hour PRN Pre/post blood products, medications, blood draw, and to maintain line patency    Pertinent Labs:  Na136 mmol/L Glu 134 mg/dL<H> K+ 4.5 mmol/L Cr  6.37 mg/dL<H> BUN 61 mg/dL<H>  Phos 6.9 mg/dL<H>  Alb 2.3 g/dL<L>  Chol --    LDL --    HDL --    Trig 414 mg/dL<H>     CAPILLARY BLOOD GLUCOSE      POCT Blood Glucose.: 148 mg/dL (23 Dec 2020 13:42)  POCT Blood Glucose.: 159 mg/dL (23 Dec 2020 12:32)  POCT Blood Glucose.: 116 mg/dL (23 Dec 2020 05:10)  POCT Blood Glucose.: 142 mg/dL (23 Dec 2020 00:01)  POCT Blood Glucose.: 152 mg/dL (22 Dec 2020 18:13)        Previous Nutrition Diagnosis:   [ ] Altered GI function  [ ]Inadequate Oral Intake [ ] Swallowing Difficulty   [ ] Altered nutrition related labs [ ] Increased Nutrient Needs [ ] Overweight/Obesity   [ ] Unintended Weight Loss [ ] Food & Nutrition Related Knowledge Deficit [x ] Malnutrition (severe PCM)  [ ] Other:     Nutrition Diagnosis is [x ] ongoing  [ ] resolved [ ] not applicable     Interventions:   Recommend  [ ] Change Diet To:  [ ] Nutrition Supplement  [ ] Nutrition Support  [x ] Other: Diet order per MD. TF as medically feasible and if consistent with goals of care. MD to monitor. RD available.     Monitoring and Evaluation:   [ x ] follow up per protocol  [ ] other:

## 2020-12-23 NOTE — GOALS OF CARE CONVERSATION - ADVANCED CARE PLANNING - NS PRO AD PATIENT TYPE
Do Not Resuscitate (DNR)/Medical Orders for Life-Sustaining Treatment (MOLST) No HD or escalation of care/Do Not Resuscitate (DNR)/Medical Orders for Life-Sustaining Treatment (MOLST)

## 2020-12-23 NOTE — PROGRESS NOTE ADULT - PROBLEM SELECTOR PROBLEM 5
Debility
Anemia of chronic disease
Anemia of chronic disease
Debility
Anemia of chronic disease
Anemia of chronic disease
Debility
Acute respiratory failure with hypoxia
Anemia of chronic disease
COVID-19
Anemia of chronic disease

## 2020-12-23 NOTE — PROGRESS NOTE ADULT - PROBLEM SELECTOR PLAN 5
Anemia of chronic illness with stable hgb 7.6  Monitor CBC  Transfuse PRBC per primary team  Advanced directives and goals of care per family and palliative care team.

## 2020-12-23 NOTE — PROGRESS NOTE ADULT - SUBJECTIVE AND OBJECTIVE BOX
Chandan Awad MD (Nephrology progress note)  [address], [facility],  [address],  [facility]  TEl: [phone]  Cell: [phone]    Patient is a 50y Male seen and evaluated at bedside. Vital signs, laboratory data, imaging studies, consult notes reviewed done within past 24 hours. Overnight events noted. Patient remains critically ill on ventilatory support and IV pressors    Allergies    No Known Allergies    Intolerances        ROS:  Limited. Sedated and intubated on ventilatory support    VITALS:    T(C): 38.2 (12-23-20 @ 07:00), Max: 39.1 (12-23-20 @ 05:00)  HR: 105 (12-23-20 @ 10:00) (99 - 120)  BP: 114/66 (12-23-20 @ 10:00) (109/46 - 121/72)  RR: 11 (12-23-20 @ 10:00) (9 - 41)  SpO2: 100% (12-23-20 @ 10:00) (81% - 100%)  CAPILLARY BLOOD GLUCOSE      POCT Blood Glucose.: 116 mg/dL (23 Dec 2020 05:10)  POCT Blood Glucose.: 142 mg/dL (23 Dec 2020 00:01)  POCT Blood Glucose.: 152 mg/dL (22 Dec 2020 18:13)      12-22-20 @ 07:01  -  12-23-20 @ 07:00  --------------------------------------------------------  IN: 1465.3 mL / OUT: 0 mL / NET: 1465.3 mL    12-23-20 @ 07:01  -  12-23-20 @ 11:58  --------------------------------------------------------  IN: 136.2 mL / OUT: 0 mL / NET: 136.2 mL      MEDICATIONS  (STANDING):  albumin human 25% IVPB 50 milliLiter(s) IV Intermittent every 6 hours  cefepime   IVPB 2000 milliGRAM(s) IV Intermittent every 24 hours  chlorhexidine 0.12% Liquid 15 milliLiter(s) Oral Mucosa every 12 hours  chlorhexidine 2% Cloths 1 Application(s) Topical <User Schedule>  glucagon  Injectable 1 milliGRAM(s) IntraMuscular once  heparin  Infusion.  Unit(s)/Hr (22 mL/Hr) IV Continuous <Continuous>  HYDROmorphone Infusion 3 mG/Hr (3 mL/Hr) IV Continuous <Continuous>  insulin lispro (ADMELOG) corrective regimen sliding scale   SubCutaneous every 6 hours  lubricant eye ont 1 Application(s) 1 Application(s) Both EYES two times a day  midodrine 5 milliGRAM(s) Oral every 8 hours  norepinephrine Infusion 0.5 MICROgram(s)/kG/Min (58.6 mL/Hr) IV Continuous <Continuous>  pantoprazole   Suspension 40 milliGRAM(s) Oral daily  polyethylene glycol 3350 17 Gram(s) Oral daily  propofol Infusion 15 MICROgram(s)/kG/Min (11.3 mL/Hr) IV Continuous <Continuous>  senna Syrup 10 milliLiter(s) Oral at bedtime  sodium bicarbonate 1300 milliGRAM(s) Oral three times a day    MEDICATIONS  (PRN):  acetaminophen    Suspension .. 650 milliGRAM(s) Enteral Tube every 6 hours PRN Temp greater or equal to 38C (100.4F)  ALBUTerol    90 MICROgram(s) HFA Inhaler 2 Puff(s) Inhalation every 6 hours PRN Shortness of Breath  heparin   Injectable 93400 Unit(s) IV Push every 6 hours PRN For aPTT less than 40  heparin   Injectable 5000 Unit(s) IV Push every 6 hours PRN For aPTT between 40 - 57  sodium chloride 0.9% lock flush 10 milliLiter(s) IV Push every 1 hour PRN Pre/post blood products, medications, blood draw, and to maintain line patency      PHYSICAL EXAM:  GENERAL: Sedated and intubated on ventilatory support  HEENT: LEONOR, EOMI, neck supple, no JVP, no carotid bruit, oral mucosa moist and pink.  CHEST/LUNG: Bilateral decreased breath sound, bilateral rhonchi and crepitations, no wheezing  HEART: Regular rate and rhythm, ERNESTO II/VI at LPSB, no gallops, no rub   ABDOMEN: Soft, nontender, non distended, bowel sounds present, no palpable organomegaly  : No flank or supra pubic tenderness.  EXTREMITIES: Bilateral pitting pedal edema  Neurology: Sedated and intubated on ventilatory support  SKIN: No rash or skin lesion  Musculoskeletal: No joint deformity     Vascular ACCESS:     LABS:                        7.6    15.61 )-----------( 383      ( 23 Dec 2020 04:10 )             26.3     12-23    136  |  96  |  61<H>  ----------------------------<  134<H>  4.5   |  28  |  6.37<H>    Ca    9.7      23 Dec 2020 04:10  Phos  6.9     12-23  Mg     2.4     12-23    TPro  7.4  /  Alb  2.3<L>  /  TBili  0.8  /  DBili  x   /  AST  68<H>  /  ALT  51  /  AlkPhos  236<H>  12-23      PTT - ( 23 Dec 2020 04:10 )  PTT:88.7 sec          RADIOLOGY & ADDITIONAL STUDIES:    < from: Xray Chest 1 View- PORTABLE-Routine (Xray Chest 1 View- PORTABLE-Routine in AM.) (12.22.20 @ 09:47) >    EXAM:  XR CHEST PORTABLE ROUTINE 1V                            PROCEDURE DATE:  12/22/2020          INTERPRETATION:  INDICATION: Ventilatory support; Covid disease.    PRIORS: 12/21/2020.    VIEWS: Portable AP radiography of the chest performed.    FINDINGS: Since prior evaluation no significant interval change in position of the indwelling ETT, NGT or right IJ CVC. Bilateral lung parenchymal airspace opacities are unchanged. No pleural effusion. No pneumothorax. No mediastinal shift. No osseous fractures.    IMPRESSION: No significant interval change.              SOPHIA CHOUDHARY MD; Attending Radiologist  This document has been electronically signed. Dec 22 2020  2:32PM    < end of copied text >  Imaging Personally Reviewed:  [x] YES  [ ] NO    Consultant(s) Notes Reviewed:  [x] YES  [ ] NO    Care Discussed with Primary team/ Other Providers [x] YES  [ ] NO

## 2020-12-23 NOTE — PROGRESS NOTE ADULT - PROBLEM SELECTOR PLAN 3
2/2 ARDS due to COVID-19; comorbid multi-organ failure involving lungs, heart, kidneys. WBC 15.61. CXR 12/22 indicated Bilateral lung parenchymal airspace opacities are unchanged. Patient remains sedated/intubated, on IV abx, + pressor, albuterol, dilaudid, albumin, HD-dependent. Wife is surrogate; DNR on file. 2/2 ARDS due to COVID-19; comorbid multi-organ failure involving lungs, heart, kidneys. WBC 15.61. CXR 12/22 indicated Bilateral lung parenchymal airspace opacities are unchanged. Patient remains sedated/intubated, on IV abx, + pressor, albuterol, dilaudid, albumin, HD-dependent. Wife is surrogate. New MOLST completed: DNR / NO ESCALATION OF CARE / NO FURTHER HD.

## 2020-12-23 NOTE — GOALS OF CARE CONVERSATION - ADVANCED CARE PLANNING - WHAT MATTERS MOST
Patients' family does not want to prolong suffering.
Patient's family requested the medical team continue to "do everything they can to save the patient," up to the point of CPR.

## 2020-12-23 NOTE — GOALS OF CARE CONVERSATION - ADVANCED CARE PLANNING - CONVERSATION DETAILS
PC , ICU resident and PC NP contacted the patient's spouse Naima Gillette [phone] utilizing #[phone].  Mrs. Gillette declined translation services and requested her daughter in law Roseanne Gillette [phone] provide Estonian translation in lieu of [employer] .  Team called Mrs. Gillette and Roseanne back to provide a clinical update which included review of labs, meds, and diagnostic results.  Family was informed the patient is in multi organ failure and at high risk of mortality during this admission, despite ventilation support and addl. life sustaining treatments.  Both spouse and daughter in law verbalized understanding and confirmed code status is DNR.  Patient and his wife have one son who is  to Roseanne.  Mr. Gillette is a native of [facility] and identifies with the Jainism matt.  Chaplaincy services were offered and accepted.  Emotional support was provided as well as this sw's contact information.  Relayed patient's status to the medicine .  No addl. needs were identified at this time.
PC  participated in a telephone conversation with the PC NP, patient's spouse Naima and daughter in law Roseanne.  Naima conferenced in Roseanne for support and Beninese translation, which is her preference.  PC NP provided a clinical summary explaining the patient is not improving, despite life sustaining treatment such as mechanical ventilation, vasopressors and HD.  Secondary to grave prognosis, HD is being deferred, as per medical team. Patient's daughter in law translated this information into Beninese for Naima and both parties expressed grief and recognition that "they knew this was coming." PC Sw and NP provided emotional support as Roseanne and Naima sounded tearful.  Emotional support and chaplaincy have been and will continue to be provided as needed.  Family denied concrete needs.  They are aware the patient's prognosis is grave, therefore he will no longer receive HD and there will be no escalation of care.  Patient's code status is already DNR.  New MOLST was drafted to reflect DNR/No HD/No escalation of care.  Family has the contact information for the PC team should any needs arise.    Addendum:  PC SW and PC NP contacted the patient's son Luke Gillette ([phone] ) to review the information stated above.  Mr. Gillette stated Naima Simon is his father's significant other, they are not  and the patient does not have a HCP form.  This is contrary to the information initially provided to this  when she participated in a family meeting two weeks prior. At that time Naima identified herself as the patient's legal spouse. Mr. Gillette states he is the patient's only child and although they are not , he is aware that the patient typically shared medical information with Ms. Simon. Therefore,  will continue to defer to  for medical decision making. Furthermore, Mr. Gillette stated when Naima and Roseanne speak with the medical providers they conference him into the call.  Mr. Gillette was offered Facetime, however shared how difficult it is to witness his father's decline.  Mr Reddy expressed appreciation of the medical care, support and prayers provided by the hospital staff.  Nain Newell is aware of the contact information for the PC team, ongoing availability and emotional support as needed.  Patient's son verbalized understanding of his father's condition and agreed with the medical plan stated above.  No addl. needs were expressed at this time.

## 2020-12-23 NOTE — PROGRESS NOTE ADULT - SUBJECTIVE AND OBJECTIVE BOX
OVERNIGHT EVENTS: family meeting today to further discuss goals of care     Present Symptoms:   Review of Systems:   Unable to obtain due to poor mentation    MEDICATIONS  (STANDING):  albumin human 25% IVPB 50 milliLiter(s) IV Intermittent every 6 hours  cefepime   IVPB 2000 milliGRAM(s) IV Intermittent every 24 hours  chlorhexidine 0.12% Liquid 15 milliLiter(s) Oral Mucosa every 12 hours  chlorhexidine 2% Cloths 1 Application(s) Topical <User Schedule>  glucagon  Injectable 1 milliGRAM(s) IntraMuscular once  heparin  Infusion.  Unit(s)/Hr (22 mL/Hr) IV Continuous <Continuous>  HYDROmorphone Infusion 3 mG/Hr (3 mL/Hr) IV Continuous <Continuous>  insulin lispro (ADMELOG) corrective regimen sliding scale   SubCutaneous every 6 hours  lubricant eye ont 1 Application(s) 1 Application(s) Both EYES two times a day  midodrine 5 milliGRAM(s) Oral every 8 hours  norepinephrine Infusion 0.5 MICROgram(s)/kG/Min (58.6 mL/Hr) IV Continuous <Continuous>  pantoprazole   Suspension 40 milliGRAM(s) Oral daily  polyethylene glycol 3350 17 Gram(s) Oral daily  propofol Infusion 15 MICROgram(s)/kG/Min (11.3 mL/Hr) IV Continuous <Continuous>  senna Syrup 10 milliLiter(s) Oral at bedtime  sodium bicarbonate 1300 milliGRAM(s) Oral three times a day    MEDICATIONS  (PRN):  acetaminophen    Suspension .. 650 milliGRAM(s) Enteral Tube every 6 hours PRN Temp greater or equal to 38C (100.4F)  ALBUTerol    90 MICROgram(s) HFA Inhaler 2 Puff(s) Inhalation every 6 hours PRN Shortness of Breath  heparin   Injectable 65644 Unit(s) IV Push every 6 hours PRN For aPTT less than 40  heparin   Injectable 5000 Unit(s) IV Push every 6 hours PRN For aPTT between 40 - 57  sodium chloride 0.9% lock flush 10 milliLiter(s) IV Push every 1 hour PRN Pre/post blood products, medications, blood draw, and to maintain line patency      PHYSICAL EXAM:  Vital Signs Last 24 Hrs  T(C): 38.2 (23 Dec 2020 07:00), Max: 39.1 (23 Dec 2020 05:00)  T(F): 100.8 (23 Dec 2020 07:00), Max: 102.4 (23 Dec 2020 05:00)  HR: 105 (23 Dec 2020 10:00) (99 - 120)  BP: 114/66 (23 Dec 2020 10:00) (109/46 - 121/72)  BP(mean): 76 (23 Dec 2020 10:00) (60 - 84)  RR: 11 (23 Dec 2020 10:00) (9 - 41)  SpO2: 100% (23 Dec 2020 10:00) (80% - 100%)  General: Patient not medically stable for full physical exam. Patient remains sedated/intubated, on pressor.    Karnofsky Performance Score/Palliative Performance Status Version2:     10%    HEENT:   ET tube   Lungs:  on ventilator. Continues propofol, dilaudid  CV:     tachycardia, on pressor + midodrine  GI:   incontinent, NGT + morbid obesity  :   cruz, on HD  Musculoskeletal: patient sedated/intubated, dependent on ADLs  Skin: +anasarca, sacrum DTI  Neuro: patient sedated/intubated, dependent on ADLs  Oral intake ability: unable/only mouth care    Diet: NPO; TF via NGT      LABS:                          7.6    15.61 )-----------( 383      ( 23 Dec 2020 04:10 )             26.3     12-23    136  |  96  |  61<H>  ----------------------------<  134<H>  4.5   |  28  |  6.37<H>    Ca    9.7      23 Dec 2020 04:10  Phos  6.9     12-23  Mg     2.4     12-23    TPro  7.4  /  Alb  2.3<L>  /  TBili  0.8  /  DBili  x   /  AST  68<H>  /  ALT  51  /  AlkPhos  236<H>  12-23        RADIOLOGY & ADDITIONAL STUDIES:  < from: Xray Chest 1 View- PORTABLE-Routine (Xray Chest 1 View- PORTABLE-Routine in AM.) (12.22.20 @ 09:47) >  EXAM:  XR CHEST PORTABLE ROUTINE 1V                        PROCEDURE DATE:  12/22/2020    INTERPRETATION:  INDICATION: Ventilatory support; Covid disease.  PRIORS: 12/21/2020.  VIEWS: Portable AP radiography of the chest performed.  FINDINGS: Since prior evaluation no significant interval change in position of the indwelling ETT, NGT or right IJ CVC. Bilateral lung parenchymal airspace opacities are unchanged. No pleural effusion. No pneumothorax. No mediastinal shift. No osseous fractures.    IMPRESSION: No significant interval change.  < end of copied text >      ADVANCE DIRECTIVES: MOLST: DNR    OVERNIGHT EVENTS: family meeting today to further discuss goals of care. Patient with grave prognosis.     Present Symptoms:   Review of Systems:   Unable to obtain due to poor mentation    MEDICATIONS  (STANDING):  albumin human 25% IVPB 50 milliLiter(s) IV Intermittent every 6 hours  cefepime   IVPB 2000 milliGRAM(s) IV Intermittent every 24 hours  chlorhexidine 0.12% Liquid 15 milliLiter(s) Oral Mucosa every 12 hours  chlorhexidine 2% Cloths 1 Application(s) Topical <User Schedule>  glucagon  Injectable 1 milliGRAM(s) IntraMuscular once  heparin  Infusion.  Unit(s)/Hr (22 mL/Hr) IV Continuous <Continuous>  HYDROmorphone Infusion 3 mG/Hr (3 mL/Hr) IV Continuous <Continuous>  insulin lispro (ADMELOG) corrective regimen sliding scale   SubCutaneous every 6 hours  lubricant eye ont 1 Application(s) 1 Application(s) Both EYES two times a day  midodrine 5 milliGRAM(s) Oral every 8 hours  norepinephrine Infusion 0.5 MICROgram(s)/kG/Min (58.6 mL/Hr) IV Continuous <Continuous>  pantoprazole   Suspension 40 milliGRAM(s) Oral daily  polyethylene glycol 3350 17 Gram(s) Oral daily  propofol Infusion 15 MICROgram(s)/kG/Min (11.3 mL/Hr) IV Continuous <Continuous>  senna Syrup 10 milliLiter(s) Oral at bedtime  sodium bicarbonate 1300 milliGRAM(s) Oral three times a day    MEDICATIONS  (PRN):  acetaminophen    Suspension .. 650 milliGRAM(s) Enteral Tube every 6 hours PRN Temp greater or equal to 38C (100.4F)  ALBUTerol    90 MICROgram(s) HFA Inhaler 2 Puff(s) Inhalation every 6 hours PRN Shortness of Breath  heparin   Injectable 06518 Unit(s) IV Push every 6 hours PRN For aPTT less than 40  heparin   Injectable 5000 Unit(s) IV Push every 6 hours PRN For aPTT between 40 - 57  sodium chloride 0.9% lock flush 10 milliLiter(s) IV Push every 1 hour PRN Pre/post blood products, medications, blood draw, and to maintain line patency      PHYSICAL EXAM:  Vital Signs Last 24 Hrs  T(C): 38.2 (23 Dec 2020 07:00), Max: 39.1 (23 Dec 2020 05:00)  T(F): 100.8 (23 Dec 2020 07:00), Max: 102.4 (23 Dec 2020 05:00)  HR: 105 (23 Dec 2020 10:00) (99 - 120)  BP: 114/66 (23 Dec 2020 10:00) (109/46 - 121/72)  BP(mean): 76 (23 Dec 2020 10:00) (60 - 84)  RR: 11 (23 Dec 2020 10:00) (9 - 41)  SpO2: 100% (23 Dec 2020 10:00) (80% - 100%)  General: Patient not medically stable for full physical exam. Patient remains sedated/intubated, on pressor.    Karnofsky Performance Score/Palliative Performance Status Version2:   10%    HEENT:   ET tube   Lungs:  on ventilator. Continues propofol, dilaudid  CV:     tachycardia, on pressor + midodrine  GI:   incontinent, NGT + morbid obesity  :   cruz, on HD  Musculoskeletal: patient sedated/intubated, dependent on ADLs  Skin: +anasarca, sacrum DTI  Neuro: patient sedated/intubated, dependent on ADLs  Oral intake ability: unable/only mouth care    Diet: NPO; TF via NGT      LABS:                          7.6    15.61 )-----------( 383      ( 23 Dec 2020 04:10 )             26.3     12-23    136  |  96  |  61<H>  ----------------------------<  134<H>  4.5   |  28  |  6.37<H>    Ca    9.7      23 Dec 2020 04:10  Phos  6.9     12-23  Mg     2.4     12-23    TPro  7.4  /  Alb  2.3<L>  /  TBili  0.8  /  DBili  x   /  AST  68<H>  /  ALT  51  /  AlkPhos  236<H>  12-23        RADIOLOGY & ADDITIONAL STUDIES:  < from: Xray Chest 1 View- PORTABLE-Routine (Xray Chest 1 View- PORTABLE-Routine in AM.) (12.22.20 @ 09:47) >  EXAM:  XR CHEST PORTABLE ROUTINE 1V                        PROCEDURE DATE:  12/22/2020    INTERPRETATION:  INDICATION: Ventilatory support; Covid disease.  PRIORS: 12/21/2020.  VIEWS: Portable AP radiography of the chest performed.  FINDINGS: Since prior evaluation no significant interval change in position of the indwelling ETT, NGT or right IJ CVC. Bilateral lung parenchymal airspace opacities are unchanged. No pleural effusion. No pneumothorax. No mediastinal shift. No osseous fractures.    IMPRESSION: No significant interval change.  < end of copied text >    c< from: Xray Chest 1 View- PORTABLE-Routine (Xray Chest 1 View- PORTABLE-Routine in AM.) (12.23.20 @ 09:27) >    EXAM:  XR CHEST PORTABLE ROUTINE 1V                            PROCEDURE DATE:  12/23/2020          INTERPRETATION:  Chest one view    HISTORY: COVID-19 follow-up    COMPARISON STUDY: 12/22/2020    Frontal expiratory view of the chest shows the heart to be similar in size. Endotracheal tube, left jugular catheter, right jugular dialysis catheter and feeding tube remain present.    The lungs show slight clearing of the lower left lung and there is no evidence of pneumothorax nor definite pleuraleffusion.    IMPRESSION:  Slight clearing, left lung.      < end of copied text >      ADVANCE DIRECTIVES: MOLST: DNR / NO ESCALATION OF CARE / NO FURTHER HD

## 2020-12-23 NOTE — PROGRESS NOTE ADULT - PROBLEM SELECTOR PLAN 6
Patient's wife/Naima ([phone]) is primary surrogate.  MOLST on file with DNR. See GOC section above. Patient's wife/Naima ([phone]) is primary surrogate.  New MOLST completed: DNR / NO ESCALATION OF CARE / NO FURTHER HD. See GOC section above. Patient's surrogate is Naima Ositocholo ([phone]).  New MOLST completed: DNR / NO ESCALATION OF CARE / NO FURTHER HD.

## 2020-12-23 NOTE — PROGRESS NOTE ADULT - PROBLEM SELECTOR PLAN 2
COVID-19; comorbid shock/multi-organ failure involving lungs, heart, kidneys. WBC 15.61. CXR 12/22 indicated Bilateral lung parenchymal airspace opacities are unchanged. Patient remains sedated/intubated, on IV abx, + pressor + midodrine, albuterol, dilaudid, HD-dependent. Overall, patient with very poor prognosis. Wife is surrogate; DNR on file. COVID-19; comorbid shock/multi-organ failure involving lungs, heart, kidneys. WBC 15.61. CXR 12/22 indicated Bilateral lung parenchymal airspace opacities are unchanged. Patient remains sedated/intubated, on IV abx, + pressor + midodrine, albuterol, dilaudid, HD-dependent. Overall, patient with grave prognosis. Wife is surrogate. New MOLST completed: DNR / NO ESCALATION OF CARE / NO FURTHER HD.

## 2020-12-24 NOTE — PROGRESS NOTE ADULT - PROBLEM SELECTOR PLAN 3
Patient with ARDS/acute hypoxic/hypercapnic respiratory failure on ventilatory support with COVID-19 pneumonia with respiratory acidosis  Continue ventilatory support  Abx per renal dose adjustment  Ventilatory management per ICU team.

## 2020-12-24 NOTE — PROGRESS NOTE ADULT - PROBLEM SELECTOR PROBLEM 2
Acute respiratory distress syndrome (ARDS) due to COVID-19 virus
Electrolyte abnormality
Acute respiratory distress syndrome (ARDS) due to COVID-19 virus
Electrolyte abnormality
Hyperkalemia
Hyponatremia
Electrolyte abnormality
Electrolyte abnormality
Septic shock
Acute respiratory distress syndrome (ARDS) due to COVID-19 virus
Electrolyte abnormality
Electrolyte abnormality

## 2020-12-24 NOTE — PROGRESS NOTE ADULT - PROBLEM SELECTOR PROBLEM 1
Multi-organ failure with heart failure
AGUSTO (acute kidney injury)
Multi-organ failure with heart failure
AGUSTO (acute kidney injury)
Multi-organ failure with heart failure

## 2020-12-24 NOTE — PROGRESS NOTE ADULT - PROBLEM SELECTOR PROBLEM 3
Acute respiratory failure with hypoxia
Septic shock
Electrolyte abnormality
Septic shock

## 2020-12-24 NOTE — PROGRESS NOTE ADULT - SUBJECTIVE AND OBJECTIVE BOX
INTERVAL HPI/OVERNIGHT EVENTS: ***    PRESSORS: [ ] YES [ ] NO    Antimicrobial:  cefepime   IVPB 2000 milliGRAM(s) IV Intermittent every 24 hours    Cardiovascular:  midodrine 5 milliGRAM(s) Oral every 8 hours  norepinephrine Infusion 0.5 MICROgram(s)/kG/Min IV Continuous <Continuous>    Pulmonary:  ALBUTerol    90 MICROgram(s) HFA Inhaler 2 Puff(s) Inhalation every 6 hours PRN    Hematalogic:  heparin   Injectable 73807 Unit(s) IV Push every 6 hours PRN  heparin   Injectable 5000 Unit(s) IV Push every 6 hours PRN  heparin  Infusion.  Unit(s)/Hr IV Continuous <Continuous>    Other:  acetaminophen    Suspension .. 650 milliGRAM(s) Enteral Tube every 6 hours PRN  chlorhexidine 0.12% Liquid 15 milliLiter(s) Oral Mucosa every 12 hours  chlorhexidine 2% Cloths 1 Application(s) Topical <User Schedule>  glucagon  Injectable 1 milliGRAM(s) IntraMuscular once  HYDROmorphone Infusion 3 mG/Hr IV Continuous <Continuous>  insulin lispro (ADMELOG) corrective regimen sliding scale   SubCutaneous every 6 hours  lubricant eye ont 1 Application(s) 1 Application(s) Both EYES two times a day  pantoprazole   Suspension 40 milliGRAM(s) Oral daily  polyethylene glycol 3350 17 Gram(s) Oral daily  propofol Infusion 15 MICROgram(s)/kG/Min IV Continuous <Continuous>  senna Syrup 10 milliLiter(s) Oral at bedtime  sodium bicarbonate 1300 milliGRAM(s) Oral three times a day  sodium chloride 0.9% lock flush 10 milliLiter(s) IV Push every 1 hour PRN    acetaminophen    Suspension .. 650 milliGRAM(s) Enteral Tube every 6 hours PRN  ALBUTerol    90 MICROgram(s) HFA Inhaler 2 Puff(s) Inhalation every 6 hours PRN  cefepime   IVPB 2000 milliGRAM(s) IV Intermittent every 24 hours  chlorhexidine 0.12% Liquid 15 milliLiter(s) Oral Mucosa every 12 hours  chlorhexidine 2% Cloths 1 Application(s) Topical <User Schedule>  glucagon  Injectable 1 milliGRAM(s) IntraMuscular once  heparin   Injectable 37692 Unit(s) IV Push every 6 hours PRN  heparin   Injectable 5000 Unit(s) IV Push every 6 hours PRN  heparin  Infusion.  Unit(s)/Hr IV Continuous <Continuous>  HYDROmorphone Infusion 3 mG/Hr IV Continuous <Continuous>  insulin lispro (ADMELOG) corrective regimen sliding scale   SubCutaneous every 6 hours  lubricant eye ont 1 Application(s) 1 Application(s) Both EYES two times a day  midodrine 5 milliGRAM(s) Oral every 8 hours  norepinephrine Infusion 0.5 MICROgram(s)/kG/Min IV Continuous <Continuous>  pantoprazole   Suspension 40 milliGRAM(s) Oral daily  polyethylene glycol 3350 17 Gram(s) Oral daily  propofol Infusion 15 MICROgram(s)/kG/Min IV Continuous <Continuous>  senna Syrup 10 milliLiter(s) Oral at bedtime  sodium bicarbonate 1300 milliGRAM(s) Oral three times a day  sodium chloride 0.9% lock flush 10 milliLiter(s) IV Push every 1 hour PRN    Drug Dosing Weight  Height (cm): 154.4 (15 Nov 2020 09:25)  Weight (kg): 125 (15 Nov 2020 09:25)  BMI (kg/m2): 52.4 (15 Nov 2020 09:25)  BSA (m2): 2.16 (15 Nov 2020 09:25)    CENTRAL LINE: [ ] YES [ ] NO  LOCATION:   DATE INSERTED:  REMOVE: [ ] YES [ ] NO  EXPLAIN:    MICHELLE: [ ] YES [ ] NO    DATE INSERTED:  REMOVE:  [ ] YES [ ] NO  EXPLAIN:    A-LINE:  [ ] YES [ ] NO  LOCATION:   DATE INSERTED:  REMOVE:  [ ] YES [ ] NO  EXPLAIN:    PMH -reviewed admission note, no change since admission      ABG - ( 23 Dec 2020 04:30 )  pH, Arterial: 7.16  pH, Blood: x     /  pCO2: 73    /  pO2: 98    / HCO3: 25    / Base Excess: -3.4  /  SaO2: 97                    12-23 @ 07:01  -  12-24 @ 07:00  --------------------------------------------------------  IN: 1522.8 mL / OUT: 0 mL / NET: 1522.8 mL        Mode: AC/ CMV (Assist Control/ Continuous Mandatory Ventilation)  RR (machine): 35  TV (machine): 450  FiO2: 100  PEEP: 14  ITime: 0.6  MAP: 25  PIP: 44      PHYSICAL EXAM:    GENERAL: NAD, well-groomed, well-developed  HEAD:  Atraumatic, Normocephalic  EYES: EOMI, PERRLA, conjunctiva and sclera clear  ENMT: No tonsillar erythema, exudates, or enlargement; Moist mucous membranes, Good dentition, [ ]No lesions  NECK: Supple, normal appearance, No JVD; Normal thyroid; Trachea midline  NERVOUS SYSTEM:  Alert & Oriented X3, Good concentration; Motor Strength 5/5 B/L upper and lower extremities; DTRs 2+ intact and symmetric  CHEST/LUNG: No chest deformity; Normal percussion bilaterally; No rales, rhonchi, wheezing   HEART: Regular rate and rhythm; No murmurs, rubs, or gallops  ABDOMEN: Soft, Nontender, Nondistended;Bowel sounds present  EXTREMITIES:  2+ Peripheral Pulses, No clubbing, cyanosis, or edema  LYMPH: No lymphadenopathy noted  SKIN: No rashes or lesions; Good capillary refill      LABS:  CBC Full  -  ( 23 Dec 2020 04:10 )  WBC Count : 15.61 K/uL  RBC Count : 2.63 M/uL  Hemoglobin : 7.6 g/dL  Hematocrit : 26.3 %  Platelet Count - Automated : 383 K/uL  Mean Cell Volume : 100.0 fl  Mean Cell Hemoglobin : 28.9 pg  Mean Cell Hemoglobin Concentration : 28.9 gm/dL  Auto Neutrophil # : 9.69 K/uL  Auto Lymphocyte # : 1.81 K/uL  Auto Monocyte # : 0.85 K/uL  Auto Eosinophil # : 0.84 K/uL  Auto Basophil # : 0.06 K/uL  Auto Neutrophil % : 62.1 %  Auto Lymphocyte % : 11.6 %  Auto Monocyte % : 5.4 %  Auto Eosinophil % : 5.4 %  Auto Basophil % : 0.4 %    12-23    136  |  96  |  61<H>  ----------------------------<  134<H>  4.5   |  28  |  6.37<H>    Ca    9.7      23 Dec 2020 04:10  Phos  6.9     12-23  Mg     2.4     12-23    TPro  7.4  /  Alb  2.3<L>  /  TBili  0.8  /  DBili  x   /  AST  68<H>  /  ALT  51  /  AlkPhos  236<H>  12-23    PTT - ( 23 Dec 2020 04:10 )  PTT:88.7 sec        RADIOLOGY & ADDITIONAL STUDIES REVIEWED:  ***    [ ]GOALS OF CARE DISCUSSION WITH PATIENT/FAMILY/PROXY:    CRITICAL CARE TIME SPENT: 35 minutes INTERVAL HPI/OVERNIGHT EVENTS: Pt is now '' no escalation of care". Heparin drip discontinued.     PRESSORS: [ x] YES [ ] NO    Antimicrobial:  cefepime   IVPB 2000 milliGRAM(s) IV Intermittent every 24 hours    Cardiovascular:  midodrine 5 milliGRAM(s) Oral every 8 hours  norepinephrine Infusion 0.5 MICROgram(s)/kG/Min IV Continuous <Continuous>    Pulmonary:  ALBUTerol    90 MICROgram(s) HFA Inhaler 2 Puff(s) Inhalation every 6 hours PRN    Hematalogic:  heparin   Injectable 29397 Unit(s) IV Push every 6 hours PRN  heparin   Injectable 5000 Unit(s) IV Push every 6 hours PRN  heparin  Infusion.  Unit(s)/Hr IV Continuous <Continuous>    Other:  acetaminophen    Suspension .. 650 milliGRAM(s) Enteral Tube every 6 hours PRN  chlorhexidine 0.12% Liquid 15 milliLiter(s) Oral Mucosa every 12 hours  chlorhexidine 2% Cloths 1 Application(s) Topical <User Schedule>  glucagon  Injectable 1 milliGRAM(s) IntraMuscular once  HYDROmorphone Infusion 3 mG/Hr IV Continuous <Continuous>  insulin lispro (ADMELOG) corrective regimen sliding scale   SubCutaneous every 6 hours  lubricant eye ont 1 Application(s) 1 Application(s) Both EYES two times a day  pantoprazole   Suspension 40 milliGRAM(s) Oral daily  polyethylene glycol 3350 17 Gram(s) Oral daily  propofol Infusion 15 MICROgram(s)/kG/Min IV Continuous <Continuous>  senna Syrup 10 milliLiter(s) Oral at bedtime  sodium bicarbonate 1300 milliGRAM(s) Oral three times a day  sodium chloride 0.9% lock flush 10 milliLiter(s) IV Push every 1 hour PRN    acetaminophen    Suspension .. 650 milliGRAM(s) Enteral Tube every 6 hours PRN  ALBUTerol    90 MICROgram(s) HFA Inhaler 2 Puff(s) Inhalation every 6 hours PRN  cefepime   IVPB 2000 milliGRAM(s) IV Intermittent every 24 hours  chlorhexidine 0.12% Liquid 15 milliLiter(s) Oral Mucosa every 12 hours  chlorhexidine 2% Cloths 1 Application(s) Topical <User Schedule>  glucagon  Injectable 1 milliGRAM(s) IntraMuscular once  heparin   Injectable 24011 Unit(s) IV Push every 6 hours PRN  heparin   Injectable 5000 Unit(s) IV Push every 6 hours PRN  heparin  Infusion.  Unit(s)/Hr IV Continuous <Continuous>  HYDROmorphone Infusion 3 mG/Hr IV Continuous <Continuous>  insulin lispro (ADMELOG) corrective regimen sliding scale   SubCutaneous every 6 hours  lubricant eye ont 1 Application(s) 1 Application(s) Both EYES two times a day  midodrine 5 milliGRAM(s) Oral every 8 hours  norepinephrine Infusion 0.5 MICROgram(s)/kG/Min IV Continuous <Continuous>  pantoprazole   Suspension 40 milliGRAM(s) Oral daily  polyethylene glycol 3350 17 Gram(s) Oral daily  propofol Infusion 15 MICROgram(s)/kG/Min IV Continuous <Continuous>  senna Syrup 10 milliLiter(s) Oral at bedtime  sodium bicarbonate 1300 milliGRAM(s) Oral three times a day  sodium chloride 0.9% lock flush 10 milliLiter(s) IV Push every 1 hour PRN    Drug Dosing Weight  Height (cm): 154.4 (15 Nov 2020 09:25)  Weight (kg): 125 (15 Nov 2020 09:25)  BMI (kg/m2): 52.4 (15 Nov 2020 09:25)  BSA (m2): 2.16 (15 Nov 2020 09:25)    CENTRAL LINE: [x ] YES [ ] NO  LOCATION:   DATE INSERTED:  REMOVE: [ ] YES [ ] NO  EXPLAIN:    MICHELLE: [x ] YES [ ] NO    DATE INSERTED:  REMOVE:  [ ] YES [ ] NO  EXPLAIN:    A-LINE:  [x ] YES [ ] NO  LOCATION:   DATE INSERTED:  REMOVE:  [ ] YES [ ] NO  EXPLAIN:    PMH -reviewed admission note, no change since admission      ABG - ( 23 Dec 2020 04:30 )  pH, Arterial: 7.16  pH, Blood: x     /  pCO2: 73    /  pO2: 98    / HCO3: 25    / Base Excess: -3.4  /  SaO2: 97                    12-23 @ 07:01  -  12-24 @ 07:00  --------------------------------------------------------  IN: 1522.8 mL / OUT: 0 mL / NET: 1522.8 mL        Mode: AC/ CMV (Assist Control/ Continuous Mandatory Ventilation)  RR (machine): 35  TV (machine): 450  FiO2: 100  PEEP: 14  ITime: 0.6  MAP: 25  PIP: 44      PHYSICAL EXAM:    GENERAL: NAD, well-groomed, well-developed  GENERAL: sedated lying in bed comfortably  HEAD:  Atraumatic, Normocephalic  EYES: PERRLA, 2m sluggish response,  conjunctiva mildly icteric   ENT: Moist mucous membranes  NECK: Supple, No JVD  CHEST/LUNG: Clear to auscultation bilaterally; No rales, rhonchi, wheezing, or rubs.  HEART: Regular rate and rhythm; S1+ S2+  ABDOMEN: Bowel sounds present; Soft, Nontender, mildly distended.  EXTREMITIES:  2+ Peripheral Pulses, brisk capillary refill. No clubbing, cyanosis, 2+ UE and LE  edema  NERVOUS SYSTEM: sedated, non responsive, Responds to pain   SKIN: sacral decub ulcer      LABS:  CBC Full  -  ( 23 Dec 2020 04:10 )  WBC Count : 15.61 K/uL  RBC Count : 2.63 M/uL  Hemoglobin : 7.6 g/dL  Hematocrit : 26.3 %  Platelet Count - Automated : 383 K/uL  Mean Cell Volume : 100.0 fl  Mean Cell Hemoglobin : 28.9 pg  Mean Cell Hemoglobin Concentration : 28.9 gm/dL  Auto Neutrophil # : 9.69 K/uL  Auto Lymphocyte # : 1.81 K/uL  Auto Monocyte # : 0.85 K/uL  Auto Eosinophil # : 0.84 K/uL  Auto Basophil # : 0.06 K/uL  Auto Neutrophil % : 62.1 %  Auto Lymphocyte % : 11.6 %  Auto Monocyte % : 5.4 %  Auto Eosinophil % : 5.4 %  Auto Basophil % : 0.4 %    12-23    136  |  96  |  61<H>  ----------------------------<  134<H>  4.5   |  28  |  6.37<H>    Ca    9.7      23 Dec 2020 04:10  Phos  6.9     12-23  Mg     2.4     12-23    TPro  7.4  /  Alb  2.3<L>  /  TBili  0.8  /  DBili  x   /  AST  68<H>  /  ALT  51  /  AlkPhos  236<H>  12-23    PTT - ( 23 Dec 2020 04:10 )  PTT:88.7 sec        RADIOLOGY & ADDITIONAL STUDIES REVIEWED:  ***    [ ]GOALS OF CARE DISCUSSION WITH PATIENT/FAMILY/PROXY:    CRITICAL CARE TIME SPENT: 35 minutes

## 2020-12-24 NOTE — PROGRESS NOTE ADULT - PROBLEM SELECTOR PLAN 1
Anuric AGUSTO from ATN due to septic shock/ARDS requiring RRT/HD  - S/p HD treatment on 12/21 with net 5 L fluid removal  - Avoid Nephrotoxic agents  - Monitor BMP and electrolytes  - Maintain MAP>65-70 mm hg  - Adjust antibiotics as per renal dose clearances  - Volume status and electrolytes noted.  - Defer HD   - No further HD treatment per family.  - Supportive care

## 2020-12-24 NOTE — DISCHARGE NOTE FOR THE EXPIRED PATIENT - HOSPITAL COURSE
49 yr old male with no PMH from home lives with wife who presented with shortness of breath. Pt states that we has been sick for a week with shortness of breath. He was diagnosed with covid 3 days ago and has been taking antibiotics and aspirin. Today his breathing was worse and he presented to ED. Pt denies any headache, abd pain pain, chest pain, nausea, vomiting or diarrhea. Pt was noted to be hypoxic in ED and was started on non-rebreather. Pt denies smoking and occasional alcohol intake. Per EMS, Pt was hypoxic to 50's and saturation improved on non-rebreather.Pt was hypoxic in ED, was placed on NRB with saturation in 80's. Pt admitted to ICU for acute hypoxic resp failure requiring high flow nasal canula. Pt was intubated for AHRF and later was noted to be in Multi-organ failure with heart failure 2/2 shock due to ARDS 2/2 COVID-19; involving lungs (patient intubated since 11/19); heart (on pressor); kidneys (AGUSTO, Cr 6.37, on HD); liver (Alk Phos 236, AST 68). WBC 15.61. pH7.16, pCO2 73. CXR 12/22 indicated Bilateral lung parenchymal airspace opacities are unchanged. Patient remained sedated/intubated, on IV abx, albumin, pressor + midodrine, dilaudid, albuterol. HD dependent. Overall, patient was with grave prognosis. Wife is surrogate. New MOLST completed: Pt was made DNR / NO ESCALATION OF CARE / NO FURTHER HD and passed away on 12/24/2020 at 9:30 pm. Patient seen at bedside. On examination, patient was unresponsive, bilateral pupils dilated, non-responsive to light, no bilateral conjunctival reflex present, no heart sounds auscultated, no peripheral pulses present. EKG showed asystole . Patient pronounced dead on 12/24/2020 at 9:30 pm. Attending, Dr. Olivares, informed regarding patient’s status. No family present at bedside. Dr. Garza informed patient’s family Roseanne and condolences were offered     60 yr old male with no PMH from home lives with wife who presented with shortness of breath. Pt states that we has been sick for a week with shortness of breath. He was diagnosed with covid 3 days ago and has been taking antibiotics and aspirin. Today his breathing was worse and he presented to ED. Pt denies any headache, abd pain pain, chest pain, nausea, vomiting or diarrhea. Pt was noted to be hypoxic in ED and was started on non-rebreather. Pt denies smoking and occasional alcohol intake. Per EMS, Pt was hypoxic to 50's and saturation improved on non-rebreather.Pt was hypoxic in ED, was placed on NRB with saturation in 80's. Pt admitted to ICU for acute hypoxic resp failure requiring high flow nasal canula. Pt was intubated for AHRF and later was noted to be in Multi-organ failure with heart failure 2/2 shock due to ARDS 2/2 COVID-19; involving lungs (patient intubated since 11/19); heart (on pressor); kidneys (AGUSTO, Cr 6.37, on HD); liver (Alk Phos 236, AST 68). WBC 15.61. pH7.16, pCO2 73. CXR 12/22 indicated Bilateral lung parenchymal airspace opacities are unchanged. Patient remained sedated/intubated, on IV abx, albumin, pressor + midodrine, dilaudid, albuterol. HD dependent. Overall, patient was with grave prognosis. Wife is surrogate. New MOLST completed: Pt was made DNR / NO ESCALATION OF CARE / NO FURTHER HD and passed away on 12/24/2020 at 9:30 pm. Patient seen at bedside. On examination, patient was unresponsive, bilateral pupils dilated, non-responsive to light, no bilateral conjunctival reflex present, no heart sounds auscultated, no peripheral pulses present. EKG showed asystole . Patient pronounced dead on 12/24/2020 at 9:30 pm. Attending, Dr. Olivares, informed regarding patient’s status. No family present at bedside. Dr. Garza informed patient’s family Roseanne and condolences were offered

## 2020-12-24 NOTE — PROGRESS NOTE ADULT - PROVIDER SPECIALTY LIST ADULT
Critical Care
Nephrology
Palliative Care
Infectious Disease
Critical Care
Nephrology
Critical Care
Nephrology
Palliative Care
Nephrology
Nephrology
Palliative Care
Nephrology

## 2020-12-24 NOTE — PROGRESS NOTE ADULT - SUBJECTIVE AND OBJECTIVE BOX
Chandan Awad MD (Nephrology progress note)  [address], [facility],  [id number],  [id number]  TEl: [phone]  Cell: [phone]    Patient is a 50y Male seen and evaluated at bedside. Vital signs, laboratory data, imaging studies, consult notes reviewed done within past 24 hours. Overnight events noted. Patient now with no escalation of care and no hemodialysis as per patient's family request at present. No new labs     Allergies    No Known Allergies    Intolerances        ROS:  Limited.    VITALS:    T(C): 37.7 (12-24-20 @ 09:00), Max: 37.7 (12-24-20 @ 09:00)  HR: 97 (12-24-20 @ 12:00) (93 - 113)  BP: 95/41 (12-24-20 @ 12:00) (81/47 - 123/73)  RR: 12 (12-24-20 @ 12:00) (8 - 39)  SpO2: 77% (12-24-20 @ 12:00) (77% - 100%)  CAPILLARY BLOOD GLUCOSE      POCT Blood Glucose.: 130 mg/dL (24 Dec 2020 06:38)  POCT Blood Glucose.: 142 mg/dL (23 Dec 2020 23:28)  POCT Blood Glucose.: 162 mg/dL (23 Dec 2020 17:30)  POCT Blood Glucose.: 148 mg/dL (23 Dec 2020 13:42)  POCT Blood Glucose.: 159 mg/dL (23 Dec 2020 12:32)      12-23-20 @ 07:01  -  12-24-20 @ 07:00  --------------------------------------------------------  IN: 1522.8 mL / OUT: 0 mL / NET: 1522.8 mL      MEDICATIONS  (STANDING):  chlorhexidine 0.12% Liquid 15 milliLiter(s) Oral Mucosa every 12 hours  chlorhexidine 2% Cloths 1 Application(s) Topical <User Schedule>  HYDROmorphone Infusion 3 mG/Hr (3 mL/Hr) IV Continuous <Continuous>  lubricant eye ont 1 Application(s) 1 Application(s) Both EYES two times a day  norepinephrine Infusion 0.32 MICROgram(s)/kG/Min (37.5 mL/Hr) IV Continuous <Continuous>  polyethylene glycol 3350 17 Gram(s) Oral daily  propofol Infusion 15 MICROgram(s)/kG/Min (11.3 mL/Hr) IV Continuous <Continuous>  senna Syrup 10 milliLiter(s) Oral at bedtime    MEDICATIONS  (PRN):  acetaminophen    Suspension .. 650 milliGRAM(s) Enteral Tube every 6 hours PRN Temp greater or equal to 38C (100.4F)  sodium chloride 0.9% lock flush 10 milliLiter(s) IV Push every 1 hour PRN Pre/post blood products, medications, blood draw, and to maintain line patency      PHYSICAL EXAM:  GENERAL: Sedated and intubated on ventilatory support  HEENT: LEONOR, EOMI, neck supple, no JVP, no carotid bruit, oral mucosa moist and pale  CHEST/LUNG: Bilateral decreased breath sounds, bibasilar rales and rhonchi  HEART: Regular rate and rhythm, ERNESTO II/VI at LPSB, no gallops, no rub   ABDOMEN: Soft, nontender, non distended, bowel sounds present, no palpable organomegaly  : No flank or supra pubic tenderness.  EXTREMITIES: Bilateral upper and lower extremity edema++nt  Neurology: SEdated and intubated on ventilatory support  SKIN: No rash or skin lesion  Musculoskeletal: No joint deformity    Vascular ACCESS:     LABS:                        7.6    15.61 )-----------( 383      ( 23 Dec 2020 04:10 )             26.3     12-23    136  |  96  |  61<H>  ----------------------------<  134<H>  4.5   |  28  |  6.37<H>    Ca    9.7      23 Dec 2020 04:10  Phos  6.9     12-23  Mg     2.4     12-23    TPro  7.4  /  Alb  2.3<L>  /  TBili  0.8  /  DBili  x   /  AST  68<H>  /  ALT  51  /  AlkPhos  236<H>  12-23      PTT - ( 23 Dec 2020 04:10 )  PTT:88.7 sec          RADIOLOGY & ADDITIONAL STUDIES:  rad< from: Xray Chest 1 View- PORTABLE-Routine (Xray Chest 1 View- PORTABLE-Routine in AM.) (12.23.20 @ 09:27) >    EXAM:  XR CHEST PORTABLE ROUTINE 1V                            PROCEDURE DATE:  12/23/2020          INTERPRETATION:  Chest one view    HISTORY: COVID-19 follow-up    COMPARISON STUDY: 12/22/2020    Frontal expiratory view of the chest shows the heart to be similar in size. Endotracheal tube, left jugular catheter, right jugular dialysis catheter and feeding tube remain present.    The lungs show slight clearing of the lower left lung and there is no evidence of pneumothorax nor definite pleuraleffusion.    IMPRESSION:  Slight clearing, left lung.    Thank you for the courtesy of this referral.            ALEXIA FERNANDES MD; Attending Interventional Radiologist  This document has been electronically signed. Dec 23 2020 12:46PM    < end of copied text >    Imaging Personally Reviewed:  [x] YES  [ ] NO    Consultant(s) Notes Reviewed:  [x] YES  [ ] NO    Care Discussed with Primary team/ Other Providers [x] YES  [ ] NO

## 2020-12-24 NOTE — PROGRESS NOTE ADULT - ASSESSMENT
Respiratory failure 2/2 COVID PNA. Patient is currently in multi organ failure and on Dialysis. Requiring pressor support    Acute respiratory failure secondary to Covid pneumonia  Septic Shock and Paroxysmal Atrial fibrillation  AGUSTO ,ATN on dialysis      Neuro:   -Intubated and sedated  - On dilaudid drip to minimize fluid being given to patient. Started on propofol.     CVS:   #Septic Shock and Paroxysmal Atrial fibrillation  - patient is currently on Levophed  - s/p dig loading HR well controlled now  - EKG with sinus tach->AF  - bedside Echo without global dysfunction  - started midodrine 5mg tid    -#troponemia  -9->8.2->8.3 11/21 trended down   -cardio Dr. Tsang saw patient before, Currently he does not need Aspirin and Plavix,   - Will Consult PRN in case patient's condition changes    Pulmonary:   #Acute respiratory failure 2/2 COVID PNA  -ETT placed 11/19, replaced on 12/16 as patient was having a leak in the balloon  - ESR, procal, ferritin decreasing, d-dimer, LDH, CRP decreasing, will trend every 3rd day  - Dexamethasone IV 6mg Qd (10 day course through 11/24) , Finished on 12/4  - CXR 12/11 shows persistent  interstitial lung markings with bilateral airspace opacities   - IgA positive, IgG negative, indicating new infection, No Remdesivir given ARF  - c/w cefepime 12/17 for persistent fevers        ID:   -history and management as above  -changed LIJ to femoral line, removed LIJ 11/25  -WBC 18->11->13.2->15.6->17.8>18>10  -RVP +COVID, procal increased to 3.13 as noted above, started cefepime 11/21, Will complete 7 days of abx  - Sputum Cx: moderate Klebsiella pneumonia  -BCx NGTD      Nephro: #AGUSTO due to ATN secondary to likely Covid pneumonia   -CrCl 44, Cr 1.1 on admission, acutely worsened 11/21, possibly 2/2 covid injury and/or low BP  -Cr 7.35 today  -RIJ HD replaced 12/13 and HD started 11/21, last HD  12/19. Plan for HD today  -continue phoslo, on bicarb tabs now 1300 tid    GI: #TF  - monitor LFTs AST/ALT,  likely 2/2 COVID, trending down slowly  -Daily CMP  -Protonix IV for PPx  -Senna and Miralax for bowel regimen    Heme:   - Platelets 348 K  - monitor for now    # Anemia  - Hb 7.5>8  - s/p 1u pRBC 12/19, total 5u pRBC so far    Endocrine: #BG controlled  -A1c 6, average glucose 126  -TSH wnl  -vit D moderately low to 22, c/w 1000U/day   -HSS    Skin/Catheters:   #LIJ placed 12/5-- ( unable too change central line as Patient has diffuse anasarca and limited access. risk of removing line is greater)   #RIJ HD catheter 12/13  #R radial artery 12/04  #eye drops    Prophylaxis:  -patient is on heparin Drip , Protonix for GI ppx    Prognosis:   Poor, DNR< Family informed on 12/5 , Family deciding on making patient hospice with comfort care  Respiratory failure 2/2 COVID PNA. Patient is currently in multi organ failure and on Dialysis. Requiring pressor support    Acute respiratory failure secondary to Covid pneumonia  Septic Shock and Paroxysmal Atrial fibrillation  AGUSTO ,ATN on dialysis      Neuro:   -Intubated and sedated  - On dilaudid drip to minimize fluid being given to patient. Started on propofol.     CVS:   #Septic Shock and Paroxysmal Atrial fibrillation  - patient is currently on Levophed  - s/p dig loading HR well controlled now  - EKG with sinus tach->AF  - bedside Echo without global dysfunction    -#troponemia  -9->8.2->8.3 11/21 trended down   -cardio Dr. Tsang saw patient before, Currently he does not need Aspirin and Plavix,     Pulmonary:   #Acute respiratory failure 2/2 COVID PNA  -ETT placed 11/19, replaced on 12/16 as patient was having a leak in the balloon  - ESR, procal, ferritin decreasing, d-dimer, LDH, CRP decreasing, will trend every 3rd day  - Dexamethasone IV 6mg Qd (10 day course through 11/24) , Finished on 12/4  - CXR 12/11 shows persistent  interstitial lung markings with bilateral airspace opacities   - IgA positive, IgG negative, indicating new infection, No Remdesivir given ARF  - completed course of cefepime        ID:   -history and management as above  -changed LIJ to femoral line, removed LIJ 11/25  -WBC 18->11->13.2->15.6->17.8>18>10  -RVP +COVID, procal increased to 3.13 as noted above, started cefepime 11/21, Will complete 7 days of abx  - Sputum Cx: moderate Klebsiella pneumonia  -BCx NGTD      Nephro: #AGUSTO due to ATN secondary to likely Covid pneumonia   -CrCl 44, Cr 1.1 on admission, acutely worsened 11/21, possibly 2/2 covid injury and/or low BP  -Cr 7.35 today  -RIJ HD replaced 12/13 and HD started 11/21, last HD  12/19. No further HD as pt is now no escalation of care    GI: #TF  - monitor LFTs AST/ALT,  likely 2/2 COVID, trending down slowly  -Daily CMP  -Protonix IV for PPx        # Anemia  - Hb 7.5>8  - s/p 1u pRBC 12/19, total 5u pRBC so far    Endocrine: #BG controlled  -A1c 6, average glucose 126  -TSH wnl  -vit D moderately low to 22, c/w 1000U/day   -HSS    Skin/Catheters:   #LIJ placed 12/5-- ( unable too change central line as Patient has diffuse anasarca and limited access. risk of removing line is greater)   #RIJ HD catheter 12/13  #R radial artery 12/04  #eye drops    Prophylaxis:  - heparin Drip discontinued , Protonix for GI ppx    Prognosis:  DNR; No escalation of care

## 2020-12-24 NOTE — PROGRESS NOTE ADULT - NSTELEHEALTH_GEN_ALL_CORE
No

## 2020-12-24 NOTE — PROGRESS NOTE ADULT - NUTRITIONAL ASSESSMENT
This patient has been assessed with a concern for Malnutrition and has been determined to have a diagnosis/diagnoses of Severe protein-calorie malnutrition.    This patient is being managed with:   Diet NPO with Tube Feed-  Tube Feeding Modality: Nasogastric  Nepro with Carb Steady  Total Volume for 24 Hours (mL): 960  Continuous  Starting Tube Feed Rate {mL per Hour}: 10  Increase Tube Feed Rate by (mL): 5     Every 8 hours  Until Goal Tube Feed Rate (mL per Hour): 40  Tube Feed Duration (in Hours): 24  Tube Feed Start Time: 17:00  Entered: Dec 11 2020  3:50PM    
This patient has been assessed with a concern for Malnutrition and has been determined to have a diagnosis/diagnoses of Severe protein-calorie malnutrition.    This patient is being managed with:   Diet NPO with Tube Feed-  Tube Feeding Modality: Nasogastric  Nepro with Carb Steady  Total Volume for 24 Hours (mL): 240  Continuous  Starting Tube Feed Rate {mL per Hour}: 10  Until Goal Tube Feed Rate (mL per Hour): 10  Tube Feed Duration (in Hours): 24  Tube Feed Start Time: 12:00  Entered: Nov 20 2020 11:39AM    
This patient has been assessed with a concern for Malnutrition and has been determined to have a diagnosis/diagnoses of Severe protein-calorie malnutrition.    This patient is being managed with:   Diet NPO with Tube Feed-  Tube Feeding Modality: Nasogastric  Nepro with Carb Steady  Total Volume for 24 Hours (mL): 480  Continuous  Starting Tube Feed Rate {mL per Hour}: 10  Until Goal Tube Feed Rate (mL per Hour): 20  Tube Feed Duration (in Hours): 24  Tube Feed Start Time: 12:00  No Carb Prosource (1pkg = 15gms Protein)     Qty per Day:  1  PKT BID  Entered: Dec  4 2020  2:38PM    
This patient has been assessed with a concern for Malnutrition and has been determined to have a diagnosis/diagnoses of Severe protein-calorie malnutrition.    This patient is being managed with:   Diet NPO with Tube Feed-  Tube Feeding Modality: Nasogastric  Nepro with Carb Steady  Total Volume for 24 Hours (mL): 960  Continuous  Starting Tube Feed Rate {mL per Hour}: 10  Increase Tube Feed Rate by (mL): 5     Every 8 hours  Until Goal Tube Feed Rate (mL per Hour): 40  Tube Feed Duration (in Hours): 24  Tube Feed Start Time: 17:00  Entered: Dec 11 2020  3:50PM    
This patient has been assessed with a concern for Malnutrition and has been determined to have a diagnosis/diagnoses of Severe protein-calorie malnutrition.    This patient is being managed with:   Diet NPO-  Entered: Dec  8 2020 10:57AM    
This patient has been assessed with a concern for Malnutrition and has been determined to have a diagnosis/diagnoses of Morbid obesity/BMI > 40.    This patient is being managed with:   Diet NPO with Tube Feed-  Tube Feeding Modality: Nasogastric  Nepro with Carb Steady  Total Volume for 24 Hours (mL): 240  Continuous  Starting Tube Feed Rate {mL per Hour}: 10  Until Goal Tube Feed Rate (mL per Hour): 10  Tube Feed Duration (in Hours): 24  Tube Feed Start Time: 12:00  Entered: Nov 20 2020 11:39AM    
This patient has been assessed with a concern for Malnutrition and has been determined to have a diagnosis/diagnoses of Morbid obesity/BMI > 40.    This patient is being managed with:   Diet NPO-  Entered: Nov 19 2020  9:20AM    
This patient has been assessed with a concern for Malnutrition and has been determined to have a diagnosis/diagnoses of Severe protein-calorie malnutrition.    This patient is being managed with:   Diet NPO with Tube Feed-  Tube Feeding Modality: Nasogastric  Nepro with Carb Steady  Total Volume for 24 Hours (mL): 240  Continuous  Starting Tube Feed Rate {mL per Hour}: 10  Until Goal Tube Feed Rate (mL per Hour): 10  Tube Feed Duration (in Hours): 24  Tube Feed Start Time: 12:00  Entered: Nov 20 2020 11:39AM    
This patient has been assessed with a concern for Malnutrition and has been determined to have a diagnosis/diagnoses of Severe protein-calorie malnutrition.    This patient is being managed with:   Diet NPO with Tube Feed-  Tube Feeding Modality: Nasogastric  Nepro with Carb Steady  Total Volume for 24 Hours (mL): 480  Continuous  Starting Tube Feed Rate {mL per Hour}: 10  Until Goal Tube Feed Rate (mL per Hour): 20  Tube Feed Duration (in Hours): 24  Tube Feed Start Time: 12:00  No Carb Prosource (1pkg = 15gms Protein)     Qty per Day:  1  PKT BID  Entered: Dec  4 2020  2:38PM    
This patient has been assessed with a concern for Malnutrition and has been determined to have a diagnosis/diagnoses of Severe protein-calorie malnutrition.    This patient is being managed with:   Diet NPO with Tube Feed-  Tube Feeding Modality: Nasogastric  Nepro with Carb Steady  Total Volume for 24 Hours (mL): 480  Continuous  Starting Tube Feed Rate {mL per Hour}: 10  Until Goal Tube Feed Rate (mL per Hour): 20  Tube Feed Duration (in Hours): 24  Tube Feed Start Time: 12:00  No Carb Prosource (1pkg = 15gms Protein)     Qty per Day:  1  PKT BID  Entered: Dec  4 2020  2:38PM    
This patient has been assessed with a concern for Malnutrition and has been determined to have a diagnosis/diagnoses of Severe protein-calorie malnutrition.    This patient is being managed with:   Diet NPO with Tube Feed-  Tube Feeding Modality: Nasogastric  Nepro with Carb Steady  Total Volume for 24 Hours (mL): 960  Continuous  Starting Tube Feed Rate {mL per Hour}: 10  Increase Tube Feed Rate by (mL): 5     Every 8 hours  Until Goal Tube Feed Rate (mL per Hour): 40  Tube Feed Duration (in Hours): 24  Tube Feed Start Time: 17:00  Entered: Dec 11 2020  3:50PM    
This patient has been assessed with a concern for Malnutrition and has been determined to have a diagnosis/diagnoses of Severe protein-calorie malnutrition.    This patient is being managed with:   Diet NPO-  Entered: Dec  8 2020 10:57AM    
This patient has been assessed with a concern for Malnutrition and has been determined to have a diagnosis/diagnoses of Severe protein-calorie malnutrition.    This patient is being managed with:   Diet NPO-  Entered: Dec  8 2020 10:57AM    
This patient has been assessed with a concern for Malnutrition and has been determined to have a diagnosis/diagnoses of Morbid obesity/BMI > 40.    This patient is being managed with:   Diet NPO with Tube Feed-  Tube Feeding Modality: Nasogastric  Nepro with Carb Steady  Total Volume for 24 Hours (mL): 240  Continuous  Starting Tube Feed Rate {mL per Hour}: 10  Until Goal Tube Feed Rate (mL per Hour): 10  Tube Feed Duration (in Hours): 24  Tube Feed Start Time: 12:00  Entered: Nov 20 2020 11:39AM    
This patient has been assessed with a concern for Malnutrition and has been determined to have a diagnosis/diagnoses of Morbid obesity/BMI > 40.    This patient is being managed with:   TALAT mcbride/hr-  Entered: Nov 19 2020  9:20AM
This patient has been assessed with a concern for Malnutrition and has been determined to have a diagnosis/diagnoses of Severe protein-calorie malnutrition.    This patient is being managed with:   Diet NPO with Tube Feed-  Tube Feeding Modality: Nasogastric  Nepro with Carb Steady  Total Volume for 24 Hours (mL): 480  Continuous  Starting Tube Feed Rate {mL per Hour}: 10  Until Goal Tube Feed Rate (mL per Hour): 20  Tube Feed Duration (in Hours): 24  Tube Feed Start Time: 12:00  No Carb Prosource (1pkg = 15gms Protein)     Qty per Day:  1  PKT BID  Entered: Dec  4 2020  2:38PM    
This patient has been assessed with a concern for Malnutrition and has been determined to have a diagnosis/diagnoses of Severe protein-calorie malnutrition.    This patient is being managed with:   Diet NPO with Tube Feed-  Tube Feeding Modality: Nasogastric  Nepro with Carb Steady  Total Volume for 24 Hours (mL): 960  Continuous  Starting Tube Feed Rate {mL per Hour}: 10  Increase Tube Feed Rate by (mL): 5     Every 8 hours  Until Goal Tube Feed Rate (mL per Hour): 40  Tube Feed Duration (in Hours): 24  Tube Feed Start Time: 17:00  Entered: Dec 11 2020  3:50PM    
This patient has been assessed with a concern for Malnutrition and has been determined to have a diagnosis/diagnoses of Severe protein-calorie malnutrition.    This patient is being managed with:   Diet NPO with Tube Feed-  Tube Feeding Modality: Nasogastric  Nepro with Carb Steady  Total Volume for 24 Hours (mL): 240  Continuous  Starting Tube Feed Rate {mL per Hour}: 10  Until Goal Tube Feed Rate (mL per Hour): 10  Tube Feed Duration (in Hours): 24  Tube Feed Start Time: 12:00  Entered: Nov 20 2020 11:39AM    
This patient has been assessed with a concern for Malnutrition and has been determined to have a diagnosis/diagnoses of Severe protein-calorie malnutrition.    This patient is being managed with:   Diet NPO with Tube Feed-  Tube Feeding Modality: Nasogastric  Nepro with Carb Steady  Total Volume for 24 Hours (mL): 960  Continuous  Starting Tube Feed Rate {mL per Hour}: 10  Increase Tube Feed Rate by (mL): 5     Every 8 hours  Until Goal Tube Feed Rate (mL per Hour): 40  Tube Feed Duration (in Hours): 24  Tube Feed Start Time: 17:00  Entered: Dec 11 2020  3:50PM    
This patient has been assessed with a concern for Malnutrition and has been determined to have a diagnosis/diagnoses of Severe protein-calorie malnutrition.    This patient is being managed with:   Diet NPO-  Entered: Dec  8 2020 10:57AM    
This patient has been assessed with a concern for Malnutrition and has been determined to have a diagnosis/diagnoses of Severe protein-calorie malnutrition.    This patient is being managed with:   Diet NPO with Tube Feed-  Tube Feeding Modality: Nasogastric  Nepro with Carb Steady  Total Volume for 24 Hours (mL): 240  Continuous  Starting Tube Feed Rate {mL per Hour}: 10  Until Goal Tube Feed Rate (mL per Hour): 10  Tube Feed Duration (in Hours): 24  Tube Feed Start Time: 12:00  Entered: Nov 20 2020 11:39AM    
This patient has been assessed with a concern for Malnutrition and has been determined to have a diagnosis/diagnoses of Severe protein-calorie malnutrition.    This patient is being managed with:   Diet NPO with Tube Feed-  Tube Feeding Modality: Nasogastric  Nepro with Carb Steady  Total Volume for 24 Hours (mL): 480  Continuous  Starting Tube Feed Rate {mL per Hour}: 10  Until Goal Tube Feed Rate (mL per Hour): 20  Tube Feed Duration (in Hours): 24  Tube Feed Start Time: 12:00  No Carb Prosource (1pkg = 15gms Protein)     Qty per Day:  1  PKT BID  Entered: Dec  4 2020  2:38PM    
This patient has been assessed with a concern for Malnutrition and has been determined to have a diagnosis/diagnoses of Severe protein-calorie malnutrition.    This patient is being managed with:   Diet NPO-  Entered: Dec  8 2020 10:57AM    
This patient has been assessed with a concern for Malnutrition and has been determined to have a diagnosis/diagnoses of Morbid obesity/BMI > 40.    This patient is being managed with:   Diet NPO with Tube Feed-  Tube Feeding Modality: Nasogastric  Nepro with Carb Steady  Total Volume for 24 Hours (mL): 240  Continuous  Starting Tube Feed Rate {mL per Hour}: 10  Until Goal Tube Feed Rate (mL per Hour): 10  Tube Feed Duration (in Hours): 24  Tube Feed Start Time: 12:00  Entered: Nov 20 2020 11:39AM    
This patient has been assessed with a concern for Malnutrition and has been determined to have a diagnosis/diagnoses of Severe protein-calorie malnutrition.    This patient is being managed with:   Diet NPO with Tube Feed-  Tube Feeding Modality: Nasogastric  Nepro with Carb Steady  Total Volume for 24 Hours (mL): 240  Continuous  Starting Tube Feed Rate {mL per Hour}: 10  Until Goal Tube Feed Rate (mL per Hour): 10  Tube Feed Duration (in Hours): 24  Tube Feed Start Time: 12:00  Entered: Nov 20 2020 11:39AM    
This patient has been assessed with a concern for Malnutrition and has been determined to have a diagnosis/diagnoses of Morbid obesity/BMI > 40.    This patient is being managed with:   Diet NPO with Tube Feed-  Tube Feeding Modality: Nasogastric  Nepro with Carb Steady  Total Volume for 24 Hours (mL): 240  Continuous  Starting Tube Feed Rate {mL per Hour}: 10  Until Goal Tube Feed Rate (mL per Hour): 10  Tube Feed Duration (in Hours): 24  Tube Feed Start Time: 12:00  Entered: Nov 20 2020 11:39AM    

## 2020-12-24 NOTE — PROGRESS NOTE ADULT - NSHPATTENDINGPLANDISCUSS_GEN_ALL_CORE
icu team on rounds
icu team on rounds
icu team
icu team on rounds
icu team on rounds / neph f/u noted
icu team on rounds
Patient/primary team/Family
icu team on rounds
icu team/ neph
icu team on rounds
Dr. Rudolph, ICU team
icu team on rounds
Dr. Rudolph, ICU team
icu team on rounds
icu team on rounds and neph attend
Patient/primary team/Family
icu team on rounds
Patient/primary team/Family
Dr. Tafoya, ICU team
Patient/primary team/Family

## 2020-12-25 PROCEDURE — 71045 X-RAY EXAM CHEST 1 VIEW: CPT

## 2020-12-25 PROCEDURE — 87640 STAPH A DNA AMP PROBE: CPT

## 2020-12-25 PROCEDURE — 87070 CULTURE OTHR SPECIMN AEROBIC: CPT

## 2020-12-25 PROCEDURE — 84478 ASSAY OF TRIGLYCERIDES: CPT

## 2020-12-25 PROCEDURE — 86140 C-REACTIVE PROTEIN: CPT

## 2020-12-25 PROCEDURE — 86706 HEP B SURFACE ANTIBODY: CPT

## 2020-12-25 PROCEDURE — 87641 MR-STAPH DNA AMP PROBE: CPT

## 2020-12-25 PROCEDURE — 83036 HEMOGLOBIN GLYCOSYLATED A1C: CPT

## 2020-12-25 PROCEDURE — 86803 HEPATITIS C AB TEST: CPT

## 2020-12-25 PROCEDURE — 86359 T CELLS TOTAL COUNT: CPT

## 2020-12-25 PROCEDURE — 82728 ASSAY OF FERRITIN: CPT

## 2020-12-25 PROCEDURE — 94640 AIRWAY INHALATION TREATMENT: CPT

## 2020-12-25 PROCEDURE — 82962 GLUCOSE BLOOD TEST: CPT

## 2020-12-25 PROCEDURE — 87186 SC STD MICRODIL/AGAR DIL: CPT

## 2020-12-25 PROCEDURE — 86704 HEP B CORE ANTIBODY TOTAL: CPT

## 2020-12-25 PROCEDURE — 87040 BLOOD CULTURE FOR BACTERIA: CPT

## 2020-12-25 PROCEDURE — 85379 FIBRIN DEGRADATION QUANT: CPT

## 2020-12-25 PROCEDURE — 99261: CPT

## 2020-12-25 PROCEDURE — 85652 RBC SED RATE AUTOMATED: CPT

## 2020-12-25 PROCEDURE — 83880 ASSAY OF NATRIURETIC PEPTIDE: CPT

## 2020-12-25 PROCEDURE — 83010 ASSAY OF HAPTOGLOBIN QUANT: CPT

## 2020-12-25 PROCEDURE — 83615 LACTATE (LD) (LDH) ENZYME: CPT

## 2020-12-25 PROCEDURE — 80074 ACUTE HEPATITIS PANEL: CPT

## 2020-12-25 PROCEDURE — 82553 CREATINE MB FRACTION: CPT

## 2020-12-25 PROCEDURE — 86923 COMPATIBILITY TEST ELECTRIC: CPT

## 2020-12-25 PROCEDURE — 85027 COMPLETE CBC AUTOMATED: CPT

## 2020-12-25 PROCEDURE — 87340 HEPATITIS B SURFACE AG IA: CPT

## 2020-12-25 PROCEDURE — 84145 PROCALCITONIN (PCT): CPT

## 2020-12-25 PROCEDURE — 85384 FIBRINOGEN ACTIVITY: CPT

## 2020-12-25 PROCEDURE — 83735 ASSAY OF MAGNESIUM: CPT

## 2020-12-25 PROCEDURE — 80162 ASSAY OF DIGOXIN TOTAL: CPT

## 2020-12-25 PROCEDURE — 86901 BLOOD TYPING SEROLOGIC RH(D): CPT

## 2020-12-25 PROCEDURE — 85362 FIBRIN DEGRADATION PRODUCTS: CPT

## 2020-12-25 PROCEDURE — 86360 T CELL ABSOLUTE COUNT/RATIO: CPT

## 2020-12-25 PROCEDURE — 0225U NFCT DS DNA&RNA 21 SARSCOV2: CPT

## 2020-12-25 PROCEDURE — 80202 ASSAY OF VANCOMYCIN: CPT

## 2020-12-25 PROCEDURE — 36430 TRANSFUSION BLD/BLD COMPNT: CPT

## 2020-12-25 PROCEDURE — 86850 RBC ANTIBODY SCREEN: CPT

## 2020-12-25 PROCEDURE — 85610 PROTHROMBIN TIME: CPT

## 2020-12-25 PROCEDURE — 85730 THROMBOPLASTIN TIME PARTIAL: CPT

## 2020-12-25 PROCEDURE — 84443 ASSAY THYROID STIM HORMONE: CPT

## 2020-12-25 PROCEDURE — P9040: CPT

## 2020-12-25 PROCEDURE — 80048 BASIC METABOLIC PNL TOTAL CA: CPT

## 2020-12-25 PROCEDURE — 80053 COMPREHEN METABOLIC PANEL: CPT

## 2020-12-25 PROCEDURE — 94003 VENT MGMT INPAT SUBQ DAY: CPT

## 2020-12-25 PROCEDURE — 36415 COLL VENOUS BLD VENIPUNCTURE: CPT

## 2020-12-25 PROCEDURE — 96374 THER/PROPH/DIAG INJ IV PUSH: CPT

## 2020-12-25 PROCEDURE — P9047: CPT

## 2020-12-25 PROCEDURE — 94002 VENT MGMT INPAT INIT DAY: CPT

## 2020-12-25 PROCEDURE — 82803 BLOOD GASES ANY COMBINATION: CPT

## 2020-12-25 PROCEDURE — 86900 BLOOD TYPING SEROLOGIC ABO: CPT

## 2020-12-25 PROCEDURE — 82550 ASSAY OF CK (CPK): CPT

## 2020-12-25 PROCEDURE — 85025 COMPLETE CBC W/AUTO DIFF WBC: CPT

## 2020-12-25 PROCEDURE — 86769 SARS-COV-2 COVID-19 ANTIBODY: CPT

## 2020-12-25 PROCEDURE — 93005 ELECTROCARDIOGRAM TRACING: CPT

## 2020-12-25 PROCEDURE — P9037: CPT

## 2020-12-25 PROCEDURE — 83605 ASSAY OF LACTIC ACID: CPT

## 2020-12-25 PROCEDURE — 82306 VITAMIN D 25 HYDROXY: CPT

## 2020-12-25 PROCEDURE — 99291 CRITICAL CARE FIRST HOUR: CPT | Mod: 25

## 2020-12-25 PROCEDURE — 84484 ASSAY OF TROPONIN QUANT: CPT

## 2020-12-25 PROCEDURE — 84100 ASSAY OF PHOSPHORUS: CPT

## 2021-11-30 NOTE — PROGRESS NOTE ADULT - PROBLEM/PLAN-3
DISPLAY PLAN FREE TEXT
3n

## 2022-01-10 NOTE — PROGRESS NOTE ADULT - PROBLEM SELECTOR PLAN 3
Please let her know that she was doing 60 tablets every few months but it looks like she wants to start taking 60 every month that might be a bit too much.  We will go ahead and fill 60 pills this time and referred to pain manage  Please send refill request   Patient with severe sepsis/ARDS from COVID-19 pneumonia on ventilatory support and IV pressors  Maintain MAP>65-70 mm hg  Continue ventilatory support  Optimal treatment of COVID-19/sepsis per primary ICU/pulmonary/ID team  Monitor vital signs  Adjust antibiotics as per renal dose clearance for Cr cl<10 ml/min.

## 2023-07-16 NOTE — PROGRESS NOTE ADULT - PROBLEM SELECTOR PROBLEM 6
Palliative care encounter
Palliative care encounter
Anemia of chronic disease
COVID-19
Palliative care encounter
- - -

## 2024-03-26 NOTE — PROCEDURE NOTE - NSNUMOFLUMENS_VASC_A_CORE
Problem: Adult Inpatient Plan of Care  Goal: Plan of Care Review  Outcome: Ongoing, Progressing  Goal: Patient-Specific Goal (Individualized)  Outcome: Ongoing, Progressing  Goal: Absence of Hospital-Acquired Illness or Injury  Outcome: Ongoing, Progressing  Goal: Optimal Comfort and Wellbeing  Outcome: Ongoing, Progressing  Goal: Readiness for Transition of Care  Outcome: Ongoing, Progressing     
double lumen
triple lumen
double lumen
triple lumen
triple lumen
double lumen

## 2024-06-23 NOTE — PATIENT PROFILE ADULT - NSASFALLATTEMPTOOB_GEN_A_NUR
Patient Seen in: [facility] Emergency Department      History     Chief Complaint   Patient presents with    Neurologic Problem     Stated Complaint: here for shaking a lot, is not shaking now, [facility]    Subjective:   HPI    78-year-old male presenting with shaking episodes this been ongoing issue progressively worsening over the last month or also gets shaking episodes all over his body is awake during this he says he just cannot control it sometimes comes on when he is driving sometimes when he is standing he says it just comes on last for couple seconds and seems to go away but is affecting his ability sometimes even stand.  He denies any sort of chest pain headaches any other exacerbating factors or associated symptoms.  Awake alert patient appears no    Objective:   Past Medical History:    Back pain    Essential hypertension    High blood pressure    High cholesterol    Migraines    Constant headaches since fall of 4/2018.              Past Surgical History:   Procedure Laterality Date    Inguinal hernia repair      Laparoscopic cholecystectomy                  Social History     Socioeconomic History    Marital status:    Tobacco Use    Smoking status: Never    Smokeless tobacco: Never   Substance and Sexual Activity    Alcohol use: No    Drug use: No   Other Topics Concern    Caffeine Concern No    Exercise No     Social Determinants of Health      Received from [facility]    Social Connections    Received from [facility]    Housing Stability              Review of Systems    Positive for stated complaint: here for shaking a lot, is not shaking now, [facility]  Other systems are as noted in HPI.  Constitutional and vital signs reviewed.      All other systems reviewed and negative except as noted above.    Physical Exam     ED Triage Vitals [06/22/24 1939]   /71   Pulse 77   Resp 20   Temp 97.8  °F (36.6 °C)   Temp src Oral   SpO2 96 %   O2 Device None (Room air)       Current Vitals:   Vital Signs  BP: 159/71  Pulse: 77  Resp: 20  Temp: 97.8 °F (36.6 °C)  Temp src: Oral    Oxygen Therapy  SpO2: 96 %  O2 Device: None (Room air)            Physical Exam  Awake alert patient appears no distress HEENT exam normal lungs are clear cardiovascular exam shows regular rhythm abdomen soft nontender extremities no clubbing cyanosis or edema cranial nerves II through XII intact    Normal gait but when the patient is standing he started having a shaking episode his whole body shaking.  But he feels extremely unsteady with his I have to hold him to help set him back down.       ED Course     Labs Reviewed   COMP METABOLIC PANEL (14) - Abnormal; Notable for the following components:       Result Value    Glucose 108 (*)     BUN 24 (*)     eGFR-Cr 56 (*)     AST 38 (*)     ALT 15 (*)     All other components within normal limits   URINALYSIS WITH CULTURE REFLEX - Abnormal; Notable for the following components:    Ketones Urine Trace (*)     All other components within normal limits   CBC W/ DIFFERENTIAL - Abnormal; Notable for the following components:    HGB 12.6 (*)     Monocyte Absolute 1.12 (*)     All other components within normal limits   CBC WITH DIFFERENTIAL WITH PLATELET    Narrative:     The following orders were created for panel order CBC With Differential With Platelet.  Procedure                               Abnormality         Status                     ---------                               -----------         ------                     CBC W/ DIFFERENTIAL[[id number]]          Abnormal            Final result                 Please view results for these tests on the individual orders.             Differential diagnosis includes subarachnoid hemorrhage subdural hematoma         MDM                                         Medical Decision Making  78-year-old male presenting emergency department for shaking  episodes.  He says he has been evaluated most recently 24 hours ago at the [facility] for this according to family he has been evaluated 4 times in the last weeks for this.  IV established cardiac monitor shows a sinus rhythm pulse ox shows no signs of hypoxia.  Patient does not want to wait for his CBC here he understands risk benefits and decision he says he is not anemic at all his metabolic panel shows stable renal function.  I recommend neurology follow-up he says [facility] that I will hold off on any further medications she was just prescribed a sleep aid patient return emerged part worsening symptoms other complaints the patient was screened and evaluated during this visit.  As a treating physician attending to the patient, I determined, within reasonable clinical confidence and prior to discharge, that an emergency medical condition was not or was no longer present.  There was no indication for further evaluation, treatment or admission on an emergency basis.    The usual and customary discharge instructions were discussed given the patient's ER course.  We discussed signs and symptoms that should prompt the patient's immediate return to the emergency department.  Reasonable over-the-counter and prescription treatment options and physician follow-up plan was discussed.  Patient was discharged home in good condition    This note was prepared using Dragon Medical voice recognition dictation software.  As a result errors may occur.  When identified to these areas have been corrected.  While every attempt is made to correct errors during dictation discrepancies may still exist.  Please contact if there are any errors    Amount and/or Complexity of Data Reviewed  Labs: ordered. Decision-making details documented in ED Course.  ECG/medicine tests: ordered and independent interpretation performed. Decision-making details documented in ED Course.        Disposition  and Plan     Clinical Impression:  1. Shaking         Disposition:  Discharge  6/22/2024 11:29 pm    Follow-up:  Nonstaff, Physician    Follow up in 1 week(s)            Medications Prescribed:  Current Discharge Medication List                                          no

## 2025-02-07 NOTE — PROGRESS NOTE ADULT - MINUTES
"    Colorectal Surgery Progress Note      HPI: Swapnil Allen is a 74 y.o. male with a past medical history of HTN, CVA/TIA 2020 (following cocaine use, on asa), anemia (hgb 12.2), CKD (baseline sCr 1.4-1.5), thyrotoxicosis s/p partial thyroidectomy, left parotid mass c/w warthins tumor, hep C (treated with epclusa 9/2024), hx of substance use (quit 2020), and BPH w/LUTS , RCC now s/p Right Radical Nephrectomy, Retroperitoneal Lymph Node Dissection on 1/28/25. Post operative course complicated by nausea and emesis requiring NG tube on POD3. High NG output and CT concerning for partial bowel obstruction with possible evidence of internal hernia. CRS consulted for further evaluation.       Subjective  NAEON. Had a large bowel movement yesterday and feeling a bit better. No abdominal pain. Passing some flatus. No new symptoms.       Objective  NAD  NGT in place with thin bilious output, functioning appropriately  Nontachycardic  On RA  Abd soft, appropriately tender to palpation following laparotomy, midline incision c/d/I with dermabond, no generalized peritonitis        I/O last 3 completed shifts:  In: 4897.6 (47.6 mL/kg) [I.V.:1865.3 (18.1 mL/kg); NG/GT:130; IV Piggyback:200]  Out: 6475 (62.9 mL/kg) [Urine:2050 (0.6 mL/kg/hr); Emesis/NG output:4425]  Weight: 102.9 kg   No intake/output data recorded.    UOP 1.6 L  NG 2.1 L  BM x2    Data Review:  CBC:   Lab Results   Component Value Date    WBC 12.3 (H) 02/06/2025    RBC 3.50 (L) 02/06/2025     BMP:   Lab Results   Component Value Date    GLUCOSE 123 (H) 02/06/2025    CO2 27 02/06/2025    BUN 34 (H) 02/06/2025    CREATININE 1.60 (H) 02/06/2025    CALCIUM 9.2 02/06/2025     Coagulation: No results found for: \"INR\", \"APTT\"    Imaging:   None new    Assessment/Plan:   74 year old male with notable medical history/co-morbidities presenting with partial bowel obstruction following open right radical nephrectomy (1/28/25). Reassuring abdominal exam, no signs of threatened " bowel on CT scan, having some bowel obstruction    - No plan for return to OR at this time  - If patient does not progress over the weekend may consider decompressive PEG early next week  - Continue TPN    Will continue to follow.    To be discussed with Dr. Alexy Zapata Service Pager #[phone]         30